# Patient Record
Sex: MALE | Race: WHITE | NOT HISPANIC OR LATINO | Employment: OTHER | URBAN - METROPOLITAN AREA
[De-identification: names, ages, dates, MRNs, and addresses within clinical notes are randomized per-mention and may not be internally consistent; named-entity substitution may affect disease eponyms.]

---

## 2017-01-06 ENCOUNTER — OFFICE VISIT (OUTPATIENT)
Dept: CARDIOLOGY | Facility: MEDICAL CENTER | Age: 67
End: 2017-01-06
Payer: MEDICARE

## 2017-01-06 ENCOUNTER — NON-PROVIDER VISIT (OUTPATIENT)
Dept: CARDIOLOGY | Facility: MEDICAL CENTER | Age: 67
End: 2017-01-06
Payer: MEDICARE

## 2017-01-06 VITALS
BODY MASS INDEX: 32.27 KG/M2 | OXYGEN SATURATION: 93 % | WEIGHT: 238 LBS | SYSTOLIC BLOOD PRESSURE: 128 MMHG | DIASTOLIC BLOOD PRESSURE: 72 MMHG | HEART RATE: 72 BPM

## 2017-01-06 DIAGNOSIS — I48.0 PAROXYSMAL ATRIAL FIBRILLATION (HCC): ICD-10-CM

## 2017-01-06 DIAGNOSIS — I49.5 SINOATRIAL NODE DYSFUNCTION (HCC): ICD-10-CM

## 2017-01-06 DIAGNOSIS — Z95.810 AICD (AUTOMATIC CARDIOVERTER/DEFIBRILLATOR) PRESENT: ICD-10-CM

## 2017-01-06 DIAGNOSIS — Z98.890 S/P CARDIAC CATH: ICD-10-CM

## 2017-01-06 DIAGNOSIS — I47.29 PAROXYSMAL VENTRICULAR TACHYCARDIA (HCC): ICD-10-CM

## 2017-01-06 DIAGNOSIS — Z79.899 HIGH RISK MEDICATION USE: ICD-10-CM

## 2017-01-06 DIAGNOSIS — Z79.01 ANTICOAGULANT LONG-TERM USE: ICD-10-CM

## 2017-01-06 DIAGNOSIS — I47.20 VENTRICULAR TACHYCARDIA (HCC): ICD-10-CM

## 2017-01-06 PROCEDURE — G8427 DOCREV CUR MEDS BY ELIG CLIN: HCPCS | Performed by: INTERNAL MEDICINE

## 2017-01-06 PROCEDURE — 99214 OFFICE O/P EST MOD 30 MIN: CPT | Mod: 25 | Performed by: INTERNAL MEDICINE

## 2017-01-06 PROCEDURE — G8419 CALC BMI OUT NRM PARAM NOF/U: HCPCS | Performed by: INTERNAL MEDICINE

## 2017-01-06 PROCEDURE — 3017F COLORECTAL CA SCREEN DOC REV: CPT | Mod: 8P | Performed by: INTERNAL MEDICINE

## 2017-01-06 PROCEDURE — 1036F TOBACCO NON-USER: CPT | Performed by: INTERNAL MEDICINE

## 2017-01-06 PROCEDURE — 93283 PRGRMG EVAL IMPLANTABLE DFB: CPT | Performed by: INTERNAL MEDICINE

## 2017-01-06 NOTE — Clinical Note
[facility]   [address], [address]  [place] [address]  Phone: [phone]  Fax: [phone]              Mkie Ferrell  1950    Encounter Date: 1/6/2017    Daniel Ferrera M.D.          PROGRESS NOTE:  Subjective:   Mike Ferrell is a 66 y.o. male who presents today with PAF and VT on sotalol. One asymptomatic NSVT. No a fib. Feels well. No chest pain or SOB. No syncope or presyncope. Device near TERESA.    Past Medical History   Diagnosis Date   • Paroxysmal ventricular tachycardia (HCC) 4/4/2012   • Sinoatrial node dysfunction (HCC) 4/4/2012   • Atrial fibrillation (HCC) 4/4/2012   • Hypopotassemia 4/4/2012   • Unspecified essential hypertension 4/4/2012   • Other and unspecified hyperlipidemia 4/4/2012   • Syncope and collapse 4/4/2012   • Thrombocytopenia, unspecified (HCC) 4/4/2012   • Methamphetamine use none for many years   • Automatic implantable cardiac defibrillator in situ 11/8/2012     History reviewed. No pertinent past surgical history.  History reviewed. No pertinent family history.  History   Smoking status   • Never Smoker    Smokeless tobacco   • Never Used     Allergies   Allergen Reactions   • Sulfa Drugs      Outpatient Encounter Prescriptions as of 1/6/2017   Medication Sig Dispense Refill   • warfarin (COUMADIN) 5 MG Tab TAKE ONE TO ONE & ONE-HALF TABLETS BY MOUTH ONCE DAILY AS DIRECTED BY  COUMADIN  CLINIC 120 Tab 1   • hydrochlorothiazide (MICROZIDE) 12.5 MG capsule Take 1 Cap by mouth every day. 90 Cap 3   • sotalol (BETAPACE) 120 MG tablet Take 1 Tab by mouth 2 times a day. TAKE 1 TAB BY MOUTH 2 TIMES A DAY. 180 Tab 3   • atorvastatin (LIPITOR) 40 MG TABS Take 1 Tab by mouth every day. 30 Tab 0   • amlodipine (NORVASC) 5 MG TABS Take 5 mg by mouth every day.     • benazepril (LOTENSIN) 40 MG tablet Take 40 mg by mouth every day.     • clonidine (CATAPRES) 0.1 MG TABS Take 0.1 mg by mouth as needed. Hardly ever uses     •  Cholecalciferol (VITAMIN D PO) Take 4,000 Units by mouth every day at 6 PM.       No facility-administered encounter medications on file as of 1/6/2017.     ROS     Objective:   /72 mmHg  Pulse 72  Wt 107.956 kg (238 lb)  SpO2 93%    Physical Exam   Constitutional: He is oriented to person, place, and time. He appears well-developed and well-nourished.   HENT:   Mouth/Throat: Oropharynx is clear and moist.   Eyes: Conjunctivae and EOM are normal.   Neck: No JVD present. No thyroid mass present.   Cardiovascular: Normal rate, regular rhythm, S1 normal, S2 normal and normal pulses.  PMI is not displaced.  Exam reveals no gallop.    No murmur heard.  Pulses:       Carotid pulses are 2+ on the right side, and 2+ on the left side.       Radial pulses are 2+ on the right side, and 2+ on the left side.        Femoral pulses are 2+ on the right side, and 2+ on the left side.       Dorsalis pedis pulses are 2+ on the right side, and 2+ on the left side.   No peripheral edema.   Pulmonary/Chest: Effort normal and breath sounds normal.   Abdominal: Soft. Normal appearance. He exhibits no abdominal bruit. There is no hepatosplenomegaly. There is no tenderness.   Musculoskeletal: Normal range of motion. He exhibits no edema.        Thoracic back: He exhibits no tenderness and no spasm.   Neurological: He is alert and oriented to person, place, and time.   Skin: No rash noted. No cyanosis. Nails show no clubbing.   Psychiatric: He has a normal mood and affect.       Assessment:     1. S/P cardiac cath     2. Anticoagulant long-term use     3. Paroxysmal atrial fibrillation (HCC)     4. High risk medication use     5. Paroxysmal ventricular tachycardia (HCC)     6. Sinoatrial node dysfunction (HCC)         Medical Decision Making:  Today's Assessment / Status / Plan:     1. VT one NSVT. Continue sotalol.  2. A fib none.  3. Anticoagulation continue.  4. HLD good numbers on statins.  5. Device nearing TERESA. Monthly  checks.      Hipolito Hall M.D.  [address] [facility] [place] [address]  VIA Facsimile: [phone]

## 2017-01-06 NOTE — PROGRESS NOTES
Subjective:   Mike Ferrell is a 66 y.o. male who presents today with PAF and VT on sotalol. One asymptomatic NSVT. No a fib. Feels well. No chest pain or SOB. No syncope or presyncope. Device near TERESA.    Past Medical History   Diagnosis Date   • Paroxysmal ventricular tachycardia (HCC) 4/4/2012   • Sinoatrial node dysfunction (HCC) 4/4/2012   • Atrial fibrillation (HCC) 4/4/2012   • Hypopotassemia 4/4/2012   • Unspecified essential hypertension 4/4/2012   • Other and unspecified hyperlipidemia 4/4/2012   • Syncope and collapse 4/4/2012   • Thrombocytopenia, unspecified (HCC) 4/4/2012   • Methamphetamine use none for many years   • Automatic implantable cardiac defibrillator in situ 11/8/2012     History reviewed. No pertinent past surgical history.  History reviewed. No pertinent family history.  History   Smoking status   • Never Smoker    Smokeless tobacco   • Never Used     Allergies   Allergen Reactions   • Sulfa Drugs      Outpatient Encounter Prescriptions as of 1/6/2017   Medication Sig Dispense Refill   • warfarin (COUMADIN) 5 MG Tab TAKE ONE TO ONE & ONE-HALF TABLETS BY MOUTH ONCE DAILY AS DIRECTED BY  COUMADIN  CLINIC 120 Tab 1   • hydrochlorothiazide (MICROZIDE) 12.5 MG capsule Take 1 Cap by mouth every day. 90 Cap 3   • sotalol (BETAPACE) 120 MG tablet Take 1 Tab by mouth 2 times a day. TAKE 1 TAB BY MOUTH 2 TIMES A DAY. 180 Tab 3   • atorvastatin (LIPITOR) 40 MG TABS Take 1 Tab by mouth every day. 30 Tab 0   • amlodipine (NORVASC) 5 MG TABS Take 5 mg by mouth every day.     • benazepril (LOTENSIN) 40 MG tablet Take 40 mg by mouth every day.     • clonidine (CATAPRES) 0.1 MG TABS Take 0.1 mg by mouth as needed. Hardly ever uses     • Cholecalciferol (VITAMIN D PO) Take 4,000 Units by mouth every day at 6 PM.       No facility-administered encounter medications on file as of 1/6/2017.     ROS     Objective:   /72 mmHg  Pulse 72  Wt 107.956 kg (238 lb)  SpO2 93%    Physical Exam    Constitutional: He is oriented to person, place, and time. He appears well-developed and well-nourished.   HENT:   Mouth/Throat: Oropharynx is clear and moist.   Eyes: Conjunctivae and EOM are normal.   Neck: No JVD present. No thyroid mass present.   Cardiovascular: Normal rate, regular rhythm, S1 normal, S2 normal and normal pulses.  PMI is not displaced.  Exam reveals no gallop.    No murmur heard.  Pulses:       Carotid pulses are 2+ on the right side, and 2+ on the left side.       Radial pulses are 2+ on the right side, and 2+ on the left side.        Femoral pulses are 2+ on the right side, and 2+ on the left side.       Dorsalis pedis pulses are 2+ on the right side, and 2+ on the left side.   No peripheral edema.   Pulmonary/Chest: Effort normal and breath sounds normal.   Abdominal: Soft. Normal appearance. He exhibits no abdominal bruit. There is no hepatosplenomegaly. There is no tenderness.   Musculoskeletal: Normal range of motion. He exhibits no edema.        Thoracic back: He exhibits no tenderness and no spasm.   Neurological: He is alert and oriented to person, place, and time.   Skin: No rash noted. No cyanosis. Nails show no clubbing.   Psychiatric: He has a normal mood and affect.       Assessment:     1. S/P cardiac cath     2. Anticoagulant long-term use     3. Paroxysmal atrial fibrillation (HCC)     4. High risk medication use     5. Paroxysmal ventricular tachycardia (HCC)     6. Sinoatrial node dysfunction (HCC)         Medical Decision Making:  Today's Assessment / Status / Plan:     1. VT one NSVT. Continue sotalol.  2. A fib none.  3. Anticoagulation continue.  4. HLD good numbers on statins.  5. Device nearing TERESA. Monthly checks.

## 2017-01-06 NOTE — MR AVS SNAPSHOT
Mike Ferrell   2017 9:40 AM   Office Visit   MRN: [id number]    Department:  [facility]   Dept Phone:  [phone]    Description:  Male : 1950   Provider:  Daniel Ferrera M.D.           Reason for Visit     Follow-Up           Allergies as of 2017     Allergen Noted Reactions    Sulfa Drugs 2012         You were diagnosed with     S/P cardiac cath   [051465]       Anticoagulant long-term use   [531837]       Paroxysmal atrial fibrillation (HCC)   [060441]       High risk medication use   [082460]       Paroxysmal ventricular tachycardia (HCC)   [427.1.ICD-9-CM]       Sinoatrial node dysfunction (HCC)   [427.81.ICD-9-CM]         Vital Signs     Blood Pressure Pulse Weight Oxygen Saturation Smoking Status       128/72 mmHg 72 107.956 kg (238 lb) 93% Never Smoker        Basic Information     Date Of Birth Sex Race Ethnicity Preferred Language    1950 Male White Non- English      Your appointments     2017  9:15 AM   Established Patient with [facility] EXAM 5   [facility]  (--)    [address] [facility] [address]   [phone]            2017  8:45 AM   PACER CHECK ONLY with PACER CHECK-[facility]   [facility] (--)    [address] [facility] [facility] [address]  [facility] [id number] [phone]            2017  9:00 AM   FOLLOW UP with Daniel Ferrera M.D.   [facility] (--)    [address] [place] [address] [address]  [facility] [id number] [phone]              Problem List              ICD-10-CM Priority Class Noted - Resolved    Paroxysmal ventricular tachycardia (HCC) I47.2   2012 - Present    Sinoatrial node dysfunction (HCC) I49.5   2012 - Present    Atrial fibrillation (HCC) I48.91   2012 - Present    Hypopotassemia E87.6   2012 - Present    Essential hypertension I10   2012 - Present    Other and unspecified hyperlipidemia  E78.5   4/4/2012 - Present    Syncope and collapse R55   4/4/2012 - Present    Thrombocytopenia (HCC) D69.6   4/4/2012 - Present    High risk medication use Z79.899   5/14/2012 - Present    Anticoagulant long-term use Z79.01   5/14/2012 - Present    Automatic implantable cardioverter-defibrillator in situ Z95.810   11/8/2012 - Present    Alcohol abuse F10.10   5/8/2013 - Present    Essential hypertension, benign I10   6/2/2014 - Present    HLD (hyperlipidemia) E78.5   8/26/2015 - Present    Paroxysmal atrial fibrillation (HCC) I48.0   9/9/2015 - Present    S/P cardiac cath Z98.890   1/6/2017 - Present      Health Maintenance        Date Due Completion Dates    IMM DTaP/Tdap/Td Vaccine (1 - Tdap) 7/8/1967 ---    COLONOSCOPY 7/8/1998 ---    IMM ZOSTER VACCINE 7/8/2008 ---    IMM PNEUMOCOCCAL 65+ (ADULT) LOW/MEDIUM RISK SERIES (1 of 2 - PCV13) 7/8/2013 ---    IMM INFLUENZA (1) 9/1/2016 ---            Current Immunizations     No immunizations on file.      Below and/or attached are the medications your provider expects you to take. Review all of your home medications and newly ordered medications with your provider and/or pharmacist. Follow medication instructions as directed by your provider and/or pharmacist. Please keep your medication list with you and share with your provider. Update the information when medications are discontinued, doses are changed, or new medications (including over-the-counter products) are added; and carry medication information at all times in the event of emergency situations     Allergies:  SULFA DRUGS - (reactions not documented)               Medications  Valid as of: January 06, 2017 - 10:48 AM    Generic Name Brand Name Tablet Size Instructions for use    AmLODIPine Besylate (Tab) NORVASC 5 MG Take 5 mg by mouth every day.        Atorvastatin Calcium (Tab) LIPITOR 40 MG Take 1 Tab by mouth every day.        Benazepril HCl (Tab) LOTENSIN 40 MG Take 40 mg by mouth every day.         Cholecalciferol   Take 4,000 Units by mouth every day at 6 PM.        CloNIDine HCl (Tab) CATAPRES 0.1 MG Take 0.1 mg by mouth as needed. Hardly ever uses        HydroCHLOROthiazide (Cap) MICROZIDE 12.5 MG Take 1 Cap by mouth every day.        Sotalol HCl (Tab) BETAPACE 120 MG Take 1 Tab by mouth 2 times a day. TAKE 1 TAB BY MOUTH 2 TIMES A DAY.        Warfarin Sodium (Tab) COUMADIN 5 MG TAKE ONE TO ONE & ONE-HALF TABLETS BY MOUTH ONCE DAILY AS DIRECTED BY  COUMADIN  CLINIC        .                 Medicines prescribed today were sent to:     [id number] - [place], [place] - [address]    [address] [facility] [address]    Phone: [phone] Fax: [phone]    Open 24 Hours?: No    [facility] [address] [place], [place] - [address] [facility]    [address] [facility] [address]    Phone: [phone] Fax: [phone]    Open 24 Hours?: No      Medication refill instructions:       If your prescription bottle indicates you have medication refills left, it is not necessary to call your provider’s office. Please contact your pharmacy and they will refill your medication.    If your prescription bottle indicates you do not have any refills left, you may request refills at any time through one of the following ways: The online [email or web address] system (except Urgent Care), by calling your provider’s office, or by asking your pharmacy to contact your provider’s office with a refill request. Medication refills are processed only during regular business hours and may not be available until the next business day. Your provider may request additional information or to have a follow-up visit with you prior to refilling your medication.   *Please Note: Medication refills are assigned a new Rx number when refilled electronically. Your pharmacy may indicate that no refills were authorized even though a new prescription for the same medication is available at the pharmacy. Please request the medicine by name  with the pharmacy before contacting your provider for a refill.           [employer] Pharmalab Access Code: [email or web address]  Expires: 2/5/2017  9:13 AM    [employer] Pharmalab  A secure, online tool to manage your health information     [employer]’s [employer] Pharmalab® is a secure, online tool that connects you to your personalized health information from the privacy of your home -- day or night - making it very easy for you to manage your healthcare. Once the activation process is completed, you can even access your medical information using the [employer] Pharmalab martha, which is available for free in the Apple Martha store or Google Play store.     [employer] Pharmalab provides the following levels of access (as shown below):   My Chart Features   [facility] Primary [facility] Doctor [facility]  Specialists [facility]  Urgent  Care [facility]  Primary Care  Doctor   Email your healthcare team securely and privately 24/7 X X X    Manage appointments: schedule your next appointment; view details of past/upcoming appointments X      Request prescription refills. X      View recent personal medical records, including lab and immunizations X X X X   View health record, including health history, allergies, medications X X X X   Read reports about your outpatient visits, procedures, consult and ER notes X X X X   See your discharge summary, which is a recap of your hospital and/or ER visit that includes your diagnosis, lab results, and care plan. X X       How to register for [employer] Pharm[employer]ab:  1. Go to  [email or web address].  2. Click on the Sign Up Now box, which takes you to the New Member Sign Up page. You will need to provide the following information:  a. Enter your [employer] Pharmalab Access Code exactly as it appears at the top of this page. (You will not need to use this code after you’ve completed the sign-up process. If you do not sign up before the expiration date, you must request a new code.)   b. Enter your date of birth.   c. Enter your home email address.   d. Click Submit, and follow the next  screen’s instructions.  3. Create a "Hey, Neighbor!"t ID. This will be your "Hey, Neighbor!"t login ID and cannot be changed, so think of one that is secure and easy to remember.  4. Create a "Hey, Neighbor!"t password. You can change your password at any time.  5. Enter your Password Reset Question and Answer. This can be used at a later time if you forget your password.   6. Enter your e-mail address. This allows you to receive e-mail notifications when new information is available in [employer].  7. Click Sign Up. You can now view your health information.    For assistance activating your [employer] account, call [phone]

## 2017-01-25 ENCOUNTER — ANTICOAGULATION VISIT (OUTPATIENT)
Dept: VASCULAR LAB | Facility: MEDICAL CENTER | Age: 67
End: 2017-01-25
Attending: NURSE PRACTITIONER
Payer: MEDICARE

## 2017-01-25 VITALS — SYSTOLIC BLOOD PRESSURE: 119 MMHG | DIASTOLIC BLOOD PRESSURE: 80 MMHG | HEART RATE: 77 BPM

## 2017-01-25 DIAGNOSIS — I48.91 ATRIAL FIBRILLATION, UNSPECIFIED TYPE (HCC): ICD-10-CM

## 2017-01-25 DIAGNOSIS — I48.0 PAROXYSMAL ATRIAL FIBRILLATION (HCC): ICD-10-CM

## 2017-01-25 LAB — INR PPP: 2.5 (ref 2–3.5)

## 2017-01-25 PROCEDURE — 99211 OFF/OP EST MAY X REQ PHY/QHP: CPT | Performed by: NURSE PRACTITIONER

## 2017-01-25 PROCEDURE — 85610 PROTHROMBIN TIME: CPT

## 2017-01-25 NOTE — PROGRESS NOTES
Anticoagulation Summary as of 1/25/2017     INR goal 2.0-3.0   Selected INR 2.5 (1/25/2017)   Maintenance plan 7.5 mg (5 mg x 1.5) on Sun, Tue, Thu; 5 mg (5 mg x 1) all other days   Weekly total 42.5 mg   No change documented DIANE PikePOG   Plan last modified ILEANA Pike (9/30/2016)   Next INR check 3/22/2017   Target end date     Indications   Atrial fibrillation (CMS-[facility]) [I48.91]  Paroxysmal atrial fibrillation (CMS-[facility]) [I48.0]         Anticoagulation Episode Summary     INR check location [facility]    Preferred lab     Send INR reminders to     Comments       Anticoagulation Care Providers     Provider Role Specialty Phone number    Daniel Ferrera M.D. Referring Cardiac Electrophysiology [phone]    [facility] Responsible  [phone]        Patient is therapeutic today. Denies any medication or diet changes. No current symptoms of bleeding or thrombosis reported. Continue current regimen. Follow up in 8 weeks.    Medication: Warfarin (Coumadin)     Sunday Monday Tuesday Wednesday Thursday Friday Saturday      7.5 mg    5 mg    7.5 mg    5 mg    7.5 mg    5 mg    5 mg      1.5 tab(s)    1 tab(s)    1.5 tab(s)    1 tab(s)    1.5 tab(s)    1 tab(s)  1 tab(s)     Next Appointment: Wednesday, March 22, 2017 at 9:15 am.    Rebecca SNIDER

## 2017-01-25 NOTE — MR AVS SNAPSHOT
Mike Soy Ferrell   2017 9:15 AM   Anticoagulation Visit   MRN: [id number]    Department:  Vascular Medicine   Dept Phone:  [phone]    Description:  Male : 1950   Provider:  [facility] EXAM 5           Allergies as of 2017     Allergen Noted Reactions    Sulfa Drugs 2012         You were diagnosed with     Paroxysmal atrial fibrillation (CMS-HCC)   [361881]       Atrial fibrillation, unspecified type (CMS-HCC)   [4712189]         Vital Signs     Smoking Status                   Never Smoker            Basic Information     Date Of Birth Sex Race Ethnicity Preferred Language    1950 Male White Non- English      Your appointments     2017  9:15 AM   Established Patient with IHVH EXAM 5   [facility]  (--)    [address]  [place] [address]   [phone]            Mar 22, 2017  9:15 AM   Established Patient with IHVH EXAM 4   [facility]  (--)    [address]  [place] [address]   [phone]            2017  8:45 AM   PACER CHECK ONLY with PACER CHECK-CAM B   [facility]-CAM B (--)    [address]  [place] [address]   [phone]            2017  9:00 AM   FOLLOW UP with Daniel Ferrera M.D.   [facility]-CAM B (--)    [address], [address]  [place] [address]   [phone]              Problem List              ICD-10-CM Priority Class Noted - Resolved    Paroxysmal ventricular tachycardia (CMS-HCC) I47.2   2012 - Present    Sinoatrial node dysfunction (CMS-HCC) I49.5   2012 - Present    Atrial fibrillation (CMS-HCC) I48.91   2012 - Present    Hypopotassemia E87.6   2012 - Present    Essential hypertension I10   2012 - Present    Other and unspecified hyperlipidemia E78.5   2012 - Present    Syncope and collapse R55   2012 -  Present    Thrombocytopenia (CMS-HCC) D69.6   4/4/2012 - Present    High risk medication use Z79.899   5/14/2012 - Present    Anticoagulant long-term use Z79.01   5/14/2012 - Present    Automatic implantable cardioverter-defibrillator in situ Z95.810   11/8/2012 - Present    Alcohol abuse F10.10   5/8/2013 - Present    Essential hypertension, benign I10   6/2/2014 - Present    HLD (hyperlipidemia) E78.5   8/26/2015 - Present    Paroxysmal atrial fibrillation (CMS-HCC) I48.0   9/9/2015 - Present    S/P cardiac cath Z98.890   1/6/2017 - Present      Health Maintenance        Date Due Completion Dates    IMM DTaP/Tdap/Td Vaccine (1 - Tdap) 7/8/1967 ---    COLONOSCOPY 7/8/1998 ---    IMM ZOSTER VACCINE 7/8/2008 ---    IMM PNEUMOCOCCAL 65+ (ADULT) LOW/MEDIUM RISK SERIES (1 of 2 - PCV13) 7/8/2013 ---    IMM INFLUENZA (1) 9/1/2016 ---            Results     POCT Protime      Component    INR    2.5                        Current Immunizations     No immunizations on file.      Below and/or attached are the medications your provider expects you to take. Review all of your home medications and newly ordered medications with your provider and/or pharmacist. Follow medication instructions as directed by your provider and/or pharmacist. Please keep your medication list with you and share with your provider. Update the information when medications are discontinued, doses are changed, or new medications (including over-the-counter products) are added; and carry medication information at all times in the event of emergency situations     Allergies:  SULFA DRUGS - (reactions not documented)               Medications  Valid as of: January 25, 2017 -  9:10 AM    Generic Name Brand Name Tablet Size Instructions for use    AmLODIPine Besylate (Tab) NORVASC 5 MG Take 5 mg by mouth every day.        Atorvastatin Calcium (Tab) LIPITOR 40 MG Take 1 Tab by mouth every day.        Benazepril HCl (Tab) LOTENSIN 40 MG Take 40 mg by mouth every  day.        Cholecalciferol   Take 4,000 Units by mouth every day at 6 PM.        CloNIDine HCl (Tab) CATAPRES 0.1 MG Take 0.1 mg by mouth as needed. Hardly ever uses        HydroCHLOROthiazide (Cap) MICROZIDE 12.5 MG Take 1 Cap by mouth every day.        Sotalol HCl (Tab) BETAPACE 120 MG Take 1 Tab by mouth 2 times a day. TAKE 1 TAB BY MOUTH 2 TIMES A DAY.        Warfarin Sodium (Tab) COUMADIN 5 MG TAKE ONE TO ONE & ONE-HALF TABLETS BY MOUTH ONCE DAILY AS DIRECTED BY  COUMADIN  CLINIC        .                 Medicines prescribed today were sent to:     [facility][id number] - [place], [place] - [address]    [address] [address]    Phone: [phone] Fax: [phone]    Open 24 Hours?: No    [facility] [address] [place] [address] [address]    [address] [address]    Phone: [phone] Fax: [phone]    Open 24 Hours?: No      Medication refill instructions:       If your prescription bottle indicates you have medication refills left, it is not necessary to call your provider’s office. Please contact your pharmacy and they will refill your medication.    If your prescription bottle indicates you do not have any refills left, you may request refills at any time through one of the following ways: The online My [employer] Interest system (except Urgent Care), by calling your provider’s office, or by asking your pharmacy to contact your provider’s office with a refill request. Medication refills are processed only during regular business hours and may not be available until the next business day. Your provider may request additional information or to have a follow-up visit with you prior to refilling your medication.   *Please Note: Medication refills are assigned a new Rx number when refilled electronically. Your pharmacy may indicate that no refills were authorized even though a new prescription for the same medication is available at the pharmacy. Please request the  medicine by name with the pharmacy before contacting your provider for a refill.        Warfarin Dosing Calendar   January 2017 Details    Sun Mon Tue Wed Thu Fri Sat     1               2               3               4               5               6               7                 8               9               10               11               12               13               14                 15               16               17               18               19               20               21                 22               23               24               25   2.5   5 mg   See details      26      7.5 mg         27      5 mg         28      5 mg           29      7.5 mg         30      5 mg         31      7.5 mg              Date Details   01/25 This INR check   INR: 2.5               How to take your warfarin dose     To take:  5 mg Take 1 of the 5 mg tablets.    To take:  7.5 mg Take 1.5 of the 5 mg tablets.           Warfarin Dosing Calendar   February 2017 Details    Sun Mon Tue Wed Thu Fri Sat        1      5 mg         2      7.5 mg         3      5 mg         4      5 mg           5      7.5 mg         6      5 mg         7      7.5 mg         8      5 mg         9      7.5 mg         10      5 mg         11      5 mg           12      7.5 mg         13      5 mg         14      7.5 mg         15      5 mg         16      7.5 mg         17      5 mg         18      5 mg           19      7.5 mg         20      5 mg         21      7.5 mg         22      5 mg         23      7.5 mg         24      5 mg         25      5 mg           26      7.5 mg         27      5 mg         28      7.5 mg              Date Details   No additional details            How to take your warfarin dose     To take:  5 mg Take 1 of the 5 mg tablets.    To take:  7.5 mg Take 1.5 of the 5 mg tablets.           Warfarin Dosing Calendar   March 2017 Details    Sun Mon Tue Wed Thu Fri Sat        1      5 mg         2         7.5 mg         3      5 mg         4      5 mg           5      7.5 mg         6      5 mg         7      7.5 mg         8      5 mg         9      7.5 mg         10      5 mg         11      5 mg           12      7.5 mg         13      5 mg         14      7.5 mg         15      5 mg         16      7.5 mg         17      5 mg         18      5 mg           19      7.5 mg         20      5 mg         21      7.5 mg         22      5 mg         23               24               25                 26               27               28               29               30               31                 Date Details   No additional details    Date of next INR:  3/22/2017         How to take your warfarin dose     To take:  5 mg Take 1 of the 5 mg tablets.    To take:  7.5 mg Take 1.5 of the 5 mg tablets.              Intellution Access Code: [email or web address]  Expires: 2/5/2017  9:13 AM    Intellution  A secure, online tool to manage your health information     [employer]’s Intellution® is a secure, online tool that connects you to your personalized health information from the privacy of your home -- day or night - making it very easy for you to manage your healthcare. Once the activation process is completed, you can even access your medical information using the Intellution martha, which is available for free in the Apple Martha store or Google Play store.     Intellution provides the following levels of access (as shown below):   My Chart Features   [facility] Primary [facility] Doctor [facility] [place]  Specialists [facility]  Urgent  Care [facility]  Primary Care  Doctor   Email your healthcare team securely and privately 24/7 X X X    Manage appointments: schedule your next appointment; view details of past/upcoming appointments X      Request prescription refills. X      View recent personal medical records, including lab and immunizations X X X X   View health record, including health history, allergies, medications X X X X   Read reports about your  outpatient visits, procedures, consult and ER notes X X X X   See your discharge summary, which is a recap of your hospital and/or ER visit that includes your diagnosis, lab results, and care plan. X X       How to register for SoftoCoupon:  1. Go to  [email or web address].  2. Click on the Sign Up Now box, which takes you to the New Member Sign Up page. You will need to provide the following information:  a. Enter your SoftoCoupon Access Code exactly as it appears at the top of this page. (You will not need to use this code after you’ve completed the sign-up process. If you do not sign up before the expiration date, you must request a new code.)   b. Enter your date of birth.   c. Enter your home email address.   d. Click Submit, and follow the next screen’s instructions.  3. Create a SoftoCoupon ID. This will be your SoftoCoupon login ID and cannot be changed, so think of one that is secure and easy to remember.  4. Create a SoftoCoupon password. You can change your password at any time.  5. Enter your Password Reset Question and Answer. This can be used at a later time if you forget your password.   6. Enter your e-mail address. This allows you to receive e-mail notifications when new information is available in SoftoCoupon.  7. Click Sign Up. You can now view your health information.    For assistance activating your SoftoCoupon account, call [phone]

## 2017-01-26 LAB — INR BLD: 2.5 (ref 0.9–1.2)

## 2017-03-22 ENCOUNTER — ANTICOAGULATION VISIT (OUTPATIENT)
Dept: VASCULAR LAB | Facility: MEDICAL CENTER | Age: 67
End: 2017-03-22
Attending: INTERNAL MEDICINE
Payer: MEDICARE

## 2017-03-22 VITALS — HEART RATE: 86 BPM | DIASTOLIC BLOOD PRESSURE: 87 MMHG | SYSTOLIC BLOOD PRESSURE: 120 MMHG

## 2017-03-22 DIAGNOSIS — I48.0 PAROXYSMAL ATRIAL FIBRILLATION (HCC): ICD-10-CM

## 2017-03-22 DIAGNOSIS — I48.91 ATRIAL FIBRILLATION, UNSPECIFIED TYPE (HCC): ICD-10-CM

## 2017-03-22 LAB
INR BLD: 2.7 (ref 0.9–1.2)
INR PPP: 2.7 (ref 2–3.5)

## 2017-03-22 PROCEDURE — 99211 OFF/OP EST MAY X REQ PHY/QHP: CPT | Performed by: NURSE PRACTITIONER

## 2017-03-22 PROCEDURE — 85610 PROTHROMBIN TIME: CPT

## 2017-03-22 NOTE — MR AVS SNAPSHOT
Mike Olivier Ferrell   3/22/2017 9:15 AM   Anticoagulation Visit   MRN: [id number]    Department:  Vascular Medicine   Dept Phone:  [phone]    Description:  Male : 1950   Provider:  [facility] EXAM 4           Allergies as of 3/22/2017     Allergen Noted Reactions    Sulfa Drugs 2012         You were diagnosed with     Paroxysmal atrial fibrillation (CMS-HCC)   [322081]       Atrial fibrillation, unspecified type (CMS-HCC)   [0334121]         Vital Signs     Blood Pressure Pulse Smoking Status             120/87 mmHg 86 Never Smoker          Basic Information     Date Of Birth Sex Race Ethnicity Preferred Language    1950 Male White Non- English      Your appointments     Mar 22, 2017  9:15 AM   Established Patient with IHV EXAM 4   [facility]  (--)    [address] [place] [address]   [phone]            May 24, 2017  9:15 AM   Established Patient with IHV EXAM 4   [facility]  (--)    [address] [place] [address]   [phone]            2017  8:45 AM   PACER CHECK ONLY with PACER CHECK-CAM B   [facility]-CAM B (--)    [address]  [place] [address]   [phone]            2017  9:00 AM   FOLLOW UP with Daniel Ferrera M.D.   [facility]-CAM B (--)    [address]  [place] [address]   [phone]              Problem List              ICD-10-CM Priority Class Noted - Resolved    Paroxysmal ventricular tachycardia (CMS-HCC) I47.2   2012 - Present    Sinoatrial node dysfunction (CMS-HCC) I49.5   2012 - Present    Atrial fibrillation (CMS-HCC) I48.91   2012 - Present    Hypopotassemia E87.6   2012 - Present    Essential hypertension I10   2012 - Present    Other and unspecified hyperlipidemia E78.5   2012 - Present    Syncope and  collapse R55   4/4/2012 - Present    Thrombocytopenia (CMS-HCC) D69.6   4/4/2012 - Present    High risk medication use Z79.899   5/14/2012 - Present    Anticoagulant long-term use Z79.01   5/14/2012 - Present    Automatic implantable cardioverter-defibrillator in situ Z95.810   11/8/2012 - Present    Alcohol abuse F10.10   5/8/2013 - Present    Essential hypertension, benign I10   6/2/2014 - Present    HLD (hyperlipidemia) E78.5   8/26/2015 - Present    Paroxysmal atrial fibrillation (CMS-HCC) I48.0   9/9/2015 - Present    S/P cardiac cath Z98.890   1/6/2017 - Present      Health Maintenance        Date Due Completion Dates    IMM DTaP/Tdap/Td Vaccine (1 - Tdap) 7/8/1967 ---    COLONOSCOPY 7/8/1998 ---    IMM ZOSTER VACCINE 7/8/2008 ---    IMM PNEUMOCOCCAL 65+ (ADULT) LOW/MEDIUM RISK SERIES (1 of 2 - PCV13) 7/8/2013 ---    IMM INFLUENZA (1) 9/1/2016 ---            Results     POCT Protime      Component    INR    2.7                        Current Immunizations     No immunizations on file.      Below and/or attached are the medications your provider expects you to take. Review all of your home medications and newly ordered medications with your provider and/or pharmacist. Follow medication instructions as directed by your provider and/or pharmacist. Please keep your medication list with you and share with your provider. Update the information when medications are discontinued, doses are changed, or new medications (including over-the-counter products) are added; and carry medication information at all times in the event of emergency situations     Allergies:  SULFA DRUGS - (reactions not documented)               Medications  Valid as of: March 22, 2017 -  9:05 AM    Generic Name Brand Name Tablet Size Instructions for use    AmLODIPine Besylate (Tab) NORVASC 5 MG Take 5 mg by mouth every day.        Atorvastatin Calcium (Tab) LIPITOR 40 MG Take 1 Tab by mouth every day.        Benazepril HCl (Tab) LOTENSIN 40 MG  Take 40 mg by mouth every day.        Cholecalciferol   Take 4,000 Units by mouth every day at 6 PM.        CloNIDine HCl (Tab) CATAPRES 0.1 MG Take 0.1 mg by mouth as needed. Hardly ever uses        HydroCHLOROthiazide (Cap) MICROZIDE 12.5 MG Take 1 Cap by mouth every day.        Sotalol HCl (Tab) BETAPACE 120 MG Take 1 Tab by mouth 2 times a day. TAKE 1 TAB BY MOUTH 2 TIMES A DAY.        Warfarin Sodium (Tab) COUMADIN 5 MG TAKE ONE TO ONE & ONE-HALF TABLETS BY MOUTH ONCE DAILY AS DIRECTED BY  COUMADIN  CLINIC        .                 Medicines prescribed today were sent to:     [facility]/PHARMACY #[id number] - [place], [place] - [address]    [address] [address]    Phone: [phone] Fax: [phone]    Open 24 Hours?: No    [facility] [address] [place] [address] - [address]    [address] [address]    Phone: [phone] Fax: [phone]    Open 24 Hours?: No      Medication refill instructions:       If your prescription bottle indicates you have medication refills left, it is not necessary to call your provider’s office. Please contact your pharmacy and they will refill your medication.    If your prescription bottle indicates you do not have any refills left, you may request refills at any time through one of the following ways: The online [employer] system (except Urgent Care), by calling your provider’s office, or by asking your pharmacy to contact your provider’s office with a refill request. Medication refills are processed only during regular business hours and may not be available until the next business day. Your provider may request additional information or to have a follow-up visit with you prior to refilling your medication.   *Please Note: Medication refills are assigned a new Rx number when refilled electronically. Your pharmacy may indicate that no refills were authorized even though a new prescription for the same medication is available at the  pharmacy. Please request the medicine by name with the pharmacy before contacting your provider for a refill.        Warfarin Dosing Calendar March 2017 Details    Sun Mon Tue Wed Thu Fri Sat        1               2               3               4                 5               6               7               8               9               10               11                 12               13               14               15               16               17               18                 19               20               21               22   2.7   5 mg   See details      23      7.5 mg         24      5 mg         25      5 mg           26      7.5 mg         27      5 mg         28      7.5 mg         29      5 mg         30      7.5 mg         31      5 mg           Date Details   03/22 This INR check   INR: 2.7               How to take your warfarin dose     To take:  5 mg Take 1 of the 5 mg tablets.    To take:  7.5 mg Take 1.5 of the 5 mg tablets.           Warfarin Dosing Calendar April 2017 Details    Sun Mon Tue Wed Thu Fri Sat           1      5 mg           2      7.5 mg         3      5 mg         4      7.5 mg         5      5 mg         6      7.5 mg         7      5 mg         8      5 mg           9      7.5 mg         10      5 mg         11      7.5 mg         12      5 mg         13      7.5 mg         14      5 mg         15      5 mg           16      7.5 mg         17      5 mg         18      7.5 mg         19      5 mg         20      7.5 mg         21      5 mg         22      5 mg           23      7.5 mg         24      5 mg         25      7.5 mg         26      5 mg         27      7.5 mg         28      5 mg         29      5 mg           30      7.5 mg                Date Details   No additional details            How to take your warfarin dose     To take:  5 mg Take 1 of the 5 mg tablets.    To take:  7.5 mg Take 1.5 of the 5 mg tablets.           Warfarin Dosing  Calendar   May 2017 Details    Sun Mon Tue Wed Thu Fri Sat      1      5 mg         2      7.5 mg         3      5 mg         4      7.5 mg         5      5 mg         6      5 mg           7      7.5 mg         8      5 mg         9      7.5 mg         10      5 mg         11      7.5 mg         12      5 mg         13      5 mg           14      7.5 mg         15      5 mg         16      7.5 mg         17      5 mg         18      7.5 mg         19      5 mg         20      5 mg           21      7.5 mg         22      5 mg         23      7.5 mg         24      5 mg         25               26               27                 28               29               30               31                   Date Details   No additional details    Date of next INR:  5/24/2017         How to take your warfarin dose     To take:  5 mg Take 1 of the 5 mg tablets.    To take:  7.5 mg Take 1.5 of the 5 mg tablets.              [employer] Access Code: [email or web address]  Expires: 4/21/2017  9:05 AM    [employer]  A secure, online tool to manage your health information     [email or web address]® is a secure, online tool that connects you to your personalized health information from the privacy of your home -- day or night - making it very easy for you to manage your healthcare. Once the activation process is completed, you can even access your medical information using the [employer] martha, which is available for free in the Apple Martha store or Google Play store.     [employer] provides the following levels of access (as shown below):   My Chart Features   [facility] Primary Care Doctor [facility]  Specialists [facility]  Urgent  Care Non-[facility]  Primary Care  Doctor   Email your healthcare team securely and privately 24/7 X X X    Manage appointments: schedule your next appointment; view details of past/upcoming appointments X      Request prescription refills. X      View recent personal medical records, including lab and immunizations X X X X   View  health record, including health history, allergies, medications X X X X   Read reports about your outpatient visits, procedures, consult and ER notes X X X X   See your discharge summary, which is a recap of your hospital and/or ER visit that includes your diagnosis, lab results, and care plan. X X       How to register for Ipercast:  1. Go to  [email or web address].  2. Click on the Sign Up Now box, which takes you to the New Member Sign Up page. You will need to provide the following information:  a. Enter your Ipercast Access Code exactly as it appears at the top of this page. (You will not need to use this code after you’ve completed the sign-up process. If you do not sign up before the expiration date, you must request a new code.)   b. Enter your date of birth.   c. Enter your home email address.   d. Click Submit, and follow the next screen’s instructions.  3. Create a Ipercast ID. This will be your Ipercast login ID and cannot be changed, so think of one that is secure and easy to remember.  4. Create a [email or web address] password. You can change your password at any time.  5. Enter your Password Reset Question and Answer. This can be used at a later time if you forget your password.   6. Enter your e-mail address. This allows you to receive e-mail notifications when new information is available in Ipercast.  7. Click Sign Up. You can now view your health information.    For assistance activating your Ipercast account, call [phone]

## 2017-03-22 NOTE — PROGRESS NOTES
Anticoagulation Summary as of 3/22/2017     INR goal 2.0-3.0   Selected INR 2.7 (3/22/2017)   Maintenance plan 7.5 mg (5 mg x 1.5) on Sun, Tue, Thu; 5 mg (5 mg x 1) all other days   Weekly total 42.5 mg   Plan last modified ILEANA Pike (9/30/2016)   Next INR check 5/24/2017   Target end date     Indications   Atrial fibrillation (CMS-HCC) [I48.91]  Paroxysmal atrial fibrillation (CMS-HCC) [I48.0]         Anticoagulation Episode Summary     INR check location [facility]    Preferred lab     Send INR reminders to     Comments       Anticoagulation Care Providers     Provider Role Specialty Phone number    Daniel Ferrera M.D. Referring Cardiac Electrophysiology [phone]    [facility] Anticoagulation Services Responsible  [phone]        Patient is therapeutic today. Denies any medication or diet changes. No current symptoms of bleeding or thrombosis reported. Continue current regimen. Follow up in 9 weeks.    Medication: Warfarin (Coumadin)     Sunday Monday Tuesday Wednesday Thursday Friday Saturday      7.5 mg    5 mg    7.5 mg    5 mg    7.5 mg    5 mg    5 mg      1.5 tab(s)    1 tab(s)    1.5 tab(s)    1 tab(s)    1.5 tab(s)    1 tab(s)  1 tab(s)     Next Appointment: Wednesday, May 24, 2017 at 9:15 am.    Rebecca SNIDER

## 2017-04-11 ENCOUNTER — TELEPHONE (OUTPATIENT)
Dept: CARDIOLOGY | Facility: MEDICAL CENTER | Age: 67
End: 2017-04-11

## 2017-04-11 NOTE — TELEPHONE ENCOUNTER
----- Message from Gayathri Banegas, Med Ass't sent at 4/7/2017 12:06 PM PDT -----  Patient's device at [facility]-- please call to schedule for gen change--can be done in the next 2-4 weeks.  Patient has dual chamber Medtronic AICD.    Thanks,  Gayathri

## 2017-04-11 NOTE — TELEPHONE ENCOUNTER
Dr. Ferrera,    I'm going to schedule this gen change with you. This patient is taking Coumadin. Would you like him to hold the Coumadin for this procedure or continue taking it?    Thank You,  Diana

## 2017-04-13 NOTE — TELEPHONE ENCOUNTER
Patient scheduled for ICD Gen change on 5-3-17 at [facility] with Dr. Ferrera.

## 2017-05-01 DIAGNOSIS — Z01.810 PRE-OPERATIVE CARDIOVASCULAR EXAMINATION: ICD-10-CM

## 2017-05-01 DIAGNOSIS — Z01.812 PRE-OPERATIVE LABORATORY EXAMINATION: ICD-10-CM

## 2017-05-01 LAB
ALBUMIN SERPL BCP-MCNC: 4.6 G/DL (ref 3.2–4.9)
ALBUMIN/GLOB SERPL: 1.8 G/DL
ALP SERPL-CCNC: 92 U/L (ref 30–99)
ALT SERPL-CCNC: 28 U/L (ref 2–50)
ANION GAP SERPL CALC-SCNC: 10 MMOL/L (ref 0–11.9)
AST SERPL-CCNC: 29 U/L (ref 12–45)
BILIRUB SERPL-MCNC: 1.6 MG/DL (ref 0.1–1.5)
BUN SERPL-MCNC: 23 MG/DL (ref 8–22)
CALCIUM SERPL-MCNC: 9.8 MG/DL (ref 8.5–10.5)
CHLORIDE SERPL-SCNC: 104 MMOL/L (ref 96–112)
CO2 SERPL-SCNC: 26 MMOL/L (ref 20–33)
CREAT SERPL-MCNC: 1.04 MG/DL (ref 0.5–1.4)
EKG IMPRESSION: NORMAL
ERYTHROCYTE [DISTWIDTH] IN BLOOD BY AUTOMATED COUNT: 46.5 FL (ref 35.9–50)
GFR SERPL CREATININE-BSD FRML MDRD: >60 ML/MIN/1.73 M 2
GLOBULIN SER CALC-MCNC: 2.5 G/DL (ref 1.9–3.5)
GLUCOSE SERPL-MCNC: 105 MG/DL (ref 65–99)
HCT VFR BLD AUTO: 41.9 % (ref 42–52)
HGB BLD-MCNC: 14.3 G/DL (ref 14–18)
INR PPP: 2.48 (ref 0.87–1.13)
MCH RBC QN AUTO: 32.7 PG (ref 27–33)
MCHC RBC AUTO-ENTMCNC: 34.1 G/DL (ref 33.7–35.3)
MCV RBC AUTO: 95.9 FL (ref 81.4–97.8)
PLATELET # BLD AUTO: 133 K/UL (ref 164–446)
PMV BLD AUTO: 11.2 FL (ref 9–12.9)
POTASSIUM SERPL-SCNC: 3.8 MMOL/L (ref 3.6–5.5)
PROT SERPL-MCNC: 7.1 G/DL (ref 6–8.2)
PROTHROMBIN TIME: 27.6 SEC (ref 12–14.6)
RBC # BLD AUTO: 4.37 M/UL (ref 4.7–6.1)
SODIUM SERPL-SCNC: 140 MMOL/L (ref 135–145)
WBC # BLD AUTO: 4.3 K/UL (ref 4.8–10.8)

## 2017-05-01 PROCEDURE — 85027 COMPLETE CBC AUTOMATED: CPT

## 2017-05-01 PROCEDURE — 93005 ELECTROCARDIOGRAM TRACING: CPT

## 2017-05-01 PROCEDURE — 93010 ELECTROCARDIOGRAM REPORT: CPT | Performed by: INTERNAL MEDICINE

## 2017-05-01 PROCEDURE — 80053 COMPREHEN METABOLIC PANEL: CPT

## 2017-05-01 PROCEDURE — 85610 PROTHROMBIN TIME: CPT

## 2017-05-01 PROCEDURE — 36415 COLL VENOUS BLD VENIPUNCTURE: CPT

## 2017-05-01 RX ORDER — ACETAMINOPHEN 500 MG
500-1000 TABLET ORAL EVERY 6 HOURS PRN
COMMUNITY
End: 2019-06-21

## 2017-05-03 ENCOUNTER — HOSPITAL ENCOUNTER (OUTPATIENT)
Facility: MEDICAL CENTER | Age: 67
End: 2017-05-03
Attending: INTERNAL MEDICINE | Admitting: INTERNAL MEDICINE
Payer: MEDICARE

## 2017-05-03 VITALS
WEIGHT: 237.22 LBS | HEIGHT: 72 IN | BODY MASS INDEX: 32.13 KG/M2 | HEART RATE: 65 BPM | RESPIRATION RATE: 22 BRPM | TEMPERATURE: 97.7 F | SYSTOLIC BLOOD PRESSURE: 138 MMHG | OXYGEN SATURATION: 94 % | DIASTOLIC BLOOD PRESSURE: 86 MMHG

## 2017-05-03 PROBLEM — T82.111A COMPLICATIONS, MECHANICAL, PACEMAKER, CARDIAC: Status: ACTIVE | Noted: 2017-05-03

## 2017-05-03 LAB
INR PPP: 1.47 (ref 0.87–1.13)
PROTHROMBIN TIME: 18.3 SEC (ref 12–14.6)

## 2017-05-03 PROCEDURE — 99153 MOD SED SAME PHYS/QHP EA: CPT

## 2017-05-03 PROCEDURE — 85610 PROTHROMBIN TIME: CPT

## 2017-05-03 PROCEDURE — 99152 MOD SED SAME PHYS/QHP 5/>YRS: CPT

## 2017-05-03 PROCEDURE — 700101 HCHG RX REV CODE 250

## 2017-05-03 PROCEDURE — C1721 AICD, DUAL CHAMBER: HCPCS

## 2017-05-03 PROCEDURE — 33263 RMVL & RPLCMT DFB GEN 2 LEAD: CPT

## 2017-05-03 PROCEDURE — 305387 HCHG SUTURES

## 2017-05-03 PROCEDURE — 304952 HCHG R 2 PADS

## 2017-05-03 PROCEDURE — 304853 HCHG PPM TEST CABLE

## 2017-05-03 PROCEDURE — 700111 HCHG RX REV CODE 636 W/ 250 OVERRIDE (IP)

## 2017-05-03 RX ORDER — OXYCODONE HYDROCHLORIDE AND ACETAMINOPHEN 5; 325 MG/1; MG/1
1-2 TABLET ORAL EVERY 4 HOURS PRN
Status: DISCONTINUED | OUTPATIENT
Start: 2017-05-03 | End: 2017-05-03 | Stop reason: HOSPADM

## 2017-05-03 RX ORDER — CEFAZOLIN SODIUM 1 G/3ML
INJECTION, POWDER, FOR SOLUTION INTRAMUSCULAR; INTRAVENOUS
Status: COMPLETED
Start: 2017-05-03 | End: 2017-05-03

## 2017-05-03 RX ORDER — MIDAZOLAM HYDROCHLORIDE 1 MG/ML
INJECTION INTRAMUSCULAR; INTRAVENOUS
Status: COMPLETED
Start: 2017-05-03 | End: 2017-05-03

## 2017-05-03 RX ORDER — LIDOCAINE HYDROCHLORIDE 20 MG/ML
INJECTION, SOLUTION INFILTRATION; PERINEURAL
Status: COMPLETED
Start: 2017-05-03 | End: 2017-05-03

## 2017-05-03 RX ADMIN — FENTANYL CITRATE 100 MCG: 50 INJECTION, SOLUTION INTRAMUSCULAR; INTRAVENOUS at 07:59

## 2017-05-03 RX ADMIN — CEFAZOLIN 1000 MG: 1 INJECTION, POWDER, FOR SOLUTION INTRAVENOUS at 07:49

## 2017-05-03 RX ADMIN — LIDOCAINE HYDROCHLORIDE: 20 INJECTION, SOLUTION INFILTRATION; PERINEURAL at 07:49

## 2017-05-03 RX ADMIN — MIDAZOLAM 2 MG: 1 INJECTION INTRAMUSCULAR; INTRAVENOUS at 07:59

## 2017-05-03 RX ADMIN — MIDAZOLAM 1 MG: 1 INJECTION INTRAMUSCULAR; INTRAVENOUS at 08:03

## 2017-05-03 ASSESSMENT — PAIN SCALES - GENERAL
PAINLEVEL_OUTOF10: 0

## 2017-05-03 NOTE — IP AVS SNAPSHOT
5/3/2017    Mike Ferrell  [address]  [place] [facility] [address]    Dear Mike:    [facility] wants to ensure your discharge home is safe and you or your loved ones have had all of your questions answered regarding your care after you leave the hospital.    Below is a list of resources and contact information should you have any questions regarding your hospital stay, follow-up instructions, or active medical symptoms.    Questions or Concerns Regarding… Contact   Medical Questions Related to Your Discharge  (7 days a week, 8am-5pm) Contact a Nurse Care Coordinator   [phone]   Medical Questions Not Related to Your Discharge  (24 hours a day / 7 days a week)  Contact the Nurse Health Line   [phone]    Medications or Discharge Instructions Refer to your discharge packet   or contact your [facility] Primary Care Provider   [phone]   Follow-up Appointment(s) Schedule your appointment via [employer]   or contact Scheduling [phone]   Billing Review your statement via [employer]  or contact Billing [phone]   Medical Records Review your records via [employer]   or contact Medical Records [phone]     You may receive a telephone call within two days of discharge. This call is to make certain you understand your discharge instructions and have the opportunity to have any questions answered. You can also easily access your medical information, test results and upcoming appointments via the [employer] free online health management tool. You can learn more and sign up at [employer]. For assistance setting up your [employer] account, please call [phone].    Once again, we want to ensure your discharge home is safe and that you have a clear understanding of any next steps in your care. If you have any questions or concerns, please do not hesitate to contact us, we are here for you. Thank you for choosing [facility] for your healthcare needs.    Sincerely,    Your [facility] Healthcare Team

## 2017-05-03 NOTE — H&P
Physician H&P    Patient ID:  Mike Ferrell  [id number]  66 y.o. male  1950    History:  Primary Diagnosis: Secondary prevention AICD at [facility]    HPI:  65 yo w male admitted for AICD generator change. Initial PPM then upgrade to AICD with VT. No chest pain or SOB. No F,C,S. No bleeding. Coumadin held two days.    Past Medical History:  has a past medical history of Paroxysmal ventricular tachycardia (CMS-HCC) (4/4/2012); Sinoatrial node dysfunction (CMS-HCC) (4/4/2012); Atrial fibrillation (CMS-[facility]) (4/4/2012); Hypopotassemia (4/4/2012); Unspecified essential hypertension (4/4/2012); Other and unspecified hyperlipidemia (4/4/2012); Syncope and collapse (4/4/2012); Thrombocytopenia, unspecified (CMS-[facility]) (4/4/2012); Methamphetamine use (none for many years); Automatic implantable cardiac defibrillator in situ (11/8/2012); Arthritis; High cholesterol; Pneumonia (2015); Pain; and Pacemaker.  Past Surgical History:  has past surgical history that includes cholecystectomy (1999); pacemaker insertion (2009); aicd implant (2010); and tonsillectomy (1953).  Past Social History:  reports that he has never smoked. He has never used smokeless tobacco. He reports that he drinks alcohol. He reports that he does not use illicit drugs.  Past Family History: History reviewed. No pertinent family history.  Allergies: Sulfa drugs    Current Medications:  Prior to Admission medications    Medication Sig Start Date End Date Taking? Authorizing Provider   acetaminophen (TYLENOL) 500 MG Tab Take 500-1,000 mg by mouth every 6 hours as needed.   Yes Not In System Provider   POTASSIUM CHLORIDE PO Take 1 Tab by mouth every day.   Yes Not In System Provider   warfarin (COUMADIN) 5 MG Tab TAKE ONE TO ONE & ONE-HALF TABLETS BY MOUTH ONCE DAILY AS DIRECTED BY  COUMADIN  CLINIC 11/2/16   Rebecca Mathews A.P.N.   hydrochlorothiazide (MICROZIDE) 12.5 MG capsule Take 1 Cap by mouth every day. 5/31/16   WILLIAM WangRLinneaNLinnea   sotalol (BETAPACE)  120 MG tablet Take 1 Tab by mouth 2 times a day. TAKE 1 TAB BY MOUTH 2 TIMES A DAY. 5/13/16   Daniel Ferrera M.D.   atorvastatin (LIPITOR) 40 MG TABS Take 1 Tab by mouth every day. 7/31/15   Daniel Ferrera M.D.   amlodipine (NORVASC) 5 MG TABS Take 5 mg by mouth every day.    Not In System Provider   benazepril (LOTENSIN) 40 MG tablet Take 40 mg by mouth every day.    Not In System Provider   Cholecalciferol (VITAMIN D PO) Take 4,000 Units by mouth every day at 6 PM.    Not In System Provider       Review of Systems:  ROS  Blood pressure 138/86, pulse 71, temperature 36.9 °C (98.4 °F), resp. rate 16, height 1.829 m (6'), weight 107.6 kg (237 lb 3.4 oz), SpO2 92 %.    Physical Examination:  Physical Exam   Constitutional: He is oriented to person, place, and time. He appears well-developed and well-nourished.   HENT:   Mouth/Throat: Oropharynx is clear and moist.   Eyes: Conjunctivae and EOM are normal.   Neck: No JVD present. No thyroid mass present.   Cardiovascular: Normal rate, regular rhythm, S1 normal, S2 normal and normal pulses.  PMI is not displaced.  Exam reveals no gallop.    No murmur heard.  Pulses:       Carotid pulses are 2+ on the right side, and 2+ on the left side.       Radial pulses are 2+ on the right side, and 2+ on the left side.        Femoral pulses are 2+ on the right side, and 2+ on the left side.       Dorsalis pedis pulses are 2+ on the right side, and 2+ on the left side.   No peripheral edema.   Pulmonary/Chest: Effort normal and breath sounds normal.   Abdominal: Soft. Normal appearance. He exhibits no abdominal bruit. There is no hepatosplenomegaly. There is no tenderness.   Musculoskeletal: Normal range of motion. He exhibits no edema.        Thoracic back: He exhibits no tenderness and no spasm.   Neurological: He is alert and oriented to person, place, and time.   Skin: No rash noted. No cyanosis. Nails show no clubbing.   Psychiatric: He has a normal mood and affect.        Impression:  1. AICD at [facility].  The risk, benefits, and alternatives to ICD replacement were discussed in great detail, specific risks mentioned including bleeding, infection, cardiac perforation with possible tamponade requiring pericardiocentesis or open heart surgery.  In addition the possibility of lead dislodgment (2-3%), inappropriate shocks, pneumothorax (3%), hemothorax were discussed. Also mentioned were the possibility of death, stroke, and myocardial infarction. The patient verbalized understanding of these potential complications and wishes to proceed with this procedure.           Pre Procedure Assessment:  Pre-Procedure Assessment - Applicable for patients receiving sedation by non-anesthesia practitioner.  Previous drug history, other anesthetic experiences, potential anesthetic problems and choice of anesthesia assessed, discussed with patient.     No prior complications.  Results of relevant diagnostic studies reviewed.    Plan of Sedation:  IV conscious sedationPatient assessed immediately prior to sedationPatient deemed appropriate candidate for conscious sedation options and plans discussed with patient / family    ASA 3 - Patient with severe systemic disease

## 2017-05-03 NOTE — OR NURSING
0834    PATIENT RECEIVED FROM CATH LAB,  S/P GENERATOR CHANGED.  PATIENT IS A/A/OX4.  DENIES ANY PAIN.  LEFT SC DRESSING INTACT.  NO FAMILY AT BEDSIDE AT THIS TIME.    0900  PATIENT TAKING PO FLUID WELL.    0930   D/C INSTRUCTIONS GIVEN TO PATIENT AND FAMILY.  VERBALIZED UNDERSTANDING OF ALL.  HL D/C.  PATIENT D/C TO HOME,  VIA WHEELCHAIR, ESCORTED OUT BY VOLUNTEER.

## 2017-05-03 NOTE — DISCHARGE INSTRUCTIONS
ACTIVITY: Rest and take it easy for the first 24 hours.  A responsible adult is recommended to remain with you during that time.  It is normal to feel sleepy.  We encourage you to not do anything that requires balance, judgment or coordination.    MILD FLU-LIKE SYMPTOMS ARE NORMAL. YOU MAY EXPERIENCE GENERALIZED MUSCLE ACHES, THROAT IRRITATION, HEADACHE AND/OR SOME NAUSEA.    FOR 24 HOURS DO NOT:  Drive, operate machinery or run household appliances.  Drink beer or alcoholic beverages.   Make important decisions or sign legal documents.    SPECIAL INSTRUCTIONS: *KEEP INCISION DRY FOR 7 DAYS**    DIET: To avoid nausea, slowly advance diet as tolerated, avoiding spicy or greasy foods for the first day.  Add more substantial food to your diet according to your physician's instructions.  Babies can be fed formula or breast milk as soon as they are hungry.  INCREASE FLUIDS AND FIBER TO AVOID CONSTIPATION.    SURGICAL DRESSING/BATHING: *NO RUNNING WATER OVER INCISION FOR 7 DAYS**    FOLLOW-UP APPOINTMENT:  A follow-up appointment should be arranged with your doctor in *[place]   [phone].  CALL TO MAKE APPT WITH PACEMAKER CLINIC IN 1 WEEK**; call to schedule.    You should CALL YOUR PHYSICIAN if you develop:  Fever greater than 101 degrees F.  Pain not relieved by medication, or persistent nausea or vomiting.  Excessive bleeding (blood soaking through dressing) or unexpected drainage from the wound.  Extreme redness or swelling around the incision site, drainage of pus or foul smelling drainage.  Inability to urinate or empty your bladder within 8 hours.  Problems with breathing or chest pain.    You should call 911 if you develop problems with breathing or chest pain.  If you are unable to contact your doctor or surgical center, you should go to the nearest emergency room or urgent care center.  Physician's telephone #: *[phone]**    If any questions arise, call your doctor.  If your doctor is not available, please  feel free to call the Surgical Center at [phone].  The Center is open Monday through Friday from 7AM to 7PM.  You can also call the HEALTH HOTLINE open 24 hours/day, 7 days/week and speak to a nurse at [phone], or toll free at [phone].    A registered nurse may call you a few days after your surgery to see how you are doing after your procedure.    MEDICATIONS: Resume taking daily medication.  Take prescribed pain medication with food.  If no medication is prescribed, you may take non-aspirin pain medication if needed.  PAIN MEDICATION CAN BE VERY CONSTIPATING.  Take a stool softener or laxative such as senokot, pericolace, or milk of magnesia if needed.        If your physician has prescribed pain medication that includes Acetaminophen (Tylenol), do not take additional Acetaminophen (Tylenol) while taking the prescribed medication.    Depression / Suicide Risk    As you are discharged from this [facility] facility, it is important to learn how to keep safe from harming yourself.    Recognize the warning signs:  · Abrupt changes in personality, positive or negative- including increase in energy   · Giving away possessions  · Change in eating patterns- significant weight changes-  positive or negative  · Change in sleeping patterns- unable to sleep or sleeping all the time   · Unwillingness or inability to communicate  · Depression  · Unusual sadness, discouragement and loneliness  · Talk of wanting to die  · Neglect of personal appearance   · Rebelliousness- reckless behavior  · Withdrawal from people/activities they love  · Confusion- inability to concentrate     If you or a loved one observes any of these behaviors or has concerns about self-harm, here's what you can do:  · Talk about it- your feelings and reasons for harming yourself  · Remove any means that you might use to hurt yourself (examples: pills, rope, extension cords, firearm)  · Get professional help from the community (Mental  Health, Substance Abuse, psychological counseling)  · Do not be alone:Call your Safe Contact- someone whom you trust who will be there for you.  · Call your local CRISIS HOTLINE 294-4900 or 769-155-0943  · Call your local Children's Mobile Crisis Response Team [place] (265) 885-3649 or www.[employer]  · Call the toll free National Suicide Prevention Hotlines   · National Suicide Prevention Lifeline 300-272-GYNX (9619)  · National Hope Line Network 800-SUICIDE (700-2419)

## 2017-05-03 NOTE — IP AVS SNAPSHOT
Home Care Instructions                                                                                                                Name:Mike Ferrell  Medical Record Number:[id number]  CSN: [id number]    YOB: 1950   Age: 66 y.o.  Sex: male  HT:1.829 m (6') WT: 107.6 kg (237 lb 3.4 oz)          Admit Date: 5/3/2017     Discharge Date:   Today's Date: 5/3/2017  Attending Doctor:  Daniel Ferrera M.D.                  Allergies:  Sulfa drugs                Discharge Instructions         ACTIVITY: Rest and take it easy for the first 24 hours.  A responsible adult is recommended to remain with you during that time.  It is normal to feel sleepy.  We encourage you to not do anything that requires balance, judgment or coordination.    MILD FLU-LIKE SYMPTOMS ARE NORMAL. YOU MAY EXPERIENCE GENERALIZED MUSCLE ACHES, THROAT IRRITATION, HEADACHE AND/OR SOME NAUSEA.    FOR 24 HOURS DO NOT:  Drive, operate machinery or run household appliances.  Drink beer or alcoholic beverages.   Make important decisions or sign legal documents.    SPECIAL INSTRUCTIONS: *KEEP INCISION DRY FOR 7 DAYS**    DIET: To avoid nausea, slowly advance diet as tolerated, avoiding spicy or greasy foods for the first day.  Add more substantial food to your diet according to your physician's instructions.  Babies can be fed formula or breast milk as soon as they are hungry.  INCREASE FLUIDS AND FIBER TO AVOID CONSTIPATION.    SURGICAL DRESSING/BATHING: *NO RUNNING WATER OVER INCISION FOR 7 DAYS**    FOLLOW-UP APPOINTMENT:  A follow-up appointment should be arranged with your doctor in *DR FERRER[place]   [phone].  CALL TO MAKE APPT WITH [facility]ER CLINIC IN 1 WEEK**; call to schedule.    You should CALL YOUR PHYSICIAN if you develop:  Fever greater than 101 degrees F.  Pain not relieved by medication, or persistent nausea or vomiting.  Excessive bleeding (blood soaking through dressing) or unexpected drainage from the wound.  Extreme redness or swelling  around the incision site, drainage of pus or foul smelling drainage.  Inability to urinate or empty your bladder within 8 hours.  Problems with breathing or chest pain.    You should call 911 if you develop problems with breathing or chest pain.  If you are unable to contact your doctor or surgical center, you should go to the nearest emergency room or urgent care center.  Physician's telephone #: *[phone]**    If any questions arise, call your doctor.  If your doctor is not available, please feel free to call the Surgical Center at [phone].  The Center is open Monday through Friday from 7AM to 7PM.  You can also call the HEALTH HOTLINE open 24 hours/day, 7 days/week and speak to a nurse at [phone], or toll free at [phone].    A registered nurse may call you a few days after your surgery to see how you are doing after your procedure.    MEDICATIONS: Resume taking daily medication.  Take prescribed pain medication with food.  If no medication is prescribed, you may take non-aspirin pain medication if needed.  PAIN MEDICATION CAN BE VERY CONSTIPATING.  Take a stool softener or laxative such as senokot, pericolace, or milk of magnesia if needed.        If your physician has prescribed pain medication that includes Acetaminophen (Tylenol), do not take additional Acetaminophen (Tylenol) while taking the prescribed medication.    Depression / Suicide Risk    As you are discharged from this [facility] facility, it is important to learn how to keep safe from harming yourself.    Recognize the warning signs:  · Abrupt changes in personality, positive or negative- including increase in energy   · Giving away possessions  · Change in eating patterns- significant weight changes-  positive or negative  · Change in sleeping patterns- unable to sleep or sleeping all the time   · Unwillingness or inability to communicate  · Depression  · Unusual sadness, discouragement and loneliness  · Talk of wanting to  die  · Neglect of personal appearance   · Rebelliousness- reckless behavior  · Withdrawal from people/activities they love  · Confusion- inability to concentrate     If you or a loved one observes any of these behaviors or has concerns about self-harm, here's what you can do:  · Talk about it- your feelings and reasons for harming yourself  · Remove any means that you might use to hurt yourself (examples: pills, rope, extension cords, firearm)  · Get professional help from the community (Mental Health, Substance Abuse, psychological counseling)  · Do not be alone:Call your Safe Contact- someone whom you trust who will be there for you.  · Call your local CRISIS HOTLINE 748-7480 or 435-165-4137  · Call your local Children's Mobile Crisis Response Team [place] (338) 299-4061 or www.[employer]  · Call the toll free National Suicide Prevention Hotlines   · National Suicide Prevention Lifeline 471-232-PARU (9142)  · [employer] Line Network 800-SUICIDE (186-6714)       Medication List      ASK your doctor about these medications        Instructions    Morning Afternoon Evening Bedtime    acetaminophen 500 MG Tabs   Commonly known as:  TYLENOL        Take 500-1,000 mg by mouth every 6 hours as needed.   Dose:  500-1000 mg                        amlodipine 5 MG Tabs   Commonly known as:  NORVASC        Take 5 mg by mouth every day.   Dose:  5 mg                        atorvastatin 40 MG Tabs   Commonly known as:  LIPITOR        Doctor's comments:  Patient increased to 40mg QD by PCP, updated med list on 7/31/15   Take 1 Tab by mouth every day.   Dose:  40 mg                        benazepril 40 MG tablet   Commonly known as:  LOTENSIN        Take 40 mg by mouth every day.   Dose:  40 mg                        hydrochlorothiazide 12.5 MG capsule   Commonly known as:  MICROZIDE        Take 1 Cap by mouth every day.   Dose:  12.5 mg                        POTASSIUM CHLORIDE PO        Take 1 Tab by mouth every  day.   Dose:  1 Tab                        sotalol 120 MG tablet   Commonly known as:  BETAPACE        Take 1 Tab by mouth 2 times a day. TAKE 1 TAB BY MOUTH 2 TIMES A DAY.   Dose:  120 mg                        VITAMIN D PO        Take 4,000 Units by mouth every day at 6 PM.   Dose:  4000 Units                        warfarin 5 MG Tabs   Commonly known as:  COUMADIN        TAKE ONE TO ONE & ONE-HALF TABLETS BY MOUTH ONCE DAILY AS DIRECTED BY  COUMADIN  CLINIC                                Medication Information     Above and/or attached are the medications your physician expects you to take upon discharge. Review all of your home medications and newly ordered medications with your doctor and/or pharmacist. Follow medication instructions as directed by your doctor and/or pharmacist. Please keep your medication list with you and share with your physician. Update the information when medications are discontinued, doses are changed, or new medications (including over-the-counter products) are added; and carry medication information at all times in the event of emergency situations.        Resources     Quit Smoking / Tobacco Use:    I understand the use of any tobacco products increases my chance of suffering from future heart disease or stroke and could cause other illnesses which may shorten my life. Quitting the use of tobacco products is the single most important thing I can do to improve my health. For further information on smoking / tobacco cessation call a Toll Free Quit Line at 1-410.577.7564 (*National Cancer [place]) or 1-881.833.2417 (American Lung Association) or you can access the web based program at www.lungusa.org.    [place] Tobacco Users Help Line:  (435) 281-3596       Toll Free: 1-159.397.9629  Quit Tobacco Program [facility] (821)994-9380    Crisis Hotline:    [place] Crisis Hotline:  0-964-VVKKRWV or 1-765.831.4751    [place] Crisis Hotline:    1-610.255.1277 or  372.651.9287    Discharge Survey:   Thank you for choosing [facility]. We hope we did everything we could to make your hospital stay a pleasant one. You may be receiving a survey and we would appreciate your time and participation in answering the questions. Your input is very valuable to us in our efforts to improve our service to our patients and their families.            Signatures     My signature on this form indicates that:    1. I acknowledge receipt and understanding of these Home Care Instruction.  2. My questions regarding this information have been answered to my satisfaction.  3. I have formulated a plan with my discharge nurse to obtain my prescribed medications for home.    __________________________________      __________________________________                   Patient Signature                                 Guardian/Responsible Adult Signature      __________________________________                 __________       ________                       Nurse Signature                                               Date                 Time

## 2017-05-03 NOTE — OP REPORT
DATE OF SERVICE:  05/03/2017    DATE OF PROCEDURE:  05/03/2017    OPERATION:  Replacement of AICD generator.    PREOPERATIVE DIAGNOSIS:  Automatic implantable cardioverter defibrillator at   elective replacement indicator.    POSTOPERATIVE DIAGNOSIS:  Successful replacement of defibrillator pulse   generator.    SURGEON:  Daniel Ferrera MD    ASSISTANT:  None.    ANESTHESIA:  Local anesthesia with conscious sedation.    ANESTHESIOLOGIST:  No anesthesiologist used.    INDICATION:  AICD at TERESA secondary prevention device.    COMPLICATIONS:  None.    NARRATIVE:  After risks and benefits were explained to the patient, the   patient gave informed consent.  The patient was brought to the cardiac   catheterization lab in fasting state.  Patient prepped and draped in usual   sterile manner.  Lidocaine 2% was infiltrated subcutaneously over the old   defibrillator pulse generator.  A 5-cm incision made with #15 blade.  Blunt   dissection performed down to the defibrillator pulse generator and the   generator was then removed.  Next, leads were freed up from adhesions.  Next,   leads were tested, they tested out fine.  Next, the pocket was irrigated with   antibiotic solution.  All bleeders were bovied off.  Next, all leads were   attached to new defibrillator pulse generator and placed in the pocket.    Incision was closed with 3-0 Vicryl in deep layer, 4-0 Vicryl subcutaneous   layer, 4-0 Vicryl in skin, a dressing was applied.    Total blood loss was 10 mL.    Preprocedure EKG shows sinus rhythm, right bundle-branch block.    Postprocedure EKG shows sinus rhythm with right bundle-branch block.    RESULTS:  Device is a [employer], model #FLEV1U1, serial #[id number].  Right   atrial lead is a [employer] model #1688TC, serial #[id number], implantation date   08/18/2009.  The RV lead is [employer] model #6947-65, lead serial #   [id number], implantation date 12/17/2010.    THRESHOLD INFORMATION:  Right atrium threshold 0.8 volts,  impedance was 484   ohms, and the P wave was 3.4 mV.  Right ventricle, the threshold 1.3 volts,   impedance was 361 ohms, R wave was 5 mV.    SEDATION GIVEN:  IV Versed and fentanyl.  Patient received IV antibiotics   prior to procedure.    CONCLUSION:  1.  Successful replacement of defibrillator pulse generator.  2.  Plan is to monitor the patient.  3.  Resume medications.       ____________________________________     MD LUCIAN BEAHC / NTS    DD:  05/03/2017 08:29:11  DT:  05/03/2017 08:47:12    D#:  [id number]  Job#:  [id number]

## 2017-05-03 NOTE — OR SURGEON
Immediate Post-Operative Note    PreOp Diagnosis: AICD at TERESA    PostOp Diagnosis: Generator change    Surgeon(s):  Daniel Ferrera M.D.    Type of Anesthesia: Moderate Sedation    Specimen: None    Estimated Blood Loss: 10 cc's    Findings: RV 1.25 V, RA 1 V    Complications: none      Daniel Ferrera M.D.  5/3/2017 8:25 AM

## 2017-05-12 ENCOUNTER — NON-PROVIDER VISIT (OUTPATIENT)
Dept: CARDIOLOGY | Facility: MEDICAL CENTER | Age: 67
End: 2017-05-12
Payer: MEDICARE

## 2017-05-12 DIAGNOSIS — I48.0 PAROXYSMAL ATRIAL FIBRILLATION (HCC): ICD-10-CM

## 2017-05-12 DIAGNOSIS — I47.20 VENTRICULAR TACHYCARDIA (HCC): ICD-10-CM

## 2017-05-12 DIAGNOSIS — Z95.810 AICD (AUTOMATIC CARDIOVERTER/DEFIBRILLATOR) PRESENT: ICD-10-CM

## 2017-05-12 PROCEDURE — 93283 PRGRMG EVAL IMPLANTABLE DFB: CPT | Performed by: INTERNAL MEDICINE

## 2017-05-12 NOTE — MR AVS SNAPSHOT
Mike Olivier Ferrell   2017 11:00 AM   Appointment   MRN: [id number]    Department:  [facility]   Dept Phone:  [phone]    Description:  Male : 1950   Provider:  [facility] 2           Allergies as of 2017     Allergen Noted Reactions    Sulfa Drugs 2012   Rash, Itching    Rash, itch      Vital Signs     Smoking Status                   Never Smoker            Basic Information     Date Of Birth Sex Race Ethnicity Preferred Language    1950 Male White Non- English      Your appointments     May 12, 2017 11:00 AM   PACER CHECK ONLY with PACER CHECK-[facility] 2   [facility] (--)    [address]  [place] [id number] [phone]            May 24, 2017  9:15 AM   Established Patient with [facility] EXAM 4   [facility]  (--)    [address]  [place] [address]   [phone]            2017  8:45 AM   PACER CHECK ONLY with PACER CHECK-CAM B   [facility] (--)    [address]  [place] [address] [phone]            2017  9:00 AM   FOLLOW UP with Daniel Ferrera M.D.   [facility] (--)    [address]  [place] [id number] [phone]              Problem List              ICD-10-CM Priority Class Noted - Resolved    Paroxysmal ventricular tachycardia (CMS-HCC) I47.2   2012 - Present    Sinoatrial node dysfunction (CMS-[facility]) I49.5   2012 - Present    Atrial fibrillation (CMS-[facility]) I48.91   2012 - Present    Hypopotassemia E87.6   2012 - Present    Essential hypertension I10   2012 - Present    Other and unspecified hyperlipidemia E78.5   2012 - Present    Syncope and collapse R55   2012 - Present    Thrombocytopenia (CMS-[facility]) D69.6   2012 - Present    High risk medication use Z79.899   2012 - Present    Anticoagulant long-term use Z79.01   5/14/2012 - Present    Automatic implantable cardioverter-defibrillator in situ Z95.810   11/8/2012 - Present    Alcohol abuse F10.10   5/8/2013 - Present    Essential hypertension, benign I10   6/2/2014 - Present    HLD (hyperlipidemia) E78.5   8/26/2015 - Present    Paroxysmal atrial fibrillation (CMS-HCC) I48.0   9/9/2015 - Present    S/P cardiac cath Z98.890   1/6/2017 - Present    Complications, mechanical, pacemaker, 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      Below and/or attached are the medications your provider expects you to take. Review all of your home medications and newly ordered medications with your provider and/or pharmacist. Follow medication instructions as directed by your provider and/or pharmacist. Please keep your medication list with you and share with your provider. Update the information when medications are discontinued, doses are changed, or new medications (including over-the-counter products) are added; and carry medication information at all times in the event of emergency situations     Allergies:  SULFA DRUGS - Rash,Itching               Medications  Valid as of: May 12, 2017 - 10:59 AM    Generic Name Brand Name Tablet Size Instructions for use    Acetaminophen (Tab) TYLENOL 500 MG Take 500-1,000 mg by mouth every 6 hours as needed.        AmLODIPine Besylate (Tab) NORVASC 5 MG Take 5 mg by mouth every day.        Atorvastatin Calcium (Tab) LIPITOR 40 MG Take 1 Tab by mouth every day.        Benazepril HCl (Tab) LOTENSIN 40 MG Take 40 mg by mouth every day.        Cholecalciferol   Take 4,000 Units by mouth every day at 6 PM.         HydroCHLOROthiazide (Cap) MICROZIDE 12.5 MG Take 1 Cap by mouth every day.        Potassium Chloride   Take 1 Tab by mouth every day.        Sotalol HCl (Tab) BETAPACE 120 MG TAKE 1 TAB BY MOUTH 2 TIMES A DAY. TAKE 1 TAB BY MOUTH 2 TIMES A DAY.        Warfarin Sodium (Tab) COUMADIN 5 MG TAKE ONE TO ONE & ONE-HALF TABLETS BY MOUTH ONCE DAILY AS DIRECTED BY  COUMADIN  CLINIC        .                 Medicines prescribed today were sent to:     [facility]/PHARMACY #[id number] - [place], [place] - [address]    [address] [address]    Phone: [phone] Fax: [phone]    Open 24 Hours?: No    [facility] [address] [place], [place] - [address]    [address] [address]    Phone: [phone] Fax: [phone]    Open 24 Hours?: No      Medication refill instructions:       If your prescription bottle indicates you have medication refills left, it is not necessary to call your provider’s office. Please contact your pharmacy and they will refill your medication.    If your prescription bottle indicates you do not have any refills left, you may request refills at any time through one of the following ways: The online [employer] system (except Urgent Care), by calling your provider’s office, or by asking your pharmacy to contact your provider’s office with a refill request. Medication refills are processed only during regular business hours and may not be available until the next business day. Your provider may request additional information or to have a follow-up visit with you prior to refilling your medication.   *Please Note: Medication refills are assigned a new Rx number when refilled electronically. Your pharmacy may indicate that no refills were authorized even though a new prescription for the same medication is available at the pharmacy. Please request the medicine by name with the pharmacy before contacting your provider for a refill.           [employer] Access Code:  [email or web address]  Expires: 6/11/2017 10:24 AM    [employer]  A secure, online tool to manage your health information     [employer]’s [employer]® is a secure, online tool that connects you to your personalized health information from the privacy of your home -- day or night - making it very easy for you to manage your healthcare. Once the activation process is completed, you can even access your medical information using the [employer] martha, which is available for free in the Apple Martha store or Google Play store.     [employer] provides the following levels of access (as shown below):   My Chart Features   [facility] Primary Care Doctor [facility]  Specialists [facility]  Urgent  Care Non-[facility]  Primary Care  Doctor   Email your healthcare team securely and privately 24/7 X X X    Manage appointments: schedule your next appointment; view details of past/upcoming appointments X      Request prescription refills. X      View recent personal medical records, including lab and immunizations X X X X   View health record, including health history, allergies, medications X X X X   Read reports about your outpatient visits, procedures, consult and ER notes X X X X   See your discharge summary, which is a recap of your hospital and/or ER visit that includes your diagnosis, lab results, and care plan. X X       How to register for [employer]  1. Go to  [email or web address].  2. Click on the Sign Up Now box, which takes you to the New Member Sign Up page. You will need to provide the following information:  a. Enter your [employer] Access Code exactly as it appears at the top of this page. (You will not need to use this code after you’ve completed the sign-up process. If you do not sign up before the expiration date, you must request a new code.)   b. Enter your date of birth.   c. Enter your home email address.   d. Click Submit, and follow the next screen’s instructions.  3. Create a [employer] ID. This will be your [employer] login ID and cannot be  changed, so think of one that is secure and easy to remember.  4. Create a [email or web address] password. You can change your password at any time.  5. Enter your Password Reset Question and Answer. This can be used at a later time if you forget your password.   6. Enter your e-mail address. This allows you to receive e-mail notifications when new information is available in [email or web address].  7. Click Sign Up. You can now view your health information.    For assistance activating your [email or web address] account, call [phone]

## 2017-05-12 NOTE — PROGRESS NOTES
Wound site is healing well.  Patient advised to watch for increased redness, swelling, oozing or fever--verbalized understanding.  He will return in July with Dr. Ferrera-- previously scheduled.

## 2017-05-24 ENCOUNTER — ANTICOAGULATION VISIT (OUTPATIENT)
Dept: VASCULAR LAB | Facility: MEDICAL CENTER | Age: 67
End: 2017-05-24
Attending: INTERNAL MEDICINE
Payer: MEDICARE

## 2017-05-24 VITALS — HEART RATE: 82 BPM | DIASTOLIC BLOOD PRESSURE: 79 MMHG | SYSTOLIC BLOOD PRESSURE: 122 MMHG

## 2017-05-24 DIAGNOSIS — I48.0 PAROXYSMAL ATRIAL FIBRILLATION (HCC): ICD-10-CM

## 2017-05-24 DIAGNOSIS — I48.91 ATRIAL FIBRILLATION, UNSPECIFIED TYPE (HCC): ICD-10-CM

## 2017-05-24 LAB
INR BLD: 2.7 (ref 0.9–1.2)
INR PPP: 2.7 (ref 2–3.5)

## 2017-05-24 PROCEDURE — 85610 PROTHROMBIN TIME: CPT

## 2017-05-24 PROCEDURE — 99211 OFF/OP EST MAY X REQ PHY/QHP: CPT | Performed by: NURSE PRACTITIONER

## 2017-05-24 NOTE — MR AVS SNAPSHOT
Mike Ferrell   2017 9:15 AM   Anticoagulation Visit   MRN: [id number]    Department:  Vascular Medicine   Dept Phone:  [phone]    Description:  Male : 1950   Provider:  [facility] EXAM 4           Allergies as of 2017     Allergen Noted Reactions    Sulfa Drugs 2012   Rash, Itching    Rash, itch      You were diagnosed with     Paroxysmal atrial fibrillation (CMS-[facility])   [943794]       Atrial fibrillation, unspecified type (CMS-[facility])   [9902842]         Vital Signs     Blood Pressure Pulse Smoking Status             122/79 mmHg 82 Never Smoker          Basic Information     Date Of Birth Sex Race Ethnicity Preferred Language    1950 Male White Non- English      Your appointments     2017  8:45 AM   PACER CHECK ONLY with PACER CHECK-CAM B   [facility] (--)    [address] [place] [address] [address]  [facility] [address]   [phone]            2017  9:00 AM   FOLLOW UP with Daniel Ferrera M.D.   [facility] (--)    [address] [place] [address] [address]  [facility] [address]   [phone]            2017  9:15 AM   Established Patient with [facility] EXAM 4   [facility]  (--)    [address] [address]   [phone]              Problem List              ICD-10-CM Priority Class Noted - Resolved    Paroxysmal ventricular tachycardia (CMS-[facility]) I47.2   2012 - Present    Sinoatrial node dysfunction (CMS-[facility]) I49.5   2012 - Present    Atrial fibrillation (CMS-[facility]) I48.91   2012 - Present    Hypopotassemia E87.6   2012 - Present    Essential hypertension I10   2012 - Present    Other and unspecified hyperlipidemia E78.5   2012 - Present    Syncope and collapse R55   2012 - Present    Thrombocytopenia (CMS-[facility]) D69.6   2012 - Present    High risk medication use Z79.899   2012 - Present    Anticoagulant long-term  use Z79.01   5/14/2012 - Present    Automatic implantable cardioverter-defibrillator in situ Z95.810   11/8/2012 - Present    Alcohol abuse F10.10   5/8/2013 - Present    Essential hypertension, benign I10   6/2/2014 - Present    HLD (hyperlipidemia) E78.5   8/26/2015 - Present    Paroxysmal atrial fibrillation (CMS-HCC) I48.0   9/9/2015 - Present    S/P cardiac cath Z98.890   1/6/2017 - Present    Complications, mechanical, pacemaker, 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      Below and/or attached are the medications your provider expects you to take. Review all of your home medications and newly ordered medications with your provider and/or pharmacist. Follow medication instructions as directed by your provider and/or pharmacist. Please keep your medication list with you and share with your provider. Update the information when medications are discontinued, doses are changed, or new medications (including over-the-counter products) are added; and carry medication information at all times in the event of emergency situations     Allergies:  SULFA DRUGS - Rash,Itching               Medications  Valid as of: May 24, 2017 -  9:23 AM    Generic Name Brand Name Tablet Size Instructions for use    Acetaminophen (Tab) TYLENOL 500 MG Take 500-1,000 mg by mouth every 6 hours as needed.        AmLODIPine Besylate (Tab) NORVASC 5 MG Take 5 mg by mouth every day.        Atorvastatin Calcium (Tab) LIPITOR 40 MG Take 1 Tab by mouth every day.        Benazepril HCl (Tab) LOTENSIN 40 MG Take 40 mg by mouth every day.        Cholecalciferol    Take 4,000 Units by mouth every day at 6 PM.        HydroCHLOROthiazide (Cap) MICROZIDE 12.5 MG Take 1 Cap by mouth every day.        Potassium Chloride   Take 1 Tab by mouth every day.        Sotalol HCl (Tab) BETAPACE 120 MG TAKE 1 TAB BY MOUTH 2 TIMES A DAY. TAKE 1 TAB BY MOUTH 2 TIMES A DAY.        Warfarin Sodium (Tab) COUMADIN 5 MG TAKE ONE TO ONE AND ONE-HALF TABLETS BY MOUTH ONCE DAILY AS DIRECTED BY COUMADIN CLINIC        .                 Medicines prescribed today were sent to:     [facility]/PHARMACY #[id number] - [place], [place] - [address]    [address] [address]    Phone: [phone] Fax: [phone]    Open 24 Hours?: No    [facility] [address] [place], [place] - [address]    [address] [facility] [address]    Phone: [phone] Fax: [phone]    Open 24 Hours?: No      Medication refill instructions:       If your prescription bottle indicates you have medication refills left, it is not necessary to call your provider’s office. Please contact your pharmacy and they will refill your medication.    If your prescription bottle indicates you do not have any refills left, you may request refills at any time through one of the following ways: The online [employer] system (except Urgent Care), by calling your provider’s office, or by asking your pharmacy to contact your provider’s office with a refill request. Medication refills are processed only during regular business hours and may not be available until the next business day. Your provider may request additional information or to have a follow-up visit with you prior to refilling your medication.   *Please Note: Medication refills are assigned a new Rx number when refilled electronically. Your pharmacy may indicate that no refills were authorized even though a new prescription for the same medication is available at the pharmacy. Please request the medicine by name with the pharmacy before contacting your provider  for a refill.        Warfarin Dosing Calendar   May 2017 Details    Sun Mon Tue Wed Thu Fri Sat      1               2               3               4               5               6                 7               8               9               10               11               12               13                 14               15               16               17               18               19               20                 21               22               23               24   2.7   5 mg   See details      25      7.5 mg         26      5 mg         27      5 mg           28      7.5 mg         29      5 mg         30      7.5 mg         31      5 mg             Date Details   05/24 This INR check   INR: 2.7               How to take your warfarin dose     To take:  5 mg Take 1 of the 5 mg tablets.    To take:  7.5 mg Take 1.5 of the 5 mg tablets.           Warfarin Dosing Calendar   June 2017 Details    Sun Mon Tue Wed Thu Fri Sat         1      7.5 mg         2      5 mg         3      5 mg           4      7.5 mg         5      5 mg         6      7.5 mg         7      5 mg         8      7.5 mg         9      5 mg         10      5 mg           11      7.5 mg         12      5 mg         13      7.5 mg         14      5 mg         15      7.5 mg         16      5 mg         17      5 mg           18      7.5 mg         19      5 mg         20      7.5 mg         21      5 mg         22      7.5 mg         23      5 mg         24      5 mg           25      7.5 mg         26      5 mg         27      7.5 mg         28      5 mg         29      7.5 mg         30      5 mg           Date Details   No additional details            How to take your warfarin dose     To take:  5 mg Take 1 of the 5 mg tablets.    To take:  7.5 mg Take 1.5 of the 5 mg tablets.           Warfarin Dosing Calendar   July 2017 Details    Sun Mon Tue Wed Thu Fri Sat           1      5 mg           2      7.5 mg         3      5  mg         4      7.5 mg         5      5 mg         6      7.5 mg         7      5 mg         8      5 mg           9      7.5 mg         10      5 mg         11      7.5 mg         12      5 mg         13      7.5 mg         14      5 mg         15      5 mg           16      7.5 mg         17      5 mg         18      7.5 mg         19      5 mg         20      7.5 mg         21      5 mg         22      5 mg           23      7.5 mg         24      5 mg         25      7.5 mg         26      5 mg         27               28               29                 30               31                     Date Details   No additional details    Date of next INR:  7/26/2017         How to take your warfarin dose     To take:  5 mg Take 1 of the 5 mg tablets.    To take:  7.5 mg Take 1.5 of the 5 mg tablets.              [employer] Access Code: [email or web address]  Expires: 6/11/2017 10:24 AM    [employer]  A secure, online tool to manage your health information     [employer]® is a secure, online tool that connects you to your personalized health information from the privacy of your home -- day or night - making it very easy for you to manage your healthcare. Once the activation process is completed, you can even access your medical information using the [employer] martha, which is available for free in the Apple Martha store or Google Play store.     [employer] provides the following levels of access (as shown below):   My Chart Features   [facility] Primary Care Doctor [facility]  Specialists [facility]  Urgent  Care Non-[facility]  Primary Care  Doctor   Email your healthcare team securely and privately 24/7 X X X    Manage appointments: schedule your next appointment; view details of past/upcoming appointments X      Request prescription refills. X      View recent personal medical records, including lab and immunizations X X X X   View health record, including health history, allergies, medications X X X X   Read reports about your  outpatient visits, procedures, consult and ER notes X X X X   See your discharge summary, which is a recap of your hospital and/or ER visit that includes your diagnosis, lab results, and care plan. X X       How to register for [employer]:  1. Go to  [email or web address].  2. Click on the Sign Up Now box, which takes you to the New Member Sign Up page. You will need to provide the following information:  a. Enter your [employer] Access Code exactly as it appears at the top of this page. (You will not need to use this code after you’ve completed the sign-up process. If you do not sign up before the expiration date, you must request a new code.)   b. Enter your date of birth.   c. Enter your home email address.   d. Click Submit, and follow the next screen’s instructions.  3. Create a [employer] ID. This will be your [employer] login ID and cannot be changed, so think of one that is secure and easy to remember.  4. Create a [employer] password. You can change your password at any time.  5. Enter your Password Reset Question and Answer. This can be used at a later time if you forget your password.   6. Enter your e-mail address. This allows you to receive e-mail notifications when new information is available in [employer].  7. Click Sign Up. You can now view your health information.    For assistance activating your [employer] account, call [phone]

## 2017-05-24 NOTE — PROGRESS NOTES
Anticoagulation Summary as of 5/24/2017     INR goal 2.0-3.0   Selected INR 2.7 (5/24/2017)   Maintenance plan 7.5 mg (5 mg x 1.5) on Sun, Tue, Thu; 5 mg (5 mg x 1) all other days   Weekly total 42.5 mg   Plan last modified ILEANA Pike (9/30/2016)   Next INR check 7/26/2017   Target end date     Indications   Atrial fibrillation (CMS-HCC) [I48.91]  Paroxysmal atrial fibrillation (CMS-HCC) [I48.0]         Anticoagulation Episode Summary     INR check location [facility]    Preferred lab     Send INR reminders to     Comments       Anticoagulation Care Providers     Provider Role Specialty Phone number    Daniel Ferrera M.D. Referring Cardiac Electrophysiology [phone]    [facility] Responsible  [phone]        Patient is therapeutic today. He had a new pacemaker/defibrilltor placed earlier this month. Denies any medication or diet changes. No current symptoms of bleeding or thrombosis reported. Continue current regimen. Follow up in 9 weeks.    Medication: Warfarin (Coumadin)     Sunday Monday Tuesday Wednesday Thursday Friday Saturday      7.5 mg    5 mg    7.5 mg    5 mg    7.5 mg    5 mg    5 mg      1.5 tab(s)    1 tab(s)    1.5 tab(s)    1 tab(s)    1.5 tab(s)    1 tab(s)  1 tab(s)     Next Appointment: Wednesday, July 26, 2017 at 9:15 am.    Rebecca SNIDER

## 2017-05-30 DIAGNOSIS — I10 ESSENTIAL HYPERTENSION: ICD-10-CM

## 2017-05-30 RX ORDER — HYDROCHLOROTHIAZIDE 12.5 MG/1
12.5 CAPSULE, GELATIN COATED ORAL
Qty: 90 CAP | Refills: 3 | Status: SHIPPED | OUTPATIENT
Start: 2017-05-30 | End: 2018-05-20 | Stop reason: SDUPTHER

## 2017-07-07 ENCOUNTER — OFFICE VISIT (OUTPATIENT)
Dept: CARDIOLOGY | Facility: MEDICAL CENTER | Age: 67
End: 2017-07-07
Payer: MEDICARE

## 2017-07-07 ENCOUNTER — NON-PROVIDER VISIT (OUTPATIENT)
Dept: CARDIOLOGY | Facility: MEDICAL CENTER | Age: 67
End: 2017-07-07
Payer: MEDICARE

## 2017-07-07 VITALS
DIASTOLIC BLOOD PRESSURE: 74 MMHG | OXYGEN SATURATION: 95 % | BODY MASS INDEX: 30.64 KG/M2 | SYSTOLIC BLOOD PRESSURE: 124 MMHG | HEART RATE: 89 BPM | WEIGHT: 226 LBS

## 2017-07-07 DIAGNOSIS — Z79.899 HIGH RISK MEDICATION USE: ICD-10-CM

## 2017-07-07 DIAGNOSIS — I48.0 PAROXYSMAL ATRIAL FIBRILLATION (HCC): ICD-10-CM

## 2017-07-07 DIAGNOSIS — Z95.810 AUTOMATIC IMPLANTABLE CARDIOVERTER-DEFIBRILLATOR IN SITU: ICD-10-CM

## 2017-07-07 DIAGNOSIS — R55 SYNCOPE AND COLLAPSE: ICD-10-CM

## 2017-07-07 DIAGNOSIS — Z79.01 ANTICOAGULANT LONG-TERM USE: ICD-10-CM

## 2017-07-07 DIAGNOSIS — I49.5 SINOATRIAL NODE DYSFUNCTION (HCC): ICD-10-CM

## 2017-07-07 DIAGNOSIS — I45.10 RBBB: ICD-10-CM

## 2017-07-07 DIAGNOSIS — E78.2 MIXED HYPERLIPIDEMIA: ICD-10-CM

## 2017-07-07 DIAGNOSIS — I10 ESSENTIAL HYPERTENSION: ICD-10-CM

## 2017-07-07 PROBLEM — T82.111A COMPLICATIONS, MECHANICAL, PACEMAKER, CARDIAC: Status: RESOLVED | Noted: 2017-05-03 | Resolved: 2017-07-07

## 2017-07-07 PROCEDURE — 93283 PRGRMG EVAL IMPLANTABLE DFB: CPT | Performed by: INTERNAL MEDICINE

## 2017-07-07 PROCEDURE — 99214 OFFICE O/P EST MOD 30 MIN: CPT | Mod: 24,25 | Performed by: INTERNAL MEDICINE

## 2017-07-07 NOTE — NON-PROVIDER
Final threshold testing. Appropriate device function demonstrated. Wound site appears clear. See back in 3 months.

## 2017-07-07 NOTE — PROGRESS NOTES
Subjective:   Mike Ferrell is a 66 y.o. male who presents today with AICD with recent generator change. No complaints. No chest pain or SOB. No palps. Mild T wave oversensing. Adjusted. Myalgias with lipitor that resolved with stopping.    Past Medical History   Diagnosis Date   • Paroxysmal ventricular tachycardia (CMS-HCC) 4/4/2012   • Sinoatrial node dysfunction (CMS-HCC) 4/4/2012   • Atrial fibrillation (CMS-HCC) 4/4/2012   • Hypopotassemia 4/4/2012   • Unspecified essential hypertension 4/4/2012   • Other and unspecified hyperlipidemia 4/4/2012   • Syncope and collapse 4/4/2012   • Thrombocytopenia, unspecified (CMS-HCC) 4/4/2012   • Methamphetamine use none for many years   • Automatic implantable cardiac defibrillator in situ 11/8/2012   • Arthritis    • High cholesterol    • Pneumonia 2015   • Pain      shoulders   • Pacemaker      Past Surgical History   Procedure Laterality Date   • Cholecystectomy  1999   • Pacemaker insertion  2009   • Aicd implant  2010   • Tonsillectomy  1953     History reviewed. No pertinent family history.  History   Smoking status   • Never Smoker    Smokeless tobacco   • Never Used     Allergies   Allergen Reactions   • Lipitor [Atorvastatin]    • Sulfa Drugs Rash and Itching     Rash, itch     Outpatient Encounter Prescriptions as of 7/7/2017   Medication Sig Dispense Refill   • hydrochlorothiazide (MICROZIDE) 12.5 MG capsule Take 1 Cap by mouth every day. 90 Cap 3   • warfarin (COUMADIN) 5 MG Tab TAKE ONE TO ONE AND ONE-HALF TABLETS BY MOUTH ONCE DAILY AS DIRECTED BY COUMADIN CLINIC 135 Tab 3   • sotalol (BETAPACE) 120 MG tablet TAKE 1 TAB BY MOUTH 2 TIMES A DAY. TAKE 1 TAB BY MOUTH 2 TIMES A DAY. 180 Tab 1   • acetaminophen (TYLENOL) 500 MG Tab Take 500-1,000 mg by mouth every 6 hours as needed.     • POTASSIUM CHLORIDE PO Take 1 Tab by mouth every day.     • amlodipine (NORVASC) 5 MG TABS Take 5 mg by mouth every day.     • benazepril (LOTENSIN) 40 MG tablet Take 40 mg by  mouth every day.     • Cholecalciferol (VITAMIN D PO) Take 4,000 Units by mouth every day at 6 PM.     • [DISCONTINUED] atorvastatin (LIPITOR) 40 MG TABS Take 1 Tab by mouth every day. 30 Tab 0     No facility-administered encounter medications on file as of 7/7/2017.     ROS     Objective:   /74 mmHg  Pulse 89  Wt 102.513 kg (226 lb)  SpO2 95%    Physical Exam   Constitutional: He is oriented to person, place, and time. He appears well-developed and well-nourished.   HENT:   Mouth/Throat: Oropharynx is clear and moist.   Eyes: Conjunctivae and EOM are normal.   Neck: No JVD present. No thyroid mass present.   Cardiovascular: Normal rate, regular rhythm, S1 normal, S2 normal and normal pulses.  PMI is not displaced.  Exam reveals no gallop.    No murmur heard.  Pulses:       Carotid pulses are 2+ on the right side, and 2+ on the left side.       Radial pulses are 2+ on the right side, and 2+ on the left side.        Femoral pulses are 2+ on the right side, and 2+ on the left side.       Dorsalis pedis pulses are 2+ on the right side, and 2+ on the left side.   No peripheral edema.   Pulmonary/Chest: Effort normal and breath sounds normal.   Abdominal: Soft. Normal appearance. He exhibits no abdominal bruit. There is no hepatosplenomegaly. There is no tenderness.   Musculoskeletal: Normal range of motion. He exhibits no edema.        Thoracic back: He exhibits no tenderness and no spasm.   Neurological: He is alert and oriented to person, place, and time.   Skin: No rash noted. No cyanosis. Nails show no clubbing.   Psychiatric: He has a normal mood and affect.       Assessment:     1. High risk medication use     2. Essential hypertension     3. Automatic implantable cardioverter-defibrillator in situ     4. Paroxysmal atrial fibrillation (CMS-HCC)     5. Anticoagulant long-term use     6. Sinoatrial node dysfunction (CMS-HCC)     7. Syncope and collapse     8. Mixed hyperlipidemia         Medical Decision  Making:  Today's Assessment / Status / Plan:     1. VT none.  2. PAF on sotalol.  3. Anticoagulation continue.  4. AICD adjusted v sensing.  5. HLD lipitor stopped and repeat labs in next month.

## 2017-07-07 NOTE — Clinical Note
[facility]   [address]  [place] [address]  Phone: [phone]  Fax: [phone]              Mike Ferrell  1950    Encounter Date: 7/7/2017    Daniel Ferrera M.D.          PROGRESS NOTE:  Subjective:   Mike Ferrell is a 66 y.o. male who presents today with AICD with recent generator change. No complaints. No chest pain or SOB. No palps. Mild T wave oversensing. Adjusted. Myalgias with lipitor that resolved with stopping.    Past Medical History   Diagnosis Date   • Paroxysmal ventricular tachycardia (CMS-HCC) 4/4/2012   • Sinoatrial node dysfunction (CMS-HCC) 4/4/2012   • Atrial fibrillation (CMS-[facility]) 4/4/2012   • Hypopotassemia 4/4/2012   • Unspecified essential hypertension 4/4/2012   • Other and unspecified hyperlipidemia 4/4/2012   • Syncope and collapse 4/4/2012   • Thrombocytopenia, unspecified (CMS-HCC) 4/4/2012   • Methamphetamine use none for many years   • Automatic implantable cardiac defibrillator in situ 11/8/2012   • Arthritis    • High cholesterol    • Pneumonia 2015   • Pain      shoulders   • Pacemaker      Past Surgical History   Procedure Laterality Date   • Cholecystectomy  1999   • Pacemaker insertion  2009   • Aicd implant  2010   • Tonsillectomy  1953     History reviewed. No pertinent family history.  History   Smoking status   • Never Smoker    Smokeless tobacco   • Never Used     Allergies   Allergen Reactions   • Lipitor [Atorvastatin]    • Sulfa Drugs Rash and Itching     Rash, itch     Outpatient Encounter Prescriptions as of 7/7/2017   Medication Sig Dispense Refill   • hydrochlorothiazide (MICROZIDE) 12.5 MG capsule Take 1 Cap by mouth every day. 90 Cap 3   • warfarin (COUMADIN) 5 MG Tab TAKE ONE TO ONE AND ONE-HALF TABLETS BY MOUTH ONCE DAILY AS DIRECTED BY COUMADIN CLINIC 135 Tab 3   • sotalol (BETAPACE) 120 MG tablet TAKE 1 TAB BY MOUTH 2 TIMES A DAY. TAKE 1 TAB BY MOUTH 2 TIMES A DAY. 180 Tab 1   •  acetaminophen (TYLENOL) 500 MG Tab Take 500-1,000 mg by mouth every 6 hours as needed.     • POTASSIUM CHLORIDE PO Take 1 Tab by mouth every day.     • amlodipine (NORVASC) 5 MG TABS Take 5 mg by mouth every day.     • benazepril (LOTENSIN) 40 MG tablet Take 40 mg by mouth every day.     • Cholecalciferol (VITAMIN D PO) Take 4,000 Units by mouth every day at 6 PM.     • [DISCONTINUED] atorvastatin (LIPITOR) 40 MG TABS Take 1 Tab by mouth every day. 30 Tab 0     No facility-administered encounter medications on file as of 7/7/2017.     ROS     Objective:   /74 mmHg  Pulse 89  Wt 102.513 kg (226 lb)  SpO2 95%    Physical Exam   Constitutional: He is oriented to person, place, and time. He appears well-developed and well-nourished.   HENT:   Mouth/Throat: Oropharynx is clear and moist.   Eyes: Conjunctivae and EOM are normal.   Neck: No JVD present. No thyroid mass present.   Cardiovascular: Normal rate, regular rhythm, S1 normal, S2 normal and normal pulses.  PMI is not displaced.  Exam reveals no gallop.    No murmur heard.  Pulses:       Carotid pulses are 2+ on the right side, and 2+ on the left side.       Radial pulses are 2+ on the right side, and 2+ on the left side.        Femoral pulses are 2+ on the right side, and 2+ on the left side.       Dorsalis pedis pulses are 2+ on the right side, and 2+ on the left side.   No peripheral edema.   Pulmonary/Chest: Effort normal and breath sounds normal.   Abdominal: Soft. Normal appearance. He exhibits no abdominal bruit. There is no hepatosplenomegaly. There is no tenderness.   Musculoskeletal: Normal range of motion. He exhibits no edema.        Thoracic back: He exhibits no tenderness and no spasm.   Neurological: He is alert and oriented to person, place, and time.   Skin: No rash noted. No cyanosis. Nails show no clubbing.   Psychiatric: He has a normal mood and affect.       Assessment:     1. High risk medication use     2. Essential hypertension        3. Automatic implantable cardioverter-defibrillator in situ     4. Paroxysmal atrial fibrillation (CMS-HCC)     5. Anticoagulant long-term use     6. Sinoatrial node dysfunction (CMS-HCC)     7. Syncope and collapse     8. Mixed hyperlipidemia         Medical Decision Making:  Today's Assessment / Status / Plan:     1. VT none.  2. PAF on sotalol.  3. Anticoagulation continue.  4. AICD adjusted v sensing.  5. HLD lipitor stopped and repeat labs in next month.      No Recipients

## 2017-07-07 NOTE — MR AVS SNAPSHOT
Mike Ferrell   2017 8:45 AM   Non-Provider Visit   MRN: [id number]    Department:  [facility]   Dept Phone:  [phone]    Description:  Male : 1950   Provider:  [facility]           Allergies as of 2017     Allergen Noted Reactions    Lipitor [Atorvastatin] 2017       Sulfa Drugs 2012   Rash, Itching    Rash, itch      Vital Signs     Smoking Status                   Never Smoker            Basic Information     Date Of Birth Sex Race Ethnicity Preferred Language    1950 Male White Non- English      Your appointments     2017  9:15 AM   Established Patient with [facility] EXAM 4   [facility]  (--)    [address]  [place] [address]   [phone]            Grover 10, 2018  9:15 AM   PACER CHECK ONLY with [facility] 2   [facility] (--)    [address], [address]  [place] [id number] [phone]            Grover 10, 2018  9:40 AM   FOLLOW UP with Daniel Ferrera M.D.   [facility] (--)    [address], [address]  [place] [id number] [phone]              Problem List              ICD-10-CM Priority Class Noted - Resolved    Paroxysmal ventricular tachycardia (CMS-HCC) I47.2   2012 - Present    Sinoatrial node dysfunction (CMS-HCC) I49.5   2012 - Present    Atrial fibrillation (CMS-[facility]) I48.91   2012 - Present    Hypopotassemia E87.6   2012 - Present    Essential hypertension I10   2012 - Present    Other and unspecified hyperlipidemia E78.5   2012 - Present    Syncope and collapse R55   2012 - Present    Thrombocytopenia (CMS-[facility]) D69.6   2012 - Present    High risk medication use Z79.899   2012 - Present    Anticoagulant long-term use Z79.01   2012 - Present    Automatic implantable cardioverter-defibrillator in situ Z95.810   2012 - Present    Alcohol abuse  F10.10   5/8/2013 - Present    HLD (hyperlipidemia) E78.5   8/26/2015 - Present    Paroxysmal atrial fibrillation (CMS-HCC) I48.0   9/9/2015 - Present    S/P cardiac cath Z98.890   1/6/2017 - Present      Health Maintenance        Date Due Completion Dates    IMM DTaP/Tdap/Td Vaccine (1 - Tdap) 7/8/1967 ---    COLONOSCOPY 7/8/1998 ---    IMM ZOSTER VACCINE 7/8/2008 ---    IMM PNEUMOCOCCAL 65+ (ADULT) LOW/MEDIUM RISK SERIES (1 of 2 - PCV13) 7/8/2013 ---    IMM INFLUENZA (1) 9/1/2017 ---            Current Immunizations     No immunizations on file.      Below and/or attached are the medications your provider expects you to take. Review all of your home medications and newly ordered medications with your provider and/or pharmacist. Follow medication instructions as directed by your provider and/or pharmacist. Please keep your medication list with you and share with your provider. Update the information when medications are discontinued, doses are changed, or new medications (including over-the-counter products) are added; and carry medication information at all times in the event of emergency situations     Allergies:  LIPITOR - (reactions not documented)     SULFA DRUGS - Rash,Itching               Medications  Valid as of: July 07, 2017 -  9:11 AM    Generic Name Brand Name Tablet Size Instructions for use    Acetaminophen (Tab) TYLENOL 500 MG Take 500-1,000 mg by mouth every 6 hours as needed.        AmLODIPine Besylate (Tab) NORVASC 5 MG Take 5 mg by mouth every day.        Benazepril HCl (Tab) LOTENSIN 40 MG Take 40 mg by mouth every day.        Cholecalciferol   Take 4,000 Units by mouth every day at 6 PM.        HydroCHLOROthiazide (Cap) MICROZIDE 12.5 MG Take 1 Cap by mouth every day.        Potassium Chloride   Take 1 Tab by mouth every day.        Sotalol HCl (Tab) BETAPACE 120 MG TAKE 1 TAB BY MOUTH 2 TIMES A DAY. TAKE 1 TAB BY MOUTH 2 TIMES A DAY.        Warfarin Sodium (Tab) COUMADIN 5 MG TAKE ONE TO ONE  AND ONE-HALF TABLETS BY MOUTH ONCE DAILY AS DIRECTED BY COUMADIN CLINIC        .                 Medicines prescribed today were sent to:     [id number] - [place], [place] - [address]    [address] [address]    Phone: [phone] Fax: [phone]    Open 24 Hours?: No    [facility] - [place], [place] - [address]    [address] [place] [address]    Phone: [phone] Fax: [phone]    Open 24 Hours?: No      Medication refill instructions:       If your prescription bottle indicates you have medication refills left, it is not necessary to call your provider’s office. Please contact your pharmacy and they will refill your medication.    If your prescription bottle indicates you do not have any refills left, you may request refills at any time through one of the following ways: The online [employer] system (except Urgent Care), by calling your provider’s office, or by asking your pharmacy to contact your provider’s office with a refill request. Medication refills are processed only during regular business hours and may not be available until the next business day. Your provider may request additional information or to have a follow-up visit with you prior to refilling your medication.   *Please Note: Medication refills are assigned a new Rx number when refilled electronically. Your pharmacy may indicate that no refills were authorized even though a new prescription for the same medication is available at the pharmacy. Please request the medicine by name with the pharmacy before contacting your provider for a refill.           [employer] Access Code: [email or web address]  Expires: 8/6/2017  9:10 AM    [employer]  A secure, online tool to manage your health information     [employer]s [employer]® is a secure, online tool that connects you to your personalized health information from the privacy of your home -- day or night - making it very easy for you to manage  your healthcare. Once the activation process is completed, you can even access your medical information using the [employer] martha, which is available for free in the Apple Martha store or Google Play store.     [employer] provides the following levels of access (as shown below):   My Chart Features   [facility] Primary [facility] Doctor [facility]  Specialists [facility]  Urgent  Care Non-[facility]  Primary Care  Doctor   Email your healthcare team securely and privately 24/7 X X X    Manage appointments: schedule your next appointment; view details of past/upcoming appointments X      Request prescription refills. X      View recent personal medical records, including lab and immunizations X X X X   View health record, including health history, allergies, medications X X X X   Read reports about your outpatient visits, procedures, consult and ER notes X X X X   See your discharge summary, which is a recap of your hospital and/or ER visit that includes your diagnosis, lab results, and care plan. X X       How to register for [employer]:  1. Go to  [email or web address].  2. Click on the Sign Up Now box, which takes you to the New Member Sign Up page. You will need to provide the following information:  a. Enter your [employer] Access Code exactly as it appears at the top of this page. (You will not need to use this code after you’ve completed the sign-up process. If you do not sign up before the expiration date, you must request a new code.)   b. Enter your date of birth.   c. Enter your home email address.   d. Click Submit, and follow the next screen’s instructions.  3. Create a [employer]ize ID. This will be your [employer] login ID and cannot be changed, so think of one that is secure and easy to remember.  4. Create a [employer] password. You can change your password at any time.  5. Enter your Password Reset Question and Answer. This can be used at a later time if you forget your password.   6. Enter your e-mail address. This allows you to receive  e-mail notifications when new information is available in [employer].  7. Click Sign Up. You can now view your health information.    For assistance activating your [employer] account, call [phone]

## 2017-07-07 NOTE — MR AVS SNAPSHOT
Mike Ferrell   2017 9:00 AM   Office Visit   MRN: [id number]    Department:  [facility]   Dept Phone:  [phone]    Description:  Male : 1950   Provider:  Daniel Ferrera M.D.           Reason for Visit     Follow-Up           Allergies as of 2017     Allergen Noted Reactions    Lipitor [Atorvastatin] 2017       Sulfa Drugs 2012   Rash, Itching    Rash, itch      You were diagnosed with     High risk medication use   [476599]       Essential hypertension   [1274054]       Automatic implantable cardioverter-defibrillator in situ   [4612743]       Paroxysmal atrial fibrillation (CMS-HCC)   [296969]       Anticoagulant long-term use   [057327]       Sinoatrial node dysfunction (CMS-HCC)   [427.81.ICD-9-CM]       Syncope and collapse   [780.2.ICD-9-CM]       Mixed hyperlipidemia   [272.2.ICD-9-CM]         Vital Signs     Blood Pressure Pulse Weight Oxygen Saturation Smoking Status       124/74 mmHg 89 102.513 kg (226 lb) 95% Never Smoker        Basic Information     Date Of Birth Sex Race Ethnicity Preferred Language    1950 Male White Non- English      Your appointments     2017  9:15 AM   Established Patient with [facility] EXAM 4   [facility]  (--)    [address] [facility] [address]   [phone]            Grover 10, 2018  9:15 AM   PACER CHECK ONLY with PACER CHECK-CAM B 2   [facility] (--)    [address]  [facility] [id number] [phone]            Grover 10, 2018  9:40 AM   FOLLOW UP with Daniel Ferrera M.D.   [facility] (--)    [address], [address]  [place] [id number] [phone]              Problem List              ICD-10-CM Priority Class Noted - Resolved    Paroxysmal ventricular tachycardia (CMS-HCC) I47.2   2012 - Present    Sinoatrial node dysfunction (CMS-HCC) I49.5   2012 - Present    Atrial fibrillation (CMS-HCC) I48.91   4/4/2012 - Present    Hypopotassemia E87.6   4/4/2012 - Present    Essential hypertension I10   4/4/2012 - Present    Other and unspecified hyperlipidemia E78.5   4/4/2012 - Present    Syncope and collapse R55   4/4/2012 - Present    Thrombocytopenia (CMS-HCC) D69.6   4/4/2012 - Present    High risk medication use Z79.899   5/14/2012 - Present    Anticoagulant long-term use Z79.01   5/14/2012 - Present    Automatic implantable cardioverter-defibrillator in situ Z95.810   11/8/2012 - Present    Alcohol abuse F10.10   5/8/2013 - Present    HLD (hyperlipidemia) E78.5   8/26/2015 - Present    Paroxysmal atrial fibrillation (CMS-HCC) I48.0   9/9/2015 - Present    S/P cardiac cath Z98.890   1/6/2017 - Present      Health Maintenance        Date Due Completion Dates    IMM DTaP/Tdap/Td Vaccine (1 - Tdap) 7/8/1967 ---    COLONOSCOPY 7/8/1998 ---    IMM ZOSTER VACCINE 7/8/2008 ---    IMM PNEUMOCOCCAL 65+ (ADULT) LOW/MEDIUM RISK SERIES (1 of 2 - PCV13) 7/8/2013 ---    IMM INFLUENZA (1) 9/1/2017 ---            Current Immunizations     No immunizations on file.      Below and/or attached are the medications your provider expects you to take. Review all of your home medications and newly ordered medications with your provider and/or pharmacist. Follow medication instructions as directed by your provider and/or pharmacist. Please keep your medication list with you and share with your provider. Update the information when medications are discontinued, doses are changed, or new medications (including over-the-counter products) are added; and carry medication information at all times in the event of emergency situations     Allergies:  LIPITOR - (reactions not documented)     SULFA DRUGS - Rash,Itching               Medications  Valid as of: July 07, 2017 -  9:10 AM    Generic Name Brand Name Tablet Size Instructions for use    Acetaminophen (Tab) TYLENOL 500 MG Take 500-1,000 mg by mouth  every 6 hours as needed.        AmLODIPine Besylate (Tab) NORVASC 5 MG Take 5 mg by mouth every day.        Benazepril HCl (Tab) LOTENSIN 40 MG Take 40 mg by mouth every day.        Cholecalciferol   Take 4,000 Units by mouth every day at 6 PM.        HydroCHLOROthiazide (Cap) MICROZIDE 12.5 MG Take 1 Cap by mouth every day.        Potassium Chloride   Take 1 Tab by mouth every day.        Sotalol HCl (Tab) BETAPACE 120 MG TAKE 1 TAB BY MOUTH 2 TIMES A DAY. TAKE 1 TAB BY MOUTH 2 TIMES A DAY.        Warfarin Sodium (Tab) COUMADIN 5 MG TAKE ONE TO ONE AND ONE-HALF TABLETS BY MOUTH ONCE DAILY AS DIRECTED BY COUMADIN CLINIC        .                 Medicines prescribed today were sent to:     [facility]/PHARMACY #[id number] - [place], [place] - [address]    [address] [facility] [address]    Phone: [phone] Fax: [phone]    Open 24 Hours?: No    [facility] [address] [place] [address] [address] [address]    Phone: [phone] Fax: [phone]    Open 24 Hours?: No      Medication refill instructions:       If your prescription bottle indicates you have medication refills left, it is not necessary to call your provider’s office. Please contact your pharmacy and they will refill your medication.    If your prescription bottle indicates you do not have any refills left, you may request refills at any time through one of the following ways: The online e(ye)BRAIN system (except Urgent Care), by calling your provider’s office, or by asking your pharmacy to contact your provider’s office with a refill request. Medication refills are processed only during regular business hours and may not be available until the next business day. Your provider may request additional information or to have a follow-up visit with you prior to refilling your medication.   *Please Note: Medication refills are assigned a new Rx number when refilled electronically. Your pharmacy may indicate  that no refills were authorized even though a new prescription for the same medication is available at the pharmacy. Please request the medicine by name with the pharmacy before contacting your provider for a refill.           MyMiniLife Access Code: [email or web address]  Expires: 8/6/2017  9:10 AM    MyMiniLife  A secure, online tool to manage your health information     [employer]’s MyMiniLife® is a secure, online tool that connects you to your personalized health information from the privacy of your home -- day or night - making it very easy for you to manage your healthcare. Once the activation process is completed, you can even access your medical information using the MyMiniLife martha, which is available for free in the Apple Martha store or Google Play store.     MyMiniLife provides the following levels of access (as shown below):   My Chart Features   [facility] Primary Care Doctor [facility]  Specialists [facility]  Urgent  Care Non-[facility]  Primary Care  Doctor   Email your healthcare team securely and privately 24/7 X X X    Manage appointments: schedule your next appointment; view details of past/upcoming appointments X      Request prescription refills. X      View recent personal medical records, including lab and immunizations X X X X   View health record, including health history, allergies, medications X X X X   Read reports about your outpatient visits, procedures, consult and ER notes X X X X   See your discharge summary, which is a recap of your hospital and/or ER visit that includes your diagnosis, lab results, and care plan. X X       How to register for MyMiniLife:  1. Go to  [email or web address].  2. Click on the Sign Up Now box, which takes you to the New Member Sign Up page. You will need to provide the following information:  a. Enter your MyMiniLife Access Code exactly as it appears at the top of this page. (You will not need to use this code after you’ve completed the sign-up process. If you do not sign up before the  expiration date, you must request a new code.)   b. Enter your date of birth.   c. Enter your home email address.   d. Click Submit, and follow the next screen’s instructions.  3. Create a [employer] ID. This will be your [employer] login ID and cannot be changed, so think of one that is secure and easy to remember.  4. Create a [employer] password. You can change your password at any time.  5. Enter your Password Reset Question and Answer. This can be used at a later time if you forget your password.   6. Enter your e-mail address. This allows you to receive e-mail notifications when new information is available in [employer].  7. Click Sign Up. You can now view your health information.    For assistance activating your [employer] account, call [phone]

## 2017-07-26 ENCOUNTER — ANTICOAGULATION VISIT (OUTPATIENT)
Dept: VASCULAR LAB | Facility: MEDICAL CENTER | Age: 67
End: 2017-07-26
Attending: INTERNAL MEDICINE
Payer: MEDICARE

## 2017-07-26 VITALS — HEART RATE: 74 BPM | SYSTOLIC BLOOD PRESSURE: 124 MMHG | DIASTOLIC BLOOD PRESSURE: 90 MMHG

## 2017-07-26 DIAGNOSIS — I48.0 PAROXYSMAL ATRIAL FIBRILLATION (HCC): ICD-10-CM

## 2017-07-26 LAB
INR BLD: 3.5 (ref 0.9–1.2)
INR PPP: 3.5 (ref 2–3.5)

## 2017-07-26 PROCEDURE — 85610 PROTHROMBIN TIME: CPT

## 2017-07-26 PROCEDURE — 99212 OFFICE O/P EST SF 10 MIN: CPT | Performed by: NURSE PRACTITIONER

## 2017-07-26 NOTE — PROGRESS NOTES
Anticoagulation Summary as of 7/26/2017     INR goal 2.0-3.0   Selected INR 3.5! (7/26/2017)   Maintenance plan 7.5 mg (5 mg x 1.5) on Sun, Tue, Thu; 5 mg (5 mg x 1) all other days   Weekly total 42.5 mg   Plan last modified ILEANA Pike (9/30/2016)   Next INR check 8/23/2017   Target end date     Indications   Atrial fibrillation (CMS-HCC) [I48.91]  Paroxysmal atrial fibrillation (CMS-HCC) [I48.0]         Anticoagulation Episode Summary     [facility] check location [facility]    Preferred lab     Send INR reminders to     Comments       Anticoagulation Care Providers     Provider Role Specialty Phone number    Daniel Ferrera M.D. Referring Cardiac Electrophysiology [phone]    [facility] Anticoagulation Services Responsible  [phone]        Anticoagulation Patient Findings      Current Outpatient Prescriptions on File Prior to Visit   Medication Sig Dispense Refill   • hydrochlorothiazide (MICROZIDE) 12.5 MG capsule Take 1 Cap by mouth every day. 90 Cap 3   • warfarin (COUMADIN) 5 MG Tab TAKE ONE TO ONE AND ONE-HALF TABLETS BY MOUTH ONCE DAILY AS DIRECTED BY COUMADIN CLINIC 135 Tab 3   • sotalol (BETAPACE) 120 MG tablet TAKE 1 TAB BY MOUTH 2 TIMES A DAY. TAKE 1 TAB BY MOUTH 2 TIMES A DAY. 180 Tab 1   • acetaminophen (TYLENOL) 500 MG Tab Take 500-1,000 mg by mouth every 6 hours as needed.     • POTASSIUM CHLORIDE PO Take 1 Tab by mouth every day.     • amlodipine (NORVASC) 5 MG TABS Take 5 mg by mouth every day.     • benazepril (LOTENSIN) 40 MG tablet Take 40 mg by mouth every day.     • Cholecalciferol (VITAMIN D PO) Take 4,000 Units by mouth every day at 6 PM.       No current facility-administered medications on file prior to visit.       Lab Results   Component Value Date/Time    SODIUM 140 05/01/2017 09:50 AM    POTASSIUM 3.8 05/01/2017 09:50 AM    CHLORIDE 104 05/01/2017 09:50 AM    CO2 26 05/01/2017 09:50 AM    GLUCOSE 105* 05/01/2017 09:50 AM    BUN 23* 05/01/2017 09:50 AM    CREATININE 1.04  05/01/2017 09:50 AM    BUN-CREATININE RATIO 25* 05/30/2014 09:04 AM          Mike Ferrell seen in clinic today  INR  supra-therapeutic.    Denies signs/symptoms of bleeding and/or thrombosis.    Denies changes to diet or medications.   Follow up appointment in 4 week(s).      Take 2.5 mg tonight then continue current medication regimen.        ROSI Hall.

## 2017-07-26 NOTE — MR AVS SNAPSHOT
Mike Ferrell   2017 9:15 AM   Anticoagulation Visit   MRN: [id number]    Department:  Vascular Medicine   Dept Phone:  [phone]    Description:  Male : 1950   Provider:  V EXAM 4           Allergies as of 2017     Allergen Noted Reactions    Lipitor [Atorvastatin] 2017       Sulfa Drugs 2012   Rash, Itching    Rash, itch      You were diagnosed with     Paroxysmal atrial fibrillation (CMS-HCC)   [419579]         Vital Signs     Smoking Status                   Never Smoker            Basic Information     Date Of Birth Sex Race Ethnicity Preferred Language    1950 Male White Non- English      Your appointments     2017  9:15 AM   Established Patient with [facility] EXAM 4   [facility] and [facility]  (--)    [address] [facility]  [place] [address]   [phone]            Aug 23, 2017  9:15 AM   Established Patient with [facility] EXAM 5   [facility] V[facility] Health  (--)    [address] [facility]  [place] [address]   [phone]            Grover 10, 2018  9:15 AM   PACER CHECK ONLY with PACER CHECK-CAM B 2   [facility] Health-[facility] (--)    [address], [address]  [place] [id number] [phone]            Gro[place] 10, 2018  9:40 AM   FOLLOW UP with Daniel Ferrera M.D.   [facility] (--)    [address]  [place] [address]   [phone]              Problem List              ICD-10-CM Priority Class Noted - Resolved    Paroxysmal ventricular tachycardia (CMS-HCC) I47.2   2012 - Present    Sinoatrial node dysfunction (CMS-HCC) I49.5   2012 - Present    Atrial fibrillation (CMS-HCC) I48.91   2012 - Present    Hypopotassemia E87.6   2012 - Present    Essential hypertension I10   2012 - Present    Other and unspecified hyperlipidemia E78.5   2012 - Present    Syncope and collapse R55   2012 -  Present    Thrombocytopenia (CMS-HCC) D69.6   4/4/2012 - Present    High risk medication use Z79.899   5/14/2012 - Present    Anticoagulant long-term use Z79.01   5/14/2012 - Present    Automatic implantable cardioverter-defibrillator in situ Z95.810   11/8/2012 - Present    Alcohol abuse F10.10   5/8/2013 - Present    HLD (hyperlipidemia) E78.5   8/26/2015 - Present    Paroxysmal atrial fibrillation (CMS-HCC) I48.0   9/9/2015 - Present    S/P cardiac cath Z98.890   1/6/2017 - Present    RBBB I45.10   7/7/2017 - Present      Health Maintenance        Date Due Completion Dates    IMM DTaP/Tdap/Td Vaccine (1 - Tdap) 7/8/1967 ---    COLONOSCOPY 7/8/1998 ---    IMM ZOSTER VACCINE 7/8/2008 ---    IMM PNEUMOCOCCAL 65+ (ADULT) LOW/MEDIUM RISK SERIES (1 of 2 - PCV13) 7/8/2013 ---    IMM INFLUENZA (1) 9/1/2017 ---            Results     POCT Protime      Component    INR    3.5                        Current Immunizations     No immunizations on file.      Below and/or attached are the medications your provider expects you to take. Review all of your home medications and newly ordered medications with your provider and/or pharmacist. Follow medication instructions as directed by your provider and/or pharmacist. Please keep your medication list with you and share with your provider. Update the information when medications are discontinued, doses are changed, or new medications (including over-the-counter products) are added; and carry medication information at all times in the event of emergency situations     Allergies:  LIPITOR - (reactions not documented)     SULFA DRUGS - Rash,Itching               Medications  Valid as of: July 26, 2017 -  9:13 AM    Generic Name Brand Name Tablet Size Instructions for use    Acetaminophen (Tab) TYLENOL 500 MG Take 500-1,000 mg by mouth every 6 hours as needed.        AmLODIPine Besylate (Tab) NORVASC 5 MG Take 5 mg by mouth every day.        Benazepril HCl (Tab) LOTENSIN 40 MG Take 40  mg by mouth every day.        Cholecalciferol   Take 4,000 Units by mouth every day at 6 PM.        HydroCHLOROthiazide (Cap) MICROZIDE 12.5 MG Take 1 Cap by mouth every day.        Potassium Chloride   Take 1 Tab by mouth every day.        Sotalol HCl (Tab) BETAPACE 120 MG TAKE 1 TAB BY MOUTH 2 TIMES A DAY. TAKE 1 TAB BY MOUTH 2 TIMES A DAY.        Warfarin Sodium (Tab) COUMADIN 5 MG TAKE ONE TO ONE AND ONE-HALF TABLETS BY MOUTH ONCE DAILY AS DIRECTED BY COUMADIN CLINIC        .                 Medicines prescribed today were sent to:     [facility]/PHARMACY #[id number] - [place], [place] - [address]    [address] [facility] [address]    Phone: [phone] Fax: [phone]    Open 24 Hours?: No    [facility] [address] [place], [place] - [address] [address] [facility] [address]    Phone: [phone] Fax: [phone]    Open 24 Hours?: No      Medication refill instructions:       If your prescription bottle indicates you have medication refills left, it is not necessary to call your provider’s office. Please contact your pharmacy and they will refill your medication.    If your prescription bottle indicates you do not have any refills left, you may request refills at any time through one of the following ways: The online ArcMail system (except Urgent Care), by calling your provider’s office, or by asking your pharmacy to contact your provider’s office with a refill request. Medication refills are processed only during regular business hours and may not be available until the next business day. Your provider may request additional information or to have a follow-up visit with you prior to refilling your medication.   *Please Note: Medication refills are assigned a new Rx number when refilled electronically. Your pharmacy may indicate that no refills were authorized even though a new prescription for the same medication is available at the pharmacy. Please request the medicine by name  with the pharmacy before contacting your provider for a refill.        Warfarin Dosing Calendar   July 2017 Details    Sun Mon Tue Wed Thu Fri Sat           1                 2               3               4               5               6               7               8                 9               10               11               12               13               14               15                 16               17               18               19               20               21               22                 23               24               25               26   3.5   2.5 mg   See details      27      7.5 mg         28      5 mg         29      5 mg           30      7.5 mg         31      5 mg               Date Details   07/26 This INR check   INR: 3.5               How to take your warfarin dose     To take:  2.5 mg Take 0.5 of a 5 mg tablet.    To take:  5 mg Take 1 of the 5 mg tablets.    To take:  7.5 mg Take 1.5 of the 5 mg tablets.           Warfarin Dosing Calendar   August 2017 Details    Sun Mon Tue Wed Thu Fri Sat       1      7.5 mg         2      5 mg         3      7.5 mg         4      5 mg         5      5 mg           6      7.5 mg         7      5 mg         8      7.5 mg         9      5 mg         10      7.5 mg         11      5 mg         12      5 mg           13      7.5 mg         14      5 mg         15      7.5 mg         16      5 mg         17      7.5 mg         18      5 mg         19      5 mg           20      7.5 mg         21      5 mg         22      7.5 mg         23      5 mg         24               25               26                 27               28               29               30               31                  Date Details   No additional details    Date of next INR:  8/23/2017         How to take your warfarin dose     To take:  5 mg Take 1 of the 5 mg tablets.    To take:  7.5 mg Take 1.5 of the 5 mg tablets.              [employer] Access Code:  [email or web address]  Expires: 8/6/2017  9:10 AM    TicTacTi  A secure, online tool to manage your health information     [employer]’s TicTacTi® is a secure, online tool that connects you to your personalized health information from the privacy of your home -- day or night - making it very easy for you to manage your healthcare. Once the activation process is completed, you can even access your medical information using the TicTacTi martha, which is available for free in the Apple Martha store or Google Play store.     TicTacTi provides the following levels of access (as shown below):   My Chart Features   [facility] Primary Care Doctor [facility]  Specialists [facility]  Urgent  Care Non-[facility]  Primary Care  Doctor   Email your healthcare team securely and privately 24/7 X X X    Manage appointments: schedule your next appointment; view details of past/upcoming appointments X      Request prescription refills. X      View recent personal medical records, including lab and immunizations X X X X   View health record, including health history, allergies, medications X X X X   Read reports about your outpatient visits, procedures, consult and ER notes X X X X   See your discharge summary, which is a recap of your hospital and/or ER visit that includes your diagnosis, lab results, and care plan. X X       How to register for TicTacTi:  1. Go to  [email or web address].  2. Click on the Sign Up Now box, which takes you to the New Member Sign Up page. You will need to provide the following information:  a. Enter your TicTacTi Access Code exactly as it appears at the top of this page. (You will not need to use this code after you’ve completed the sign-up process. If you do not sign up before the expiration date, you must request a new code.)   b. Enter your date of birth.   c. Enter your home email address.   d. Click Submit, and follow the next screen’s instructions.  3. Create a TicTacTi ID. This will be your TicTacTi login ID and cannot be  changed, so think of one that is secure and easy to remember.  4. Create a [employer] password. You can change your password at any time.  5. Enter your Password Reset Question and Answer. This can be used at a later time if you forget your password.   6. Enter your e-mail address. This allows you to receive e-mail notifications when new information is available in [employer].  7. Click Sign Up. You can now view your health information.    For assistance activating your [employer] account, call [phone]

## 2017-08-23 ENCOUNTER — ANTICOAGULATION VISIT (OUTPATIENT)
Dept: VASCULAR LAB | Facility: MEDICAL CENTER | Age: 67
End: 2017-08-23
Attending: INTERNAL MEDICINE
Payer: MEDICARE

## 2017-08-23 VITALS — DIASTOLIC BLOOD PRESSURE: 94 MMHG | HEART RATE: 81 BPM | SYSTOLIC BLOOD PRESSURE: 123 MMHG

## 2017-08-23 DIAGNOSIS — I48.0 PAROXYSMAL ATRIAL FIBRILLATION (HCC): ICD-10-CM

## 2017-08-23 LAB — INR PPP: 2.3 (ref 2–3.5)

## 2017-08-23 PROCEDURE — 85610 PROTHROMBIN TIME: CPT

## 2017-08-23 PROCEDURE — 99211 OFF/OP EST MAY X REQ PHY/QHP: CPT | Performed by: NURSE PRACTITIONER

## 2017-08-23 NOTE — PROGRESS NOTES
Anticoagulation Summary as of 8/23/2017     INR goal 2.0-3.0   Selected INR 2.3 (8/23/2017)   Maintenance plan 7.5 mg (5 mg x 1.5) on Sun, Tue, Thu; 5 mg (5 mg x 1) all other days   Weekly total 42.5 mg   Plan last modified ILEANA Pike (9/30/2016)   Next INR check 9/27/2017   Target end date     Indications   Atrial fibrillation (CMS-HCC) [I48.91]  Paroxysmal atrial fibrillation (CMS-HCC) [I48.0]         Anticoagulation Episode Summary     [facility] location [facility]    Preferred lab     Send INR reminders to     Comments       Anticoagulation Care Providers     Provider Role Specialty Phone number    Daniel Ferrera M.D. Referring Cardiac Electrophysiology [phone]    [facility] Anticoagulation Services Responsible  [phone]        Patient is therapeutic today. Denies any medication or diet changes. No current symptoms of bleeding or thrombosis reported. Continue current regimen. Follow up in 5 weeks.    Medication: Warfarin (Coumadin)     Sunday Monday Tuesday Wednesday Thursday Friday Saturday      7.5 mg    5 mg    7.5 mg    5 mg    7.5 mg    5 mg    5 mg      1.5 tab(s)    1 tab(s)    1.5 tab(s)    1 tab(s)    1.5 tab(s)    1 tab(s)  1 tab(s)     Next Appointment: Wednesday, September 27, 2017 at 9:15 am.    Rebecca SNIDER

## 2017-08-23 NOTE — MR AVS SNAPSHOT
Mike Ferrell   2017 9:15 AM   Anticoagulation Visit   MRN: [id number]    Department:  Vascular Medicine   Dept Phone:  [phone]    Description:  Male : 1950   Provider:  V EXAM 5           Allergies as of 2017     Allergen Noted Reactions    Lipitor [Atorvastatin] 2017       Sulfa Drugs 2012   Rash, Itching    Rash, itch      You were diagnosed with     Paroxysmal atrial fibrillation (CMS-HCC)   [813714]         Vital Signs     Blood Pressure Pulse Smoking Status             123/94 mmHg 81 Never Smoker          Basic Information     Date Of Birth Sex Race Ethnicity Preferred Language    1950 Male White Non- English      Your appointments     Aug 23, 2017  9:15 AM   Established Patient with [facility] EXAM 5   [facility]  (--)    [address]  [place] [address]   [phone]            Sep 27, 2017  9:15 AM   Established Patient with IHVH EXAM 4   [facility]  (--)    [address] [place] [address]   [phone]            Grover 10, 2018  9:15 AM   PACER CHECK ONLY with PACER CHECK-CAM B 2   [facility] (--)    [address]  [place] [id number] [phone]            Grover 10, 2018  9:40 AM   FOLLOW UP with Daniel Ferrera M.D.   [facility] (--)    [address]  [place] [address]   [phone]              Problem List              ICD-10-CM Priority Class Noted - Resolved    Paroxysmal ventricular tachycardia (CMS-HCC) I47.2   2012 - Present    Sinoatrial node dysfunction (CMS-HCC) I49.5   2012 - Present    Atrial fibrillation (CMS-HCC) I48.91   2012 - Present    Hypopotassemia E87.6   2012 - Present    Essential hypertension I10   2012 - Present    Other and unspecified hyperlipidemia E78.5   2012 - Present    Syncope and  collapse R55   4/4/2012 - Present    Thrombocytopenia (CMS-HCC) D69.6   4/4/2012 - Present    High risk medication use Z79.899   5/14/2012 - Present    Anticoagulant long-term use Z79.01   5/14/2012 - Present    Automatic implantable cardioverter-defibrillator in situ Z95.810   11/8/2012 - Present    Alcohol abuse F10.10   5/8/2013 - Present    HLD (hyperlipidemia) E78.5   8/26/2015 - Present    Paroxysmal atrial fibrillation (CMS-HCC) I48.0   9/9/2015 - Present    S/P cardiac cath Z98.890   1/6/2017 - Present    RBBB I45.10   7/7/2017 - Present      Health Maintenance        Date Due Completion Dates    IMM DTaP/Tdap/Td Vaccine (1 - Tdap) 7/8/1967 ---    COLONOSCOPY 7/8/1998 ---    IMM ZOSTER VACCINE 7/8/2008 ---    IMM PNEUMOCOCCAL 65+ (ADULT) LOW/MEDIUM RISK SERIES (1 of 2 - PCV13) 7/8/2013 ---    IMM INFLUENZA (1) 9/1/2017 ---            Results     POCT Protime      Component    INR    2.3                        Current Immunizations     No immunizations on file.      Below and/or attached are the medications your provider expects you to take. Review all of your home medications and newly ordered medications with your provider and/or pharmacist. Follow medication instructions as directed by your provider and/or pharmacist. Please keep your medication list with you and share with your provider. Update the information when medications are discontinued, doses are changed, or new medications (including over-the-counter products) are added; and carry medication information at all times in the event of emergency situations     Allergies:  LIPITOR - (reactions not documented)     SULFA DRUGS - Rash,Itching               Medications  Valid as of: August 23, 2017 -  9:00 AM    Generic Name Brand Name Tablet Size Instructions for use    Acetaminophen (Tab) TYLENOL 500 MG Take 500-1,000 mg by mouth every 6 hours as needed.        AmLODIPine Besylate (Tab) NORVASC 5 MG Take 5 mg by mouth every day.        Benazepril HCl  (Tab) LOTENSIN 40 MG Take 40 mg by mouth every day.        Cholecalciferol   Take 4,000 Units by mouth every day at 6 PM.        HydroCHLOROthiazide (Cap) MICROZIDE 12.5 MG Take 1 Cap by mouth every day.        Potassium Chloride   Take 1 Tab by mouth every day.        Sotalol HCl (Tab) BETAPACE 120 MG TAKE 1 TAB BY MOUTH 2 TIMES A DAY. TAKE 1 TAB BY MOUTH 2 TIMES A DAY.        Warfarin Sodium (Tab) COUMADIN 5 MG TAKE ONE TO ONE AND ONE-HALF TABLETS BY MOUTH ONCE DAILY AS DIRECTED BY COUMADIN CLINIC        .                 Medicines prescribed today were sent to:     [id number] - [place], [place] - [address]    [address] [address]    Phone: [phone] Fax: [phone]    Open 24 Hours?: No    [facility] [address] [place] [address] [address]    [address] [address]    Phone: [phone] Fax: [phone]    Open 24 Hours?: No      Medication refill instructions:       If your prescription bottle indicates you have medication refills left, it is not necessary to call your provider’s office. Please contact your pharmacy and they will refill your medication.    If your prescription bottle indicates you do not have any refills left, you may request refills at any time through one of the following ways: The online [employer] system (except Urgent Care), by calling your provider’s office, or by asking your pharmacy to contact your provider’s office with a refill request. Medication refills are processed only during regular business hours and may not be available until the next business day. Your provider may request additional information or to have a follow-up visit with you prior to refilling your medication.   *Please Note: Medication refills are assigned a new Rx number when refilled electronically. Your pharmacy may indicate that no refills were authorized even though a new prescription for the same medication is available at the pharmacy. Please  request the medicine by name with the pharmacy before contacting your provider for a refill.        Warfarin Dosing Calendar   August 2017 Details    Sun Mon Tue Wed Thu Fri Sat       1               2               3               4               5                 6               7               8               9               10               11               12                 13               14               15               16               17               18               19                 20               21               22               23   2.3   5 mg   See details      24      7.5 mg         25      5 mg         26      5 mg           27      7.5 mg         28      5 mg         29      7.5 mg         30      5 mg         31      7.5 mg            Date Details   08/23 This INR check   INR: 2.3               How to take your warfarin dose     To take:  5 mg Take 1 of the 5 mg tablets.    To take:  7.5 mg Take 1.5 of the 5 mg tablets.           Warfarin Dosing Calendar September 2017 Details    Sun Mon Tue Wed Thu Fri Sat          1      5 mg         2      5 mg           3      7.5 mg         4      5 mg         5      7.5 mg         6      5 mg         7      7.5 mg         8      5 mg         9      5 mg           10      7.5 mg         11      5 mg         12      7.5 mg         13      5 mg         14      7.5 mg         15      5 mg         16      5 mg           17      7.5 mg         18      5 mg         19      7.5 mg         20      5 mg         21      7.5 mg         22      5 mg         23      5 mg           24      7.5 mg         25      5 mg         26      7.5 mg         27      5 mg         28               29               30                Date Details   No additional details    Date of next INR:  9/27/2017         How to take your warfarin dose     To take:  5 mg Take 1 of the 5 mg tablets.    To take:  7.5 mg Take 1.5 of the 5 mg tablets.              [employer] Access Code:  [email or web address]  Expires: 9/22/2017  9:00 AM    [employer]  A secure, online tool to manage your health information     [employer]’s [employer]® is a secure, online tool that connects you to your personalized health information from the privacy of your home -- day or night - making it very easy for you to manage your healthcare. Once the activation process is completed, you can even access your medical information using the [employer] martha, which is available for free in the [employer] Martha store or Google Play store.     [employer] provides the following levels of access (as shown below):   My Chart Features   [facility] Primary Care Doctor [facility]  Specialists [facility]  Urgent  Care Non-[facility]  Primary Care  Doctor   Email your healthcare team securely and privately 24/7 X X X    Manage appointments: schedule your next appointment; view details of past/upcoming appointments X      Request prescription refills. X      View recent personal medical records, including lab and immunizations X X X X   View health record, including health history, allergies, medications X X X X   Read reports about your outpatient visits, procedures, consult and ER notes X X X X   See your discharge summary, which is a recap of your hospital and/or ER visit that includes your diagnosis, lab results, and care plan. X X       How to register for [employer]:  1. Go to  [email or web address].  2. Click on the Sign Up Now box, which takes you to the New Member Sign Up page. You will need to provide the following information:  a. Enter your [employer] Access Code exactly as it appears at the top of this page. (You will not need to use this code after you’ve completed the sign-up process. If you do not sign up before the expiration date, you must request a new code.)   b. Enter your date of birth.   c. Enter your home email address.   d. Click Submit, and follow the next screen’s instructions.  3. Create a [employer] ID. This will be your [employer] login ID and cannot be  changed, so think of one that is secure and easy to remember.  4. Create a [employer] password. You can change your password at any time.  5. Enter your Password Reset Question and Answer. This can be used at a later time if you forget your password.   6. Enter your e-mail address. This allows you to receive e-mail notifications when new information is available in [employer].  7. Click Sign Up. You can now view your health information.    For assistance activating your [employer] account, call [phone]

## 2017-08-24 LAB — INR BLD: 2.3 (ref 0.9–1.2)

## 2017-09-27 ENCOUNTER — ANTICOAGULATION VISIT (OUTPATIENT)
Dept: VASCULAR LAB | Facility: MEDICAL CENTER | Age: 67
End: 2017-09-27
Attending: INTERNAL MEDICINE
Payer: MEDICARE

## 2017-09-27 VITALS — DIASTOLIC BLOOD PRESSURE: 83 MMHG | SYSTOLIC BLOOD PRESSURE: 126 MMHG | HEART RATE: 90 BPM

## 2017-09-27 DIAGNOSIS — I48.0 PAROXYSMAL ATRIAL FIBRILLATION (HCC): ICD-10-CM

## 2017-09-27 LAB
INR BLD: 2.6 (ref 0.9–1.2)
INR PPP: 2.6 (ref 2–3.5)

## 2017-09-27 PROCEDURE — 85610 PROTHROMBIN TIME: CPT

## 2017-09-27 PROCEDURE — 99211 OFF/OP EST MAY X REQ PHY/QHP: CPT | Performed by: NURSE PRACTITIONER

## 2017-11-02 DIAGNOSIS — I48.91 ATRIAL FIBRILLATION, UNSPECIFIED TYPE (HCC): ICD-10-CM

## 2017-11-02 RX ORDER — SOTALOL HYDROCHLORIDE 120 MG/1
120 TABLET ORAL 2 TIMES DAILY
Qty: 180 TAB | Refills: 2
Start: 2017-11-02 | End: 2018-07-25 | Stop reason: SDUPTHER

## 2017-11-08 ENCOUNTER — ANTICOAGULATION VISIT (OUTPATIENT)
Dept: VASCULAR LAB | Facility: MEDICAL CENTER | Age: 67
End: 2017-11-08
Attending: INTERNAL MEDICINE
Payer: MEDICARE

## 2017-11-08 VITALS — HEART RATE: 86 BPM | DIASTOLIC BLOOD PRESSURE: 87 MMHG | SYSTOLIC BLOOD PRESSURE: 123 MMHG

## 2017-11-08 DIAGNOSIS — I48.0 PAROXYSMAL ATRIAL FIBRILLATION (HCC): ICD-10-CM

## 2017-11-08 LAB
INR BLD: 2.6 (ref 0.9–1.2)
INR PPP: 2.6 (ref 2–3.5)

## 2017-11-08 PROCEDURE — 85610 PROTHROMBIN TIME: CPT

## 2017-11-08 PROCEDURE — 99211 OFF/OP EST MAY X REQ PHY/QHP: CPT | Performed by: NURSE PRACTITIONER

## 2017-11-08 NOTE — PROGRESS NOTES
Anticoagulation Summary  As of 11/8/2017    INR goal:   2.0-3.0   TTR:   70.3 % (2.4 y)   Today's INR:   2.6   Maintenance plan:   7.5 mg (5 mg x 1.5) on Sun, Tue, Thu; 5 mg (5 mg x 1) all other days   Weekly total:   42.5 mg   Plan last modified:   DIANE PikePOG (9/30/2016)   Next INR check:   12/27/2017   Target end date:       Indications    Atrial fibrillation (CMS-HCC) [I48.91]  Paroxysmal atrial fibrillation (CMS-HCC) [I48.0]             Anticoagulation Episode Summary     [facility] check [facility]   [facility]    Preferred lab:       Send INR reminders to:       Comments:         Anticoagulation Care Providers     Provider Role Specialty Phone number    Daniel Ferrera M.D. Referring Cardiac Electrophysiology [phone]    [facility] Anticoagulation Services Responsible  [phone]        Anticoagulation Patient Findings      HPI:  Mike Ferrell seen in clinic today for follow up on anticoagulation therapy in the presence of AF. Denies any changes to current medical/health status since last appointment. Denies any medication or diet changes. No current symptoms of bleeding or thrombosis reported.    A/P:   INR therapeutic. Continue current regimen. BP recorded in vitals.    Follow up appointment in 7 week(s).    Next Appointment: Wednesday, December 27, 2017 at 9:15 am.    Rebecca SNIDER

## 2017-11-29 ENCOUNTER — HOSPITAL ENCOUNTER (OUTPATIENT)
Dept: RADIOLOGY | Facility: MEDICAL CENTER | Age: 67
End: 2017-11-29
Attending: FAMILY MEDICINE
Payer: MEDICARE

## 2017-11-29 DIAGNOSIS — M25.511 RIGHT SHOULDER PAIN, UNSPECIFIED CHRONICITY: ICD-10-CM

## 2017-11-29 PROCEDURE — 73030 X-RAY EXAM OF SHOULDER: CPT | Mod: RT

## 2017-12-27 ENCOUNTER — ANTICOAGULATION VISIT (OUTPATIENT)
Dept: VASCULAR LAB | Facility: MEDICAL CENTER | Age: 67
End: 2017-12-27
Attending: INTERNAL MEDICINE
Payer: MEDICARE

## 2017-12-27 VITALS — HEART RATE: 87 BPM | SYSTOLIC BLOOD PRESSURE: 144 MMHG | DIASTOLIC BLOOD PRESSURE: 94 MMHG

## 2017-12-27 DIAGNOSIS — I48.0 PAROXYSMAL ATRIAL FIBRILLATION (HCC): ICD-10-CM

## 2017-12-27 LAB
INR BLD: 2.4 (ref 0.9–1.2)
INR PPP: 2.4 (ref 2–3.5)

## 2017-12-27 PROCEDURE — 85610 PROTHROMBIN TIME: CPT

## 2017-12-27 PROCEDURE — 99211 OFF/OP EST MAY X REQ PHY/QHP: CPT | Performed by: NURSE PRACTITIONER

## 2017-12-27 NOTE — PROGRESS NOTES
Anticoagulation Summary  As of 12/27/2017    INR goal:   2.0-3.0   TTR:   71.9 % (2.5 y)   Today's INR:   2.4   Maintenance plan:   7.5 mg (5 mg x 1.5) on Sun, Tue, Thu; 5 mg (5 mg x 1) all other days   Weekly total:   42.5 mg   Plan last modified:   DIANE PikePOG (9/30/2016)   Next INR check:   2/21/2018   Target end date:       Indications    Atrial fibrillation (CMS-HCC) [I48.91]  Paroxysmal atrial fibrillation (CMS-HCC) [I48.0]             Anticoagulation Episode Summary     [facility] check [facility]   [facility]    Preferred lab:       Send INR reminders to:       Comments:         Anticoagulation Care Providers     Provider Role Specialty Phone number    Daniel Ferrera M.D. Referring Cardiac Electrophysiology [phone]    [facility] Anticoagulation Services Responsible  [phone]        Anticoagulation Patient Findings      HPI:  Mike Ferrell seen in clinic today for follow up on anticoagulation therapy in the presence of AF. Denies any changes to current medical/health status since last appointment. Denies any medication or diet changes. No current symptoms of bleeding or thrombosis reported.    A/P:   INR therapeutic. Continue current regimen. BP recorded in vitals.    Follow up appointment in 8 week(s).    Next Appointment: Wednesday, February 21, 2018 at 9:15 am.     Rebecca SNIDER

## 2018-01-10 ENCOUNTER — OFFICE VISIT (OUTPATIENT)
Dept: CARDIOLOGY | Facility: MEDICAL CENTER | Age: 68
End: 2018-01-10
Payer: MEDICARE

## 2018-01-10 ENCOUNTER — NON-PROVIDER VISIT (OUTPATIENT)
Dept: CARDIOLOGY | Facility: MEDICAL CENTER | Age: 68
End: 2018-01-10
Payer: MEDICARE

## 2018-01-10 VITALS
DIASTOLIC BLOOD PRESSURE: 90 MMHG | OXYGEN SATURATION: 90 % | WEIGHT: 239 LBS | HEART RATE: 90 BPM | HEIGHT: 72 IN | SYSTOLIC BLOOD PRESSURE: 120 MMHG | BODY MASS INDEX: 32.37 KG/M2

## 2018-01-10 DIAGNOSIS — Z95.810 AUTOMATIC IMPLANTABLE CARDIOVERTER-DEFIBRILLATOR IN SITU: ICD-10-CM

## 2018-01-10 DIAGNOSIS — I47.29 PAROXYSMAL VENTRICULAR TACHYCARDIA (HCC): ICD-10-CM

## 2018-01-10 DIAGNOSIS — I48.0 PAROXYSMAL ATRIAL FIBRILLATION (HCC): ICD-10-CM

## 2018-01-10 DIAGNOSIS — Z79.01 ANTICOAGULANT LONG-TERM USE: ICD-10-CM

## 2018-01-10 DIAGNOSIS — Z79.899 HIGH RISK MEDICATION USE: ICD-10-CM

## 2018-01-10 DIAGNOSIS — F10.10 ALCOHOL ABUSE: ICD-10-CM

## 2018-01-10 DIAGNOSIS — R55 SYNCOPE AND COLLAPSE: ICD-10-CM

## 2018-01-10 DIAGNOSIS — Z98.890 S/P CARDIAC CATH: ICD-10-CM

## 2018-01-10 PROCEDURE — 99214 OFFICE O/P EST MOD 30 MIN: CPT | Performed by: INTERNAL MEDICINE

## 2018-01-10 PROCEDURE — 93283 PRGRMG EVAL IMPLANTABLE DFB: CPT | Performed by: INTERNAL MEDICINE

## 2018-01-10 NOTE — LETTER
[facility]   [address], [address]  [place] [address]  Phone: [phone]  Fax: [phone]              Mike Ferrell  1950    Encounter Date: 1/10/2018    Daniel Ferrera M.D.          PROGRESS NOTE:  Subjective:   Mike Ferrell is a 67 y.o. male who presents today with SSS and VT. Nl cors. Nl EF. On sotalol. Occasional a fib and NSVT asymptomatic. No chest pain r SOB. Has DJD seeing Dr Conroy.     Chief Complaint: AICD a fib and VT    Past Medical History:   Diagnosis Date   • Arthritis    • Atrial fibrillation (CMS-HCC) 4/4/2012   • Automatic implantable cardiac defibrillator in situ 11/8/2012   • High cholesterol    • Hypopotassemia 4/4/2012   • Methamphetamine use none for many years   • Other and unspecified hyperlipidemia 4/4/2012   • Pacemaker    • Pain     shoulders   • Paroxysmal ventricular tachycardia (CMS-[facility]) 4/4/2012   • Pneumonia 2015   • Sinoatrial node dysfunction (CMS-[facility]) 4/4/2012   • Syncope and collapse 4/4/2012   • Thrombocytopenia, unspecified 4/4/2012   • Unspecified essential hypertension 4/4/2012     Past Surgical History:   Procedure Laterality Date   • AICD IMPLANT  2010   • PACEMAKER INSERTION  2009   • CHOLECYSTECTOMY  1999   • TONSILLECTOMY  1953     History reviewed. No pertinent family history.  History   Smoking Status   • Never Smoker   Smokeless Tobacco   • Never Used     Allergies   Allergen Reactions   • Lipitor [Atorvastatin]    • Sulfa Drugs Rash and Itching     Rash, itch     Outpatient Encounter Prescriptions as of 1/10/2018   Medication Sig Dispense Refill   • sotalol (BETAPACE) 120 MG tablet Take 1 Tab by mouth 2 Times a Day. 180 Tab 2   • hydrochlorothiazide (MICROZIDE) 12.5 MG capsule Take 1 Cap by mouth every day. 90 Cap 3   • warfarin (COUMADIN) 5 MG Tab TAKE ONE TO ONE AND ONE-HALF TABLETS BY MOUTH ONCE DAILY AS DIRECTED BY COUMADIN CLINIC 135 Tab 3   • acetaminophen (TYLENOL) 500 MG Tab Take 500-1,000 mg  by mouth every 6 hours as needed.     • POTASSIUM CHLORIDE PO Take 1 Tab by mouth every day.     • amlodipine (NORVASC) 5 MG TABS Take 5 mg by mouth every day.     • benazepril (LOTENSIN) 40 MG tablet Take 40 mg by mouth every day.     • Cholecalciferol (VITAMIN D PO) Take 4,000 Units by mouth every day at 6 PM.       No facility-administered encounter medications on file as of 1/10/2018.      ROS     Objective:   /90   Pulse 90   Ht 1.829 m (6')   Wt 108.4 kg (239 lb)   SpO2 90%   BMI 32.41 kg/m²      Physical Exam   Constitutional: He is oriented to person, place, and time. He appears well-developed. No distress.   HENT:   Mouth/Throat: Mucous membranes are normal.   Eyes: Conjunctivae and EOM are normal.   Neck: No JVD present. No tracheal deviation present. No thyroid mass and no thyromegaly present.   Cardiovascular: Normal rate, regular rhythm and intact distal pulses.    No murmur heard.  Pulmonary/Chest: Effort normal and breath sounds normal. No respiratory distress. He exhibits no tenderness.   Abdominal: Soft. There is no tenderness.   Musculoskeletal: Normal range of motion. He exhibits no edema.   Neurological: He is alert and oriented to person, place, and time. He has normal strength. He displays no tremor.   Skin: Skin is warm and dry. He is not diaphoretic.   Psychiatric: He has a normal mood and affect. His behavior is normal.   Vitals reviewed.      Assessment:     1. Alcohol abuse     2. Paroxysmal atrial fibrillation (CMS-HCC)     3. High risk medication use     4. S/P cardiac cath     5. Paroxysmal ventricular tachycardia (CMS-HCC)     6. Anticoagulant long-term use         Medical Decision Making:  Today's Assessment / Status / Plan:   1. VT nl AICD function.  2. PAF continue sotalol. Minimal.  3. Alcohol work on cutting back.  4. Anticoagulation continue.  5. Device check in 6 months and me in 1 year.        Hipolito Hall M.D.  [address]  [place]  [address]  VIA Facsimile: [phone]

## 2018-01-10 NOTE — PROGRESS NOTES
Subjective:   Mike Ferrell is a 67 y.o. male who presents today with SSS and VT. Nl cors. Nl EF. On sotalol. Occasional a fib and NSVT asymptomatic. No chest pain r SOB. Has DJD seeing Dr Conroy.     Chief Complaint: AICD a fib and VT    Past Medical History:   Diagnosis Date   • Arthritis    • Atrial fibrillation (CMS-HCC) 4/4/2012   • Automatic implantable cardiac defibrillator in situ 11/8/2012   • High cholesterol    • Hypopotassemia 4/4/2012   • Methamphetamine use none for many years   • Other and unspecified hyperlipidemia 4/4/2012   • Pacemaker    • Pain     shoulders   • Paroxysmal ventricular tachycardia (CMS-HCC) 4/4/2012   • Pneumonia 2015   • Sinoatrial node dysfunction (CMS-HCC) 4/4/2012   • Syncope and collapse 4/4/2012   • Thrombocytopenia, unspecified 4/4/2012   • Unspecified essential hypertension 4/4/2012     Past Surgical History:   Procedure Laterality Date   • AICD IMPLANT  2010   • PACEMAKER INSERTION  2009   • CHOLECYSTECTOMY  1999   • TONSILLECTOMY  1953     History reviewed. No pertinent family history.  History   Smoking Status   • Never Smoker   Smokeless Tobacco   • Never Used     Allergies   Allergen Reactions   • Lipitor [Atorvastatin]    • Sulfa Drugs Rash and Itching     Rash, itch     Outpatient Encounter Prescriptions as of 1/10/2018   Medication Sig Dispense Refill   • sotalol (BETAPACE) 120 MG tablet Take 1 Tab by mouth 2 Times a Day. 180 Tab 2   • hydrochlorothiazide (MICROZIDE) 12.5 MG capsule Take 1 Cap by mouth every day. 90 Cap 3   • warfarin (COUMADIN) 5 MG Tab TAKE ONE TO ONE AND ONE-HALF TABLETS BY MOUTH ONCE DAILY AS DIRECTED BY COUMADIN CLINIC 135 Tab 3   • acetaminophen (TYLENOL) 500 MG Tab Take 500-1,000 mg by mouth every 6 hours as needed.     • POTASSIUM CHLORIDE PO Take 1 Tab by mouth every day.     • amlodipine (NORVASC) 5 MG TABS Take 5 mg by mouth every day.     • benazepril (LOTENSIN) 40 MG tablet Take 40 mg by mouth every day.     • Cholecalciferol (VITAMIN  D PO) Take 4,000 Units by mouth every day at 6 PM.       No facility-administered encounter medications on file as of 1/10/2018.      ROS     Objective:   /90   Pulse 90   Ht 1.829 m (6')   Wt 108.4 kg (239 lb)   SpO2 90%   BMI 32.41 kg/m²     Physical Exam   Constitutional: He is oriented to person, place, and time. He appears well-developed. No distress.   HENT:   Mouth/Throat: Mucous membranes are normal.   Eyes: Conjunctivae and EOM are normal.   Neck: No JVD present. No tracheal deviation present. No thyroid mass and no thyromegaly present.   Cardiovascular: Normal rate, regular rhythm and intact distal pulses.    No murmur heard.  Pulmonary/Chest: Effort normal and breath sounds normal. No respiratory distress. He exhibits no tenderness.   Abdominal: Soft. There is no tenderness.   Musculoskeletal: Normal range of motion. He exhibits no edema.   Neurological: He is alert and oriented to person, place, and time. He has normal strength. He displays no tremor.   Skin: Skin is warm and dry. He is not diaphoretic.   Psychiatric: He has a normal mood and affect. His behavior is normal.   Vitals reviewed.      Assessment:     1. Alcohol abuse     2. Paroxysmal atrial fibrillation (CMS-HCC)     3. High risk medication use     4. S/P cardiac cath     5. Paroxysmal ventricular tachycardia (CMS-HCC)     6. Anticoagulant long-term use         Medical Decision Making:  Today's Assessment / Status / Plan:   1. VT nl AICD function.  2. PAF continue sotalol. Minimal.  3. Alcohol work on cutting back.  4. Anticoagulation continue.  5. Device check in 6 months and me in 1 year.

## 2018-02-21 ENCOUNTER — ANTICOAGULATION VISIT (OUTPATIENT)
Dept: VASCULAR LAB | Facility: MEDICAL CENTER | Age: 68
End: 2018-02-21
Attending: INTERNAL MEDICINE
Payer: MEDICARE

## 2018-02-21 VITALS — HEART RATE: 85 BPM | DIASTOLIC BLOOD PRESSURE: 89 MMHG | SYSTOLIC BLOOD PRESSURE: 143 MMHG

## 2018-02-21 DIAGNOSIS — I48.91 ATRIAL FIBRILLATION, UNSPECIFIED TYPE (HCC): ICD-10-CM

## 2018-02-21 LAB — INR PPP: 2 (ref 2–3.5)

## 2018-02-21 PROCEDURE — 99211 OFF/OP EST MAY X REQ PHY/QHP: CPT | Performed by: NURSE PRACTITIONER

## 2018-02-21 PROCEDURE — 85610 PROTHROMBIN TIME: CPT

## 2018-02-21 NOTE — PROGRESS NOTES
Anticoagulation Summary  As of 2/21/2018    INR goal:   2.0-3.0   TTR:   73.5 % (2.7 y)   Today's INR:   2.0   Maintenance plan:   7.5 mg (5 mg x 1.5) on Sun, Tue, Thu; 5 mg (5 mg x 1) all other days   Weekly total:   42.5 mg   Plan last modified:   ILEANA Pike (9/30/2016)   Next INR check:   4/25/2018   Target end date:       Indications    Atrial fibrillation (CMS-HCC) [I48.91]  Paroxysmal atrial fibrillation (CMS-HCC) (Resolved) [I48.0]             Anticoagulation Episode Summary     [facility] check location[facility]   [facility]    Preferred lab:       Send INR reminders to:       Comments:         Anticoagulation Care Providers     Provider Role Specialty Phone number    Daniel Frerera M.D. Referring Cardiac Electrophysiology [phone]    [facility] Responsible  [phone]        Anticoagulation Patient Findings      HPI:  Mike Ferrell seen in clinic today for follow up on anticoagulation therapy in the presence of AF. Denies any changes to current medical/health status since last appointment. Denies any medication or diet changes. No current symptoms of bleeding or thrombosis reported.    A/P:   INR therapeutic. Continue current regimen. BP recorded in vitals.    Follow up appointment in 9 week(s).    Next Appointment: Wednesday, April 25, 2018 at 9:15 am.    Rebecca singh

## 2018-02-22 LAB — INR BLD: 2 (ref 0.9–1.2)

## 2018-04-25 ENCOUNTER — ANTICOAGULATION VISIT (OUTPATIENT)
Dept: VASCULAR LAB | Facility: MEDICAL CENTER | Age: 68
End: 2018-04-25
Attending: INTERNAL MEDICINE
Payer: MEDICARE

## 2018-04-25 VITALS — HEART RATE: 89 BPM | DIASTOLIC BLOOD PRESSURE: 88 MMHG | SYSTOLIC BLOOD PRESSURE: 123 MMHG

## 2018-04-25 DIAGNOSIS — Z79.01 CHRONIC ANTICOAGULATION: ICD-10-CM

## 2018-04-25 DIAGNOSIS — I48.91 ATRIAL FIBRILLATION, UNSPECIFIED TYPE (HCC): ICD-10-CM

## 2018-04-25 LAB
INR BLD: 1.8 (ref 0.9–1.2)
INR PPP: 1.8 (ref 2–3.5)

## 2018-04-25 PROCEDURE — 99212 OFFICE O/P EST SF 10 MIN: CPT | Performed by: NURSE PRACTITIONER

## 2018-04-25 PROCEDURE — 85610 PROTHROMBIN TIME: CPT

## 2018-04-25 RX ORDER — WARFARIN SODIUM 5 MG/1
TABLET ORAL
Qty: 135 TAB | Refills: 1 | Status: SHIPPED | OUTPATIENT
Start: 2018-04-25 | End: 2018-07-30

## 2018-04-25 NOTE — PROGRESS NOTES
Anticoagulation Summary  As of 4/25/2018    INR goal:   2.0-3.0   TTR:   69.0 % (2.9 y)   Today's INR:   1.8!   Warfarin maintenance plan:   7.5 mg (5 mg x 1.5) on Sun, Tue, Thu; 5 mg (5 mg x 1) all other days   Weekly warfarin total:   42.5 mg   Plan last modified:   ILEANA Pike (9/30/2016)   Next INR check:   6/6/2018   Target end date:       Indications    Atrial fibrillation (CMS-HCC) [I48.91]  Paroxysmal atrial fibrillation (CMS-HCC) (Resolved) [I48.0]             Anticoagulation Episode Summary     [facility]   [facility]    Preferred lab:       Send INR reminders to:       Comments:         Anticoagulation Care Providers     Provider Role Specialty Phone number    Daniel Ferrera M.D. Referring Cardiac Electrophysiology [phone]    [facility] Anticoagulation Services Responsible  [phone]        Anticoagulation Patient Findings      HPI:  Mike Ferrell seen in clinic today for follow up on anticoagulation therapy in the presence of AF. Denies any changes to current medical/health status since last appointment. Denies any medication or diet changes. No current symptoms of bleeding or thrombosis reported.    A/P:   INR subtherapeutic. Will increase tonight then continue current regimen. BP recorded in vitals.    Follow up appointment in 6 week(s) per pt's preference.    Next Appointment: Wednesday, June 6, 2018 at 9:15 am.    Rebecca SNIDER

## 2018-06-06 ENCOUNTER — ANTICOAGULATION VISIT (OUTPATIENT)
Dept: VASCULAR LAB | Facility: MEDICAL CENTER | Age: 68
End: 2018-06-06
Attending: INTERNAL MEDICINE
Payer: MEDICARE

## 2018-06-06 VITALS — SYSTOLIC BLOOD PRESSURE: 134 MMHG | DIASTOLIC BLOOD PRESSURE: 92 MMHG | HEART RATE: 74 BPM

## 2018-06-06 DIAGNOSIS — I48.91 ATRIAL FIBRILLATION, UNSPECIFIED TYPE (HCC): ICD-10-CM

## 2018-06-06 LAB
INR BLD: 3 (ref 0.9–1.2)
INR PPP: 3 (ref 2–3.5)

## 2018-06-06 PROCEDURE — 99211 OFF/OP EST MAY X REQ PHY/QHP: CPT | Performed by: NURSE PRACTITIONER

## 2018-06-06 PROCEDURE — 85610 PROTHROMBIN TIME: CPT

## 2018-06-06 NOTE — PROGRESS NOTES
Anticoagulation Summary  As of 6/6/2018    INR goal:   2.0-3.0   TTR:   69.6 % (3 y)   Today's INR:   3.0   Warfarin maintenance plan:   7.5 mg (5 mg x 1.5) on Sun, Tue, Thu; 5 mg (5 mg x 1) all other days   Weekly warfarin total:   42.5 mg   Plan last modified:   Rebecca Mathews ALinneaPOG (9/30/2016)   Next INR check:   7/18/2018   Target end date:       Indications    Atrial fibrillation (HCC) [I48.91]  Paroxysmal atrial fibrillation (HCC) (Resolved) [I48.0]             Anticoagulation Episode Summary     INR check location:   [facility]    Preferred lab:       Send INR reminders to:       Comments:         Anticoagulation Care Providers     Provider Role Specialty Phone number    Daniel Ferrera M.D. Referring Cardiac Electrophysiology [phone]    [facility] Responsible  [phone]        Anticoagulation Patient Findings      HPI:  Mike Ferrell seen in clinic today for follow up on anticoagulation therapy in the presence of AF. Denies any changes to current medical/health status since last appointment. Took NSAIDs this week for arthritis pain. Denies any diet changes. No current symptoms of bleeding or thrombosis reported.    A/P:   INR therapeutic. Continue current regimen. BP recorded in vitals.    Follow up appointment in 6 week(s).    Next Appointment: Wednesday, July 18, 2018 at 9:15 am.    Rebecca SNIDER

## 2018-07-12 ENCOUNTER — NON-PROVIDER VISIT (OUTPATIENT)
Dept: CARDIOLOGY | Facility: MEDICAL CENTER | Age: 68
End: 2018-07-12
Payer: MEDICARE

## 2018-07-12 DIAGNOSIS — Z95.810 AUTOMATIC IMPLANTABLE CARDIOVERTER-DEFIBRILLATOR IN SITU: ICD-10-CM

## 2018-07-12 PROCEDURE — 93283 PRGRMG EVAL IMPLANTABLE DFB: CPT | Performed by: INTERNAL MEDICINE

## 2018-07-18 ENCOUNTER — ANTICOAGULATION VISIT (OUTPATIENT)
Dept: VASCULAR LAB | Facility: MEDICAL CENTER | Age: 68
End: 2018-07-18
Attending: INTERNAL MEDICINE
Payer: MEDICARE

## 2018-07-18 VITALS — DIASTOLIC BLOOD PRESSURE: 92 MMHG | SYSTOLIC BLOOD PRESSURE: 132 MMHG

## 2018-07-18 DIAGNOSIS — I48.91 ATRIAL FIBRILLATION, UNSPECIFIED TYPE (HCC): ICD-10-CM

## 2018-07-18 LAB
INR BLD: 1.3 (ref 0.9–1.2)
INR PPP: 1.3 (ref 2–3.5)

## 2018-07-18 PROCEDURE — 85610 PROTHROMBIN TIME: CPT

## 2018-07-18 PROCEDURE — 99212 OFFICE O/P EST SF 10 MIN: CPT | Performed by: NURSE PRACTITIONER

## 2018-07-18 NOTE — PROGRESS NOTES
Anticoagulation Summary  As of 7/18/2018    INR goal:   2.0-3.0   TTR:   69.2 % (3.1 y)   Today's INR:   1.3!   Warfarin maintenance plan:   7.5 mg (5 mg x 1.5) every day   Weekly warfarin total:   52.5 mg   Plan last modified:   ILEANA Pike (7/18/2018)   Next INR check:   7/26/2018   Target end date:       Indications    Atrial fibrillation (HCC) [I48.91]  Paroxysmal atrial fibrillation (HCC) (Resolved) [I48.0]             Anticoagulation Episode Summary     INR check location:   [facility]    Preferred lab:       Send INR reminders to:       Comments:         Anticoagulation Care Providers     Provider Role Specialty Phone number    Daniel Ferrera M.D. Referring Cardiac Electrophysiology [phone]    [facility] Responsible  [phone]        Anticoagulation Patient Findings      HPI:  Mike Ferrell seen in clinic today for follow up on anticoagulation therapy in the presence of AF. Stopped drinking completely about 2 weeks ago and plans to abstain from ETOH. Denies any medication changes. No current symptoms of bleeding or thrombosis reported.    A/P:   INR subtherapeutic. Will increase regimen. BP recorded in vitals.    Follow up appointment in 1 week(s).    Next Appointment: Thursday, July 26, 2018 at 9:00 am.     Rebecca SNIDER

## 2018-07-25 DIAGNOSIS — I48.91 ATRIAL FIBRILLATION, UNSPECIFIED TYPE (HCC): ICD-10-CM

## 2018-07-25 RX ORDER — SOTALOL HYDROCHLORIDE 120 MG/1
TABLET ORAL
Qty: 180 TAB | Refills: 3 | Status: SHIPPED | OUTPATIENT
Start: 2018-07-25 | End: 2019-08-04 | Stop reason: SDUPTHER

## 2018-07-26 ENCOUNTER — ANTICOAGULATION VISIT (OUTPATIENT)
Dept: VASCULAR LAB | Facility: MEDICAL CENTER | Age: 68
End: 2018-07-26
Attending: INTERNAL MEDICINE
Payer: MEDICARE

## 2018-07-26 VITALS — HEART RATE: 81 BPM | SYSTOLIC BLOOD PRESSURE: 144 MMHG | DIASTOLIC BLOOD PRESSURE: 96 MMHG

## 2018-07-26 DIAGNOSIS — I48.91 ATRIAL FIBRILLATION, UNSPECIFIED TYPE (HCC): ICD-10-CM

## 2018-07-26 LAB — INR PPP: 2 (ref 2–3.5)

## 2018-07-26 PROCEDURE — 99211 OFF/OP EST MAY X REQ PHY/QHP: CPT | Performed by: NURSE PRACTITIONER

## 2018-07-26 PROCEDURE — 85610 PROTHROMBIN TIME: CPT

## 2018-07-26 NOTE — PROGRESS NOTES
Anticoagulation Summary  As of 7/26/2018    INR goal:   2.0-3.0   TTR:   68.7 % (3.1 y)   Today's INR:   2.0   Warfarin maintenance plan:   7.5 mg (5 mg x 1.5) every day   Weekly warfarin total:   52.5 mg   Plan last modified:   ILEANA Pike (7/18/2018)   Next INR check:   8/9/2018   Target end date:       Indications    Atrial fibrillation (HCC) [I48.91]  Paroxysmal atrial fibrillation (HCC) (Resolved) [I48.0]             Anticoagulation Episode Summary     INR check location:   [facility]    Preferred lab:       Send INR reminders to:       Comments:         Anticoagulation Care Providers     Provider Role Specialty Phone number    Daniel Ferrera M.D. Referring Cardiac Electrophysiology [phone]    [facility] Responsible  [phone]        Anticoagulation Patient Findings      HPI:  Mike Ferrell seen in clinic today for follow up on anticoagulation therapy in the presence of AF. Denies any changes to current medical/health status since last appointment. Denies any medication or diet changes. No current symptoms of bleeding or thrombosis reported.    A/P:   INR therapeutic. Continue current regimen. BP recorded in vitals.    Follow up appointment in 2 week(s).    Next Appointment: Thursday, August 9, 2018 at 9:15 am.     Rebecca SNIDER

## 2018-07-27 LAB — INR BLD: 2 (ref 0.9–1.2)

## 2018-07-29 DIAGNOSIS — Z79.01 CHRONIC ANTICOAGULATION: ICD-10-CM

## 2018-07-30 RX ORDER — WARFARIN SODIUM 5 MG/1
TABLET ORAL
Qty: 135 TAB | Refills: 1 | Status: SHIPPED | OUTPATIENT
Start: 2018-07-30 | End: 2019-04-07 | Stop reason: SDUPTHER

## 2018-08-09 ENCOUNTER — ANTICOAGULATION VISIT (OUTPATIENT)
Dept: VASCULAR LAB | Facility: MEDICAL CENTER | Age: 68
End: 2018-08-09
Attending: INTERNAL MEDICINE
Payer: MEDICARE

## 2018-08-09 VITALS — SYSTOLIC BLOOD PRESSURE: 136 MMHG | HEART RATE: 98 BPM | DIASTOLIC BLOOD PRESSURE: 96 MMHG

## 2018-08-09 DIAGNOSIS — I48.91 ATRIAL FIBRILLATION, UNSPECIFIED TYPE (HCC): ICD-10-CM

## 2018-08-09 LAB
INR BLD: 4.4 (ref 0.9–1.2)
INR PPP: 4.4 (ref 2–3.5)

## 2018-08-09 PROCEDURE — 85610 PROTHROMBIN TIME: CPT

## 2018-08-09 PROCEDURE — 99212 OFFICE O/P EST SF 10 MIN: CPT

## 2018-08-09 NOTE — PROGRESS NOTES
Anticoagulation Summary  As of 8/9/2018    INR goal:   2.0-3.0   TTR:   68.4 % (3.2 y)   Today's INR:   4.4!   Warfarin maintenance plan:   5 mg (5 mg x 1) on Mon, Fri; 7.5 mg (5 mg x 1.5) all other days   Weekly warfarin total:   47.5 mg   Plan last modified:   Rissa Mills PharmD (8/9/2018)   Next INR check:   8/23/2018   Target end date:       Indications    Atrial fibrillation (HCC) [I48.91]  Paroxysmal atrial fibrillation (HCC) (Resolved) [I48.0]             Anticoagulation Episode Summary     INR check location:   [facility]    Preferred lab:       Send INR reminders to:       Comments:         Anticoagulation Care Providers     Provider Role Specialty Phone number    Daniel Ferrera M.D. Referring Cardiac Electrophysiology [phone]    [facility] Responsible  [phone]        Anticoagulation Patient Findings  Patient Findings     Negatives:   Signs/symptoms of thrombosis, Signs/symptoms of bleeding, Laboratory test error suspected, Change in health, Change in alcohol use, Change in activity, Upcoming invasive procedure, Emergency department visit, Upcoming dental procedure, Missed doses, Extra doses, Change in medications, Change in diet/appetite, Hospital admission, Bruising, Other complaints        History of Present Illness: follow up appointment for chronic anticoagulation with the high risk medication, warfarin for atrial fibrillation.    Last INR was out of range, dosage adjusted: pt is now supra therapeutic today. Will HOLD dose tonight, and reduce weekly dose by 10%. Follow up in 2 weeks, to reduce risk of adverse events related to this high risk medication,  Warfarin.    Cecelia TobiasD

## 2018-08-23 ENCOUNTER — ANTICOAGULATION VISIT (OUTPATIENT)
Dept: VASCULAR LAB | Facility: MEDICAL CENTER | Age: 68
End: 2018-08-23
Attending: INTERNAL MEDICINE
Payer: MEDICARE

## 2018-08-23 DIAGNOSIS — I48.91 ATRIAL FIBRILLATION, UNSPECIFIED TYPE (HCC): ICD-10-CM

## 2018-08-23 LAB
INR BLD: 2.5 (ref 0.9–1.2)
INR PPP: 2.5 (ref 2–3.5)

## 2018-08-23 PROCEDURE — 85610 PROTHROMBIN TIME: CPT

## 2018-08-23 PROCEDURE — 99211 OFF/OP EST MAY X REQ PHY/QHP: CPT | Performed by: NURSE PRACTITIONER

## 2018-08-23 NOTE — PROGRESS NOTES
Anticoagulation Summary  As of 8/23/2018    INR goal:   2.0-3.0   TTR:   67.9 % (3.2 y)   Today's INR:   2.5   Warfarin maintenance plan:   7.5 mg (5 mg x 1.5) on Sun, Tue, Thu; 5 mg (5 mg x 1) all other days   Weekly warfarin total:   42.5 mg   Plan last modified:   Rebecca Mathews AiLnneaPLinneaNLinnea (8/23/2018)   Next INR check:   9/20/2018   Target end date:       Indications    Atrial fibrillation (HCC) [I48.91]  Paroxysmal atrial fibrillation (HCC) (Resolved) [I48.0]             Anticoagulation Episode Summary     INR check location:   [facility]    Preferred lab:       Send INR reminders to:       Comments:         Anticoagulation Care Providers     Provider Role Specialty Phone number    Daniel Ferrera M.D. Referring Cardiac Electrophysiology [phone]    [facility] Responsible  [phone]        Anticoagulation Patient Findings      HPI:  Mike Ferrell seen in clinic today for follow up on anticoagulation therapy in the presence of AF. Denies any changes to current medical/health status since last appointment. Denies any medication or diet changes. No current symptoms of bleeding or thrombosis reported. He resume drinking alcohol daily and started his previous warfarin regimen as he was stable in the past. He knows ETOH can increase his risk of bleeding.    A/P:   INR therapeutic. Continue current regimen. BP recorded in vitals.    Follow up appointment in 4 week(s).    Next Appointment: Thursday, September 20, 2018 at 9:45 am.     Rebecca SNIDER

## 2018-09-20 ENCOUNTER — ANTICOAGULATION VISIT (OUTPATIENT)
Dept: VASCULAR LAB | Facility: MEDICAL CENTER | Age: 68
End: 2018-09-20
Attending: INTERNAL MEDICINE
Payer: MEDICARE

## 2018-09-20 VITALS — DIASTOLIC BLOOD PRESSURE: 91 MMHG | HEART RATE: 73 BPM | SYSTOLIC BLOOD PRESSURE: 138 MMHG

## 2018-09-20 DIAGNOSIS — I48.91 ATRIAL FIBRILLATION, UNSPECIFIED TYPE (HCC): ICD-10-CM

## 2018-09-20 LAB
INR BLD: 2.5 (ref 0.9–1.2)
INR PPP: 2.5 (ref 2–3.5)

## 2018-09-20 PROCEDURE — 85610 PROTHROMBIN TIME: CPT

## 2018-09-20 PROCEDURE — 99211 OFF/OP EST MAY X REQ PHY/QHP: CPT | Performed by: NURSE PRACTITIONER

## 2018-09-20 NOTE — PROGRESS NOTES
Anticoagulation Summary  As of 9/20/2018    INR goal:   2.0-3.0   TTR:   68.6 % (3.3 y)   Today's INR:   2.5   Warfarin maintenance plan:   7.5 mg (5 mg x 1.5) on Sun, Tue, Thu; 5 mg (5 mg x 1) all other days   Weekly warfarin total:   42.5 mg   Plan last modified:   Rebecca Mathews, A.P.NLinnea (8/23/2018)   Next INR check:      Target end date:       Indications    Atrial fibrillation (HCC) [I48.91]  Paroxysmal atrial fibrillation (HCC) (Resolved) [I48.0]             Anticoagulation Episode Summary     INR check location:   [facility]    Preferred lab:       Send INR reminders to:       Comments:         Anticoagulation Care Providers     Provider Role Specialty Phone number    Daniel Ferrera M.D. Referring Cardiac Electrophysiology [phone]    [facility] Responsible  [phone]        Anticoagulation Patient Findings      HPI:  Mike Ferrell seen in clinic today for follow up on anticoagulation therapy in the presence of AF. Denies any changes to current medical/health status since last appointment. Denies any medication or diet changes. No current symptoms of bleeding or thrombosis reported.    A/P:   INR therapeutic. Continue current regimen. BP recorded in vitals.    Follow up appointment in 6 week(s).    Next Appointment: Thursday, November 1, 2018 at 9:45 am.     Rebecca SNIDER

## 2018-11-05 ENCOUNTER — ANTICOAGULATION VISIT (OUTPATIENT)
Dept: VASCULAR LAB | Facility: MEDICAL CENTER | Age: 68
End: 2018-11-05
Attending: INTERNAL MEDICINE
Payer: MEDICARE

## 2018-11-05 VITALS — HEART RATE: 79 BPM | SYSTOLIC BLOOD PRESSURE: 140 MMHG | DIASTOLIC BLOOD PRESSURE: 91 MMHG

## 2018-11-05 DIAGNOSIS — I48.91 ATRIAL FIBRILLATION, UNSPECIFIED TYPE (HCC): ICD-10-CM

## 2018-11-05 LAB — INR PPP: 5 (ref 2–3.5)

## 2018-11-05 PROCEDURE — 85610 PROTHROMBIN TIME: CPT

## 2018-11-05 PROCEDURE — 99212 OFFICE O/P EST SF 10 MIN: CPT | Performed by: NURSE PRACTITIONER

## 2018-11-06 LAB — INR BLD: 5 (ref 0.9–1.2)

## 2018-11-06 NOTE — PROGRESS NOTES
Anticoagulation Summary  As of 11/5/2018    INR goal:   2.0-3.0   TTR:   66.8 % (3.4 y)   Today's INR:   5.0!   Warfarin maintenance plan:   7.5 mg (5 mg x 1.5) on Sun, Tue, Thu; 5 mg (5 mg x 1) all other days   Weekly warfarin total:   42.5 mg   Plan last modified:   ILEANA Pike (8/23/2018)   Next INR check:      Target end date:       Indications    Atrial fibrillation (HCC) [I48.91]  Paroxysmal atrial fibrillation (HCC) (Resolved) [I48.0]             Anticoagulation Episode Summary     INR check location:   [facility]    Preferred lab:       Send INR reminders to:       Comments:         Anticoagulation Care Providers     Provider Role Specialty Phone number    Daniel Ferrera M.D. Referring Cardiac Electrophysiology [phone]    [facility] Anticoagulation Services Responsible  [phone]        Anticoagulation Patient Findings      HPI:  Mike Ferrell seen in clinic today for follow up on anticoagulation therapy in the presence of AF. Denies any changes to current medical/health status since last appointment. Denies any medication or diet changes. No current symptoms of bleeding or thrombosis reported.    A/P:   INR supratherapeutic. POC checked x 2 per pt's request. Declines stat venapuncture. Will HOLD two doses then continue current regimen. BP recorded in vitals.    Follow up appointment in 1 week(s).    Next Appointment: Wednesday, November 14, 2018 at 10:00 am.     Rebecca SNIDER

## 2018-11-14 ENCOUNTER — ANTICOAGULATION VISIT (OUTPATIENT)
Dept: VASCULAR LAB | Facility: MEDICAL CENTER | Age: 68
End: 2018-11-14
Attending: INTERNAL MEDICINE
Payer: MEDICARE

## 2018-11-14 DIAGNOSIS — Z79.01 ANTICOAGULANT LONG-TERM USE: ICD-10-CM

## 2018-11-14 PROCEDURE — 85610 PROTHROMBIN TIME: CPT

## 2018-11-14 PROCEDURE — 99211 OFF/OP EST MAY X REQ PHY/QHP: CPT | Performed by: NURSE PRACTITIONER

## 2018-12-12 ENCOUNTER — ANTICOAGULATION VISIT (OUTPATIENT)
Dept: VASCULAR LAB | Facility: MEDICAL CENTER | Age: 68
End: 2018-12-12
Attending: INTERNAL MEDICINE
Payer: MEDICARE

## 2018-12-12 ENCOUNTER — HOSPITAL ENCOUNTER (OUTPATIENT)
Dept: CARDIOLOGY | Facility: MEDICAL CENTER | Age: 68
End: 2018-12-12
Attending: INTERNAL MEDICINE
Payer: MEDICARE

## 2018-12-12 VITALS — HEART RATE: 86 BPM | SYSTOLIC BLOOD PRESSURE: 138 MMHG | DIASTOLIC BLOOD PRESSURE: 97 MMHG

## 2018-12-12 DIAGNOSIS — I48.91 ATRIAL FIBRILLATION, UNSPECIFIED TYPE (HCC): ICD-10-CM

## 2018-12-12 LAB — INR PPP: 2 (ref 2–3.5)

## 2018-12-12 PROCEDURE — 85610 PROTHROMBIN TIME: CPT

## 2018-12-12 PROCEDURE — 99211 OFF/OP EST MAY X REQ PHY/QHP: CPT | Performed by: NURSE PRACTITIONER

## 2018-12-12 NOTE — PROGRESS NOTES
Anticoagulation Summary  As of 12/12/2018    INR goal:   2.0-3.0   TTR:   65.9 % (3.5 y)   Today's INR:   2.0   Warfarin maintenance plan:   7.5 mg (5 mg x 1.5) on Sun, Tue, Thu; 5 mg (5 mg x 1) all other days   Weekly warfarin total:   42.5 mg   Plan last modified:   DIANE PikePOG (8/23/2018)   Next INR check:   1/23/2019   Target end date:       Indications    Atrial fibrillation (HCC) [I48.91]  Paroxysmal atrial fibrillation (HCC) (Resolved) [I48.0]             Anticoagulation Episode Summary     INR check location:   [facility]    Preferred lab:       Send INR reminders to:       Comments:         Anticoagulation Care Providers     Provider Role Specialty Phone number    Daniel Ferrera M.D. Referring Cardiac Electrophysiology [phone]    [facility] Responsible  [phone]        Anticoagulation Patient Findings      HPI:  Mike Ferrell seen in clinic today for follow up on anticoagulation therapy in the presence of AF. Denies any changes to current medical/health status since last appointment. Denies any medication or diet changes. No current symptoms of bleeding or thrombosis reported.    A/P:   INR therapeutic. Continue current regimen. BP recorded in vitals.    Follow up appointment in 5 week(s).    Next Appointment: Wednesday, January 23, 2019 at 9:45 am.     Rebecca SNIDER

## 2018-12-18 LAB — INR BLD: 2 (ref 0.9–1.2)

## 2018-12-27 LAB — INR PPP: 2.1 (ref 2–3.5)

## 2018-12-28 ENCOUNTER — NON-PROVIDER VISIT (OUTPATIENT)
Dept: CARDIOLOGY | Facility: MEDICAL CENTER | Age: 68
End: 2018-12-28
Payer: MEDICARE

## 2018-12-28 ENCOUNTER — OFFICE VISIT (OUTPATIENT)
Dept: CARDIOLOGY | Facility: MEDICAL CENTER | Age: 68
End: 2018-12-28
Payer: MEDICARE

## 2018-12-28 VITALS
SYSTOLIC BLOOD PRESSURE: 120 MMHG | BODY MASS INDEX: 29.8 KG/M2 | HEART RATE: 70 BPM | WEIGHT: 220 LBS | DIASTOLIC BLOOD PRESSURE: 80 MMHG | HEIGHT: 72 IN

## 2018-12-28 DIAGNOSIS — Z98.890 S/P CARDIAC CATH: ICD-10-CM

## 2018-12-28 DIAGNOSIS — Z95.810 AUTOMATIC IMPLANTABLE CARDIOVERTER-DEFIBRILLATOR IN SITU: ICD-10-CM

## 2018-12-28 DIAGNOSIS — I47.29 PAROXYSMAL VENTRICULAR TACHYCARDIA (HCC): ICD-10-CM

## 2018-12-28 DIAGNOSIS — F10.10 ALCOHOL ABUSE: ICD-10-CM

## 2018-12-28 DIAGNOSIS — I10 ESSENTIAL HYPERTENSION: ICD-10-CM

## 2018-12-28 DIAGNOSIS — I49.5 SINOATRIAL NODE DYSFUNCTION (HCC): ICD-10-CM

## 2018-12-28 DIAGNOSIS — R55 SYNCOPE AND COLLAPSE: ICD-10-CM

## 2018-12-28 DIAGNOSIS — I48.91 ATRIAL FIBRILLATION, UNSPECIFIED TYPE (HCC): Primary | ICD-10-CM

## 2018-12-28 LAB — EKG IMPRESSION: NORMAL

## 2018-12-28 PROCEDURE — 99214 OFFICE O/P EST MOD 30 MIN: CPT | Mod: 25 | Performed by: INTERNAL MEDICINE

## 2018-12-28 PROCEDURE — 93283 PRGRMG EVAL IMPLANTABLE DFB: CPT | Performed by: INTERNAL MEDICINE

## 2018-12-28 PROCEDURE — 93000 ELECTROCARDIOGRAM COMPLETE: CPT | Mod: 59 | Performed by: INTERNAL MEDICINE

## 2018-12-28 NOTE — PROGRESS NOTES
Chief Complaint   Patient presents with   • Atrial Fibrillation     follow up       Subjective:   Mike Ferrell is a 68 y.o. male who presents today with history of with history of ventricular tachycardia and sick sinus syndrome.  Previous pacemaker upgraded to defibrillator.  Previous drug abuse none at this time.  Previous alcohol use.  No recent echocardiogram.  Appears to be pacing more in the ventricle.  Denies any chest pain or shortness of breath.  Denies any shocks from the device.  Has had some atrial fibrillation with some mild fatigue.  A. fib percentage 4.8%.  81% V sense.    Past Medical History:   Diagnosis Date   • Arthritis    • Atrial fibrillation (CMS-[facility]) ([facility]) 4/4/2012   • Automatic implantable cardiac defibrillator in situ 11/8/2012   • High cholesterol    • Hypopotassemia 4/4/2012   • Methamphetamine use none for many years   • Other and unspecified hyperlipidemia 4/4/2012   • Pacemaker    • Pain     shoulders   • Paroxysmal ventricular tachycardia (CMS-[facility]) ([facility]) 4/4/2012   • Pneumonia 2015   • Sinoatrial node dysfunction (CMS-[facility]) ([facility]) 4/4/2012   • Syncope and collapse 4/4/2012   • Thrombocytopenia, unspecified ([facility]) 4/4/2012   • Unspecified essential hypertension 4/4/2012     Past Surgical History:   Procedure Laterality Date   • AICD IMPLANT  2010   • PACEMAKER INSERTION  2009   • CHOLECYSTECTOMY  1999   • TONSILLECTOMY  1953     No family history on file.  Social History     Social History   • Marital status: Single     Spouse name: N/A   • Number of children: N/A   • Years of education: N/A     Occupational History   • Not on file.     Social History Main Topics   • Smoking status: Never Smoker   • Smokeless tobacco: Never Used   • Alcohol use Yes      Comment: 4-5 per day   • Drug use: No   • Sexual activity: Not on file     Other Topics Concern   • Not on file     Social History Narrative   • No narrative on file     Allergies   Allergen Reactions   • Lipitor [Atorvastatin]    •  Sulfa Drugs Rash and Itching     Rash, itch     Outpatient Encounter Prescriptions as of 12/28/2018   Medication Sig Dispense Refill   • warfarin (COUMADIN) 5 MG Tab TAKE ONE TO ONE & ONE-HALF TABLETS BY MOUTH ONCE DAILY AS DIRECTED BY  [facility] 135 Tab 1   • sotalol (BETAPACE) 120 MG tablet TAKE 1 TABLET BY MOUTH TWICE A  Tab 3   • hydrochlorothiazide (MICROZIDE) 12.5 MG capsule TAKE 1 CAP BY MOUTH EVERY DAY. 90 Cap 3   • acetaminophen (TYLENOL) 500 MG Tab Take 500-1,000 mg by mouth every 6 hours as needed.     • amlodipine (NORVASC) 5 MG TABS Take 5 mg by mouth every day.     • benazepril (LOTENSIN) 40 MG tablet Take 40 mg by mouth every day.     • Cholecalciferol (VITAMIN D PO) Take 4,000 Units by mouth every day at 6 PM.     • POTASSIUM CHLORIDE PO Take 1 Tab by mouth every day.       No facility-administered encounter medications on file as of 12/28/2018.      ROS     Objective:   /80 (BP Location: Left arm, Patient Position: Sitting, BP Cuff Size: Adult)   Pulse 70   Ht 1.829 m (6')   Wt 99.8 kg (220 lb)   BMI 29.84 kg/m²     Physical Exam   Constitutional: He is oriented to person, place, and time. He appears well-developed and well-nourished.   Overweight   HENT:   Head: Normocephalic and atraumatic.   Eyes: EOM are normal.   Neck: Normal range of motion. Neck supple.   Cardiovascular: Normal rate, regular rhythm and intact distal pulses.  Exam reveals no gallop and no friction rub.    No murmur heard.  Pulmonary/Chest: Effort normal and breath sounds normal.   Abdominal: Soft.   Musculoskeletal: Normal range of motion. He exhibits no edema.   Neurological: He is alert and oriented to person, place, and time.   Skin: Skin is warm and dry.   Psychiatric: He has a normal mood and affect. His behavior is normal. Judgment and thought content normal.       Assessment:     1. Atrial fibrillation, unspecified type (HCC)  EKG    CULTURE STOOL    COMP METABOLIC PANEL    THYROID PANEL    Lipid  Profile    EC-ECHOCARDIOGRAM COMPLETE W/O CONT   2. Alcohol abuse     3. Paroxysmal ventricular tachycardia (HCC)     4. Automatic implantable cardioverter-defibrillator in situ     5. Syncope and collapse     6. Sinoatrial node dysfunction (HCC)     7. S/P cardiac cath     8. Essential hypertension         Medical Decision Making:  Today's Assessment / Status / Plan:   1.  Sick sinus syndrome status post permanent pacemaker normal function.  No recent echocardiogram recheck.  2.  Atrial fibrillation small percentage continue sotalol.  3.  QTC below 550.  4.  Anticoagulation continue.  5.  Recheck labs.  6.  Follow-up with me in 6 months.

## 2018-12-28 NOTE — PROGRESS NOTES
Anticoagulation Summary  As of 11/14/2018    INR goal:   2.0-3.0   TTR:   --   Today's INR:   2.1 (12/27/2018)   Warfarin maintenance plan:   7.5 mg (5 mg x 1.5) on Sun, Tue, Thu; 5 mg (5 mg x 1) all other days   Weekly warfarin total:   42.5 mg   Plan last modified:   Rebecca Mathews, A.P.NLinnea (8/23/2018)   Next INR check:      Target end date:       Indications    Atrial fibrillation (HCC) [I48.91]  Paroxysmal atrial fibrillation (HCC) (Resolved) [I48.0]             Anticoagulation Episode Summary     INR check location:   [facility]    Preferred lab:       Send INR reminders to:       Comments:         Anticoagulation Care Providers     Provider Role Specialty Phone number    Daniel Ferrera M.D. Referring Cardiac Electrophysiology [phone]    [facility] Responsible  [phone]        Anticoagulation Patient Findings      HPI:  Mike Ferrell seen in clinic today for follow up on anticoagulation therapy in the presence of AF. Denies any changes to current medical/health status since last appointment. Denies any medication or diet changes. No current symptoms of bleeding or thrombosis reported.    A/P:   INR therapeutic. INR down from 5.0. Resume usual regimen.     Follow up appointment in 4 week(s) per pt's preference.    Next Appointment: Wednesday, December 12, 2018 at 9:45 am.       Rebecca SNIDER

## 2018-12-28 NOTE — LETTER
[facility]   [address], [address]  [place] [address]  Phone: [phone]  Fax: [phone]              Mike Ferrell  1950    Encounter Date: 12/28/2018    Daniel Ferrera M.D.          PROGRESS NOTE:  Chief Complaint   Patient presents with   • Atrial Fibrillation     follow up       Subjective:   Mike Ferrell is a 68 y.o. male who presents today with history of with history of ventricular tachycardia and sick sinus syndrome.  Previous pacemaker upgraded to defibrillator.  Previous drug abuse none at this time.  Previous alcohol use.  No recent echocardiogram.  Appears to be pacing more in the ventricle.  Denies any chest pain or shortness of breath.  Denies any shocks from the device.  Has had some atrial fibrillation with some mild fatigue.  A. fib percentage 4.8%.  81% V sense.    Past Medical History:   Diagnosis Date   • Arthritis    • Atrial fibrillation (CMS-HCC) ([facility]) 4/4/2012   • Automatic implantable cardiac defibrillator in situ 11/8/2012   • High cholesterol    • Hypopotassemia 4/4/2012   • Methamphetamine use none for many years   • Other and unspecified hyperlipidemia 4/4/2012   • Pacemaker    • Pain     shoulders   • Paroxysmal ventricular tachycardia (CMS-HCC) ([facility]) 4/4/2012   • Pneumonia 2015   • Sinoatrial node dysfunction (CMS-HCC) ([facility]) 4/4/2012   • Syncope and collapse 4/4/2012   • Thrombocytopenia, unspecified ([facility]) 4/4/2012   • Unspecified essential hypertension 4/4/2012     Past Surgical History:   Procedure Laterality Date   • AICD IMPLANT  2010   • PACEMAKER INSERTION  2009   • CHOLECYSTECTOMY  1999   • TONSILLECTOMY  1953     No family history on file.  Social History     Social History   • Marital status: Single     Spouse name: N/A   • Number of children: N/A   • Years of education: N/A     Occupational History   • Not on file.     Social History Main Topics   • Smoking status: Never Smoker   • Smokeless tobacco: Never Used    • Alcohol use Yes      Comment: 4-5 per day   • Drug use: No   • Sexual activity: Not on file     Other Topics Concern   • Not on file     Social History Narrative   • No narrative on file     Allergies   Allergen Reactions   • Lipitor [Atorvastatin]    • Sulfa Drugs Rash and Itching     Rash, itch     Outpatient Encounter Prescriptions as of 12/28/2018   Medication Sig Dispense Refill   • warfarin (COUMADIN) 5 MG Tab TAKE ONE TO ONE & ONE-HALF TABLETS BY MOUTH ONCE DAILY AS DIRECTED BY  [facility] 135 Tab 1   • sotalol (BETAPACE) 120 MG tablet TAKE 1 TABLET BY MOUTH TWICE A  Tab 3   • hydrochlorothiazide (MICROZIDE) 12.5 MG capsule TAKE 1 CAP BY MOUTH EVERY DAY. 90 Cap 3   • acetaminophen (TYLENOL) 500 MG Tab Take 500-1,000 mg by mouth every 6 hours as needed.     • amlodipine (NORVASC) 5 MG TABS Take 5 mg by mouth every day.     • benazepril (LOTENSIN) 40 MG tablet Take 40 mg by mouth every day.     • Cholecalciferol (VITAMIN D PO) Take 4,000 Units by mouth every day at 6 PM.     • POTASSIUM CHLORIDE PO Take 1 Tab by mouth every day.       No facility-administered encounter medications on file as of 12/28/2018.      ROS     Objective:   /80 (BP Location: Left arm, Patient Position: Sitting, BP Cuff Size: Adult)   Pulse 70   Ht 1.829 m (6')   Wt 99.8 kg (220 lb)   BMI 29.84 kg/m²      Physical Exam   Constitutional: He is oriented to person, place, and time. He appears well-developed and well-nourished.   Overweight   HENT:   Head: Normocephalic and atraumatic.   Eyes: EOM are normal.   Neck: Normal range of motion. Neck supple.   Cardiovascular: Normal rate, regular rhythm and intact distal pulses.  Exam reveals no gallop and no friction rub.    No murmur heard.  Pulmonary/Chest: Effort normal and breath sounds normal.   Abdominal: Soft.   Musculoskeletal: Normal range of motion. He exhibits no edema.   Neurological: He is alert and oriented to person, place, and time.   Skin: Skin is warm  and dry.   Psychiatric: He has a normal mood and affect. His behavior is normal. Judgment and thought content normal.       Assessment:     1. Atrial fibrillation, unspecified type (HCC)  EKG    CULTURE STOOL    COMP METABOLIC PANEL    THYROID PANEL    Lipid Profile    EC-ECHOCARDIOGRAM COMPLETE W/O CONT   2. Alcohol abuse     3. Paroxysmal ventricular tachycardia (HCC)     4. Automatic implantable cardioverter-defibrillator in situ     5. Syncope and collapse     6. Sinoatrial node dysfunction (HCC)     7. S/P cardiac cath     8. Essential hypertension         Medical Decision Making:  Today's Assessment / Status / Plan:   1.  Sick sinus syndrome status post permanent pacemaker normal function.  No recent echocardiogram recheck.  2.  Atrial fibrillation small percentage continue sotalol.  3.  QTC below 550.  4.  Anticoagulation continue.  5.  Recheck labs.  6.  Follow-up with me in 6 months.      Hipolito Hall M.D.  [address] [place] [address]  VIA Facsimile: [phone]

## 2019-01-18 ENCOUNTER — HOSPITAL ENCOUNTER (OUTPATIENT)
Dept: CARDIOLOGY | Facility: MEDICAL CENTER | Age: 69
End: 2019-01-18
Attending: INTERNAL MEDICINE
Payer: MEDICARE

## 2019-01-18 DIAGNOSIS — I48.91 ATRIAL FIBRILLATION, UNSPECIFIED TYPE (HCC): ICD-10-CM

## 2019-01-18 LAB
LV EJECT FRACT MOD 2C 99903: 56.22
LV EJECT FRACT MOD 4C 99902: 57.15
LV EJECT FRACT MOD BP 99901: 57.31

## 2019-01-18 PROCEDURE — 93306 TTE W/DOPPLER COMPLETE: CPT | Mod: 26 | Performed by: INTERNAL MEDICINE

## 2019-01-18 PROCEDURE — 93306 TTE W/DOPPLER COMPLETE: CPT

## 2019-01-23 ENCOUNTER — ANTICOAGULATION VISIT (OUTPATIENT)
Dept: VASCULAR LAB | Facility: MEDICAL CENTER | Age: 69
End: 2019-01-23
Attending: INTERNAL MEDICINE
Payer: MEDICARE

## 2019-01-23 VITALS — DIASTOLIC BLOOD PRESSURE: 76 MMHG | HEART RATE: 83 BPM | SYSTOLIC BLOOD PRESSURE: 127 MMHG

## 2019-01-23 DIAGNOSIS — I48.91 ATRIAL FIBRILLATION, UNSPECIFIED TYPE (HCC): ICD-10-CM

## 2019-01-23 LAB — INR PPP: 3.9 (ref 2–3.5)

## 2019-01-23 PROCEDURE — 85610 PROTHROMBIN TIME: CPT

## 2019-01-23 PROCEDURE — 99212 OFFICE O/P EST SF 10 MIN: CPT | Performed by: NURSE PRACTITIONER

## 2019-01-23 NOTE — PROGRESS NOTES
Anticoagulation Summary  As of 1/23/2019    INR goal:   2.0-3.0   TTR:   65.9 % (3.6 y)   Today's INR:   3.9!   Warfarin maintenance plan:   7.5 mg (5 mg x 1.5) on Sun, Tue, Thu; 5 mg (5 mg x 1) all other days   Weekly warfarin total:   42.5 mg   Plan last modified:   DIAEN PikePOG (8/23/2018)   Next INR check:   2/6/2019   Target end date:       Indications    Atrial fibrillation (HCC) [I48.91]  Paroxysmal atrial fibrillation (HCC) (Resolved) [I48.0]             Anticoagulation Episode Summary     INR check location:   [facility]    Preferred lab:       Send INR reminders to:       Comments:         Anticoagulation Care Providers     Provider Role Specialty Phone number    Daniel Ferrera M.D. Referring Cardiac Electrophysiology [phone]    [facility] Responsible  [phone]        Anticoagulation Patient Findings      HPI:  Mike Ferrell seen in clinic today for follow up on anticoagulation therapy in the presence of AF. Denies any changes to current medical/health status since last appointment. Denies any medication or diet changes. No current symptoms of bleeding or thrombosis reported.    A/P:   INR supratherapeutic. HOLD tonight then continue current regimen. BP recorded in vitals.    Follow up appointment in 2 week(s).    Next Appointment: Wednesday, February 6, 2019 at 10:00 am.     Rebecca SNIDER

## 2019-01-24 LAB — INR BLD: 3.9 (ref 0.9–1.2)

## 2019-02-06 ENCOUNTER — ANTICOAGULATION VISIT (OUTPATIENT)
Dept: VASCULAR LAB | Facility: MEDICAL CENTER | Age: 69
End: 2019-02-06
Attending: INTERNAL MEDICINE
Payer: MEDICARE

## 2019-02-06 VITALS — SYSTOLIC BLOOD PRESSURE: 136 MMHG | DIASTOLIC BLOOD PRESSURE: 89 MMHG | HEART RATE: 73 BPM

## 2019-02-06 DIAGNOSIS — I48.91 ATRIAL FIBRILLATION, UNSPECIFIED TYPE (HCC): ICD-10-CM

## 2019-02-06 LAB — INR PPP: 4.7 (ref 2–3.5)

## 2019-02-06 PROCEDURE — 99212 OFFICE O/P EST SF 10 MIN: CPT | Performed by: NURSE PRACTITIONER

## 2019-02-06 PROCEDURE — 85610 PROTHROMBIN TIME: CPT

## 2019-02-06 NOTE — PROGRESS NOTES
Anticoagulation Summary  As of 2019    INR goal:   2.0-3.0   TTR:   65.2 % (3.7 y)   INR used for dosin.7! (2019)   Warfarin maintenance plan:   7.5 mg (5 mg x 1.5) every Sun, Thu; 5 mg (5 mg x 1) all other days   Weekly warfarin total:   40 mg   Plan last modified:   Rebecca Mathews A.P.NLinnea (2019)   Next INR check:   2019   Target end date:       Indications    Atrial fibrillation (HCC) [I48.91]  Paroxysmal atrial fibrillation (HCC) (Resolved) [I48.0]             Anticoagulation Episode Summary     INR check location:   [facility]    Preferred lab:       Send INR reminders to:       Comments:         Anticoagulation Care Providers     Provider Role Specialty Phone number    Daniel Ferrera M.D. Referring Cardiac Electrophysiology [phone]    [facility] Responsible  [phone]        Anticoagulation Patient Findings      HPI:  Mike Ferrell seen in clinic today for follow up on anticoagulation therapy in the presence of AF. Denies any changes to current medical/health status since last appointment. Denies any medication or diet changes. No current symptoms of bleeding or thrombosis reported.    A/P:   INR supratherapeutic. HOLD tonight and began reduced regimen. BP recorded in vitals.    Follow up appointment in 2 week(s).    Next Appointment: 2019 at 9:45 am.     Rebecca SNIDER

## 2019-02-07 LAB — INR BLD: 4.7 (ref 0.9–1.2)

## 2019-02-20 ENCOUNTER — ANTICOAGULATION VISIT (OUTPATIENT)
Dept: VASCULAR LAB | Facility: MEDICAL CENTER | Age: 69
End: 2019-02-20
Attending: INTERNAL MEDICINE
Payer: MEDICARE

## 2019-02-20 DIAGNOSIS — Z79.01 CHRONIC ANTICOAGULATION: ICD-10-CM

## 2019-02-20 LAB — INR PPP: 2.2 (ref 2–3.5)

## 2019-02-20 PROCEDURE — 85610 PROTHROMBIN TIME: CPT

## 2019-02-20 PROCEDURE — 99211 OFF/OP EST MAY X REQ PHY/QHP: CPT

## 2019-02-20 NOTE — PROGRESS NOTES
Anticoagulation Summary  As of 2019    INR goal:   2.0-3.0   TTR:   64.9 % (3.7 y)   INR used for dosin.2 (2019)   Warfarin maintenance plan:   7.5 mg (5 mg x 1.5) every Sun, Thu; 5 mg (5 mg x 1) all other days   Weekly warfarin total:   40 mg   Plan last modified:   ILEANA Pike (2019)   Next INR check:   3/13/2019   Target end date:       Indications    Atrial fibrillation (HCC) [I48.91]  Paroxysmal atrial fibrillation (HCC) (Resolved) [I48.0]             Anticoagulation Episode Summary     INR check location:   [facility]    Preferred lab:       Send INR reminders to:       Comments:         Anticoagulation Care Providers     Provider Role Specialty Phone number    Daniel Ferrera M.D. Referring Cardiac Electrophysiology [phone]    [facility] Responsible  [phone]        Anticoagulation Patient Findings      HPI:  Mike Ferrell seen in clinic today, on anticoagulation therapy with warfarin for AF.   Changes to current medical/health status since last appt: none  Denies signs/symptoms of bleeding and/or thrombosis since the last appt.    Denies any interval changes to diet  Denies any interval changes to medications since last appt.   Denies any complications or cost restrictions with current therapy.   Confirmed dosing regimen.     A/P   INR  therapeutic.   Pt is to continue with current warfarin dosing regimen.     Follow up appointment in 3 weeks per pt.     Jose Arvizu, CeceliaD

## 2019-03-13 ENCOUNTER — ANTICOAGULATION VISIT (OUTPATIENT)
Dept: VASCULAR LAB | Facility: MEDICAL CENTER | Age: 69
End: 2019-03-13
Attending: INTERNAL MEDICINE
Payer: MEDICARE

## 2019-03-13 VITALS — DIASTOLIC BLOOD PRESSURE: 90 MMHG | HEART RATE: 74 BPM | SYSTOLIC BLOOD PRESSURE: 130 MMHG

## 2019-03-13 DIAGNOSIS — I48.91 ATRIAL FIBRILLATION, UNSPECIFIED TYPE (HCC): ICD-10-CM

## 2019-03-13 LAB — INR PPP: 3.3 (ref 2–3.5)

## 2019-03-13 PROCEDURE — 99212 OFFICE O/P EST SF 10 MIN: CPT | Performed by: NURSE PRACTITIONER

## 2019-03-13 PROCEDURE — 85610 PROTHROMBIN TIME: CPT

## 2019-03-13 NOTE — PROGRESS NOTES
Anticoagulation Summary  As of 3/13/2019    INR goal:   2.0-3.0   TTR:   65.0 % (3.8 y)   INR used for dosing:   3.3! (3/13/2019)   Warfarin maintenance plan:   7.5 mg (5 mg x 1.5) every Sun, Wed; 5 mg (5 mg x 1) all other days   Weekly warfarin total:   40 mg   Plan last modified:   Rebecca Mathews ALinneaPOG (3/13/2019)   Next INR check:      Target end date:       Indications    Atrial fibrillation (HCC) [I48.91]  Paroxysmal atrial fibrillation (HCC) (Resolved) [I48.0]             Anticoagulation Episode Summary     INR check location:   [facility]    Preferred lab:       Send INR reminders to:       Comments:         Anticoagulation Care Providers     Provider Role Specialty Phone number    Daniel Ferrera M.D. Referring Cardiac Electrophysiology [phone]    [facility] Responsible  [phone]        Anticoagulation Patient Findings      HPI:  Mike Ferrell seen in clinic today for follow up on anticoagulation therapy in the presence of AF. Denies any changes to current medical/health status since last appointment. Denies any medication or diet changes. No current symptoms of bleeding or thrombosis reported.    A/P:   INR supratherapeutic. Decrease tonight then continue current regimen. BP recorded in vitals.    Follow up appointment in 3 week(s).    Next Appointment: Wednesday, April 10, 2019 at 9:45 am.     Rebecca SNIDER

## 2019-03-14 LAB — INR BLD: 3.3 (ref 0.9–1.2)

## 2019-04-10 ENCOUNTER — ANTICOAGULATION VISIT (OUTPATIENT)
Dept: VASCULAR LAB | Facility: MEDICAL CENTER | Age: 69
End: 2019-04-10
Attending: INTERNAL MEDICINE
Payer: MEDICARE

## 2019-04-10 VITALS — DIASTOLIC BLOOD PRESSURE: 93 MMHG | SYSTOLIC BLOOD PRESSURE: 125 MMHG | HEART RATE: 74 BPM

## 2019-04-10 DIAGNOSIS — Z79.01 CHRONIC ANTICOAGULATION: ICD-10-CM

## 2019-04-10 DIAGNOSIS — I48.91 ATRIAL FIBRILLATION, UNSPECIFIED TYPE (HCC): ICD-10-CM

## 2019-04-10 LAB — INR PPP: 2.2 (ref 2–3.5)

## 2019-04-10 PROCEDURE — 99211 OFF/OP EST MAY X REQ PHY/QHP: CPT | Performed by: NURSE PRACTITIONER

## 2019-04-10 PROCEDURE — 85610 PROTHROMBIN TIME: CPT

## 2019-04-10 NOTE — PROGRESS NOTES
Anticoagulation Summary  As of 4/10/2019    INR goal:   2.0-3.0   TTR:   65.2 % (3.8 y)   INR used for dosin.2 (4/10/2019)   Warfarin maintenance plan:   7.5 mg (5 mg x 1.5) every Sun, Wed; 5 mg (5 mg x 1) all other days   Weekly warfarin total:   40 mg   Plan last modified:   ILEANA Pike (3/13/2019)   Next INR check:   2019   Target end date:       Indications    Atrial fibrillation (HCC) [I48.91]  Paroxysmal atrial fibrillation (HCC) (Resolved) [I48.0]             Anticoagulation Episode Summary     INR check location:   [facility]    Preferred lab:       Send INR reminders to:       Comments:         Anticoagulation Care Providers     Provider Role Specialty Phone number    Daniel Ferrera M.D. Referring Cardiac Electrophysiology [phone]    [facility] Responsible  [phone]        Anticoagulation Patient Findings      HPI:  Mike Ferrell seen in clinic today for follow up on anticoagulation therapy in the presence of AF. Denies any changes to current medical/health status since last appointment. Denies any medication or diet changes. No current symptoms of bleeding or thrombosis reported.    A/P:   INR therapeutic. Continue current regimen. BP recorded in vitals.    Follow up appointment in 4 week(s).    Next Appointment: Wednesday, May 8, 2019 at 9:45 am.    Rebecca SNIDER

## 2019-04-11 LAB — INR BLD: 2.2 (ref 0.9–1.2)

## 2019-05-08 ENCOUNTER — ANTICOAGULATION VISIT (OUTPATIENT)
Dept: VASCULAR LAB | Facility: MEDICAL CENTER | Age: 69
End: 2019-05-08
Attending: INTERNAL MEDICINE
Payer: MEDICARE

## 2019-05-08 VITALS — DIASTOLIC BLOOD PRESSURE: 91 MMHG | SYSTOLIC BLOOD PRESSURE: 141 MMHG | HEART RATE: 83 BPM

## 2019-05-08 DIAGNOSIS — I48.91 ATRIAL FIBRILLATION, UNSPECIFIED TYPE (HCC): ICD-10-CM

## 2019-05-08 LAB
INR BLD: 3.7 (ref 0.9–1.2)
INR PPP: 3.7 (ref 2–3.5)

## 2019-05-08 PROCEDURE — 85610 PROTHROMBIN TIME: CPT

## 2019-05-08 PROCEDURE — 99212 OFFICE O/P EST SF 10 MIN: CPT | Performed by: PHARMACIST

## 2019-05-08 NOTE — PROGRESS NOTES
Anticoagulation Summary  As of 5/8/2019    INR goal:   2.0-3.0   TTR:   64.9 % (3.9 y)   INR used for dosing:   3.70! (5/8/2019)   Warfarin maintenance plan:   7.5 mg (5 mg x 1.5) every Sun, Wed; 5 mg (5 mg x 1) all other days   Weekly warfarin total:   40 mg   Plan last modified:   ILEANA Pike (3/13/2019)   Next INR check:   6/5/2019   Target end date:       Indications    Atrial fibrillation (HCC) [I48.91]  Paroxysmal atrial fibrillation (HCC) (Resolved) [I48.0]             Anticoagulation Episode Summary     INR check location:   [facility]    Preferred lab:       Send INR reminders to:       Comments:         Anticoagulation Care Providers     Provider Role Specialty Phone number    Daniel Ferrera M.D. Referring Cardiac Electrophysiology [phone]    [facility] Responsible  [phone]        Anticoagulation Patient Findings    HPI:  Mike presents for anticoagulation management. He takes warfarin for a history of atrial fib. No current signs of bleeding. No interval medication or diet changes. Drank more EtOH than usual this weekend.      A:  Supratherapeutic INR    P:  HOLD warfarin tonight then resume previously prescribed regimen.  Follow up INR in four weeks.    Reji Miguel, PharmD

## 2019-05-19 DIAGNOSIS — I10 ESSENTIAL HYPERTENSION: ICD-10-CM

## 2019-05-20 RX ORDER — HYDROCHLOROTHIAZIDE 12.5 MG/1
CAPSULE, GELATIN COATED ORAL
Qty: 90 CAP | Refills: 2 | Status: SHIPPED | OUTPATIENT
Start: 2019-05-20 | End: 2019-11-27 | Stop reason: SDUPTHER

## 2019-06-05 ENCOUNTER — ANTICOAGULATION VISIT (OUTPATIENT)
Dept: VASCULAR LAB | Facility: MEDICAL CENTER | Age: 69
End: 2019-06-05
Attending: INTERNAL MEDICINE
Payer: MEDICARE

## 2019-06-05 VITALS — DIASTOLIC BLOOD PRESSURE: 88 MMHG | SYSTOLIC BLOOD PRESSURE: 124 MMHG | HEART RATE: 81 BPM

## 2019-06-05 DIAGNOSIS — I48.91 ATRIAL FIBRILLATION, UNSPECIFIED TYPE (HCC): ICD-10-CM

## 2019-06-05 LAB — INR PPP: 2 (ref 2–3.5)

## 2019-06-05 PROCEDURE — 99211 OFF/OP EST MAY X REQ PHY/QHP: CPT | Performed by: NURSE PRACTITIONER

## 2019-06-05 PROCEDURE — 85610 PROTHROMBIN TIME: CPT

## 2019-06-05 NOTE — PROGRESS NOTES
Anticoagulation Summary  As of 2019    INR goal:   2.0-3.0   TTR:   64.8 % (4 y)   INR used for dosin.00 (2019)   Warfarin maintenance plan:   7.5 mg (5 mg x 1.5) every Sun, Wed; 5 mg (5 mg x 1) all other days   Weekly warfarin total:   40 mg   Plan last modified:   DIANE PikePOG (3/13/2019)   Next INR check:   7/3/2019   Target end date:       Indications    Atrial fibrillation (HCC) [I48.91]  Paroxysmal atrial fibrillation (HCC) (Resolved) [I48.0]             Anticoagulation Episode Summary     INR check location:   [facility]    Preferred lab:       Send INR reminders to:       Comments:         Anticoagulation Care Providers     Provider Role Specialty Phone number    Daniel Ferrera M.D. Referring Cardiac Electrophysiology [phone]    [facility] Responsible  [phone]        Anticoagulation Patient Findings      HPI:  Mike Ferrell seen in clinic today for follow up on anticoagulation therapy in the presence of AF. Denies any changes to current medical/health status since last appointment. Denies any medication or diet changes. No current symptoms of bleeding or thrombosis reported.    A/P:   INR therapeutic. Continue current regimen. BP recorded in vitals.    Follow up appointment in 6 week(s).    Next Appointment: 2019 at 9:45 am.     Rebecca SNIDER

## 2019-06-06 LAB — INR BLD: 2 (ref 0.9–1.2)

## 2019-06-17 ENCOUNTER — HOSPITAL ENCOUNTER (OUTPATIENT)
Dept: LAB | Facility: MEDICAL CENTER | Age: 69
End: 2019-06-17
Attending: INTERNAL MEDICINE
Payer: MEDICARE

## 2019-06-17 ENCOUNTER — HOSPITAL ENCOUNTER (OUTPATIENT)
Dept: LAB | Facility: MEDICAL CENTER | Age: 69
End: 2019-06-17
Attending: FAMILY MEDICINE
Payer: MEDICARE

## 2019-06-17 DIAGNOSIS — I48.91 ATRIAL FIBRILLATION, UNSPECIFIED TYPE (HCC): ICD-10-CM

## 2019-06-17 LAB
ALBUMIN SERPL BCP-MCNC: 4.1 G/DL (ref 3.2–4.9)
ALBUMIN/GLOB SERPL: 1.6 G/DL
ALP SERPL-CCNC: 57 U/L (ref 30–99)
ALT SERPL-CCNC: 22 U/L (ref 2–50)
ANION GAP SERPL CALC-SCNC: 9 MMOL/L (ref 0–11.9)
AST SERPL-CCNC: 16 U/L (ref 12–45)
BILIRUB SERPL-MCNC: 0.7 MG/DL (ref 0.1–1.5)
BUN SERPL-MCNC: 23 MG/DL (ref 8–22)
CALCIUM SERPL-MCNC: 9.7 MG/DL (ref 8.5–10.5)
CHLORIDE SERPL-SCNC: 107 MMOL/L (ref 96–112)
CHOLEST SERPL-MCNC: 219 MG/DL (ref 100–199)
CO2 SERPL-SCNC: 28 MMOL/L (ref 20–33)
CREAT SERPL-MCNC: 0.95 MG/DL (ref 0.5–1.4)
GLOBULIN SER CALC-MCNC: 2.5 G/DL (ref 1.9–3.5)
GLUCOSE SERPL-MCNC: 101 MG/DL (ref 65–99)
HDLC SERPL-MCNC: 47 MG/DL
LDLC SERPL CALC-MCNC: 145 MG/DL
POTASSIUM SERPL-SCNC: 3.4 MMOL/L (ref 3.6–5.5)
PROT SERPL-MCNC: 6.6 G/DL (ref 6–8.2)
PSA SERPL-MCNC: 0.53 NG/ML (ref 0–4)
SODIUM SERPL-SCNC: 144 MMOL/L (ref 135–145)
TRIGL SERPL-MCNC: 134 MG/DL (ref 0–149)

## 2019-06-17 PROCEDURE — 80061 LIPID PANEL: CPT

## 2019-06-17 PROCEDURE — 84436 ASSAY OF TOTAL THYROXINE: CPT

## 2019-06-17 PROCEDURE — 84479 ASSAY OF THYROID (T3 OR T4): CPT

## 2019-06-17 PROCEDURE — 36415 COLL VENOUS BLD VENIPUNCTURE: CPT

## 2019-06-17 PROCEDURE — 84153 ASSAY OF PSA TOTAL: CPT

## 2019-06-17 PROCEDURE — 80053 COMPREHEN METABOLIC PANEL: CPT

## 2019-06-18 ENCOUNTER — TELEPHONE (OUTPATIENT)
Dept: CARDIOLOGY | Facility: MEDICAL CENTER | Age: 69
End: 2019-06-18

## 2019-06-18 DIAGNOSIS — E87.6 HYPOKALEMIA: ICD-10-CM

## 2019-06-18 LAB
FT4I SERPL CALC-MCNC: 2 UNITS (ref 1.7–4.2)
T3RU NFR SERPL: 36 % (ref 28–41)
T4 SERPL-MCNC: 5.67 UG/DL (ref 5.1–14.1)

## 2019-06-18 RX ORDER — POTASSIUM CHLORIDE 750 MG/1
10 CAPSULE, EXTENDED RELEASE ORAL DAILY
Qty: 90 CAP | Refills: 1 | Status: SHIPPED | OUTPATIENT
Start: 2019-06-18 | End: 2019-11-27

## 2019-06-18 NOTE — TELEPHONE ENCOUNTER
Status:  Final result   Visible to patient:  No (Not Released) Dx:  Atrial fibrillation, unspecified type...   Notes recorded by Elvi Martinez R.N. on 6/18/2019 at 2:03 PM PDT  Pt is not currently on K+, please advise on dose. See nurses note from here.   [facility]   Pt will discuss further chol options at appt in 2 days.  ------    Notes recorded by Daniel Ferrera M.D. on 6/18/2019 at 10:49 AM PDT  should double his daily k dose. HLD can continue to try diet and exercise or try a different statin or zetia  ------    Notes recorded by Elvi Martinez R.N. on 6/18/2019 at 9:37 AM PDT     To DS

## 2019-06-18 NOTE — TELEPHONE ENCOUNTER
rx ordered. BMP lab slip mailed to pt to repeat in 1-2 weeks unless otherwise changed at appt on 6/21

## 2019-06-21 ENCOUNTER — NON-PROVIDER VISIT (OUTPATIENT)
Dept: CARDIOLOGY | Facility: MEDICAL CENTER | Age: 69
End: 2019-06-21
Payer: MEDICARE

## 2019-06-21 ENCOUNTER — OFFICE VISIT (OUTPATIENT)
Dept: CARDIOLOGY | Facility: MEDICAL CENTER | Age: 69
End: 2019-06-21
Payer: MEDICARE

## 2019-06-21 VITALS
OXYGEN SATURATION: 95 % | WEIGHT: 220 LBS | SYSTOLIC BLOOD PRESSURE: 126 MMHG | HEIGHT: 72 IN | BODY MASS INDEX: 29.8 KG/M2 | HEART RATE: 86 BPM | DIASTOLIC BLOOD PRESSURE: 80 MMHG

## 2019-06-21 DIAGNOSIS — Z95.810 AUTOMATIC IMPLANTABLE CARDIOVERTER-DEFIBRILLATOR IN SITU: ICD-10-CM

## 2019-06-21 DIAGNOSIS — I47.29 PAROXYSMAL VENTRICULAR TACHYCARDIA (HCC): ICD-10-CM

## 2019-06-21 DIAGNOSIS — Z98.890 S/P CARDIAC CATH: ICD-10-CM

## 2019-06-21 DIAGNOSIS — I48.0 PAROXYSMAL ATRIAL FIBRILLATION (HCC): ICD-10-CM

## 2019-06-21 DIAGNOSIS — I49.5 SINOATRIAL NODE DYSFUNCTION (HCC): ICD-10-CM

## 2019-06-21 DIAGNOSIS — Z79.01 ANTICOAGULANT LONG-TERM USE: ICD-10-CM

## 2019-06-21 PROCEDURE — 93283 PRGRMG EVAL IMPLANTABLE DFB: CPT | Performed by: INTERNAL MEDICINE

## 2019-06-21 PROCEDURE — 99214 OFFICE O/P EST MOD 30 MIN: CPT | Mod: 25 | Performed by: INTERNAL MEDICINE

## 2019-06-21 NOTE — PROGRESS NOTES
Chief Complaint   Patient presents with   • Atrial Fibrillation     F/V DX:PAF   With history of    Subjective:   Mike Ferrell is a 68 y.o. male who presents today with history of ventricular arrhythmias and paroxysmal atrial fibrillation.  Status post implantable defibrillator.  On sotalol.  Mild hypokalemia.  Overall doing well.  Coming by his grandson who is has custody.  Patient is doing well denies chest pain or shortness of breath    Past Medical History:   Diagnosis Date   • Arthritis    • Atrial fibrillation (HCC) 4/4/2012   • Automatic implantable cardiac defibrillator in situ 11/8/2012   • High cholesterol    • Hypopotassemia 4/4/2012   • Methamphetamine use ([facility]) none for many years   • Other and unspecified hyperlipidemia 4/4/2012   • Pacemaker    • Pain     shoulders   • Paroxysmal ventricular tachycardia ([facility]) 4/4/2012   • Pneumonia 2015   • Sinoatrial node dysfunction ([facility]) 4/4/2012   • Syncope and collapse 4/4/2012   • Thrombocytopenia, unspecified ([facility]) 4/4/2012   • Unspecified essential hypertension 4/4/2012     Past Surgical History:   Procedure Laterality Date   • AICD IMPLANT  2010   • PACEMAKER INSERTION  2009   • CHOLECYSTECTOMY  1999   • TONSILLECTOMY  1953     No family history on file.  Social History     Social History   • Marital status:      Spouse name: N/A   • Number of children: N/A   • Years of education: N/A     Occupational History   • Not on file.     Social History Main Topics   • Smoking status: Never Smoker   • Smokeless tobacco: Never Used   • Alcohol use Yes      Comment: 4-5 per day   • Drug use: No   • Sexual activity: Not on file     Other Topics Concern   • Not on file     Social History Narrative   • No narrative on file     Allergies   Allergen Reactions   • Lipitor [Atorvastatin]    • Sulfa Drugs Rash and Itching     Rash, itch     Outpatient Encounter Prescriptions as of 6/21/2019   Medication Sig Dispense Refill   • potassium chloride (MICRO-K) 10 MEQ  capsule Take 1 Cap by mouth every day. 90 Cap 1   • hydrochlorothiazide (MICROZIDE) 12.5 MG capsule TAKE 1 CAP BY MOUTH DAILY 90 Cap 2   • warfarin (COUMADIN) 5 MG Tab TAKE 1 (ONE) TO 1 & 1/2 (ONE & ONE-HALF) TABLETS BY MOUTH ONCE DAILY AS DIRECTED BY THE COUMADIN CLINIC. 135 Tab 1   • sotalol (BETAPACE) 120 MG tablet TAKE 1 TABLET BY MOUTH TWICE A  Tab 3   • amlodipine (NORVASC) 5 MG TABS Take 5 mg by mouth every day.     • benazepril (LOTENSIN) 40 MG tablet Take 40 mg by mouth every day.     • Cholecalciferol (VITAMIN D PO) Take 4,000 Units by mouth every day at 6 PM.     • [DISCONTINUED] acetaminophen (TYLENOL) 500 MG Tab Take 500-1,000 mg by mouth every 6 hours as needed.     • [DISCONTINUED] POTASSIUM CHLORIDE PO Take 1 Tab by mouth every day.       No facility-administered encounter medications on file as of 6/21/2019.      ROS     Objective:   /80 (BP Location: Left arm, Patient Position: Sitting, BP Cuff Size: Adult)   Pulse 86   Ht 1.829 m (6')   Wt 99.8 kg (220 lb)   SpO2 95%   BMI 29.84 kg/m²     Physical Exam   Constitutional: He is oriented to person, place, and time. He appears well-developed and well-nourished.   HENT:   Head: Normocephalic and atraumatic.   Eyes: EOM are normal.   Neck: Normal range of motion. Neck supple.   Cardiovascular: Normal rate, regular rhythm and intact distal pulses.  Exam reveals no gallop and no friction rub.    No murmur heard.  Pulmonary/Chest: Effort normal and breath sounds normal.   Abdominal: Soft.   Musculoskeletal: Normal range of motion. He exhibits no edema.   Neurological: He is alert and oriented to person, place, and time.   Skin: Skin is warm and dry.   Psychiatric: He has a normal mood and affect. His behavior is normal. Judgment and thought content normal.       Assessment:     1. Anticoagulant long-term use     2. Automatic implantable cardioverter-defibrillator in situ     3. Paroxysmal atrial fibrillation (HCC)     4. Paroxysmal  ventricular tachycardia (HCC)     5. S/P cardiac cath         Medical Decision Making:  Today's Assessment / Status / Plan:   1.  Paroxysmal atrial fibrillation.  Continue sotalol 2.7%.  2.  Anticoagulation continue Coumadin.  3.  Ventricular arrhythmias quiet.  4.  Follow-up in device clinic in 6 months follow-up me in 1 year.  5.  Mild hypokalemia potassium repleted.

## 2019-06-21 NOTE — LETTER
[facility]   [address]  [place] [address]  Phone: [phone]  Fax: [phone]              Mike Ferrell  1950    Encounter Date: 6/21/2019    Daniel Ferrera M.D.          PROGRESS NOTE:  Chief Complaint   Patient presents with   • Atrial Fibrillation     F/V DX:PAF   With history of    Subjective:   Mike Ferrell is a 68 y.o. male who presents today with history of ventricular arrhythmias and paroxysmal atrial fibrillation.  Status post implantable defibrillator.  On sotalol.  Mild hypokalemia.  Overall doing well.  Coming by his grandson who is has custody.  Patient is doing well denies chest pain or shortness of breath    Past Medical History:   Diagnosis Date   • Arthritis    • Atrial fibrillation (HCC) 4/4/2012   • Automatic implantable cardiac defibrillator in situ 11/8/2012   • High cholesterol    • Hypopotassemia 4/4/2012   • Methamphetamine use (HCC) none for many years   • Other and unspecified hyperlipidemia 4/4/2012   • Pacemaker    • Pain     shoulders   • Paroxysmal ventricular tachycardia (HCC) 4/4/2012   • Pneumonia 2015   • Sinoatrial node dysfunction (HCC) 4/4/2012   • Syncope and collapse 4/4/2012   • Thrombocytopenia, unspecified (HCC) 4/4/2012   • Unspecified essential hypertension 4/4/2012     Past Surgical History:   Procedure Laterality Date   • AICD IMPLANT  2010   • PACEMAKER INSERTION  2009   • CHOLECYSTECTOMY  1999   • TONSILLECTOMY  1953     No family history on file.  Social History     Social History   • Marital status:      Spouse name: N/A   • Number of children: N/A   • Years of education: N/A     Occupational History   • Not on file.     Social History Main Topics   • Smoking status: Never Smoker   • Smokeless tobacco: Never Used   • Alcohol use Yes      Comment: 4-5 per day   • Drug use: No   • Sexual activity: Not on file     Other Topics Concern   • Not on file     Social History Narrative   • No  narrative on file     Allergies   Allergen Reactions   • Lipitor [Atorvastatin]    • Sulfa Drugs Rash and Itching     Rash, itch     Outpatient Encounter Prescriptions as of 6/21/2019   Medication Sig Dispense Refill   • potassium chloride (MICRO-K) 10 MEQ capsule Take 1 Cap by mouth every day. 90 Cap 1   • hydrochlorothiazide (MICROZIDE) 12.5 MG capsule TAKE 1 CAP BY MOUTH DAILY 90 Cap 2   • warfarin (COUMADIN) 5 MG Tab TAKE 1 (ONE) TO 1 & 1/2 (ONE & ONE-HALF) TABLETS BY MOUTH ONCE DAILY AS DIRECTED BY THE COUMADIN CLINIC. 135 Tab 1   • sotalol (BETAPACE) 120 MG tablet TAKE 1 TABLET BY MOUTH TWICE A  Tab 3   • amlodipine (NORVASC) 5 MG TABS Take 5 mg by mouth every day.     • benazepril (LOTENSIN) 40 MG tablet Take 40 mg by mouth every day.     • Cholecalciferol (VITAMIN D PO) Take 4,000 Units by mouth every day at 6 PM.     • [DISCONTINUED] acetaminophen (TYLENOL) 500 MG Tab Take 500-1,000 mg by mouth every 6 hours as needed.     • [DISCONTINUED] POTASSIUM CHLORIDE PO Take 1 Tab by mouth every day.       No facility-administered encounter medications on file as of 6/21/2019.      ROS     Objective:   /80 (BP Location: Left arm, Patient Position: Sitting, BP Cuff Size: Adult)   Pulse 86   Ht 1.829 m (6')   Wt 99.8 kg (220 lb)   SpO2 95%   BMI 29.84 kg/m²      Physical Exam   Constitutional: He is oriented to person, place, and time. He appears well-developed and well-nourished.   HENT:   Head: Normocephalic and atraumatic.   Eyes: EOM are normal.   Neck: Normal range of motion. Neck supple.   Cardiovascular: Normal rate, regular rhythm and intact distal pulses.  Exam reveals no gallop and no friction rub.    No murmur heard.  Pulmonary/Chest: Effort normal and breath sounds normal.   Abdominal: Soft.   Musculoskeletal: Normal range of motion. He exhibits no edema.   Neurological: He is alert and oriented to person, place, and time.   Skin: Skin is warm and dry.   Psychiatric: He has a normal mood  and affect. His behavior is normal. Judgment and thought content normal.       Assessment:     1. Anticoagulant long-term use     2. Automatic implantable cardioverter-defibrillator in situ     3. Paroxysmal atrial fibrillation (HCC)     4. Paroxysmal ventricular tachycardia (HCC)     5. S/P cardiac cath         Medical Decision Making:  Today's Assessment / Status / Plan:   1.  Paroxysmal atrial fibrillation.  Continue sotalol 2.7%.  2.  Anticoagulation continue Coumadin.  3.  Ventricular arrhythmias quiet.  4.  Follow-up in device clinic in 6 months follow-up me in 1 year.  5.  Mild hypokalemia potassium repleted.      Hipolito Hall M.D.  [address] [place] [address]  VIA Facsimile: [phone]

## 2019-07-17 ENCOUNTER — ANTICOAGULATION VISIT (OUTPATIENT)
Dept: VASCULAR LAB | Facility: MEDICAL CENTER | Age: 69
End: 2019-07-17
Attending: INTERNAL MEDICINE
Payer: MEDICARE

## 2019-07-17 VITALS — DIASTOLIC BLOOD PRESSURE: 81 MMHG | SYSTOLIC BLOOD PRESSURE: 133 MMHG | HEART RATE: 87 BPM

## 2019-07-17 DIAGNOSIS — I48.0 PAROXYSMAL ATRIAL FIBRILLATION (HCC): ICD-10-CM

## 2019-07-17 LAB — INR PPP: 2.3 (ref 2–3.5)

## 2019-07-17 PROCEDURE — 99211 OFF/OP EST MAY X REQ PHY/QHP: CPT | Performed by: NURSE PRACTITIONER

## 2019-07-17 PROCEDURE — 85610 PROTHROMBIN TIME: CPT

## 2019-07-17 NOTE — PROGRESS NOTES
Anticoagulation Summary  As of 2019    INR goal:   2.0-3.0   TTR:   65.8 % (4.1 y)   INR used for dosin.30 (2019)   Warfarin maintenance plan:   7.5 mg (5 mg x 1.5) every Sun, Wed; 5 mg (5 mg x 1) all other days   Weekly warfarin total:   40 mg   Plan last modified:   DIANE PikePOG (3/13/2019)   Next INR check:   2019   Target end date:       Indications    Atrial fibrillation (HCC) [I48.91]  Paroxysmal atrial fibrillation (HCC) (Resolved) [I48.0]             Anticoagulation Episode Summary     INR check location:   [facility]    Preferred lab:       Send INR reminders to:       Comments:         Anticoagulation Care Providers     Provider Role Specialty Phone number    Daniel Ferrera M.D. Referring Cardiac Electrophysiology [phone]    [facility] Responsible  [phone]        Anticoagulation Patient Findings      HPI:  Mike Ferrell seen in clinic today for follow up on anticoagulation therapy in the presence of AF.   Denies any changes to current medical/health status since last appointment.   Denies any medication or diet changes.   No current symptoms of bleeding or thrombosis reported.    A/P:   INR therapeutic.   Continue current regimen.   BP recorded in vitals.    Follow up appointment in 6 week(s).    Next Appointment: 2019 at 9:45 am.    Rebecca SNIDER

## 2019-07-19 LAB — INR BLD: 2.3 (ref 0.9–1.2)

## 2019-08-04 DIAGNOSIS — I48.91 ATRIAL FIBRILLATION, UNSPECIFIED TYPE (HCC): ICD-10-CM

## 2019-08-05 RX ORDER — SOTALOL HYDROCHLORIDE 120 MG/1
TABLET ORAL
Qty: 180 TAB | Refills: 3 | Status: SHIPPED | OUTPATIENT
Start: 2019-08-05 | End: 2020-01-28

## 2019-09-04 ENCOUNTER — ANTICOAGULATION VISIT (OUTPATIENT)
Dept: VASCULAR LAB | Facility: MEDICAL CENTER | Age: 69
End: 2019-09-04
Attending: INTERNAL MEDICINE
Payer: MEDICARE

## 2019-09-04 VITALS — DIASTOLIC BLOOD PRESSURE: 90 MMHG | SYSTOLIC BLOOD PRESSURE: 133 MMHG | HEART RATE: 72 BPM

## 2019-09-04 DIAGNOSIS — I48.0 PAROXYSMAL ATRIAL FIBRILLATION (HCC): ICD-10-CM

## 2019-09-04 LAB
INR BLD: 2.5 (ref 0.9–1.2)
INR PPP: 2.5 (ref 2–3.5)

## 2019-09-04 PROCEDURE — 99211 OFF/OP EST MAY X REQ PHY/QHP: CPT | Performed by: NURSE PRACTITIONER

## 2019-09-04 PROCEDURE — 85610 PROTHROMBIN TIME: CPT

## 2019-09-04 NOTE — PROGRESS NOTES
Anticoagulation Summary  As of 2019    INR goal:   2.0-3.0   TTR:   66.9 % (4.2 y)   INR used for dosin.50 (2019)   Warfarin maintenance plan:   7.5 mg (5 mg x 1.5) every Sun, Wed; 5 mg (5 mg x 1) all other days   Weekly warfarin total:   40 mg   Plan last modified:   ILEANA Pike (3/13/2019)   Next INR check:   10/30/2019   Target end date:       Indications    Atrial fibrillation (HCC) [I48.91]  Paroxysmal atrial fibrillation (HCC) (Resolved) [I48.0]             Anticoagulation Episode Summary     INR check location[facility]   [facility]    Preferred lab:       Send INR reminders to:       Comments:         Anticoagulation Care Providers     Provider Role Specialty Phone number    Daniel Ferrera M.D. Referring Cardiac Electrophysiology [phone]    [facility] Responsible  [phone]        Anticoagulation Patient Findings      HPI:  Mike Ferrell seen in clinic today for follow up on anticoagulation therapy in the presence of AF.   Denies any changes to current medical/health status since last appointment.   Denies any medication or diet changes.   No current symptoms of bleeding or thrombosis reported.    A/P:   INR therapeutic.   Continue current regimen.   BP recorded in vitals.    Follow up appointment in 8 week(s).    Next Appointment: Wednesday, Oct 30, 2019 at 9:45 am.    Rebecca SNIDER

## 2019-09-23 ENCOUNTER — HOSPITAL ENCOUNTER (OUTPATIENT)
Dept: LAB | Facility: MEDICAL CENTER | Age: 69
End: 2019-09-23
Attending: FAMILY MEDICINE
Payer: MEDICARE

## 2019-09-23 LAB
ALBUMIN SERPL BCP-MCNC: 4.8 G/DL (ref 3.2–4.9)
ALBUMIN/GLOB SERPL: 2.2 G/DL
ALP SERPL-CCNC: 80 U/L (ref 30–99)
ALT SERPL-CCNC: 14 U/L (ref 2–50)
ANION GAP SERPL CALC-SCNC: 12 MMOL/L (ref 0–11.9)
AST SERPL-CCNC: 18 U/L (ref 12–45)
BASOPHILS # BLD AUTO: 0.8 % (ref 0–1.8)
BASOPHILS # BLD: 0.05 K/UL (ref 0–0.12)
BILIRUB SERPL-MCNC: 1 MG/DL (ref 0.1–1.5)
BUN SERPL-MCNC: 21 MG/DL (ref 8–22)
CALCIUM SERPL-MCNC: 10.1 MG/DL (ref 8.5–10.5)
CHLORIDE SERPL-SCNC: 106 MMOL/L (ref 96–112)
CO2 SERPL-SCNC: 24 MMOL/L (ref 20–33)
CREAT SERPL-MCNC: 1.03 MG/DL (ref 0.5–1.4)
EOSINOPHIL # BLD AUTO: 0.05 K/UL (ref 0–0.51)
EOSINOPHIL NFR BLD: 0.8 % (ref 0–6.9)
ERYTHROCYTE [DISTWIDTH] IN BLOOD BY AUTOMATED COUNT: 50.4 FL (ref 35.9–50)
ERYTHROCYTE [SEDIMENTATION RATE] IN BLOOD BY WESTERGREN METHOD: 14 MM/HOUR (ref 0–20)
GLOBULIN SER CALC-MCNC: 2.2 G/DL (ref 1.9–3.5)
GLUCOSE SERPL-MCNC: 85 MG/DL (ref 65–99)
HCT VFR BLD AUTO: 42.5 % (ref 42–52)
HGB BLD-MCNC: 14.8 G/DL (ref 14–18)
IMM GRANULOCYTES # BLD AUTO: 0.01 K/UL (ref 0–0.11)
IMM GRANULOCYTES NFR BLD AUTO: 0.2 % (ref 0–0.9)
LYMPHOCYTES # BLD AUTO: 1.35 K/UL (ref 1–4.8)
LYMPHOCYTES NFR BLD: 21.6 % (ref 22–41)
MCH RBC QN AUTO: 36.8 PG (ref 27–33)
MCHC RBC AUTO-ENTMCNC: 34.8 G/DL (ref 33.7–35.3)
MCV RBC AUTO: 105.7 FL (ref 81.4–97.8)
MONOCYTES # BLD AUTO: 0.62 K/UL (ref 0–0.85)
MONOCYTES NFR BLD AUTO: 9.9 % (ref 0–13.4)
NEUTROPHILS # BLD AUTO: 4.18 K/UL (ref 1.82–7.42)
NEUTROPHILS NFR BLD: 66.7 % (ref 44–72)
NRBC # BLD AUTO: 0 K/UL
NRBC BLD-RTO: 0 /100 WBC
NT-PROBNP SERPL IA-MCNC: 274 PG/ML (ref 0–125)
PLATELET # BLD AUTO: 155 K/UL (ref 164–446)
PMV BLD AUTO: 11.9 FL (ref 9–12.9)
POTASSIUM SERPL-SCNC: 3.3 MMOL/L (ref 3.6–5.5)
PROT SERPL-MCNC: 7 G/DL (ref 6–8.2)
RBC # BLD AUTO: 4.02 M/UL (ref 4.7–6.1)
SODIUM SERPL-SCNC: 142 MMOL/L (ref 135–145)
TSH SERPL DL<=0.005 MIU/L-ACNC: 1.89 UIU/ML (ref 0.38–5.33)
WBC # BLD AUTO: 6.3 K/UL (ref 4.8–10.8)

## 2019-09-23 PROCEDURE — 84443 ASSAY THYROID STIM HORMONE: CPT

## 2019-09-23 PROCEDURE — 85025 COMPLETE CBC W/AUTO DIFF WBC: CPT

## 2019-09-23 PROCEDURE — 83880 ASSAY OF NATRIURETIC PEPTIDE: CPT

## 2019-09-23 PROCEDURE — 80053 COMPREHEN METABOLIC PANEL: CPT

## 2019-09-23 PROCEDURE — 36415 COLL VENOUS BLD VENIPUNCTURE: CPT

## 2019-09-23 PROCEDURE — 85652 RBC SED RATE AUTOMATED: CPT

## 2019-10-17 ENCOUNTER — HOSPITAL ENCOUNTER (OUTPATIENT)
Dept: RADIOLOGY | Facility: MEDICAL CENTER | Age: 69
End: 2019-10-17
Attending: FAMILY MEDICINE
Payer: MEDICARE

## 2019-10-17 DIAGNOSIS — R05.9 COUGH: ICD-10-CM

## 2019-10-17 PROCEDURE — 71046 X-RAY EXAM CHEST 2 VIEWS: CPT

## 2019-10-21 ENCOUNTER — DOCUMENTATION (OUTPATIENT)
Dept: CARDIOLOGY | Facility: MEDICAL CENTER | Age: 69
End: 2019-10-21

## 2019-10-21 NOTE — PROGRESS NOTES
Bella Steinberg, Med Ass't  Roxy Villanueva Med Ass't             Yes, patient called and wanted to schedule sooner appt.    Previous Messages      ----- Message -----   From: Roxy Villanueva, Med Ass't   Sent: 10/21/2019  11:43 AM PDT   To: Bella Steinberg Med Ass't, *   Subject: R/S patient per note                             Bella   Per MILLY last note patient was to be scheduled for 6mo device check and 1yr F/V.Let me know if you have any questions.     Thank you   Roxy Acosta to R/S per Last office note by DS. Scheduling states pt request early appt.

## 2019-10-29 DIAGNOSIS — Z79.01 CHRONIC ANTICOAGULATION: ICD-10-CM

## 2019-10-30 ENCOUNTER — ANTICOAGULATION VISIT (OUTPATIENT)
Dept: VASCULAR LAB | Facility: MEDICAL CENTER | Age: 69
End: 2019-10-30
Attending: INTERNAL MEDICINE
Payer: MEDICARE

## 2019-10-30 ENCOUNTER — OFFICE VISIT (OUTPATIENT)
Dept: CARDIOLOGY | Facility: MEDICAL CENTER | Age: 69
End: 2019-10-30
Payer: MEDICARE

## 2019-10-30 VITALS
HEART RATE: 85 BPM | OXYGEN SATURATION: 94 % | BODY MASS INDEX: 33.18 KG/M2 | WEIGHT: 245 LBS | DIASTOLIC BLOOD PRESSURE: 90 MMHG | HEIGHT: 72 IN | SYSTOLIC BLOOD PRESSURE: 134 MMHG

## 2019-10-30 VITALS — SYSTOLIC BLOOD PRESSURE: 135 MMHG | HEART RATE: 77 BPM | DIASTOLIC BLOOD PRESSURE: 98 MMHG

## 2019-10-30 DIAGNOSIS — I47.29 PAROXYSMAL VENTRICULAR TACHYCARDIA (HCC): ICD-10-CM

## 2019-10-30 DIAGNOSIS — I48.0 PAROXYSMAL ATRIAL FIBRILLATION (HCC): ICD-10-CM

## 2019-10-30 DIAGNOSIS — Z95.810 AUTOMATIC IMPLANTABLE CARDIOVERTER-DEFIBRILLATOR IN SITU: ICD-10-CM

## 2019-10-30 DIAGNOSIS — Z79.899 HIGH RISK MEDICATION USE: ICD-10-CM

## 2019-10-30 DIAGNOSIS — Z79.01 ANTICOAGULANT LONG-TERM USE: ICD-10-CM

## 2019-10-30 DIAGNOSIS — I49.5 SINOATRIAL NODE DYSFUNCTION (HCC): ICD-10-CM

## 2019-10-30 DIAGNOSIS — Z98.890 S/P CARDIAC CATH: ICD-10-CM

## 2019-10-30 DIAGNOSIS — F10.10 ALCOHOL ABUSE: ICD-10-CM

## 2019-10-30 LAB — INR PPP: 3 (ref 2–3.5)

## 2019-10-30 PROCEDURE — 85610 PROTHROMBIN TIME: CPT

## 2019-10-30 PROCEDURE — 93283 PRGRMG EVAL IMPLANTABLE DFB: CPT | Performed by: INTERNAL MEDICINE

## 2019-10-30 PROCEDURE — 99214 OFFICE O/P EST MOD 30 MIN: CPT | Mod: 25 | Performed by: INTERNAL MEDICINE

## 2019-10-30 PROCEDURE — 99211 OFF/OP EST MAY X REQ PHY/QHP: CPT | Performed by: NURSE PRACTITIONER

## 2019-10-30 NOTE — PROGRESS NOTES
Chief Complaint   Patient presents with   • Atrial Fibrillation     F/V DX:PAF       Subjective:   Mike Ferrell is a 69 y.o. male who presents today with history of previous methamphetamine abuse alcohol abuse.  Drinks greater than 5 drinks per day.  2-month history of worsening shortness of breath and fatigue.  Denies any chest pain negative cath in 2010.  Does not smoke.  BNP was slightly high.  Increased burden of ventricular pacing despite being programmed with MVP.  Chest x-ray unremarkable.    Past Medical History:   Diagnosis Date   • Arthritis    • Atrial fibrillation (HCC) 4/4/2012   • Automatic implantable cardiac defibrillator in situ 11/8/2012   • High cholesterol    • Hypopotassemia 4/4/2012   • Methamphetamine use (HCC) none for many years   • Other and unspecified hyperlipidemia 4/4/2012   • Pacemaker    • Pain     shoulders   • Paroxysmal ventricular tachycardia (HCC) 4/4/2012   • Pneumonia 2015   • Sinoatrial node dysfunction (HCC) 4/4/2012   • Syncope and collapse 4/4/2012   • Thrombocytopenia, unspecified (HCC) 4/4/2012   • Unspecified essential hypertension 4/4/2012     Past Surgical History:   Procedure Laterality Date   • AICD IMPLANT  2010   • PACEMAKER INSERTION  2009   • CHOLECYSTECTOMY  1999   • TONSILLECTOMY  1953     No family history on file.  Social History     Socioeconomic History   • Marital status:      Spouse name: Not on file   • Number of children: Not on file   • Years of education: Not on file   • Highest education level: Not on file   Occupational History   • Not on file   Social Needs   • Financial resource strain: Not on file   • Food insecurity:     Worry: Not on file     Inability: Not on file   • Transportation needs:     Medical: Not on file     Non-medical: Not on file   Tobacco Use   • Smoking status: Never Smoker   • Smokeless tobacco: Never Used   Substance and Sexual Activity   • Alcohol use: Yes     Comment: 4-5 per day   • Drug use: No   • Sexual  activity: Not on file   Lifestyle   • Physical activity:     Days per week: Not on file     Minutes per session: Not on file   • Stress: Not on file   Relationships   • Social connections:     Talks on phone: Not on file     Gets together: Not on file     Attends Denominational service: Not on file     Active member of club or organization: Not on file     Attends meetings of clubs or organizations: Not on file     Relationship status: Not on file   • Intimate partner violence:     Fear of current or ex partner: Not on file     Emotionally abused: Not on file     Physically abused: Not on file     Forced sexual activity: Not on file   Other Topics Concern   • Not on file   Social History Narrative   • Not on file     Allergies   Allergen Reactions   • Lipitor [Atorvastatin]    • Sulfa Drugs Rash and Itching     Rash, itch     Outpatient Encounter Medications as of 10/30/2019   Medication Sig Dispense Refill   • sotalol (BETAPACE) 120 MG tablet TAKE 1 TABLET BY MOUTH TWICE A  Tab 3   • potassium chloride (MICRO-K) 10 MEQ capsule Take 1 Cap by mouth every day. (Patient taking differently: Take 20 mEq by mouth every day.) 90 Cap 1   • hydrochlorothiazide (MICROZIDE) 12.5 MG capsule TAKE 1 CAP BY MOUTH DAILY 90 Cap 2   • warfarin (COUMADIN) 5 MG Tab TAKE 1 (ONE) TO 1 & 1/2 (ONE & ONE-HALF) TABLETS BY MOUTH ONCE DAILY AS DIRECTED BY THE COUMADIN CLINIC. 135 Tab 1   • amlodipine (NORVASC) 5 MG TABS Take 5 mg by mouth every day.     • benazepril (LOTENSIN) 40 MG tablet Take 40 mg by mouth every day.     • Cholecalciferol (VITAMIN D PO) Take 4,000 Units by mouth every day at 6 PM.       No facility-administered encounter medications on file as of 10/30/2019.      ROS     Objective:   /90 (BP Location: Left arm, Patient Position: Sitting, BP Cuff Size: Large adult)   Pulse 85   Ht 1.829 m (6')   Wt 111.1 kg (245 lb)   SpO2 94%   BMI 33.23 kg/m²     Physical Exam   Constitutional: He is oriented to person, place,  and time. He appears well-developed and well-nourished.   HENT:   Head: Normocephalic and atraumatic.   Eyes: EOM are normal.   Neck: Normal range of motion. Neck supple.   Cardiovascular: Normal rate, regular rhythm and intact distal pulses. Exam reveals no gallop and no friction rub.   No murmur heard.  Pulmonary/Chest: Effort normal and breath sounds normal.   Abdominal: Soft.   Musculoskeletal: Normal range of motion. He exhibits no edema.   Neurological: He is alert and oriented to person, place, and time.   Skin: Skin is warm and dry.   Psychiatric: He has a normal mood and affect. His behavior is normal. Judgment and thought content normal.       Assessment:     1. Paroxysmal atrial fibrillation (HCC)  EC-ECHOCARDIOGRAM LTD W/O CONT   2. S/P cardiac cath     3. Sinoatrial node dysfunction (HCC)     4. Paroxysmal ventricular tachycardia (HCC)     5. High risk medication use     6. Automatic implantable cardioverter-defibrillator in situ     7. Anticoagulant long-term use     8. Alcohol abuse         Medical Decision Making:  Today's Assessment / Status / Plan:   1.  Worsening dyspnea possible related to alcohol abuse also possibly related to RV pacing.  I did extend the AV delay.  Check echocardiogram.  May need upgrade to CRT-D.  Underlying first-degree AV block right bundle branch block.  2.  Alcohol abuse work on cessation.  3.  Paroxysmal atrial fibrillation 15% on sotalol and anticoagulation.  4.  Follow-up with me in 2 months.

## 2019-10-30 NOTE — PROGRESS NOTES
Anticoagulation Summary  As of 10/30/2019    INR goal:   2.0-3.0   TTR:   68.0 % (4.4 y)   INR used for dosing:   3.00 (10/30/2019)   Warfarin maintenance plan:   7.5 mg (5 mg x 1.5) every Sun, Wed; 5 mg (5 mg x 1) all other days   Weekly warfarin total:   40 mg   Plan last modified:   DIANE PikePOG (3/13/2019)   Next INR check:   1/2/2020   Target end date:       Indications    Atrial fibrillation (HCC) [I48.91]  Paroxysmal atrial fibrillation (HCC) (Resolved) [I48.0]             Anticoagulation Episode Summary     INR check location:   Anticoagulation Clinic    Preferred lab:       Send INR reminders to:       Comments:         Anticoagulation Care Providers     Provider Role Specialty Phone number    Daniel Ferrera M.D. Referring Cardiac Electrophysiology [phone]    [facility] Responsible  [phone]        Anticoagulation Patient Findings      HPI:  Mike Ferrell seen in clinic today for follow up on anticoagulation therapy in the presence of AF.   Denies any changes to current medical/health status since last appointment.   Denies any medication or diet changes.   No current symptoms of bleeding or thrombosis reported.    A/P:   INR therapeutic.   Encouraged to eat a little extra greens. Continue current regimen.   BP recorded in vitals.    Follow up appointment in 7 week(s).    Next Appointment: Thursday, Jan 2, 2019 at 9:45 am.    Rebecca SNIDER

## 2019-10-31 LAB — INR BLD: 3 (ref 0.9–1.2)

## 2019-11-08 ENCOUNTER — HOSPITAL ENCOUNTER (OUTPATIENT)
Dept: CARDIOLOGY | Facility: MEDICAL CENTER | Age: 69
End: 2019-11-08
Attending: INTERNAL MEDICINE
Payer: MEDICARE

## 2019-11-08 DIAGNOSIS — I48.0 PAROXYSMAL ATRIAL FIBRILLATION (HCC): ICD-10-CM

## 2019-11-08 PROCEDURE — 93325 DOPPLER ECHO COLOR FLOW MAPG: CPT

## 2019-11-11 LAB
LV EJECT FRACT MOD 2C 99903: 55.15
LV EJECT FRACT MOD 4C 99902: 62.25
LV EJECT FRACT MOD BP 99901: 59.21

## 2019-11-11 PROCEDURE — 93308 TTE F-UP OR LMTD: CPT | Mod: 26 | Performed by: INTERNAL MEDICINE

## 2019-11-14 ENCOUNTER — TELEPHONE (OUTPATIENT)
Dept: CARDIOLOGY | Facility: MEDICAL CENTER | Age: 69
End: 2019-11-14

## 2019-11-14 NOTE — LETTER
November 14, 2019        Mike Ferrell  [address]   [facility] [address]        Dear Mike,      Dr. Ferrera reviewed your echocardiogram results. He states they are normal. He is not recommending an upgrade on your pacemaker.    If you have questions or concerns, please give us a call.      Thank you,    Roxana HERNÁNDEZ for Dr. Ferrera

## 2019-11-15 NOTE — TELEPHONE ENCOUNTER
Result Notes for EC-ECHOCARDIOGRAM LTD W/O CONT     Notes recorded by Daniel Ferrera M.D. on 11/11/2019 at 4:22 PM PST  Echo looks nl would not upgrade ppm         Letter drafted and mailed to pt.

## 2019-11-27 ENCOUNTER — TELEPHONE (OUTPATIENT)
Dept: CARDIOLOGY | Facility: MEDICAL CENTER | Age: 69
End: 2019-11-27

## 2019-11-27 DIAGNOSIS — I10 ESSENTIAL HYPERTENSION: ICD-10-CM

## 2019-11-27 RX ORDER — HYDROCHLOROTHIAZIDE 12.5 MG/1
12.5 CAPSULE, GELATIN COATED ORAL DAILY
Qty: 100 CAP | Refills: 3 | Status: ON HOLD | OUTPATIENT
Start: 2019-11-27 | End: 2021-12-21

## 2019-11-27 RX ORDER — POTASSIUM CHLORIDE 20 MEQ/1
20 TABLET, EXTENDED RELEASE ORAL DAILY
Qty: 100 TAB | Refills: 3 | Status: SHIPPED | OUTPATIENT
Start: 2019-11-27 | End: 2020-01-07

## 2019-11-27 NOTE — TELEPHONE ENCOUNTER
You  You Just now (3:17 PM)      Called pt, he has K+ 20meq that he is taking once daily. Will update rx.     Routing comment      See refill encounter

## 2020-01-02 ENCOUNTER — ANTICOAGULATION VISIT (OUTPATIENT)
Dept: VASCULAR LAB | Facility: MEDICAL CENTER | Age: 70
End: 2020-01-02
Attending: INTERNAL MEDICINE
Payer: MEDICARE

## 2020-01-02 VITALS — SYSTOLIC BLOOD PRESSURE: 138 MMHG | DIASTOLIC BLOOD PRESSURE: 97 MMHG | HEART RATE: 82 BPM

## 2020-01-02 DIAGNOSIS — I48.0 PAROXYSMAL ATRIAL FIBRILLATION (HCC): ICD-10-CM

## 2020-01-02 LAB
INR BLD: 3.1 (ref 0.9–1.2)
INR PPP: 3.1 (ref 2–3.5)

## 2020-01-02 PROCEDURE — 99212 OFFICE O/P EST SF 10 MIN: CPT | Performed by: NURSE PRACTITIONER

## 2020-01-02 PROCEDURE — 85610 PROTHROMBIN TIME: CPT

## 2020-01-02 RX ORDER — MELOXICAM 7.5 MG/1
7.5 TABLET ORAL DAILY
COMMUNITY
End: 2020-01-07

## 2020-01-02 NOTE — PROGRESS NOTES
Anticoagulation Summary  As of 1/2/2020    INR goal:   2.0-3.0   TTR:   65.4 % (4.6 y)   INR used for dosing:   3.10! (1/2/2020)   Warfarin maintenance plan:   7.5 mg (5 mg x 1.5) every Wed; 5 mg (5 mg x 1) all other days   Weekly warfarin total:   37.5 mg   Plan last modified:   Rebecca Mathews ALinneaPOG (1/2/2020)   Next INR check:   2/13/2020   Target end date:       Indications    Atrial fibrillation (HCC) [I48.91]  Paroxysmal atrial fibrillation (HCC) (Resolved) [I48.0]             Anticoagulation Episode Summary     INR check location:   Anticoagulation Clinic    Preferred lab:       Send INR reminders to:       Comments:         Anticoagulation Care Providers     Provider Role Specialty Phone number    Daniel Ferrera M.D. Referring Cardiac Electrophysiology [phone]    [facility] Responsible  [phone]        Anticoagulation Patient Findings      HPI:  Mike Ferrell seen in clinic today for follow up on anticoagulation therapy in the presence of AF.   Denies any changes to current medical/health status since last appointment.   Started meloxicam for arthritic pain. Let pt know the combination with warfarin can increase his risk of GIB and to monitor closely for blood with his stools or for dark, tarry stools or coffee ground stools - he will go to the ER if he has any of these symptoms.  Denies any diet changes.   No current symptoms of bleeding or thrombosis reported.    A/P:   INR supratherapeutic. Will decrease regimen.   BP recorded in vitals.    Follow up appointment in 6 week(s) per pt's request.    Next Appointment: Thursday, Feb 13, 2020 at 9:45 am.    Rebecca SNIDER

## 2020-01-07 ENCOUNTER — OFFICE VISIT (OUTPATIENT)
Dept: CARDIOLOGY | Facility: MEDICAL CENTER | Age: 70
End: 2020-01-07
Payer: MEDICARE

## 2020-01-07 VITALS
WEIGHT: 249 LBS | OXYGEN SATURATION: 94 % | DIASTOLIC BLOOD PRESSURE: 84 MMHG | HEART RATE: 85 BPM | HEIGHT: 72 IN | SYSTOLIC BLOOD PRESSURE: 142 MMHG | BODY MASS INDEX: 33.72 KG/M2

## 2020-01-07 DIAGNOSIS — I47.29 PAROXYSMAL VENTRICULAR TACHYCARDIA (HCC): ICD-10-CM

## 2020-01-07 DIAGNOSIS — I48.0 PAROXYSMAL ATRIAL FIBRILLATION (HCC): ICD-10-CM

## 2020-01-07 DIAGNOSIS — Z79.01 ANTICOAGULANT LONG-TERM USE: ICD-10-CM

## 2020-01-07 DIAGNOSIS — I49.5 SINOATRIAL NODE DYSFUNCTION (HCC): ICD-10-CM

## 2020-01-07 DIAGNOSIS — F10.10 ALCOHOL ABUSE: ICD-10-CM

## 2020-01-07 DIAGNOSIS — Z95.810 AUTOMATIC IMPLANTABLE CARDIOVERTER-DEFIBRILLATOR IN SITU: ICD-10-CM

## 2020-01-07 DIAGNOSIS — I45.10 RBBB: ICD-10-CM

## 2020-01-07 DIAGNOSIS — E78.2 MIXED HYPERLIPIDEMIA: ICD-10-CM

## 2020-01-07 DIAGNOSIS — I10 ESSENTIAL HYPERTENSION: ICD-10-CM

## 2020-01-07 PROCEDURE — 93283 PRGRMG EVAL IMPLANTABLE DFB: CPT | Performed by: INTERNAL MEDICINE

## 2020-01-07 PROCEDURE — 99214 OFFICE O/P EST MOD 30 MIN: CPT | Mod: 25 | Performed by: INTERNAL MEDICINE

## 2020-01-07 RX ORDER — POTASSIUM CHLORIDE 750 MG/1
20 TABLET, FILM COATED, EXTENDED RELEASE ORAL
COMMUNITY
Start: 2019-12-18 | End: 2021-02-22

## 2020-01-07 RX ORDER — MELOXICAM 15 MG/1
TABLET ORAL
COMMUNITY
Start: 2019-11-21 | End: 2020-01-07

## 2020-01-07 NOTE — PROGRESS NOTES
Chief Complaint   Patient presents with   • Atrial Fibrillation     F/V DX:PAF       Subjective:   Mike Ferrell is a 69 y.o. male who presents today with history of sick sinus syndrome status post permanent pacemaker, upgrade to dual-chamber defibrillator secondary to VT. Recently seen with some increasing shortness of breath.  Some excess alcohol.  Programmed the AV delay longer to allow less pacing in the ventricle and pseudofusion.  Patient feels improved.  Hyperlipidemia with LDL in the 140s 6 months ago.  Blood pressures been stable at home less than 120.  Denies smoking.  Echocardiogram unremarkable.    Past Medical History:   Diagnosis Date   • Arthritis    • Atrial fibrillation (HCC) 4/4/2012   • High cholesterol    • Hypopotassemia 4/4/2012   • Methamphetamine use (HCC) none for many years   • Other and unspecified hyperlipidemia 4/4/2012   • Pain     shoulders   • Paroxysmal ventricular tachycardia (HCC) 4/4/2012   • Sinoatrial node dysfunction (HCC) 4/4/2012   • Syncope and collapse 4/4/2012   • Thrombocytopenia, unspecified (HCC) 4/4/2012   • Unspecified essential hypertension 4/4/2012     Past Surgical History:   Procedure Laterality Date   • AICD IMPLANT  2010   • PACEMAKER INSERTION  2009   • CHOLECYSTECTOMY  1999   • TONSILLECTOMY  1953     History reviewed. No pertinent family history.  Social History     Socioeconomic History   • Marital status:      Spouse name: Not on file   • Number of children: Not on file   • Years of education: Not on file   • Highest education level: Not on file   Occupational History   • Not on file   Social Needs   • Financial resource strain: Not on file   • Food insecurity:     Worry: Not on file     Inability: Not on file   • Transportation needs:     Medical: Not on file     Non-medical: Not on file   Tobacco Use   • Smoking status: Never Smoker   • Smokeless tobacco: Never Used   Substance and Sexual Activity   • Alcohol use: Yes     Comment: 4-5 per day    • Drug use: No   • Sexual activity: Not on file   Lifestyle   • Physical activity:     Days per week: Not on file     Minutes per session: Not on file   • Stress: Not on file   Relationships   • Social connections:     Talks on phone: Not on file     Gets together: Not on file     Attends Holiness service: Not on file     Active member of club or organization: Not on file     Attends meetings of clubs or organizations: Not on file     Relationship status: Not on file   • Intimate partner violence:     Fear of current or ex partner: Not on file     Emotionally abused: Not on file     Physically abused: Not on file     Forced sexual activity: Not on file   Other Topics Concern   • Not on file   Social History Narrative   • Not on file     Allergies   Allergen Reactions   • Lipitor [Atorvastatin]    • Sulfa Drugs Rash and Itching     Rash, itch     Outpatient Encounter Medications as of 1/7/2020   Medication Sig Dispense Refill   • potassium chloride ER (KLOR-CON) 10 MEQ tablet      • Cholecalciferol (VITAMIN D3) 5000 units Cap      • hydrochlorothiazide (MICROZIDE) 12.5 MG capsule Take 1 Cap by mouth every day. 100 Cap 3   • sotalol (BETAPACE) 120 MG tablet TAKE 1 TABLET BY MOUTH TWICE A  Tab 3   • warfarin (COUMADIN) 5 MG Tab TAKE 1 (ONE) TO 1 & 1/2 (ONE & ONE-HALF) TABLETS BY MOUTH ONCE DAILY AS DIRECTED BY THE COUMADIN CLINIC. 135 Tab 1   • amlodipine (NORVASC) 5 MG TABS Take 5 mg by mouth every day.     • benazepril (LOTENSIN) 40 MG tablet Take 40 mg by mouth every day.     • [DISCONTINUED] meloxicam (MOBIC) 15 MG tablet      • [DISCONTINUED] meloxicam (MOBIC) 7.5 MG Tab Take 7.5 mg by mouth every day.     • [DISCONTINUED] potassium chloride SA (KDUR) 20 MEQ Tab CR Take 1 Tab by mouth every day. 100 Tab 3   • [DISCONTINUED] Cholecalciferol (VITAMIN D PO) Take 4,000 Units by mouth every day at 6 PM.       No facility-administered encounter medications on file as of 1/7/2020.      ROS     Objective:   BP  142/84 (BP Location: Left arm, Patient Position: Sitting, BP Cuff Size: Large adult)   Pulse 85   Ht 1.829 m (6')   Wt 112.9 kg (249 lb)   SpO2 94%   BMI 33.77 kg/m²     Physical Exam   Constitutional: He is oriented to person, place, and time. He appears well-developed and well-nourished.   HENT:   Head: Normocephalic and atraumatic.   Eyes: EOM are normal.   Neck: Normal range of motion. Neck supple.   Cardiovascular: Normal rate, regular rhythm and intact distal pulses. Exam reveals no gallop and no friction rub.   No murmur heard.  Pulmonary/Chest: Effort normal and breath sounds normal.   Abdominal: Soft.   Musculoskeletal: Normal range of motion.         General: No edema.   Neurological: He is alert and oriented to person, place, and time.   Skin: Skin is warm and dry.   Psychiatric: He has a normal mood and affect. His behavior is normal. Judgment and thought content normal.       Assessment:     1. RBBB     2. Sinoatrial node dysfunction (HCC)     3. Alcohol abuse     4. Anticoagulant long-term use     5. Paroxysmal atrial fibrillation (HCC)     6. Essential hypertension     7. Paroxysmal ventricular tachycardia (HCC)     8. Mixed hyperlipidemia     9. Automatic implantable cardioverter-defibrillator in situ         Medical Decision Making:  Today's Assessment / Status / Plan:   1.  Dyspnea possible related to RV pacing.  Still significant amount of RV pacing but able to program longer AV delays.  Pseudofusion with improvement.  Would not upgrade at this time.   2.  Paroxysmal atrial fibrillation continue sotalol.  3.  Anticoagulation continue.  4.  Hyperlipidemia.  Repeat labs are pending in next few months.  Previous intolerance to Lipitor may require a statin.  5.  Follow-up in 6 months.  6.  Also less atrial fibrillation significant.  This also may contribute to improvement in dyspnea.

## 2020-01-27 DIAGNOSIS — I48.91 ATRIAL FIBRILLATION, UNSPECIFIED TYPE (HCC): ICD-10-CM

## 2020-01-28 RX ORDER — SOTALOL HYDROCHLORIDE 120 MG/1
TABLET ORAL
Qty: 180 TAB | Refills: 3 | Status: SHIPPED | OUTPATIENT
Start: 2020-01-28 | End: 2021-02-05 | Stop reason: SDUPTHER

## 2020-02-13 ENCOUNTER — ANTICOAGULATION VISIT (OUTPATIENT)
Dept: VASCULAR LAB | Facility: MEDICAL CENTER | Age: 70
End: 2020-02-13
Attending: INTERNAL MEDICINE
Payer: MEDICARE

## 2020-02-13 VITALS — DIASTOLIC BLOOD PRESSURE: 93 MMHG | HEART RATE: 78 BPM | SYSTOLIC BLOOD PRESSURE: 136 MMHG

## 2020-02-13 DIAGNOSIS — I48.0 PAROXYSMAL ATRIAL FIBRILLATION (HCC): ICD-10-CM

## 2020-02-13 LAB
INR BLD: 1.7 (ref 0.9–1.2)
INR PPP: 1.7 (ref 2–3.5)

## 2020-02-13 PROCEDURE — 99212 OFFICE O/P EST SF 10 MIN: CPT | Performed by: NURSE PRACTITIONER

## 2020-02-13 PROCEDURE — 85610 PROTHROMBIN TIME: CPT

## 2020-02-13 NOTE — PROGRESS NOTES
Anticoagulation Summary  As of 2020    INR goal:   2.0-3.0   TTR:   65.6 % (4.7 y)   INR used for dosin.70! (2020)   Warfarin maintenance plan:   7.5 mg (5 mg x 1.5) every Sun, Wed; 5 mg (5 mg x 1) all other days   Weekly warfarin total:   40 mg   Plan last modified:   DIANE PikePOG (2020)   Next INR check:   3/12/2020   Target end date:       Indications    Atrial fibrillation (HCC) [I48.91]  Paroxysmal atrial fibrillation (HCC) (Resolved) [I48.0]             Anticoagulation Episode Summary     INR check location:   Anticoagulation Clinic    Preferred lab:       Send INR reminders to:       Comments:         Anticoagulation Care Providers     Provider Role Specialty Phone number    Daniel Ferrera M.D. Referring Cardiac Electrophysiology [phone]    [facility] Responsible  [phone]        Anticoagulation Patient Findings      HPI:  Mike Ferrlel seen in clinic today for follow up on anticoagulation therapy in the presence of AF.   Denies any changes to current medical/health status since last appointment.   He is eating more greens and plans to stay consistent. Denies any medication changes.   No current symptoms of bleeding or thrombosis reported.    A/P:   INR subtherapeutic. CHADS 2 score = 1.   Will increase regimen.   BP recorded in vitals.    Follow up appointment in 4 week(s) per pt's request.    Next Appointment:  at 9:45 am.    Rebecca SNIDER

## 2020-03-12 ENCOUNTER — ANTICOAGULATION VISIT (OUTPATIENT)
Dept: VASCULAR LAB | Facility: MEDICAL CENTER | Age: 70
End: 2020-03-12
Attending: INTERNAL MEDICINE
Payer: MEDICARE

## 2020-03-12 VITALS — HEART RATE: 78 BPM | DIASTOLIC BLOOD PRESSURE: 83 MMHG | SYSTOLIC BLOOD PRESSURE: 143 MMHG

## 2020-03-12 DIAGNOSIS — I48.0 PAROXYSMAL ATRIAL FIBRILLATION (HCC): ICD-10-CM

## 2020-03-12 LAB
INR BLD: 4.4 (ref 0.9–1.2)
INR PPP: 4.4 (ref 2–3.5)

## 2020-03-12 PROCEDURE — 99212 OFFICE O/P EST SF 10 MIN: CPT | Performed by: NURSE PRACTITIONER

## 2020-03-12 PROCEDURE — 85610 PROTHROMBIN TIME: CPT | Mod: QW

## 2020-03-12 NOTE — PROGRESS NOTES
Anticoagulation Summary  As of 3/12/2020    INR goal:   2.0-3.0   TTR:   65.1 % (4.8 y)   INR used for dosin.40! (3/12/2020)   Warfarin maintenance plan:   7.5 mg (5 mg x 1.5) every Wed; 5 mg (5 mg x 1) all other days   Weekly warfarin total:   37.5 mg   Plan last modified:   DIANE PikePOG (3/12/2020)   Next INR check:   3/26/2020   Target end date:       Indications    Atrial fibrillation (HCC) [I48.91]  Paroxysmal atrial fibrillation (HCC) (Resolved) [I48.0]             Anticoagulation Episode Summary     INR check location:   Anticoagulation Clinic    Preferred lab:       Send INR reminders to:       Comments:         Anticoagulation Care Providers     Provider Role Specialty Phone number    Daniel Ferrera M.D. Referring Cardiac Electrophysiology [phone]    [facility] Responsible  [phone]        Anticoagulation Patient Findings      HPI:  Mike Ferrell seen in clinic today for follow up on anticoagulation therapy in the presence of AF.   Denies any changes to current medical/health status since last appointment.   Denies any medication or diet changes.   No current symptoms of bleeding or thrombosis reported.    A/P:   INR supratherapeutic.   HOLD one dose then began decrease regimen.   BP recorded in vitals.    Follow up appointment in 2 week(s).    Next Appointment:  at 9:15 am.    Rebecca SNIDER

## 2020-03-13 ENCOUNTER — HOSPITAL ENCOUNTER (OUTPATIENT)
Dept: LAB | Facility: MEDICAL CENTER | Age: 70
End: 2020-03-13
Attending: FAMILY MEDICINE
Payer: MEDICARE

## 2020-03-13 LAB
ALBUMIN SERPL BCP-MCNC: 4.6 G/DL (ref 3.2–4.9)
ALBUMIN/GLOB SERPL: 1.7 G/DL
ALP SERPL-CCNC: 76 U/L (ref 30–99)
ALT SERPL-CCNC: 20 U/L (ref 2–50)
ANION GAP SERPL CALC-SCNC: 9 MMOL/L (ref 7–16)
AST SERPL-CCNC: 26 U/L (ref 12–45)
BILIRUB SERPL-MCNC: 0.9 MG/DL (ref 0.1–1.5)
BUN SERPL-MCNC: 19 MG/DL (ref 8–22)
CALCIUM SERPL-MCNC: 10.4 MG/DL (ref 8.5–10.5)
CHLORIDE SERPL-SCNC: 103 MMOL/L (ref 96–112)
CHOLEST SERPL-MCNC: 220 MG/DL (ref 100–199)
CO2 SERPL-SCNC: 27 MMOL/L (ref 20–33)
CREAT SERPL-MCNC: 0.81 MG/DL (ref 0.5–1.4)
FASTING STATUS PATIENT QL REPORTED: NORMAL
GLOBULIN SER CALC-MCNC: 2.7 G/DL (ref 1.9–3.5)
GLUCOSE SERPL-MCNC: 100 MG/DL (ref 65–99)
HDLC SERPL-MCNC: 53 MG/DL
LDLC SERPL CALC-MCNC: 143 MG/DL
NT-PROBNP SERPL IA-MCNC: 233 PG/ML (ref 0–125)
POTASSIUM SERPL-SCNC: 3.9 MMOL/L (ref 3.6–5.5)
PROT SERPL-MCNC: 7.3 G/DL (ref 6–8.2)
SODIUM SERPL-SCNC: 139 MMOL/L (ref 135–145)
TRIGL SERPL-MCNC: 120 MG/DL (ref 0–149)

## 2020-03-13 PROCEDURE — 83880 ASSAY OF NATRIURETIC PEPTIDE: CPT

## 2020-03-13 PROCEDURE — 80061 LIPID PANEL: CPT

## 2020-03-13 PROCEDURE — 36415 COLL VENOUS BLD VENIPUNCTURE: CPT

## 2020-03-13 PROCEDURE — 80053 COMPREHEN METABOLIC PANEL: CPT

## 2020-03-26 ENCOUNTER — ANTICOAGULATION VISIT (OUTPATIENT)
Dept: VASCULAR LAB | Facility: MEDICAL CENTER | Age: 70
End: 2020-03-26
Attending: INTERNAL MEDICINE
Payer: MEDICARE

## 2020-03-26 VITALS — SYSTOLIC BLOOD PRESSURE: 148 MMHG | HEART RATE: 76 BPM | DIASTOLIC BLOOD PRESSURE: 84 MMHG

## 2020-03-26 DIAGNOSIS — I48.0 PAROXYSMAL ATRIAL FIBRILLATION (HCC): ICD-10-CM

## 2020-03-26 LAB — INR PPP: 2.1 (ref 2–3.5)

## 2020-03-26 PROCEDURE — 99211 OFF/OP EST MAY X REQ PHY/QHP: CPT | Performed by: NURSE PRACTITIONER

## 2020-03-26 PROCEDURE — 85610 PROTHROMBIN TIME: CPT

## 2020-03-26 NOTE — PROGRESS NOTES
Anticoagulation Summary  As of 3/26/2020    INR goal:   2.0-3.0   TTR:   64.9 % (4.8 y)   INR used for dosin.10 (3/26/2020)   Warfarin maintenance plan:   7.5 mg (5 mg x 1.5) every Wed; 5 mg (5 mg x 1) all other days   Weekly warfarin total:   37.5 mg   Plan last modified:   Rebecca Mathews ALinneaPLinneaNLinnea (3/12/2020)   Next INR check:      Target end date:       Indications    Atrial fibrillation (HCC) [I48.91]  Paroxysmal atrial fibrillation (HCC) (Resolved) [I48.0]             Anticoagulation Episode Summary     INR check location:   Anticoagulation Clinic    Preferred lab:       Send INR reminders to:       Comments:         Anticoagulation Care Providers     Provider Role Specialty Phone number    Daniel Ferrera M.D. Referring Cardiac Electrophysiology [phone]    [facility] Responsible  [phone]        Anticoagulation Patient Findings      HPI:  Mike Ferrell seen in clinic today for follow up on anticoagulation therapy in the presence of AF.   Denies any changes to current medical/health status since last appointment.   Denies any medication or diet changes.   No current symptoms of bleeding or thrombosis reported.    A/P:   INR therapeutic.   Continue current regimen.   BP recorded in vitals.    Follow up appointment in 6 week(s).    Next Appointment: Thursday, May 7, 2020 at 9:45 am.     Rebecca SNIDER

## 2020-03-27 LAB — INR BLD: 2.1 (ref 0.9–1.2)

## 2020-04-02 ENCOUNTER — TELEPHONE (OUTPATIENT)
Dept: CARDIOLOGY | Facility: MEDICAL CENTER | Age: 70
End: 2020-04-02

## 2020-04-02 NOTE — TELEPHONE ENCOUNTER
DS    Patient was prescribed Rosuvastatin by Dr. Hall. They want to make sure that it would be okay to take with the other medications DS prescribed. Please call the PT back at [phone].    Thank you,  Letitia LAMB

## 2020-04-02 NOTE — TELEPHONE ENCOUNTER
I let patient know it was ok.  He has never been on them and therefore no problems.  No problems with pacemaker + statin (his major concern).

## 2020-05-07 ENCOUNTER — ANTICOAGULATION VISIT (OUTPATIENT)
Dept: VASCULAR LAB | Facility: MEDICAL CENTER | Age: 70
End: 2020-05-07
Attending: INTERNAL MEDICINE
Payer: MEDICARE

## 2020-05-07 DIAGNOSIS — I48.0 PAROXYSMAL ATRIAL FIBRILLATION (HCC): ICD-10-CM

## 2020-05-07 LAB — INR PPP: 2.2 (ref 2–3.5)

## 2020-05-07 PROCEDURE — 85610 PROTHROMBIN TIME: CPT

## 2020-05-07 PROCEDURE — 99211 OFF/OP EST MAY X REQ PHY/QHP: CPT

## 2020-05-07 NOTE — PROGRESS NOTES
Anticoagulation Summary  As of 2020    INR goal:   2.0-3.0   TTR:   65.7 % (4.9 y)   INR used for dosin.20 (2020)   Warfarin maintenance plan:   7.5 mg (5 mg x 1.5) every Wed; 5 mg (5 mg x 1) all other days   Weekly warfarin total:   37.5 mg   Plan last modified:   ILEANA Pike (3/12/2020)   Next INR check:   2020   Target end date:       Indications    Atrial fibrillation (HCC) [I48.91]  Paroxysmal atrial fibrillation (HCC) (Resolved) [I48.0]             Anticoagulation Episode Summary     INR check location:   Anticoagulation Clinic    Preferred lab:       Send INR reminders to:       Comments:         Anticoagulation Care Providers     Provider Role Specialty Phone number    Daniel Ferrera M.D. Referring Cardiac Electrophysiology [phone]    [facility] Responsible  [phone]        Anticoagulation Patient Findings  Patient Findings     Negatives:   Signs/symptoms of thrombosis, Signs/symptoms of bleeding, Laboratory test error suspected, Change in health, Change in alcohol use, Change in activity, Upcoming invasive procedure, Emergency department visit, Upcoming dental procedure, Missed doses, Extra doses, Change in medications, Change in diet/appetite, Hospital admission, Bruising, Other complaints              History of Present Illness: follow up appointment for chronic anticoagulation with the high risk medication, warfarin for atrial fibrillation    Pt remains therapeutic today. Continue current dosing regimen.  Follow up in 8 weeks, to reduce the risk of adverse events related to this high risk medication, warfarin.  Pt declines vitals today    Rissa Mills, Clinical Pharmacist

## 2020-06-29 ENCOUNTER — OFFICE VISIT (OUTPATIENT)
Dept: CARDIOLOGY | Facility: MEDICAL CENTER | Age: 70
End: 2020-06-29
Payer: MEDICARE

## 2020-06-29 VITALS
DIASTOLIC BLOOD PRESSURE: 80 MMHG | HEART RATE: 90 BPM | WEIGHT: 245 LBS | HEIGHT: 72 IN | SYSTOLIC BLOOD PRESSURE: 140 MMHG | OXYGEN SATURATION: 94 % | BODY MASS INDEX: 33.18 KG/M2

## 2020-06-29 DIAGNOSIS — Z79.01 ANTICOAGULANT LONG-TERM USE: ICD-10-CM

## 2020-06-29 DIAGNOSIS — Z98.890 S/P CARDIAC CATH: ICD-10-CM

## 2020-06-29 DIAGNOSIS — I48.0 PAROXYSMAL ATRIAL FIBRILLATION (HCC): ICD-10-CM

## 2020-06-29 DIAGNOSIS — Z95.810 AUTOMATIC IMPLANTABLE CARDIOVERTER-DEFIBRILLATOR IN SITU: ICD-10-CM

## 2020-06-29 DIAGNOSIS — I45.10 RIGHT BUNDLE BRANCH BLOCK: ICD-10-CM

## 2020-06-29 DIAGNOSIS — I47.29 PAROXYSMAL VENTRICULAR TACHYCARDIA (HCC): ICD-10-CM

## 2020-06-29 DIAGNOSIS — E78.2 MIXED HYPERLIPIDEMIA: ICD-10-CM

## 2020-06-29 DIAGNOSIS — F10.10 ALCOHOL ABUSE: ICD-10-CM

## 2020-06-29 DIAGNOSIS — I49.5 SINOATRIAL NODE DYSFUNCTION (HCC): ICD-10-CM

## 2020-06-29 PROCEDURE — 93283 PRGRMG EVAL IMPLANTABLE DFB: CPT | Performed by: INTERNAL MEDICINE

## 2020-06-29 PROCEDURE — 99214 OFFICE O/P EST MOD 30 MIN: CPT | Mod: 25 | Performed by: INTERNAL MEDICINE

## 2020-06-29 RX ORDER — ROSUVASTATIN CALCIUM 5 MG/1
10 TABLET, COATED ORAL
Qty: 60 TAB | Refills: 3
Start: 2020-06-29 | End: 2021-02-11

## 2020-06-29 ASSESSMENT — FIBROSIS 4 INDEX: FIB4 SCORE: 2.66

## 2020-06-29 NOTE — PROGRESS NOTES
Chief Complaint   Patient presents with   • Atrial Fibrillation     Follow up       Subjective:   Mike Ferrell is a 69 y.o. male who presents today with history of clinical VT and previous sick sinus syndrome.  Pacemaker upgraded to defibrillator.  Feeling well.  Avoiding RV pacing.  Baseline right bundle branch block and first-degree.  He has cut down his alcohol and has had significantly less atrial fibrillation.  On sotalol.  On anticoagulation.  He recently started on a statin for hyperlipidemia.  No chest pain or shortness of breath.    Past Medical History:   Diagnosis Date   • Arthritis    • Atrial fibrillation (HCC) 4/4/2012   • Methamphetamine use (HCC) none for many years   • Pain     shoulders   • Paroxysmal ventricular tachycardia (HCC) 4/4/2012   • Sinoatrial node dysfunction (HCC) 4/4/2012   • Syncope and collapse 4/4/2012     Past Surgical History:   Procedure Laterality Date   • AICD IMPLANT  2010   • PACEMAKER INSERTION  2009   • CHOLECYSTECTOMY  1999   • TONSILLECTOMY  1953     History reviewed. No pertinent family history.  Social History     Socioeconomic History   • Marital status:      Spouse name: Not on file   • Number of children: Not on file   • Years of education: Not on file   • Highest education level: Not on file   Occupational History   • Not on file   Social Needs   • Financial resource strain: Not on file   • Food insecurity     Worry: Not on file     Inability: Not on file   • Transportation needs     Medical: Not on file     Non-medical: Not on file   Tobacco Use   • Smoking status: Never Smoker   • Smokeless tobacco: Never Used   Substance and Sexual Activity   • Alcohol use: Yes     Comment: 4-5 per day   • Drug use: No   • Sexual activity: Not on file   Lifestyle   • Physical activity     Days per week: Not on file     Minutes per session: Not on file   • Stress: Not on file   Relationships   • Social connections     Talks on phone: Not on file     Gets together: Not  on file     Attends Denominational service: Not on file     Active member of club or organization: Not on file     Attends meetings of clubs or organizations: Not on file     Relationship status: Not on file   • Intimate partner violence     Fear of current or ex partner: Not on file     Emotionally abused: Not on file     Physically abused: Not on file     Forced sexual activity: Not on file   Other Topics Concern   • Not on file   Social History Narrative   • Not on file     Allergies   Allergen Reactions   • Lipitor [Atorvastatin]    • Sulfa Drugs Rash and Itching     Rash, itch     Outpatient Encounter Medications as of 6/29/2020   Medication Sig Dispense Refill   • rosuvastatin (CRESTOR) 5 MG Tab Take 2 Tabs by mouth every 48 hours. 60 Tab 3   • sotalol (BETAPACE) 120 MG tablet TAKE 1 TABLET BY MOUTH TWICE A  Tab 3   • potassium chloride ER (KLOR-CON) 10 MEQ tablet Take 20 mEq by mouth.     • Cholecalciferol (VITAMIN D3) 5000 units Cap      • hydrochlorothiazide (MICROZIDE) 12.5 MG capsule Take 1 Cap by mouth every day. 100 Cap 3   • warfarin (COUMADIN) 5 MG Tab TAKE 1 (ONE) TO 1 & 1/2 (ONE & ONE-HALF) TABLETS BY MOUTH ONCE DAILY AS DIRECTED BY THE COUMADIN CLINIC. 135 Tab 1   • amlodipine (NORVASC) 5 MG TABS Take 5 mg by mouth every day.     • benazepril (LOTENSIN) 40 MG tablet Take 40 mg by mouth every day.       No facility-administered encounter medications on file as of 6/29/2020.      ROS     Objective:   /80 (BP Location: Right arm, Patient Position: Sitting, BP Cuff Size: Adult)   Pulse 90   Ht 1.829 m (6')   Wt 111.1 kg (245 lb)   SpO2 94%   BMI 33.23 kg/m²     Physical Exam   Constitutional: He is oriented to person, place, and time. He appears well-developed and well-nourished.   HENT:   Head: Normocephalic and atraumatic.   Eyes: EOM are normal.   Neck: Normal range of motion. Neck supple.   Cardiovascular: Normal rate, regular rhythm and intact distal pulses. Exam reveals no gallop and  no friction rub.   No murmur heard.  Pulmonary/Chest: Effort normal and breath sounds normal.   Abdominal: Soft.   Musculoskeletal: Normal range of motion.         General: No edema.   Neurological: He is alert and oriented to person, place, and time.   Skin: Skin is warm and dry.   Psychiatric: He has a normal mood and affect. His behavior is normal. Judgment and thought content normal.       Assessment:     1. Automatic implantable cardioverter-defibrillator in situ     2. RBBB     3. S/P cardiac cath     4. Paroxysmal atrial fibrillation (HCC)     5. Alcohol abuse     6. Anticoagulant long-term use     7. Sinoatrial node dysfunction (HCC)     8. Paroxysmal ventricular tachycardia (HCC)     9. Mixed hyperlipidemia         Medical Decision Making:  Today's Assessment / Status / Plan:   1.  Hypertension continue current regimen.  2.  Hyperlipidemia continue statins.  3.  VT and atrial fibrillation continue sotalol.  4.  Anticoagulation.  5.  Alcohol use continue to decrease.  6.  AICD normal function.  7.  Follow-up with me in 6 months.

## 2020-07-02 ENCOUNTER — ANTICOAGULATION VISIT (OUTPATIENT)
Dept: VASCULAR LAB | Facility: MEDICAL CENTER | Age: 70
End: 2020-07-02
Attending: INTERNAL MEDICINE
Payer: MEDICARE

## 2020-07-02 DIAGNOSIS — I48.0 PAROXYSMAL ATRIAL FIBRILLATION (HCC): ICD-10-CM

## 2020-07-02 LAB — INR PPP: 2.7 (ref 2–3.5)

## 2020-07-02 PROCEDURE — 85610 PROTHROMBIN TIME: CPT

## 2020-07-02 PROCEDURE — 99211 OFF/OP EST MAY X REQ PHY/QHP: CPT

## 2020-07-02 NOTE — PROGRESS NOTES
Anticoagulation Summary  As of 2020    INR goal:   2.0-3.0   TTR:   66.8 % (5.1 y)   INR used for dosin.70 (2020)   Warfarin maintenance plan:   7.5 mg (5 mg x 1.5) every Wed; 5 mg (5 mg x 1) all other days   Weekly warfarin total:   37.5 mg   Plan last modified:   ILEANA Pike (3/12/2020)   Next INR check:   2020   Target end date:       Indications    Atrial fibrillation (HCC) [I48.91]             Anticoagulation Episode Summary     INR check location:   Anticoagulation Clinic    Preferred lab:       Send INR reminders to:       Comments:         Anticoagulation Care Providers     Provider Role Specialty Phone number    Daniel Ferrera M.D. Referring Cardiac Electrophysiology [phone]    [facility] Responsible  [phone]        Anticoagulation Patient Findings      HPI:  Mike Ferrell seen in clinic today, on anticoagulation therapy with warfarin for Afib.   Changes to current medical/health status since last appt: none  Denies signs/symptoms of bleeding and/or thrombosis since the last appt.    Denies any interval changes to diet  Denies any interval changes to medications since last appt.   Denies any complications or cost restrictions with current therapy.   Confirmed dosing regimen.     A/P   INR  therapeutic.   Pt is to continue with current warfarin dosing regimen.     Follow up appointment in 8 week(s).    Jose Arvizu, CeceliaD

## 2020-08-19 ENCOUNTER — HOSPITAL ENCOUNTER (OUTPATIENT)
Dept: LAB | Facility: MEDICAL CENTER | Age: 70
End: 2020-08-19
Attending: FAMILY MEDICINE
Payer: MEDICARE

## 2020-08-19 LAB
ALBUMIN SERPL BCP-MCNC: 4.9 G/DL (ref 3.2–4.9)
ALBUMIN/GLOB SERPL: 2 G/DL
ALP SERPL-CCNC: 66 U/L (ref 30–99)
ALT SERPL-CCNC: 21 U/L (ref 2–50)
ANION GAP SERPL CALC-SCNC: 14 MMOL/L (ref 7–16)
AST SERPL-CCNC: 24 U/L (ref 12–45)
BILIRUB SERPL-MCNC: 1 MG/DL (ref 0.1–1.5)
BUN SERPL-MCNC: 21 MG/DL (ref 8–22)
CALCIUM SERPL-MCNC: 9.7 MG/DL (ref 8.5–10.5)
CHLORIDE SERPL-SCNC: 104 MMOL/L (ref 96–112)
CHOLEST SERPL-MCNC: 218 MG/DL (ref 100–199)
CO2 SERPL-SCNC: 24 MMOL/L (ref 20–33)
CREAT SERPL-MCNC: 0.93 MG/DL (ref 0.5–1.4)
FASTING STATUS PATIENT QL REPORTED: NORMAL
GLOBULIN SER CALC-MCNC: 2.4 G/DL (ref 1.9–3.5)
GLUCOSE SERPL-MCNC: 93 MG/DL (ref 65–99)
HDLC SERPL-MCNC: 62 MG/DL
LDLC SERPL CALC-MCNC: 108 MG/DL
POTASSIUM SERPL-SCNC: 3.8 MMOL/L (ref 3.6–5.5)
PROT SERPL-MCNC: 7.3 G/DL (ref 6–8.2)
SODIUM SERPL-SCNC: 142 MMOL/L (ref 135–145)
TRIGL SERPL-MCNC: 238 MG/DL (ref 0–149)

## 2020-08-19 PROCEDURE — 36415 COLL VENOUS BLD VENIPUNCTURE: CPT

## 2020-08-19 PROCEDURE — 80061 LIPID PANEL: CPT

## 2020-08-19 PROCEDURE — 80053 COMPREHEN METABOLIC PANEL: CPT

## 2020-08-27 ENCOUNTER — ANTICOAGULATION VISIT (OUTPATIENT)
Dept: VASCULAR LAB | Facility: MEDICAL CENTER | Age: 70
End: 2020-08-27
Attending: INTERNAL MEDICINE
Payer: MEDICARE

## 2020-08-27 DIAGNOSIS — I48.91 ATRIAL FIBRILLATION, UNSPECIFIED TYPE (HCC): ICD-10-CM

## 2020-08-27 LAB
INR BLD: 6.6 (ref 0.9–1.2)
INR PPP: 6.6 (ref 2–3.5)

## 2020-08-27 PROCEDURE — 99212 OFFICE O/P EST SF 10 MIN: CPT | Performed by: NURSE PRACTITIONER

## 2020-08-27 PROCEDURE — 85610 PROTHROMBIN TIME: CPT

## 2020-08-27 NOTE — PROGRESS NOTES
Anticoagulation Summary  As of 2020    INR goal:  2.0-3.0   TTR:  65.0 % (5.2 y)   INR used for dosin.60 (2020)   Warfarin maintenance plan:  7.5 mg (5 mg x 1.5) every Wed; 5 mg (5 mg x 1) all other days   Weekly warfarin total:  37.5 mg   Plan last modified:  Rebecca Mathews ALinneaPOG (3/12/2020)   Next INR check:  2020   Target end date:  Indefinite    Indications    Atrial fibrillation (HCC) [I48.91]             Anticoagulation Episode Summary     INR check location:  Anticoagulation Clinic    Preferred lab:      Send INR reminders to:      Comments:        Anticoagulation Care Providers     Provider Role Specialty Phone number    Daniel Ferrera M.D. Referring Cardiac Electrophysiology [phone]    [facility] Responsible  [phone]        Anticoagulation Patient Findings      HPI:  Mike Ferrell seen in clinic today for follow up on anticoagulation therapy in the presence of AF.   Denies any changes to current medical/health status since last appointment.   Denies any medication or diet changes.   No current symptoms of bleeding or thrombosis reported.  Under a lot of stress this week. Had more alcohol than his usual.    A/P:   INR supratherapeutic.   HOLD two doses and will decrease Saturday's dose. Pt then to continue current regimen. He will be extra cautious about avoiding falls and monitoring for bleeding. He knows ETOH can increase his risk of bleeding and to avoid.  BP recorded in vitals.    Follow up appointment in 1 week(s).    Next Appointment:  at10:15 am.    Rebecca SNIDER

## 2020-09-02 ENCOUNTER — ANTICOAGULATION VISIT (OUTPATIENT)
Dept: VASCULAR LAB | Facility: MEDICAL CENTER | Age: 70
End: 2020-09-02
Attending: INTERNAL MEDICINE
Payer: MEDICARE

## 2020-09-02 DIAGNOSIS — I48.91 ATRIAL FIBRILLATION, UNSPECIFIED TYPE (HCC): ICD-10-CM

## 2020-09-02 LAB — INR PPP: 3.3 (ref 2–3.5)

## 2020-09-02 PROCEDURE — 99212 OFFICE O/P EST SF 10 MIN: CPT | Performed by: NURSE PRACTITIONER

## 2020-09-02 PROCEDURE — 85610 PROTHROMBIN TIME: CPT

## 2020-09-02 NOTE — PROGRESS NOTES
Anticoagulation Summary  As of 9/2/2020    INR goal:  2.0-3.0   TTR:  64.8 % (5.2 y)   INR used for dosing:  3.30 (9/2/2020)   Warfarin maintenance plan:  2.5 mg (5 mg x 0.5) every Fri; 5 mg (5 mg x 1) all other days   Weekly warfarin total:  32.5 mg   Plan last modified:  DIANE PikePOG (9/2/2020)   Next INR check:  9/16/2020   Target end date:  Indefinite    Indications    Atrial fibrillation (HCC) [I48.91]             Anticoagulation Episode Summary     INR check location:  Anticoagulation Clinic    Preferred lab:      Send INR reminders to:      Comments:        Anticoagulation Care Providers     Provider Role Specialty Phone number    Daniel Ferrera M.D. Referring Cardiac Electrophysiology [phone]    [facility] Responsible  [phone]        Anticoagulation Patient Findings      HPI:  Mike Ferrell seen in clinic today for follow up on anticoagulation therapy in the presence of AF.   Denies any changes to current medical/health status since last appointment.   Denies any medication or diet changes.   No current symptoms of bleeding or thrombosis reported.    A/P:   INR supratherapeutic despite two dose holds.   HOLD tonight and began reduced regimen.     Follow up appointment in 2 week(s).    Next Appointment: Wednesday, Sept 16, 2020 at 9:45 am.    Rebecca SNIDER

## 2020-09-03 LAB — INR BLD: 3.3 (ref 0.9–1.2)

## 2020-09-16 ENCOUNTER — ANTICOAGULATION VISIT (OUTPATIENT)
Dept: VASCULAR LAB | Facility: MEDICAL CENTER | Age: 70
End: 2020-09-16
Attending: INTERNAL MEDICINE
Payer: MEDICARE

## 2020-09-16 VITALS — HEART RATE: 85 BPM | SYSTOLIC BLOOD PRESSURE: 125 MMHG | DIASTOLIC BLOOD PRESSURE: 48 MMHG

## 2020-09-16 DIAGNOSIS — I48.91 ATRIAL FIBRILLATION, UNSPECIFIED TYPE (HCC): ICD-10-CM

## 2020-09-16 LAB
INR BLD: 3 (ref 0.9–1.2)
INR PPP: 3 (ref 2–3.5)

## 2020-09-16 PROCEDURE — 99212 OFFICE O/P EST SF 10 MIN: CPT | Performed by: NURSE PRACTITIONER

## 2020-09-16 PROCEDURE — 85610 PROTHROMBIN TIME: CPT

## 2020-09-16 NOTE — PROGRESS NOTES
Anticoagulation Summary  As of 9/16/2020    INR goal:  2.0-3.0   TTR:  64.4 % (5.3 y)   INR used for dosing:  3.00 (9/16/2020)   Warfarin maintenance plan:  2.5 mg (5 mg x 0.5) every Mon, Fri; 5 mg (5 mg x 1) all other days   Weekly warfarin total:  30 mg   Plan last modified:  Rebecca Mathews ALinneaPOG (9/16/2020)   Next INR check:  10/7/2020   Target end date:  Indefinite    Indications    Atrial fibrillation (HCC) [I48.91]             Anticoagulation Episode Summary     INR check location:  Anticoagulation Clinic    Preferred lab:      Send INR reminders to:      Comments:        Anticoagulation Care Providers     Provider Role Specialty Phone number    Daniel Ferrera M.D. Referring Cardiac Electrophysiology [phone]    [facility] Responsible  [phone]        Anticoagulation Patient Findings      HPI:  Mike Ferrell seen in clinic today for follow up on anticoagulation therapy in the presence of AF.   Denies any changes to current medical/health status since last appointment.   Denies any medication or diet changes.   No current symptoms of bleeding or thrombosis reported.    A/P:   INR therapeutic. INR trending high. Will decrease regimen a bit more.   BP recorded in vitals.    Follow up appointment in 3 week(s) per pt's request.    Next Appointment: Wednesday, Oct 7, 2020 at 9:45 am.    Rebecca SNIDER

## 2020-10-07 ENCOUNTER — ANTICOAGULATION VISIT (OUTPATIENT)
Dept: VASCULAR LAB | Facility: MEDICAL CENTER | Age: 70
End: 2020-10-07
Attending: INTERNAL MEDICINE
Payer: MEDICARE

## 2020-10-07 VITALS — DIASTOLIC BLOOD PRESSURE: 40 MMHG | HEART RATE: 66 BPM | SYSTOLIC BLOOD PRESSURE: 112 MMHG

## 2020-10-07 DIAGNOSIS — I48.91 ATRIAL FIBRILLATION, UNSPECIFIED TYPE (HCC): ICD-10-CM

## 2020-10-07 LAB
INR BLD: 1.7 (ref 0.9–1.2)
INR PPP: 1.7 (ref 2–3.5)

## 2020-10-07 PROCEDURE — 99212 OFFICE O/P EST SF 10 MIN: CPT | Performed by: NURSE PRACTITIONER

## 2020-10-07 PROCEDURE — 85610 PROTHROMBIN TIME: CPT

## 2020-10-07 NOTE — PROGRESS NOTES
Anticoagulation Summary  As of 10/7/2020    INR goal:  2.0-3.0   TTR:  64.5 % (5.3 y)   INR used for dosin.70 (10/7/2020)   Warfarin maintenance plan:  2.5 mg (5 mg x 0.5) every Mon, Fri; 5 mg (5 mg x 1) all other days   Weekly warfarin total:  30 mg   Plan last modified:  Rebecca Mathews ALinneaPLinneaNLinnea (2020)   Next INR check:     Target end date:  Indefinite    Indications    Atrial fibrillation (HCC) [I48.91]             Anticoagulation Episode Summary     INR check location:  Anticoagulation Clinic    Preferred lab:      Send INR reminders to:      Comments:        Anticoagulation Care Providers     Provider Role Specialty Phone number    Daniel Ferrera M.D. Referring Cardiac Electrophysiology [phone]    [facility] Responsible  [phone]        Anticoagulation Patient Findings      HPI:  Mike Ferrell seen in clinic today for follow up on anticoagulation therapy in the presence of AF.   Denies any changes to current medical/health status since last appointment.   Denies any medication or diet changes.   No current symptoms of bleeding or thrombosis reported.    A/P:   INR subtherapeutic. Low CHADS 2 score.   Will increase regimen.   BP recorded in vitals.    Follow up appointment in 3 week(s) per pt's request.    Next Appointment: Wednesday, Oct 28, 2020 at 9:45 am.    Rebecca SNIDER

## 2020-10-12 ENCOUNTER — OFFICE VISIT (OUTPATIENT)
Dept: CARDIOLOGY | Facility: MEDICAL CENTER | Age: 70
End: 2020-10-12
Payer: MEDICARE

## 2020-10-12 VITALS
BODY MASS INDEX: 31.42 KG/M2 | OXYGEN SATURATION: 94 % | DIASTOLIC BLOOD PRESSURE: 74 MMHG | SYSTOLIC BLOOD PRESSURE: 116 MMHG | WEIGHT: 232 LBS | HEART RATE: 88 BPM | HEIGHT: 72 IN

## 2020-10-12 DIAGNOSIS — I49.5 SINOATRIAL NODE DYSFUNCTION (HCC): ICD-10-CM

## 2020-10-12 DIAGNOSIS — F10.10 ALCOHOL ABUSE: ICD-10-CM

## 2020-10-12 DIAGNOSIS — E78.2 MIXED HYPERLIPIDEMIA: ICD-10-CM

## 2020-10-12 DIAGNOSIS — R06.02 SHORTNESS OF BREATH: ICD-10-CM

## 2020-10-12 DIAGNOSIS — Z79.01 ANTICOAGULANT LONG-TERM USE: ICD-10-CM

## 2020-10-12 DIAGNOSIS — I48.91 ATRIAL FIBRILLATION, UNSPECIFIED TYPE (HCC): ICD-10-CM

## 2020-10-12 DIAGNOSIS — R06.09 DYSPNEA ON EXERTION: ICD-10-CM

## 2020-10-12 DIAGNOSIS — I47.29 PAROXYSMAL VENTRICULAR TACHYCARDIA (HCC): ICD-10-CM

## 2020-10-12 DIAGNOSIS — I10 ESSENTIAL HYPERTENSION: ICD-10-CM

## 2020-10-12 DIAGNOSIS — Z95.810 AUTOMATIC IMPLANTABLE CARDIOVERTER-DEFIBRILLATOR IN SITU: ICD-10-CM

## 2020-10-12 PROBLEM — R06.00 DYSPNEA: Status: ACTIVE | Noted: 2020-10-12

## 2020-10-12 PROCEDURE — 99214 OFFICE O/P EST MOD 30 MIN: CPT | Performed by: INTERNAL MEDICINE

## 2020-10-12 PROCEDURE — 93283 PRGRMG EVAL IMPLANTABLE DFB: CPT | Performed by: INTERNAL MEDICINE

## 2020-10-12 ASSESSMENT — FIBROSIS 4 INDEX: FIB4 SCORE: 2.43

## 2020-10-12 NOTE — PROGRESS NOTES
Chief Complaint   Patient presents with   • Atrial Fibrillation     F/V DX:PAF       Subjective:   Mike Ferrell is a 70 y.o. male who presents today with history of sick sinus syndrome status post permanent pacemaker.  Upgrade to AICD for VT. normal coronaries.  Normal echocardiogram a year ago.  Mild dyspnea on exertion worsened with smoke.  Has not seen pulmonary.  Dyspnea improved with albuterol.  Ventricular pacing at 18 %.  No shocks from the device.  No atrial fibrillation.    Past Medical History:   Diagnosis Date   • Arthritis    • Atrial fibrillation (HCC) 4/4/2012   • Methamphetamine use (HCC) none for many years   • Pain     shoulders   • Paroxysmal ventricular tachycardia (HCC) 4/4/2012   • Sinoatrial node dysfunction (HCC) 4/4/2012   • Syncope and collapse 4/4/2012     Past Surgical History:   Procedure Laterality Date   • AICD IMPLANT  2010   • PACEMAKER INSERTION  2009   • CHOLECYSTECTOMY  1999   • TONSILLECTOMY  1953     No family history on file.  Social History     Socioeconomic History   • Marital status:      Spouse name: Not on file   • Number of children: Not on file   • Years of education: Not on file   • Highest education level: Not on file   Occupational History   • Not on file   Social Needs   • Financial resource strain: Not on file   • Food insecurity     Worry: Not on file     Inability: Not on file   • Transportation needs     Medical: Not on file     Non-medical: Not on file   Tobacco Use   • Smoking status: Never Smoker   • Smokeless tobacco: Never Used   Substance and Sexual Activity   • Alcohol use: Yes     Comment: 4-5 per day   • Drug use: No   • Sexual activity: Not on file   Lifestyle   • Physical activity     Days per week: Not on file     Minutes per session: Not on file   • Stress: Not on file   Relationships   • Social connections     Talks on phone: Not on file     Gets together: Not on file     Attends Voodoo service: Not on file     Active member of club or  organization: Not on file     Attends meetings of clubs or organizations: Not on file     Relationship status: Not on file   • Intimate partner violence     Fear of current or ex partner: Not on file     Emotionally abused: Not on file     Physically abused: Not on file     Forced sexual activity: Not on file   Other Topics Concern   • Not on file   Social History Narrative   • Not on file     Allergies   Allergen Reactions   • Lipitor [Atorvastatin]    • Sulfa Drugs Rash and Itching     Rash, itch     Outpatient Encounter Medications as of 10/12/2020   Medication Sig Dispense Refill   • Acetaminophen (TYLENOL ARTHRITIS PAIN PO) Take  by mouth.     • rosuvastatin (CRESTOR) 5 MG Tab Take 2 Tabs by mouth every 48 hours. 60 Tab 3   • sotalol (BETAPACE) 120 MG tablet TAKE 1 TABLET BY MOUTH TWICE A  Tab 3   • potassium chloride ER (KLOR-CON) 10 MEQ tablet Take 20 mEq by mouth.     • Cholecalciferol (VITAMIN D3) 5000 units Cap      • hydrochlorothiazide (MICROZIDE) 12.5 MG capsule Take 1 Cap by mouth every day. 100 Cap 3   • warfarin (COUMADIN) 5 MG Tab TAKE 1 (ONE) TO 1 & 1/2 (ONE & ONE-HALF) TABLETS BY MOUTH ONCE DAILY AS DIRECTED BY THE COUMADIN CLINIC. 135 Tab 1   • amlodipine (NORVASC) 5 MG TABS Take 5 mg by mouth every day.     • benazepril (LOTENSIN) 40 MG tablet Take 40 mg by mouth every day.       No facility-administered encounter medications on file as of 10/12/2020.      ROS     Objective:   /74 (BP Location: Left arm, Patient Position: Sitting, BP Cuff Size: Adult)   Pulse 88   Ht 1.829 m (6')   Wt 105.2 kg (232 lb)   SpO2 94%   BMI 31.46 kg/m²     Physical Exam   Constitutional: He is oriented to person, place, and time. He appears well-developed and well-nourished.   HENT:   Head: Normocephalic and atraumatic.   Eyes: EOM are normal.   Neck: Normal range of motion. Neck supple.   Cardiovascular: Normal rate, regular rhythm and intact distal pulses. Exam reveals no gallop and no friction  rub.   No murmur heard.  Pulmonary/Chest: Effort normal and breath sounds normal.   Abdominal: Soft.   Musculoskeletal: Normal range of motion.         General: No edema.   Neurological: He is alert and oriented to person, place, and time.   Skin: Skin is warm and dry.   Psychiatric: He has a normal mood and affect. His behavior is normal. Judgment and thought content normal.       Assessment:     1. Sinoatrial node dysfunction (HCC)  REFERRAL TO PULMONARY AND SLEEP MEDICINE General Pulmonary   2. Paroxysmal ventricular tachycardia (HCC)     3. Mixed hyperlipidemia     4. Essential hypertension     5. Atrial fibrillation, unspecified type (HCC)     6. Anticoagulant long-term use     7. Alcohol abuse     8. Automatic implantable cardioverter-defibrillator in situ     9. Shortness of breath     10. Dyspnea on exertion         Medical Decision Making:  Today's Assessment / Status / Plan:   1.  Dyspnea on exertion improved with albuterol worsening with smoke.  Referral to pulmonary for evaluation.  2.  Status post AICD normal function.  3.  Atrial fibrillation none on Coumadin.  4.  Hyperlipidemia on low-dose Crestor.  5.  Follow-up with me in 6 months.

## 2020-10-21 DIAGNOSIS — Z79.01 CHRONIC ANTICOAGULATION: ICD-10-CM

## 2020-10-28 ENCOUNTER — ANTICOAGULATION VISIT (OUTPATIENT)
Dept: VASCULAR LAB | Facility: MEDICAL CENTER | Age: 70
End: 2020-10-28
Attending: INTERNAL MEDICINE
Payer: MEDICARE

## 2020-10-28 VITALS — HEART RATE: 78 BPM | DIASTOLIC BLOOD PRESSURE: 98 MMHG | SYSTOLIC BLOOD PRESSURE: 144 MMHG

## 2020-10-28 DIAGNOSIS — I48.91 ATRIAL FIBRILLATION, UNSPECIFIED TYPE (HCC): ICD-10-CM

## 2020-10-28 LAB
INR BLD: 4.5 (ref 0.9–1.2)
INR PPP: 4.5 (ref 2–3.5)

## 2020-10-28 PROCEDURE — 99212 OFFICE O/P EST SF 10 MIN: CPT | Performed by: NURSE PRACTITIONER

## 2020-10-28 PROCEDURE — 85610 PROTHROMBIN TIME: CPT

## 2020-10-28 NOTE — PROGRESS NOTES
Anticoagulation Summary  As of 10/28/2020    INR goal:  2.0-3.0   TTR:  64.2 % (5.4 y)   INR used for dosin.50 (10/28/2020)   Warfarin maintenance plan:  2.5 mg (5 mg x 0.5) every Wed; 5 mg (5 mg x 1) all other days   Weekly warfarin total:  32.5 mg   Plan last modified:  DIANE PikePOG (10/28/2020)   Next INR check:  2020   Target end date:  Indefinite    Indications    Atrial fibrillation (HCC) [I48.91]             Anticoagulation Episode Summary     INR check location:  Anticoagulation Clinic    Preferred lab:      Send INR reminders to:      Comments:        Anticoagulation Care Providers     Provider Role Specialty Phone number    Daniel Ferrera M.D. Referring Cardiac Electrophysiology [phone]    [facility] Responsible  [phone]        Anticoagulation Patient Findings      HPI:  Mike Ferrell seen in clinic today for follow up on anticoagulation therapy in the presence of AF.   Denies any changes to current medical/health status since last appointment.   Denies any medication or diet changes.   No current symptoms of bleeding or thrombosis reported.  Pt admits to drinking ETOH daily. Reports that he is consistent with the amount. Knows alcohol and concurrent warfarin increases his risk of bleeding. No plans to quit.    A/P:   INR supratherapeutic. Will HOLD one dose then decrease regimen.   BP recorded in vitals.    Follow up appointment in 4 week(s) per pt. I suggested sooner follow up.    Next Appointment:  at 9:45 am.    Rebecca SNIDER

## 2020-11-16 ENCOUNTER — OFFICE VISIT (OUTPATIENT)
Dept: SLEEP MEDICINE | Facility: MEDICAL CENTER | Age: 70
End: 2020-11-16
Payer: MEDICARE

## 2020-11-16 VITALS
DIASTOLIC BLOOD PRESSURE: 80 MMHG | WEIGHT: 225 LBS | OXYGEN SATURATION: 95 % | HEART RATE: 74 BPM | BODY MASS INDEX: 30.48 KG/M2 | TEMPERATURE: 97.5 F | SYSTOLIC BLOOD PRESSURE: 126 MMHG | RESPIRATION RATE: 16 BRPM | HEIGHT: 72 IN

## 2020-11-16 DIAGNOSIS — R06.09 DYSPNEA ON EXERTION: ICD-10-CM

## 2020-11-16 DIAGNOSIS — I48.91 ATRIAL FIBRILLATION, UNSPECIFIED TYPE (HCC): ICD-10-CM

## 2020-11-16 DIAGNOSIS — J47.9 BRONCHIECTASIS, UNCOMPLICATED (HCC): ICD-10-CM

## 2020-11-16 DIAGNOSIS — J84.9 INTERSTITIAL LUNG DISEASE (HCC): ICD-10-CM

## 2020-11-16 PROCEDURE — 99204 OFFICE O/P NEW MOD 45 MIN: CPT | Performed by: INTERNAL MEDICINE

## 2020-11-16 ASSESSMENT — FIBROSIS 4 INDEX: FIB4 SCORE: 2.43

## 2020-11-16 ASSESSMENT — PAIN SCALES - GENERAL: PAINLEVEL: NO PAIN

## 2020-11-16 NOTE — PATIENT INSTRUCTIONS
"This gentleman comes in today with his wife, lives locally, has had breathing problems recently aggravated by the smoke this summer.    He indicates that his symptoms of shortness of breath began with an episode of valley fever in 1996, hospitalized for 7 days in [facility].  I reviewed his x-ray from over a year ago, and repeating that now to see if there is any impact.  He may have some interstitial lung disease from the valley fever and prolonged infection, that remains to be determined.  We will screen him with an x-ray of lung function test a 6-minute walk test and then decide if we need to look at high resolution CAT scan.    He had an episode last February of protracted illness, may have had Covid, presently is caring for his grandson who is 9 years old and understands the importance of masks handwashing and distancing.    The other contribution to his pulmonary limitations are atrial fibrillation, currently has a pacemaker, is on chronic anticoagulation and followed by cardiology.    The last issue with regard to his shortness of breath and [place] exertion is he does carry excess weight, he indicates 10 pound weight gain on the \"Covid diet\", but also reduced activity and exercise related to arthritis, he has a cane and moves slowly, cannot do exercise to maintain weight loss, portion control will have to be the best approach.  He does drink alcohol, moderate, no associated medical or social problems that will contribute to weight gain understandably.    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  Flu vaccine is declined by the patient.  "

## 2020-11-17 NOTE — PROGRESS NOTES
"Mike Ferrell is a 70 y.o. male here for shortness of breath and dyspnea on exertion. Patient was referred by primary care and cardiology.    History of Present Illness: As follows  his gentleman comes in today with his wife, lives locally, has had breathing problems recently aggravated by the smoke this summer.    He indicates that his symptoms of shortness of breath began with an episode of valley fever in 1996, hospitalized for 7 days in [facility].  I reviewed his x-ray from over a year ago, and repeating that now to see if there is any impact.  He may have some interstitial lung disease from the valley fever and prolonged infection, that remains to be determined.  We will screen him with an x-ray of lung function test a 6-minute walk test and then decide if we need to look at high resolution CAT scan.    He had an episode last February of protracted illness, may have had Covid, presently is caring for his grandson who is 9 years old and understands the importance of masks handwashing and distancing.    The other contribution to his pulmonary limitations are atrial fibrillation, currently has a pacemaker, is on chronic anticoagulation and followed by cardiology.    The last issue with regard to his shortness of breath and [place] exertion is he does carry excess weight, he indicates 10 pound weight gain on the \"Covid diet\", but also reduced activity and exercise related to arthritis, he has a cane and moves slowly, cannot do exercise to maintain weight loss, portion control will have to be the best approach.  He does drink alcohol, moderate, no associated medical or social problems that will contribute to weight gain understandably.    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  Flu vaccine is declined by the " patient.Constitutional ROS: No unexpected change in weight, No unexplained fevers  Eyes: No change in vision or blurring or double vision  Mouth/Throat ROS: No sore throat, No recent change in voice or hoarseness  Pulmonary ROS: See present history for pertinent positives  Cardiovascular ROS: No chest pain to suggest acute coronary syndrome  Gastrointestinal ROS: No abdominal pain to suggest peptic disease  Musculoskeletal/Extremities ROS: no acute artritis or unusual swelling; chronic arthritis is quite debilitating, reduces his activity level  Hematologic/Lymphatic ROS: No easy bleeding or unusual lymph node swelling  Neurologic ROS: Uses a cane for ambulation  Psychiatric ROS: No hallucinations  Allergic/Immunologic: No  urticaria or allergic rash      Current Outpatient Medications   Medication Sig Dispense Refill   • Acetaminophen (TYLENOL ARTHRITIS PAIN PO) Take  by mouth.     • rosuvastatin (CRESTOR) 5 MG Tab Take 2 Tabs by mouth every 48 hours. 60 Tab 3   • sotalol (BETAPACE) 120 MG tablet TAKE 1 TABLET BY MOUTH TWICE A  Tab 3   • Cholecalciferol (VITAMIN D3) 5000 units Cap      • hydrochlorothiazide (MICROZIDE) 12.5 MG capsule Take 1 Cap by mouth every day. 100 Cap 3   • warfarin (COUMADIN) 5 MG Tab TAKE 1 (ONE) TO 1 & 1/2 (ONE & ONE-HALF) TABLETS BY MOUTH ONCE DAILY AS DIRECTED BY THE COUMADIN CLINIC. 135 Tab 1   • amlodipine (NORVASC) 5 MG TABS Take 5 mg by mouth every day.     • benazepril (LOTENSIN) 40 MG tablet Take 40 mg by mouth every day.     • potassium chloride ER (KLOR-CON) 10 MEQ tablet Take 20 mEq by mouth.       No current facility-administered medications for this visit.        Social History     Tobacco Use   • Smoking status: Never Smoker   • Smokeless tobacco: Never Used   Substance Use Topics   • Alcohol use: Yes     Comment: 4-5 per day   • Drug use: No        Past Medical History:   Diagnosis Date   • Arthritis    • Atrial fibrillation (HCC) 4/4/2012   • Methamphetamine use (HCC)  none for many years   • Pain     shoulders   • Paroxysmal ventricular tachycardia (HCC) 4/4/2012   • Sinoatrial node dysfunction (HCC) 4/4/2012   • Syncope and collapse 4/4/2012       Past Surgical History:   Procedure Laterality Date   • AICD IMPLANT  2010   • PACEMAKER INSERTION  2009   • CHOLECYSTECTOMY  1999   • TONSILLECTOMY  1953       Allergies: Lipitor [atorvastatin] and Sulfa drugs    History reviewed. No pertinent family history.    Physical Examination    Vitals:    11/16/20 1555   Height: 1.829 m (6')   Weight: 102.1 kg (225 lb)   Weight % change since last entry.: 0 %   BP: 126/80   Pulse: 74   BMI (Calculated): 30.52   Resp: 16   Temp: 36.4 °C (97.5 °F)   TempSrc: Temporal       General Appearance: alert, no distress  Skin: Skin color, texture, turgor normal. No rashes or lesions.  Eyes: negative  Oropharynx: Lips, mucosa, and tongue normal. Teeth and gums normal. Oropharynx moist and without lesion  Lungs: positive findings: Quiet and clear but diminished without wheezes or rhonchi  Heart: negative. RRR without murmur, gallop, or rubs.  No ectopy.  Abdomen: Abdomen soft, non-tender. . No masses,  No organomegaly  Extremities:  No deformities, edema, or skin discoloration  Joints: No acute arthritis; describes chronic arthritis, particularly both knees with reduced mobility and chronic discomfort  Peripheral Pulses:perfused  Neurologic: intact grossly; uses a cane for ambulation  No clubbing or cyanosis      Imaging: Ordered and pending ordered and pending    PFTS: Ordered and pending      Assessment and Plan  1. Dyspnea on exertion  Multifactorial, conditioning, elevated body mass index, possible chronic lung scarring from prior protracted flexion work-up in progress  - PULMONARY FUNCTION TESTS -Test requested: Complete Pulmonary Function Test; Future  - DX-CHEST-2 VIEWS; Future  - Exercise Test for Bronchospasm / 6-Minute Walk; Future    2. BMI 30.0-30.9,adult  Patient's body mass index is 30.52  kg/m². Exercise and nutrition counseling were performed at this visit.    3. Atrial fibrillation, unspecified type (HCC)  Noted    4. Interstitial lung disease (HCC), presumptive  Possible with prior exposures both in the workplace and protracted pneumonia  - PULMONARY FUNCTION TESTS -Test requested: Complete Pulmonary Function Test; Future  - DX-CHEST-2 VIEWS; Future  - Exercise Test for Bronchospasm / 6-Minute Walk; Future    5. Bronchiectasis, uncomplicated (HCC)   May need high-resolution CAT scan with postinfectious process potential  - PULMONARY FUNCTION TESTS -Test requested: Complete Pulmonary Function Test; Future    Followup Return in about 2 months (around 1/16/2021) for follow up visit with Dr. Sandra Ferrera.

## 2020-11-25 ENCOUNTER — ANTICOAGULATION VISIT (OUTPATIENT)
Dept: VASCULAR LAB | Facility: MEDICAL CENTER | Age: 70
End: 2020-11-25
Attending: INTERNAL MEDICINE
Payer: MEDICARE

## 2020-11-25 VITALS — HEART RATE: 76 BPM | DIASTOLIC BLOOD PRESSURE: 70 MMHG | SYSTOLIC BLOOD PRESSURE: 137 MMHG

## 2020-11-25 DIAGNOSIS — I48.91 ATRIAL FIBRILLATION, UNSPECIFIED TYPE (HCC): ICD-10-CM

## 2020-11-25 LAB
INR BLD: 2.1 (ref 0.9–1.2)
INR PPP: 2.1 (ref 2–3.5)

## 2020-11-25 PROCEDURE — 85610 PROTHROMBIN TIME: CPT

## 2020-11-25 PROCEDURE — 99211 OFF/OP EST MAY X REQ PHY/QHP: CPT | Performed by: NURSE PRACTITIONER

## 2020-11-25 NOTE — PROGRESS NOTES
Anticoagulation Summary  As of 2020    INR goal:  2.0-3.0   TTR:  63.8 % (5.5 y)   INR used for dosin.10 (2020)   Warfarin maintenance plan:  5 mg (5 mg x 1), then 2.5 mg (5 mg x 0.5) repeating every 2 days   Average weekly warfarin total:  26.25 mg   Plan last modified:  Rebecca Mathews A.P.NLinnea (2020)   Next INR check:  2020   Target end date:  Indefinite    Indications    Atrial fibrillation (HCC) [I48.91]             Anticoagulation Episode Summary     INR check location:  Anticoagulation Clinic    Preferred lab:      Send INR reminders to:      Comments:        Anticoagulation Care Providers     Provider Role Specialty Phone number    Daniel Ferrera M.D. Referring Cardiac Electrophysiology [phone]    [facility] Responsible  [phone]        Anticoagulation Patient Findings      HPI:  Mike Ferrell seen in clinic today for follow up on anticoagulation therapy in the presence of AF.   Denies any changes to current medical/health status since last appointment.   Denies any medication or diet changes.   No current symptoms of bleeding or thrombosis reported.  He self adjusted his dose to 5 mg/2.5 mg alternating every other day.    A/P:   INR therapeutic.   Continue current regimen.   BP recorded in vitals.    Follow up appointment in 4 week(s) per pt's preference.    Next Appointment: Wednesday, Dec 23, 2020 at 9:45 am.    Rebecca SNIDER

## 2020-12-23 ENCOUNTER — ANTICOAGULATION VISIT (OUTPATIENT)
Dept: VASCULAR LAB | Facility: MEDICAL CENTER | Age: 70
End: 2020-12-23
Attending: INTERNAL MEDICINE
Payer: MEDICARE

## 2020-12-23 DIAGNOSIS — I48.91 ATRIAL FIBRILLATION, UNSPECIFIED TYPE (HCC): ICD-10-CM

## 2020-12-23 LAB
INR BLD: 1.6 (ref 0.9–1.2)
INR PPP: 1.6 (ref 2–3.5)

## 2020-12-23 PROCEDURE — 99212 OFFICE O/P EST SF 10 MIN: CPT | Performed by: NURSE PRACTITIONER

## 2020-12-23 PROCEDURE — 85610 PROTHROMBIN TIME: CPT

## 2020-12-23 NOTE — PROGRESS NOTES
Anticoagulation Summary  As of 2020    INR goal:  2.0-3.0   TTR:  63.2 % (5.5 y)   INR used for dosin.60 (2020)   Warfarin maintenance plan:  2.5 mg (5 mg x 0.5) every Mon, Fri; 5 mg (5 mg x 1) all other days   Weekly warfarin total:  30 mg   Plan last modified:  DIANE PikePOG (2020)   Next INR check:  2021   Target end date:  Indefinite    Indications    Atrial fibrillation (HCC) [I48.91]             Anticoagulation Episode Summary     INR check location:  Anticoagulation Clinic    Preferred lab:      Send INR reminders to:      Comments:        Anticoagulation Care Providers     Provider Role Specialty Phone number    Daniel Ferrera M.D. Referring Cardiac Electrophysiology [phone]    [facility] Responsible  [phone]        Anticoagulation Patient Findings      HPI:  Mike Ferrell seen in clinic today for follow up on anticoagulation therapy in the presence of AF.   Denies any changes to current medical/health status since last appointment.   Denies any medication or diet changes.   No current symptoms of bleeding or thrombosis reported.    Pt on antiplatelet therapy - n/a    A/P:   INR subtherapeutic. Low CHADS 2 score. Will increase regimen.   BP recorded in vitals.    Follow up appointment in 4 week(s) per pt's preference.    Next Appointment: Wednesday, 2021 at 9:45 am.    Rebecca SNIDER

## 2021-01-15 DIAGNOSIS — Z23 NEED FOR VACCINATION: ICD-10-CM

## 2021-01-20 ENCOUNTER — TELEPHONE (OUTPATIENT)
Dept: VASCULAR LAB | Facility: MEDICAL CENTER | Age: 71
End: 2021-01-20

## 2021-01-20 ENCOUNTER — APPOINTMENT (OUTPATIENT)
Dept: VASCULAR LAB | Facility: MEDICAL CENTER | Age: 71
End: 2021-01-20
Attending: INTERNAL MEDICINE
Payer: MEDICARE

## 2021-01-20 NOTE — TELEPHONE ENCOUNTER
Pt called to report diarrhea and body aches/weakness. He wants to reschedule his INR appt. New appt made for 2/3/21 at 9:45 am. He will continue his current dose of warfarin.    Rebecca SNIDER

## 2021-02-03 ENCOUNTER — ANTICOAGULATION VISIT (OUTPATIENT)
Dept: VASCULAR LAB | Facility: MEDICAL CENTER | Age: 71
End: 2021-02-03
Attending: INTERNAL MEDICINE
Payer: MEDICARE

## 2021-02-03 VITALS — HEART RATE: 75 BPM | SYSTOLIC BLOOD PRESSURE: 135 MMHG | DIASTOLIC BLOOD PRESSURE: 86 MMHG

## 2021-02-03 DIAGNOSIS — I48.91 ATRIAL FIBRILLATION, UNSPECIFIED TYPE (HCC): ICD-10-CM

## 2021-02-03 LAB
INR BLD: 2.6 (ref 0.9–1.2)
INR PPP: 2.6 (ref 2–3.5)

## 2021-02-03 PROCEDURE — 85610 PROTHROMBIN TIME: CPT

## 2021-02-03 PROCEDURE — 99211 OFF/OP EST MAY X REQ PHY/QHP: CPT | Performed by: NURSE PRACTITIONER

## 2021-02-03 NOTE — PROGRESS NOTES
Anticoagulation Summary  As of 2/3/2021    INR goal:  2.0-3.0   TTR:  63.1 % (5.7 y)   INR used for dosin.60 (2/3/2021)   Warfarin maintenance plan:  5 mg (5 mg x 1) every day   Weekly warfarin total:  35 mg   Plan last modified:  ILEANA Pike (2/3/2021)   Next INR check:     Target end date:  Indefinite    Indications    Atrial fibrillation (HCC) [I48.91]             Anticoagulation Episode Summary     INR check location:  Anticoagulation Clinic    Preferred lab:      Send INR reminders to:      Comments:        Anticoagulation Care Providers     Provider Role Specialty Phone number    Daniel Ferrera M.D. Referring Cardiac Electrophysiology [phone]    [facility] Responsible  [phone]        Anticoagulation Patient Findings      HPI:  Mike Ferrell seen in clinic today for follow up on anticoagulation therapy in the presence of AF.   Denies any changes to current medical/health status since last appointment.   Denies any medication or diet changes.   No current symptoms of bleeding or thrombosis reported.  He is taking 5 mg daily.    Pt on antiplatelet therapy - none.    A/P:   INR therapeutic.   Continue current regimen of 5 mg daily.   BP recorded in vitals.    Follow up appointment in 8 week(s).    Next Appointment: 2021 at 9:45 am.    Rebecca SNIDER

## 2021-02-05 DIAGNOSIS — I48.91 ATRIAL FIBRILLATION, UNSPECIFIED TYPE (HCC): ICD-10-CM

## 2021-02-05 DIAGNOSIS — Z79.899 HIGH RISK MEDICATION USE: ICD-10-CM

## 2021-02-05 DIAGNOSIS — Z51.81 ENCOUNTER FOR MONITORING SOTALOL THERAPY: ICD-10-CM

## 2021-02-05 DIAGNOSIS — Z79.01 ANTICOAGULANT LONG-TERM USE: ICD-10-CM

## 2021-02-05 DIAGNOSIS — Z79.899 ENCOUNTER FOR MONITORING SOTALOL THERAPY: ICD-10-CM

## 2021-02-05 DIAGNOSIS — I10 ESSENTIAL HYPERTENSION: ICD-10-CM

## 2021-02-05 DIAGNOSIS — E78.2 MIXED HYPERLIPIDEMIA: ICD-10-CM

## 2021-02-05 RX ORDER — SOTALOL HYDROCHLORIDE 120 MG/1
120 TABLET ORAL 2 TIMES DAILY
Qty: 180 TAB | Refills: 3 | Status: SHIPPED | OUTPATIENT
Start: 2021-02-05 | End: 2022-03-12 | Stop reason: SDUPTHER

## 2021-02-10 ENCOUNTER — NON-PROVIDER VISIT (OUTPATIENT)
Dept: SLEEP MEDICINE | Facility: MEDICAL CENTER | Age: 71
End: 2021-02-10
Attending: INTERNAL MEDICINE
Payer: MEDICARE

## 2021-02-10 ENCOUNTER — HOSPITAL ENCOUNTER (OUTPATIENT)
Dept: LAB | Facility: MEDICAL CENTER | Age: 71
End: 2021-02-10
Attending: INTERNAL MEDICINE
Payer: MEDICARE

## 2021-02-10 VITALS — BODY MASS INDEX: 32.78 KG/M2 | HEIGHT: 72 IN | WEIGHT: 242 LBS

## 2021-02-10 DIAGNOSIS — J84.9 INTERSTITIAL LUNG DISEASE (HCC): ICD-10-CM

## 2021-02-10 DIAGNOSIS — J47.9 BRONCHIECTASIS, UNCOMPLICATED (HCC): ICD-10-CM

## 2021-02-10 DIAGNOSIS — R06.09 DYSPNEA ON EXERTION: ICD-10-CM

## 2021-02-10 DIAGNOSIS — Z79.899 ENCOUNTER FOR MONITORING SOTALOL THERAPY: ICD-10-CM

## 2021-02-10 DIAGNOSIS — Z79.01 ANTICOAGULANT LONG-TERM USE: ICD-10-CM

## 2021-02-10 DIAGNOSIS — E78.2 MIXED HYPERLIPIDEMIA: ICD-10-CM

## 2021-02-10 DIAGNOSIS — I48.91 ATRIAL FIBRILLATION, UNSPECIFIED TYPE (HCC): ICD-10-CM

## 2021-02-10 DIAGNOSIS — Z51.81 ENCOUNTER FOR MONITORING SOTALOL THERAPY: ICD-10-CM

## 2021-02-10 DIAGNOSIS — Z79.899 HIGH RISK MEDICATION USE: ICD-10-CM

## 2021-02-10 DIAGNOSIS — I10 ESSENTIAL HYPERTENSION: ICD-10-CM

## 2021-02-10 LAB
ALBUMIN SERPL BCP-MCNC: 4.5 G/DL (ref 3.2–4.9)
ALBUMIN/GLOB SERPL: 1.6 G/DL
ALP SERPL-CCNC: 68 U/L (ref 30–99)
ALT SERPL-CCNC: 20 U/L (ref 2–50)
ANION GAP SERPL CALC-SCNC: 11 MMOL/L (ref 7–16)
AST SERPL-CCNC: 25 U/L (ref 12–45)
BASOPHILS # BLD AUTO: 1.2 % (ref 0–1.8)
BASOPHILS # BLD: 0.06 K/UL (ref 0–0.12)
BILIRUB SERPL-MCNC: 0.7 MG/DL (ref 0.1–1.5)
BUN SERPL-MCNC: 29 MG/DL (ref 8–22)
CALCIUM SERPL-MCNC: 9.7 MG/DL (ref 8.5–10.5)
CHLORIDE SERPL-SCNC: 102 MMOL/L (ref 96–112)
CHOLEST SERPL-MCNC: 242 MG/DL (ref 100–199)
CO2 SERPL-SCNC: 24 MMOL/L (ref 20–33)
CREAT SERPL-MCNC: 1.69 MG/DL (ref 0.5–1.4)
EOSINOPHIL # BLD AUTO: 0.13 K/UL (ref 0–0.51)
EOSINOPHIL NFR BLD: 2.6 % (ref 0–6.9)
ERYTHROCYTE [DISTWIDTH] IN BLOOD BY AUTOMATED COUNT: 48.3 FL (ref 35.9–50)
GLOBULIN SER CALC-MCNC: 2.9 G/DL (ref 1.9–3.5)
GLUCOSE SERPL-MCNC: 89 MG/DL (ref 65–99)
HCT VFR BLD AUTO: 42.2 % (ref 42–52)
HDLC SERPL-MCNC: 55 MG/DL
HGB BLD-MCNC: 14.5 G/DL (ref 14–18)
IMM GRANULOCYTES # BLD AUTO: 0.02 K/UL (ref 0–0.11)
IMM GRANULOCYTES NFR BLD AUTO: 0.4 % (ref 0–0.9)
LDLC SERPL CALC-MCNC: 152 MG/DL
LYMPHOCYTES # BLD AUTO: 1.56 K/UL (ref 1–4.8)
LYMPHOCYTES NFR BLD: 31.6 % (ref 22–41)
MAGNESIUM SERPL-MCNC: 1.6 MG/DL (ref 1.5–2.5)
MCH RBC QN AUTO: 35.9 PG (ref 27–33)
MCHC RBC AUTO-ENTMCNC: 34.4 G/DL (ref 33.7–35.3)
MCV RBC AUTO: 104.5 FL (ref 81.4–97.8)
MONOCYTES # BLD AUTO: 0.53 K/UL (ref 0–0.85)
MONOCYTES NFR BLD AUTO: 10.7 % (ref 0–13.4)
NEUTROPHILS # BLD AUTO: 2.64 K/UL (ref 1.82–7.42)
NEUTROPHILS NFR BLD: 53.5 % (ref 44–72)
NRBC # BLD AUTO: 0 K/UL
NRBC BLD-RTO: 0 /100 WBC
PLATELET # BLD AUTO: 152 K/UL (ref 164–446)
PMV BLD AUTO: 11.6 FL (ref 9–12.9)
POTASSIUM SERPL-SCNC: 3.8 MMOL/L (ref 3.6–5.5)
PROT SERPL-MCNC: 7.4 G/DL (ref 6–8.2)
RBC # BLD AUTO: 4.04 M/UL (ref 4.7–6.1)
SODIUM SERPL-SCNC: 137 MMOL/L (ref 135–145)
TRIGL SERPL-MCNC: 173 MG/DL (ref 0–149)
WBC # BLD AUTO: 4.9 K/UL (ref 4.8–10.8)

## 2021-02-10 PROCEDURE — 80053 COMPREHEN METABOLIC PANEL: CPT

## 2021-02-10 PROCEDURE — 85025 COMPLETE CBC W/AUTO DIFF WBC: CPT

## 2021-02-10 PROCEDURE — 94618 PULMONARY STRESS TESTING: CPT | Performed by: INTERNAL MEDICINE

## 2021-02-10 PROCEDURE — 94060 EVALUATION OF WHEEZING: CPT | Performed by: INTERNAL MEDICINE

## 2021-02-10 PROCEDURE — 83735 ASSAY OF MAGNESIUM: CPT

## 2021-02-10 PROCEDURE — 80061 LIPID PANEL: CPT

## 2021-02-10 PROCEDURE — 84443 ASSAY THYROID STIM HORMONE: CPT

## 2021-02-10 PROCEDURE — 36415 COLL VENOUS BLD VENIPUNCTURE: CPT

## 2021-02-10 PROCEDURE — 94729 DIFFUSING CAPACITY: CPT | Performed by: INTERNAL MEDICINE

## 2021-02-10 PROCEDURE — 94726 PLETHYSMOGRAPHY LUNG VOLUMES: CPT | Performed by: INTERNAL MEDICINE

## 2021-02-10 ASSESSMENT — 6 MINUTE WALK TEST (6MWT)
PERCEIVED BREATHLESSNESS AT 1 MIN: 1
HEART RATE AT 6 MIN: 94
BLOOD PRESSURE AT 1 MIN: 135/86
SAO2 AT 1 MIN: 93
PERCEIVED BREATHLESSNESS AT 2 MIN: 1
SAO2 AT 2 MIN: 93
PERCEIVED FATIGUE AT 1 MIN: 6
PERCEIVED BREATHLESSNESS AT 3 MIN: 1
PERCEIVED FATIGUE AT 4 MIN: 6
HEART RATE AT 4 MIN: 95
O2 SAT PERCENT ROOM AIR: 94
PERCEIVED BREATHLESSNESS AT 5 MIN: 1
SAO2 AT 6 MIN: 94
PERCEIVED BREATHLESSNESS AT 4 MIN: 1
HEART RATE AT 5 MIN: 95
SAO2 AT 4 MIN: 93
HEART RATE AT 1 MIN: 94
PERCEIVED FATIGUE AT 5 MIN: 6
PERCEIVED FATIGUE AT 2 MIN: 6
COMMENTS: TEST ON ROOM AIR.
HEART RATE AT 2 MIN: 95
SAO2 AT 2 MIN: 93
AMBULATES WITH O2: WITHOUT O2
BLOOD PRESSURE: LEFT ARM
TOTAL REST TIME: 0
HEART RATE: 94
HEART RATE AT 1 MIN: 95
PERCENT OF NORMAL WALKED: 52
SAO2 AT 1 MIN: 94
PERCEIVED FATIGUE AT 6 MIN: 6
PERCEIVED FATIGUE AT 2 MIN: 6
HEART RATE AT 2 MIN: 94
HEART RATE AT 3 MIN: 95
SAO2 AT 5 MIN: 94
PERCEIVED FATIGUE AT 3 MIN: 6
SAO2 AT 3 MIN: 93
NUMBER OF RESTS: 0
PERCEIVED BREATHLESSNESS AT 2 MIN: 1
PERCEIVED BREATHLESSNESS AT 6 MIN: 1
SITTING BLOOD PRESSURE: 135/86
PERCEIVED FATIGUE AT 1 MIN: 6
PERCEIVED BREATHLESSNESS AT 1 MIN: 1

## 2021-02-10 ASSESSMENT — PULMONARY FUNCTION TESTS
FEV1_PERCENT_PREDICTED: 68
FEV1_PERCENT_CHANGE: -1
FEV1/FVC_PREDICTED: 75
FEV1/FVC_PERCENT_LLN: 63
FEV1/FVC: 71.29
FVC_PERCENT_PREDICTED: 70
FEV1/FVC: 73
FEV1_PERCENT_CHANGE: 0
FEV1/FVC_PERCENT_PREDICTED: 94
FVC_PREDICTED: 4.47
FEV1_LLN: 2.79
FEV1/FVC_PERCENT_PREDICTED: 94
FEV1/FVC: 71
FEV1/FVC_PERCENT_CHANGE: -2
FVC: 3.17
FVC: 3.15
FVC_LLN: 3.73
FEV1/FVC: 73
FEV1: 2.26
FEV1/FVC_PERCENT_PREDICTED: 97
FVC_PERCENT_PREDICTED: 71
FEV1/FVC_PERCENT_PREDICTED: 96
FEV1_PERCENT_PREDICTED: 67
FEV1: 2.3
FEV1/FVC_PERCENT_PREDICTED: 75
FEV1_PREDICTED: 3.35

## 2021-02-10 ASSESSMENT — FIBROSIS 4 INDEX: FIB4 SCORE: 2.43

## 2021-02-10 NOTE — PROCEDURES
Test on Room Air.    There is no desaturation with ambulation.  Distance walked was 1080 feet or 52% predicted.  This testing shows no abnormal desaturation but does show decreased exercise tolerance.

## 2021-02-10 NOTE — PROCEDURES
Technician: Celine Dyer RRT   Good patient effort & cooperation. Patient had a hard time panting to speed during the PLETH. The results of this test meet the ATS/ERS standards for acceptability & reproducibility.  Test was performed on the OpenDrive Body Plethysmograph-Elite DX system.  Predicted equations for Spirometry are GLI-2012, ITS for lung volumes, and GLI-2017 for DLCO.  The DLCO was uncorrected for Hgb.  A bronchodilator of Ventolin HFA -2puffs via spacer administered.  DLCO performed during dilation period.    Interpretation;   1.  Baseline spirometry shows proportionate and mild reduction in both FVC at 3.15 L or 70% predicted and FEV1 at 2.3 L or 68% predicted.  FEV1/FVC ratio is normal at 73.  2.  No significant bronchodilator response.  3.  Total lung capacity is low normal at 6.06 L or 81% predicted.  There is borderline air trapping.  4.  Diffusion capacity is mildly reduced at 69% predicted.  Overall pulmonary function testing shows mild restrictive defect although borderline air trapping and mild concavity to the expiratory flow volume loop suggest mixed restrictive obstructive lung disease.  DLCO is mildly reduced suggesting pulmonary parenchymal process such as ILD.  Currently the PFT pattern could be consistent with the stated diagnoses of bronchiectasis with associated ILD.  Suggest clinical correlation.

## 2021-02-11 ENCOUNTER — TELEPHONE (OUTPATIENT)
Dept: CARDIOLOGY | Facility: MEDICAL CENTER | Age: 71
End: 2021-02-11

## 2021-02-11 DIAGNOSIS — Z79.899 HIGH RISK MEDICATION USE: ICD-10-CM

## 2021-02-11 DIAGNOSIS — R79.89 ELEVATED SERUM CREATININE: ICD-10-CM

## 2021-02-11 LAB — TSH SERPL DL<=0.005 MIU/L-ACNC: 1.21 UIU/ML (ref 0.38–5.33)

## 2021-02-11 RX ORDER — PRAVASTATIN SODIUM 20 MG
20 TABLET ORAL EVERY EVENING
Qty: 30 TABLET | Refills: 11 | Status: SHIPPED | OUTPATIENT
Start: 2021-02-11 | End: 2021-02-11

## 2021-02-11 RX ORDER — PRAVASTATIN SODIUM 20 MG
20 TABLET ORAL EVERY EVENING
Qty: 30 TABLET | Refills: 11 | Status: ON HOLD | OUTPATIENT
Start: 2021-02-11 | End: 2021-02-22

## 2021-02-11 NOTE — TELEPHONE ENCOUNTER
Spoke with patient and notified of results and POC. Patient willing to try pravastatin and will let us know if he develops any symptoms from it.    Prescription sent to [facility] and referral placed to nephrology.

## 2021-02-11 NOTE — TELEPHONE ENCOUNTER
----- Message from Daniel Ferrera M.D. sent at 2/11/2021 11:39 AM PST -----  May want to retry a statin like pravachol 20 mg qd. Increase hydration and refer to renal

## 2021-02-12 ENCOUNTER — OFFICE VISIT (OUTPATIENT)
Dept: SLEEP MEDICINE | Facility: MEDICAL CENTER | Age: 71
End: 2021-02-12
Payer: MEDICARE

## 2021-02-12 VITALS
HEART RATE: 82 BPM | BODY MASS INDEX: 32.51 KG/M2 | TEMPERATURE: 98.1 F | WEIGHT: 240 LBS | DIASTOLIC BLOOD PRESSURE: 76 MMHG | SYSTOLIC BLOOD PRESSURE: 122 MMHG | HEIGHT: 72 IN | OXYGEN SATURATION: 95 %

## 2021-02-12 DIAGNOSIS — R06.09 DYSPNEA ON EXERTION: ICD-10-CM

## 2021-02-12 DIAGNOSIS — I48.0 PAROXYSMAL ATRIAL FIBRILLATION (HCC): ICD-10-CM

## 2021-02-12 PROCEDURE — 99214 OFFICE O/P EST MOD 30 MIN: CPT | Performed by: INTERNAL MEDICINE

## 2021-02-12 RX ORDER — POTASSIUM CHLORIDE 20 MEQ/1
20 TABLET, EXTENDED RELEASE ORAL DAILY
COMMUNITY
End: 2021-12-08

## 2021-02-12 ASSESSMENT — FIBROSIS 4 INDEX: FIB4 SCORE: 2.65

## 2021-02-12 ASSESSMENT — PAIN SCALES - GENERAL: PAINLEVEL: NO PAIN

## 2021-02-12 NOTE — PATIENT INSTRUCTIONS
Jose comes in for follow-up of his remote history of valley fever, made a significant impact on his lung function 30 years ago, since then he has been limited more by arthritis than by his lungs.  A 6-minute walk test did not show desaturation, that is encouraging.  In addition we underwent lung function testing, he does have mild obstruction, some borderline restriction some of this is from reduced expiratory reserve volume with his body shape and there may be a suspicion of some prior reduction of oxygen transfer related to his valley fever, but at this time he is at his baseline state, the x-ray that we had considered he is decided to hold off on, and I would only do a high resolution CAT scan or x-ray if he presented with new symptoms, at his request.    Presently he has limitations with cane, arthritis, tries to exercise but is limited in that regard and does not feel that his pulmonary status is deteriorating.  He would like to be seen on a periodic basis, in 1 year but I can certainly see him sooner if new problems arise or his situation changes.  All of his findings seem to be quiesced sent, stable, and he does not require the inhaler unless there is smoke in the air, last summer was problematic and he utilized his family's inhaler, he tells me he himself has an adequate supply for as needed use.

## 2021-02-12 NOTE — PROGRESS NOTES
Mike Ferrell is a 70 y.o. male here for results of lung function testing and 6-minute walk test with history of valley fever protracted illness. Patient was referred by primary care.    History of Present Illness: As follows  Jose comes in for follow-up of his remote history of valley fever, made a significant impact on his lung function 30 years ago, since then he has been limited more by arthritis than by his lungs.  A 6-minute walk test did not show desaturation, that is encouraging.  In addition we underwent lung function testing, he does have mild obstruction, some borderline restriction some of this is from reduced expiratory reserve volume with his body shape and there may be a suspicion of some prior reduction of oxygen transfer related to his valley fever, but at this time he is at his baseline state, the x-ray that we had considered he is decided to hold off on, and I would only do a high resolution CAT scan or x-ray if he presented with new symptoms, at his request.    Presently he has limitations with cane, arthritis, tries to exercise but is limited in that regard and does not feel that his pulmonary status is deteriorating.  He would like to be seen on a periodic basis, in 1 year but I can certainly see him sooner if new problems arise or his situation changes.  All of his findings seem to be quiesced sent, stable, and he does not require the inhaler unless there is smoke in the air, last summer was problematic and he utilized his family's inhaler, he tells me he himself has an adequate supply for as needed use.  Constitutional ROS: No unexpected change in weight, No unexplained fevers  Eyes: No change in vision or blurring or double vision  Mouth/Throat ROS: No sore throat, No recent change in voice or hoarseness  Pulmonary ROS: See present history for pertinent positives  Cardiovascular ROS: No chest pain to suggest acute coronary syndrome  Gastrointestinal ROS: No abdominal pain to suggest  peptic disease  Musculoskeletal/Extremities ROS: no acute artritis or unusual swelling  Hematologic/Lymphatic ROS: No easy bleeding or unusual lymph node swelling  Neurologic ROS: No new or unusual weakness  Psychiatric ROS: No hallucinations  Allergic/Immunologic: No  urticaria or allergic rash      Current Outpatient Medications   Medication Sig Dispense Refill   • potassium chloride SA (KDUR) 20 MEQ Tab CR      • B Complex Vitamins (B COMPLEX PO) Take  by mouth.     • sotalol (BETAPACE) 120 MG tablet Take 1 Tab by mouth 2 times a day. 180 Tab 3   • Acetaminophen (TYLENOL ARTHRITIS PAIN PO) Take  by mouth.     • Cholecalciferol (VITAMIN D3) 5000 units Cap      • hydrochlorothiazide (MICROZIDE) 12.5 MG capsule Take 1 Cap by mouth every day. 100 Cap 3   • warfarin (COUMADIN) 5 MG Tab TAKE 1 (ONE) TO 1 & 1/2 (ONE & ONE-HALF) TABLETS BY MOUTH ONCE DAILY AS DIRECTED BY THE COUMADIN CLINIC. 135 Tab 1   • amlodipine (NORVASC) 5 MG TABS Take 5 mg by mouth every day.     • benazepril (LOTENSIN) 40 MG tablet Take 40 mg by mouth every day.     • pravastatin (PRAVACHOL) 20 MG Tab Take 1 tablet by mouth every evening. 30 tablet 11   • potassium chloride ER (KLOR-CON) 10 MEQ tablet Take 20 mEq by mouth.       No current facility-administered medications for this visit.       Social History     Tobacco Use   • Smoking status: Never Smoker   • Smokeless tobacco: Never Used   Substance Use Topics   • Alcohol use: Yes     Comment: 4-5 per day   • Drug use: No        Past Medical History:   Diagnosis Date   • Arthritis    • Atrial fibrillation (HCC) 4/4/2012   • Methamphetamine use (HCC) none for many years   • Pain     shoulders   • Paroxysmal ventricular tachycardia (HCC) 4/4/2012   • Sinoatrial node dysfunction (HCC) 4/4/2012   • Syncope and collapse 4/4/2012       Past Surgical History:   Procedure Laterality Date   • AICD IMPLANT  2010   • PACEMAKER INSERTION  2009   • CHOLECYSTECTOMY  1999   • TONSILLECTOMY  1953        Allergies: Lipitor [atorvastatin] and Sulfa drugs    History reviewed. No pertinent family history.    Physical Examination    Vitals:    02/12/21 1044   Height: 1.829 m (6')   Weight: 109 kg (240 lb)   Weight % change since last entry.: 0 %   BP: 122/76   Pulse: 82   BMI (Calculated): 32.55   Temp: 36.7 °C (98.1 °F)   TempSrc: Temporal       General Appearance: alert, no distress  Skin: Skin color, texture, turgor normal. No rashes or lesions.  Eyes: negative  Oropharynx: Lips, mucosa, and tongue normal. Teeth and gums normal. Oropharynx moist and without lesion  Lungs: positive findings: Quiet and clear  Heart: negative. RRR without murmur, gallop, or rubs.  No ectopy.  Abdomen: Abdomen soft, non-tender. . No masses,  No organomegaly  Extremities:  No deformities, edema, or skin discoloration; knee braces bilaterally, and uses a cane, reduced mobility  Joints: No acute arthritis  Peripheral Pulses:perfused  Neurologic: intact grossly  No cyanosis      Imaging: Declined none today    PFTS: None today      Assessment and Plan  1. Dyspnea on exertion  Stable pattern, surveillance as needed    2. BMI 30.0-30.9,adult  Reduced expiratory reserve volume    3. Paroxysmal atrial fibrillation (HCC)  Noted      Followup Return in about 1 year (around 2/12/2022) for follow up visit with Dr. Sandra Ferrera.

## 2021-02-22 ENCOUNTER — APPOINTMENT (OUTPATIENT)
Dept: RADIOLOGY | Facility: MEDICAL CENTER | Age: 71
End: 2021-02-22
Attending: EMERGENCY MEDICINE
Payer: MEDICARE

## 2021-02-22 ENCOUNTER — HOSPITAL ENCOUNTER (OUTPATIENT)
Facility: MEDICAL CENTER | Age: 71
End: 2021-02-23
Attending: EMERGENCY MEDICINE | Admitting: STUDENT IN AN ORGANIZED HEALTH CARE EDUCATION/TRAINING PROGRAM
Payer: MEDICARE

## 2021-02-22 DIAGNOSIS — M10.9 ACUTE GOUT OF RIGHT FOOT, UNSPECIFIED CAUSE: ICD-10-CM

## 2021-02-22 DIAGNOSIS — R53.1 WEAKNESS: ICD-10-CM

## 2021-02-22 DIAGNOSIS — M79.10 MYALGIA: ICD-10-CM

## 2021-02-22 PROBLEM — D72.829 LEUCOCYTOSIS: Status: ACTIVE | Noted: 2021-02-22

## 2021-02-22 LAB
ALBUMIN SERPL BCP-MCNC: 4.2 G/DL (ref 3.2–4.9)
ALBUMIN/GLOB SERPL: 1.4 G/DL
ALP SERPL-CCNC: 89 U/L (ref 30–99)
ALT SERPL-CCNC: 14 U/L (ref 2–50)
ANION GAP SERPL CALC-SCNC: 16 MMOL/L (ref 7–16)
APPEARANCE UR: CLEAR
AST SERPL-CCNC: 17 U/L (ref 12–45)
BACTERIA #/AREA URNS HPF: ABNORMAL /HPF
BASOPHILS # BLD AUTO: 0.5 % (ref 0–1.8)
BASOPHILS # BLD: 0.06 K/UL (ref 0–0.12)
BILIRUB SERPL-MCNC: 2.2 MG/DL (ref 0.1–1.5)
BILIRUB UR QL STRIP.AUTO: ABNORMAL
BLOOD CULTURE HOLD CXBCH: NORMAL
BUN SERPL-MCNC: 14 MG/DL (ref 8–22)
CALCIUM SERPL-MCNC: 9.4 MG/DL (ref 8.5–10.5)
CHLORIDE SERPL-SCNC: 93 MMOL/L (ref 96–112)
CK SERPL-CCNC: 60 U/L (ref 0–154)
CO2 SERPL-SCNC: 22 MMOL/L (ref 20–33)
COLOR UR: YELLOW
CREAT SERPL-MCNC: 0.92 MG/DL (ref 0.5–1.4)
CRP SERPL HS-MCNC: 25.18 MG/DL (ref 0–0.75)
EOSINOPHIL # BLD AUTO: 0.05 K/UL (ref 0–0.51)
EOSINOPHIL NFR BLD: 0.4 % (ref 0–6.9)
EPI CELLS #/AREA URNS HPF: ABNORMAL /HPF
ERYTHROCYTE [DISTWIDTH] IN BLOOD BY AUTOMATED COUNT: 46.8 FL (ref 35.9–50)
FLUAV RNA SPEC QL NAA+PROBE: NEGATIVE
FLUBV RNA SPEC QL NAA+PROBE: NEGATIVE
GLOBULIN SER CALC-MCNC: 3 G/DL (ref 1.9–3.5)
GLUCOSE SERPL-MCNC: 109 MG/DL (ref 65–99)
GLUCOSE UR STRIP.AUTO-MCNC: NEGATIVE MG/DL
HCT VFR BLD AUTO: 40.9 % (ref 42–52)
HGB BLD-MCNC: 14.3 G/DL (ref 14–18)
IMM GRANULOCYTES # BLD AUTO: 0.04 K/UL (ref 0–0.11)
IMM GRANULOCYTES NFR BLD AUTO: 0.3 % (ref 0–0.9)
INR PPP: 1.52 (ref 0.87–1.13)
KETONES UR STRIP.AUTO-MCNC: ABNORMAL MG/DL
LACTATE BLD-SCNC: 1.6 MMOL/L (ref 0.5–2)
LACTATE BLD-SCNC: 1.9 MMOL/L (ref 0.5–2)
LACTATE BLD-SCNC: 3.4 MMOL/L (ref 0.5–2)
LEUKOCYTE ESTERASE UR QL STRIP.AUTO: NEGATIVE
LYMPHOCYTES # BLD AUTO: 1.31 K/UL (ref 1–4.8)
LYMPHOCYTES NFR BLD: 10.6 % (ref 22–41)
MCH RBC QN AUTO: 36 PG (ref 27–33)
MCHC RBC AUTO-ENTMCNC: 35 G/DL (ref 33.7–35.3)
MCV RBC AUTO: 103 FL (ref 81.4–97.8)
MICRO URNS: ABNORMAL
MONOCYTES # BLD AUTO: 1.62 K/UL (ref 0–0.85)
MONOCYTES NFR BLD AUTO: 13.1 % (ref 0–13.4)
NEUTROPHILS # BLD AUTO: 9.32 K/UL (ref 1.82–7.42)
NEUTROPHILS NFR BLD: 75.1 % (ref 44–72)
NITRITE UR QL STRIP.AUTO: POSITIVE
NRBC # BLD AUTO: 0 K/UL
NRBC BLD-RTO: 0 /100 WBC
PH UR STRIP.AUTO: 5 [PH] (ref 5–8)
PLATELET # BLD AUTO: 184 K/UL (ref 164–446)
PMV BLD AUTO: 11.4 FL (ref 9–12.9)
POTASSIUM SERPL-SCNC: 3.8 MMOL/L (ref 3.6–5.5)
PROCALCITONIN SERPL-MCNC: 0.48 NG/ML
PROT SERPL-MCNC: 7.2 G/DL (ref 6–8.2)
PROT UR QL STRIP: 30 MG/DL
PROTHROMBIN TIME: 18.8 SEC (ref 12–14.6)
RBC # BLD AUTO: 3.97 M/UL (ref 4.7–6.1)
RBC # URNS HPF: ABNORMAL /HPF
RBC UR QL AUTO: NEGATIVE
RENAL EPI CELLS #/AREA URNS HPF: ABNORMAL /HPF
RSV RNA SPEC QL NAA+PROBE: NEGATIVE
SARS-COV-2 RNA RESP QL NAA+PROBE: NOTDETECTED
SODIUM SERPL-SCNC: 131 MMOL/L (ref 135–145)
SP GR UR STRIP.AUTO: >=1.03
SPECIMEN SOURCE: NORMAL
URATE SERPL-MCNC: 7.1 MG/DL (ref 2.5–8.3)
UROBILINOGEN UR STRIP.AUTO-MCNC: 2 MG/DL
WBC # BLD AUTO: 12.4 K/UL (ref 4.8–10.8)
WBC #/AREA URNS HPF: ABNORMAL /HPF

## 2021-02-22 PROCEDURE — 700111 HCHG RX REV CODE 636 W/ 250 OVERRIDE (IP): Performed by: EMERGENCY MEDICINE

## 2021-02-22 PROCEDURE — 81001 URINALYSIS AUTO W/SCOPE: CPT

## 2021-02-22 PROCEDURE — 700111 HCHG RX REV CODE 636 W/ 250 OVERRIDE (IP): Performed by: STUDENT IN AN ORGANIZED HEALTH CARE EDUCATION/TRAINING PROGRAM

## 2021-02-22 PROCEDURE — A9270 NON-COVERED ITEM OR SERVICE: HCPCS | Performed by: EMERGENCY MEDICINE

## 2021-02-22 PROCEDURE — 87086 URINE CULTURE/COLONY COUNT: CPT

## 2021-02-22 PROCEDURE — 83605 ASSAY OF LACTIC ACID: CPT | Mod: 91

## 2021-02-22 PROCEDURE — 82550 ASSAY OF CK (CPK): CPT

## 2021-02-22 PROCEDURE — 0241U HCHG SARS-COV-2 COVID-19 NFCT DS RESP RNA 4 TRGT MIC: CPT

## 2021-02-22 PROCEDURE — 71045 X-RAY EXAM CHEST 1 VIEW: CPT

## 2021-02-22 PROCEDURE — 85025 COMPLETE CBC W/AUTO DIFF WBC: CPT

## 2021-02-22 PROCEDURE — 80053 COMPREHEN METABOLIC PANEL: CPT

## 2021-02-22 PROCEDURE — 84145 PROCALCITONIN (PCT): CPT

## 2021-02-22 PROCEDURE — 87150 DNA/RNA AMPLIFIED PROBE: CPT | Mod: 91

## 2021-02-22 PROCEDURE — 700102 HCHG RX REV CODE 250 W/ 637 OVERRIDE(OP): Performed by: EMERGENCY MEDICINE

## 2021-02-22 PROCEDURE — 87077 CULTURE AEROBIC IDENTIFY: CPT

## 2021-02-22 PROCEDURE — G0378 HOSPITAL OBSERVATION PER HR: HCPCS

## 2021-02-22 PROCEDURE — 36415 COLL VENOUS BLD VENIPUNCTURE: CPT

## 2021-02-22 PROCEDURE — 96366 THER/PROPH/DIAG IV INF ADDON: CPT

## 2021-02-22 PROCEDURE — 700105 HCHG RX REV CODE 258: Performed by: EMERGENCY MEDICINE

## 2021-02-22 PROCEDURE — C9803 HOPD COVID-19 SPEC COLLECT: HCPCS | Performed by: EMERGENCY MEDICINE

## 2021-02-22 PROCEDURE — 99218 PR INITIAL OBSERVATION CARE,LEVL I: CPT | Performed by: STUDENT IN AN ORGANIZED HEALTH CARE EDUCATION/TRAINING PROGRAM

## 2021-02-22 PROCEDURE — 87040 BLOOD CULTURE FOR BACTERIA: CPT | Mod: 91

## 2021-02-22 PROCEDURE — A9270 NON-COVERED ITEM OR SERVICE: HCPCS | Performed by: STUDENT IN AN ORGANIZED HEALTH CARE EDUCATION/TRAINING PROGRAM

## 2021-02-22 PROCEDURE — 96365 THER/PROPH/DIAG IV INF INIT: CPT

## 2021-02-22 PROCEDURE — 84550 ASSAY OF BLOOD/URIC ACID: CPT

## 2021-02-22 PROCEDURE — 700105 HCHG RX REV CODE 258: Performed by: STUDENT IN AN ORGANIZED HEALTH CARE EDUCATION/TRAINING PROGRAM

## 2021-02-22 PROCEDURE — 85610 PROTHROMBIN TIME: CPT

## 2021-02-22 PROCEDURE — 99285 EMERGENCY DEPT VISIT HI MDM: CPT

## 2021-02-22 PROCEDURE — 86140 C-REACTIVE PROTEIN: CPT

## 2021-02-22 PROCEDURE — 700102 HCHG RX REV CODE 250 W/ 637 OVERRIDE(OP): Performed by: STUDENT IN AN ORGANIZED HEALTH CARE EDUCATION/TRAINING PROGRAM

## 2021-02-22 RX ORDER — COLESEVELAM 180 1/1
625 TABLET ORAL EVERY EVENING
Status: ON HOLD | COMMUNITY
End: 2022-07-07

## 2021-02-22 RX ORDER — BISACODYL 10 MG
10 SUPPOSITORY, RECTAL RECTAL
Status: DISCONTINUED | OUTPATIENT
Start: 2021-02-22 | End: 2021-02-23 | Stop reason: HOSPADM

## 2021-02-22 RX ORDER — COLCHICINE 0.6 MG/1
1.2 TABLET ORAL ONCE
Status: COMPLETED | OUTPATIENT
Start: 2021-02-22 | End: 2021-02-22

## 2021-02-22 RX ORDER — OXYCODONE HYDROCHLORIDE 5 MG/1
5 TABLET ORAL EVERY 4 HOURS PRN
Status: DISCONTINUED | OUTPATIENT
Start: 2021-02-22 | End: 2021-02-23 | Stop reason: HOSPADM

## 2021-02-22 RX ORDER — HYDROCODONE BITARTRATE AND ACETAMINOPHEN 5; 325 MG/1; MG/1
1 TABLET ORAL ONCE
Status: COMPLETED | OUTPATIENT
Start: 2021-02-22 | End: 2021-02-22

## 2021-02-22 RX ORDER — POLYETHYLENE GLYCOL 3350 17 G/17G
1 POWDER, FOR SOLUTION ORAL
Status: DISCONTINUED | OUTPATIENT
Start: 2021-02-22 | End: 2021-02-23 | Stop reason: HOSPADM

## 2021-02-22 RX ORDER — ACETAMINOPHEN 325 MG/1
650 TABLET ORAL EVERY 4 HOURS PRN
Status: DISCONTINUED | OUTPATIENT
Start: 2021-02-22 | End: 2021-02-23 | Stop reason: HOSPADM

## 2021-02-22 RX ORDER — ONDANSETRON 2 MG/ML
4 INJECTION INTRAMUSCULAR; INTRAVENOUS EVERY 4 HOURS PRN
Status: DISCONTINUED | OUTPATIENT
Start: 2021-02-22 | End: 2021-02-23 | Stop reason: HOSPADM

## 2021-02-22 RX ORDER — TRAMADOL HYDROCHLORIDE 50 MG/1
50 TABLET ORAL EVERY MORNING
Status: SHIPPED | COMMUNITY
End: 2022-05-05

## 2021-02-22 RX ORDER — ASCORBIC ACID 500 MG
500 TABLET ORAL DAILY
Status: SHIPPED | COMMUNITY
End: 2021-11-01

## 2021-02-22 RX ORDER — GUAIFENESIN/DEXTROMETHORPHAN 100-10MG/5
10 SYRUP ORAL EVERY 6 HOURS PRN
Status: DISCONTINUED | OUTPATIENT
Start: 2021-02-22 | End: 2021-02-23 | Stop reason: HOSPADM

## 2021-02-22 RX ORDER — ONDANSETRON 4 MG/1
4 TABLET, ORALLY DISINTEGRATING ORAL EVERY 4 HOURS PRN
Status: DISCONTINUED | OUTPATIENT
Start: 2021-02-22 | End: 2021-02-23 | Stop reason: HOSPADM

## 2021-02-22 RX ORDER — COLCHICINE 0.6 MG/1
0.6 TABLET ORAL 2 TIMES DAILY
Status: DISCONTINUED | OUTPATIENT
Start: 2021-02-23 | End: 2021-02-23 | Stop reason: HOSPADM

## 2021-02-22 RX ORDER — SOTALOL HYDROCHLORIDE 120 MG/1
120 TABLET ORAL 2 TIMES DAILY
Status: DISCONTINUED | OUTPATIENT
Start: 2021-02-22 | End: 2021-02-23 | Stop reason: HOSPADM

## 2021-02-22 RX ORDER — MAGNESIUM SULFATE HEPTAHYDRATE 40 MG/ML
2 INJECTION, SOLUTION INTRAVENOUS ONCE
Status: COMPLETED | OUTPATIENT
Start: 2021-02-22 | End: 2021-02-22

## 2021-02-22 RX ORDER — BENAZEPRIL HYDROCHLORIDE 20 MG/1
40 TABLET ORAL DAILY
Status: DISCONTINUED | OUTPATIENT
Start: 2021-02-23 | End: 2021-02-23 | Stop reason: HOSPADM

## 2021-02-22 RX ORDER — SODIUM CHLORIDE 9 MG/ML
INJECTION, SOLUTION INTRAVENOUS CONTINUOUS
Status: DISCONTINUED | OUTPATIENT
Start: 2021-02-22 | End: 2021-02-23

## 2021-02-22 RX ORDER — PRAVASTATIN SODIUM 20 MG
20 TABLET ORAL EVERY EVENING
Status: DISCONTINUED | OUTPATIENT
Start: 2021-02-22 | End: 2021-02-22

## 2021-02-22 RX ORDER — COLESEVELAM 180 1/1
625 TABLET ORAL DAILY
Status: DISCONTINUED | OUTPATIENT
Start: 2021-02-23 | End: 2021-02-23 | Stop reason: HOSPADM

## 2021-02-22 RX ORDER — COLCHICINE 0.6 MG/1
0.6 TABLET ORAL ONCE
Status: DISPENSED | OUTPATIENT
Start: 2021-02-22 | End: 2021-02-23

## 2021-02-22 RX ORDER — AMOXICILLIN 250 MG
2 CAPSULE ORAL 2 TIMES DAILY
Status: DISCONTINUED | OUTPATIENT
Start: 2021-02-22 | End: 2021-02-23 | Stop reason: HOSPADM

## 2021-02-22 RX ADMIN — COLCHICINE 1.2 MG: 0.6 TABLET, FILM COATED ORAL at 16:44

## 2021-02-22 RX ADMIN — HYDROCODONE BITARTRATE AND ACETAMINOPHEN 1 TABLET: 5; 325 TABLET ORAL at 12:44

## 2021-02-22 RX ADMIN — MAGNESIUM SULFATE 2 G: 2 INJECTION INTRAVENOUS at 18:54

## 2021-02-22 RX ADMIN — SOTALOL HYDROCHLORIDE 120 MG: 120 TABLET ORAL at 18:32

## 2021-02-22 RX ADMIN — SODIUM CHLORIDE: 9 INJECTION, SOLUTION INTRAVENOUS at 16:47

## 2021-02-22 RX ADMIN — CEFTRIAXONE SODIUM 2 G: 2 INJECTION, POWDER, FOR SOLUTION INTRAMUSCULAR; INTRAVENOUS at 11:46

## 2021-02-22 RX ADMIN — OXYCODONE 5 MG: 5 TABLET ORAL at 15:23

## 2021-02-22 ASSESSMENT — LIFESTYLE VARIABLES
ON A TYPICAL DAY WHEN YOU DRINK ALCOHOL HOW MANY DRINKS DO YOU HAVE: 5
TOTAL SCORE: 0
AUDITORY DISTURBANCES: NOT PRESENT
TOTAL SCORE: 4
TOTAL SCORE: 0
VISUAL DISTURBANCES: NOT PRESENT
ORIENTATION AND CLOUDING OF SENSORIUM: ORIENTED AND CAN DO SERIAL ADDITIONS
AUDITORY DISTURBANCES: NOT PRESENT
HOW MANY TIMES IN THE PAST YEAR HAVE YOU HAD 5 OR MORE DRINKS IN A DAY: 365
ANXIETY: NO ANXIETY (AT EASE)
TOTAL SCORE: 4
TREMOR: NO TREMOR
HEADACHE, FULLNESS IN HEAD: NOT PRESENT
HAVE YOU EVER FELT YOU SHOULD CUT DOWN ON YOUR DRINKING: YES
VISUAL DISTURBANCES: NOT PRESENT
PAROXYSMAL SWEATS: NO SWEAT VISIBLE
ORIENTATION AND CLOUDING OF SENSORIUM: ORIENTED AND CAN DO SERIAL ADDITIONS
NAUSEA AND VOMITING: NO NAUSEA AND NO VOMITING
EVER FELT BAD OR GUILTY ABOUT YOUR DRINKING: YES
CONSUMPTION TOTAL: POSITIVE
HAVE PEOPLE ANNOYED YOU BY CRITICIZING YOUR DRINKING: YES
PAROXYSMAL SWEATS: NO SWEAT VISIBLE
NAUSEA AND VOMITING: NO NAUSEA AND NO VOMITING
TOTAL SCORE: 4
AVERAGE NUMBER OF DAYS PER WEEK YOU HAVE A DRINK CONTAINING ALCOHOL: 7
HEADACHE, FULLNESS IN HEAD: NOT PRESENT
ANXIETY: NO ANXIETY (AT EASE)
DOES PATIENT WANT TO STOP DRINKING: NO
AGITATION: NORMAL ACTIVITY
AGITATION: NORMAL ACTIVITY
ALCOHOL_USE: YES
EVER HAD A DRINK FIRST THING IN THE MORNING TO STEADY YOUR NERVES TO GET RID OF A HANGOVER: YES
TREMOR: NO TREMOR

## 2021-02-22 ASSESSMENT — ENCOUNTER SYMPTOMS
BLURRED VISION: 0
HEADACHES: 0
SPUTUM PRODUCTION: 0
MYALGIAS: 0
DIZZINESS: 0
VOMITING: 0
DIARRHEA: 0
COUGH: 0
SHORTNESS OF BREATH: 1
CHILLS: 0
CONSTIPATION: 0
NERVOUS/ANXIOUS: 0
FOCAL WEAKNESS: 0
NAUSEA: 0
SINUS PAIN: 0
DOUBLE VISION: 0
PALPITATIONS: 0
SORE THROAT: 0
FEVER: 0
WHEEZING: 0
ABDOMINAL PAIN: 0

## 2021-02-22 ASSESSMENT — PATIENT HEALTH QUESTIONNAIRE - PHQ9
1. LITTLE INTEREST OR PLEASURE IN DOING THINGS: NOT AT ALL
2. FEELING DOWN, DEPRESSED, IRRITABLE, OR HOPELESS: NOT AT ALL
SUM OF ALL RESPONSES TO PHQ9 QUESTIONS 1 AND 2: 0

## 2021-02-22 ASSESSMENT — PAIN DESCRIPTION - PAIN TYPE
TYPE: CHRONIC PAIN
TYPE: ACUTE PAIN

## 2021-02-22 ASSESSMENT — FIBROSIS 4 INDEX: FIB4 SCORE: 2.65

## 2021-02-22 NOTE — ED TRIAGE NOTES
".  Chief Complaint   Patient presents with   • Gout     BIB [facility] for \"gout attack\" to entire body since yesterday. unable to move comfortably states everything is \"stiff\". major pain in neck and BLE region. denies other medical complaints. denies increased intake in red meats, nuts, seafood. \"refugio been sticking to my diet and dont why my gout flared up\" hx of this condition, not on any medications because \"it causes m,e diarrhea\"     ./65   Pulse 71   Temp 36.6 °C (97.8 °F) (Temporal)   Resp 14   Ht 1.829 m (6')   Wt 109 kg (240 lb)   SpO2 90%   BMI 32.55 kg/m²     "

## 2021-02-22 NOTE — ASSESSMENT & PLAN NOTE
- sick sinus syndrome s/p pacemaker placement & AICD for Paroxysmal Paroxysmal VT  - stable   -Patient is following up with outpatient cardiology every 6 mos  -Continue home sotalol and warfarin

## 2021-02-22 NOTE — ASSESSMENT & PLAN NOTE
- c/o Red, hot, tender ,swollen ankles bilaterally  -Known history of gout for 40 years, not on any medications because of medication intolerance with diarrhea  -Colchicine 1.5 mg once followed by colchicine 0.6 mg in 1 hour followed by colchicine 0.6 mg twice daily  -Consider starting allopurinol on discharge

## 2021-02-22 NOTE — ED NOTES
Med Rec complete per Pt and Pt's home pharmacy.  Allergies reviewed  Pt states no oral ABX in the last 14 days.    Pt states that he was taking Tramadol but was unsure of the strength. He stated that his last dose was on 2/17/21. Pt's home pharmacy had no record of Tramadol in his profile.

## 2021-02-22 NOTE — ASSESSMENT & PLAN NOTE
"-Patient and wife stated that he drinks whiskey every day, wife stated that \"he drinks too much\"  -Patient denied past medical history of alcohol withdrawal seizures or alcohol withdrawal signs/ symptoms   -Monitor for alcohol withdrawal signs & symptoms  "

## 2021-02-22 NOTE — ASSESSMENT & PLAN NOTE
-Neck dyspnea on exertion   - past medical history of valley fever  -Pt is following up with outpatient pulmonology once a year  -Oxygen per protocol

## 2021-02-22 NOTE — ASSESSMENT & PLAN NOTE
- sick sinus syndrome s/p pacemaker placement & AICD for Paroxysmal Paroxysmal VT  -Atrial fibrillation  -[id number] score 2  - stable   -Patient is following up with outpatient cardiology every 6 mos  -Continue home sotalol and warfarin

## 2021-02-22 NOTE — ASSESSMENT & PLAN NOTE
- Labs showed mild leukocytosis with WBC of 12, elevated L/A at 3.4  - UA positive for nitrates and some bacteria, WBC 0-2.  - Patient got a dose of ceftriaxone, IV fluid  -Leukocytosis may be a component of acute gouty arthritis  -Patient denied dysuria, urinary frequency and urgency  -Hold antibiotics for now since the patient got a dose of ceftriaxone for today   -Ordered procalcitonin  -will start antibiotic if worsening leukocytosis and procalcitonin came back positive

## 2021-02-22 NOTE — DISCHARGE PLANNING
Pt independent prior to admission, he does have a cane if needed.        Care Transition Team Assessment  (Karen, spouse~ [phone])    Information Source  Orientation : Oriented x 4  Information Given By: Patient  Who is responsible for making decisions for patient? : Patient     Elopement Risk  Legal Hold: No  Ambulatory or Self Mobile in Wheelchair: No-Not an Elopement Risk    Interdisciplinary Discharge Planning  Does Admitting Nurse Feel This Could be a Complex Discharge?: No  Primary Care Physician: Hipolito Hall  Lives with - Patient's Self Care Capacity: Spouse  Support Systems: Spouse / Significant Other  Do You Take your Prescribed Medications Regularly: Yes  Able to Return to Previous ADL's: Yes  Mobility Issues: No  Prior Services: None  Patient Prefers to be Discharged to:: home  Assistance Needed: Unknown at this Time  Durable Medical Equipment: Not Applicable    Discharge Preparedness  What is your plan after discharge?: Home with help  What are your discharge supports?: Spouse  Prior Functional Level: Independent with Activities of Daily Living  Difficulity with ADLs: None  Difficulity with IADLs: None    Functional Assesment  Prior Functional Level: Independent with Activities of Daily Living    Finances  Prescription Coverage: Yes    Advance Directive  Advance Directive?: None    Domestic Abuse  Have you ever been the victim of abuse or violence?: No    Discharge Risks or Barriers  Discharge risks or barriers?: No    Anticipated Discharge Information  Discharge Disposition: Still a Patient (30)  Discharge Address: [address] [place] [address]  Discharge Contact Phone Number: [phone]

## 2021-02-22 NOTE — PROGRESS NOTES
Inpatient Anticoagulation Service Note    Date: 2/23/2021      Hemoglobin Value: 14.3  Hematocrit Value: (!) 40.9  Lab Platelet Value: 184  Target INR: 2.0 to 3.0    INR from last 7 days     Date/Time INR Value    02/22/21 0830  (!) 1.52        Dose from last 7 days     Date/Time Dose (mg)    02/23/21 0036  10        Average Dose (mg): (Home regimen = 5 mg daily)  Bridge Therapy: No       Reversal Agent Administered: Not Applicable  Comments: Therapy with warfarin continues from home for history of atrial fibrillation. Baseline INR subtherapeutic. Home dosing regimen per [facility] outlined above.     Plan:  Will give larger dose of 10 mg to mobilize INR and continue home dosing thereafter. Pharmacy will continue to monitor INR trend for dose adjustments as appropriate.    Education Material Provided?: No(Chronic warfarin therapy)    Pharmacist suggested discharge dosing: TBD, potentially continue home dosing of 5 mg daily     Zach RaiD, BCPS

## 2021-02-22 NOTE — ED PROVIDER NOTES
"ED Provider Note    CHIEF COMPLAINT  Chief Complaint   Patient presents with   • Gout     BIB [facility] for \"gout attack\" to entire body since yesterday. unable to move comfortably states everything is \"stiff\". major pain in neck and BLE region. denies other medical complaints. denies increased intake in red meats, nuts, seafood. \"refugio been sticking to my diet and dont why my gout flared up\" hx of this condition, not on any medications because \"it causes m,e diarrhea\"         HPI  Mike Ferrell is a 70 y.o. male who presents to the emergency department for evaluation of body aches.  The patient comes in complaining of a possible gouty flare.  He states he has a history of gout but this is the worst gouty flare has ever had.  Typically it is just in his right ankle but now is in both ankles both on his shoulders and his neck.  He feels diffusely achy he feels occasionally chilled.  Denies any chest pain, cough, or shortness of breath.  Denies any fevers though he has had a chill.  Denies any known Covid exposure.  Denies any dysuria duration or frequency.  Denies any other acute concerns or complaints.  Did recently have his medicines changed to tramadol and new cholesterol medication last week.  He felt he had achy since that time.    REVIEW OF SYSTEMS  See HPI for further details. All other systems are negative.    PAST MEDICAL HISTORY  Past Medical History:   Diagnosis Date   • Arthritis    • Atrial fibrillation (HCC) 4/4/2012   • Methamphetamine use (HCC) none for many years   • Pain     shoulders   • Paroxysmal ventricular tachycardia (HCC) 4/4/2012   • Sinoatrial node dysfunction (HCC) 4/4/2012   • Syncope and collapse 4/4/2012       FAMILY HISTORY  History reviewed. No pertinent family history.    SOCIAL HISTORY  Social History     Socioeconomic History   • Marital status:      Spouse name: Not on file   • Number of children: Not on file   • Years of education: Not on file   • Highest education level: Not " on file   Occupational History   • Not on file   Tobacco Use   • Smoking status: Never Smoker   • Smokeless tobacco: Never Used   Substance and Sexual Activity   • Alcohol use: Yes     Comment: 4-5 per day   • Drug use: No   • Sexual activity: Not on file   Other Topics Concern   • Not on file   Social History Narrative   • Not on file     Social Determinants of Health     Financial Resource Strain:    • Difficulty of Paying Living Expenses:    Food Insecurity:    • Worried About Running Out of Food in the Last Year:    • Ran Out of Food in the Last Year:    Transportation Needs:    • Lack of Transportation (Medical):    • Lack of Transportation (Non-Medical):    Physical Activity:    • Days of Exercise per Week:    • Minutes of Exercise per Session:    Stress:    • Feeling of Stress :    Social Connections:    • Frequency of Communication with Friends and Family:    • Frequency of Social Gatherings with Friends and Family:    • Attends Mu-ism Services:    • Active Member of Clubs or Organizations:    • Attends Club or Organization Meetings:    • Marital Status:    Intimate Partner Violence:    • Fear of Current or Ex-Partner:    • Emotionally Abused:    • Physically Abused:    • Sexually Abused:        SURGICAL HISTORY  Past Surgical History:   Procedure Laterality Date   • AICD IMPLANT  2010   • PACEMAKER INSERTION  2009   • CHOLECYSTECTOMY  1999   • TONSILLECTOMY  1953       CURRENT MEDICATIONS  Home Medications     Reviewed by Ирина Miller R.N. (Registered Nurse) on 02/22/21 at 0834  Med List Status: Not Addressed   Medication Last Dose Status   Acetaminophen (TYLENOL ARTHRITIS PAIN PO)  Active   amlodipine (NORVASC) 5 MG TABS  Active   B Complex Vitamins (B COMPLEX PO)  Active   benazepril (LOTENSIN) 40 MG tablet  Active   Cholecalciferol (VITAMIN D3) 5000 units Cap  Active   hydrochlorothiazide (MICROZIDE) 12.5 MG capsule  Active   potassium chloride ER (KLOR-CON) 10 MEQ tablet  Active   potassium chloride  SA (KDUR) 20 MEQ Tab CR  Active   pravastatin (PRAVACHOL) 20 MG Tab  Active   sotalol (BETAPACE) 120 MG tablet  Active   warfarin (COUMADIN) 5 MG Tab  Active                ALLERGIES  Allergies   Allergen Reactions   • Lipitor [Atorvastatin]    • Sulfa Drugs Rash and Itching     Rash, itch       PHYSICAL EXAM  VITAL SIGNS: /65   Pulse 71   Temp 36.6 °C (97.8 °F) (Temporal)   Resp 14   Ht 1.829 m (6')   Wt 109 kg (240 lb)   SpO2 90%   BMI 32.55 kg/m²    Constitutional: Awake, ill-appearing, appears uncomfortable no acute distress  HENT: Normocephalic, Atraumatic, Bilateral external ears normal,  Eyes: PERRL, EOMI, Conjunctiva normal, No discharge.   Neck: Normal range of motion  Cardiovascular: Normal heart rate, Normal rhythm, No murmurs, No rubs, No gallops.   Thorax & Lungs: Normal breath sounds, No respiratory distress, No wheezing  Abdomen: Bowel sounds normal, Soft, No tenderness.   Skin: Warm, Dry, No erythema, No rash.   Musculoskeletal: Good range of motion in all major joints.  Right lower extremities tenderness and swelling around the ankle the foot is mildly warm.  This is consistent with gout.  Good perfusion.  Left ankle is chronically swollen but not red or hot.  Upper extremities showed no focal tenderness or swelling around the joints.  Neurologic: Alert, No focal deficits noted.   Psychiatric: Affect anxious      Results for orders placed or performed during the hospital encounter of 02/22/21   LACTIC ACID   Result Value Ref Range    Lactic Acid 3.4 (H) 0.5 - 2.0 mmol/L   LACTIC ACID   Result Value Ref Range    Lactic Acid 1.9 0.5 - 2.0 mmol/L   LACTIC ACID   Result Value Ref Range    Lactic Acid 1.6 0.5 - 2.0 mmol/L   CBC WITH DIFFERENTIAL   Result Value Ref Range    WBC 12.4 (H) 4.8 - 10.8 K/uL    RBC 3.97 (L) 4.70 - 6.10 M/uL    Hemoglobin 14.3 14.0 - 18.0 g/dL    Hematocrit 40.9 (L) 42.0 - 52.0 %    .0 (H) 81.4 - 97.8 fL    MCH 36.0 (H) 27.0 - 33.0 pg    MCHC 35.0 33.7 - 35.3  g/dL    RDW 46.8 35.9 - 50.0 fL    Platelet Count 184 164 - 446 K/uL    MPV 11.4 9.0 - 12.9 fL    Neutrophils-Polys 75.10 (H) 44.00 - 72.00 %    Lymphocytes 10.60 (L) 22.00 - 41.00 %    Monocytes 13.10 0.00 - 13.40 %    Eosinophils 0.40 0.00 - 6.90 %    Basophils 0.50 0.00 - 1.80 %    Immature Granulocytes 0.30 0.00 - 0.90 %    Nucleated RBC 0.00 /100 WBC    Neutrophils (Absolute) 9.32 (H) 1.82 - 7.42 K/uL    Lymphs (Absolute) 1.31 1.00 - 4.80 K/uL    Monos (Absolute) 1.62 (H) 0.00 - 0.85 K/uL    Eos (Absolute) 0.05 0.00 - 0.51 K/uL    Baso (Absolute) 0.06 0.00 - 0.12 K/uL    Immature Granulocytes (abs) 0.04 0.00 - 0.11 K/uL    NRBC (Absolute) 0.00 K/uL   COMP METABOLIC PANEL   Result Value Ref Range    Sodium 131 (L) 135 - 145 mmol/L    Potassium 3.8 3.6 - 5.5 mmol/L    Chloride 93 (L) 96 - 112 mmol/L    Co2 22 20 - 33 mmol/L    Anion Gap 16.0 7.0 - 16.0    Glucose 109 (H) 65 - 99 mg/dL    Bun 14 8 - 22 mg/dL    Creatinine 0.92 0.50 - 1.40 mg/dL    Calcium 9.4 8.5 - 10.5 mg/dL    AST(SGOT) 17 12 - 45 U/L    ALT(SGPT) 14 2 - 50 U/L    Alkaline Phosphatase 89 30 - 99 U/L    Total Bilirubin 2.2 (H) 0.1 - 1.5 mg/dL    Albumin 4.2 3.2 - 4.9 g/dL    Total Protein 7.2 6.0 - 8.2 g/dL    Globulin 3.0 1.9 - 3.5 g/dL    A-G Ratio 1.4 g/dL   URINALYSIS    Specimen: Urine   Result Value Ref Range    Color Yellow     Character Clear     Specific Gravity >=1.030 <1.035    Ph 5.0 5.0 - 8.0    Glucose Negative Negative mg/dL    Ketones Trace (A) Negative mg/dL    Protein 30 (A) Negative mg/dL    Bilirubin Moderate (A) Negative    Urobilinogen, Urine 2.0 Negative    Nitrite Positive (A) Negative    Leukocyte Esterase Negative Negative    Occult Blood Negative Negative    Micro Urine Req Microscopic    COV-2, FLU A/B, AND RSV BY PCR (2-4 HOURS CEPHEID): Collect NP swab in VTM    Specimen: Respirate   Result Value Ref Range    Influenza virus A RNA Negative Negative    Influenza virus B, PCR Negative Negative    RSV, PCR Negative  Negative    SARS-CoV-2 by PCR NotDetected     SARS-CoV-2 Source NP Swab    CREATINE KINASE   Result Value Ref Range    CPK Total 60 0 - 154 U/L   CRP QUANTITIVE (NON-CARDIAC)   Result Value Ref Range    Stat C-Reactive Protein 25.18 (H) 0.00 - 0.75 mg/dL   URIC ACID   Result Value Ref Range    Uric Acid 7.1 2.5 - 8.3 mg/dL   ESTIMATED GFR   Result Value Ref Range    GFR If African American >60 >60 mL/min/1.73 m 2    GFR If Non African American >60 >60 mL/min/1.73 m 2   URINE MICROSCOPIC (W/UA)   Result Value Ref Range    WBC 0-2 (A) /hpf    RBC Rare /hpf    Bacteria Few (A) None /hpf    Epithelial Cells Few /hpf    Epithelial Cells Renal Rare /hpf   Blood Culture,Hold   Result Value Ref Range    Blood Culture Hold Collected         RADIOLOGY/PROCEDURES  DX-CHEST-PORTABLE (1 VIEW)   Final Result      No acute cardiac or pulmonary abnormality is noted.            COURSE & MEDICAL DECISION MAKING  Pertinent Labs & Imaging studies reviewed. (See chart for details)    Patient presents with a gouty flare in his right foot but diffuse pain throughout his entire body including his arms, neck, and legs.  He states he feels weak and is having hard time getting up and moving around.  He states he feels like he altered his whole body.  Is never had any gouty attack quite like this were involved his whole body.    The patient certainly could have gout but a broader differential diagnosis was considered including sepsis, COVID-19, dehydration, electrolyte abnormality to name a few.    The patient has a leukocytosis and a relatively soft blood pressure.  With a lactic acidosis and elevated CRP.  He is cultured empirically given dose of antibiotics.  This could be sepsis.    The patient also could have a gouty flare.  He deftly has gout in his right foot which is red hot and swollen.  Do not believe this is cellulitis.  I spoke with his wife he states he is having hard time getting up and getting around and care himself with a  decreased intake of food and fluid at home.  And is generally not doing well.    At this point I suspect he does have gotten concerned about a possible infection.  The patient has the above described labs.  I have given empiric dose of antibiotics and be hospitalized because he is weak and having hard time getting up and walking.  This may be all just pain related to gout and this is the case patient can be better pain management or possibly colchicine for acute gout while in the hospital.    His uric acid is not particularly high.    The patient will be hospitalized for further work-up and treatment I discussed the case with hospitalist at that time.    FINAL IMPRESSION  1. Acute gout of right foot, unspecified cause     2. Myalgia     3. Weakness         3.         Electronically signed by: Rashid Franco M.D., 2/22/2021 8:45 AM

## 2021-02-23 ENCOUNTER — PATIENT OUTREACH (OUTPATIENT)
Dept: HEALTH INFORMATION MANAGEMENT | Facility: OTHER | Age: 71
End: 2021-02-23

## 2021-02-23 VITALS
SYSTOLIC BLOOD PRESSURE: 125 MMHG | WEIGHT: 240 LBS | OXYGEN SATURATION: 96 % | HEIGHT: 72 IN | RESPIRATION RATE: 18 BRPM | HEART RATE: 70 BPM | BODY MASS INDEX: 32.51 KG/M2 | DIASTOLIC BLOOD PRESSURE: 68 MMHG | TEMPERATURE: 98.6 F

## 2021-02-23 LAB
ALBUMIN SERPL BCP-MCNC: 3.4 G/DL (ref 3.2–4.9)
ALBUMIN/GLOB SERPL: 1 G/DL
ALP SERPL-CCNC: 78 U/L (ref 30–99)
ALT SERPL-CCNC: 12 U/L (ref 2–50)
ANION GAP SERPL CALC-SCNC: 11 MMOL/L (ref 7–16)
AST SERPL-CCNC: 15 U/L (ref 12–45)
BASOPHILS # BLD AUTO: 0.5 % (ref 0–1.8)
BASOPHILS # BLD: 0.04 K/UL (ref 0–0.12)
BILIRUB SERPL-MCNC: 1.1 MG/DL (ref 0.1–1.5)
BUN SERPL-MCNC: 20 MG/DL (ref 8–22)
CALCIUM SERPL-MCNC: 8.9 MG/DL (ref 8.5–10.5)
CHLORIDE SERPL-SCNC: 95 MMOL/L (ref 96–112)
CO2 SERPL-SCNC: 28 MMOL/L (ref 20–33)
CREAT SERPL-MCNC: 0.89 MG/DL (ref 0.5–1.4)
EOSINOPHIL # BLD AUTO: 0.08 K/UL (ref 0–0.51)
EOSINOPHIL NFR BLD: 0.9 % (ref 0–6.9)
ERYTHROCYTE [DISTWIDTH] IN BLOOD BY AUTOMATED COUNT: 45.8 FL (ref 35.9–50)
GLOBULIN SER CALC-MCNC: 3.4 G/DL (ref 1.9–3.5)
GLUCOSE SERPL-MCNC: 99 MG/DL (ref 65–99)
HCT VFR BLD AUTO: 35.5 % (ref 42–52)
HGB BLD-MCNC: 12.1 G/DL (ref 14–18)
IMM GRANULOCYTES # BLD AUTO: 0.03 K/UL (ref 0–0.11)
IMM GRANULOCYTES NFR BLD AUTO: 0.3 % (ref 0–0.9)
INR PPP: 1.61 (ref 0.87–1.13)
LYMPHOCYTES # BLD AUTO: 1.1 K/UL (ref 1–4.8)
LYMPHOCYTES NFR BLD: 12.4 % (ref 22–41)
MCH RBC QN AUTO: 35.5 PG (ref 27–33)
MCHC RBC AUTO-ENTMCNC: 34.1 G/DL (ref 33.7–35.3)
MCV RBC AUTO: 104.1 FL (ref 81.4–97.8)
MONOCYTES # BLD AUTO: 1.4 K/UL (ref 0–0.85)
MONOCYTES NFR BLD AUTO: 15.8 % (ref 0–13.4)
NEUTROPHILS # BLD AUTO: 6.22 K/UL (ref 1.82–7.42)
NEUTROPHILS NFR BLD: 70.1 % (ref 44–72)
NRBC # BLD AUTO: 0 K/UL
NRBC BLD-RTO: 0 /100 WBC
PLATELET # BLD AUTO: 167 K/UL (ref 164–446)
PMV BLD AUTO: 11.2 FL (ref 9–12.9)
POTASSIUM SERPL-SCNC: 3.5 MMOL/L (ref 3.6–5.5)
PROT SERPL-MCNC: 6.8 G/DL (ref 6–8.2)
PROTHROMBIN TIME: 19.7 SEC (ref 12–14.6)
RBC # BLD AUTO: 3.41 M/UL (ref 4.7–6.1)
SODIUM SERPL-SCNC: 134 MMOL/L (ref 135–145)
WBC # BLD AUTO: 8.9 K/UL (ref 4.8–10.8)

## 2021-02-23 PROCEDURE — G0378 HOSPITAL OBSERVATION PER HR: HCPCS

## 2021-02-23 PROCEDURE — 700102 HCHG RX REV CODE 250 W/ 637 OVERRIDE(OP): Performed by: NURSE PRACTITIONER

## 2021-02-23 PROCEDURE — 85610 PROTHROMBIN TIME: CPT

## 2021-02-23 PROCEDURE — A9270 NON-COVERED ITEM OR SERVICE: HCPCS | Performed by: NURSE PRACTITIONER

## 2021-02-23 PROCEDURE — 85025 COMPLETE CBC W/AUTO DIFF WBC: CPT

## 2021-02-23 PROCEDURE — 80053 COMPREHEN METABOLIC PANEL: CPT

## 2021-02-23 PROCEDURE — 99217 PR OBSERVATION CARE DISCHARGE: CPT | Performed by: INTERNAL MEDICINE

## 2021-02-23 PROCEDURE — A9270 NON-COVERED ITEM OR SERVICE: HCPCS | Performed by: STUDENT IN AN ORGANIZED HEALTH CARE EDUCATION/TRAINING PROGRAM

## 2021-02-23 PROCEDURE — 700105 HCHG RX REV CODE 258: Performed by: STUDENT IN AN ORGANIZED HEALTH CARE EDUCATION/TRAINING PROGRAM

## 2021-02-23 PROCEDURE — 700111 HCHG RX REV CODE 636 W/ 250 OVERRIDE (IP): Performed by: NURSE PRACTITIONER

## 2021-02-23 PROCEDURE — 700102 HCHG RX REV CODE 250 W/ 637 OVERRIDE(OP): Performed by: STUDENT IN AN ORGANIZED HEALTH CARE EDUCATION/TRAINING PROGRAM

## 2021-02-23 RX ORDER — WARFARIN SODIUM 2.5 MG/1
5 TABLET ORAL DAILY
Status: DISCONTINUED | OUTPATIENT
Start: 2021-02-23 | End: 2021-02-23

## 2021-02-23 RX ORDER — POTASSIUM CHLORIDE 20 MEQ/1
20 TABLET, EXTENDED RELEASE ORAL ONCE
Status: COMPLETED | OUTPATIENT
Start: 2021-02-23 | End: 2021-02-23

## 2021-02-23 RX ORDER — COLCHICINE 0.6 MG/1
0.6 TABLET ORAL 2 TIMES DAILY
Qty: 30 TABLET | Refills: 0 | Status: ON HOLD | OUTPATIENT
Start: 2021-02-23 | End: 2021-12-20

## 2021-02-23 RX ORDER — WARFARIN SODIUM 7.5 MG/1
7.5 TABLET ORAL DAILY
Status: DISCONTINUED | OUTPATIENT
Start: 2021-02-23 | End: 2021-02-23 | Stop reason: HOSPADM

## 2021-02-23 RX ORDER — WARFARIN SODIUM 10 MG/1
10 TABLET ORAL ONCE
Status: COMPLETED | OUTPATIENT
Start: 2021-02-23 | End: 2021-02-23

## 2021-02-23 RX ORDER — PREDNISONE 20 MG/1
20 TABLET ORAL DAILY
Status: DISCONTINUED | OUTPATIENT
Start: 2021-02-23 | End: 2021-02-23

## 2021-02-23 RX ADMIN — SOTALOL HYDROCHLORIDE 120 MG: 120 TABLET ORAL at 05:24

## 2021-02-23 RX ADMIN — PREDNISONE 20 MG: 20 TABLET ORAL at 08:58

## 2021-02-23 RX ADMIN — DOCUSATE SODIUM 50 MG AND SENNOSIDES 8.6 MG 2 TABLET: 8.6; 5 TABLET, FILM COATED ORAL at 05:23

## 2021-02-23 RX ADMIN — WARFARIN SODIUM 10 MG: 10 TABLET ORAL at 02:13

## 2021-02-23 RX ADMIN — POTASSIUM CHLORIDE 20 MEQ: 1500 TABLET, EXTENDED RELEASE ORAL at 08:58

## 2021-02-23 RX ADMIN — COLESEVELAM HYDROCHLORIDE 625 MG: 625 TABLET, FILM COATED ORAL at 05:24

## 2021-02-23 RX ADMIN — BENAZEPRIL HYDROCHLORIDE 40 MG: 20 TABLET ORAL at 05:23

## 2021-02-23 RX ADMIN — COLCHICINE 0.6 MG: 0.6 TABLET, FILM COATED ORAL at 05:25

## 2021-02-23 RX ADMIN — OXYCODONE 5 MG: 5 TABLET ORAL at 00:25

## 2021-02-23 RX ADMIN — SODIUM CHLORIDE: 9 INJECTION, SOLUTION INTRAVENOUS at 05:28

## 2021-02-23 ASSESSMENT — LIFESTYLE VARIABLES
HEADACHE, FULLNESS IN HEAD: NOT PRESENT
TOTAL SCORE: 4
NAUSEA AND VOMITING: NO NAUSEA AND NO VOMITING
TREMOR: *
PAROXYSMAL SWEATS: NO SWEAT VISIBLE
ORIENTATION AND CLOUDING OF SENSORIUM: ORIENTED AND CAN DO SERIAL ADDITIONS
AUDITORY DISTURBANCES: NOT PRESENT
AGITATION: NORMAL ACTIVITY
VISUAL DISTURBANCES: MILD SENSITIVITY
ANXIETY: NO ANXIETY (AT EASE)

## 2021-02-23 ASSESSMENT — CHA2DS2 SCORE
HYPERTENSION: YES
AGE 65 TO 74: YES
CHA2DS2 VASC SCORE: 2
VASCULAR DISEASE: NO
PRIOR STROKE OR TIA OR THROMBOEMBOLISM: NO
SEX: MALE
AGE 75 OR GREATER: NO
DIABETES: NO
CHF OR LEFT VENTRICULAR DYSFUNCTION: NO

## 2021-02-23 ASSESSMENT — PAIN DESCRIPTION - PAIN TYPE: TYPE: CHRONIC PAIN

## 2021-02-23 NOTE — PROGRESS NOTES
Pt arrived to unit via gurney at 1616. Pt oriented to room, unit, and plan of care. Tele-monitor placed. All questions answered at this time. Call light within reach, fall precautions in place, will continue to monitor.

## 2021-02-23 NOTE — PROGRESS NOTES
Assessment completed. Pt A&Ox4.  Pt reports 7/10 generalized pain, medicated per MAR.  Sinus rhythm on telemetry monitor.  POC discussed, communication board updated.    Bed in locked, lowest position. Call light and belongings within reach.  All needs met at this time.

## 2021-02-23 NOTE — DISCHARGE INSTRUCTIONS
"Discharge instructions:    DIET: Refer to \"Low Purine\" handout for diet choices     ACTIVITY: Resume previous level of activities.    DIAGNOSIS: Acute gouty arthritis     MEDICATIONS: Continue Colchicine twice daily until gout flare resolves, then STOP.      Return to ER if fever greater than 38 degree Celsius or 100.4 degree Fahrenheit, worsening pain, chest pain at rest or associated with exertion, worsening shortness of breath, bloody coughs, bloody bowel movement, severe unrelenting abdominal pain, focal weakness.    BRODY Orozco         Gout    Gout is a condition that causes painful swelling of the joints. Gout is a type of inflammation of the joints (arthritis). This condition is caused by having too much uric acid in the body. Uric acid is a chemical that forms when the body breaks down substances called purines. Purines are important for building body proteins.  When the body has too much uric acid, sharp crystals can form and build up inside the joints. This causes pain and swelling. Gout attacks can happen quickly and may be very painful (acute gout). Over time, the attacks can affect more joints and become more frequent (chronic gout). Gout can also cause uric acid to build up under the skin and inside the kidneys.  What are the causes?  This condition is caused by too much uric acid in your blood. This can happen because:  · Your kidneys do not remove enough uric acid from your blood. This is the most common cause.  · Your body makes too much uric acid. This can happen with some cancers and cancer treatments. It can also occur if your body is breaking down too many red blood cells (hemolytic anemia).  · You eat too many foods that are high in purines. These foods include organ meats and some seafood. Alcohol, especially beer, is also high in purines.  A gout attack may be triggered by trauma or stress.  What increases the risk?  You are more likely to develop this condition if " you:  · Have a family history of gout.  · Are male and middle-aged.  · Are female and have gone through menopause.  · Are obese.  · Frequently drink alcohol, especially beer.  · Are dehydrated.  · Lose weight too quickly.  · Have an organ transplant.  · Have lead poisoning.  · Take certain medicines, including aspirin, cyclosporine, diuretics, levodopa, and niacin.  · Have kidney disease.  · Have a skin condition called psoriasis.  What are the signs or symptoms?  An attack of acute gout happens quickly. It usually occurs in just one joint. The most common place is the big toe. Attacks often start at night. Other joints that may be affected include joints of the feet, ankle, knee, fingers, wrist, or elbow. Symptoms of this condition may include:  · Severe pain.  · Warmth.  · Swelling.  · Stiffness.  · Tenderness. The affected joint may be very painful to touch.  · Shiny, red, or purple skin.  · Chills and fever.  Chronic gout may cause symptoms more frequently. More joints may be involved. You may also have white or yellow lumps (tophi) on your hands or feet or in other areas near your joints.  How is this diagnosed?  This condition is diagnosed based on your symptoms, medical history, and physical exam. You may have tests, such as:  · Blood tests to measure uric acid levels.  · Removal of joint fluid with a thin needle (aspiration) to look for uric acid crystals.  · X-rays to look for joint damage.  How is this treated?  Treatment for this condition has two phases: treating an acute attack and preventing future attacks. Acute gout treatment may include medicines to reduce pain and swelling, including:  · NSAIDs.  · Steroids. These are strong anti-inflammatory medicines that can be taken by mouth (orally) or injected into a joint.  · Colchicine. This medicine relieves pain and swelling when it is taken soon after an attack. It can be given by mouth or through an IV.  Preventive treatment may include:  · Daily use of  smaller doses of NSAIDs or colchicine.  · Use of a medicine that reduces uric acid levels in your blood.  · Changes to your diet. You may need to see a dietitian about what to eat and drink to prevent gout.  Follow these instructions at home:  During a gout attack    · If directed, put ice on the affected area:  ? Put ice in a plastic bag.  ? Place a towel between your skin and the bag.  ? Leave the ice on for 20 minutes, 2-3 times a day.  · Raise (elevate) the affected joint above the level of your heart as often as possible.  · Rest the joint as much as possible. If the affected joint is in your leg, you may be given crutches to use.  · Follow instructions from your health care provider about eating or drinking restrictions.  Avoiding future gout attacks  · Follow a low-purine diet as told by your dietitian or health care provider. Avoid foods and drinks that are high in purines, including liver, kidney, anchovies, asparagus, herring, mushrooms, mussels, and beer.  · Maintain a healthy weight or lose weight if you are overweight. If you want to lose weight, talk with your health care provider. It is important that you do not lose weight too quickly.  · Start or maintain an exercise program as told by your health care provider.  Eating and drinking  · Drink enough fluids to keep your urine pale yellow.  · If you drink alcohol:  ? Limit how much you use to:  § 0-1 drink a day for women.  § 0-2 drinks a day for men.  ? Be aware of how much alcohol is in your drink. In the [place] one drink equals one 12 oz bottle of beer (355 mL) one 5 oz glass of wine (148 mL), or one 1½ oz glass of hard liquor (44 mL).  General instructions  · Take over-the-counter and prescription medicines only as told by your health care provider.  · Do not drive or use heavy machinery while taking prescription pain medicine.  · Return to your normal activities as told by your health care provider. Ask your health care provider what activities are  safe for you.  · Keep all follow-up visits as told by your health care provider. This is important.  Contact a health care provider if you have:  · Another gout attack.  · Continuing symptoms of a gout attack after 10 days of treatment.  · Side effects from your medicines.  · Chills or a fever.  · Burning pain when you urinate.  · Pain in your lower back or belly.  Get help right away if you:  · Have severe or uncontrolled pain.  · Cannot urinate.  Summary  · Gout is painful swelling of the joints caused by inflammation.  · The most common site of pain is the big toe, but it can affect other joints in the body.  · Medicines and dietary changes can help to prevent and treat gout attacks.  This information is not intended to replace advice given to you by your health care provider. Make sure you discuss any questions you have with your health care provider.  Document Released: 12/15/2001 Document Revised: 07/10/2019 Document Reviewed: 07/10/2019  [employer] Patient Education © 2020 [employer]        Low-Purine Eating Plan  A low-purine eating plan involves making food choices to limit your intake of purine. Purine is a kind of uric acid. Too much uric acid in your blood can cause certain conditions, such as gout and kidney stones. Eating a low-purine diet can help control these conditions.  What are tips for following this plan?  Reading food labels    Avoid foods with saturated or Trans fat.  Check the ingredient list of grains-based foods, such as bread and cereal, to make sure that they contain whole grains.  Check the ingredient list of sauces or soups to make sure they do not contain meat or fish.  When choosing soft drinks, check the ingredient list to make sure they do not contain high-fructose corn syrup.  Shopping  Buy plenty of fresh fruits and vegetables.  Avoid buying canned or fresh fish.  Buy dairy products labeled as low-fat or nonfat.  Avoid buying premade or processed foods. These foods are often  high in fat, salt (sodium), and added sugar.  Cooking  Use olive oil instead of butter when cooking. Oils like olive oil, canola oil, and sunflower oil contain healthy fats.  Meal planning  Learn which foods do or do not affect you. If you find out that a food tends to cause your gout symptoms to flare up, avoid eating that food. You can enjoy foods that do not cause problems. If you have any questions about a food item, talk with your dietitian or health care provider.  Limit foods high in fat, especially saturated fat. Fat makes it harder for your body to get rid of uric acid.  Choose foods that are lower in fat and are lean sources of protein.  General guidelines  Limit alcohol intake to no more than 1 drink a day for nonpregnant women and 2 drinks a day for men. One drink equals 12 oz of beer, 5 oz of wine, or 1½ oz of hard liquor. Alcohol can affect the way your body gets rid of uric acid.  Drink plenty of water to keep your urine clear or pale yellow. Fluids can help remove uric acid from your body.  If directed by your health care provider, take a vitamin C supplement.  Work with your health care provider and dietitian to develop a plan to achieve or maintain a healthy weight. Losing weight can help reduce uric acid in your blood.  What foods are recommended?  The items listed may not be a complete list. Talk with your dietitian about what dietary choices are best for you.  Foods low in purines  Foods low in purines do not need to be limited. These include:  All fruits.  All low-purine vegetables, pickles, and olives.  Breads, pasta, rice, cornbread, and popcorn. Cake and other baked goods.  All dairy foods.  Eggs, nuts, and nut butters.  Spices and condiments, such as salt, herbs, and vinegar.  Plant oils, butter, and margarine.  Water, sugar-free soft drinks, tea, coffee, and cocoa.  Vegetable-based soups, broths, sauces, and gravies.  Foods moderate in purines  Foods moderate in purines should be limited  to the amounts listed.  ½ cup of asparagus, cauliflower, spinach, mushrooms, or green peas, each day.  2/3 cup uncooked oatmeal, each day.  ¼ cup dry wheat bran or wheat germ, each day.  2-3 ounces of meat or poultry, each day.  4-6 ounces of shellfish, such as crab, lobster, oysters, or shrimp, each day.  1 cup cooked beans, peas, or lentils, each day.  Soup, broths, or bouillon made from meat or fish. Limit these foods as much as possible.  What foods are not recommended?  The items listed may not be a complete list. Talk with your dietitian about what dietary choices are best for you.  Limit your intake of foods high in purines, including:  Beer and other alcohol.  Meat-based gravy or sauce.  Canned or fresh fish, such as:  Anchovies, sardines, herring, and tuna.  Mussels and scallops.  Codfish, trout, and dara.  Jhaveri.  Organ meats, such as:  Liver or kidney.  [place]pe.  Sweetbreads (thymus gland or pancreas).  Wild game or goose.  Yeast or yeast extract supplements.  Drinks sweetened with high-fructose corn syrup.  Summary  Eating a low-purine diet can help control conditions caused by too much uric acid in the body, such as gout or kidney stones.  Choose low-purine foods, limit alcohol, and limit foods high in fat.  You will learn over time which foods do or do not affect you. If you find out that a food tends to cause your gout symptoms to flare up, avoid eating that food.  This information is not intended to replace advice given to you by your health care provider. Make sure you discuss any questions you have with your health care provider.  Document Released: 04/13/2012 Document Revised: 11/30/2018 Document Reviewed: 01/31/2018  [employer] Patient Education © 2020 [employer]    Discharge Instructions    Discharged to home by car with relative. Discharged via wheelchair, hospital escort: Yes.  Special equipment needed: Not Applicable    Be sure to schedule a follow-up appointment with your primary care  doctor or any specialists as instructed.     Discharge Plan:   Diet Plan: Discussed  Activity Level: Discussed  Confirmed Follow up Appointment: Appointment Scheduled  Confirmed Symptoms Management: Discussed  Medication Reconciliation Updated: Yes  Influenza Vaccine Indication: Patient Refuses    I understand that a diet low in cholesterol, fat, and sodium is recommended for good health. Unless I have been given specific instructions below for another diet, I accept this instruction as my diet prescription.   Other diet: Low Purine Diet    Special Instructions: None    · Is patient discharged on Warfarin / Coumadin?   Yes    You are receiving the drug warfarin. Please understand the importance of monitoring warfarin with scheduled PT/INR blood draws.  Follow-up with a call to your personal Doctor's office in 3 days to schedule a PT/INR. .    IMPORTANT: HOW TO USE THIS INFORMATION:  This is a summary and does NOT have all possible information about this product. This information does not assure that this product is safe, effective, or appropriate for you. This information is not individual medical advice and does not substitute for the advice of your health care professional. Always ask your health care professional for complete information about this product and your specific health needs.      WARFARIN - ORAL (WARF-uh-rin)      COMMON BRAND NAME(S): Coumadin      WARNING:  Warfarin can cause very serious (possibly fatal) bleeding. This is more likely to occur when you first start taking this medication or if you take too much warfarin. To decrease your risk for bleeding, your doctor or other health care provider will monitor you closely and check your lab results (INR test) to make sure you are not taking too much warfarin. Keep all medical and laboratory appointments. Tell your doctor right away if you notice any signs of serious bleeding. See also Side Effects section.      USES:  This medication is used to  "treat blood clots (such as in deep vein thrombosis-DVT or pulmonary embolus-PE) and/or to prevent new clots from forming in your body. Preventing harmful blood clots helps to reduce the risk of a stroke or heart attack. Conditions that increase your risk of developing blood clots include a certain type of irregular heart rhythm (atrial fibrillation), heart valve replacement, recent heart attack, and certain surgeries (such as hip/knee replacement). Warfarin is commonly called a \"blood thinner,\" but the more correct term is \"anticoagulant.\" It helps to keep blood flowing smoothly in your body by decreasing the amount of certain substances (clotting proteins) in your blood.      HOW TO USE:  Read the Medication Guide provided by your pharmacist before you start taking warfarin and each time you get a refill. If you have any questions, ask your doctor or pharmacist. Take this medication by mouth with or without food as directed by your doctor or other health care professional, usually once a day. It is very important to take it exactly as directed. Do not increase the dose, take it more frequently, or stop using it unless directed by your doctor. Dosage is based on your medical condition, laboratory tests (such as INR), and response to treatment. Your doctor or other health care provider will monitor you closely while you are taking this medication to determine the right dose for you. Use this medication regularly to get the most benefit from it. To help you remember, take it at the same time each day. It is important to eat a balanced, consistent diet while taking warfarin. Some foods can affect how warfarin works in your body and may affect your treatment and dose. Avoid sudden large increases or decreases in your intake of foods high in vitamin K (such as broccoli, cauliflower, cabbage, brussels sprouts, kale, spinach, and other green leafy vegetables, liver, green tea, certain vitamin supplements). If you are " trying to lose weight, check with your doctor before you try to go on a diet. Cranberry products may also affect how your warfarin works. Limit the amount of cranberry juice (16 ounces/480 milliliters a day) or other cranberry products you may drink or eat.      SIDE EFFECTS:  Nausea, loss of appetite, or stomach/abdominal pain may occur. If any of these effects persist or worsen, tell your doctor or pharmacist promptly. Remember that your doctor has prescribed this medication because he or she has judged that the benefit to you is greater than the risk of side effects. Many people using this medication do not have serious side effects. This medication can cause serious bleeding if it affects your blood clotting proteins too much (shown by unusually high INR lab results). Even if your doctor stops your medication, this risk of bleeding can continue for up to a week. Tell your doctor right away if you have any signs of serious bleeding, including: unusual pain/swelling/discomfort, unusual/easy bruising, prolonged bleeding from cuts or gums, persistent/frequent nosebleeds, unusually heavy/prolonged menstrual flow, pink/dark urine, coughing up blood, vomit that is bloody or looks like coffee grounds, severe headache, dizziness/fainting, unusual or persistent tiredness/weakness, bloody/black/tarry stools, chest pain, shortness of breath, difficulty swallowing. Tell your doctor right away if any of these unlikely but serious side effects occur: persistent nausea/vomiting, severe stomach/abdominal pain, yellowing eyes/skin. This drug rarely has caused very serious (possibly fatal) problems if its effects lead to small blood clots (usually at the beginning of treatment). This can lead to severe skin/tissue damage that may require surgery or amputation if left untreated. Patients with certain blood conditions (protein C or S deficiency) may be at greater risk. Get medical help right away if any of these rare but serious  side effects occur: painful/red/purplish patches on the skin (such as on the toe, breast, abdomen), change in the amount of urine, vision changes, confusion, slurred speech, weakness on one side of the body. A very serious allergic reaction to this drug is rare. However, get medical help right away if you notice any symptoms of a serious allergic reaction, including: rash, itching/swelling (especially of the face/tongue/throat), severe dizziness, trouble breathing. This is not a complete list of possible side effects. If you notice other effects not listed above, contact your doctor or pharmacist. In the [place] - Call your doctor for medical advice about side effects. You may report side effects to FDA at 4-368-HEB-8141. In [place] - Call your doctor for medical advice about side effects. You may report side effects to [facility] [place] at [phone].      PRECAUTIONS:  Before taking warfarin, tell your doctor or pharmacist if you are allergic to it; or if you have any other allergies. This product may contain inactive ingredients, which can cause allergic reactions or other problems. Talk to your pharmacist for more details. Before using this medication, tell your doctor or pharmacist your medical history, especially of: blood disorders (such as anemia, hemophilia), bleeding problems (such as bleeding of the stomach/intestines, bleeding in the brain), blood vessel disorders (such as aneurysms), recent major injury/surgery, liver disease, alcohol use, mental/mood disorders (including memory problems), frequent falls/injuries. It is important that all your doctors and dentists know that you take warfarin. Before having surgery or any medical/dental procedures, tell your doctor or dentist that you are taking this medication and about all the products you use (including prescription drugs, nonprescription drugs, and herbal products). Avoid getting injections into the muscles. If you must have an injection into a muscle (for  example, a flu shot), it should be given in the arm. This way, it will be easier to check for bleeding and/or apply pressure bandages. This medication may cause stomach bleeding. Daily use of alcohol while using this medicine will increase your risk for stomach bleeding and may also affect how this medication works. Limit or avoid alcoholic beverages. If you have not been eating well, if you have an illness or infection that causes fever, vomiting, or diarrhea for more than 2 days, or if you start using any antibiotic medications, contact your doctor or pharmacist immediately because these conditions can affect how warfarin works. This medication can cause heavy bleeding. To lower the chance of getting cut, bruised, or injured, use great caution with sharp objects like safety razors and nail cutters. Use an electric razor when shaving and a soft toothbrush when brushing your teeth. Avoid activities such as contact sports. If you fall or injure yourself, especially if you hit your head, call your doctor immediately. Your doctor may need to check you. The Food & Drug Administration has stated that generic warfarin products are interchangeable. However, consult your doctor or pharmacist before switching warfarin products. Be careful not to take more than one medication that contains warfarin unless specifically directed by the doctor or health care provider who is monitoring your warfarin treatment. Older adults may be at greater risk for bleeding while using this drug. This medication is not recommended for use during pregnancy because of serious (possibly fatal) harm to an unborn baby. Discuss the use of reliable forms of birth control with your doctor. If you become pregnant or think you may be pregnant, tell your doctor immediately. If you are planning pregnancy, discuss a plan for managing your condition with your doctor before you become pregnant. Your doctor may switch the type of medication you use during  "pregnancy. Very small amounts of this medication may pass into breast milk but is unlikely to harm a nursing infant. Consult your doctor before breast-feeding.      DRUG INTERACTIONS:  Drug interactions may change how your medications work or increase your risk for serious side effects. This document does not contain all possible drug interactions. Keep a list of all the products you use (including prescription/nonprescription drugs and herbal products) and share it with your doctor and pharmacist. Do not start, stop, or change the dosage of any medicines without your doctor's approval. Warfarin interacts with many prescription, nonprescription, vitamin, and herbal products. This includes medications that are applied to the skin or inside the vagina or rectum. The interactions with warfarin usually result in an increase or decrease in the \"blood-thinning\" (anticoagulant) effect. Your doctor or other health care professional should closely monitor you to prevent serious bleeding or clotting problems. While taking warfarin, it is very important to tell your doctor or pharmacist of any changes in medications, vitamins, or herbal products that you are taking. Some products that may interact with this drug include: capecitabine, imatinib, mifepristone. Aspirin, aspirin-like drugs (salicylates), and nonsteroidal anti-inflammatory drugs (NSAIDs such as ibuprofen, naproxen, celecoxib) may have effects similar to warfarin. These drugs may increase the risk of bleeding problems if taken during treatment with warfarin. Carefully check all prescription/nonprescription product labels (including drugs applied to the skin such as pain-relieving creams) since the products may contain NSAIDs or salicylates. Talk to your doctor about using a different medication (such as acetaminophen) to treat pain/fever. Low-dose aspirin and related drugs (such as clopidogrel, ticlopidine) should be continued if prescribed by your doctor for " specific medical reasons such as heart attack or stroke prevention. Consult your doctor or pharmacist for more details. Many herbal products interact with warfarin. Tell your doctor before taking any herbal products, especially bromelains, coenzyme Q10, cranberry, danshen, dong quai, fenugreek, garlic, ginkgo biloba, ginseng, and Yasir's wort, among others. This medication may interfere with a certain laboratory test to measure theophylline levels, possibly causing false test results. Make sure laboratory personnel and all your doctors know you use this drug.      OVERDOSE:  If overdose is suspected, contact a poison control center or emergency room immediately. [place] residents can call the [place] National Poison Hotline at 1-491.258.2608. [place] residents can call a provincial poison control center. Symptoms of overdose may include: bloody/black/tarry stools, pink/dark urine, unusual/prolonged bleeding.      NOTES:  Do not share this medication with others. Laboratory and/or medical tests (such as INR, complete blood count) must be performed periodically to monitor your progress or check for side effects. Consult your doctor for more details.      MISSED DOSE:  For the best possible benefit, do not miss any doses. If you do miss a dose and remember on the same day, take it as soon as you remember. If you remember on the next day, skip the missed dose and resume your usual dosing schedule. Do not double the dose to catch up because this could increase your risk for bleeding. Keep a record of missed doses to give to your doctor or pharmacist. Contact your doctor or pharmacist if you miss 2 or more doses in a row.      STORAGE:  Store at room temperature away from light and moisture. Do not store in the bathroom. Keep all medications away from children and pets. Do not flush medications down the toilet or pour them into a drain unless instructed to do so. Properly discard this product when it is  or no longer  needed. Consult your pharmacist or local waste disposal company for more details about how to safely discard your product.      MEDICAL ALERT:  Your condition and medication can cause complications in a medical emergency. For information about enrolling in MedicAlert, call 1-682.412.7092 ([place]) or 1-775.405.1298 ([place]).      Information last revised October 2010 Copyright(c) 2010 [employer]             Depression / Suicide Risk    As you are discharged from this [facility] facility, it is important to learn how to keep safe from harming yourself.    Recognize the warning signs:  · Abrupt changes in personality, positive or negative- including increase in energy   · Giving away possessions  · Change in eating patterns- significant weight changes-  positive or negative  · Change in sleeping patterns- unable to sleep or sleeping all the time   · Unwillingness or inability to communicate  · Depression  · Unusual sadness, discouragement and loneliness  · Talk of wanting to die  · Neglect of personal appearance   · Rebelliousness- reckless behavior  · Withdrawal from people/activities they love  · Confusion- inability to concentrate     If you or a loved one observes any of these behaviors or has concerns about self-harm, here's what you can do:  · Talk about it- your feelings and reasons for harming yourself  · Remove any means that you might use to hurt yourself (examples: pills, rope, extension cords, firearm)  · Get professional help from the community (Mental Health, Substance Abuse, psychological counseling)  · Do not be alone:Call your Safe Contact- someone whom you trust who will be there for you.  · Call your local CRISIS HOTLINE 156-8363 or 861-654-6658  · Call your local Children's Mobile Crisis Response Team [place] (526) 716-1117 or www.[facility]  · Call the toll free National Suicide Prevention Hotlines   · National Suicide Prevention Lifeline 736-783-THOQ (7865)  · National Hope  Line Network 800-SUICIDE (378-0571)       1 day

## 2021-02-23 NOTE — CARE PLAN
Problem: Communication  Goal: The ability to communicate needs accurately and effectively will improve  Outcome: PROGRESSING AS EXPECTED     Problem: Safety  Goal: Will remain free from injury  Outcome: PROGRESSING AS EXPECTED  Goal: Will remain free from falls  Outcome: PROGRESSING AS EXPECTED  Note: Pt educated on safety precautions, utilization of the call light, and bed alarm.  Pt verbalized understanding.      Problem: Pain Management  Goal: Pain level will decrease to patient's comfort goal  Outcome: PROGRESSING AS EXPECTED     Problem: Infection  Goal: Will remain free from infection  Outcome: PROGRESSING AS EXPECTED  Note: Implement standard precautions and perform handwashing before and after patient contact. RN will follow protocols and necessary steps to minimize the spread of infection.  RN educated pt and any visitors on proper hand hygiene.      Problem: Mobility  Goal: Risk for activity intolerance will decrease  Outcome: PROGRESSING AS EXPECTED     Problem: Knowledge Deficit  Goal: Knowledge of disease process/condition, treatment plan, diagnostic tests, and medications will improve  Outcome: PROGRESSING AS EXPECTED  Goal: Knowledge of the prescribed therapeutic regimen will improve  Outcome: PROGRESSING AS EXPECTED

## 2021-02-23 NOTE — DISCHARGE SUMMARY
"Discharge Summary    CHIEF COMPLAINT ON ADMISSION  Chief Complaint   Patient presents with   • Gout     BIB [facility] for \"gout attack\" to entire body since yesterday. unable to move comfortably states everything is \"stiff\". major pain in neck and BLE region. denies other medical complaints. denies increased intake in red meats, nuts, seafood. \"refugio been sticking to my diet and dont why my gout flared up\" hx of this condition, not on any medications because \"it causes m,e diarrhea\"         Reason for Admission  EMS     Admission Date  2/22/2021    CODE STATUS  Full Code    HPI & HOSPITAL COURSE  70 y.o. male with current medical history of paroxysmal A. fib on sotalol & chronic coagulation with warfarin, sick sinus syndrome s/p pacemaker placement & AICD for Paroxysmal Paroxysmal VT, chronic dyspnea on exertion-remote history of valley fever: follow-up with pulmonology, hypertension, hyperlipidemia, alcohol use ( drinks whiskey every day, patient and wife stated that he drinks too much ) who presented 2/22/2021 with pain and swelling in his ankles bilaterally. Uric acid was normal. CRP was significantly elevated.    He was admitted for acute gout flare and pain managment. Patient reported he was previously prescribed prednisone, which he stopped taking due to hallucinations. Therefore, he was started on Colchicine and reported pain relief. He was noted to have mild leukocytosis, which was likely reactive and resolved on labs the following morning. CIWA protocol was initiated due to reports of heavy and recent alcohol use. He was educated on alcohol cessation to prevent further gout attacks and responded, \"I'll think about cutting back\". In addition, patient said he thought his AICD may have fired the night prior to admission. Device was interrogated and was did not show any events. For these reasons, he was discharged with instructions to continue BID colchine until gout flare resolves and follow-up with his PCP for " repeat uric acid and further medication management.     Therefore, he is discharged in guarded and stable condition to home with close outpatient follow-up.    The patient recovered much more quickly than anticipated on admission.    Discharge Date  2/23/2021    FOLLOW UP ITEMS POST DISCHARGE  PCP     DISCHARGE DIAGNOSES  Active Problems:    Gouty arthritis of both ankles POA: Unknown    Paroxysmal ventricular tachycardia (HCC) POA: Yes    Sinoatrial node dysfunction (HCC) POA: Yes    Atrial fibrillation (HCC) POA: Yes    Anticoagulant long-term use POA: Yes    Alcohol abuse POA: Yes    Dyspnea POA: Yes    Leucocytosis POA: Unknown  Resolved Problems:    * No resolved hospital problems. *      FOLLOW UP  Future Appointments   Date Time Provider Department Center   3/30/2021  9:00 AM [facility] None   3/31/2021  9:45 AM [facility] EXAM 5 [facility] None   4/6/2021 10:40 AM Daniel Ferrera M.D. [facility] None     No follow-up provider specified.    MEDICATIONS ON DISCHARGE     Medication List      START taking these medications      Instructions   colchicine 0.6 MG Tabs  Commonly known as: COLCRYS   Take 1 tablet by mouth 2 Times a Day. Continue until gout flare resolves.  Dose: 0.6 mg        CHANGE how you take these medications      Instructions   warfarin 5 MG Tabs  What changed: See the new instructions.  Commonly known as: COUMADIN   TAKE 1 (ONE) TO 1 & 1/2 (ONE & ONE-HALF) TABLETS BY MOUTH ONCE DAILY AS DIRECTED BY THE COUMADIN CLINIC.        CONTINUE taking these medications      Instructions   amLODIPine 5 MG Tabs  Commonly known as: NORVASC   Take 5 mg by mouth every day.  Dose: 5 mg     ascorbic acid 500 MG tablet  Commonly known as: Vitamin C   Take 500 mg by mouth every day.  Dose: 500 mg     B COMPLEX PO   Take  by mouth.     benazepril 40 MG tablet  Commonly known as: LOTENSIN   Take 40 mg by mouth every day.  Dose: 40 mg     colesevelam 625 MG Tabs  Commonly known as: WELCHOL   Take 625 mg by mouth every  day.  Dose: 625 mg     hydrochlorothiazide 12.5 MG capsule  Commonly known as: MICROZIDE   Take 1 Cap by mouth every day.  Dose: 12.5 mg     potassium chloride SA 20 MEQ Tbcr  Commonly known as: Kdur   Take 20 mEq by mouth every day.  Dose: 20 mEq     sotalol 120 MG tablet  Commonly known as: BETAPACE   Take 1 Tab by mouth 2 times a day.  Dose: 120 mg     traMADol 50 MG Tabs  Commonly known as: ULTRAM   Take 50 mg by mouth every 6 hours as needed for Moderate Pain.  Dose: 50 mg     vitamin D3 125 MCG (5000 UT) Caps   Take 1 capsule by mouth every day.  Dose: 1 capsule            Allergies  Allergies   Allergen Reactions   • Lipitor [Atorvastatin]    • Sulfa Drugs Rash and Itching     Rash, itch   • Statins [Hmg-Coa-R Inhibitors] Itching       DIET  Orders Placed This Encounter   Procedures   • Diet Order Diet: Regular; Nutrient modifications: (optional): Low Protein     Standing Status:   Standing     Number of Occurrences:   1     Order Specific Question:   Diet:     Answer:   Regular [1]     Order Specific Question:   Nutrient modifications: (optional)     Answer:   Low Protein [3]       ACTIVITY  As tolerated.  Weight bearing as tolerated    CONSULTATIONS  N/A    PROCEDURES  N/A    LABORATORY  Lab Results   Component Value Date    SODIUM 134 (L) 02/23/2021    POTASSIUM 3.5 (L) 02/23/2021    CHLORIDE 95 (L) 02/23/2021    CO2 28 02/23/2021    GLUCOSE 99 02/23/2021    BUN 20 02/23/2021    CREATININE 0.89 02/23/2021        Lab Results   Component Value Date    WBC 8.9 02/23/2021    HEMOGLOBIN 12.1 (L) 02/23/2021    HEMATOCRIT 35.5 (L) 02/23/2021    PLATELETCT 167 02/23/2021        Total time of the discharge process exceeds 35 minutes.

## 2021-02-23 NOTE — PROGRESS NOTES
Report received from BETTY Boyd.  Patient resting in bed. POC gone over with patient and all questions answered.  Patient A & O  X 4.  No apparent signs of distress.  Safety precautions in place.  Patient educated to call for assistance.  Will continue to monitor.

## 2021-02-23 NOTE — CARE PLAN
Problem: Communication  Goal: The ability to communicate needs accurately and effectively will improve  Outcome: PROGRESSING AS EXPECTED     Problem: Safety  Goal: Will remain free from injury  Outcome: PROGRESSING AS EXPECTED     Problem: Safety  Goal: Will remain free from falls  Outcome: PROGRESSING AS EXPECTED     Problem: Pain Management  Goal: Pain level will decrease to patient's comfort goal  Outcome: PROGRESSING AS EXPECTED     Problem: Infection  Goal: Will remain free from infection  Outcome: PROGRESSING AS EXPECTED     Problem: Venous Thromboembolism (VTW)/Deep Vein Thrombosis (DVT) Prevention:  Goal: Patient will participate in Venous Thrombosis (VTE)/Deep Vein Thrombosis (DVT)Prevention Measures  Outcome: PROGRESSING AS EXPECTED

## 2021-02-23 NOTE — PROGRESS NOTES
Inpatient Anticoagulation Service Note    Date: 2/23/2021  Reason for Anticoagulation: Atrial Fibrillation   [id number] VASc Score: 2  HAS-BLED Score: 3    Hemoglobin Value: (!) 12.1  Hematocrit Value: (!) 35.5  Lab Platelet Value: 167  Target INR: 2.0 to 3.0    INR from last 7 days     Date/Time INR Value    02/23/21 0215  (!) 1.61    02/22/21 0830  (!) 1.52        Dose from last 7 days     Date/Time Dose (mg)    02/23/21 0036  10        Average Dose (mg): (Home regimen = 5 mg daily)  Significant Interactions: Other (Comments): (Welchol (home med), prednisone)  Bridge Therapy: No    Reversal Agent Administered: Not Applicable  Comments: INR subtherapeutic, increased by ~0.1 unit after a 10 mg dose yesterday. H/H trending down but no sx of bleeding. No new significant DDI. Per patient, he has been consistent with his home regimen, no new change in diet or any new antibiotics started recently PTA. Of note, patient reported recent changes with some home regimen, but no major DDI with warfarin. Given that patient's INR is subtherapeutic, will increase dose to 7.5 mg Qday.     Plan:  Warfarin 7.5 mg Qday  Education Material Provided?: No  Pharmacist suggested discharge dosing: TBD, will mostly like need to be discharged on a higher dose than 5 mg Qday.    Tiffanie Camacho, PharmD

## 2021-02-24 LAB
BACTERIA UR CULT: NORMAL
SIGNIFICANT IND 70042: NORMAL
SITE SITE: NORMAL
SOURCE SOURCE: NORMAL

## 2021-02-24 NOTE — PROGRESS NOTES
Pt's discharge paperwork printed out and gone over with the patient.  All questions and concerns addressed.  Pt's IV and armband removed.  Pt gathered all of his items and everything is accounted for to discharge.  Pt is being escorted down via wheelchair to the [facility] exit to be picked up by his wife.

## 2021-02-25 LAB
BACTERIA BLD CULT: ABNORMAL
BACTERIA BLD CULT: ABNORMAL
SIGNIFICANT IND 70042: ABNORMAL
SITE SITE: ABNORMAL
SOURCE SOURCE: ABNORMAL

## 2021-02-26 ENCOUNTER — PATIENT OUTREACH (OUTPATIENT)
Dept: SCHEDULING | Facility: IMAGING CENTER | Age: 71
End: 2021-02-26

## 2021-02-27 LAB
BACTERIA BLD CULT: NORMAL
SIGNIFICANT IND 70042: NORMAL
SITE SITE: NORMAL
SOURCE SOURCE: NORMAL

## 2021-03-01 SDOH — ECONOMIC STABILITY: TRANSPORTATION INSECURITY
IN THE PAST 12 MONTHS, HAS LACK OF TRANSPORTATION KEPT YOU FROM MEETINGS, WORK, OR FROM GETTING THINGS NEEDED FOR DAILY LIVING?: NO

## 2021-03-01 SDOH — ECONOMIC STABILITY: FOOD INSECURITY: WITHIN THE PAST 12 MONTHS, THE FOOD YOU BOUGHT JUST DIDN'T LAST AND YOU DIDN'T HAVE MONEY TO GET MORE.: NEVER TRUE

## 2021-03-01 SDOH — ECONOMIC STABILITY: FOOD INSECURITY: WITHIN THE PAST 12 MONTHS, YOU WORRIED THAT YOUR FOOD WOULD RUN OUT BEFORE YOU GOT MONEY TO BUY MORE.: NEVER TRUE

## 2021-03-01 SDOH — ECONOMIC STABILITY: TRANSPORTATION INSECURITY
IN THE PAST 12 MONTHS, HAS THE LACK OF TRANSPORTATION KEPT YOU FROM MEDICAL APPOINTMENTS OR FROM GETTING MEDICATIONS?: NO

## 2021-03-01 ASSESSMENT — SOCIAL DETERMINANTS OF HEALTH (SDOH): HOW HARD IS IT FOR YOU TO PAY FOR THE VERY BASICS LIKE FOOD, HOUSING, MEDICAL CARE, AND HEATING?: NOT HARD AT ALL

## 2021-03-01 NOTE — PROGRESS NOTES
Community Health Worker Intake  • Social determinates of health intake completed.   • Identified barriers to- none.  • Contact information provided to Mike Ferrell.  • Outpatient assessment completed.  • Did the patient receive medications post discharge: Yes    CHW Elizabeth spoke to pt via telephone and completed out pt assessment. Pt reports that he does not have any food, housing, or food needs. Pt reports that he has all of his follow up apts scheduled and does not need any assistance. CHW provided pt with [facility] contact info.    Plan: Dc pt from [facility] as pt has no needs at this time.

## 2021-03-04 ENCOUNTER — ANTICOAGULATION VISIT (OUTPATIENT)
Dept: VASCULAR LAB | Facility: MEDICAL CENTER | Age: 71
End: 2021-03-04
Attending: INTERNAL MEDICINE
Payer: MEDICARE

## 2021-03-04 VITALS — HEART RATE: 85 BPM | SYSTOLIC BLOOD PRESSURE: 124 MMHG | DIASTOLIC BLOOD PRESSURE: 76 MMHG

## 2021-03-04 DIAGNOSIS — I48.0 PAROXYSMAL ATRIAL FIBRILLATION (HCC): ICD-10-CM

## 2021-03-04 LAB — INR PPP: 1.8 (ref 2–3.5)

## 2021-03-04 PROCEDURE — 85610 PROTHROMBIN TIME: CPT

## 2021-03-04 PROCEDURE — 99212 OFFICE O/P EST SF 10 MIN: CPT | Performed by: NURSE PRACTITIONER

## 2021-03-04 NOTE — PROGRESS NOTES
Anticoagulation Summary  As of 3/4/2021    INR goal:  2.0-3.0   TTR:  62.8 % (5.7 y)   INR used for dosin.80 (3/4/2021)   Warfarin maintenance plan:  7.5 mg (5 mg x 1.5) every Tue, Thu; 5 mg (5 mg x 1) all other days   Weekly warfarin total:  40 mg   Plan last modified:  Rebecca Mathews, A.P.NLinnea (3/4/2021)   Next INR check:     Target end date:  Indefinite    Indications    Atrial fibrillation (HCC) [I48.91]             Anticoagulation Episode Summary     INR check location:  Anticoagulation Clinic    Preferred lab:      Send INR reminders to:      Comments:        Anticoagulation Care Providers     Provider Role Specialty Phone number    Daniel Ferrera M.D. Referring Cardiac Electrophysiology [phone]    [facility] Responsible  [phone]        Anticoagulation Patient Findings      HPI:  Mike Frerell seen in clinic today for follow up on anticoagulation therapy in the presence of AF.   Hospitalized recently for gout attack.  Denies any medication or diet changes.   No current symptoms of bleeding or thrombosis reported.  He is not drinking any alcohol.    Pt on antiplatelet therapy - none.    A/P:   INR subtherapeutic.   Will increase regimen.   BP recorded in vitals.    Follow up appointment in 4 week(s) per pt's request.    Next Appointment: 2021 at 9:45 am.    Rebecca SNIDER

## 2021-03-05 LAB — INR BLD: 1.8 (ref 0.9–1.2)

## 2021-03-30 ENCOUNTER — HOSPITAL ENCOUNTER (OUTPATIENT)
Dept: LAB | Facility: MEDICAL CENTER | Age: 71
End: 2021-03-30
Attending: NURSE PRACTITIONER
Payer: MEDICARE

## 2021-03-30 ENCOUNTER — HOSPITAL ENCOUNTER (OUTPATIENT)
Dept: LAB | Facility: MEDICAL CENTER | Age: 71
End: 2021-03-30
Attending: FAMILY MEDICINE
Payer: MEDICARE

## 2021-03-30 LAB
ALBUMIN SERPL BCP-MCNC: 4 G/DL (ref 3.2–4.9)
ALBUMIN SERPL BCP-MCNC: 4.1 G/DL (ref 3.2–4.9)
ALBUMIN/GLOB SERPL: 1.3 G/DL
ALBUMIN/GLOB SERPL: 1.4 G/DL
ALP SERPL-CCNC: 113 U/L (ref 30–99)
ALP SERPL-CCNC: 113 U/L (ref 30–99)
ALT SERPL-CCNC: 30 U/L (ref 2–50)
ALT SERPL-CCNC: 30 U/L (ref 2–50)
ANION GAP SERPL CALC-SCNC: 11 MMOL/L (ref 7–16)
ANION GAP SERPL CALC-SCNC: 12 MMOL/L (ref 7–16)
AST SERPL-CCNC: 30 U/L (ref 12–45)
AST SERPL-CCNC: 31 U/L (ref 12–45)
BASOPHILS # BLD AUTO: 1.9 % (ref 0–1.8)
BASOPHILS # BLD: 0.07 K/UL (ref 0–0.12)
BILIRUB SERPL-MCNC: 0.4 MG/DL (ref 0.1–1.5)
BILIRUB SERPL-MCNC: 0.4 MG/DL (ref 0.1–1.5)
BUN SERPL-MCNC: 15 MG/DL (ref 8–22)
BUN SERPL-MCNC: 16 MG/DL (ref 8–22)
CALCIUM SERPL-MCNC: 9.7 MG/DL (ref 8.5–10.5)
CALCIUM SERPL-MCNC: 9.7 MG/DL (ref 8.5–10.5)
CHLORIDE SERPL-SCNC: 103 MMOL/L (ref 96–112)
CHLORIDE SERPL-SCNC: 103 MMOL/L (ref 96–112)
CHOLEST SERPL-MCNC: 207 MG/DL (ref 100–199)
CO2 SERPL-SCNC: 24 MMOL/L (ref 20–33)
CO2 SERPL-SCNC: 25 MMOL/L (ref 20–33)
CREAT SERPL-MCNC: 1.02 MG/DL (ref 0.5–1.4)
CREAT SERPL-MCNC: 1.07 MG/DL (ref 0.5–1.4)
EOSINOPHIL # BLD AUTO: 0.18 K/UL (ref 0–0.51)
EOSINOPHIL NFR BLD: 4.9 % (ref 0–6.9)
ERYTHROCYTE [DISTWIDTH] IN BLOOD BY AUTOMATED COUNT: 46.7 FL (ref 35.9–50)
FASTING STATUS PATIENT QL REPORTED: NORMAL
FASTING STATUS PATIENT QL REPORTED: NORMAL
GLOBULIN SER CALC-MCNC: 2.9 G/DL (ref 1.9–3.5)
GLOBULIN SER CALC-MCNC: 3 G/DL (ref 1.9–3.5)
GLUCOSE SERPL-MCNC: 105 MG/DL (ref 65–99)
GLUCOSE SERPL-MCNC: 105 MG/DL (ref 65–99)
HCT VFR BLD AUTO: 40.1 % (ref 42–52)
HDLC SERPL-MCNC: 43 MG/DL
HGB BLD-MCNC: 13.5 G/DL (ref 14–18)
IMM GRANULOCYTES # BLD AUTO: 0.01 K/UL (ref 0–0.11)
IMM GRANULOCYTES NFR BLD AUTO: 0.3 % (ref 0–0.9)
LDLC SERPL CALC-MCNC: 130 MG/DL
LYMPHOCYTES # BLD AUTO: 1.15 K/UL (ref 1–4.8)
LYMPHOCYTES NFR BLD: 31.6 % (ref 22–41)
MCH RBC QN AUTO: 33.7 PG (ref 27–33)
MCHC RBC AUTO-ENTMCNC: 33.7 G/DL (ref 33.7–35.3)
MCV RBC AUTO: 100 FL (ref 81.4–97.8)
MONOCYTES # BLD AUTO: 0.38 K/UL (ref 0–0.85)
MONOCYTES NFR BLD AUTO: 10.4 % (ref 0–13.4)
NEUTROPHILS # BLD AUTO: 1.85 K/UL (ref 1.82–7.42)
NEUTROPHILS NFR BLD: 50.9 % (ref 44–72)
NRBC # BLD AUTO: 0 K/UL
NRBC BLD-RTO: 0 /100 WBC
PLATELET # BLD AUTO: 201 K/UL (ref 164–446)
PMV BLD AUTO: 11.2 FL (ref 9–12.9)
POTASSIUM SERPL-SCNC: 3.8 MMOL/L (ref 3.6–5.5)
POTASSIUM SERPL-SCNC: 3.9 MMOL/L (ref 3.6–5.5)
PROT SERPL-MCNC: 7 G/DL (ref 6–8.2)
PROT SERPL-MCNC: 7 G/DL (ref 6–8.2)
RBC # BLD AUTO: 4.01 M/UL (ref 4.7–6.1)
SODIUM SERPL-SCNC: 139 MMOL/L (ref 135–145)
SODIUM SERPL-SCNC: 139 MMOL/L (ref 135–145)
TRIGL SERPL-MCNC: 168 MG/DL (ref 0–149)
URATE SERPL-MCNC: 9.4 MG/DL (ref 2.5–8.3)
WBC # BLD AUTO: 3.6 K/UL (ref 4.8–10.8)

## 2021-03-30 PROCEDURE — 84550 ASSAY OF BLOOD/URIC ACID: CPT

## 2021-03-30 PROCEDURE — 36415 COLL VENOUS BLD VENIPUNCTURE: CPT

## 2021-03-30 PROCEDURE — 80053 COMPREHEN METABOLIC PANEL: CPT

## 2021-03-30 PROCEDURE — 80061 LIPID PANEL: CPT

## 2021-03-30 PROCEDURE — 80053 COMPREHEN METABOLIC PANEL: CPT | Mod: 91

## 2021-03-30 PROCEDURE — 85025 COMPLETE CBC W/AUTO DIFF WBC: CPT

## 2021-03-31 ENCOUNTER — ANTICOAGULATION VISIT (OUTPATIENT)
Dept: VASCULAR LAB | Facility: MEDICAL CENTER | Age: 71
End: 2021-03-31
Attending: INTERNAL MEDICINE
Payer: MEDICARE

## 2021-03-31 VITALS — SYSTOLIC BLOOD PRESSURE: 125 MMHG | DIASTOLIC BLOOD PRESSURE: 81 MMHG | HEART RATE: 79 BPM

## 2021-03-31 DIAGNOSIS — I48.0 PAROXYSMAL ATRIAL FIBRILLATION (HCC): ICD-10-CM

## 2021-03-31 LAB — INR PPP: 3.1 (ref 2–3.5)

## 2021-03-31 PROCEDURE — 85610 PROTHROMBIN TIME: CPT

## 2021-03-31 PROCEDURE — 99211 OFF/OP EST MAY X REQ PHY/QHP: CPT | Performed by: NURSE PRACTITIONER

## 2021-03-31 NOTE — PROGRESS NOTES
Anticoagulation Summary  As of 3/31/2021    INR goal:  2.0-3.0   TTR:  63.0 % (5.8 y)   INR used for dosing:  3.10 (3/31/2021)   Warfarin maintenance plan:  7.5 mg (5 mg x 1.5) every Tue, Thu; 5 mg (5 mg x 1) all other days   Weekly warfarin total:  40 mg   Plan last modified:  Rebecca Mathews A.P.NLinnea (3/4/2021)   Next INR check:  4/28/2021   Target end date:  Indefinite    Indications    Atrial fibrillation (HCC) [I48.91]             Anticoagulation Episode Summary     INR check location:  Anticoagulation Clinic    Preferred lab:      Send INR reminders to:      Comments:        Anticoagulation Care Providers     Provider Role Specialty Phone number    Daniel Ferrera M.D. Referring Cardiac Electrophysiology [phone]    [facility] Responsible  [phone]        Anticoagulation Patient Findings      HPI:  Mike Ferrell seen in clinic today for follow up on anticoagulation therapy in the presence of AF.   Denies any changes to current medical/health status since last appointment.   Denies any medication or diet changes.   No current symptoms of bleeding or thrombosis reported.    Pt on antiplatelet therapy - none.    A/P:   INR slightly supratherapeutic. With shared decision making, will continue current regimen and he will add a little more greens to his diet.   BP recorded in vitals.    Pt educated to contact our clinic with any changes in medications or s/s of bleeding or thrombosis. Pt is aware to seek immediate medical attention for falls, head injury or deep cuts    Follow up appointment in 4 week(s) per pt's request.    Next Appointment: Wednesday, April 28, 2021 at 9:45 am.    Rebecca SNIDER

## 2021-04-06 ENCOUNTER — OFFICE VISIT (OUTPATIENT)
Dept: CARDIOLOGY | Facility: MEDICAL CENTER | Age: 71
End: 2021-04-06
Payer: MEDICARE

## 2021-04-06 VITALS
WEIGHT: 228 LBS | HEIGHT: 72 IN | OXYGEN SATURATION: 93 % | BODY MASS INDEX: 30.88 KG/M2 | SYSTOLIC BLOOD PRESSURE: 122 MMHG | HEART RATE: 92 BPM | DIASTOLIC BLOOD PRESSURE: 84 MMHG

## 2021-04-06 DIAGNOSIS — Z95.810 AUTOMATIC IMPLANTABLE CARDIOVERTER-DEFIBRILLATOR IN SITU: ICD-10-CM

## 2021-04-06 DIAGNOSIS — I10 ESSENTIAL HYPERTENSION: ICD-10-CM

## 2021-04-06 DIAGNOSIS — E78.2 MIXED HYPERLIPIDEMIA: ICD-10-CM

## 2021-04-06 DIAGNOSIS — I49.5 SINOATRIAL NODE DYSFUNCTION (HCC): ICD-10-CM

## 2021-04-06 DIAGNOSIS — I47.29 PAROXYSMAL VENTRICULAR TACHYCARDIA (HCC): ICD-10-CM

## 2021-04-06 DIAGNOSIS — M10.9 GOUTY ARTHRITIS OF BOTH ANKLES: ICD-10-CM

## 2021-04-06 DIAGNOSIS — Z98.890 S/P CARDIAC CATH: ICD-10-CM

## 2021-04-06 DIAGNOSIS — Z79.01 ANTICOAGULANT LONG-TERM USE: ICD-10-CM

## 2021-04-06 DIAGNOSIS — F10.10 ALCOHOL ABUSE: ICD-10-CM

## 2021-04-06 DIAGNOSIS — I45.10 RIGHT BUNDLE BRANCH BLOCK: ICD-10-CM

## 2021-04-06 PROCEDURE — 99214 OFFICE O/P EST MOD 30 MIN: CPT | Mod: 25 | Performed by: INTERNAL MEDICINE

## 2021-04-06 PROCEDURE — 93283 PRGRMG EVAL IMPLANTABLE DFB: CPT | Performed by: INTERNAL MEDICINE

## 2021-04-06 ASSESSMENT — FIBROSIS 4 INDEX: FIB4 SCORE: 2.03

## 2021-04-06 NOTE — PROGRESS NOTES
Chief Complaint   Patient presents with   • Atrial Fibrillation     F/V DX:PAF       Subjective:   Mike Ferrell is a 70 y.o. male who presents today with recent hospitalization for gout attack and UTI.  Did have some hallucinations at that time.  Thought he got shocked by the defibrillator.  No shocks seen.  Increased amount of ventricular pacing.  Asymptomatic.  5% ventricular sensing.  Gout.  On medications.  Renal status improved. Some excess alcohol use.  Past Medical History:   Diagnosis Date   • Arthritis    • Atrial fibrillation (HCC) 4/4/2012   • Methamphetamine use (HCC) none for many years   • Pain     shoulders   • Paroxysmal ventricular tachycardia (HCC) 4/4/2012   • Sinoatrial node dysfunction (HCC) 4/4/2012   • Syncope and collapse 4/4/2012     Past Surgical History:   Procedure Laterality Date   • AICD IMPLANT  2010   • PACEMAKER INSERTION  2009   • CHOLECYSTECTOMY  1999   • TONSILLECTOMY  1953     No family history on file.  Social History     Socioeconomic History   • Marital status:      Spouse name: Not on file   • Number of children: Not on file   • Years of education: Not on file   • Highest education level: Not on file   Occupational History   • Not on file   Tobacco Use   • Smoking status: Never Smoker   • Smokeless tobacco: Never Used   Substance and Sexual Activity   • Alcohol use: Yes     Comment: 4-5 per day   • Drug use: No   • Sexual activity: Not on file   Other Topics Concern   • Not on file   Social History Narrative   • Not on file     Social Determinants of Health     Financial Resource Strain: Low Risk    • Difficulty of Paying Living Expenses: Not hard at all   Food Insecurity: No Food Insecurity   • Worried About Running Out of Food in the Last Year: Never true   • Ran Out of Food in the Last Year: Never true   Transportation Needs: No Transportation Needs   • Lack of Transportation (Medical): No   • Lack of Transportation (Non-Medical): No   Physical Activity:    •  Days of Exercise per Week:    • Minutes of Exercise per Session:    Stress:    • Feeling of Stress :    Social Connections:    • Frequency of Communication with Friends and Family:    • Frequency of Social Gatherings with Friends and Family:    • Attends [employer] Services:    • Active Member of Clubs or Organizations:    • Attends Club or Organization Meetings:    • Marital Status:    Intimate Partner Violence:    • Fear of Current or Ex-Partner:    • Emotionally Abused:    • Physically Abused:    • Sexually Abused:      Allergies   Allergen Reactions   • Lipitor [Atorvastatin]    • Sulfa Drugs Rash and Itching     Rash, itch   • Statins [Hmg-Coa-R Inhibitors] Itching     Outpatient Encounter Medications as of 4/6/2021   Medication Sig Dispense Refill   • colchicine (COLCRYS) 0.6 MG Tab Take 1 tablet by mouth 2 Times a Day. Continue until gout flare resolves. 30 tablet 0   • ascorbic acid (VITAMIN C) 500 MG tablet Take 500 mg by mouth every day.     • traMADol (ULTRAM) 50 MG Tab Take 50 mg by mouth every 6 hours as needed for Moderate Pain.     • potassium chloride SA (KDUR) 20 MEQ Tab CR Take 20 mEq by mouth every day.     • B Complex Vitamins (B COMPLEX PO) Take  by mouth.     • sotalol (BETAPACE) 120 MG tablet Take 1 Tab by mouth 2 times a day. 180 Tab 3   • Cholecalciferol (VITAMIN D3) 5000 units Cap Take 1 capsule by mouth every day.     • hydrochlorothiazide (MICROZIDE) 12.5 MG capsule Take 1 Cap by mouth every day. 100 Cap 3   • warfarin (COUMADIN) 5 MG Tab TAKE 1 (ONE) TO 1 & 1/2 (ONE & ONE-HALF) TABLETS BY MOUTH ONCE DAILY AS DIRECTED BY THE COUMADIN CLINIC. (Patient taking differently: Take 5 mg by mouth every day.) 135 Tab 1   • amlodipine (NORVASC) 5 MG TABS Take 5 mg by mouth every day.     • benazepril (LOTENSIN) 40 MG tablet Take 40 mg by mouth every day.     • colesevelam (WELCHOL) 625 MG Tab Take 625 mg by mouth every day.       No facility-administered encounter medications on file as of 4/6/2021.      ROS     Objective:   /84 (BP Location: Left arm, Patient Position: Sitting, BP Cuff Size: Large adult)   Pulse 92   Ht 1.829 m (6')   Wt 103 kg (228 lb)   SpO2 93%   BMI 30.92 kg/m²     Physical Exam   Constitutional: He is oriented to person, place, and time. He appears well-developed and well-nourished.   HENT:   Head: Normocephalic and atraumatic.   Eyes: EOM are normal.   Cardiovascular: Normal rate, regular rhythm and intact distal pulses. Exam reveals no gallop and no friction rub.   No murmur heard.  Pulmonary/Chest: Effort normal and breath sounds normal.   Abdominal: Soft.   Musculoskeletal:         General: No edema.      Cervical back: Normal range of motion and neck supple.   Neurological: He is alert and oriented to person, place, and time.   Skin: Skin is warm and dry.   Psychiatric: He has a normal mood and affect. His behavior is normal. Judgment and thought content normal.       Assessment:     1. Sinoatrial node dysfunction (HCC)     2. S/P cardiac cath     3. Paroxysmal ventricular tachycardia (HCC)     4. Automatic implantable cardioverter-defibrillator in situ     5. Anticoagulant long-term use     6. Alcohol abuse     7. RBBB     8. Mixed hyperlipidemia     9. Essential hypertension     10. Gouty arthritis of both ankles         Medical Decision Making:  Today's Assessment / Status / Plan:   1.  Paroxysmal atrial fibrillation continue sotalol.Continue anticoagulation.  2.  Ventricular arrhythmias continue sotalol.  3.  Renal insufficiency back to baseline.  4.  AICD normal function no shocks.  5.  Alcohol abuse working on decreasing.  6.  Gout followed by primary.  7.  Follow-up in 6 months

## 2021-04-09 LAB — INR BLD: 3.1 (ref 0.9–1.2)

## 2021-04-28 ENCOUNTER — ANTICOAGULATION VISIT (OUTPATIENT)
Dept: VASCULAR LAB | Facility: MEDICAL CENTER | Age: 71
End: 2021-04-28
Attending: INTERNAL MEDICINE
Payer: MEDICARE

## 2021-04-28 VITALS — DIASTOLIC BLOOD PRESSURE: 80 MMHG | HEART RATE: 74 BPM | SYSTOLIC BLOOD PRESSURE: 133 MMHG

## 2021-04-28 DIAGNOSIS — I48.0 PAROXYSMAL ATRIAL FIBRILLATION (HCC): ICD-10-CM

## 2021-04-28 LAB
INR BLD: 1.8 (ref 0.9–1.2)
INR PPP: 1.8 (ref 2–3.5)

## 2021-04-28 PROCEDURE — 99212 OFFICE O/P EST SF 10 MIN: CPT | Performed by: NURSE PRACTITIONER

## 2021-04-28 PROCEDURE — 85610 PROTHROMBIN TIME: CPT

## 2021-04-28 NOTE — PROGRESS NOTES
Anticoagulation Summary  As of 2021    INR goal:  2.0-3.0   TTR:  63.1 % (5.9 y)   INR used for dosin.80 (2021)   Warfarin maintenance plan:  7.5 mg (5 mg x 1.5) every Tue, Th; 5 mg (5 mg x 1) all other days   Weekly warfarin total:  40 mg   Plan last modified:  Rebecca Mathews, A.P.NLinnea (3/4/2021)   Next INR check:     Target end date:  Indefinite    Indications    Atrial fibrillation (HCC) [I48.91]             Anticoagulation Episode Summary     INR check location:  Anticoagulation Clinic    Preferred lab:      Send INR reminders to:      Comments:        Anticoagulation Care Providers     Provider Role Specialty Phone number    Daniel Ferrera M.D. Referring Cardiac Electrophysiology [phone]    [facility] Responsible  [phone]        Anticoagulation Patient Findings      HPI:  Mike Ferrell seen in clinic today for follow up on anticoagulation therapy in the presence of AF.   Denies any changes to current medical/health status since last appointment.   Denies any medication or diet changes.   No current symptoms of bleeding or thrombosis reported.    Pt is not on antiplatelet therapy.    A/P:   INR subtherapeutic.   Will increase tonight then continue current regimen.   BP recorded in vitals.    Pt educated to contact our clinic with any changes in medications or s/s of bleeding or thrombosis. Pt is aware to seek immediate medical attention for falls, head injury or deep cuts    Follow up appointment in 5 week(s) per pt's request.    Next Appointment: 2021 at 9:45 am.    Rebecca SNIDER

## 2021-05-25 ENCOUNTER — PATIENT OUTREACH (OUTPATIENT)
Dept: HEALTH INFORMATION MANAGEMENT | Facility: OTHER | Age: 71
End: 2021-05-25

## 2021-05-25 NOTE — PROGRESS NOTES
Outcome: Left Message to schedule Comprehensive Health Assessment.      Please transfer to Patient Outreach Team at [phone] when patient returns call.        Attempt # 1

## 2021-06-02 ENCOUNTER — APPOINTMENT (OUTPATIENT)
Dept: VASCULAR LAB | Facility: MEDICAL CENTER | Age: 71
End: 2021-06-02
Attending: INTERNAL MEDICINE
Payer: MEDICARE

## 2021-06-03 ENCOUNTER — DOCUMENTATION (OUTPATIENT)
Dept: VASCULAR LAB | Facility: MEDICAL CENTER | Age: 71
End: 2021-06-03

## 2021-06-03 NOTE — PROGRESS NOTES
Called pt and left VM for them to call back so we could get them rescheduled for their missed routine anti-coag appt with [facility].

## 2021-06-04 ENCOUNTER — ANTICOAGULATION VISIT (OUTPATIENT)
Dept: VASCULAR LAB | Facility: MEDICAL CENTER | Age: 71
End: 2021-06-04
Attending: INTERNAL MEDICINE
Payer: MEDICARE

## 2021-06-04 DIAGNOSIS — Z79.01 CHRONIC ANTICOAGULATION: ICD-10-CM

## 2021-06-04 LAB — INR PPP: 1.7 (ref 2–3.5)

## 2021-06-04 PROCEDURE — 99212 OFFICE O/P EST SF 10 MIN: CPT

## 2021-06-04 PROCEDURE — 85610 PROTHROMBIN TIME: CPT

## 2021-06-04 NOTE — PROGRESS NOTES
Anticoagulation Summary  As of 2021    INR goal:  2.0-3.0   TTR:  62.1 % (6 y)   INR used for dosin.70 (2021)   Warfarin maintenance plan:  7.5 mg (5 mg x 1.5) every Mon, Wed, Fri; 5 mg (5 mg x 1) all other days   Weekly warfarin total:  42.5 mg   Plan last modified:  Rissa Mills (2021)   Next INR check:  2021   Target end date:  Indefinite    Indications    Atrial fibrillation (HCC) [I48.91]             Anticoagulation Episode Summary     INR check location:  Anticoagulation Clinic    Preferred lab:      Send INR reminders to:      Comments:        Anticoagulation Care Providers     Provider Role Specialty Phone number    Daniel Ferrera M.D. Referring Cardiac Electrophysiology [phone]    [facility] Responsible  [phone]        Anticoagulation Patient Findings  Patient Findings     Negatives:  Signs/symptoms of thrombosis, Signs/symptoms of bleeding, Laboratory test error suspected, Change in health, Change in alcohol use, Change in activity, Upcoming invasive procedure, Emergency department visit, Upcoming dental procedure, Missed doses, Extra doses, Change in medications, Change in diet/appetite, Hospital admission, Bruising, Other complaints              History of Present Illness: follow up appointment for chronic anticoagulation with the high risk medication, warfarin for AF    Last INR was out of range, dosage adjusted: pt remains sub therapeutic today. Will increase weekly dose by 6%. Follow up in 2 weeks, to reduce the risk of adverse events related to this high risk medication, warfarin.    Rissa Mills, Clinical Pharmacist

## 2021-06-09 ENCOUNTER — HOSPITAL ENCOUNTER (EMERGENCY)
Facility: MEDICAL CENTER | Age: 71
End: 2021-06-09
Attending: EMERGENCY MEDICINE
Payer: MEDICARE

## 2021-06-09 ENCOUNTER — APPOINTMENT (OUTPATIENT)
Dept: RADIOLOGY | Facility: MEDICAL CENTER | Age: 71
End: 2021-06-09
Attending: EMERGENCY MEDICINE
Payer: MEDICARE

## 2021-06-09 ENCOUNTER — NURSE TRIAGE (OUTPATIENT)
Dept: HEALTH INFORMATION MANAGEMENT | Facility: OTHER | Age: 71
End: 2021-06-09

## 2021-06-09 VITALS
OXYGEN SATURATION: 93 % | WEIGHT: 244.27 LBS | SYSTOLIC BLOOD PRESSURE: 154 MMHG | BODY MASS INDEX: 31.35 KG/M2 | DIASTOLIC BLOOD PRESSURE: 87 MMHG | HEIGHT: 74 IN | RESPIRATION RATE: 16 BRPM | TEMPERATURE: 97.6 F | HEART RATE: 70 BPM

## 2021-06-09 DIAGNOSIS — Z79.01 CHRONIC ANTICOAGULATION: ICD-10-CM

## 2021-06-09 DIAGNOSIS — S90.02XA TRAUMATIC ECCHYMOSIS OF LEFT ANKLE, INITIAL ENCOUNTER: ICD-10-CM

## 2021-06-09 LAB
ANION GAP SERPL CALC-SCNC: 11 MMOL/L (ref 7–16)
BUN SERPL-MCNC: 30 MG/DL (ref 8–22)
CALCIUM SERPL-MCNC: 9 MG/DL (ref 8.5–10.5)
CHLORIDE SERPL-SCNC: 105 MMOL/L (ref 96–112)
CO2 SERPL-SCNC: 20 MMOL/L (ref 20–33)
CREAT SERPL-MCNC: 0.94 MG/DL (ref 0.5–1.4)
ERYTHROCYTE [DISTWIDTH] IN BLOOD BY AUTOMATED COUNT: 45.9 FL (ref 35.9–50)
GLUCOSE SERPL-MCNC: 108 MG/DL (ref 65–99)
HCT VFR BLD AUTO: 38.1 % (ref 42–52)
HGB BLD-MCNC: 13.3 G/DL (ref 14–18)
INR PPP: 1.8 (ref 0.87–1.13)
MCH RBC QN AUTO: 33.4 PG (ref 27–33)
MCHC RBC AUTO-ENTMCNC: 34.9 G/DL (ref 33.7–35.3)
MCV RBC AUTO: 95.7 FL (ref 81.4–97.8)
PLATELET # BLD AUTO: 134 K/UL (ref 164–446)
PMV BLD AUTO: 10.8 FL (ref 9–12.9)
POTASSIUM SERPL-SCNC: 4.1 MMOL/L (ref 3.6–5.5)
PROTHROMBIN TIME: 21.4 SEC (ref 12–14.6)
RBC # BLD AUTO: 3.98 M/UL (ref 4.7–6.1)
SODIUM SERPL-SCNC: 136 MMOL/L (ref 135–145)
WBC # BLD AUTO: 5.9 K/UL (ref 4.8–10.8)

## 2021-06-09 PROCEDURE — 73610 X-RAY EXAM OF ANKLE: CPT | Mod: LT

## 2021-06-09 PROCEDURE — 99283 EMERGENCY DEPT VISIT LOW MDM: CPT

## 2021-06-09 PROCEDURE — 85610 PROTHROMBIN TIME: CPT

## 2021-06-09 PROCEDURE — 80048 BASIC METABOLIC PNL TOTAL CA: CPT

## 2021-06-09 PROCEDURE — 85027 COMPLETE CBC AUTOMATED: CPT

## 2021-06-09 ASSESSMENT — LIFESTYLE VARIABLES
CONSUMPTION TOTAL: INCOMPLETE
HAVE PEOPLE ANNOYED YOU BY CRITICIZING YOUR DRINKING: NO
DOES PATIENT WANT TO STOP DRINKING: NO
TOTAL SCORE: 0
EVER HAD A DRINK FIRST THING IN THE MORNING TO STEADY YOUR NERVES TO GET RID OF A HANGOVER: NO
DO YOU DRINK ALCOHOL: NO
TOTAL SCORE: 0
EVER FELT BAD OR GUILTY ABOUT YOUR DRINKING: NO
TOTAL SCORE: 0
HAVE YOU EVER FELT YOU SHOULD CUT DOWN ON YOUR DRINKING: NO

## 2021-06-09 ASSESSMENT — FIBROSIS 4 INDEX: FIB4 SCORE: 2.03

## 2021-06-09 NOTE — ED TRIAGE NOTES
Mike Ferrell  70 y.o. male  Chief Complaint   Patient presents with   • Ankle Pain   • Foot Swelling   • Bleeding/Bruising     Patient reports he woke up with a large bruising to his left ankle that wraps across the back of his leg and developed new swelling. Patient describes pain as dull but states only painful when he bears weight and it subsides with rest. Patient denies trauma. Patient states he had a recent increase in his coumadin dose last Friday due to INR level of 1.7.     Vitals:    06/09/21 0900   BP: 154/90   Pulse: 83   Resp: 16   Temp: 36.4 °C (97.6 °F)   SpO2: 94%

## 2021-06-09 NOTE — TELEPHONE ENCOUNTER
"Ankle really swollen & purple bruise, bruise is 2 inches from going all the way around his ankle.  INR of 1.7. on 6/4,  Daniel Ferrera is cardiologist.  [facility] follows his INR.  Before taking was taking 1 coumadin tablet 7 days a week. Still taking 7 days a week but increased to 1.5 pills on Monday, Wednesday & Friday.  Took 1 1/2  pills on 6/7 & 6/9. Swelling first noticed on 6/8.  History of fall before 6/4.       Reason for Disposition  • Patient sounds very sick or weak to the triager    Additional Information  • Negative: Difficulty breathing  • Negative: Entire foot is cool or blue in comparison to other side  • Negative: Chest pain  • Negative: Followed an ankle injury  • Negative: Ankle pain is main symptom  • Negative: Swelling of both ankles (i.e., pedal edema)  • Negative: Swelling of calf or leg    Answer Assessment - Initial Assessment Questions  1. LOCATION: \"Which joint is swollen?\"      Left ankle swelling & bruising  2. ONSET: \"When did the swelling start?\"      Noticed @ 7 p.m. on 6/8  3. SIZE: \"How large is the swelling?\"      Pretty big, abut 2 times size of other ankle  4. PAIN: \"Is there any pain?\" If so, ask: \"How bad is it?\" (Scale 1-10; or mild, moderate, severe)      Painful, 8  5. CAUSE: \"What do you think caused the swollen joint?\"      Does not know, fell about week ago, does not remember hitting ankle  6. OTHER SYMPTOMS: \"Do you have any other symptoms?\" (e.g., fever, chest pain, difficulty breathing, calf pain)      Bruising around ankle  7. PREGNANCY: \"Is there any chance you are pregnant?\" \"When was your last menstrual period?\"      NA    Protocols used: ANKLE SWELLING-A-AH      "

## 2021-06-09 NOTE — ED PROVIDER NOTES
ED Provider Note    Scribed for Santino Juarez M.D. by Della Acosta. 6/9/2021  9:37 AM    Primary care provider: Hipolito Hall M.D.  Means of arrival: Walk in  History obtained from: Patient  History limited by: None    CHIEF COMPLAINT  Chief Complaint   Patient presents with    Ankle Pain    Foot Swelling    Bleeding/Bruising     [facility]  Mike Ferrell is a 70 y.o. male who presents to the Emergency Department for evaluation of left ankle bruising onset this morning. The patient is on warfarin and notes his dosage was recently increased. He admits to associated symptoms of moderate left ankle swelling, but denies significant left ankle pain. He denies any trauma to his ankle. He notes he walked more than usual over the past couple days. No alleviating factors were reported.      REVIEW OF SYSTEMS  Pertinent positives include left ankle bruising, left ankle swelling.   Pertinent negatives include no significant left ankle pain.    All other systems reviewed and negative. See [facility] for further details.     PAST MEDICAL HISTORY   has a past medical history of Arthritis, Atrial fibrillation (HCC) (4/4/2012), Methamphetamine use (HCC) (none for many years), Pain, Paroxysmal ventricular tachycardia (HCC) (4/4/2012), Sinoatrial node dysfunction (HCC) (4/4/2012), and Syncope and collapse (4/4/2012).    SURGICAL HISTORY   has a past surgical history that includes cholecystectomy (1999); pacemaker insertion (2009); aicd implant (2010); and tonsillectomy (1953).    SOCIAL HISTORY  Social History     Tobacco Use    Smoking status: Never Smoker    Smokeless tobacco: Never Used   Vaping Use    Vaping Use: Never used   Substance Use Topics    Alcohol use: Yes     Comment: a couple of whiskey every weekened     Drug use: No      Social History     Substance and Sexual Activity   Drug Use No     FAMILY HISTORY  History reviewed. No pertinent family history.    CURRENT MEDICATIONS  Current Outpatient Medications  "  Medication Instructions    amLODIPine (NORVASC) 5 mg, DAILY    ascorbic acid (VITAMIN C) 500 mg, Oral, DAILY    B Complex Vitamins (B COMPLEX PO) Oral    benazepril (LOTENSIN) 40 mg, DAILY    Cholecalciferol (VITAMIN D3) 5000 units Cap 1 capsule, Oral, DAILY    colchicine (COLCRYS) 0.6 mg, Oral, 2 TIMES DAILY, Continue until gout flare resolves.    colesevelam (WELCHOL) 625 mg, Oral, DAILY    hydrochlorothiazide (MICROZIDE) 12.5 mg, Oral, DAILY    potassium chloride SA (KDUR) 20 MEQ Tab CR 20 mEq, Oral, DAILY    sotalol (BETAPACE) 120 mg, Oral, 2 TIMES DAILY    traMADol (ULTRAM) 50 mg, Oral, EVERY 6 HOURS PRN    warfarin (COUMADIN) 5 MG Tab TAKE 1 (ONE) TO 1 & 1/2 (ONE & ONE-HALF) TABLETS BY MOUTH ONCE DAILY AS DIRECTED BY THE COUMADIN CLINIC.     ALLERGIES  Allergies   Allergen Reactions    Lipitor [Atorvastatin]     Sulfa Drugs Rash and Itching     Rash, itch    Statins [Hmg-Coa-R Inhibitors] Itching     PHYSICAL EXAM  VITAL SIGNS: /90   Pulse 83   Temp 36.4 °C (97.6 °F) (Temporal)   Resp 16   Ht 1.88 m (6' 2\")   Wt 111 kg (244 lb 4.3 oz)   SpO2 94%   BMI 31.36 kg/m²   Constitutional: Well-developed and well-nourished. No distress.   HENT: Head is normocephalic and atraumatic.   Cardiovascular: Capillary refill is less than 2 seconds   Pulmonary/Chest: No respiratory distress.    Abdominal: Atraumatic.  Musculoskeletal: Ecchymosis about the medial and lateral aspect of the left ankle without any bony tenderness to palpation. No calf tenderness. Extremities exhibit normal range of motion   Neurological: Awake, alert and oriented to person, place, and time. left lower extremity is neurologically intact.   Skin: Skin is warm and dry. No rash.   Psychiatric: Normal mood and affect. Appropriate for clinical situation     DIAGNOSTIC STUDIES / PROCEDURES    LABS  Results for orders placed or performed during the hospital encounter of 06/09/21   CBC WITHOUT DIFFERENTIAL   Result Value Ref Range    WBC 5.9 " 4.8 - 10.8 K/uL    RBC 3.98 (L) 4.70 - 6.10 M/uL    Hemoglobin 13.3 (L) 14.0 - 18.0 g/dL    Hematocrit 38.1 (L) 42.0 - 52.0 %    MCV 95.7 81.4 - 97.8 fL    MCH 33.4 (H) 27.0 - 33.0 pg    MCHC 34.9 33.7 - 35.3 g/dL    RDW 45.9 35.9 - 50.0 fL    Platelet Count 134 (L) 164 - 446 K/uL    MPV 10.8 9.0 - 12.9 fL   BASIC METABOLIC PANEL   Result Value Ref Range    Sodium 136 135 - 145 mmol/L    Potassium 4.1 3.6 - 5.5 mmol/L    Chloride 105 96 - 112 mmol/L    Co2 20 20 - 33 mmol/L    Glucose 108 (H) 65 - 99 mg/dL    Bun 30 (H) 8 - 22 mg/dL    Creatinine 0.94 0.50 - 1.40 mg/dL    Calcium 9.0 8.5 - 10.5 mg/dL    Anion Gap 11.0 7.0 - 16.0   PROTHROMBIN TIME (INR)   Result Value Ref Range    PT 21.4 (H) 12.0 - 14.6 sec    INR 1.80 (H) 0.87 - 1.13   ESTIMATED GFR   Result Value Ref Range    GFR If African American >60 >60 mL/min/1.73 m 2    GFR If Non African American >60 >60 mL/min/1.73 m 2      All labs reviewed by me.    RADIOLOGY  DX-ANKLE 3+ VIEWS LEFT   Final Result      1.  No left ankle fracture or dislocation.      2.  Mild diffuse soft tissue swelling.      3.  Moderate osteoarthritic appearing degenerative change of the left ankle.        The radiologist's interpretation of all radiological studies have been reviewed by me.    COURSE & MEDICAL DECISION MAKING  Nursing notes, VS, PMSFHx reviewed in chart.     Review of past medical records shows the patient was in the anticoagulation clinic on 6/4/21, at which time he was found to have INR of 1.7. His warfarin was increased by 6%.     9:37 AM - Patient seen and examined at bedside. I informed the patient of my plan to run diagnostic studies to evaluate their symptoms including imaging and labs. Patient verbalizes understanding and support with my plan of care. Ordered DX-Chest, CBC w/o diff, BMP, INR to evaluate his symptoms. The differential diagnoses include but are not limited to: Supratherapeutic INR, Trauma, Fracture.      10:45 AM - I reevaluated the patient at  bedside. I discussed the patient's diagnostic study results as shown above. I discussed plan for discharge and follow up as outlined below. The patient verbalizes they feel comfortable going home. The patient is stable for discharge at this time and will return for any new or worsening symptoms. Patient verbalizes understanding and support with my plan for discharge.      The patient will return for new or worsening symptoms and is stable at the time of discharge.    The patient is referred to a primary physician for blood pressure management, diabetic screening, and for all other preventative health concerns.    DISPOSITION:  Patient will be discharged home in stable condition.    FOLLOW UP:  Hipolito Hall M.D.  [address]   [address]  [place] [id number] [phone]    Schedule an appointment as soon as possible for a visit       [facility], Emergency Dept  [address] [id number] [phone]    If symptoms worsen    FINAL IMPRESSION  1. Traumatic ecchymosis of left ankle, initial encounter    2. Chronic anticoagulation        Della PALOMARES (El), am scribing for, and in the presence of, Santino Juarez M.D..    Electronically signed by: Della Acosta (El), 6/9/2021    Snatino PALOMARES M.D. personally performed the services described in this documentation, as scribed by Della Acosta in my presence, and it is both accurate and complete.    The note accurately reflects work and decisions made by me.  Santino Juarez M.D.  6/9/2021  2:26 PM

## 2021-06-14 ENCOUNTER — ANTICOAGULATION VISIT (OUTPATIENT)
Dept: VASCULAR LAB | Facility: MEDICAL CENTER | Age: 71
End: 2021-06-14
Attending: INTERNAL MEDICINE
Payer: MEDICARE

## 2021-06-14 DIAGNOSIS — I48.11 LONGSTANDING PERSISTENT ATRIAL FIBRILLATION (HCC): ICD-10-CM

## 2021-06-14 LAB
INR BLD: 1.8 (ref 0.9–1.2)
INR PPP: 1.8 (ref 2–3.5)

## 2021-06-14 PROCEDURE — 85610 PROTHROMBIN TIME: CPT

## 2021-06-14 PROCEDURE — 99212 OFFICE O/P EST SF 10 MIN: CPT | Performed by: NURSE PRACTITIONER

## 2021-06-14 NOTE — PROGRESS NOTES
Anticoagulation Summary  As of 2021    INR goal:  2.0-3.0   TTR:  61.8 % (6 y)   INR used for dosin.80 (2021)   Warfarin maintenance plan:  5 mg (5 mg x 1) every Sun, Tue, Thu; 7.5 mg (5 mg x 1.5) all other days   Weekly warfarin total:  45 mg   Plan last modified:  Rebecca Mathews A.P.NLinnea (2021)   Next INR check:     Target end date:  Indefinite    Indications    Atrial fibrillation (HCC) [I48.91]             Anticoagulation Episode Summary     INR check location:  Anticoagulation Clinic    Preferred lab:      Send INR reminders to:      Comments:        Anticoagulation Care Providers     Provider Role Specialty Phone number    Daniel Ferrera M.D. Referring Cardiac Electrophysiology [phone]    [facility] Responsible  [phone]        Anticoagulation Patient Findings      HPI:  Mike Ferrell seen in clinic today for follow up on anticoagulation therapy in the presence of AF.   He was in the ER last week for left foot/ankle bruising. Work up was neg. Seems to be improving.  INR trending low.  He is eating more greens and plans to stay consistent.  Denies any medication changes.   No current symptoms of bleeding or thrombosis reported.  Verified dose with patient. No missed doses.  BP recorded in vitals.    Pt is not on antiplatelet therapy.    A/P:   INR subtherapeutic. CHADS 2 score = 1 (htn). Will increase regimen.     Pt educated to contact our clinic with any changes in medications or s/s of bleeding or thrombosis. Pt is aware to seek immediate medical attention for falls, head injury or deep cuts    Follow up appointment in 2 week(s).    Next Appointment: Wednesday, 2021 at 9:30 am.    Rebecca SNIDER

## 2021-06-30 ENCOUNTER — ANTICOAGULATION VISIT (OUTPATIENT)
Dept: VASCULAR LAB | Facility: MEDICAL CENTER | Age: 71
End: 2021-06-30
Attending: INTERNAL MEDICINE
Payer: MEDICARE

## 2021-06-30 VITALS — HEART RATE: 78 BPM | DIASTOLIC BLOOD PRESSURE: 52 MMHG | SYSTOLIC BLOOD PRESSURE: 124 MMHG

## 2021-06-30 DIAGNOSIS — I48.11 LONGSTANDING PERSISTENT ATRIAL FIBRILLATION (HCC): ICD-10-CM

## 2021-06-30 LAB
INR BLD: 2.1 (ref 0.9–1.2)
INR PPP: 2.1 (ref 2–3.5)

## 2021-06-30 PROCEDURE — 85610 PROTHROMBIN TIME: CPT

## 2021-06-30 PROCEDURE — 99211 OFF/OP EST MAY X REQ PHY/QHP: CPT | Performed by: NURSE PRACTITIONER

## 2021-06-30 NOTE — PROGRESS NOTES
Anticoagulation Summary  As of 2021    INR goal:  2.0-3.0   TTR:  61.6 % (6.1 y)   INR used for dosin.10 (2021)   Warfarin maintenance plan:  5 mg (5 mg x 1) every Sun, Tue, Thu; 7.5 mg (5 mg x 1.5) all other days   Weekly warfarin total:  45 mg   Plan last modified:  Rebecca Mathews, A.P.N. (2021)   Next INR check:  2021   Target end date:  Indefinite    Indications    Atrial fibrillation (HCC) [I48.91]             Anticoagulation Episode Summary     INR check location:  Anticoagulation Clinic    Preferred lab:      Send INR reminders to:      Comments:        Anticoagulation Care Providers     Provider Role Specialty Phone number    Daniel Ferrera M.D. Referring Cardiac Electrophysiology [phone]    [facility] Responsible  [phone]        Anticoagulation Patient Findings      HPI:  Mike Ferrell seen in clinic today for follow up on anticoagulation therapy in the presence of AF.   Denies any changes to current medical/health status since last appointment.   Denies any medication or diet changes.   No current symptoms of bleeding or thrombosis reported.  Verified dose with patient.  BP recorded in vitals.    Pt is not on antiplatelet therapy.    A/P:   INR therapeutic.   Continue current regimen.     Pt educated to contact our clinic with any changes in medications or s/s of bleeding or thrombosis. Pt is aware to seek immediate medical attention for falls, head injury or deep cuts    Follow up appointment in 4 week(s) per pt's request.    Next Appointment: 2021 at 9:30 am    Rebecca SNIDER

## 2021-07-28 ENCOUNTER — ANTICOAGULATION VISIT (OUTPATIENT)
Dept: VASCULAR LAB | Facility: MEDICAL CENTER | Age: 71
End: 2021-07-28
Attending: INTERNAL MEDICINE
Payer: MEDICARE

## 2021-07-28 VITALS — DIASTOLIC BLOOD PRESSURE: 70 MMHG | SYSTOLIC BLOOD PRESSURE: 126 MMHG | HEART RATE: 70 BPM

## 2021-07-28 DIAGNOSIS — I48.11 LONGSTANDING PERSISTENT ATRIAL FIBRILLATION (HCC): ICD-10-CM

## 2021-07-28 LAB — INR PPP: 1.9 (ref 2–3.5)

## 2021-07-28 PROCEDURE — 85610 PROTHROMBIN TIME: CPT

## 2021-07-28 PROCEDURE — 99212 OFFICE O/P EST SF 10 MIN: CPT | Performed by: NURSE PRACTITIONER

## 2021-07-28 NOTE — PROGRESS NOTES
Anticoagulation Summary  As of 2021    INR goal:  2.0-3.0   TTR:  61.4 % (6.1 y)   INR used for dosin.90 (2021)   Warfarin maintenance plan:  5 mg (5 mg x 1) every Sun, Tue, Thu; 7.5 mg (5 mg x 1.5) all other days   Weekly warfarin total:  45 mg   Plan last modified:  Rebecca Mathews, A.P.NLinnea (2021)   Next INR check:  2021   Target end date:  Indefinite    Indications    Atrial fibrillation (HCC) [I48.91]             Anticoagulation Episode Summary     INR check location:  Anticoagulation Clinic    Preferred lab:      Send INR reminders to:      Comments:        Anticoagulation Care Providers     Provider Role Specialty Phone number    Daniel Ferrera M.D. Referring Cardiac Electrophysiology [phone]    [facility] Responsible  [phone]        Anticoagulation Patient Findings      HPI:  Mike Ferrell seen in clinic today for follow up on anticoagulation therapy in the presence of AF.   Denies any changes to current medical/health status since last appointment.   Denies any medication or diet changes.   No current symptoms of bleeding or thrombosis reported.  Verified dose with patient. He is taking 5 mg on , not 7.5 mg as previously noted.  BP recorded in vitals.    Pt on antiplatelet therapy.    A/P:   INR slightly subtherapeutic.   Will have patient began previously intended regimen.     Pt educated to contact our clinic with any changes in medications or s/s of bleeding or thrombosis. Pt is aware to seek immediate medical attention for falls, head injury or deep cuts    Follow up appointment in 4 week(s) per pt's request.    Next Appointment: Wednesday, 2021 at 9:30 am.    Rebecca SNIDER

## 2021-07-29 LAB — INR BLD: 1.9 (ref 0.9–1.2)

## 2021-08-25 ENCOUNTER — ANTICOAGULATION VISIT (OUTPATIENT)
Dept: VASCULAR LAB | Facility: MEDICAL CENTER | Age: 71
End: 2021-08-25
Attending: INTERNAL MEDICINE
Payer: MEDICARE

## 2021-08-25 VITALS — DIASTOLIC BLOOD PRESSURE: 89 MMHG | HEART RATE: 84 BPM | SYSTOLIC BLOOD PRESSURE: 139 MMHG

## 2021-08-25 DIAGNOSIS — I48.11 LONGSTANDING PERSISTENT ATRIAL FIBRILLATION (HCC): ICD-10-CM

## 2021-08-25 LAB
INR BLD: 2.9 (ref 0.9–1.2)
INR PPP: 2.9 (ref 2–3.5)

## 2021-08-25 PROCEDURE — 85610 PROTHROMBIN TIME: CPT

## 2021-08-25 PROCEDURE — 99211 OFF/OP EST MAY X REQ PHY/QHP: CPT | Performed by: NURSE PRACTITIONER

## 2021-08-25 NOTE — PROGRESS NOTES
Anticoagulation Summary  As of 2021    INR goal:  2.0-3.0   TTR:  61.8 % (6.2 y)   INR used for dosin.90 (2021)   Warfarin maintenance plan:  5 mg (5 mg x 1) every Mon, Wed, Fri; 7.5 mg (5 mg x 1.5) all other days   Weekly warfarin total:  45 mg   Plan last modified:  ILEANA Pike (2021)   Next INR check:  2021   Target end date:  Indefinite    Indications    Atrial fibrillation (HCC) [I48.91]             Anticoagulation Episode Summary     INR check location:  Anticoagulation Clinic    Preferred lab:      Send INR reminders to:      Comments:        Anticoagulation Care Providers     Provider Role Specialty Phone number    Daniel Ferrera M.D. Referring Cardiac Electrophysiology [phone]    [facility]ticoagulation [facility] Responsible  [phone]                    Refer to Patient Findings for HPI:        Vitals:    21 0914   BP: 139/89   Pulse: 84     Verified current warfarin dosing schedule.      Medications reconciled   Pt is not on antiplatelet therapy      A/P   INR  -therapeutic.     Warfarin dosing recommendation: Continue current regimen.    Pt educated to contact our clinic with any changes in medications or s/s of bleeding or thrombosis. Pt is aware to seek immediate medical attention for falls, head injury or deep cuts.    Follow up appointment in 5 week(s).    ILEANA Pike

## 2021-09-22 ENCOUNTER — HOSPITAL ENCOUNTER (OUTPATIENT)
Dept: LAB | Facility: MEDICAL CENTER | Age: 71
End: 2021-09-22
Attending: FAMILY MEDICINE
Payer: MEDICARE

## 2021-09-22 LAB
ALBUMIN SERPL BCP-MCNC: 4.1 G/DL (ref 3.2–4.9)
ALBUMIN/GLOB SERPL: 1.5 G/DL
ALP SERPL-CCNC: 129 U/L (ref 30–99)
ALT SERPL-CCNC: 30 U/L (ref 2–50)
ANION GAP SERPL CALC-SCNC: 13 MMOL/L (ref 7–16)
AST SERPL-CCNC: 44 U/L (ref 12–45)
BILIRUB SERPL-MCNC: 1 MG/DL (ref 0.1–1.5)
BUN SERPL-MCNC: 18 MG/DL (ref 8–22)
CALCIUM SERPL-MCNC: 9.6 MG/DL (ref 8.5–10.5)
CHLORIDE SERPL-SCNC: 99 MMOL/L (ref 96–112)
CHOLEST SERPL-MCNC: 235 MG/DL (ref 100–199)
CO2 SERPL-SCNC: 27 MMOL/L (ref 20–33)
CREAT SERPL-MCNC: 1.26 MG/DL (ref 0.5–1.4)
GLOBULIN SER CALC-MCNC: 2.8 G/DL (ref 1.9–3.5)
GLUCOSE SERPL-MCNC: 97 MG/DL (ref 65–99)
HDLC SERPL-MCNC: 57 MG/DL
LDLC SERPL CALC-MCNC: 154 MG/DL
POTASSIUM SERPL-SCNC: 3.8 MMOL/L (ref 3.6–5.5)
PROT SERPL-MCNC: 6.9 G/DL (ref 6–8.2)
SODIUM SERPL-SCNC: 139 MMOL/L (ref 135–145)
TRIGL SERPL-MCNC: 121 MG/DL (ref 0–149)
URATE SERPL-MCNC: 8.3 MG/DL (ref 2.5–8.3)

## 2021-09-22 PROCEDURE — 84550 ASSAY OF BLOOD/URIC ACID: CPT

## 2021-09-22 PROCEDURE — 36415 COLL VENOUS BLD VENIPUNCTURE: CPT

## 2021-09-22 PROCEDURE — 80053 COMPREHEN METABOLIC PANEL: CPT

## 2021-09-22 PROCEDURE — 80061 LIPID PANEL: CPT

## 2021-09-23 ENCOUNTER — PATIENT MESSAGE (OUTPATIENT)
Dept: HEALTH INFORMATION MANAGEMENT | Facility: OTHER | Age: 71
End: 2021-09-23

## 2021-09-29 ENCOUNTER — ANTICOAGULATION VISIT (OUTPATIENT)
Dept: VASCULAR LAB | Facility: MEDICAL CENTER | Age: 71
End: 2021-09-29
Attending: INTERNAL MEDICINE
Payer: MEDICARE

## 2021-09-29 VITALS — SYSTOLIC BLOOD PRESSURE: 149 MMHG | HEART RATE: 86 BPM | DIASTOLIC BLOOD PRESSURE: 97 MMHG

## 2021-09-29 DIAGNOSIS — I48.11 LONGSTANDING PERSISTENT ATRIAL FIBRILLATION (HCC): ICD-10-CM

## 2021-09-29 LAB — INR PPP: 5.6 (ref 2–3.5)

## 2021-09-29 PROCEDURE — 85610 PROTHROMBIN TIME: CPT

## 2021-09-29 PROCEDURE — 99212 OFFICE O/P EST SF 10 MIN: CPT | Performed by: NURSE PRACTITIONER

## 2021-09-29 NOTE — PROGRESS NOTES
Anticoagulation Summary  As of 2021    INR goal:  2.0-3.0   TTR:  60.9 % (6.3 y)   INR used for dosin.60 (2021)   Warfarin maintenance plan:  5 mg (5 mg x 1) every Mon, Wed, Fri; 7.5 mg (5 mg x 1.5) all other days   Weekly warfarin total:  45 mg   Plan last modified:  ILEANA Pike (2021)   Next INR check:  10/13/2021   Target end date:  Indefinite    Indications    Atrial fibrillation (HCC) [I48.91]             Anticoagulation Episode Summary     INR check location:  Anticoagulation Clinic    Preferred lab:      Send INR reminders to:      Comments:        Anticoagulation Care Providers     Provider Role Specialty Phone number    Daniel Ferrera M.D. Referring Internal Medicine Clinical Cardiac Electrophysiology [phone]    [facility] Responsible  [phone]                Refer to Patient Findings for HPI:  Patient Findings     Negatives:  Signs/symptoms of thrombosis, Signs/symptoms of bleeding, Laboratory test error suspected, Change in health, Change in alcohol use, Change in activity, Upcoming invasive procedure, Emergency department visit, Upcoming dental procedure, Missed doses, Extra doses, Change in medications, Change in diet/appetite, Hospital admission, Bruising, Other complaints          Vitals:    21 0907   BP: 149/97   Pulse: 86       Verified current warfarin dosing schedule.    Medications reconciled   Pt is not on antiplatelet therapy      A/P   INR  supra-therapeutic.     Warfarin dosing recommendation: HOLD two doses then resume your prior regimen.    Pt educated to contact our clinic with any changes in medications or s/s of bleeding or thrombosis. Pt is aware to seek immediate medical attention for falls, head injury or deep cuts.    Follow up appointment in 2 week(s) per pt's request.    ILEANA Pike

## 2021-10-01 LAB — INR BLD: 5.6 (ref 0.9–1.2)

## 2021-10-04 ENCOUNTER — TELEPHONE (OUTPATIENT)
Dept: CARDIOLOGY | Facility: MEDICAL CENTER | Age: 71
End: 2021-10-04

## 2021-10-04 NOTE — TELEPHONE ENCOUNTER
Remote transmission 10/4/2021 shows normal device function with no afib. 1-NSVT episode 7 bts 10/2/2021@4:30pm lasting 1 sec.   Full report scanned into media.

## 2021-10-04 NOTE — TELEPHONE ENCOUNTER
"Wife called hetal feels like he has been back in afib for about a week.   Sob, chest tightness has been ongoing for about 1 week.   Confirm sotalol and warfarin as prescribed. Does not monitor BP or HR. But last time s/o checked with pulse ox he was about 90bpm.    Used to have a home monitor but not hooked up. Will plug in monitor and try to send a transmission. Per device team he is followed by us remotely. Will call back once monitor plugged back in and transmission sent.       Advised if pt had worsening or intolerable symptoms may go to ER for evaluation. Wife states she will not be taking him to the ER because \"they don't know what to do with defibrillators and pacemakers over there\"   "

## 2021-10-04 NOTE — TELEPHONE ENCOUNTER
Returned call, transmission clear of arrhthymias in the last week.     States SOB with exertion only, no longer having chest discomfort started last Sunday and resolved this Saturday. Does complain of cough recently slowly resolving, and has been sleeping a lot, and has to cut up food really small or starts chocking.     Pt states he is starting to feel a little better today. Encouraged to call PCP regarding swallowing issue. With symptoms slowly resolving and no arrhthymias on device. Will see if pt may have just had a cold that is now resolving. Pt s/o to monitor over the next few days. Will forward to DS for any further recommendations and call back with any changes to POC    [phone] Karen elkins.

## 2021-10-13 ENCOUNTER — ANTICOAGULATION VISIT (OUTPATIENT)
Dept: VASCULAR LAB | Facility: MEDICAL CENTER | Age: 71
End: 2021-10-13
Attending: INTERNAL MEDICINE
Payer: MEDICARE

## 2021-10-13 DIAGNOSIS — Z79.01 CHRONIC ANTICOAGULATION: ICD-10-CM

## 2021-10-13 LAB
INR BLD: 2.4 (ref 0.9–1.2)
INR PPP: 2.4 (ref 2–3.5)

## 2021-10-13 PROCEDURE — 99211 OFF/OP EST MAY X REQ PHY/QHP: CPT

## 2021-10-13 PROCEDURE — 85610 PROTHROMBIN TIME: CPT

## 2021-10-13 NOTE — PROGRESS NOTES
Anticoagulation Summary  As of 10/13/2021    INR goal:  2.0-3.0   TTR:  60.7 % (6.3 y)   INR used for dosin.40 (10/13/2021)   Warfarin maintenance plan:  5 mg (5 mg x 1) every Mon, Wed, Fri; 7.5 mg (5 mg x 1.5) all other days   Weekly warfarin total:  45 mg   Plan last modified:  ILEANA Pike (2021)   Next INR check:  11/10/2021   Target end date:  Indefinite    Indications    Atrial fibrillation (HCC) [I48.91]             Anticoagulation Episode Summary     INR check location:  Anticoagulation Clinic    Preferred lab:      Send INR reminders to:      Comments:        Anticoagulation Care Providers     Provider Role Specialty Phone number    Daniel Ferrera M.D. Referring Internal Medicine Clinical Cardiac Electrophysiology [phone]    [facility] Responsible  [phone]                Refer to Patient Findings for HPI:  Patient Findings     Positives:  Change in alcohol use (Less EtOH - plans to continue.)    Negatives:  Signs/symptoms of thrombosis, Signs/symptoms of bleeding, Laboratory test error suspected, Change in health, Change in activity, Upcoming invasive procedure, Emergency department visit, Upcoming dental procedure, Missed doses, Extra doses, Change in medications, Change in diet/appetite, Hospital admission, Bruising, Other complaints          There were no vitals filed for this visit.  Pt declined vitals    Verified current warfarin dosing schedule.    Medications reconciled   Pt is not on antiplatelet therapy      A/P   INR  therapeutic.     Warfarin dosing recommendation: Instructed pt to continue on with current regimen.    Pt educated to contact our clinic with any changes in medications or s/s of bleeding or thrombosis. Pt is aware to seek immediate medical attention for falls, head injury or deep cuts.    Follow up appointment in 4 week(s).    David Giles, [place]

## 2021-10-14 DIAGNOSIS — Z79.01 CHRONIC ANTICOAGULATION: ICD-10-CM

## 2021-11-01 ENCOUNTER — OFFICE VISIT (OUTPATIENT)
Dept: CARDIOLOGY | Facility: MEDICAL CENTER | Age: 71
End: 2021-11-01
Payer: MEDICARE

## 2021-11-01 VITALS
DIASTOLIC BLOOD PRESSURE: 80 MMHG | HEART RATE: 74 BPM | BODY MASS INDEX: 30.48 KG/M2 | SYSTOLIC BLOOD PRESSURE: 118 MMHG | HEIGHT: 72 IN | RESPIRATION RATE: 16 BRPM | WEIGHT: 225 LBS | OXYGEN SATURATION: 94 %

## 2021-11-01 DIAGNOSIS — I49.5 SINOATRIAL NODE DYSFUNCTION (HCC): ICD-10-CM

## 2021-11-01 DIAGNOSIS — Z95.810 AUTOMATIC IMPLANTABLE CARDIOVERTER-DEFIBRILLATOR IN SITU: ICD-10-CM

## 2021-11-01 DIAGNOSIS — I48.91 ATRIAL FIBRILLATION, UNSPECIFIED TYPE (HCC): ICD-10-CM

## 2021-11-01 DIAGNOSIS — I10 ESSENTIAL HYPERTENSION: ICD-10-CM

## 2021-11-01 DIAGNOSIS — Z79.01 ANTICOAGULANT LONG-TERM USE: ICD-10-CM

## 2021-11-01 DIAGNOSIS — I45.10 RIGHT BUNDLE BRANCH BLOCK: ICD-10-CM

## 2021-11-01 DIAGNOSIS — I47.29 PAROXYSMAL VENTRICULAR TACHYCARDIA (HCC): ICD-10-CM

## 2021-11-01 DIAGNOSIS — E78.2 MIXED HYPERLIPIDEMIA: ICD-10-CM

## 2021-11-01 DIAGNOSIS — F10.10 ALCOHOL ABUSE: ICD-10-CM

## 2021-11-01 PROCEDURE — 99214 OFFICE O/P EST MOD 30 MIN: CPT | Mod: 25 | Performed by: INTERNAL MEDICINE

## 2021-11-01 PROCEDURE — 93283 PRGRMG EVAL IMPLANTABLE DFB: CPT | Performed by: INTERNAL MEDICINE

## 2021-11-01 RX ORDER — POTASSIUM CHLORIDE 20 MEQ/1
20 TABLET, EXTENDED RELEASE ORAL DAILY
Qty: 30 TABLET | Refills: 3 | Status: SHIPPED | OUTPATIENT
Start: 2021-11-01 | End: 2021-12-20

## 2021-11-01 RX ORDER — FUROSEMIDE 40 MG/1
40 TABLET ORAL DAILY
Qty: 30 TABLET | Refills: 3 | Status: ON HOLD | OUTPATIENT
Start: 2021-11-01 | End: 2021-12-21

## 2021-11-01 ASSESSMENT — FIBROSIS 4 INDEX: FIB4 SCORE: 4.38

## 2021-11-10 ENCOUNTER — ANTICOAGULATION VISIT (OUTPATIENT)
Dept: VASCULAR LAB | Facility: MEDICAL CENTER | Age: 71
End: 2021-11-10
Attending: INTERNAL MEDICINE
Payer: MEDICARE

## 2021-11-10 VITALS — DIASTOLIC BLOOD PRESSURE: 74 MMHG | SYSTOLIC BLOOD PRESSURE: 112 MMHG | HEART RATE: 75 BPM

## 2021-11-10 DIAGNOSIS — I48.11 LONGSTANDING PERSISTENT ATRIAL FIBRILLATION (HCC): ICD-10-CM

## 2021-11-10 LAB
INR BLD: 4 (ref 0.9–1.2)
INR PPP: 4 (ref 2–3.5)

## 2021-11-10 PROCEDURE — 99212 OFFICE O/P EST SF 10 MIN: CPT | Performed by: NURSE PRACTITIONER

## 2021-11-10 PROCEDURE — 85610 PROTHROMBIN TIME: CPT

## 2021-11-10 NOTE — PROGRESS NOTES
Anticoagulation Summary  As of 11/10/2021    INR goal:  2.0-3.0   TTR:  60.4 % (6.4 y)   INR used for dosin.00 (11/10/2021)   Warfarin maintenance plan:  7.5 mg (5 mg x 1.5) every Tue, Sat; 5 mg (5 mg x 1) all other days   Weekly warfarin total:  40 mg   Plan last modified:  ILEANA Pike (11/10/2021)   Next INR check:  2021   Target end date:  Indefinite    Indications    Atrial fibrillation (HCC) [I48.91]             Anticoagulation Episode Summary     INR check location:  Anticoagulation Clinic    Preferred lab:      Send INR reminders to:      Comments:        Anticoagulation Care Providers     Provider Role Specialty Phone number    Daniel Ferrera M.D. Referring Internal Medicine Clinical Cardiac Electrophysiology [phone]    [facility] Responsible  [phone]                Refer to Patient Findings for HPI:  Patient Findings     Positives:  Change in health, Change in activity, Change in medications    Negatives:  Signs/symptoms of thrombosis, Signs/symptoms of bleeding, Laboratory test error suspected, Change in alcohol use, Upcoming invasive procedure, Emergency department visit, Upcoming dental procedure, Missed doses, Extra doses, Change in diet/appetite, Hospital admission, Bruising, Other complaints    Comments:  Having increased right hip and right knee pain. Taking Motrin 800 mg as needed. Also on acetaminophen and percocet. May need surgery in the near future. Also taking furosemide. He knows NSAID can increase his risk of bleeding and will be extra cautious.          Vitals:    11/10/21 0926   BP: 112/74   Pulse: 75       Verified current warfarin dosing schedule.    Medications reconciled   Pt is not on antiplatelet therapy      A/P   INR  supra-therapeutic.     Warfarin dosing recommendation: HOLD tonight and began reduced regimen.    Pt educated to contact our clinic with any changes in medications or s/s of bleeding or thrombosis. Pt is aware to seek  immediate medical attention for falls, head injury or deep cuts.    Follow up appointment in 4 week(s) per pt's request.    ROSI Pike.

## 2021-11-12 NOTE — PROGRESS NOTES
Outcome: Scheduled in home PRAFUL between 9-1pm     Please transfer to Patient Outreach Team at [phone] when patient returns call.      Attempt # 2

## 2021-11-15 PROBLEM — D68.69 SECONDARY HYPERCOAGULABLE STATE (HCC): Status: ACTIVE | Noted: 2021-11-15

## 2021-11-15 PROBLEM — N18.31 STAGE 3A CHRONIC KIDNEY DISEASE: Status: ACTIVE | Noted: 2021-11-15

## 2021-12-08 ENCOUNTER — ANTICOAGULATION VISIT (OUTPATIENT)
Dept: VASCULAR LAB | Facility: MEDICAL CENTER | Age: 71
End: 2021-12-08
Attending: INTERNAL MEDICINE
Payer: MEDICARE

## 2021-12-08 DIAGNOSIS — I48.11 LONGSTANDING PERSISTENT ATRIAL FIBRILLATION (HCC): ICD-10-CM

## 2021-12-08 DIAGNOSIS — D68.69 SECONDARY HYPERCOAGULABLE STATE (HCC): ICD-10-CM

## 2021-12-08 LAB — INR PPP: 7 (ref 2–3.5)

## 2021-12-08 PROCEDURE — 99212 OFFICE O/P EST SF 10 MIN: CPT | Performed by: NURSE PRACTITIONER

## 2021-12-08 PROCEDURE — 85610 PROTHROMBIN TIME: CPT

## 2021-12-08 RX ORDER — OMEPRAZOLE 20 MG/1
20 CAPSULE, DELAYED RELEASE ORAL EVERY EVENING
COMMUNITY
End: 2022-02-22

## 2021-12-08 NOTE — PROGRESS NOTES
Anticoagulation Summary  As of 2021    INR goal:  2.0-3.0   TTR:  59.7 % (6.5 y)   INR used for dosin.00 (2021)   Warfarin maintenance plan:  5 mg (5 mg x 1) every day   Weekly warfarin total:  35 mg   Plan last modified:  ILEANA Pike (2021)   Next INR check:  12/15/2021   Target end date:  Indefinite    Indications    Atrial fibrillation (HCC) [I48.91]             Anticoagulation Episode Summary     INR check location:  Anticoagulation Clinic    Preferred lab:      Send INR reminders to:      Comments:        Anticoagulation Care Providers     Provider Role Specialty Phone number    Daniel Ferrera M.D. Referring Internal Medicine Clinical Cardiac Electrophysiology [phone]    [facility] Responsible  [phone]                Refer to Patient Findings for HPI:  Patient Findings     Positives:  Change in activity    Negatives:  Signs/symptoms of thrombosis, Signs/symptoms of bleeding, Laboratory test error suspected, Change in health, Change in alcohol use, Upcoming invasive procedure, Emergency department visit, Upcoming dental procedure, Missed doses, Extra doses, Change in medications, Change in diet/appetite, Hospital admission, Bruising, Other complaints    Comments:  Patient with increased arthritic pain. Has a rx for ibuprofen 800 mg but not taking. Using acetaminophen arthritis most days of the week. Just completed a 5 day course of medrol dose eugene. Taking Tramadol q PM. Also on omeprazole. No changes with ETOH (drinks daily, is consistent with the amount and aware of the increased risk for bleeding).        Verified current warfarin dosing schedule.    Medications reconciled - yes  Pt is not on antiplatelet therapy    Pt or his wife will call in the future for any medication changes.    A/P   INR  supra-therapeutic.     Warfarin dosing recommendation: HOLD three doses then began 5 mg daily.    Pt educated to contact our clinic with any changes in  medications or s/s of bleeding or thrombosis. Pt is aware to seek immediate medical attention for falls, head injury or deep cuts.    Follow up appointment in 1 week(s).    ROSI Pike.

## 2021-12-09 LAB — INR BLD: 7 (ref 0.9–1.2)

## 2021-12-15 ENCOUNTER — ANTICOAGULATION VISIT (OUTPATIENT)
Dept: VASCULAR LAB | Facility: MEDICAL CENTER | Age: 71
End: 2021-12-15
Attending: INTERNAL MEDICINE
Payer: MEDICARE

## 2021-12-15 ENCOUNTER — PRE-ADMISSION TESTING (OUTPATIENT)
Dept: ADMISSIONS | Facility: MEDICAL CENTER | Age: 71
End: 2021-12-15
Attending: STUDENT IN AN ORGANIZED HEALTH CARE EDUCATION/TRAINING PROGRAM
Payer: MEDICARE

## 2021-12-15 DIAGNOSIS — D68.69 SECONDARY HYPERCOAGULABLE STATE (HCC): ICD-10-CM

## 2021-12-15 DIAGNOSIS — Z01.812 PRE-OPERATIVE LABORATORY EXAMINATION: ICD-10-CM

## 2021-12-15 LAB
ERYTHROCYTE [DISTWIDTH] IN BLOOD BY AUTOMATED COUNT: 50.2 FL (ref 35.9–50)
HCT VFR BLD AUTO: 33.5 % (ref 42–52)
HGB BLD-MCNC: 11.1 G/DL (ref 14–18)
INR BLD: 2.7 (ref 0.9–1.2)
INR PPP: 2.7 (ref 2–3.5)
MCH RBC QN AUTO: 34.7 PG (ref 27–33)
MCHC RBC AUTO-ENTMCNC: 33.1 G/DL (ref 33.7–35.3)
MCV RBC AUTO: 104.7 FL (ref 81.4–97.8)
PLATELET # BLD AUTO: 152 K/UL (ref 164–446)
PMV BLD AUTO: 11.6 FL (ref 9–12.9)
RBC # BLD AUTO: 3.2 M/UL (ref 4.7–6.1)
WBC # BLD AUTO: 8.6 K/UL (ref 4.8–10.8)

## 2021-12-15 PROCEDURE — 36415 COLL VENOUS BLD VENIPUNCTURE: CPT

## 2021-12-15 PROCEDURE — 85027 COMPLETE CBC AUTOMATED: CPT

## 2021-12-15 PROCEDURE — 85610 PROTHROMBIN TIME: CPT

## 2021-12-15 PROCEDURE — 99211 OFF/OP EST MAY X REQ PHY/QHP: CPT

## 2021-12-15 RX ORDER — IBUPROFEN 800 MG/1
800 TABLET ORAL EVERY 8 HOURS PRN
COMMUNITY
End: 2021-12-20

## 2021-12-15 NOTE — PROGRESS NOTES
Anticoagulation Summary  As of 12/15/2021    INR goal:  2.0-3.0   TTR:  59.5 % (6.5 y)   INR used for dosin.70 (12/15/2021)   Warfarin maintenance plan:  5 mg (5 mg x 1) every day   Weekly warfarin total:  35 mg   Plan last modified:  ILEANA Pike (2021)   Next INR check:  2021   Target end date:  Indefinite    Indications    Atrial fibrillation (HCC) [I48.91]  Secondary hypercoagulable state (HCC) [D68.69]             Anticoagulation Episode Summary     INR check location:  Anticoagulation Clinic    Preferred lab:      Send INR reminders to:      Comments:        Anticoagulation Care Providers     Provider Role Specialty Phone number    Daniel Ferrera M.D. Referring Internal Medicine Clinical Cardiac Electrophysiology [phone]    [facility] Responsible  [phone]                Refer to Patient Findings for HPI:  Patient Findings     Positives:  Upcoming invasive procedure (CT guided lumbar myelogram on )    Negatives:  Signs/symptoms of thrombosis, Signs/symptoms of bleeding, Laboratory test error suspected, Change in health, Change in alcohol use, Change in activity, Emergency department visit, Upcoming dental procedure, Missed doses, Extra doses, Change in medications, Change in diet/appetite, Hospital admission, Bruising, Other complaints          There were no vitals filed for this visit.   pt declined vitals    Verified current warfarin dosing schedule.    Medications reconciled   Pt is not on antiplatelet therapy      A/P   INR  therapeutic.     Pt to have procedure on  and needs to hold warfarin for 5 days prior.  Pt on warfarin for atrial fibrillation; [id number] = 2; NO hx of stroke.    Will forgo bridging with Lovenox  Pt to continue with warfarin regimen post op pending surgeon's discretion    Warfarin dosing recommendation: Hold warfarin x 5 days then continue current regimen post op    Pt educated to contact our clinic with any changes in  medications or s/s of bleeding or thrombosis. Pt is aware to seek immediate medical attention for falls, head injury or deep cuts.    Follow up appointment in 1.5 week(s).    Bridgette Fermin, PharmD

## 2021-12-20 ENCOUNTER — HOSPITAL ENCOUNTER (OUTPATIENT)
Facility: MEDICAL CENTER | Age: 71
End: 2021-12-21
Attending: EMERGENCY MEDICINE | Admitting: HOSPITALIST
Payer: MEDICARE

## 2021-12-20 ENCOUNTER — APPOINTMENT (OUTPATIENT)
Dept: RADIOLOGY | Facility: MEDICAL CENTER | Age: 71
End: 2021-12-20
Attending: STUDENT IN AN ORGANIZED HEALTH CARE EDUCATION/TRAINING PROGRAM
Payer: MEDICARE

## 2021-12-20 ENCOUNTER — HOSPITAL ENCOUNTER (OUTPATIENT)
Facility: MEDICAL CENTER | Age: 71
End: 2021-12-20
Attending: STUDENT IN AN ORGANIZED HEALTH CARE EDUCATION/TRAINING PROGRAM | Admitting: STUDENT IN AN ORGANIZED HEALTH CARE EDUCATION/TRAINING PROGRAM
Payer: MEDICARE

## 2021-12-20 VITALS
HEIGHT: 72 IN | BODY MASS INDEX: 29.86 KG/M2 | TEMPERATURE: 97.5 F | HEART RATE: 70 BPM | WEIGHT: 220.46 LBS | OXYGEN SATURATION: 96 %

## 2021-12-20 DIAGNOSIS — N17.9 ACUTE KIDNEY INJURY (HCC): ICD-10-CM

## 2021-12-20 DIAGNOSIS — R79.89 ELEVATED TROPONIN I LEVEL: ICD-10-CM

## 2021-12-20 DIAGNOSIS — N17.9 AKI (ACUTE KIDNEY INJURY) (HCC): ICD-10-CM

## 2021-12-20 DIAGNOSIS — M54.16 LUMBAR RADICULOPATHY: ICD-10-CM

## 2021-12-20 DIAGNOSIS — I95.9 HYPOTENSION, UNSPECIFIED HYPOTENSION TYPE: ICD-10-CM

## 2021-12-20 DIAGNOSIS — E86.0 DEHYDRATION: ICD-10-CM

## 2021-12-20 PROBLEM — R11.10 DRY HEAVES: Status: ACTIVE | Noted: 2021-12-20

## 2021-12-20 LAB
ALBUMIN SERPL BCP-MCNC: 4 G/DL (ref 3.2–4.9)
ALBUMIN/GLOB SERPL: 1.7 G/DL
ALP SERPL-CCNC: 102 U/L (ref 30–99)
ALT SERPL-CCNC: 14 U/L (ref 2–50)
ANION GAP SERPL CALC-SCNC: 15 MMOL/L (ref 7–16)
AST SERPL-CCNC: 12 U/L (ref 12–45)
BASOPHILS # BLD AUTO: 0.6 % (ref 0–1.8)
BASOPHILS # BLD: 0.04 K/UL (ref 0–0.12)
BILIRUB SERPL-MCNC: 1.8 MG/DL (ref 0.1–1.5)
BLOOD CULTURE HOLD CXBCH: NORMAL
BUN SERPL-MCNC: 59 MG/DL (ref 8–22)
CALCIUM SERPL-MCNC: 9.2 MG/DL (ref 8.5–10.5)
CHLORIDE SERPL-SCNC: 93 MMOL/L (ref 96–112)
CO2 SERPL-SCNC: 26 MMOL/L (ref 20–33)
CORTIS SERPL-MCNC: 11.4 UG/DL (ref 0–23)
CREAT SERPL-MCNC: 2.32 MG/DL (ref 0.5–1.4)
EOSINOPHIL # BLD AUTO: 0.14 K/UL (ref 0–0.51)
EOSINOPHIL NFR BLD: 1.9 % (ref 0–6.9)
ERYTHROCYTE [DISTWIDTH] IN BLOOD BY AUTOMATED COUNT: 49.8 FL (ref 35.9–50)
GLOBULIN SER CALC-MCNC: 2.4 G/DL (ref 1.9–3.5)
GLUCOSE SERPL-MCNC: 101 MG/DL (ref 65–99)
HCT VFR BLD AUTO: 32.1 % (ref 42–52)
HGB BLD-MCNC: 11.3 G/DL (ref 14–18)
IMM GRANULOCYTES # BLD AUTO: 0.05 K/UL (ref 0–0.11)
IMM GRANULOCYTES NFR BLD AUTO: 0.7 % (ref 0–0.9)
INR PPP: 1.32 (ref 0.87–1.13)
LIPASE SERPL-CCNC: 50 U/L (ref 11–82)
LYMPHOCYTES # BLD AUTO: 0.84 K/UL (ref 1–4.8)
LYMPHOCYTES NFR BLD: 11.6 % (ref 22–41)
MCH RBC QN AUTO: 36 PG (ref 27–33)
MCHC RBC AUTO-ENTMCNC: 35.2 G/DL (ref 33.7–35.3)
MCV RBC AUTO: 102.2 FL (ref 81.4–97.8)
MONOCYTES # BLD AUTO: 0.93 K/UL (ref 0–0.85)
MONOCYTES NFR BLD AUTO: 12.8 % (ref 0–13.4)
NEUTROPHILS # BLD AUTO: 5.24 K/UL (ref 1.82–7.42)
NEUTROPHILS NFR BLD: 72.4 % (ref 44–72)
NRBC # BLD AUTO: 0 K/UL
NRBC BLD-RTO: 0 /100 WBC
PLATELET # BLD AUTO: 168 K/UL (ref 164–446)
PMV BLD AUTO: 11.2 FL (ref 9–12.9)
POTASSIUM SERPL-SCNC: 3.8 MMOL/L (ref 3.6–5.5)
PROT SERPL-MCNC: 6.4 G/DL (ref 6–8.2)
PROTHROMBIN TIME: 16 SEC (ref 12–14.6)
RBC # BLD AUTO: 3.14 M/UL (ref 4.7–6.1)
SODIUM SERPL-SCNC: 134 MMOL/L (ref 135–145)
TROPONIN T SERPL-MCNC: 58 NG/L (ref 6–19)
WBC # BLD AUTO: 7.2 K/UL (ref 4.8–10.8)

## 2021-12-20 PROCEDURE — 83690 ASSAY OF LIPASE: CPT

## 2021-12-20 PROCEDURE — 700105 HCHG RX REV CODE 258: Performed by: EMERGENCY MEDICINE

## 2021-12-20 PROCEDURE — 82533 TOTAL CORTISOL: CPT

## 2021-12-20 PROCEDURE — 85610 PROTHROMBIN TIME: CPT

## 2021-12-20 PROCEDURE — 700102 HCHG RX REV CODE 250 W/ 637 OVERRIDE(OP): Performed by: HOSPITALIST

## 2021-12-20 PROCEDURE — 36415 COLL VENOUS BLD VENIPUNCTURE: CPT

## 2021-12-20 PROCEDURE — 99285 EMERGENCY DEPT VISIT HI MDM: CPT

## 2021-12-20 PROCEDURE — 700101 HCHG RX REV CODE 250: Performed by: HOSPITALIST

## 2021-12-20 PROCEDURE — 85025 COMPLETE CBC W/AUTO DIFF WBC: CPT

## 2021-12-20 PROCEDURE — 99220 PR INITIAL OBSERVATION CARE,LEVL III: CPT | Performed by: HOSPITALIST

## 2021-12-20 PROCEDURE — 84484 ASSAY OF TROPONIN QUANT: CPT

## 2021-12-20 PROCEDURE — G0378 HOSPITAL OBSERVATION PER HR: HCPCS

## 2021-12-20 PROCEDURE — 80053 COMPREHEN METABOLIC PANEL: CPT

## 2021-12-20 PROCEDURE — A9270 NON-COVERED ITEM OR SERVICE: HCPCS | Performed by: HOSPITALIST

## 2021-12-20 RX ORDER — HEPARIN SODIUM 5000 [USP'U]/ML
5000 INJECTION, SOLUTION INTRAVENOUS; SUBCUTANEOUS EVERY 8 HOURS
Status: DISCONTINUED | OUTPATIENT
Start: 2021-12-20 | End: 2021-12-21 | Stop reason: HOSPADM

## 2021-12-20 RX ORDER — HYDROCODONE BITARTRATE AND ACETAMINOPHEN 10; 325 MG/1; MG/1
1 TABLET ORAL EVERY 4 HOURS PRN
Status: ON HOLD | COMMUNITY
End: 2022-07-07

## 2021-12-20 RX ORDER — BISACODYL 10 MG
10 SUPPOSITORY, RECTAL RECTAL
Status: DISCONTINUED | OUTPATIENT
Start: 2021-12-20 | End: 2021-12-21 | Stop reason: HOSPADM

## 2021-12-20 RX ORDER — B-COMPLEX WITH VITAMIN C
1 TABLET ORAL DAILY
Status: SHIPPED | COMMUNITY
End: 2022-05-16

## 2021-12-20 RX ORDER — SOTALOL HYDROCHLORIDE 120 MG/1
120 TABLET ORAL EVERY 24 HOURS
Status: DISCONTINUED | OUTPATIENT
Start: 2021-12-21 | End: 2021-12-21 | Stop reason: HOSPADM

## 2021-12-20 RX ORDER — ONDANSETRON 4 MG/1
4 TABLET, ORALLY DISINTEGRATING ORAL EVERY 4 HOURS PRN
Status: DISCONTINUED | OUTPATIENT
Start: 2021-12-20 | End: 2021-12-20

## 2021-12-20 RX ORDER — SODIUM CHLORIDE AND POTASSIUM CHLORIDE 150; 900 MG/100ML; MG/100ML
INJECTION, SOLUTION INTRAVENOUS CONTINUOUS
Status: DISCONTINUED | OUTPATIENT
Start: 2021-12-20 | End: 2021-12-21 | Stop reason: HOSPADM

## 2021-12-20 RX ORDER — SENNOSIDES 8.6 MG
1300 CAPSULE ORAL EVERY 6 HOURS PRN
COMMUNITY
End: 2021-12-20

## 2021-12-20 RX ORDER — SODIUM CHLORIDE 9 MG/ML
1000 INJECTION, SOLUTION INTRAVENOUS ONCE
Status: COMPLETED | OUTPATIENT
Start: 2021-12-20 | End: 2021-12-20

## 2021-12-20 RX ORDER — ONDANSETRON 2 MG/ML
4 INJECTION INTRAMUSCULAR; INTRAVENOUS EVERY 4 HOURS PRN
Status: DISCONTINUED | OUTPATIENT
Start: 2021-12-20 | End: 2021-12-20

## 2021-12-20 RX ORDER — HYDROCODONE BITARTRATE AND ACETAMINOPHEN 5; 325 MG/1; MG/1
1 TABLET ORAL EVERY 6 HOURS PRN
Status: DISCONTINUED | OUTPATIENT
Start: 2021-12-20 | End: 2021-12-21 | Stop reason: HOSPADM

## 2021-12-20 RX ORDER — ROSUVASTATIN CALCIUM 5 MG/1
TABLET, COATED ORAL
Status: ON HOLD | COMMUNITY
Start: 2021-12-04 | End: 2021-12-20

## 2021-12-20 RX ORDER — POLYETHYLENE GLYCOL 3350 17 G/17G
1 POWDER, FOR SOLUTION ORAL
Status: DISCONTINUED | OUTPATIENT
Start: 2021-12-20 | End: 2021-12-21 | Stop reason: HOSPADM

## 2021-12-20 RX ORDER — AMOXICILLIN 250 MG
2 CAPSULE ORAL 2 TIMES DAILY
Status: DISCONTINUED | OUTPATIENT
Start: 2021-12-20 | End: 2021-12-21 | Stop reason: HOSPADM

## 2021-12-20 RX ORDER — SODIUM CHLORIDE, SODIUM LACTATE, POTASSIUM CHLORIDE, CALCIUM CHLORIDE 600; 310; 30; 20 MG/100ML; MG/100ML; MG/100ML; MG/100ML
INJECTION, SOLUTION INTRAVENOUS CONTINUOUS
Status: DISCONTINUED | OUTPATIENT
Start: 2021-12-20 | End: 2021-12-20 | Stop reason: CLARIF

## 2021-12-20 RX ADMIN — SODIUM CHLORIDE 1000 ML: 9 INJECTION, SOLUTION INTRAVENOUS at 15:48

## 2021-12-20 RX ADMIN — HYDROCODONE BITARTRATE AND ACETAMINOPHEN 1 TABLET: 5; 325 TABLET ORAL at 20:20

## 2021-12-20 RX ADMIN — SODIUM CHLORIDE 1000 ML: 9 INJECTION, SOLUTION INTRAVENOUS at 13:53

## 2021-12-20 RX ADMIN — POTASSIUM CHLORIDE AND SODIUM CHLORIDE: 900; 150 INJECTION, SOLUTION INTRAVENOUS at 18:52

## 2021-12-20 ASSESSMENT — LIFESTYLE VARIABLES
EVER HAD A DRINK FIRST THING IN THE MORNING TO STEADY YOUR NERVES TO GET RID OF A HANGOVER: NO
EVER FELT BAD OR GUILTY ABOUT YOUR DRINKING: NO
AVERAGE NUMBER OF DAYS PER WEEK YOU HAVE A DRINK CONTAINING ALCOHOL: 0
TOTAL SCORE: 0
TOTAL SCORE: 0
HAVE PEOPLE ANNOYED YOU BY CRITICIZING YOUR DRINKING: NO
HOW MANY TIMES IN THE PAST YEAR HAVE YOU HAD 5 OR MORE DRINKS IN A DAY: 0
ON A TYPICAL DAY WHEN YOU DRINK ALCOHOL HOW MANY DRINKS DO YOU HAVE: 2
TOTAL SCORE: 0
AVERAGE NUMBER OF DAYS PER WEEK YOU HAVE A DRINK CONTAINING ALCOHOL: 7
DO YOU DRINK ALCOHOL: YES
CONSUMPTION TOTAL: NEGATIVE
TOTAL SCORE: 0
ON A TYPICAL DAY WHEN YOU DRINK ALCOHOL HOW MANY DRINKS DO YOU HAVE: 0
CONSUMPTION TOTAL: NEGATIVE
HAVE PEOPLE ANNOYED YOU BY CRITICIZING YOUR DRINKING: NO
HOW MANY TIMES IN THE PAST YEAR HAVE YOU HAD 5 OR MORE DRINKS IN A DAY: 0
HAVE YOU EVER FELT YOU SHOULD CUT DOWN ON YOUR DRINKING: NO
DOES PATIENT WANT TO STOP DRINKING: NO
TOTAL SCORE: 0
ALCOHOL_USE: NO
HAVE YOU EVER FELT YOU SHOULD CUT DOWN ON YOUR DRINKING: NO
TOTAL SCORE: 0
EVER FELT BAD OR GUILTY ABOUT YOUR DRINKING: NO
EVER HAD A DRINK FIRST THING IN THE MORNING TO STEADY YOUR NERVES TO GET RID OF A HANGOVER: NO

## 2021-12-20 ASSESSMENT — ENCOUNTER SYMPTOMS
CHILLS: 0
SHORTNESS OF BREATH: 0
DIZZINESS: 1
FEVER: 0
VOMITING: 1
NAUSEA: 1

## 2021-12-20 ASSESSMENT — PAIN DESCRIPTION - PAIN TYPE
TYPE: ACUTE PAIN
TYPE: ACUTE PAIN;CHRONIC PAIN

## 2021-12-20 ASSESSMENT — FIBROSIS 4 INDEX
FIB4 SCORE: 3.86
FIB4 SCORE: 3.86

## 2021-12-20 NOTE — OR NURSING
[facility]  Nauseated and pale.  Bp on low side in 90's.  Took bp meds and diuretics this morning and didn't check bp.  Ir notified.  Patient resting in bed.  Wife at bedside. Nauseated gone after lying down for 20 minutes.

## 2021-12-20 NOTE — H&P
Hospital Medicine History & Physical Note    Date of Service  12/20/2021    Primary Care Physician  Hipolito Hall M.D.    Consultants  none    Code Status  Full Code    Chief Complaint  Chief Complaint   Patient presents with   • Sent by MD       History of Presenting Illness  Mike Ferrell is a 71 y.o. male who presented 12/20/2021 with dizziness and hypotension.  Mr. Ferrell has a past medical history of atrial fibrillation and AICD placement by Dr. Daniel Ferrera that has been on Coumadin therapy as an outpatient.  He was scheduled for a myelogram today and his Coumadin had been held for this.  Prior to the procedure he was found to have hypotension with a systolic blood pressure in the 90s and was orthostatic for he was referred to the emergency room where is found to have an acute kidney injury with a creatinine over 2.  Upon questioning, he has had poor appetite for the past few months after he likely COVID-19 infection in September.  For the past week he has had dry heaves on a daily basis with even less oral intake.  A few weeks ago his primary care provider Dr. Hall started him on Lasix.    Mr. Ferrell will be placed under observation status will be given IV fluids overnight we will follow his urine output.  His blood pressure medicines will be held for except for the sotalol which he is on for atrial fibrillation will certainly hold his hydrochlorothiazide and Lasix upon discharge.    His wife, Karen is at bedside.  We had a discussion about alcohol cessation is is apparently has been drinking more than usual and he is open to this.  He has been drinking about a half a quart of bourbon per day.    He is scheduled for cataract surgery in a couple days therefore we will continue to hold his Coumadin and hopefully he can have this procedure done.    Review of Systems  Review of Systems   Constitutional: Negative for chills and fever.   Respiratory: Negative for shortness of breath.     Cardiovascular: Negative for chest pain and leg swelling.   Gastrointestinal: Positive for nausea and vomiting.   Neurological: Positive for dizziness.   All other systems reviewed and are negative.      Past Medical History   has a past medical history of AICD (automatic cardioverter/defibrillator) present, Arthritis, Atrial fibrillation (HCC) (2012), Bowel habit changes, Breath shortness, Cataract (12/15/2021), Chronic back pain, High cholesterol, Hypertension, Methamphetamine use (HCC) (none for many years), Pacemaker, Pain (12/15/2021), Paroxysmal ventricular tachycardia (HCC) (2012), Pneumonia, Sinoatrial node dysfunction (HCC) (2012), and Syncope and collapse (2012).    Surgical History   has a past surgical history that includes cholecystectomy (); pacemaker insertion (); aicd implant (); and tonsillectomy ().     Family History  Father  of heart attack    Social History   reports that he has never smoked. He has never used smokeless tobacco. He reports current alcohol use. He reports previous drug use.    Allergies  Allergies   Allergen Reactions   • Statins [Hmg-Coa-R Inhibitors] Itching   • Sulfa Drugs Rash and Itching     Rash, itch       Medications  Prior to Admission Medications   Prescriptions Last Dose Informant Patient Reported? Taking?   B Complex Vitamins (B COMPLEX PO)  Patient Yes No   Sig: Take 1 Tablet by mouth every day.   Cholecalciferol (VITAMIN D3) 5000 units Cap  Patient Yes No   Sig: Take 5,000 Units by mouth every day.   HYDROcodone/acetaminophen (NORCO)  MG Tab  Patient Yes No   Sig: Take 1 Tablet by mouth 2 times a day as needed. Indications: Pain   acetaminophen (TYLENOL 8 HOUR) 650 MG CR tablet  Patient Yes No   Sig: Take 1,300 mg by mouth every 6 hours as needed. Indications: Pain   amlodipine (NORVASC) 5 MG TABS  Patient Yes No   Sig: Take 5 mg by mouth every morning. Indications: High Blood Pressure Disorder   benazepril (LOTENSIN) 20  MG Tab  Patient Yes No   Sig: Take 40 mg by mouth every morning. Indications: High Blood Pressure Disorder   colesevelam (WELCHOL) 625 MG Tab  Patient Yes No   Sig: Take 625 mg by mouth every evening.   furosemide (LASIX) 40 MG Tab  Patient No No   Sig: Take 1 Tablet by mouth every day.   hydrochlorothiazide (MICROZIDE) 12.5 MG capsule  Patient No No   Sig: Take 1 Cap by mouth every day.   ibuprofen (MOTRIN) 800 MG Tab  Patient Yes No   Sig: Take 800 mg by mouth every 8 hours as needed. Indications: Pain   omeprazole (PRILOSEC) 20 MG delayed-release capsule  Patient Yes No   Sig: Take 20 mg by mouth every evening. Indications: Gastroesophageal Reflux Disease   oxyCODONE-acetaminophen (PERCOCET) 7.5-325 MG per tablet  Patient Yes No   Sig: Take 1 Tablet by mouth every 6 hours as needed for Severe Pain.   potassium chloride SA (KDUR) 20 MEQ Tab CR  Patient No No   Sig: Take 1 Tablet by mouth every day.   sotalol (BETAPACE) 120 MG tablet  Patient No No   Sig: Take 1 Tab by mouth 2 times a day.   traMADol (ULTRAM) 50 MG Tab  Patient Yes No   Sig: Take 50 mg by mouth 1 time a day as needed for Moderate Pain. Indications: Pain   warfarin (COUMADIN) 5 MG Tab  Patient No No   Sig: TAKE 1 (ONE) TO 1 & 1/2 (ONE & ONE-HALF) TABLETS BY MOUTH ONCE DAILY AS DIRECTED BY THE COUMADIN CLINIC.   Patient taking differently: Take 5 mg by mouth every day.      Facility-Administered Medications: None       Physical Exam  Temp:  [36.4 °C (97.5 °F)-36.4 °C (97.6 °F)] 36.4 °C (97.6 °F)  Pulse:  [68-78] 69  Resp:  [12-20] 13  BP: ()/(60-76) 130/76  SpO2:  [91 %-96 %] 96 %  Blood Pressure : 130/76   Temperature: 36.4 °C (97.6 °F)   Pulse: 69   Respiration: 13   Pulse Oximetry: 96 %       Physical Exam  Vitals and nursing note reviewed.   Constitutional:       General: He is not in acute distress.     Appearance: He is ill-appearing.   HENT:      Head: Normocephalic.      Mouth/Throat:      Mouth: Mucous membranes are dry.      Pharynx:  Oropharynx is clear.   Eyes:      General: No scleral icterus.     Conjunctiva/sclera: Conjunctivae normal.   Cardiovascular:      Rate and Rhythm: Normal rate and regular rhythm.      Heart sounds: No murmur heard.       Comments: AICD in place  Pulmonary:      Effort: Pulmonary effort is normal. No respiratory distress.      Breath sounds: Normal breath sounds.   Abdominal:      General: There is distension.      Palpations: Abdomen is soft.      Tenderness: There is no abdominal tenderness.   Musculoskeletal:      Cervical back: Normal range of motion and neck supple.      Right lower leg: No edema.      Left lower leg: No edema.   Skin:     General: Skin is warm and dry.   Neurological:      General: No focal deficit present.      Mental Status: He is alert and oriented to person, place, and time.   Psychiatric:         Mood and Affect: Mood normal.         Behavior: Behavior normal.         Thought Content: Thought content normal.         Judgment: Judgment normal.         Laboratory:  Recent Labs     12/20/21  1337   WBC 7.2   RBC 3.14*   HEMOGLOBIN 11.3*   HEMATOCRIT 32.1*   .2*   MCH 36.0*   MCHC 35.2   RDW 49.8   PLATELETCT 168   MPV 11.2     Recent Labs     12/20/21  1337   SODIUM 134*   POTASSIUM 3.8   CHLORIDE 93*   CO2 26   GLUCOSE 101*   BUN 59*   CREATININE 2.32*   CALCIUM 9.2     Recent Labs     12/20/21  1337   ALTSGPT 14   ASTSGOT 12   ALKPHOSPHAT 102*   TBILIRUBIN 1.8*   LIPASE 50   GLUCOSE 101*     Recent Labs     12/20/21  1149   INR 1.32*     No results for input(s): NTPROBNP in the last 72 hours.      Recent Labs     12/20/21  1337   TROPONINT 58*       Imaging:  No orders to display           Assessment/Plan:  I anticipate this patient is appropriate for observation status at this time.    * Hypotension- (present on admission)  Assessment & Plan  Orthostatic hypotension with acute kidney injury distant with dehydration  IV fluids will be given and will hold his home blood pressure  medicines  Cortisol level has been ordered    KEVYN (acute kidney injury) (HCC)- (present on admission)  Assessment & Plan    His baseline creatinine per review of the records is 1 currently up to 2.32 today  This is likely combination of initiation of diuretics and poor oral intake  Lasix will be held he will be given IV fluids, follow urine output, check a basic metabolic panel in the morning    Dry heaves- (present on admission)  Assessment & Plan  One weak of dry heaves  Consider alcohol induced gastritis  He is on a PPI    Alcohol abuse- (present on admission)  Assessment & Plan  Is been drinking more bourbon than usual he states he is using it as a pain medication  This may be eating too his daily dry heaves as he may have component of alcohol induced gastritis  We discussed cessation she is open to    Automatic implantable cardioverter-defibrillator in situ- (present on admission)  Assessment & Plan  Followed by by Dr. Daniel Ferrera, electrophysiology    Essential hypertension- (present on admission)  Assessment & Plan  He is on Norvasc 5 mg daily, benazepril 20 mg daily, Lasix 40 mg daily, hydrochlorothiazide 12.5 mg daily, sotalol 120 mg twice daily  Sotalol will be continued hold his other blood pressure medications as blood pressure comes up and possibly restart some though likely not the diuretics on 12/21/2021    Atrial fibrillation (HCC)- (present on admission)  Assessment & Plan  Story of followed by Dr. Ferrera, Coumadin has been held due to the CT myelogram      VTE prophylaxis: heparin ppx

## 2021-12-20 NOTE — ED NOTES
Med rec updated and complete.  Allergies reviewed. Pt confirmed name and date of birth . Pt denies antibiotic use in last 30 days.      [facility] [place]  [phone]

## 2021-12-20 NOTE — ASSESSMENT & PLAN NOTE
His baseline creatinine per review of the records is 1 currently up to 2.32 today  This is likely combination of initiation of diuretics and poor oral intake  Lasix will be held he will be given IV fluids, follow urine output, check a basic metabolic panel in the morning

## 2021-12-20 NOTE — ED TRIAGE NOTES
"Mike Ferrell  71 y.o.  Chief Complaint   Patient presents with   • Sent by MD     Patient to [facility] via gurney from [facility] Pre-op. A & O x 4, GCS 15. Mask in place.    Per RN report patient presented today for an outpatient lumbar puncture to assist with diagnosis for acute-on-chronic back pain. While pre-procedure vitals were being taken patient noted with SBP 90's. RN states that anesthesia said the patient \"doesn't look good\" and wants the patient evaluated in the ED.    Labs drawn in pre-op in process per RN report.    Upon arrival to ED patient A & O x 4, GCS 15. Complaining of 4/10 chronic new pain, no other pain. Denies headache, dizziness, lightheadedness, chest pain, abdominal pain. Complaining of mild nausea with dry heaving (no vomiting) x 3 days. Endorses intermittent episodes x 3 over the past 3 days.    Attached to vitals monitoring and [place] with in reach. Family at bedside. Chart up for ERP.    Vitals:    12/20/21 1319   BP: 105/68   Pulse: 69   Resp: 13   Temp: 36.4 °C (97.6 °F)   SpO2: 94%       "

## 2021-12-20 NOTE — ED PROVIDER NOTES
"ED Provider Note    CHIEF COMPLAINT  Chief Complaint   Patient presents with   • Sent by MD DUNCAN  Mike Soy Ferrell is a 71 y.o. male who presents for evaluation of hypotension.  Was at interventional radiology due to have a CT myelogram performed.  He was found to be hypotensive and orthostatic, therefore the procedure was not performed and he was sent here for further evaluation.  The patient and the family member the bedside state he does not eat or drink much recently, he states he is has no appetite.  They suspect he is dehydrated.  Over the past couple days when he stands up he feels nauseated and lightheaded as though he could possibly pass out.  The patient has been taking his hypertensive medications still.  No chest pain.  No shortness of breath.  No abdominal pain, vomiting, diarrhea or any other specific complaints.    REVIEW OF SYSTEMS  Negative for fever, rash, chest pain, dyspnea, abdominal pain, vomiting, diarrhea, headache, focal weakness, focal numbness, focal tingling. All other systems are negative.     PAST MEDICAL HISTORY  Past Medical History:   Diagnosis Date   • AICD (automatic cardioverter/defibrillator) present    • Arthritis     generalized everywhere   • Atrial fibrillation (HCC) 4/4/2012   • Bowel habit changes     constipation   • Breath shortness     \"since 1996 from [place]\"   • Cataract 12/15/2021    bilat, no surgey yet   • Chronic back pain    • High cholesterol    • Hypertension    • Methamphetamine use (HCC) none for many years   • Pacemaker     defibrilator   • Pain 12/15/2021    back, right hip and knee, 8/10   • Paroxysmal ventricular tachycardia (HCC) 4/4/2012   • Pneumonia     in the past   • Sinoatrial node dysfunction (HCC) 4/4/2012    Dr. Daniel Ferrera   • Syncope and collapse 4/4/2012       FAMILY HISTORY  History reviewed. No pertinent family history.    SOCIAL HISTORY  Social History     Tobacco Use   • Smoking status: Never Smoker   • Smokeless tobacco: Never " Used   Vaping Use   • Vaping Use: Never used   Substance Use Topics   • Alcohol use: Yes     Comment: 2-3 drinks daily   • Drug use: Not Currently     Comment: 25 years ago       SURGICAL HISTORY  Past Surgical History:   Procedure Laterality Date   • AICD IMPLANT  2010   • PACEMAKER INSERTION  2009   • CHOLECYSTECTOMY  1999   • TONSILLECTOMY  1953       CURRENT MEDICATIONS  I personally reviewed the medication list in the charting documentation.     ALLERGIES  Allergies   Allergen Reactions   • Statins [Hmg-Coa-R Inhibitors] Itching   • Sulfa Drugs Rash and Itching     Rash, itch       MEDICAL RECORD  I have reviewed patient's medical record and pertinent results are listed above.      PHYSICAL EXAM  VITAL SIGNS: /62   Pulse 69   Temp 36.4 °C (97.6 °F) (Temporal)   Resp 12   Ht 1.829 m (6')   Wt 99.8 kg (220 lb)   SpO2 93%   BMI 29.84 kg/m²    Constitutional: Chronically ill-appearing, awake, alert, no acute distress  HENT: Normocephalic, no obvious evidence of acute trauma.  Eyes: No scleral icterus. Normal conjunctiva   Neck: Comfortable movement without any obvious restriction in the range of motion.  Cardiovascular: Upon ascultation I appreciate a regular heart rhythm and a normal rate.   Thorax & Lungs: Normal nonlabored respirations.  Upon application of the stethoscope for auscultation I find there to be no associated chest wall tenderness.  I appreciate no wheezing, rhonchi or rales. There is normal air movement.    Abdomen: The abdomen is not visibly distended. Upon palpation, I find it to be without tenderness.  No mass appreciated.  Skin: The exposed portions of skin reveal no obvious rash or other abnormalities.  Extremities/Musculoskeletal: No obvious sign of acute trauma. No asymmetric calf tenderness or edema.   Neurologic: Alert & oriented. No focal deficits observed.   Psychiatric: Normal affect appropriate for the clinical situation.    DIAGNOSTIC STUDIES /  PROCEDURES    LABS/EKGs  Results for orders placed or performed during the hospital encounter of 12/20/21   CBC WITH DIFFERENTIAL   Result Value Ref Range    WBC 7.2 4.8 - 10.8 K/uL    RBC 3.14 (L) 4.70 - 6.10 M/uL    Hemoglobin 11.3 (L) 14.0 - 18.0 g/dL    Hematocrit 32.1 (L) 42.0 - 52.0 %    .2 (H) 81.4 - 97.8 fL    MCH 36.0 (H) 27.0 - 33.0 pg    MCHC 35.2 33.7 - 35.3 g/dL    RDW 49.8 35.9 - 50.0 fL    Platelet Count 168 164 - 446 K/uL    MPV 11.2 9.0 - 12.9 fL    Neutrophils-Polys 72.40 (H) 44.00 - 72.00 %    Lymphocytes 11.60 (L) 22.00 - 41.00 %    Monocytes 12.80 0.00 - 13.40 %    Eosinophils 1.90 0.00 - 6.90 %    Basophils 0.60 0.00 - 1.80 %    Immature Granulocytes 0.70 0.00 - 0.90 %    Nucleated RBC 0.00 /100 WBC    Neutrophils (Absolute) 5.24 1.82 - 7.42 K/uL    Lymphs (Absolute) 0.84 (L) 1.00 - 4.80 K/uL    Monos (Absolute) 0.93 (H) 0.00 - 0.85 K/uL    Eos (Absolute) 0.14 0.00 - 0.51 K/uL    Baso (Absolute) 0.04 0.00 - 0.12 K/uL    Immature Granulocytes (abs) 0.05 0.00 - 0.11 K/uL    NRBC (Absolute) 0.00 K/uL   COMP METABOLIC PANEL   Result Value Ref Range    Sodium 134 (L) 135 - 145 mmol/L    Potassium 3.8 3.6 - 5.5 mmol/L    Chloride 93 (L) 96 - 112 mmol/L    Co2 26 20 - 33 mmol/L    Anion Gap 15.0 7.0 - 16.0    Glucose 101 (H) 65 - 99 mg/dL    Bun 59 (H) 8 - 22 mg/dL    Creatinine 2.32 (H) 0.50 - 1.40 mg/dL    Calcium 9.2 8.5 - 10.5 mg/dL    AST(SGOT) 12 12 - 45 U/L    ALT(SGPT) 14 2 - 50 U/L    Alkaline Phosphatase 102 (H) 30 - 99 U/L    Total Bilirubin 1.8 (H) 0.1 - 1.5 mg/dL    Albumin 4.0 3.2 - 4.9 g/dL    Total Protein 6.4 6.0 - 8.2 g/dL    Globulin 2.4 1.9 - 3.5 g/dL    A-G Ratio 1.7 g/dL   LIPASE   Result Value Ref Range    Lipase 50 11 - 82 U/L   TROPONIN   Result Value Ref Range    Troponin T 58 (H) 6 - 19 ng/L   ESTIMATED GFR   Result Value Ref Range    GFR If  34 (A) >60 mL/min/1.73 m 2    GFR If Non  28 (A) >60 mL/min/1.73 m 2   CORTISOL   Result Value  Ref Range    Cortisol 11.4 0.0 - 23.0 ug/dL   Blood Culture,Hold   Result Value Ref Range    Blood Culture Hold Collected           COURSE & MEDICAL DECISION MAKING  I have reviewed any medical record information, laboratory studies and radiographic results as noted above.  Differential diagnoses includes: Dehydration, electrolyte abnormalities, anemia, renal failure orthostasis    Encounter Summary: This is a very pleasant 71 y.o. male who unfortunately required evaluation in the emergency department today with a soft blood pressure at interventional radiology, sent after an aborted CT myelogram because of this.  He is symptomatic, when he stands up he feels nauseated and lightheaded.  His blood pressure was soft upon arrival here in emergency department, it was 90 systolic at interventional radiology.  He gives a history of dehydration.  He continues take his blood pressure medication.  No other focal findings on exam.  Will  IV fluids, will check blood work, he will be reevaluated ------- blood work reveals acute kidney injury.  Does also have a indeterminately elevated troponin.  At this point, especially considering the acute kidney injury he will require hospitalization for further evaluation and care.  Additional IV fluid ministration will be initiated.      DISPOSITION: Admit in guarded condition      FINAL IMPRESSION  1. Hypotension, unspecified hypotension type    2. Dehydration    3. Acute kidney injury (HCC)    4. Elevated troponin I level           This dictation was created using voice recognition software. The accuracy of the dictation is limited to the abilities of the software. I expect there may be some errors of grammar and possibly content. The nursing notes were reviewed and certain aspects of this information were incorporated into this note.    Electronically signed by: Francois Cabrales M.D., 12/20/2021 2:32 PM

## 2021-12-20 NOTE — ASSESSMENT & PLAN NOTE
Is been drinking more bourbon than usual he states he is using it as a pain medication  This may be eating too his daily dry heaves as he may have component of alcohol induced gastritis  We discussed cessation she is open to

## 2021-12-20 NOTE — PROGRESS NOTES
Med Rec completed per patient, wife, and med list (returned)  Allergies reviewed  No ORAL antibiotics in last 30 days    Patient takes Warfarin 5 mg daily

## 2021-12-20 NOTE — ASSESSMENT & PLAN NOTE
He is on Norvasc 5 mg daily, benazepril 20 mg daily, Lasix 40 mg daily, hydrochlorothiazide 12.5 mg daily, sotalol 120 mg twice daily  Sotalol will be continued hold his other blood pressure medications as blood pressure comes up and possibly restart some though likely not the diuretics on 12/21/2021

## 2021-12-20 NOTE — ASSESSMENT & PLAN NOTE
Orthostatic hypotension with acute kidney injury distant with dehydration  IV fluids will be given and will hold his home blood pressure medicines  Cortisol level has been ordered

## 2021-12-20 NOTE — PROGRESS NOTES
Patient presented for lumbar puncture with contrast injection for CT myelogram. Upon arrival to the PACU, patient was noted to have hypotension and inability to stand without extreme nausea. Patient states that he has been taking his normal medications with the exception of warfarin, which was held for this procedure.    Upon examination, patient appears hypotensive with otherwise normal vital signs. Patient appears of very low energy. This may be due to dehydration, as patient states he has had little water today. Patient does not appear to be a good candidate for lumbar puncture at this time and will be sent to the emergency department for further evaluation. Recommend that the patient contact radiology after his acute episode to reschedule.

## 2021-12-21 VITALS
HEIGHT: 72 IN | SYSTOLIC BLOOD PRESSURE: 125 MMHG | DIASTOLIC BLOOD PRESSURE: 76 MMHG | RESPIRATION RATE: 18 BRPM | TEMPERATURE: 96.7 F | WEIGHT: 220 LBS | BODY MASS INDEX: 29.8 KG/M2 | HEART RATE: 70 BPM | OXYGEN SATURATION: 94 %

## 2021-12-21 PROBLEM — R11.10 DRY HEAVES: Status: RESOLVED | Noted: 2021-12-20 | Resolved: 2021-12-21

## 2021-12-21 PROBLEM — I95.9 HYPOTENSION: Status: RESOLVED | Noted: 2021-12-20 | Resolved: 2021-12-21

## 2021-12-21 PROBLEM — N17.9 AKI (ACUTE KIDNEY INJURY) (HCC): Status: RESOLVED | Noted: 2021-12-20 | Resolved: 2021-12-21

## 2021-12-21 LAB
ANION GAP SERPL CALC-SCNC: 12 MMOL/L (ref 7–16)
BUN SERPL-MCNC: 51 MG/DL (ref 8–22)
CALCIUM SERPL-MCNC: 8.5 MG/DL (ref 8.5–10.5)
CHLORIDE SERPL-SCNC: 98 MMOL/L (ref 96–112)
CO2 SERPL-SCNC: 25 MMOL/L (ref 20–33)
CREAT SERPL-MCNC: 1.66 MG/DL (ref 0.5–1.4)
GLUCOSE SERPL-MCNC: 91 MG/DL (ref 65–99)
POTASSIUM SERPL-SCNC: 3.5 MMOL/L (ref 3.6–5.5)
SODIUM SERPL-SCNC: 135 MMOL/L (ref 135–145)

## 2021-12-21 PROCEDURE — G0378 HOSPITAL OBSERVATION PER HR: HCPCS

## 2021-12-21 PROCEDURE — 80048 BASIC METABOLIC PNL TOTAL CA: CPT

## 2021-12-21 PROCEDURE — 700102 HCHG RX REV CODE 250 W/ 637 OVERRIDE(OP): Performed by: INTERNAL MEDICINE

## 2021-12-21 PROCEDURE — A9270 NON-COVERED ITEM OR SERVICE: HCPCS | Performed by: HOSPITALIST

## 2021-12-21 PROCEDURE — A9270 NON-COVERED ITEM OR SERVICE: HCPCS | Performed by: INTERNAL MEDICINE

## 2021-12-21 PROCEDURE — 700101 HCHG RX REV CODE 250: Performed by: HOSPITALIST

## 2021-12-21 PROCEDURE — 99217 PR OBSERVATION CARE DISCHARGE: CPT | Performed by: INTERNAL MEDICINE

## 2021-12-21 PROCEDURE — 700102 HCHG RX REV CODE 250 W/ 637 OVERRIDE(OP): Performed by: HOSPITALIST

## 2021-12-21 RX ORDER — POTASSIUM CHLORIDE 20 MEQ/1
40 TABLET, EXTENDED RELEASE ORAL ONCE
Status: COMPLETED | OUTPATIENT
Start: 2021-12-21 | End: 2021-12-21

## 2021-12-21 RX ADMIN — HYDROCODONE BITARTRATE AND ACETAMINOPHEN 1 TABLET: 5; 325 TABLET ORAL at 03:31

## 2021-12-21 RX ADMIN — POTASSIUM CHLORIDE 40 MEQ: 1500 TABLET, EXTENDED RELEASE ORAL at 08:33

## 2021-12-21 RX ADMIN — SOTALOL HYDROCHLORIDE 120 MG: 120 TABLET ORAL at 05:54

## 2021-12-21 RX ADMIN — POTASSIUM CHLORIDE AND SODIUM CHLORIDE: 900; 150 INJECTION, SOLUTION INTRAVENOUS at 02:39

## 2021-12-21 ASSESSMENT — PAIN DESCRIPTION - PAIN TYPE
TYPE: ACUTE PAIN
TYPE: ACUTE PAIN

## 2021-12-21 NOTE — DISCHARGE INSTRUCTIONS
Discharge Instructions    Discharged to home by car with relative. Discharged via wheelchair, hospital escort: Yes.  Special equipment needed: Not Applicable    Be sure to schedule a follow-up appointment with your primary care doctor or any specialists as instructed.     Discharge Plan:   Diet Plan: Discussed  Activity Level: Discussed  Confirmed Follow up Appointment: Appointment Scheduled  Confirmed Symptoms Management: Discussed  Medication Reconciliation Updated: Yes  Influenza Vaccine Indication: Not indicated: Previously immunized this influenza season and > 8 years of age    I understand that a diet low in cholesterol, fat, and sodium is recommended for good health. Unless I have been given specific instructions below for another diet, I accept this instruction as my diet prescription.   Other diet: Regular Diet    Special Instructions: None    · Is patient discharged on Warfarin / Coumadin?   No     Depression / Suicide Risk    As you are discharged from this [facility] facility, it is important to learn how to keep safe from harming yourself.    Recognize the warning signs:  · Abrupt changes in personality, positive or negative- including increase in energy   · Giving away possessions  · Change in eating patterns- significant weight changes-  positive or negative  · Change in sleeping patterns- unable to sleep or sleeping all the time   · Unwillingness or inability to communicate  · Depression  · Unusual sadness, discouragement and loneliness  · Talk of wanting to die  · Neglect of personal appearance   · Rebelliousness- reckless behavior  · Withdrawal from people/activities they love  · Confusion- inability to concentrate     If you or a loved one observes any of these behaviors or has concerns about self-harm, here's what you can do:  · Talk about it- your feelings and reasons for harming yourself  · Remove any means that you might use to hurt yourself (examples: pills, rope, extension cords,  firearm)  · Get professional help from the community (Mental Health, Substance Abuse, psychological counseling)  · Do not be alone:Call your Safe Contact- someone whom you trust who will be there for you.  · Call your local CRISIS HOTLINE 830-5095 or 263-792-5717  · Call your local Children's Mobile Crisis Response Team [place] (935) 214-6196 or www.[employer]Whale  · Call the toll free National Suicide Prevention Hotlines   · National Suicide Prevention Lifeline 145-959-HLGW (0469)  · National Hope Line Network 800-SUICIDE (419-8514)

## 2021-12-21 NOTE — CARE PLAN
The patient is Stable - Low risk of patient condition declining or worsening    Shift Goals  Clinical Goals: Monitor BP    Progress made toward(s) clinical / shift goals:    Plan of care reviewed with pt, all questions and concerns addressed. Pain managed with PRN medications per MAR. Pain evaluated using the 0-10 pain scale.   Problem: Pain - Standard  Goal: Alleviation of pain or a reduction in pain to the patient’s comfort goal  Outcome: Progressing     Problem: Knowledge Deficit - Standard  Goal: Patient and family/care givers will demonstrate understanding of plan of care, disease process/condition, diagnostic tests and medications  Outcome: Progressing     Problem: Fall Risk  Goal: Patient will remain free from falls  Outcome: Progressing

## 2021-12-21 NOTE — CARE PLAN
Problem: Pain - Standard  Goal: Alleviation of pain or a reduction in pain to the patient’s comfort goal  Outcome: Progressing     Problem: Knowledge Deficit - Standard  Goal: Patient and family/care givers will demonstrate understanding of plan of care, disease process/condition, diagnostic tests and medications  Outcome: Progressing     Problem: Fall Risk  Goal: Patient will remain free from falls  Outcome: Progressing   The patient is Stable - Low risk of patient condition declining or worsening         Progress made toward(s) clinical / shift goals:  patient updated on POC    Patient is not progressing towards the following goals:

## 2021-12-21 NOTE — PROGRESS NOTES
Assessment completed. Pt A&Ox 4. Respirations are even and unlabored on room air. Pt reports pain at this time. Monitors applied 100% paced, VS stable, call light and belongings within reach. POC updated (D/C). Pt educated on room and call light, pt verbalized understanding. Communication board updated. Needs met.

## 2021-12-21 NOTE — DISCHARGE SUMMARY
Discharge Summary    CHIEF COMPLAINT ON ADMISSION  Chief Complaint   Patient presents with   • Sent by MD       Reason for Admission  From Surgery     Admission Date  12/20/2021    CODE STATUS  Full Code    HPI & HOSPITAL COURSE  Mike Ferrell is a 71 y.o. male with a past medical history of A. fib on Coumadin, hypertension, chronic back pain who presented 12/20/2021 with dizziness and hypotension.  He was scheduled for a CT myelogram on 12/20/2021 for evaluation of his chronic back pain however prior to the procedure he was found to have hypotension with a systolic blood pressure in the 90s and was orthostatic for he was referred to the emergency room where he was found to have an acute kidney injury with a creatinine over 2.  Reported a poor appetite and poor oral intake for the past few weeks along with dry heaves.  He also reported drinking half a quart of bourbon per day for pain control.  Patient was treated with IV fluid hydration with improvement of his creatinine.  His troponin was found to be mildly elevated but he denies any active chest pain.  His benazepril, Lasix and HCTZ were held due to hypotension and KEVYN.  He was instructed to follow-up with his PCP and was instructed to get a BMP in 1 week's time to evaluate his kidney function..  He was instructed to increase his oral intake.         Therefore, he is discharged in good and stable condition to home with close outpatient follow-up.      Discharge Date  12/21/2021    FOLLOW UP ITEMS POST DISCHARGE  Follow-up with PCP    DISCHARGE DIAGNOSES  Principal Problem (Resolved):    Hypotension POA: Yes  Active Problems:    Atrial fibrillation (HCC) POA: Yes    Essential hypertension POA: Yes      Overview: ICD-10 transition    Automatic implantable cardioverter-defibrillator in situ POA: Yes      Overview: ICD-10 transition    Alcohol abuse POA: Yes  Resolved Problems:    KEVYN (acute kidney injury) (HCC) POA: Yes    Dry heaves POA: Yes      FOLLOW  UP  Future Appointments   Date Time Provider Department Center   12/27/2021  8:45 AM [facility] EXAM 4 [facility] None   2/14/2022  9:00 AM Daniel Ferrera M.D. [facility] None     Hipolito Hall M.D.  [address]  [place] [address]  [phone]    In 1 week  As needed, If symptoms worsen      MEDICATIONS ON DISCHARGE     Medication List      CHANGE how you take these medications      Instructions   warfarin 5 MG Tabs  What changed: See the new instructions.  Commonly known as: COUMADIN   TAKE 1 (ONE) TO 1 & 1/2 (ONE & ONE-HALF) TABLETS BY MOUTH ONCE DAILY AS DIRECTED BY THE COUMADIN CLINIC.        CONTINUE taking these medications      Instructions   amLODIPine 5 MG Tabs  Commonly known as: NORVASC   Take 5 mg by mouth every morning. Indications: High Blood Pressure Disorder  Dose: 5 mg     colesevelam 625 MG Tabs  Commonly known as: WELCHOL   Take 625 mg by mouth every evening.  Dose: 625 mg     HYDROcodone/acetaminophen  MG Tabs  Commonly known as: NORCO   Take 1 Tablet by mouth 2 times a day as needed for Moderate Pain. Indications: Pain  Dose: 1 Tablet     omeprazole 20 MG delayed-release capsule  Commonly known as: PRILOSEC   Take 20 mg by mouth every evening. Indications: Gastroesophageal Reflux Disease  Dose: 20 mg     sotalol 120 MG tablet  Commonly known as: BETAPACE   Take 1 Tab by mouth 2 times a day.  Dose: 120 mg     traMADol 50 MG Tabs  Commonly known as: ULTRAM   Take 50 mg by mouth every morning. Indications: Pain  Dose: 50 mg     Vitamin B Complex Tabs   Take 1 Tablet by mouth every day.  Dose: 1 Tablet     vitamin D3 125 MCG (5000 UT) Caps   Take 5,000 Units by mouth every day.  Dose: 5,000 Units        STOP taking these medications    benazepril 20 MG Tabs  Commonly known as: LOTENSIN     furosemide 40 MG Tabs  Commonly known as: LASIX     hydrochlorothiazide 12.5 MG capsule  Commonly known as: MICROZIDE            Allergies  Allergies   Allergen Reactions   • Statins [Hmg-Coa-R  Inhibitors] Itching   • Sulfa Drugs Rash and Itching     Rash, itch       DIET  Orders Placed This Encounter   Procedures   • Diet Order Diet: Regular     Standing Status:   Standing     Number of Occurrences:   1     Order Specific Question:   Diet:     Answer:   Regular [1]       ACTIVITY  As tolerated.  Weight bearing as tolerated    CONSULTATIONS  None    PROCEDURES  None    LABORATORY  Lab Results   Component Value Date    SODIUM 135 12/21/2021    POTASSIUM 3.5 (L) 12/21/2021    CHLORIDE 98 12/21/2021    CO2 25 12/21/2021    GLUCOSE 91 12/21/2021    BUN 51 (H) 12/21/2021    CREATININE 1.66 (H) 12/21/2021        Lab Results   Component Value Date    WBC 7.2 12/20/2021    HEMOGLOBIN 11.3 (L) 12/20/2021    HEMATOCRIT 32.1 (L) 12/20/2021    PLATELETCT 168 12/20/2021        Total time of the discharge process exceeds 33 minutes.

## 2021-12-21 NOTE — CARE PLAN
Problem: Pain - Standard  Goal: Alleviation of pain or a reduction in pain to the patient’s comfort goal  Outcome: Progressing     Problem: Knowledge Deficit - Standard  Goal: Patient and family/care givers will demonstrate understanding of plan of care, disease process/condition, diagnostic tests and medications  Outcome: Progressing     Problem: Fall Risk  Goal: Patient will remain free from falls  Outcome: Progressing   The patient is Stable - Low risk of patient condition declining or worsening    Shift Goals  Clinical Goals: Monitor BP  Patient Goals: pain control    Progress made toward(s) clinical / shift goals: Patient updated on POC    Patient is not progressing towards the following goals:

## 2021-12-27 ENCOUNTER — PATIENT MESSAGE (OUTPATIENT)
Dept: HEALTH INFORMATION MANAGEMENT | Facility: OTHER | Age: 71
End: 2021-12-27
Payer: MEDICARE

## 2021-12-27 ENCOUNTER — APPOINTMENT (OUTPATIENT)
Dept: VASCULAR LAB | Facility: MEDICAL CENTER | Age: 71
End: 2021-12-27
Attending: INTERNAL MEDICINE
Payer: MEDICARE

## 2021-12-27 ENCOUNTER — TELEPHONE (OUTPATIENT)
Dept: HEALTH INFORMATION MANAGEMENT | Facility: OTHER | Age: 71
End: 2021-12-27

## 2022-01-03 ENCOUNTER — ANTICOAGULATION VISIT (OUTPATIENT)
Dept: VASCULAR LAB | Facility: MEDICAL CENTER | Age: 72
End: 2022-01-03
Attending: INTERNAL MEDICINE
Payer: MEDICARE

## 2022-01-03 VITALS — HEART RATE: 73 BPM | DIASTOLIC BLOOD PRESSURE: 88 MMHG | SYSTOLIC BLOOD PRESSURE: 127 MMHG

## 2022-01-03 DIAGNOSIS — I48.91 ATRIAL FIBRILLATION, UNSPECIFIED TYPE (HCC): ICD-10-CM

## 2022-01-03 DIAGNOSIS — D68.69 SECONDARY HYPERCOAGULABLE STATE (HCC): ICD-10-CM

## 2022-01-03 DIAGNOSIS — Z79.01 ANTICOAGULANT LONG-TERM USE: ICD-10-CM

## 2022-01-03 LAB
INR BLD: 2.1 (ref 0.9–1.2)
INR PPP: 2.1 (ref 2–3.5)

## 2022-01-03 PROCEDURE — 85610 PROTHROMBIN TIME: CPT

## 2022-01-03 PROCEDURE — 99211 OFF/OP EST MAY X REQ PHY/QHP: CPT | Performed by: NURSE PRACTITIONER

## 2022-01-03 RX ORDER — BENAZEPRIL HYDROCHLORIDE 20 MG/1
20 TABLET ORAL DAILY
COMMUNITY
End: 2022-05-04

## 2022-01-03 NOTE — PROGRESS NOTES
Anticoagulation Summary  As of 1/3/2022    INR goal:  2.0-3.0   TTR:  59.2 % (6.6 y)   INR used for dosin.10 (1/3/2022)   Warfarin maintenance plan:  5 mg (5 mg x 1) every day   Weekly warfarin total:  35 mg   Plan last modified:  ILEANA Pike (2021)   Next INR check:  2022   Target end date:  Indefinite    Indications    Atrial fibrillation (HCC) [I48.91]  Secondary hypercoagulable state (HCC) [D68.69]             Anticoagulation Episode Summary     INR check location:  Anticoagulation Clinic    Preferred lab:      Send INR reminders to:      Comments:        Anticoagulation Care Providers     Provider Role Specialty Phone number    Daniel Ferrera M.D. Referring Interventional Cardiology [phone]    [facility] Responsible  [phone]                Refer to Patient Findings for HPI:  Patient Findings     Positives:  Upcoming invasive procedure, Change in medications, Hospital admission    Negatives:  Signs/symptoms of thrombosis, Signs/symptoms of bleeding, Laboratory test error suspected, Change in health, Change in alcohol use, Change in activity, Emergency department visit, Upcoming dental procedure, Missed doses, Extra doses, Change in diet/appetite, Bruising, Other complaints    Comments:  Was hospitalized overnight last month. No longer taking furosemide and hctz. Benazapril dose decreased. Has his 2ns cataract surgery this week. Did not have to stop warfarin.          Vitals:    22 0858   BP: 127/88   Pulse: 73       Verified current warfarin dosing schedule.    Medications reconciled   Pt is not on antiplatelet therapy      A/P   INR  -therapeutic.     Warfarin dosing recommendation: Continue current regimen.    Pt educated to contact our clinic with any changes in medications or s/s of bleeding or thrombosis. Pt is aware to seek immediate medical attention for falls, head injury or deep cuts.    Follow up appointment in 4 week(s) per pt's  request.    ILEANA Pike

## 2022-01-26 ENCOUNTER — HOSPITAL ENCOUNTER (OUTPATIENT)
Dept: LAB | Facility: MEDICAL CENTER | Age: 72
End: 2022-01-26
Attending: INTERNAL MEDICINE
Payer: MEDICARE

## 2022-01-26 ENCOUNTER — HOSPITAL ENCOUNTER (OUTPATIENT)
Dept: LAB | Facility: MEDICAL CENTER | Age: 72
End: 2022-01-26
Attending: FAMILY MEDICINE
Payer: MEDICARE

## 2022-01-26 DIAGNOSIS — E78.2 MIXED HYPERLIPIDEMIA: ICD-10-CM

## 2022-01-26 LAB
ALBUMIN SERPL BCP-MCNC: 4.2 G/DL (ref 3.2–4.9)
ALBUMIN/GLOB SERPL: 2.1 G/DL
ALP SERPL-CCNC: 86 U/L (ref 30–99)
ALT SERPL-CCNC: 31 U/L (ref 2–50)
ANION GAP SERPL CALC-SCNC: 11 MMOL/L (ref 7–16)
AST SERPL-CCNC: 31 U/L (ref 12–45)
BILIRUB SERPL-MCNC: 0.9 MG/DL (ref 0.1–1.5)
BUN SERPL-MCNC: 33 MG/DL (ref 8–22)
CALCIUM SERPL-MCNC: 8.9 MG/DL (ref 8.5–10.5)
CHLORIDE SERPL-SCNC: 104 MMOL/L (ref 96–112)
CHOLEST SERPL-MCNC: 239 MG/DL (ref 100–199)
CO2 SERPL-SCNC: 23 MMOL/L (ref 20–33)
CREAT SERPL-MCNC: 0.9 MG/DL (ref 0.5–1.4)
FASTING STATUS PATIENT QL REPORTED: NORMAL
GLOBULIN SER CALC-MCNC: 2 G/DL (ref 1.9–3.5)
GLUCOSE SERPL-MCNC: 108 MG/DL (ref 65–99)
HDLC SERPL-MCNC: 77 MG/DL
LDLC SERPL CALC-MCNC: 139 MG/DL
POTASSIUM SERPL-SCNC: 4.6 MMOL/L (ref 3.6–5.5)
PROT SERPL-MCNC: 6.2 G/DL (ref 6–8.2)
SODIUM SERPL-SCNC: 138 MMOL/L (ref 135–145)
TRIGL SERPL-MCNC: 117 MG/DL (ref 0–149)

## 2022-01-26 PROCEDURE — 80053 COMPREHEN METABOLIC PANEL: CPT

## 2022-01-26 PROCEDURE — 80061 LIPID PANEL: CPT

## 2022-01-26 PROCEDURE — 36415 COLL VENOUS BLD VENIPUNCTURE: CPT

## 2022-01-28 ENCOUNTER — TELEPHONE (OUTPATIENT)
Dept: CARDIOLOGY | Facility: MEDICAL CENTER | Age: 72
End: 2022-01-28

## 2022-01-28 DIAGNOSIS — E78.2 MIXED HYPERLIPIDEMIA: ICD-10-CM

## 2022-01-29 NOTE — TELEPHONE ENCOUNTER
----- Message from Daniel Ferrera M.D. sent at 1/27/2022  8:18 AM PST -----  Referral to vascular medicine

## 2022-02-02 ENCOUNTER — ANTICOAGULATION VISIT (OUTPATIENT)
Dept: VASCULAR LAB | Facility: MEDICAL CENTER | Age: 72
End: 2022-02-02
Attending: INTERNAL MEDICINE
Payer: MEDICARE

## 2022-02-02 VITALS — SYSTOLIC BLOOD PRESSURE: 119 MMHG | HEART RATE: 73 BPM | DIASTOLIC BLOOD PRESSURE: 80 MMHG

## 2022-02-02 DIAGNOSIS — D68.69 SECONDARY HYPERCOAGULABLE STATE (HCC): ICD-10-CM

## 2022-02-02 DIAGNOSIS — I48.11 LONGSTANDING PERSISTENT ATRIAL FIBRILLATION (HCC): ICD-10-CM

## 2022-02-02 LAB — INR PPP: 1.8 (ref 2–3.5)

## 2022-02-02 PROCEDURE — 85610 PROTHROMBIN TIME: CPT

## 2022-02-02 PROCEDURE — 99212 OFFICE O/P EST SF 10 MIN: CPT | Performed by: NURSE PRACTITIONER

## 2022-02-02 RX ORDER — POTASSIUM CHLORIDE 20 MEQ/1
20 TABLET, EXTENDED RELEASE ORAL EVERY MORNING
COMMUNITY
End: 2022-05-25 | Stop reason: SDUPTHER

## 2022-02-02 NOTE — PROGRESS NOTES
Anticoagulation Summary  As of 2022    INR goal:  2.0-3.0   TTR:  58.9 % (6.7 y)   INR used for dosin.80 (2022)   Warfarin maintenance plan:  7.5 mg (5 mg x 1.5) every Wed; 5 mg (5 mg x 1) all other days   Weekly warfarin total:  37.5 mg   Plan last modified:  ILEANA Pike (2022)   Next INR check:  3/2/2022   Target end date:  Indefinite    Indications    Atrial fibrillation (HCC) [I48.91]  Secondary hypercoagulable state (HCC) [D68.69]             Anticoagulation Episode Summary     INR check location:  Anticoagulation Clinic    Preferred lab:      Send INR reminders to:      Comments:        Anticoagulation Care Providers     Provider Role Specialty Phone number    Daniel Ferrera M.D. Referring Internal Medicine Clinical Cardiac Electrophysiology [phone]    [facility] Responsible  [phone]                Refer to Patient Findings for HPI:      Vitals:    22 0912   BP: 119/80   Pulse: 73       Verified current warfarin dosing schedule.    Medications reconciled   Pt is not on antiplatelet therapy      A/P   INR  sub-therapeutic. No hx of TIA/CVA.    Warfarin dosing recommendation: began 7.5 mg on Wednesday, 5 mg all other days.    Pt educated to contact our clinic with any changes in medications or s/s of bleeding or thrombosis. Pt is aware to seek immediate medical attention for falls, head injury or deep cuts.    Follow up appointment in 4 week(s) per pt's request.    ILEANA Pike

## 2022-02-03 LAB — INR BLD: 1.8 (ref 0.9–1.2)

## 2022-02-14 ENCOUNTER — OFFICE VISIT (OUTPATIENT)
Dept: CARDIOLOGY | Facility: MEDICAL CENTER | Age: 72
End: 2022-02-14
Payer: MEDICARE

## 2022-02-14 VITALS
DIASTOLIC BLOOD PRESSURE: 84 MMHG | OXYGEN SATURATION: 96 % | BODY MASS INDEX: 30.48 KG/M2 | SYSTOLIC BLOOD PRESSURE: 122 MMHG | RESPIRATION RATE: 16 BRPM | HEART RATE: 80 BPM | HEIGHT: 72 IN | WEIGHT: 225 LBS

## 2022-02-14 DIAGNOSIS — Z79.01 ANTICOAGULANT LONG-TERM USE: ICD-10-CM

## 2022-02-14 DIAGNOSIS — I49.5 SINOATRIAL NODE DYSFUNCTION (HCC): ICD-10-CM

## 2022-02-14 DIAGNOSIS — E78.2 MIXED HYPERLIPIDEMIA: ICD-10-CM

## 2022-02-14 DIAGNOSIS — I10 ESSENTIAL HYPERTENSION: ICD-10-CM

## 2022-02-14 DIAGNOSIS — I44.2 COMPLETE HEART BLOCK (HCC): ICD-10-CM

## 2022-02-14 DIAGNOSIS — I48.0 PAROXYSMAL ATRIAL FIBRILLATION (HCC): ICD-10-CM

## 2022-02-14 DIAGNOSIS — Z95.810 AUTOMATIC IMPLANTABLE CARDIOVERTER-DEFIBRILLATOR IN SITU: ICD-10-CM

## 2022-02-14 DIAGNOSIS — I47.29 PAROXYSMAL VENTRICULAR TACHYCARDIA (HCC): ICD-10-CM

## 2022-02-14 PROCEDURE — 93283 PRGRMG EVAL IMPLANTABLE DFB: CPT | Performed by: INTERNAL MEDICINE

## 2022-02-14 PROCEDURE — 99214 OFFICE O/P EST MOD 30 MIN: CPT | Performed by: INTERNAL MEDICINE

## 2022-02-14 RX ORDER — COLCHICINE 0.6 MG/1
TABLET ORAL
COMMUNITY
End: 2022-05-02

## 2022-02-14 RX ORDER — FUROSEMIDE 40 MG/1
40 TABLET ORAL EVERY MORNING
Status: ON HOLD | COMMUNITY
Start: 2022-02-04 | End: 2022-05-18

## 2022-02-14 RX ORDER — SILDENAFIL 100 MG/1
TABLET, FILM COATED ORAL
COMMUNITY
End: 2022-04-01

## 2022-02-14 ASSESSMENT — FIBROSIS 4 INDEX: FIB4 SCORE: 2.42

## 2022-02-14 NOTE — PROGRESS NOTES
"Chief Complaint   Patient presents with   • Ventricular Tachycardia     F/V Dx: Paroxysmal ventricular tachycardia (HCC)       Subjective     Mike Ferrell is a 71 y.o. male who presents today with history of worsening lower extremity edema and shortness of breath.  Pacing AAIR to DDDR.  Now pacer dependent with CHB.  Long VA interval.    Past Medical History:   Diagnosis Date   • AICD (automatic cardioverter/defibrillator) present    • Arthritis     generalized everywhere   • Atrial fibrillation (HCC) 4/4/2012   • Bowel habit changes     constipation   • Breath shortness     \"since 1996 from [place]\"   • Cataract 12/15/2021    bilat, no surgey yet   • Chronic back pain    • High cholesterol    • Hypertension    • Methamphetamine use (HCC) none for many years   • Pacemaker     defibrilator   • Pain 12/15/2021    back, right hip and knee, 8/10   • Paroxysmal ventricular tachycardia (HCC) 4/4/2012   • Pneumonia     in the past   • Sinoatrial node dysfunction (HCC) 4/4/2012    Dr. Daniel Ferrera   • Syncope and collapse 4/4/2012     Past Surgical History:   Procedure Laterality Date   • AICD IMPLANT  2010   • PACEMAKER INSERTION  2009   • CHOLECYSTECTOMY  1999   • TONSILLECTOMY  1953     History reviewed. No pertinent family history.  Social History     Socioeconomic History   • Marital status:      Spouse name: Not on file   • Number of children: Not on file   • Years of education: Not on file   • Highest education level: Not on file   Occupational History   • Not on file   Tobacco Use   • Smoking status: Never Smoker   • Smokeless tobacco: Never Used   Vaping Use   • Vaping Use: Never used   Substance and Sexual Activity   • Alcohol use: Yes     Comment: 2-3 drinks daily   • Drug use: Not Currently     Comment: 25 years ago   • Sexual activity: Not on file   Other Topics Concern   • Not on file   Social History Narrative   • Not on file     Social Determinants of Health     Financial Resource Strain: Low " Risk    • Difficulty of Paying Living Expenses: Not hard at all   Food Insecurity: No Food Insecurity   • Worried About Running Out of Food in the Last Year: Never true   • Ran Out of Food in the Last Year: Never true   Transportation Needs: No Transportation Needs   • Lack of Transportation (Medical): No   • Lack of Transportation (Non-Medical): No   Physical Activity:    • Days of Exercise per Week: Not on file   • Minutes of Exercise per Session: Not on file   Stress:    • Feeling of Stress : Not on file   Social Connections:    • Frequency of Communication with Friends and Family: Not on file   • Frequency of Social Gatherings with Friends and Family: Not on file   • Attends Latter day Services: Not on file   • Active Member of Clubs or Organizations: Not on file   • Attends Club or Organization Meetings: Not on file   • Marital Status: Not on file   Intimate Partner Violence:    • Fear of Current or Ex-Partner: Not on file   • Emotionally Abused: Not on file   • Physically Abused: Not on file   • Sexually Abused: Not on file   Housing Stability:    • Unable to Pay for Housing in the Last Year: Not on file   • Number of Places Lived in the Last Year: Not on file   • Unstable Housing in the Last Year: Not on file     Allergies   Allergen Reactions   • Statins [Hmg-Coa-R Inhibitors] Itching   • Sulfa Drugs Rash and Itching     Rash, itch     Outpatient Encounter Medications as of 2/14/2022   Medication Sig Dispense Refill   • sildenafil citrate (VIAGRA) 100 MG tablet sildenafil 100 mg tablet     • furosemide (LASIX) 40 MG Tab TAKE 1 TABLET EVERY DAY BY ORAL ROUTE AS NEEDED.     • colchicine (COLCRYS) 0.6 MG Tab colchicine 0.6 mg tablet     • potassium chloride SA (KDUR) 20 MEQ Tab CR Take 20 mEq by mouth every day.     • benazepril (LOTENSIN) 20 MG Tab Take 20 mg by mouth every day.     • HYDROcodone/acetaminophen (NORCO)  MG Tab Take 1 Tablet by mouth 2 times a day as needed for Moderate Pain. Indications:  Pain     • B Complex Vitamins (VITAMIN B COMPLEX) Tab Take 1 Tablet by mouth every day.     • omeprazole (PRILOSEC) 20 MG delayed-release capsule Take 20 mg by mouth every evening. Indications: Gastroesophageal Reflux Disease     • colesevelam (WELCHOL) 625 MG Tab Take 625 mg by mouth every evening.     • traMADol (ULTRAM) 50 MG Tab Take 50 mg by mouth every morning. Indications: Pain     • sotalol (BETAPACE) 120 MG tablet Take 1 Tab by mouth 2 times a day. 180 Tab 3   • Cholecalciferol (VITAMIN D3) 5000 units Cap Take 5,000 Units by mouth every day.     • warfarin (COUMADIN) 5 MG Tab TAKE 1 (ONE) TO 1 & 1/2 (ONE & ONE-HALF) TABLETS BY MOUTH ONCE DAILY AS DIRECTED BY THE COUMADIN CLINIC. (Patient taking differently: Take 5 mg by mouth every evening.) 135 Tab 1   • amlodipine (NORVASC) 5 MG TABS Take 5 mg by mouth every morning. Indications: High Blood Pressure Disorder       No facility-administered encounter medications on file as of 2/14/2022.     ROS           Objective     /84 (BP Location: Left arm, Patient Position: Sitting, BP Cuff Size: Adult)   Pulse 80   Resp 16   Ht 1.829 m (6')   Wt 102 kg (225 lb)   SpO2 96%   BMI 30.52 kg/m²     Physical Exam  Constitutional:       Appearance: He is well-developed.   HENT:      Head: Normocephalic and atraumatic.   Cardiovascular:      Rate and Rhythm: Normal rate and regular rhythm.      Heart sounds: No murmur heard.  No friction rub. No gallop.    Pulmonary:      Effort: Pulmonary effort is normal.      Breath sounds: Normal breath sounds.   Abdominal:      Palpations: Abdomen is soft.   Musculoskeletal:         General: Normal range of motion.      Cervical back: Normal range of motion and neck supple.      Right lower leg: Edema present.      Left lower leg: Edema present.   Skin:     General: Skin is warm and dry.   Neurological:      Mental Status: He is alert and oriented to person, place, and time.   Psychiatric:         Behavior: Behavior normal.          Thought Content: Thought content normal.         Judgment: Judgment normal.                Assessment & Plan     1. Automatic implantable cardioverter-defibrillator in situ  EC-ECHOCARDIOGRAM COMPLETE W/O CONT   2. Anticoagulant long-term use     3. Mixed hyperlipidemia     4. Essential hypertension     5. Paroxysmal atrial fibrillation (HCC)     6. Sinoatrial node dysfunction (HCC)     7. Paroxysmal ventricular tachycardia (HCC)     8. Complete heart block (HCC)         Medical Decision Making: Today's Assessment/Status/Plan:   1.  Complete heart block.  Reprogrammed DDDR with therapeutic AV delays.  2.  Lower extremity edema increase Lasix x1 week.  Compression stockings.  3.  Atrial fibrillation none seen.  Continue sotalol.  4.  Anticoagulation continue.  5.  Recheck echocardiogram.  6.  Follow-up in 6 weeks.  7.  Hyperlipidemia intolerant to statin.  Has referral to vascular medicine.

## 2022-02-17 ENCOUNTER — HOSPITAL ENCOUNTER (OUTPATIENT)
Dept: CARDIOLOGY | Facility: MEDICAL CENTER | Age: 72
End: 2022-02-17
Attending: INTERNAL MEDICINE
Payer: MEDICARE

## 2022-02-17 DIAGNOSIS — Z95.810 AUTOMATIC IMPLANTABLE CARDIOVERTER-DEFIBRILLATOR IN SITU: ICD-10-CM

## 2022-02-17 PROCEDURE — 93306 TTE W/DOPPLER COMPLETE: CPT

## 2022-02-21 LAB
LV EJECT FRACT  99904: 60
LV EJECT FRACT MOD 2C 99903: 50.28
LV EJECT FRACT MOD 4C 99902: 48.8
LV EJECT FRACT MOD BP 99901: 49.89

## 2022-02-21 PROCEDURE — 93306 TTE W/DOPPLER COMPLETE: CPT | Mod: 26 | Performed by: INTERNAL MEDICINE

## 2022-02-22 ENCOUNTER — OFFICE VISIT (OUTPATIENT)
Dept: CARDIOLOGY | Facility: MEDICAL CENTER | Age: 72
End: 2022-02-22
Payer: MEDICARE

## 2022-02-22 ENCOUNTER — TELEPHONE (OUTPATIENT)
Dept: CARDIOLOGY | Facility: MEDICAL CENTER | Age: 72
End: 2022-02-22
Payer: MEDICARE

## 2022-02-22 ENCOUNTER — HOSPITAL ENCOUNTER (OUTPATIENT)
Dept: LAB | Facility: MEDICAL CENTER | Age: 72
End: 2022-02-22
Attending: INTERNAL MEDICINE
Payer: MEDICARE

## 2022-02-22 VITALS
HEART RATE: 86 BPM | SYSTOLIC BLOOD PRESSURE: 108 MMHG | DIASTOLIC BLOOD PRESSURE: 60 MMHG | BODY MASS INDEX: 34.81 KG/M2 | OXYGEN SATURATION: 95 % | WEIGHT: 257 LBS | HEIGHT: 72 IN

## 2022-02-22 DIAGNOSIS — I49.5 SINOATRIAL NODE DYSFUNCTION (HCC): ICD-10-CM

## 2022-02-22 DIAGNOSIS — R06.02 SHORTNESS OF BREATH: ICD-10-CM

## 2022-02-22 DIAGNOSIS — I10 ESSENTIAL HYPERTENSION: ICD-10-CM

## 2022-02-22 DIAGNOSIS — Z79.01 ANTICOAGULANT LONG-TERM USE: ICD-10-CM

## 2022-02-22 DIAGNOSIS — I44.2 COMPLETE HEART BLOCK (HCC): ICD-10-CM

## 2022-02-22 DIAGNOSIS — M79.89 SWELLING OF BOTH LOWER EXTREMITIES: ICD-10-CM

## 2022-02-22 DIAGNOSIS — Z95.810 AUTOMATIC IMPLANTABLE CARDIOVERTER-DEFIBRILLATOR IN SITU: ICD-10-CM

## 2022-02-22 DIAGNOSIS — I50.30 HEART FAILURE WITH PRESERVED EJECTION FRACTION, UNSPECIFIED HF CHRONICITY (HCC): ICD-10-CM

## 2022-02-22 DIAGNOSIS — I48.91 ATRIAL FIBRILLATION, UNSPECIFIED TYPE (HCC): ICD-10-CM

## 2022-02-22 DIAGNOSIS — R06.09 DYSPNEA ON EXERTION: ICD-10-CM

## 2022-02-22 DIAGNOSIS — E78.2 MIXED HYPERLIPIDEMIA: ICD-10-CM

## 2022-02-22 DIAGNOSIS — I47.29 PAROXYSMAL VENTRICULAR TACHYCARDIA (HCC): ICD-10-CM

## 2022-02-22 LAB
ALBUMIN SERPL BCP-MCNC: 4.2 G/DL (ref 3.2–4.9)
ALBUMIN/GLOB SERPL: 2.1 G/DL
ALP SERPL-CCNC: 75 U/L (ref 30–99)
ALT SERPL-CCNC: 30 U/L (ref 2–50)
ANION GAP SERPL CALC-SCNC: 13 MMOL/L (ref 7–16)
AST SERPL-CCNC: 20 U/L (ref 12–45)
BILIRUB SERPL-MCNC: 0.5 MG/DL (ref 0.1–1.5)
BUN SERPL-MCNC: 30 MG/DL (ref 8–22)
CALCIUM SERPL-MCNC: 9 MG/DL (ref 8.5–10.5)
CHLORIDE SERPL-SCNC: 102 MMOL/L (ref 96–112)
CO2 SERPL-SCNC: 22 MMOL/L (ref 20–33)
CREAT SERPL-MCNC: 1.21 MG/DL (ref 0.5–1.4)
GLOBULIN SER CALC-MCNC: 2 G/DL (ref 1.9–3.5)
GLUCOSE SERPL-MCNC: 107 MG/DL (ref 65–99)
NT-PROBNP SERPL IA-MCNC: 537 PG/ML (ref 0–125)
POTASSIUM SERPL-SCNC: 3.5 MMOL/L (ref 3.6–5.5)
PROT SERPL-MCNC: 6.2 G/DL (ref 6–8.2)
SODIUM SERPL-SCNC: 137 MMOL/L (ref 135–145)

## 2022-02-22 PROCEDURE — 36415 COLL VENOUS BLD VENIPUNCTURE: CPT

## 2022-02-22 PROCEDURE — 83880 ASSAY OF NATRIURETIC PEPTIDE: CPT

## 2022-02-22 PROCEDURE — 99214 OFFICE O/P EST MOD 30 MIN: CPT | Mod: 25 | Performed by: INTERNAL MEDICINE

## 2022-02-22 PROCEDURE — 93283 PRGRMG EVAL IMPLANTABLE DFB: CPT | Performed by: INTERNAL MEDICINE

## 2022-02-22 PROCEDURE — 80053 COMPREHEN METABOLIC PANEL: CPT

## 2022-02-22 RX ORDER — SPIRONOLACTONE 25 MG/1
25 TABLET ORAL DAILY
Qty: 90 TABLET | Refills: 3 | Status: SHIPPED | OUTPATIENT
Start: 2022-02-22 | End: 2022-05-16

## 2022-02-22 ASSESSMENT — FIBROSIS 4 INDEX: FIB4 SCORE: 1.59

## 2022-02-22 NOTE — PROGRESS NOTES
"No chief complaint on file.      Subjective     Mike Ferrell is a 71 y.o. male who presents today with history of dual-chamber defibrillator.  RV pacing.  LV function normal on echo.  Elevated BNP.  On Norvasc.  Continues to have swelling in his legs.  BNP elevated, potassium slightly low.    Past Medical History:   Diagnosis Date   • AICD (automatic cardioverter/defibrillator) present    • Arthritis     generalized everywhere   • Atrial fibrillation (HCC) 4/4/2012   • Bowel habit changes     constipation   • Breath shortness     \"since 1996 from [place]\"   • Cataract 12/15/2021    bilat, no surgey yet   • Chronic back pain    • High cholesterol    • Hypertension    • Methamphetamine use (HCC) none for many years   • Pacemaker     defibrilator   • Pain 12/15/2021    back, right hip and knee, 8/10   • Paroxysmal ventricular tachycardia (HCC) 4/4/2012   • Pneumonia     in the past   • Sinoatrial node dysfunction (HCC) 4/4/2012    Dr. Daniel Ferrera   • Syncope and collapse 4/4/2012     Past Surgical History:   Procedure Laterality Date   • AICD IMPLANT  2010   • PACEMAKER INSERTION  2009   • CHOLECYSTECTOMY  1999   • TONSILLECTOMY  1953     History reviewed. No pertinent family history.  Social History     Socioeconomic History   • Marital status:      Spouse name: Not on file   • Number of children: Not on file   • Years of education: Not on file   • Highest education level: Not on file   Occupational History   • Not on file   Tobacco Use   • Smoking status: Never Smoker   • Smokeless tobacco: Never Used   Vaping Use   • Vaping Use: Never used   Substance and Sexual Activity   • Alcohol use: Yes     Comment: 2-3 drinks daily   • Drug use: Not Currently     Comment: 25 years ago   • Sexual activity: Not on file   Other Topics Concern   • Not on file   Social History Narrative   • Not on file     Social Determinants of Health     Financial Resource Strain: Low Risk    • Difficulty of Paying Living " Expenses: Not hard at all   Food Insecurity: No Food Insecurity   • Worried About Running Out of Food in the Last Year: Never true   • Ran Out of Food in the Last Year: Never true   Transportation Needs: No Transportation Needs   • Lack of Transportation (Medical): No   • Lack of Transportation (Non-Medical): No   Physical Activity: Not on file   Stress: Not on file   Social Connections: Not on file   Intimate Partner Violence: Not on file   Housing Stability: Not on file     Allergies   Allergen Reactions   • Statins [Hmg-Coa-R Inhibitors] Itching   • Sulfa Drugs Rash and Itching     Rash, itch     Outpatient Encounter Medications as of 2/22/2022   Medication Sig Dispense Refill   • spironolactone (ALDACTONE) 25 MG Tab Take 1 Tablet by mouth every day. 90 Tablet 3   • sildenafil citrate (VIAGRA) 100 MG tablet sildenafil 100 mg tablet     • furosemide (LASIX) 40 MG Tab Take 40 mg by mouth as needed.     • colchicine (COLCRYS) 0.6 MG Tab colchicine 0.6 mg tablet     • potassium chloride SA (KDUR) 20 MEQ Tab CR Take 20 mEq by mouth every day.     • benazepril (LOTENSIN) 20 MG Tab Take 20 mg by mouth every day.     • HYDROcodone/acetaminophen (NORCO)  MG Tab Take 1 Tablet by mouth 2 times a day as needed for Moderate Pain. Indications: Pain     • B Complex Vitamins (VITAMIN B COMPLEX) Tab Take 1 Tablet by mouth every day.     • colesevelam (WELCHOL) 625 MG Tab Take 625 mg by mouth every evening.     • traMADol (ULTRAM) 50 MG Tab Take 50 mg by mouth every morning. Indications: Pain     • sotalol (BETAPACE) 120 MG tablet Take 1 Tab by mouth 2 times a day. 180 Tab 3   • Cholecalciferol (VITAMIN D3) 5000 units Cap Take 5,000 Units by mouth every day.     • warfarin (COUMADIN) 5 MG Tab TAKE 1 (ONE) TO 1 & 1/2 (ONE & ONE-HALF) TABLETS BY MOUTH ONCE DAILY AS DIRECTED BY THE COUMADIN CLINIC. (Patient taking differently: Take 5 mg by mouth every evening.) 135 Tab 1   • [DISCONTINUED] omeprazole (PRILOSEC) 20 MG  delayed-release capsule Take 20 mg by mouth every evening. Indications: Gastroesophageal Reflux Disease (Patient not taking: Reported on 2/22/2022)     • [DISCONTINUED] amlodipine (NORVASC) 5 MG TABS Take 5 mg by mouth every morning. Indications: High Blood Pressure Disorder       No facility-administered encounter medications on file as of 2/22/2022.     ROS           Objective     /60 (BP Location: Right arm, Patient Position: Sitting, BP Cuff Size: Adult)   Pulse 86   Ht 1.829 m (6')   Wt 117 kg (257 lb)   SpO2 95%   BMI 34.86 kg/m²     Physical Exam  Constitutional:       Appearance: He is well-developed.   HENT:      Head: Normocephalic and atraumatic.   Cardiovascular:      Rate and Rhythm: Normal rate and regular rhythm.      Heart sounds: No murmur heard.    No friction rub. No gallop.   Pulmonary:      Effort: Pulmonary effort is normal.      Breath sounds: Normal breath sounds.   Abdominal:      Palpations: Abdomen is soft.   Musculoskeletal:         General: Normal range of motion.      Cervical back: Normal range of motion and neck supple.   Skin:     General: Skin is warm and dry.   Neurological:      Mental Status: He is alert and oriented to person, place, and time.   Psychiatric:         Behavior: Behavior normal.         Thought Content: Thought content normal.         Judgment: Judgment normal.                Assessment & Plan     1. Atrial fibrillation, unspecified type (HCC)  Basic Metabolic Panel    REFERRAL TO CARDIOLOGY   2. Paroxysmal ventricular tachycardia (HCC)     3. Dyspnea on exertion     4. Automatic implantable cardioverter-defibrillator in situ     5. Complete heart block (HCC)     6. Anticoagulant long-term use     7. Mixed hyperlipidemia     8. Essential hypertension     9. Sinoatrial node dysfunction (HCC)     10. Heart failure with preserved ejection fraction, unspecified HF chronicity (HCC)         Medical Decision Making: Today's Assessment/Status/Plan:   1.  Heart  failure preserved LV function.  DC Norvasc.  Add Aldactone.  Check BMP in BNP in 1 week.  2.  Pacing normal function.  3.  Avoid too much salt and diuretics.  4.  Referral to heart failure clinic.  5.  Patient already has follow-up with me.

## 2022-02-22 NOTE — TELEPHONE ENCOUNTER
CHON Jones (spouse) called, she mentioned that since Mike came in , Chon had made some changes to his Pace Maker, he is still having issues, and would like to get a all back.     Karen - [phone]    Thank you,    Shyann JAIN

## 2022-02-22 NOTE — TELEPHONE ENCOUNTER
Inc in lasix x1 week pt wife said did not help at all and now his swelling is worse. Pt still having SOB. Pt wife made appt today to f/u with DS. Discussed echo results and no questions at this time.        Daniel Ferrera M.D.  Nicole Gonsalez R.N.  Echo looks fine       D/w DS in office. Labs ordered and referral to HF placed, offered appt with HF team tomorrow, wife emotional d/t having to go out of town and would like pt to be seen today. Pt to go get lab work done in hopes for results to discuss in office

## 2022-02-28 NOTE — PROGRESS NOTES
Cardiology Follow-up Consultation Note    Date of note: 03/01/22  Primary Care Provider: Hipolito Hall M.D.    Patient Name: Mike Ferrell   YOB: 1950  MRN:              [id number]    Chief Complaint: Follow-up HFpEF    History of Present Illness: Mr. Mike Ferrell is a 71 y.o. male whose current medical problems include HFpEF, hypertension, atrial fibrillation, complete heart block status post pacemaker placement, ventricular tachycardia status post ICD, and dyslipidemia with statin intolerance who is here for follow-up HFpEF.    The patient was previously a patient of Dr. Ferrera, last seen 2/22/2022.  During the visit, Aldactone was added for leg swelling.  Prior to that visit, the patient was evaluated in Dr. Ferrera's clinic on 2/14/2022, and Lasix was increased for 1 week.  The patient is referred to heart failure clinic for further management of volume status.    The patient is new to me.  The patient presents with his wife.  The patient reports that ever since starting Aldactone, neck swelling has somewhat improved but continues to have leg swelling.  His normal weight is around 220 pounds, and he is currently close to 260 pounds.  The patient reports early satiety as well as persistent leg swelling.  He denies any chest pain or shortness of breath.  No orthopnea or PND.  No palpitations.  No syncope or presyncopal episodes.      Cardiovascular Risk Factors:  1. Smoking status: Never smoker  2. Type II Diabetes Mellitus: None/not recently checked  3. Hypertension: None  4. Dyslipidemia: Intolerant to statin  Cholesterol,Tot   Date Value Ref Range Status   01/26/2022 239 (H) 100 - 199 mg/dL Final     LDL   Date Value Ref Range Status   01/26/2022 139 (H) <100 mg/dL Final     HDL   Date Value Ref Range Status   01/26/2022 77 >=40 mg/dL Final     Triglycerides   Date Value Ref Range Status   01/26/2022 117 0 - 149 mg/dL Final     5. Family history of early Coronary Artery Disease in a  first degree relative (Male less than 55 years of age; Female less than 65 years of age): None  6.  Obesity and/or Metabolic Syndrome: BMI 34.86  7. Sedentary lifestyle: Not active    Review of Systems   Constitutional: Negative for fever, malaise/fatigue and night sweats.   Cardiovascular: Negative for chest pain, dyspnea on exertion, irregular heartbeat, leg swelling, near-syncope, orthopnea, palpitations, paroxysmal nocturnal dyspnea and syncope.   Respiratory: Negative for cough, shortness of breath and wheezing.    Gastrointestinal: Negative for abdominal pain, diarrhea, nausea and vomiting.   Neurological: Negative for dizziness, focal weakness and weakness.       All other systems reviewed and are negative.       Current Outpatient Medications   Medication Sig Dispense Refill   • spironolactone (ALDACTONE) 25 MG Tab Take 1 Tablet by mouth every day. 90 Tablet 3   • sildenafil citrate (VIAGRA) 100 MG tablet sildenafil 100 mg tablet     • furosemide (LASIX) 40 MG Tab Take 40 mg by mouth 2 times a day. Twice daily for a week, followed by once daily.     • colchicine (COLCRYS) 0.6 MG Tab colchicine 0.6 mg tablet     • potassium chloride SA (KDUR) 20 MEQ Tab CR Take 20 mEq by mouth 2 times a day. BID for one week, followed by once daily     • benazepril (LOTENSIN) 20 MG Tab Take 20 mg by mouth every day.     • HYDROcodone/acetaminophen (NORCO)  MG Tab Take 1 Tablet by mouth 2 times a day as needed for Moderate Pain. Indications: Pain     • B Complex Vitamins (VITAMIN B COMPLEX) Tab Take 1 Tablet by mouth every day.     • colesevelam (WELCHOL) 625 MG Tab Take 625 mg by mouth every evening.     • traMADol (ULTRAM) 50 MG Tab Take 50 mg by mouth every morning. Indications: Pain     • sotalol (BETAPACE) 120 MG tablet Take 1 Tab by mouth 2 times a day. 180 Tab 3   • Cholecalciferol (VITAMIN D3) 5000 units Cap Take 5,000 Units by mouth every day.     • warfarin (COUMADIN) 5 MG Tab TAKE 1 (ONE) TO 1 & 1/2 (ONE &  ONE-HALF) TABLETS BY MOUTH ONCE DAILY AS DIRECTED BY THE COUMADIN CLINIC. (Patient taking differently: Take 5 mg by mouth every evening.) 135 Tab 1     No current facility-administered medications for this visit.         Allergies   Allergen Reactions   • Atorvastatin Myalgia   • Statins [Hmg-Coa-R Inhibitors] Itching   • Sulfa Drugs Rash and Itching     Rash, itch       Physical Exam:  Ambulatory Vitals  /80 (BP Location: Left arm, Patient Position: Sitting, BP Cuff Size: Adult)   Pulse 81   Resp 17   Ht 1.829 m (6')   Wt 118 kg (259 lb 6.4 oz)   SpO2 96%    Oxygen Therapy:  Pulse Oximetry: 96 %  BP Readings from Last 4 Encounters:   03/01/22 130/80   02/22/22 108/60   02/14/22 122/84   02/02/22 119/80       Weight/BMI: Body mass index is 35.18 kg/m².  Wt Readings from Last 4 Encounters:   03/01/22 118 kg (259 lb 6.4 oz)   02/22/22 117 kg (257 lb)   02/14/22 102 kg (225 lb)   12/20/21 99.8 kg (220 lb)     The patient walked 170 m in 6 minutes.    General: Well appearing and in no apparent distress  Eyes: nl conjunctiva, no icteric sclera  ENT: wearing a mask, normal external appearance of ears  Neck: JVP around 9  Lungs: normal respiratory effort, CTAB  Heart: RRR, no murmurs, no rubs or gallops, 1+ edema bilateral lower extremities. No LV/RV heave on cardiac palpatation. + bilateral radial pulses.  + bilateral dp pulses.   Abdomen: soft, non tender, mildly distended, no masses, normal bowel sounds.  No HSM.  Extremities/MSK: no clubbing, no cyanosis  Neurological: No focal sensory deficits  Psychiatric: Appropriate affect, A/O x 3, intact judgement and insight  Skin: Warm extremities      Lab Data Review:  Lab Results   Component Value Date/Time    CHOLSTRLTOT 239 (H) 01/26/2022 09:59 AM     (H) 01/26/2022 09:59 AM    HDL 77 01/26/2022 09:59 AM    TRIGLYCERIDE 117 01/26/2022 09:59 AM       Lab Results   Component Value Date/Time    SODIUM 137 02/22/2022 10:44 AM    POTASSIUM 3.5 (L) 02/22/2022  10:44 AM    CHLORIDE 102 02/22/2022 10:44 AM    CO2 22 02/22/2022 10:44 AM    GLUCOSE 107 (H) 02/22/2022 10:44 AM    BUN 30 (H) 02/22/2022 10:44 AM    CREATININE 1.21 02/22/2022 10:44 AM    BUNCREATRAT 25 (H) 05/30/2014 09:04 AM     Lab Results   Component Value Date/Time    ALKPHOSPHAT 75 02/22/2022 10:44 AM    ASTSGOT 20 02/22/2022 10:44 AM    ALTSGPT 30 02/22/2022 10:44 AM    TBILIRUBIN 0.5 02/22/2022 10:44 AM      Lab Results   Component Value Date/Time    WBC 7.2 12/20/2021 01:37 PM     No results found for: HBA1C      Cardiac Imaging and Procedures Review:    EKG dated 12/28/2018: My personal interpretation is AV dual paced    Echo dated 2/21/2022:   CONCLUSIONS  Compared to the prior echo on 11/08/19, no significant changes  The left ventricular ejection fraction is visually estimated to be 60%.  Mild mitral regurgitation.  Estimated right ventricular systolic pressure is 30 mmHg.  Grade 1 diastolic dysfunction (my read)    Assessment & Plan     1. Atrial fibrillation, unspecified type (HCC)     2. Paroxysmal ventricular tachycardia (HCC)     3. Automatic implantable cardioverter-defibrillator in situ     4. Complete heart block (HCC)     5. Chronic heart failure with preserved ejection fraction (HCC)  Basic Metabolic Panel    MAGNESIUM   6. Diastolic dysfunction  Basic Metabolic Panel    MAGNESIUM   7. Essential hypertension     8. Dyslipidemia     9. Statin intolerance     10. CHF (NYHA class III, ACC/AHA stage C) (HCC)  Basic Metabolic Panel    MAGNESIUM         Shared Medical Decision Making:    Grade 1 diastolic dysfunction  HFpEF  NYHA class III stage C heart failure  Volume overloaded on exam.  Patient reports his normal weight is closer to 220 pounds  -Continue spironolactone 25 mg daily  -Increase Lasix to 40 mg twice daily with potassium twice daily for 1 week, lab check in 1 week  -If the patient remains volume overloaded, will add metolazone.    Atrial fibrillation  Complete heart block status  post pacemaker  [id number] (hypertension, age,?  CHF)  -Continue warfarin  -Continue sotalol  -Continue follow-up with device clinic    Paroxysmal ventricular tachycardia  Status post ICD  -Continue sotalol  -Continue follow-up with device clinic    Dyslipidemia  Statin intolerance  -Referral has been placed to lipid clinic    Hypertension  BP well controlled this visit  -Continue benazepril 20 mg daily  -Continue Aldactone and Lasix as above    All of the patient's excellent questions were answered to the best of my knowledge and to his satisfaction.  It was a pleasure seeing Mr. Mike Ferrell in my clinic today. Return in about 3 weeks (around 3/22/2022). Patient is aware to call the cardiology clinic with any questions or concerns.      Tiffanie Mariee MD  [facility], [facility]  [address] [place] [id number]  Phone: [phone]  Fax: [phone]

## 2022-03-01 ENCOUNTER — OFFICE VISIT (OUTPATIENT)
Dept: CARDIOLOGY | Facility: MEDICAL CENTER | Age: 72
End: 2022-03-01
Attending: INTERNAL MEDICINE
Payer: MEDICARE

## 2022-03-01 VITALS
OXYGEN SATURATION: 96 % | RESPIRATION RATE: 17 BRPM | BODY MASS INDEX: 35.13 KG/M2 | DIASTOLIC BLOOD PRESSURE: 80 MMHG | SYSTOLIC BLOOD PRESSURE: 130 MMHG | WEIGHT: 259.4 LBS | HEART RATE: 81 BPM | HEIGHT: 72 IN

## 2022-03-01 DIAGNOSIS — I10 ESSENTIAL HYPERTENSION: ICD-10-CM

## 2022-03-01 DIAGNOSIS — I47.29 PAROXYSMAL VENTRICULAR TACHYCARDIA (HCC): ICD-10-CM

## 2022-03-01 DIAGNOSIS — Z95.810 AUTOMATIC IMPLANTABLE CARDIOVERTER-DEFIBRILLATOR IN SITU: ICD-10-CM

## 2022-03-01 DIAGNOSIS — I51.89 DIASTOLIC DYSFUNCTION: ICD-10-CM

## 2022-03-01 DIAGNOSIS — I50.9 CHF (NYHA CLASS III, ACC/AHA STAGE C) (HCC): ICD-10-CM

## 2022-03-01 DIAGNOSIS — I44.2 COMPLETE HEART BLOCK (HCC): ICD-10-CM

## 2022-03-01 DIAGNOSIS — I48.91 ATRIAL FIBRILLATION, UNSPECIFIED TYPE (HCC): ICD-10-CM

## 2022-03-01 DIAGNOSIS — E78.5 DYSLIPIDEMIA: ICD-10-CM

## 2022-03-01 DIAGNOSIS — Z78.9 STATIN INTOLERANCE: ICD-10-CM

## 2022-03-01 DIAGNOSIS — I50.32 CHRONIC HEART FAILURE WITH PRESERVED EJECTION FRACTION (HCC): ICD-10-CM

## 2022-03-01 PROCEDURE — 99214 OFFICE O/P EST MOD 30 MIN: CPT | Performed by: STUDENT IN AN ORGANIZED HEALTH CARE EDUCATION/TRAINING PROGRAM

## 2022-03-01 ASSESSMENT — 6 MINUTE WALK TEST (6MWT): TOTAL DISTANCE WALKED (METERS): 170

## 2022-03-01 ASSESSMENT — MINNESOTA LIVING WITH HEART FAILURE QUESTIONNAIRE (MLHF)
GIVING YOU SIDE EFFECTS FROM TREATMENTS: 5
COSTING YOU MONEY FOR MEDICAL CARE: 0
HAVING TO SIT OR LIE DOWN DURING THE DAY: 5
MAKING YOU WORRY: 5
FEELING LIKE A BURDEN TO FAMILY AND FRIENDS: 5
TOTAL_SCORE: 90
DIFFICULTY SOCIALIZING WITH FAMILY OR FRIENDS: 5
TIRED, FATIGUED OR LOW ON ENERGY: 0
DIFFICULTY TO CONCENTRATE OR REMEMBERING THINGS: 5
DIFFICULTY WITH RECREATIONAL PASTIMES, SPORTS, HOBBIES: 5
DIFFICULTY GOING AWAY FROM HOME: 5
MAKING YOU FEEL DEPRESSED: 5
WORKING AROUND THE HOUSE OR YARD DIFFICULT: 5
EATING LESS FOODS YOU LIKE: 5
DIFFICULTY SLEEPING WELL AT NIGHT: 0
SWELLING IN ANKLES OR LEGS: 5
DIFFICULTY WITH SEXUAL ACTIVITIES: 5
MAKING YOU SHORT OF BREATH: 5
DIFFICULTY WORKING TO EARN A LIVING: 5
WALKING ABOUT OR CLIMBING STAIRS DIFFICULT: 5
LOSS OF SELF CONTROL IN YOUR LIFE: 5
MAKING YOU STAY IN A HOSPITAL: 5

## 2022-03-01 ASSESSMENT — ENCOUNTER SYMPTOMS
IRREGULAR HEARTBEAT: 0
WHEEZING: 0
VOMITING: 0
PND: 0
SYNCOPE: 0
NEAR-SYNCOPE: 0
DYSPNEA ON EXERTION: 0
FOCAL WEAKNESS: 0
NIGHT SWEATS: 0
DIARRHEA: 0
ORTHOPNEA: 0
DIZZINESS: 0
SHORTNESS OF BREATH: 0
WEAKNESS: 0
FEVER: 0
ABDOMINAL PAIN: 0
NAUSEA: 0
COUGH: 0
PALPITATIONS: 0

## 2022-03-01 ASSESSMENT — FIBROSIS 4 INDEX: FIB4 SCORE: 1.59

## 2022-03-01 NOTE — PATIENT INSTRUCTIONS
-Increase lasix to 40mg twice daily for a week, and obtain labs on Tuesday  -Increase potassium to twice daily when you are increasing lasix.

## 2022-03-02 ENCOUNTER — ANTICOAGULATION VISIT (OUTPATIENT)
Dept: VASCULAR LAB | Facility: MEDICAL CENTER | Age: 72
End: 2022-03-02
Attending: INTERNAL MEDICINE
Payer: MEDICARE

## 2022-03-02 VITALS — HEART RATE: 74 BPM | SYSTOLIC BLOOD PRESSURE: 133 MMHG | DIASTOLIC BLOOD PRESSURE: 95 MMHG

## 2022-03-02 DIAGNOSIS — D68.69 SECONDARY HYPERCOAGULABLE STATE (HCC): ICD-10-CM

## 2022-03-02 DIAGNOSIS — I48.11 LONGSTANDING PERSISTENT ATRIAL FIBRILLATION (HCC): ICD-10-CM

## 2022-03-02 LAB — INR PPP: 2.7 (ref 2–3.5)

## 2022-03-02 PROCEDURE — 99211 OFF/OP EST MAY X REQ PHY/QHP: CPT | Performed by: NURSE PRACTITIONER

## 2022-03-02 PROCEDURE — 85610 PROTHROMBIN TIME: CPT

## 2022-03-02 NOTE — PROGRESS NOTES
Anticoagulation Summary  As of 3/2/2022    INR goal:  2.0-3.0   TTR:  59.1 % (6.7 y)   INR used for dosin.70 (3/2/2022)   Warfarin maintenance plan:  7.5 mg (5 mg x 1.5) every Wed; 5 mg (5 mg x 1) all other days   Weekly warfarin total:  37.5 mg   No change documented:  ILEANA Pike   Plan last modified:  ILEANA Pike (2022)   Next INR check:  2022   Target end date:  Indefinite    Indications    Atrial fibrillation (HCC) [I48.91]             Anticoagulation Episode Summary     INR check location:  Anticoagulation Clinic    Preferred lab:      Send INR reminders to:      Comments:        Anticoagulation Care Providers     Provider Role Specialty Phone number    Daniel Ferrera M.D. Referring Internal Medicine Clinical Cardiac Electrophysiology [phone]    [facility] Responsible  [phone]                Refer to Patient Findings for HPI:  Patient Findings     Positives:  Change in medications    Negatives:  Signs/symptoms of thrombosis, Signs/symptoms of bleeding, Laboratory test error suspected, Change in health, Change in alcohol use, Change in activity, Upcoming invasive procedure, Emergency department visit, Upcoming dental procedure, Missed doses, Extra doses, Change in diet/appetite, Hospital admission, Bruising, Other complaints    Comments:  His furosemide was increased along with potassium supplement x 1 week.          Vitals:    22 0955   BP: 133/95   Pulse: 74       Verified current warfarin dosing schedule.    Medications reconciled   Pt is not on antiplatelet therapy  Atrial fibrillation controlled on sotolol.  Secondary hypercoagulable state due to Atrial Fibrillation/Flutter on warfarin.      A/P   INR  -therapeutic.     Warfarin dosing recommendation: Continue current regimen.    Pt educated to contact our clinic with any changes in medications or s/s of bleeding or thrombosis. Pt is aware to seek immediate medical attention for falls,  head injury or deep cuts.    Follow up appointment in 5 week(s).    ROSI Pike.

## 2022-03-03 LAB — INR BLD: 2.7 (ref 0.9–1.2)

## 2022-03-08 ENCOUNTER — HOSPITAL ENCOUNTER (OUTPATIENT)
Dept: LAB | Facility: MEDICAL CENTER | Age: 72
End: 2022-03-08
Attending: INTERNAL MEDICINE
Payer: MEDICARE

## 2022-03-08 ENCOUNTER — HOSPITAL ENCOUNTER (OUTPATIENT)
Facility: MEDICAL CENTER | Age: 72
End: 2022-03-08
Attending: STUDENT IN AN ORGANIZED HEALTH CARE EDUCATION/TRAINING PROGRAM
Payer: MEDICARE

## 2022-03-08 DIAGNOSIS — I48.91 ATRIAL FIBRILLATION, UNSPECIFIED TYPE (HCC): ICD-10-CM

## 2022-03-08 LAB
ANION GAP SERPL CALC-SCNC: 14 MMOL/L (ref 7–16)
BUN SERPL-MCNC: 34 MG/DL (ref 8–22)
CALCIUM SERPL-MCNC: 10.1 MG/DL (ref 8.5–10.5)
CHLORIDE SERPL-SCNC: 97 MMOL/L (ref 96–112)
CO2 SERPL-SCNC: 27 MMOL/L (ref 20–33)
CREAT SERPL-MCNC: 2 MG/DL (ref 0.5–1.4)
FASTING STATUS PATIENT QL REPORTED: NORMAL
GLUCOSE SERPL-MCNC: 102 MG/DL (ref 65–99)
POTASSIUM SERPL-SCNC: 4.3 MMOL/L (ref 3.6–5.5)
SODIUM SERPL-SCNC: 138 MMOL/L (ref 135–145)

## 2022-03-08 PROCEDURE — 36415 COLL VENOUS BLD VENIPUNCTURE: CPT

## 2022-03-08 PROCEDURE — 80048 BASIC METABOLIC PNL TOTAL CA: CPT

## 2022-03-08 PROCEDURE — 83735 ASSAY OF MAGNESIUM: CPT

## 2022-03-09 ENCOUNTER — PATIENT MESSAGE (OUTPATIENT)
Dept: CARDIOLOGY | Facility: MEDICAL CENTER | Age: 72
End: 2022-03-09
Payer: MEDICARE

## 2022-03-09 DIAGNOSIS — I50.9 CHF (NYHA CLASS III, ACC/AHA STAGE C) (HCC): ICD-10-CM

## 2022-03-09 DIAGNOSIS — I50.32 CHRONIC HEART FAILURE WITH PRESERVED EJECTION FRACTION (HCC): ICD-10-CM

## 2022-03-09 DIAGNOSIS — Z79.899 HIGH RISK MEDICATION USE: ICD-10-CM

## 2022-03-09 DIAGNOSIS — I51.89 DIASTOLIC DYSFUNCTION: ICD-10-CM

## 2022-03-09 LAB — MAGNESIUM SERPL-MCNC: 1.9 MG/DL (ref 1.5–2.5)

## 2022-03-09 NOTE — PATIENT COMMUNICATION
Contacted lab, DS orders completed with duplicate BMP however magnesium was not. They are able to add it to blood drawn yesterday.

## 2022-03-09 NOTE — PATIENT COMMUNICATION
Tiffanie Mariee M.D.  You 26 minutes ago (10:45 AM)         Tesfaye Garces,   Could you call him to see if he is still having leg swelling and shortness of breath?  If the symptoms are resolved/better, I would hold lasix for 2 days, and resume at once daily dose.  He has KEVYN on labs, and I want to make sure I didn't overdiurese.  Please repeat lab again in one week. Thank you!   -HK    Message text      S/W wife Karen. No lasix for tomorrow or next day. (already took dose today) 64oz of water encouraged. Patient does not like water. Recommended salt free sugar free crystal lite for flavoring.     BMP ordered for one week.     Karen was able to repeat all instructions.

## 2022-03-12 DIAGNOSIS — I48.91 ATRIAL FIBRILLATION, UNSPECIFIED TYPE (HCC): ICD-10-CM

## 2022-03-14 RX ORDER — SOTALOL HYDROCHLORIDE 120 MG/1
120 TABLET ORAL 2 TIMES DAILY
Qty: 180 TABLET | Refills: 3 | Status: SHIPPED | OUTPATIENT
Start: 2022-03-14 | End: 2022-05-16

## 2022-03-18 ENCOUNTER — HOSPITAL ENCOUNTER (OUTPATIENT)
Dept: LAB | Facility: MEDICAL CENTER | Age: 72
End: 2022-03-18
Attending: STUDENT IN AN ORGANIZED HEALTH CARE EDUCATION/TRAINING PROGRAM
Payer: MEDICARE

## 2022-03-18 DIAGNOSIS — Z79.899 HIGH RISK MEDICATION USE: ICD-10-CM

## 2022-03-18 LAB
ANION GAP SERPL CALC-SCNC: 14 MMOL/L (ref 7–16)
BUN SERPL-MCNC: 25 MG/DL (ref 8–22)
CALCIUM SERPL-MCNC: 10.1 MG/DL (ref 8.5–10.5)
CHLORIDE SERPL-SCNC: 97 MMOL/L (ref 96–112)
CO2 SERPL-SCNC: 24 MMOL/L (ref 20–33)
CREAT SERPL-MCNC: 1.51 MG/DL (ref 0.5–1.4)
GFR SERPLBLD CREATININE-BSD FMLA CKD-EPI: 48 ML/MIN/1.73 M 2
GLUCOSE SERPL-MCNC: 104 MG/DL (ref 65–99)
POTASSIUM SERPL-SCNC: 4.6 MMOL/L (ref 3.6–5.5)
SODIUM SERPL-SCNC: 135 MMOL/L (ref 135–145)

## 2022-03-18 PROCEDURE — 80048 BASIC METABOLIC PNL TOTAL CA: CPT

## 2022-03-18 PROCEDURE — 36415 COLL VENOUS BLD VENIPUNCTURE: CPT

## 2022-03-23 ENCOUNTER — TELEPHONE (OUTPATIENT)
Dept: CARDIOLOGY | Facility: MEDICAL CENTER | Age: 72
End: 2022-03-23
Payer: MEDICARE

## 2022-03-23 DIAGNOSIS — Z79.899 HIGH RISK MEDICATION USE: ICD-10-CM

## 2022-03-23 NOTE — TELEPHONE ENCOUNTER
We should bring him in for visit with DANIA or me this week or next week - need to assess volume status. And repeat labs to be done one day prior to that visit. Thanks!     D/W HK, current follow up for 4/12/22 is sufficient

## 2022-04-01 ENCOUNTER — OFFICE VISIT (OUTPATIENT)
Dept: CARDIOLOGY | Facility: MEDICAL CENTER | Age: 72
End: 2022-04-01
Payer: MEDICARE

## 2022-04-01 ENCOUNTER — OFFICE VISIT (OUTPATIENT)
Dept: VASCULAR LAB | Facility: MEDICAL CENTER | Age: 72
End: 2022-04-01
Attending: FAMILY MEDICINE
Payer: MEDICARE

## 2022-04-01 ENCOUNTER — ANTICOAGULATION MONITORING (OUTPATIENT)
Dept: VASCULAR LAB | Facility: MEDICAL CENTER | Age: 72
End: 2022-04-01
Payer: MEDICARE

## 2022-04-01 VITALS
WEIGHT: 246 LBS | DIASTOLIC BLOOD PRESSURE: 86 MMHG | BODY MASS INDEX: 33.32 KG/M2 | HEART RATE: 83 BPM | RESPIRATION RATE: 16 BRPM | HEIGHT: 72 IN | OXYGEN SATURATION: 93 % | SYSTOLIC BLOOD PRESSURE: 92 MMHG

## 2022-04-01 VITALS
HEART RATE: 73 BPM | HEIGHT: 72 IN | DIASTOLIC BLOOD PRESSURE: 62 MMHG | SYSTOLIC BLOOD PRESSURE: 93 MMHG | WEIGHT: 247 LBS | BODY MASS INDEX: 33.46 KG/M2

## 2022-04-01 DIAGNOSIS — D68.69 SECONDARY HYPERCOAGULABLE STATE (HCC): ICD-10-CM

## 2022-04-01 DIAGNOSIS — I48.11 LONGSTANDING PERSISTENT ATRIAL FIBRILLATION (HCC): ICD-10-CM

## 2022-04-01 DIAGNOSIS — I49.5 SINOATRIAL NODE DYSFUNCTION (HCC): ICD-10-CM

## 2022-04-01 DIAGNOSIS — R73.03 PREDIABETES: ICD-10-CM

## 2022-04-01 DIAGNOSIS — I50.32 CHRONIC HEART FAILURE WITH PRESERVED EJECTION FRACTION (HCC): ICD-10-CM

## 2022-04-01 DIAGNOSIS — I50.30 HEART FAILURE WITH PRESERVED EJECTION FRACTION, UNSPECIFIED HF CHRONICITY (HCC): ICD-10-CM

## 2022-04-01 DIAGNOSIS — E78.5 DYSLIPIDEMIA: ICD-10-CM

## 2022-04-01 DIAGNOSIS — Z95.810 AUTOMATIC IMPLANTABLE CARDIOVERTER-DEFIBRILLATOR IN SITU: ICD-10-CM

## 2022-04-01 DIAGNOSIS — Z79.01 ANTICOAGULANT LONG-TERM USE: ICD-10-CM

## 2022-04-01 DIAGNOSIS — R06.09 DYSPNEA ON EXERTION: ICD-10-CM

## 2022-04-01 DIAGNOSIS — I10 ESSENTIAL HYPERTENSION: ICD-10-CM

## 2022-04-01 DIAGNOSIS — N18.32 CKD STAGE G3B/A2, GFR 30-44 AND ALBUMIN CREATININE RATIO 30-299 MG/G: ICD-10-CM

## 2022-04-01 DIAGNOSIS — E78.2 MIXED HYPERLIPIDEMIA: ICD-10-CM

## 2022-04-01 DIAGNOSIS — D53.9 MACROCYTIC ANEMIA: ICD-10-CM

## 2022-04-01 LAB
INR BLD: 2.8 (ref 0.9–1.2)
INR PPP: 2.8 (ref 2–3.5)

## 2022-04-01 PROCEDURE — 99204 OFFICE O/P NEW MOD 45 MIN: CPT | Performed by: FAMILY MEDICINE

## 2022-04-01 PROCEDURE — 99212 OFFICE O/P EST SF 10 MIN: CPT

## 2022-04-01 PROCEDURE — 85610 PROTHROMBIN TIME: CPT

## 2022-04-01 PROCEDURE — 99214 OFFICE O/P EST MOD 30 MIN: CPT | Performed by: INTERNAL MEDICINE

## 2022-04-01 PROCEDURE — 93283 PRGRMG EVAL IMPLANTABLE DFB: CPT | Performed by: INTERNAL MEDICINE

## 2022-04-01 ASSESSMENT — ENCOUNTER SYMPTOMS
MYALGIAS: 0
DIZZINESS: 0
VOMITING: 0
WHEEZING: 0
CLAUDICATION: 0
WEAKNESS: 0
SHORTNESS OF BREATH: 0
NAUSEA: 0
DIARRHEA: 0
BRUISES/BLEEDS EASILY: 0
TREMORS: 0
SEIZURES: 0
ABDOMINAL PAIN: 0
HEADACHES: 0
BLOOD IN STOOL: 0
PALPITATIONS: 0
COUGH: 0
HEMOPTYSIS: 0
FOCAL WEAKNESS: 0
CHILLS: 0
FEVER: 0

## 2022-04-01 ASSESSMENT — FIBROSIS 4 INDEX
FIB4 SCORE: 1.59
FIB4 SCORE: 1.59

## 2022-04-01 NOTE — PROGRESS NOTES
[facility] - Initial Visit  Date of Service: 04/01/22     Mike Ferrell has been referred for evaluation and management of dyslipidemia    Referral Source: Daniel Ferrera M.D.     Subjective   Current/interval symptoms or concerns:  No major concerns, working on dietary changes    Change in weight: Stable  BMI Readings from Last 5 Encounters:   04/01/22 33.36 kg/m²   04/01/22 33.50 kg/m²   03/01/22 35.18 kg/m²   02/22/22 34.86 kg/m²   02/14/22 30.52 kg/m²     Exercise habits: no regular exercise program  Dietary patterns: reduced red meat   Etoh: Yes, Details: heavy use, has reduced  Reported barriers to care: limited mobility, using cane   History of ASCVD: None  Secondary causes of dyslipidemia:  Endocrine/Hypothyroidism:  none reported   Liver disease: none reported   Renal disease/nephrotic syndrome:  none reported  Medications: None  Other Established (non-atherosclerotic) Vascular Disease, if Present: None  Age at Initial Diagnosis of Dyslipidemia: several years ago     Current Prescription Lipid Lowering Medications - including dose:   Statin: none   Non-Statin: welchol 625mg once daily   Current Lipid Lowering and Related Supplements: None  Any Current Side Effects Potentially Related to Lipid Lowering therapy? No  Current Adherence to Lipid Lowering Therapies: Complete  Previously Attempted Interventions for Lipids - including outcome  Statin: atorva   Outcome: severe itching   Non-Statin: None  Outcome: N/A  Any Previous History of Statin Intolerance? Yes, Details: other statins with myalgias  Baseline Lipids Prior to Treatment:      Ref. Range 9/22/2021 11:27   Cholesterol,Tot Latest Ref Range: 100 - 199 mg/dL 235 (H)   Triglycerides Latest Ref Range: 0 - 149 mg/dL 121   HDL Latest Ref Range: >=40 mg/dL 57   LDL Latest Ref Range: <100 mg/dL 154 (H)       Anticoagulation/antiplats: Yes, Details: VKA for Afib, INR per AC clinic , no bleeding noted     HTN:  No concerns  Home BP log: not  checking   Adherence to current HTN meds: compliant all of the time      T2D: No     HFpEF : stable, no current symptoms     PAST MEDICAL HISTORY:  has a past medical history of AICD (automatic cardioverter/defibrillator) present, Arthritis, Atrial fibrillation (HCC) (4/4/2012), Bowel habit changes, Breath shortness, Cataract (12/15/2021), Chronic back pain, High cholesterol, Hypertension, Methamphetamine use (HCC) (none for many years), Pacemaker, Pain (12/15/2021), Paroxysmal ventricular tachycardia (HCC) (4/4/2012), Pneumonia, Sinoatrial node dysfunction (HCC) (4/4/2012), and Syncope and collapse (4/4/2012).    PAST SURGICAL HISTORY:  has a past surgical history that includes cholecystectomy (1999); pacemaker insertion (2009); aicd implant (2010); and tonsillectomy (1953).    CURRENT MEDICATIONS:   Current Outpatient Medications:   •  Plant Sterols and Stanols, Take  by mouth.  •  Berberine Complex, Take  by mouth.  •  sotalol, 120 mg, Oral, BID, Taking  •  spironolactone, 25 mg, Oral, DAILY, Taking  •  furosemide, 40 mg, Oral, BID, Taking  •  colchicine, colchicine 0.6 mg tablet, PRN  •  potassium chloride SA, 20 mEq, Oral, BID, Taking  •  benazepril, 20 mg, Oral, DAILY, Taking  •  HYDROcodone/acetaminophen, 1 Tablet, Oral, BID PRN, Taking  •  Vitamin B Complex, 1 Tablet, Oral, DAILY, Taking  •  colesevelam, 625 mg, Oral, Q EVENING, Taking  •  traMADol, 50 mg, Oral, QAM, Taking  •  vitamin D3, 5,000 Units, Oral, DAILY, Taking  •  warfarin, TAKE 1 (ONE) TO 1 & 1/2 (ONE & ONE-HALF) TABLETS BY MOUTH ONCE DAILY AS DIRECTED BY THE COUMADIN CLINIC. (Patient taking differently: 5 mg, Oral, EVERY EVENING), Taking    ALLERGIES: Atorvastatin, Statins [hmg-coa-r inhibitors], and Sulfa drugs    FAMILY HISTORY: History reviewed. No pertinent family history.    SOCIAL HISTORY   Social History     Tobacco Use   • Smoking status: Never Smoker   • Smokeless tobacco: Never Used   Vaping Use   • Vaping Use: Never used   Substance  Use Topics   • Alcohol use: Yes     Comment: 2-3 drinks daily   • Drug use: Not Currently     Comment: 25 years ago       Review of Systems   Constitutional: Negative for chills, fever and malaise/fatigue.   HENT: Negative for nosebleeds.    Respiratory: Negative for cough, hemoptysis, shortness of breath and wheezing.    Cardiovascular: Positive for leg swelling. Negative for chest pain, palpitations and claudication.   Gastrointestinal: Negative for abdominal pain, blood in stool, diarrhea, nausea and vomiting.   Musculoskeletal: Negative for joint pain and myalgias.   Skin: Negative for itching and rash.   Neurological: Negative for dizziness, tremors, focal weakness, seizures, weakness and headaches.   Endo/Heme/Allergies: Does not bruise/bleed easily.         Objective    Vitals:    04/01/22 1453   BP: (!) 93/62   BP Location: Left arm   Patient Position: Sitting   BP Cuff Size: Adult   Pulse: 73   Weight: 112 kg (247 lb)   Height: 1.829 m (6')      BP Readings from Last 5 Encounters:   04/01/22 (!) 92/86   04/01/22 (!) 93/62   03/02/22 133/95   03/01/22 130/80   02/22/22 108/60     Physical Exam  Constitutional:       Appearance: He is well-developed.   HENT:      Head: Normocephalic and atraumatic.   Eyes:      Conjunctiva/sclera: Conjunctivae normal.      Pupils: Pupils are equal, round, and reactive to light.   Neck:      Thyroid: No thyromegaly.      Vascular: No JVD.   Cardiovascular:      Rate and Rhythm: Normal rate and regular rhythm.      Pulses:           Radial pulses are 2+ on the right side and 2+ on the left side.        Dorsalis pedis pulses are 2+ on the right side and 2+ on the left side.        Posterior tibial pulses are 2+ on the right side and 2+ on the left side.      Heart sounds: Normal heart sounds. No murmur heard.    No friction rub. No gallop.   Pulmonary:      Effort: Pulmonary effort is normal. No respiratory distress.      Breath sounds: Normal breath sounds.   Musculoskeletal:       Cervical back: Normal range of motion and neck supple.   Lymphadenopathy:      Cervical: No cervical adenopathy.   Skin:     General: Skin is warm and dry.   Neurological:      Mental Status: He is alert and oriented to person, place, and time.      Cranial Nerves: No cranial nerve deficit.         DATA REVIEW:  Most Recent Lipid Panel:   Lab Results   Component Value Date    CHOLSTRLTOT 239 (H) 2022    TRIGLYCERIDE 117 2022    HDL 77 2022     (H) 2022     No results found for: LIPOPROTA   No results found for: APOB      Other Pertinent Blood Work:   Lab Results   Component Value Date    SODIUM 135 2022    POTASSIUM 4.6 2022    CHLORIDE 97 2022    CO2 24 2022    ANION 14.0 2022    GLUCOSE 104 (H) 2022    BUN 25 (H) 2022    CREATININE 1.51 (H) 2022    CALCIUM 10.1 2022    ASTSGOT 20 2022    ALTSGPT 30 2022    ALKPHOSPHAT 75 2022    TBILIRUBIN 0.5 2022    ALBUMIN 4.2 2022    AGRATIO 2.1 2022    CREACTPROT 25.18 (H) 2021    TSHULTRASEN 1.210 02/10/2021    CPKTOTAL 60 2021     VASCULAR IMAGING:     Last EKG:   Results for orders placed or performed in visit on 18   EKG   Result Value Ref Range    Report       [facility]    Test Date:  2018  Pt Name:    BAILEY RICHARD                  Department: [facility]  MRN:        [id number]                      Room:  Gender:     Male                         Technician: J  :        [id number]                   Requested By:DANIEL FERRERA  Order #:    [id number]                    Reading MD: Daniel Ferrera MD    Measurements  Intervals                                Axis  Rate:       74                           P:  MA:         210                          QRS:        119  QRSD:       164                          T:          -76  QT:         476  QTc:        529    Interpretive Statements  A-V DUAL-PACED COMPLEXES W/ SOME  INHIBITION  NO FURTHER ANALYSIS ATTEMPTED DUE TO PACED RHYTHM  Compared to ECG 05/01/2017 09:45:18  Dual chamber pacing seen.    Electronically Signed On 12- 10:17:12 PST by Daniel Ferrera MD       Echo 2/2022   Compared to the prior echo on 11/08/19, no significant changes  The left ventricular ejection fraction is visually estimated to be 60%.  Mild mitral regurgitation.  Estimated right ventricular systolic pressure is 30 mmHg.            ASSESSMENT AND PLAN  1. Dyslipidemia  Comp Metabolic Panel    Lipid Profile    Lipoprotein (a)   2. Essential hypertension     3. Longstanding persistent atrial fibrillation (HCC)     4. Secondary hypercoagulable state (HCC)     5. Anticoagulant long-term use     6. Automatic implantable cardioverter-defibrillator in situ     7. Chronic heart failure with preserved ejection fraction (HCC)     8. Macrocytic anemia  VITAMIN B12    FOLATE   9. CKD stage G3b/A2, GFR 30-44 and albumin creatinine ratio  mg/g (HCC)  MICROALBUMIN CREAT RATIO URINE   10. Prediabetes       Patient Type, check all that apply: Primary Prevention    Established Atherosclerotic Cardiovascular Disease (ASCVD): no    1) HFpEF - stable echo, no fluid overload on exam   - continue meds and f/u with cardiology     2) hx of complete heart block, s/p AICD  - ongoing care with cardiology     3) Afib, chronic - stable, rate-controlled   - no prior CVA/TIA  - continue VKA, ongoing care with cardiology     Other Established (non-atherosclerotic) Vascular Disease, if Present: None    Evidence of Heterozygous Familial Hypercholesterolemia (FH): No     FH genotyping:  not recommended    ACC/AHA Indication for Statin Therapy, saji all that apply:  Baseline Calculated ASCVD risk >7.5%: Indication for Moderate intensity statin  Primary Prevention - 40-76yo, LDLc >70, <190 w/o DM    Calculated Risk for ASCVD, if applicable    The 10-year ASCVD risk score (Shimon YVONNE Jr., et al., 2013) is: 13.5%, 7.5 - <20%  "\"intermediate risk\"     Other Significant Risk Markers, if any, saji all that apply   None    Risk-enhancers: N/A    Goal LDL-C and nonHDL-C based on Clinic Protocol  reduce LDL-C 30-49% and LDL-C <100 (consider non-HDL-C <130, apoB <90)  At goal? No, 1/2022     LIFESTYLE INTERVENTIONS:    SMOKING:   reports that he has never smoked. He has never used smokeless tobacco.   - continued complete avoidance of all tobacco products     PHYSICAL ACTIVITY: continue healthy activity to improve CV fitness.  In general, targeting >150min/week of moderate-level activity.  Additional details reviewed with patient and/or outlined in care instructions     WEIGHT MANAGEMENT AND NUTRITION: Dietary plan was discussed with patient at this visit including Mediterrean and plant-based -gave NLA handouts     LIPID LOWERING MEDICATION MANAGEMENT:     Statin Therapy Recommendations from Today’s Visit:   - declines rechallenges due to prior severe myalgias and itching     Non-Statin Medications Recommendations from Today’s Visit:   Zetia: add as initial agent   Nexletol (avoid simva >20, prava >40): not currently indicated   BAS: continue welchol 1 tab daily (low dose)   Rx-grade Omega-3 FA: not currently indicated   Fibrates:  not indicated     Indication for PCSK9 Inhibitor, if applicable:  Not currently indicated    Supplements Recommended at this visit: - start plant sterols and berberine      RECOMMENDATIONS FOR OTHER CV RISK FACTORS:    1) BLOOD PRESSURE MANAGEMENT:  ACC/AHA (2017) goal <130/80  Home BP at goal:  yes  Office BP at goal:  yes  24h ABPM: not ordered to date  Indications of end organ damage: None  Device candidate? no  Plan:   Monitoring:   - start/continue home BP monitoring, reviewed correct technique, provide BP log and instructions  - order 24h ABPM:  NO  - monitor lytes/gfr routinely   - contact office if BP consistently >140/>90 for discussion of tx adjustments   Medications:  ACEi/ARB: continue benazepril 20mg " daily   DHP-CCB: none   Thiazide: none   Michael-receptor Antagonist: continue spirono 25mg   Other:   Peripheral Alpha Blocker: not indicated   Loop: not indicated   BB: not indicated   non-DHP-CCB: not indicated   Centrally Acting Alpha Agonist: not indicated   Potassium-sparing diuretic: not indicated   DRI: not indicated    Direct Vasodilator: not indicated     2) Glycemic Status: Prediabetic      Plan:  - continue healthy diet, weight reduction, daily physical activity   - consider metformin up to 850mg BID to slow or offset progression to T2D as per DPP trial   - monitor labs Q6-12mo     ANTITHROMBOTIC THERAPY continue VKA with INR mgmt per [facility] clinic    monitor for bleeding     OTHER ISSUES:    # macrocytic anemia, mild, no symptoms   TSH normal  - update folate/b12  - defer ongoing w/u or tx to PCP     Studies Ordered at Todays Visit: None   Blood Work Ordered At Today’s visit: as noted above   Follow-Up: ongoing INR mgmt with [facility] clinic, 3-4mo with [facility] med     Jose Fisher M.D.  [facility], Board-certified clinical lipidologist   [facility] Medicine Clinic   [facility]   [phone]     CC:  DAR Vasquez, Daniel GARRIDO M.D.

## 2022-04-01 NOTE — PROGRESS NOTES
Anticoagulation Summary  As of 2022    INR goal:  2.0-3.0   TTR:  59.6 % (6.8 y)   INR used for dosin.80 (2022)   Warfarin maintenance plan:  7.5 mg (5 mg x 1.5) every Wed; 5 mg (5 mg x 1) all other days   Weekly warfarin total:  37.5 mg   Plan last modified:  ILEANA Pike (2022)   Next INR check:  2022   Target end date:  Indefinite    Indications    Atrial fibrillation (HCC) [I48.91]  Secondary hypercoagulable state (HCC) [D68.69]             Anticoagulation Episode Summary     INR check location:  Anticoagulation Clinic    Preferred lab:      Send INR reminders to:      Comments:        Anticoagulation Care Providers     Provider Role Specialty Phone number    Daniel Ferrera M.D. Referring Internal Medicine Clinical Cardiac Electrophysiology [phone]    [facility] Responsible  [phone]                Refer to Patient Findings for HPI:  Patient Findings     Negatives:  Signs/symptoms of thrombosis, Signs/symptoms of bleeding, Laboratory test error suspected, Change in health, Change in alcohol use, Change in activity, Upcoming invasive procedure, Emergency department visit, Upcoming dental procedure, Missed doses, Extra doses, Change in medications, Change in diet/appetite, Hospital admission, Bruising, Other complaints          Verified current warfarin dosing schedule.    Medications reconciled   Pt is not on antiplatelet therapy      A/P   INR  therapeutic.     Warfarin dosing recommendation: Instructed pt to continue on with current regimen.    Pt educated to contact our clinic with any changes in medications or s/s of bleeding or thrombosis. Pt is aware to seek immediate medical attention for falls, head injury or deep cuts.    Follow up appointment in 6 week(s).    David Giles, PharmD, BCACP

## 2022-04-01 NOTE — PROGRESS NOTES
"Chief Complaint   Patient presents with   • Atrial Fibrillation     Fv Dx: Atrial fibrillation, unspecified type        Subjective     Mike Ferrell is a 71 y.o. male who presents today has been seen in heart failure with Dr. Mariee.  Congestion diuretics with significant improvement patient feels back to baseline.  No chest pain or shortness of breath.  Lower extremity is improved.    Past Medical History:   Diagnosis Date   • AICD (automatic cardioverter/defibrillator) present    • Arthritis     generalized everywhere   • Atrial fibrillation (HCC) 4/4/2012   • Bowel habit changes     constipation   • Breath shortness     \"since 1996 from [place]\"   • Cataract 12/15/2021    bilat, no surgey yet   • Chronic back pain    • High cholesterol    • Hypertension    • Methamphetamine use ([facility]) none for many years   • Pacemaker     defibrilator   • Pain 12/15/2021    back, right hip and knee, 8/10   • Paroxysmal ventricular tachycardia (HCC) 4/4/2012   • Pneumonia     in the past   • Sinoatrial node dysfunction (HCC) 4/4/2012    Dr. Daniel Ferrera   • Syncope and collapse 4/4/2012     Past Surgical History:   Procedure Laterality Date   • AICD IMPLANT  2010   • PACEMAKER INSERTION  2009   • CHOLECYSTECTOMY  1999   • TONSILLECTOMY  1953     No family history on file.  Social History     Socioeconomic History   • Marital status:      Spouse name: Not on file   • Number of children: Not on file   • Years of education: Not on file   • Highest education level: Not on file   Occupational History   • Not on file   Tobacco Use   • Smoking status: Never Smoker   • Smokeless tobacco: Never Used   Vaping Use   • Vaping Use: Never used   Substance and Sexual Activity   • Alcohol use: Yes     Comment: 2-3 drinks daily   • Drug use: Not Currently     Comment: 25 years ago   • Sexual activity: Not on file   Other Topics Concern   • Not on file   Social History Narrative   • Not on file     Social Determinants of Health "     Financial Resource Strain: Not on file   Food Insecurity: Not on file   Transportation Needs: Not on file   Physical Activity: Not on file   Stress: Not on file   Social Connections: Not on file   Intimate Partner Violence: Not on file   Housing Stability: Not on file     Allergies   Allergen Reactions   • Atorvastatin Myalgia   • Statins [Hmg-Coa-R Inhibitors] Itching   • Sulfa Drugs Rash and Itching     Rash, itch     Outpatient Encounter Medications as of 4/1/2022   Medication Sig Dispense Refill   • Plant Sterols and Stanols 450 MG Cap Take  by mouth.     • Barberry-Oreg Grape-Goldenseal (BERBERINE COMPLEX) 200-200-50 MG Cap Take  by mouth.     • sotalol (BETAPACE) 120 MG tablet Take 1 Tablet by mouth 2 times a day. 180 Tablet 3   • spironolactone (ALDACTONE) 25 MG Tab Take 1 Tablet by mouth every day. 90 Tablet 3   • furosemide (LASIX) 40 MG Tab Take 40 mg by mouth 2 times a day. Twice daily for a week, followed by once daily.     • colchicine (COLCRYS) 0.6 MG Tab colchicine 0.6 mg tablet     • potassium chloride SA (KDUR) 20 MEQ Tab CR Take 20 mEq by mouth 2 times a day. BID for one week, followed by once daily     • benazepril (LOTENSIN) 20 MG Tab Take 20 mg by mouth every day.     • HYDROcodone/acetaminophen (NORCO)  MG Tab Take 1 Tablet by mouth 2 times a day as needed for Moderate Pain. Indications: Pain     • B Complex Vitamins (VITAMIN B COMPLEX) Tab Take 1 Tablet by mouth every day.     • colesevelam (WELCHOL) 625 MG Tab Take 625 mg by mouth every evening.     • traMADol (ULTRAM) 50 MG Tab Take 50 mg by mouth every morning. Indications: Pain     • Cholecalciferol (VITAMIN D3) 5000 units Cap Take 5,000 Units by mouth every day.     • warfarin (COUMADIN) 5 MG Tab TAKE 1 (ONE) TO 1 & 1/2 (ONE & ONE-HALF) TABLETS BY MOUTH ONCE DAILY AS DIRECTED BY THE COUMADIN CLINIC. (Patient taking differently: Take 5 mg by mouth every evening.) 135 Tab 1     No facility-administered encounter medications on  file as of 4/1/2022.     ROS           Objective     BP (!) 92/86 (BP Location: Left arm, Patient Position: Sitting, BP Cuff Size: Adult)   Pulse 83   Resp 16   Ht 1.829 m (6')   Wt 112 kg (246 lb)   SpO2 93%   BMI 33.36 kg/m²     Physical Exam  Constitutional:       Appearance: He is well-developed.   HENT:      Head: Normocephalic and atraumatic.   Cardiovascular:      Rate and Rhythm: Normal rate and regular rhythm.      Heart sounds: No murmur heard.    No friction rub. No gallop.   Pulmonary:      Effort: Pulmonary effort is normal.      Breath sounds: Normal breath sounds.   Abdominal:      Palpations: Abdomen is soft.   Musculoskeletal:         General: Normal range of motion.      Cervical back: Normal range of motion and neck supple.   Skin:     General: Skin is warm and dry.   Neurological:      Mental Status: He is alert and oriented to person, place, and time.   Psychiatric:         Behavior: Behavior normal.         Thought Content: Thought content normal.         Judgment: Judgment normal.                Assessment & Plan     1. Heart failure with preserved ejection fraction, unspecified HF chronicity (HCC)     2. Automatic implantable cardioverter-defibrillator in situ     3. Dyspnea on exertion     4. Essential hypertension     5. Sinoatrial node dysfunction (HCC)     6. Mixed hyperlipidemia     7. Anticoagulant long-term use         Medical Decision Making: Today's Assessment/Status/Plan:   1.  Heart failure preserved LV function continue current regimen.  2.  Hyperlipidemia trying natural products.  Has been seen in vascular medicine.  3.  Atrial arrhythmias minimal on sotalol and Coumadin.  4.  AICD normal function.  5.  Follow-up me in 3 months.

## 2022-04-07 ENCOUNTER — APPOINTMENT (OUTPATIENT)
Dept: VASCULAR LAB | Facility: MEDICAL CENTER | Age: 72
End: 2022-04-07
Payer: MEDICARE

## 2022-04-21 ENCOUNTER — HOSPITAL ENCOUNTER (OUTPATIENT)
Dept: LAB | Facility: MEDICAL CENTER | Age: 72
End: 2022-04-21
Attending: FAMILY MEDICINE
Payer: MEDICARE

## 2022-04-21 LAB
ALBUMIN SERPL BCP-MCNC: 4.6 G/DL (ref 3.2–4.9)
ALBUMIN/GLOB SERPL: 2.1 G/DL
ALP SERPL-CCNC: 70 U/L (ref 30–99)
ALT SERPL-CCNC: 22 U/L (ref 2–50)
ANION GAP SERPL CALC-SCNC: 12 MMOL/L (ref 7–16)
AST SERPL-CCNC: 29 U/L (ref 12–45)
BILIRUB SERPL-MCNC: 0.6 MG/DL (ref 0.1–1.5)
BUN SERPL-MCNC: 77 MG/DL (ref 8–22)
CALCIUM SERPL-MCNC: 10.1 MG/DL (ref 8.5–10.5)
CHLORIDE SERPL-SCNC: 105 MMOL/L (ref 96–112)
CHOLEST SERPL-MCNC: 245 MG/DL (ref 100–199)
CO2 SERPL-SCNC: 21 MMOL/L (ref 20–33)
CREAT SERPL-MCNC: 2 MG/DL (ref 0.5–1.4)
EST. AVERAGE GLUCOSE BLD GHB EST-MCNC: 111 MG/DL
FASTING STATUS PATIENT QL REPORTED: NORMAL
GFR SERPLBLD CREATININE-BSD FMLA CKD-EPI: 34 ML/MIN/1.73 M 2
GLOBULIN SER CALC-MCNC: 2.2 G/DL (ref 1.9–3.5)
GLUCOSE SERPL-MCNC: 104 MG/DL (ref 65–99)
HBA1C MFR BLD: 5.5 % (ref 4–5.6)
HDLC SERPL-MCNC: 73 MG/DL
LDLC SERPL CALC-MCNC: 148 MG/DL
POTASSIUM SERPL-SCNC: 5.9 MMOL/L (ref 3.6–5.5)
PROT SERPL-MCNC: 6.8 G/DL (ref 6–8.2)
SODIUM SERPL-SCNC: 138 MMOL/L (ref 135–145)
TRIGL SERPL-MCNC: 118 MG/DL (ref 0–149)

## 2022-04-21 PROCEDURE — 80053 COMPREHEN METABOLIC PANEL: CPT

## 2022-04-21 PROCEDURE — 83036 HEMOGLOBIN GLYCOSYLATED A1C: CPT

## 2022-04-21 PROCEDURE — 80061 LIPID PANEL: CPT

## 2022-04-21 PROCEDURE — 36415 COLL VENOUS BLD VENIPUNCTURE: CPT

## 2022-04-29 ENCOUNTER — ANTICOAGULATION VISIT (OUTPATIENT)
Dept: VASCULAR LAB | Facility: MEDICAL CENTER | Age: 72
End: 2022-04-29
Attending: INTERNAL MEDICINE
Payer: MEDICARE

## 2022-04-29 DIAGNOSIS — I48.11 LONGSTANDING PERSISTENT ATRIAL FIBRILLATION (HCC): ICD-10-CM

## 2022-04-29 LAB
INR BLD: >8 (ref 0.9–1.2)
INR PPP: 8 (ref 2–3.5)

## 2022-04-29 PROCEDURE — 85610 PROTHROMBIN TIME: CPT

## 2022-04-29 PROCEDURE — 99212 OFFICE O/P EST SF 10 MIN: CPT

## 2022-04-29 NOTE — PROGRESS NOTES
Anticoagulation Summary  As of 2022    INR goal:  2.0-3.0   TTR:  59.0 % (6.9 y)   INR used for dosin.00 (2022)   Warfarin maintenance plan:  7.5 mg (5 mg x 1.5) every Wed; 5 mg (5 mg x 1) all other days   Weekly warfarin total:  37.5 mg   Plan last modified:  ILEANA Pike (2022)   Next INR check:  2022   Target end date:  Indefinite    Indications    Atrial fibrillation (HCC) [I48.91]             Anticoagulation Episode Summary     INR check location:  Anticoagulation Clinic    Preferred lab:      Send INR reminders to:      Comments:        Anticoagulation Care Providers     Provider Role Specialty Phone number    Daniel Ferrera M.D. Referring Internal Medicine Clinical Cardiac Electrophysiology [phone]    [facility] Anticoagulation Services Responsible  [phone]                Refer to Patient Findings for HPI:  Patient Findings     Positives:  Signs/symptoms of bleeding    Negatives:  Signs/symptoms of thrombosis, Laboratory test error suspected, Change in health, Change in alcohol use, Change in activity, Upcoming invasive procedure, Emergency department visit, Upcoming dental procedure, Missed doses, Extra doses, Change in medications, Change in diet/appetite, Hospital admission, Bruising, Other complaints    Comments:  Severe  Bruising on his arms.  He did have a scrape on his right arm, but bleeding is controlled.             Verified current warfarin dosing schedule.    Medications reconciled   Pt is not on antiplatelet therapy      A/P   INR  SUPRA-therapeutic.   Unknown cause of elevated    Warfarin dosing recommendation: Hold warfarin until next INR in 72 hours.     Seek emergency medical help for any serious s/s of bleeding.   Informed pt of protocol to have venipuncture, he declines.     Pt educated to contact our clinic with any changes in medications or s/s of bleeding or thrombosis. Pt is aware to seek immediate medical attention for falls, head injury or deep  cuts.    Follow up appointment in 3 days.     Jose Arvizu, PharmD

## 2022-05-02 ENCOUNTER — ANTICOAGULATION VISIT (OUTPATIENT)
Dept: VASCULAR LAB | Facility: MEDICAL CENTER | Age: 72
End: 2022-05-02
Attending: INTERNAL MEDICINE
Payer: MEDICARE

## 2022-05-02 VITALS — HEART RATE: 69 BPM | DIASTOLIC BLOOD PRESSURE: 92 MMHG | SYSTOLIC BLOOD PRESSURE: 141 MMHG

## 2022-05-02 DIAGNOSIS — I48.11 LONGSTANDING PERSISTENT ATRIAL FIBRILLATION (HCC): ICD-10-CM

## 2022-05-02 DIAGNOSIS — D68.69 SECONDARY HYPERCOAGULABLE STATE (HCC): ICD-10-CM

## 2022-05-02 LAB
INR BLD: 3.3 (ref 0.9–1.2)
INR PPP: 3.3 (ref 2–3.5)

## 2022-05-02 PROCEDURE — 85610 PROTHROMBIN TIME: CPT

## 2022-05-02 PROCEDURE — 99212 OFFICE O/P EST SF 10 MIN: CPT | Performed by: NURSE PRACTITIONER

## 2022-05-04 ENCOUNTER — OFFICE VISIT (OUTPATIENT)
Dept: CARDIOLOGY | Facility: MEDICAL CENTER | Age: 72
End: 2022-05-04
Payer: MEDICARE

## 2022-05-04 VITALS
OXYGEN SATURATION: 96 % | WEIGHT: 248.8 LBS | HEIGHT: 72 IN | BODY MASS INDEX: 33.7 KG/M2 | RESPIRATION RATE: 14 BRPM | DIASTOLIC BLOOD PRESSURE: 84 MMHG | HEART RATE: 70 BPM | SYSTOLIC BLOOD PRESSURE: 124 MMHG

## 2022-05-04 DIAGNOSIS — Z79.899 HIGH RISK MEDICATION USE: ICD-10-CM

## 2022-05-04 DIAGNOSIS — I10 ESSENTIAL HYPERTENSION: ICD-10-CM

## 2022-05-04 DIAGNOSIS — I47.29 PAROXYSMAL VENTRICULAR TACHYCARDIA (HCC): ICD-10-CM

## 2022-05-04 DIAGNOSIS — R60.9 EDEMA, UNSPECIFIED TYPE: ICD-10-CM

## 2022-05-04 DIAGNOSIS — Z95.810 AUTOMATIC IMPLANTABLE CARDIOVERTER-DEFIBRILLATOR IN SITU: ICD-10-CM

## 2022-05-04 DIAGNOSIS — I48.0 PAROXYSMAL ATRIAL FIBRILLATION (HCC): ICD-10-CM

## 2022-05-04 DIAGNOSIS — Z79.01 ANTICOAGULANT LONG-TERM USE: ICD-10-CM

## 2022-05-04 DIAGNOSIS — I50.30 HEART FAILURE WITH PRESERVED EJECTION FRACTION, UNSPECIFIED HF CHRONICITY (HCC): ICD-10-CM

## 2022-05-04 LAB — EKG IMPRESSION: NORMAL

## 2022-05-04 PROCEDURE — 93000 ELECTROCARDIOGRAM COMPLETE: CPT | Performed by: INTERNAL MEDICINE

## 2022-05-04 PROCEDURE — 99214 OFFICE O/P EST MOD 30 MIN: CPT | Performed by: NURSE PRACTITIONER

## 2022-05-04 RX ORDER — BENAZEPRIL HYDROCHLORIDE 20 MG/1
10 TABLET ORAL NIGHTLY
Status: ON HOLD | COMMUNITY
Start: 2022-05-04 | End: 2022-05-18

## 2022-05-04 ASSESSMENT — FIBROSIS 4 INDEX: FIB4 SCORE: 2.69

## 2022-05-04 NOTE — PROGRESS NOTES
"Cardiology/Electrophysiology Follow-up Note      Subjective:   Chief Complaint:   Chief Complaint   Patient presents with   • Atrial Fibrillation   • Ventricular Tachycardia     F/V Dx: Paroxysmal ventricular tachycardia (HCC)           Mike Ferrell is a 71 y.o. male who presents today for follow up and review of Heart failure with preserved ejection fraction, atrial fibrillation, CHB and VT S/P I CD.    He is followed by Dr. Ferrera for EP and Dr Mariee for cardiology/heart failure.  Past medical history also significant for high risk mediation use in the form of Sotalol, hypertension, dyslipidemia with status intolerance.    Last seen by Dr. Mariee in HF clinic for volume overload.  His diuretics were adjusted for increased diuresis.      Today in follow up he is accompanied by his wife.  He sates that his blood pressures have been low on his home machines, 90s/50s, sometimes a bit lower.  Reports not feeling well with these numbers.  States that he stopped all of his medications recently and now is feeling improved.  Recently resumed Sotalol daily and warfarin.  INR was supra therapeutic at 8 was was having issues bleeding from abrasions.  Coumadin was adjusted and repeat INR down to 3.3.  He reports that in terms of his volume overload he is close to his dry weight, states weight about 238 lbs, states edema and symptoms greatly improved.   He currently denies chest pain, dizziness, palpitations, pre syncope or syncope, dyspnea, PND, orthopnea, or lower extremity edema.        Patient endorses medication compliance        Past Medical History:   Diagnosis Date   • AICD (automatic cardioverter/defibrillator) present    • Arthritis     generalized everywhere   • Atrial fibrillation (HCC) 4/4/2012   • Bowel habit changes     constipation   • Breath shortness     \"since 1996 from [place]\"   • Cataract 12/15/2021    bilat, no surgey yet   • Chronic back pain    • High cholesterol    • Hypertension    • " Methamphetamine use (HCC) none for many years   • Pacemaker     defibrilator   • Pain 12/15/2021    back, right hip and knee, 8/10   • Paroxysmal ventricular tachycardia (HCC) 4/4/2012   • Pneumonia     in the past   • Sinoatrial node dysfunction (HCC) 4/4/2012    Dr. Daniel Ferrera   • Syncope and collapse 4/4/2012     Past Surgical History:   Procedure Laterality Date   • AICD IMPLANT  2010   • PACEMAKER INSERTION  2009   • CHOLECYSTECTOMY  1999   • TONSILLECTOMY  1953     No family history on file.  Social History     Socioeconomic History   • Marital status:      Spouse name: Not on file   • Number of children: Not on file   • Years of education: Not on file   • Highest education level: Not on file   Occupational History   • Not on file   Tobacco Use   • Smoking status: Never Smoker   • Smokeless tobacco: Never Used   Vaping Use   • Vaping Use: Never used   Substance and Sexual Activity   • Alcohol use: Yes     Comment: 2-3 drinks daily   • Drug use: Not Currently     Comment: 25 years ago   • Sexual activity: Not on file   Other Topics Concern   • Not on file   Social History Narrative   • Not on file     Social Determinants of Health     Financial Resource Strain: Not on file   Food Insecurity: Not on file   Transportation Needs: Not on file   Physical Activity: Not on file   Stress: Not on file   Social Connections: Not on file   Intimate Partner Violence: Not on file   Housing Stability: Not on file     Allergies   Allergen Reactions   • Atorvastatin Myalgia   • Statins [Hmg-Coa-R Inhibitors] Itching   • Sulfa Drugs Rash and Itching     Rash, itch       Current Outpatient Medications   Medication Sig Dispense Refill   • sotalol (BETAPACE) 120 MG tablet Take 1 Tablet by mouth 2 times a day. 180 Tablet 3   • spironolactone (ALDACTONE) 25 MG Tab Take 1 Tablet by mouth every day. 90 Tablet 3   • furosemide (LASIX) 40 MG Tab Take 40 mg by mouth 2 times a day. Twice daily for a week, followed by once daily.      • potassium chloride SA (KDUR) 20 MEQ Tab CR Take 20 mEq by mouth 2 times a day. BID for one week, followed by once daily     • benazepril (LOTENSIN) 20 MG Tab Take 20 mg by mouth every day.     • HYDROcodone/acetaminophen (NORCO)  MG Tab Take 1 Tablet by mouth 2 times a day as needed for Moderate Pain. Indications: Pain     • B Complex Vitamins (VITAMIN B COMPLEX) Tab Take 1 Tablet by mouth every day.     • colesevelam (WELCHOL) 625 MG Tab Take 625 mg by mouth every evening.     • traMADol (ULTRAM) 50 MG Tab Take 50 mg by mouth every morning. Indications: Pain     • Cholecalciferol (VITAMIN D3) 5000 units Cap Take 5,000 Units by mouth every day.     • warfarin (COUMADIN) 5 MG Tab TAKE 1 (ONE) TO 1 & 1/2 (ONE & ONE-HALF) TABLETS BY MOUTH ONCE DAILY AS DIRECTED BY THE COUMADIN CLINIC. (Patient taking differently: Take 5 mg by mouth every evening.) 135 Tab 1     No current facility-administered medications for this visit.       ROS  All others systems reviewed and negative.     Objective:     /84 (BP Location: Left arm, Patient Position: Sitting, BP Cuff Size: Adult)   Pulse 70   Resp 14   Ht 1.829 m (6')   Wt 113 kg (248 lb 12.8 oz)   SpO2 96%  Body mass index is 33.74 kg/m².    Weight/BMI: Body mass index is 33.74 kg/m².  Wt Readings from Last 4 Encounters:   04/01/22 112 kg (246 lb)   04/01/22 112 kg (247 lb)   03/01/22 118 kg (259 lb 6.4 oz)   02/22/22 117 kg (257 lb)         Physical Exam      Cardiac Imaging and Procedures Review:    EKG (5/4/22) reviewed by myself:   AV paced     Echo (2/17/22):   CONCLUSIONS  Compared to the prior echo on 11/08/19, no significant changes  The left ventricular ejection fraction is visually estimated to be 60%.  Mild mitral regurgitation.  Estimated right ventricular systolic pressure is 30 mmHg.    Labs (personally reviewed and notable for):   [facility]  Lab Results   Component Value Date/Time    SODIUM 138 04/21/2022 11:27 AM    POTASSIUM 5.9 (H) 04/21/2022 11:27  AM    CHLORIDE 105 04/21/2022 11:27 AM    CO2 21 04/21/2022 11:27 AM    GLUCOSE 104 (H) 04/21/2022 11:27 AM    BUN 77 (HH) 04/21/2022 11:27 AM    CREATININE 2.00 (H) 04/21/2022 11:27 AM      Lipids:   Lab Results   Component Value Date/Time    CHOLSTRLTOT 245 (H) 04/21/2022 11:27 AM    TRIGLYCERIDE 118 04/21/2022 11:27 AM    HDL 73 04/21/2022 11:27 AM     (H) 04/21/2022 11:27 AM     TSH:   Lab Results   Component Value Date/Time    TSHULTRASEN 1.210 02/10/2021 1321         Assessment:     1. Heart failure with preserved ejection fraction, unspecified HF chronicity (HCC)     2. Edema, unspecified type  Comp Metabolic Panel    proBrain Natriuretic Peptide, NT   3. Paroxysmal ventricular tachycardia (HCC)  EKG    Comp Metabolic Panel    proBrain Natriuretic Peptide, NT   4. Paroxysmal atrial fibrillation (HCC)  EKG   5. High risk medication use  Comp Metabolic Panel    proBrain Natriuretic Peptide, NT   6. Automatic implantable cardioverter-defibrillator in situ     7. Essential hypertension     8. Anticoagulant long-term use         Medical Decision Making:  Today's Assessment / Status / Plan:   1. Diastolic HF  2, Edema:  - Preserved on repeat echo Feb 2022.    - Reports feeling much improved with volume loss.  About 10 lbs from dry weight per pt report.  He has not been weighting daily.   - He has stopped Lasix and aldactone at this time secondary to symptoamtic hypotension.  Recent labs reviewed, increasing renal fucntion with SCr of 2.0, BUN 77.  Discussed to hold Aldactone for now.  Recheck CMP, BNP.   - Until labs evaluated attempt to transition lasix to as needed PRN edema, wt increase of 2# overnight/5#total in a week.  He is instructed to weight  daily and log, monitor closely.   - continue low sodium diet.     3. VT/AF:  4. High Risk Medication Use   - No arrhthymias logged in device.   - Continue daily Sotalol 120 mg.  QTc today appropriate at 539 for V pacing.   - Recheck CMP.     5. ICD:  - Device  interrogated today per self and with appropriate function/stable lead function.   - See device flowsheet for details.     6. HTN:  - Now with borderline hypotension, symptomatic. Normotensive today, he has been off medications with exception of Sotalol.   - Hold Aldactone, cut Lotensin to 10 mg. Attempt to transition lasix to every other day.    7. Anticoagulation:  - Warfarin: has been supratherapeutic.  Last week INR 8, now down to 3.3.  Has Close INR follow up check for this week.        Plan reviewed in detail with the patient and he verbalizes understanding and is in agreement.   RTC in 3 weeks, sooner if clinical condition changes      Please note that this dictation was created using voice recognition software. I have made every reasonable attempt to correct obvious errors, but I expect that there are errors of grammar and possibly content that I did not discover before finalizing the note.    MINI Wang.

## 2022-05-05 ENCOUNTER — ANTICOAGULATION VISIT (OUTPATIENT)
Dept: VASCULAR LAB | Facility: MEDICAL CENTER | Age: 72
End: 2022-05-05
Attending: INTERNAL MEDICINE
Payer: MEDICARE

## 2022-05-05 VITALS — SYSTOLIC BLOOD PRESSURE: 117 MMHG | DIASTOLIC BLOOD PRESSURE: 83 MMHG | HEART RATE: 72 BPM

## 2022-05-05 DIAGNOSIS — D68.69 SECONDARY HYPERCOAGULABLE STATE (HCC): ICD-10-CM

## 2022-05-05 LAB
INR BLD: 1.8 (ref 0.9–1.2)
INR PPP: 1.8 (ref 2–3.5)

## 2022-05-05 PROCEDURE — 99212 OFFICE O/P EST SF 10 MIN: CPT

## 2022-05-05 PROCEDURE — 85610 PROTHROMBIN TIME: CPT

## 2022-05-05 NOTE — PROGRESS NOTES
OP Anticoagulation Service Note    Date: 2022  Vitals:    22 0952   BP: 117/83   Pulse: 72      pt declined vitals    Anticoagulation Summary  As of 2022    INR goal:  2.0-3.0   TTR:  58.9 % (6.9 y)   INR used for dosin.80 (2022)   Warfarin maintenance plan:  7.5 mg (5 mg x 1.5) every Wed; 5 mg (5 mg x 1) all other days   Weekly warfarin total:  37.5 mg   Plan last modified:  ILEANA Pike (2022)   Next INR check:  2022   Target end date:  Indefinite    Indications    Atrial fibrillation (HCC) [I48.91]  Secondary hypercoagulable state (HCC) [D68.69]             Anticoagulation Episode Summary     INR check location:  Anticoagulation Clinic    Preferred lab:      Send INR reminders to:      Comments:        Anticoagulation Care Providers     Provider Role Specialty Phone number    Daniel Ferrera M.D. Referring Internal Medicine Clinical Cardiac Electrophysiology [phone]    [facility] Anticoagulation Services Responsible  [phone]            HPI:   Mike Ferrell seen in clinic today, on anticoagulation therapy with warfarin (a high risk medication) for atrial fibrillation      Pt is here today to evaluate anticoagulation therapy    Previous INR was  3.3 on 22    Pt was instructed to hold then lower regimen    Anticoagulation Patient Findings  Patient Findings     Negatives:  Signs/symptoms of thrombosis, Signs/symptoms of bleeding, Laboratory test error suspected, Change in health, Change in alcohol use, Change in activity, Upcoming invasive procedure, Emergency department visit, Upcoming dental procedure, Missed doses, Extra doses, Change in medications, Change in diet/appetite, Hospital admission, Bruising, Other complaints          Confirmed warfarin dosing regimen    Pt is not on antiplatelet/NSAID therapy          A/P   INR  SUB-therapeutic today, will require dose adjust ment today to prevent stroke and closer follow up.   INR is low today, will resume usual  warfarin regimen, no bolus d/t high INR on 5/2/22. After discussion with pt INR of >8.0 was likely from CHF exacerbation, he is back to his dry weight.          Pt educated to contact our clinic with any changes in medications or s/s of bleeding or thrombosis. Pt is aware to seek immediate medical attention for falls, head injury or deep cuts    Follow up appointment in 1 week(s) to reduce risk of adverse events from warfarin  Becca Frank, PharmD

## 2022-05-10 ENCOUNTER — HOSPITAL ENCOUNTER (OUTPATIENT)
Dept: LAB | Facility: MEDICAL CENTER | Age: 72
End: 2022-05-10
Attending: FAMILY MEDICINE
Payer: MEDICARE

## 2022-05-10 ENCOUNTER — HOSPITAL ENCOUNTER (OUTPATIENT)
Dept: LAB | Facility: MEDICAL CENTER | Age: 72
End: 2022-05-10
Attending: STUDENT IN AN ORGANIZED HEALTH CARE EDUCATION/TRAINING PROGRAM
Payer: MEDICARE

## 2022-05-10 ENCOUNTER — HOSPITAL ENCOUNTER (OUTPATIENT)
Dept: LAB | Facility: MEDICAL CENTER | Age: 72
End: 2022-05-10
Attending: NURSE PRACTITIONER
Payer: MEDICARE

## 2022-05-10 DIAGNOSIS — Z79.899 HIGH RISK MEDICATION USE: ICD-10-CM

## 2022-05-10 DIAGNOSIS — N18.32 CKD STAGE G3B/A2, GFR 30-44 AND ALBUMIN CREATININE RATIO 30-299 MG/G: ICD-10-CM

## 2022-05-10 DIAGNOSIS — R60.9 EDEMA, UNSPECIFIED TYPE: ICD-10-CM

## 2022-05-10 DIAGNOSIS — I47.29 PAROXYSMAL VENTRICULAR TACHYCARDIA (HCC): ICD-10-CM

## 2022-05-10 DIAGNOSIS — E78.5 DYSLIPIDEMIA: ICD-10-CM

## 2022-05-10 DIAGNOSIS — D53.9 MACROCYTIC ANEMIA: ICD-10-CM

## 2022-05-10 LAB
ALBUMIN SERPL BCP-MCNC: 3.8 G/DL (ref 3.2–4.9)
ALBUMIN SERPL BCP-MCNC: 4 G/DL (ref 3.2–4.9)
ALBUMIN/GLOB SERPL: 1.6 G/DL
ALBUMIN/GLOB SERPL: 1.9 G/DL
ALP SERPL-CCNC: 62 U/L (ref 30–99)
ALP SERPL-CCNC: 63 U/L (ref 30–99)
ALT SERPL-CCNC: 26 U/L (ref 2–50)
ALT SERPL-CCNC: 27 U/L (ref 2–50)
ANION GAP SERPL CALC-SCNC: 12 MMOL/L (ref 7–16)
ANION GAP SERPL CALC-SCNC: 12 MMOL/L (ref 7–16)
ANION GAP SERPL CALC-SCNC: 15 MMOL/L (ref 7–16)
AST SERPL-CCNC: 21 U/L (ref 12–45)
AST SERPL-CCNC: 23 U/L (ref 12–45)
BILIRUB SERPL-MCNC: 0.9 MG/DL (ref 0.1–1.5)
BILIRUB SERPL-MCNC: 0.9 MG/DL (ref 0.1–1.5)
BUN SERPL-MCNC: 66 MG/DL (ref 8–22)
BUN SERPL-MCNC: 68 MG/DL (ref 8–22)
BUN SERPL-MCNC: 69 MG/DL (ref 8–22)
CALCIUM SERPL-MCNC: 9.3 MG/DL (ref 8.5–10.5)
CALCIUM SERPL-MCNC: 9.3 MG/DL (ref 8.5–10.5)
CALCIUM SERPL-MCNC: 9.4 MG/DL (ref 8.5–10.5)
CHLORIDE SERPL-SCNC: 102 MMOL/L (ref 96–112)
CHLORIDE SERPL-SCNC: 102 MMOL/L (ref 96–112)
CHLORIDE SERPL-SCNC: 103 MMOL/L (ref 96–112)
CHOLEST SERPL-MCNC: 320 MG/DL (ref 100–199)
CHOLEST SERPL-MCNC: 322 MG/DL (ref 100–199)
CO2 SERPL-SCNC: 20 MMOL/L (ref 20–33)
CO2 SERPL-SCNC: 22 MMOL/L (ref 20–33)
CO2 SERPL-SCNC: 22 MMOL/L (ref 20–33)
CREAT SERPL-MCNC: 1.83 MG/DL (ref 0.5–1.4)
CREAT SERPL-MCNC: 1.87 MG/DL (ref 0.5–1.4)
CREAT SERPL-MCNC: 1.88 MG/DL (ref 0.5–1.4)
CREAT UR-MCNC: 96.82 MG/DL
FASTING STATUS PATIENT QL REPORTED: NORMAL
FASTING STATUS PATIENT QL REPORTED: NORMAL
GFR SERPLBLD CREATININE-BSD FMLA CKD-EPI: 37 ML/MIN/1.73 M 2
GFR SERPLBLD CREATININE-BSD FMLA CKD-EPI: 37 ML/MIN/1.73 M 2
GFR SERPLBLD CREATININE-BSD FMLA CKD-EPI: 38 ML/MIN/1.73 M 2
GLOBULIN SER CALC-MCNC: 2.1 G/DL (ref 1.9–3.5)
GLOBULIN SER CALC-MCNC: 2.4 G/DL (ref 1.9–3.5)
GLUCOSE SERPL-MCNC: 118 MG/DL (ref 65–99)
HDLC SERPL-MCNC: 76 MG/DL
HDLC SERPL-MCNC: 77 MG/DL
LDLC SERPL CALC-MCNC: 211 MG/DL
LDLC SERPL CALC-MCNC: 211 MG/DL
MICROALBUMIN UR-MCNC: <1.2 MG/DL
MICROALBUMIN/CREAT UR: NORMAL MG/G (ref 0–30)
POTASSIUM SERPL-SCNC: 5.2 MMOL/L (ref 3.6–5.5)
PROT SERPL-MCNC: 6.1 G/DL (ref 6–8.2)
PROT SERPL-MCNC: 6.2 G/DL (ref 6–8.2)
SODIUM SERPL-SCNC: 136 MMOL/L (ref 135–145)
SODIUM SERPL-SCNC: 137 MMOL/L (ref 135–145)
SODIUM SERPL-SCNC: 137 MMOL/L (ref 135–145)
TRIGL SERPL-MCNC: 167 MG/DL (ref 0–149)
TRIGL SERPL-MCNC: 169 MG/DL (ref 0–149)

## 2022-05-10 PROCEDURE — 80048 BASIC METABOLIC PNL TOTAL CA: CPT

## 2022-05-10 PROCEDURE — 80053 COMPREHEN METABOLIC PANEL: CPT | Mod: 91

## 2022-05-10 PROCEDURE — 84153 ASSAY OF PSA TOTAL: CPT

## 2022-05-10 PROCEDURE — 83695 ASSAY OF LIPOPROTEIN(A): CPT

## 2022-05-10 PROCEDURE — 83880 ASSAY OF NATRIURETIC PEPTIDE: CPT

## 2022-05-10 PROCEDURE — 82043 UR ALBUMIN QUANTITATIVE: CPT

## 2022-05-10 PROCEDURE — 80061 LIPID PANEL: CPT | Mod: 91

## 2022-05-10 PROCEDURE — 82746 ASSAY OF FOLIC ACID SERUM: CPT

## 2022-05-10 PROCEDURE — 82607 VITAMIN B-12: CPT

## 2022-05-10 PROCEDURE — 80053 COMPREHEN METABOLIC PANEL: CPT

## 2022-05-10 PROCEDURE — 82570 ASSAY OF URINE CREATININE: CPT

## 2022-05-10 PROCEDURE — 83036 HEMOGLOBIN GLYCOSYLATED A1C: CPT

## 2022-05-10 PROCEDURE — 80061 LIPID PANEL: CPT

## 2022-05-10 PROCEDURE — 36415 COLL VENOUS BLD VENIPUNCTURE: CPT

## 2022-05-11 ENCOUNTER — TELEPHONE (OUTPATIENT)
Dept: CARDIOLOGY | Facility: MEDICAL CENTER | Age: 72
End: 2022-05-11
Payer: MEDICARE

## 2022-05-11 DIAGNOSIS — I50.30 HEART FAILURE WITH PRESERVED EJECTION FRACTION, UNSPECIFIED HF CHRONICITY (HCC): ICD-10-CM

## 2022-05-11 DIAGNOSIS — R60.9 EDEMA, UNSPECIFIED TYPE: ICD-10-CM

## 2022-05-11 LAB
ALBUMIN SERPL BCP-MCNC: 4 G/DL (ref 3.2–4.9)
ALBUMIN/GLOB SERPL: 1.8 G/DL
ALP SERPL-CCNC: 63 U/L (ref 30–99)
ALT SERPL-CCNC: 28 U/L (ref 2–50)
ANION GAP SERPL CALC-SCNC: 13 MMOL/L (ref 7–16)
AST SERPL-CCNC: 21 U/L (ref 12–45)
BILIRUB SERPL-MCNC: 0.9 MG/DL (ref 0.1–1.5)
BUN SERPL-MCNC: 64 MG/DL (ref 8–22)
CALCIUM SERPL-MCNC: 9.3 MG/DL (ref 8.5–10.5)
CHLORIDE SERPL-SCNC: 103 MMOL/L (ref 96–112)
CO2 SERPL-SCNC: 21 MMOL/L (ref 20–33)
CREAT SERPL-MCNC: 1.95 MG/DL (ref 0.5–1.4)
EST. AVERAGE GLUCOSE BLD GHB EST-MCNC: 123 MG/DL
FASTING STATUS PATIENT QL REPORTED: NORMAL
FOLATE SERPL-MCNC: 8.6 NG/ML
GFR SERPLBLD CREATININE-BSD FMLA CKD-EPI: 35 ML/MIN/1.73 M 2
GLOBULIN SER CALC-MCNC: 2.2 G/DL (ref 1.9–3.5)
GLUCOSE SERPL-MCNC: 117 MG/DL (ref 65–99)
HBA1C MFR BLD: 5.9 % (ref 4–5.6)
NT-PROBNP SERPL IA-MCNC: 683 PG/ML (ref 0–125)
POTASSIUM SERPL-SCNC: 5.3 MMOL/L (ref 3.6–5.5)
PROT SERPL-MCNC: 6.2 G/DL (ref 6–8.2)
PSA SERPL-MCNC: 0.3 NG/ML (ref 0–4)
SODIUM SERPL-SCNC: 137 MMOL/L (ref 135–145)
VIT B12 SERPL-MCNC: 210 PG/ML (ref 211–911)

## 2022-05-11 NOTE — TELEPHONE ENCOUNTER
I called to discuss.  He was napping.  Conversation with wife.    Reports he has been weighing daily.  Wt/edema was increasing so took lasix x 3 days, none today.  Reports edema improved today.  Slight SOB noted.  Discussed to take lasix daily x 5 days.  If wt and symptoms stable after that can try to go to every other day.  If recurrent trouble with volume management he may need to be on daily dosing.      Discussed repeat BNP and BMP prior to next office visit.    Delphine, if you can order and mail him the lab slips?        If trouble with symptoms or volume needs to be seen sooner of ER precautions.     Thank you  Anneliese

## 2022-05-11 NOTE — RESULT ENCOUNTER NOTE
Does this patient have any HF symptoms? What are his weights?     If he is stable, please have him reduce furosemide to 40 mg daily if taking BID. If taking daily tell him to take PRN.

## 2022-05-12 ENCOUNTER — ANTICOAGULATION VISIT (OUTPATIENT)
Dept: VASCULAR LAB | Facility: MEDICAL CENTER | Age: 72
End: 2022-05-12
Attending: INTERNAL MEDICINE
Payer: MEDICARE

## 2022-05-12 VITALS — SYSTOLIC BLOOD PRESSURE: 115 MMHG | DIASTOLIC BLOOD PRESSURE: 77 MMHG | HEART RATE: 72 BPM

## 2022-05-12 DIAGNOSIS — D68.69 SECONDARY HYPERCOAGULABLE STATE (HCC): ICD-10-CM

## 2022-05-12 LAB
INR BLD: 3.9 (ref 0.9–1.2)
INR PPP: 3.9 (ref 2–3.5)
LPA SERPL-MCNC: <6 MG/DL

## 2022-05-12 PROCEDURE — 85610 PROTHROMBIN TIME: CPT

## 2022-05-12 PROCEDURE — 99212 OFFICE O/P EST SF 10 MIN: CPT

## 2022-05-12 NOTE — PROGRESS NOTES
Anticoagulation Summary  As of 5/12/2022    INR goal:  2.0-3.0   TTR:  58.9 % (6.9 y)   INR used for dosing:  3.90 (5/12/2022)   Warfarin maintenance plan:  2.5 mg (5 mg x 0.5) every Wed; 5 mg (5 mg x 1) all other days   Weekly warfarin total:  32.5 mg   Plan last modified:  Cecelia LouD (5/12/2022)   Next INR check:  5/19/2022   Target end date:  Indefinite    Indications    Atrial fibrillation (HCC) [I48.91]  Secondary hypercoagulable state (HCC) [D68.69]             Anticoagulation Episode Summary     INR check location:  Anticoagulation Clinic    Preferred lab:      Send INR reminders to:      Comments:        Anticoagulation Care Providers     Provider Role Specialty Phone number    Daniel Ferrera M.D. Referring Internal Medicine Clinical Cardiac Electrophysiology [phone]    [facility] Anticoagulation Services Responsible  [phone]                Refer to Patient Findings for HPI:  Patient Findings     Negatives:  Signs/symptoms of thrombosis, Signs/symptoms of bleeding, Laboratory test error suspected, Change in health, Change in alcohol use, Change in activity, Upcoming invasive procedure, Emergency department visit, Upcoming dental procedure, Missed doses, Extra doses, Change in medications, Change in diet/appetite, Hospital admission, Bruising, Other complaints          Vitals:    05/12/22 0934   BP: 115/77   BP Location: Right arm   Patient Position: Sitting   BP Cuff Size: Adult   Pulse: 72        Verified current warfarin dosing schedule.    Medications reconciled   Pt is not on antiplatelet therapy      A/P   INR  SUPRA-therapeutic.     Warfarin dosing recommendation: Instructed pt to HOLD x 1 dose and to then begin newly decreased regimen of 2.5 mg Wed and 5 mg ROW.    Pt educated to contact our clinic with any changes in medications or s/s of bleeding or thrombosis. Pt is aware to seek immediate medical attention for falls, head injury or deep cuts.    Follow up appointment in 1  week(s).    David Giles, PharmD, BCACP;

## 2022-05-13 ENCOUNTER — TELEPHONE (OUTPATIENT)
Dept: CARDIOLOGY | Facility: MEDICAL CENTER | Age: 72
End: 2022-05-13
Payer: MEDICARE

## 2022-05-13 NOTE — TELEPHONE ENCOUNTER
MINI David.  Dez Garcia R.N.  Does this patient have any HF symptoms? What are his weights?     If he is stable, please have him reduce furosemide to 40 mg daily if taking BID. If taking daily tell him to take PRN.       MINI David.  Dez Garcia R.N.  Repeat BMP prior to FU appt and ER precautions.     MINI Wang.  5/11/22 3:15 PM  Note   I called to discuss.  He was napping.  Conversation with wife.    Reports he has been weighing daily.  Wt/edema was increasing so took lasix x 3 days, none today.  Reports edema improved today.  Slight SOB noted.  Discussed to take lasix daily x 5 days.  If wt and symptoms stable after that can try to go to every other day.  If recurrent trouble with volume management he may need to be on daily dosing.       Discussed repeat BNP and BMP prior to next office visit.    Delphine, if you can order and mail him the lab slips?         If trouble with symptoms or volume needs to be seen sooner of ER precautions.      Thank you  Anneliese         S/W pt, symptoms are improving- reminded to get labs.

## 2022-05-16 ENCOUNTER — APPOINTMENT (OUTPATIENT)
Dept: RADIOLOGY | Facility: MEDICAL CENTER | Age: 72
DRG: 291 | End: 2022-05-16
Attending: EMERGENCY MEDICINE
Payer: MEDICARE

## 2022-05-16 ENCOUNTER — APPOINTMENT (OUTPATIENT)
Dept: CARDIOLOGY | Facility: MEDICAL CENTER | Age: 72
DRG: 291 | End: 2022-05-16
Attending: STUDENT IN AN ORGANIZED HEALTH CARE EDUCATION/TRAINING PROGRAM
Payer: MEDICARE

## 2022-05-16 ENCOUNTER — HOSPITAL ENCOUNTER (INPATIENT)
Facility: MEDICAL CENTER | Age: 72
LOS: 1 days | DRG: 291 | End: 2022-05-18
Attending: EMERGENCY MEDICINE | Admitting: INTERNAL MEDICINE
Payer: MEDICARE

## 2022-05-16 DIAGNOSIS — R53.81 DEBILITY: ICD-10-CM

## 2022-05-16 DIAGNOSIS — I48.0 PAROXYSMAL ATRIAL FIBRILLATION (HCC): ICD-10-CM

## 2022-05-16 DIAGNOSIS — I50.32 CHRONIC HEART FAILURE WITH PRESERVED EJECTION FRACTION (HCC): ICD-10-CM

## 2022-05-16 DIAGNOSIS — R06.09 DYSPNEA ON EXERTION: ICD-10-CM

## 2022-05-16 DIAGNOSIS — R60.9 PERIPHERAL EDEMA: ICD-10-CM

## 2022-05-16 DIAGNOSIS — R79.89 ELEVATED TROPONIN: ICD-10-CM

## 2022-05-16 DIAGNOSIS — R07.9 CHEST PAIN, UNSPECIFIED TYPE: ICD-10-CM

## 2022-05-16 DIAGNOSIS — R73.03 PREDIABETES: ICD-10-CM

## 2022-05-16 DIAGNOSIS — N18.32 CKD STAGE G3B/A2, GFR 30-44 AND ALBUMIN CREATININE RATIO 30-299 MG/G: ICD-10-CM

## 2022-05-16 LAB
ALBUMIN SERPL BCP-MCNC: 4.1 G/DL (ref 3.2–4.9)
ALBUMIN/GLOB SERPL: 2.3 G/DL
ALP SERPL-CCNC: 70 U/L (ref 30–99)
ALT SERPL-CCNC: 28 U/L (ref 2–50)
ANION GAP SERPL CALC-SCNC: 12 MMOL/L (ref 7–16)
APTT PPP: 26.5 SEC (ref 24.7–36)
AST SERPL-CCNC: 21 U/L (ref 12–45)
BASOPHILS # BLD AUTO: 0.2 % (ref 0–1.8)
BASOPHILS # BLD: 0.01 K/UL (ref 0–0.12)
BILIRUB SERPL-MCNC: 1 MG/DL (ref 0.1–1.5)
BUN SERPL-MCNC: 50 MG/DL (ref 8–22)
CALCIUM SERPL-MCNC: 9.2 MG/DL (ref 8.5–10.5)
CHLORIDE SERPL-SCNC: 103 MMOL/L (ref 96–112)
CO2 SERPL-SCNC: 22 MMOL/L (ref 20–33)
CREAT SERPL-MCNC: 1.46 MG/DL (ref 0.5–1.4)
EKG IMPRESSION: NORMAL
EOSINOPHIL # BLD AUTO: 0.03 K/UL (ref 0–0.51)
EOSINOPHIL NFR BLD: 0.5 % (ref 0–6.9)
ERYTHROCYTE [DISTWIDTH] IN BLOOD BY AUTOMATED COUNT: 48.9 FL (ref 35.9–50)
GFR SERPLBLD CREATININE-BSD FMLA CKD-EPI: 50 ML/MIN/1.73 M 2
GLOBULIN SER CALC-MCNC: 1.8 G/DL (ref 1.9–3.5)
GLUCOSE SERPL-MCNC: 109 MG/DL (ref 65–99)
HCT VFR BLD AUTO: 32.7 % (ref 42–52)
HGB BLD-MCNC: 11.3 G/DL (ref 14–18)
IMM GRANULOCYTES # BLD AUTO: 0.08 K/UL (ref 0–0.11)
IMM GRANULOCYTES NFR BLD AUTO: 1.4 % (ref 0–0.9)
INR PPP: 1.81 (ref 0.87–1.13)
LV EJECT FRACT  99904: 75
LV EJECT FRACT MOD 2C 99903: 66.28
LV EJECT FRACT MOD 4C 99902: 65.95
LV EJECT FRACT MOD BP 99901: 66.24
LYMPHOCYTES # BLD AUTO: 0.84 K/UL (ref 1–4.8)
LYMPHOCYTES NFR BLD: 14.5 % (ref 22–41)
MAGNESIUM SERPL-MCNC: 2.1 MG/DL (ref 1.5–2.5)
MCH RBC QN AUTO: 35.1 PG (ref 27–33)
MCHC RBC AUTO-ENTMCNC: 34.6 G/DL (ref 33.7–35.3)
MCV RBC AUTO: 101.6 FL (ref 81.4–97.8)
MONOCYTES # BLD AUTO: 0.68 K/UL (ref 0–0.85)
MONOCYTES NFR BLD AUTO: 11.7 % (ref 0–13.4)
NEUTROPHILS # BLD AUTO: 4.17 K/UL (ref 1.82–7.42)
NEUTROPHILS NFR BLD: 71.7 % (ref 44–72)
NRBC # BLD AUTO: 0.02 K/UL
NRBC BLD-RTO: 0.3 /100 WBC
NT-PROBNP SERPL IA-MCNC: 950 PG/ML (ref 0–125)
PLATELET # BLD AUTO: 110 K/UL (ref 164–446)
PMV BLD AUTO: 10.1 FL (ref 9–12.9)
POTASSIUM SERPL-SCNC: 4.7 MMOL/L (ref 3.6–5.5)
PROT SERPL-MCNC: 5.9 G/DL (ref 6–8.2)
PROTHROMBIN TIME: 20.4 SEC (ref 12–14.6)
RBC # BLD AUTO: 3.22 M/UL (ref 4.7–6.1)
SODIUM SERPL-SCNC: 137 MMOL/L (ref 135–145)
TROPONIN T SERPL-MCNC: 54 NG/L (ref 6–19)
TROPONIN T SERPL-MCNC: 66 NG/L (ref 6–19)
WBC # BLD AUTO: 5.8 K/UL (ref 4.8–10.8)

## 2022-05-16 PROCEDURE — 99285 EMERGENCY DEPT VISIT HI MDM: CPT

## 2022-05-16 PROCEDURE — 93306 TTE W/DOPPLER COMPLETE: CPT

## 2022-05-16 PROCEDURE — 93005 ELECTROCARDIOGRAM TRACING: CPT

## 2022-05-16 PROCEDURE — 700102 HCHG RX REV CODE 250 W/ 637 OVERRIDE(OP): Performed by: INTERNAL MEDICINE

## 2022-05-16 PROCEDURE — 36415 COLL VENOUS BLD VENIPUNCTURE: CPT

## 2022-05-16 PROCEDURE — G0378 HOSPITAL OBSERVATION PER HR: HCPCS

## 2022-05-16 PROCEDURE — 70450 CT HEAD/BRAIN W/O DYE: CPT | Mod: ME

## 2022-05-16 PROCEDURE — 700102 HCHG RX REV CODE 250 W/ 637 OVERRIDE(OP): Performed by: STUDENT IN AN ORGANIZED HEALTH CARE EDUCATION/TRAINING PROGRAM

## 2022-05-16 PROCEDURE — 83735 ASSAY OF MAGNESIUM: CPT

## 2022-05-16 PROCEDURE — 83880 ASSAY OF NATRIURETIC PEPTIDE: CPT

## 2022-05-16 PROCEDURE — 93306 TTE W/DOPPLER COMPLETE: CPT | Mod: 26 | Performed by: INTERNAL MEDICINE

## 2022-05-16 PROCEDURE — A9270 NON-COVERED ITEM OR SERVICE: HCPCS | Performed by: INTERNAL MEDICINE

## 2022-05-16 PROCEDURE — 99220 PR INITIAL OBSERVATION CARE,LEVL III: CPT | Performed by: STUDENT IN AN ORGANIZED HEALTH CARE EDUCATION/TRAINING PROGRAM

## 2022-05-16 PROCEDURE — 85730 THROMBOPLASTIN TIME PARTIAL: CPT

## 2022-05-16 PROCEDURE — 71045 X-RAY EXAM CHEST 1 VIEW: CPT

## 2022-05-16 PROCEDURE — 93005 ELECTROCARDIOGRAM TRACING: CPT | Performed by: EMERGENCY MEDICINE

## 2022-05-16 PROCEDURE — A9270 NON-COVERED ITEM OR SERVICE: HCPCS | Performed by: STUDENT IN AN ORGANIZED HEALTH CARE EDUCATION/TRAINING PROGRAM

## 2022-05-16 PROCEDURE — 84484 ASSAY OF TROPONIN QUANT: CPT

## 2022-05-16 PROCEDURE — 99215 OFFICE O/P EST HI 40 MIN: CPT | Performed by: INTERNAL MEDICINE

## 2022-05-16 PROCEDURE — 85610 PROTHROMBIN TIME: CPT

## 2022-05-16 PROCEDURE — 85025 COMPLETE CBC W/AUTO DIFF WBC: CPT

## 2022-05-16 PROCEDURE — 80053 COMPREHEN METABOLIC PANEL: CPT

## 2022-05-16 RX ORDER — WARFARIN SODIUM 5 MG/1
5 TABLET ORAL
Status: ON HOLD | COMMUNITY
End: 2022-05-18

## 2022-05-16 RX ORDER — LORAZEPAM 2 MG/ML
2 INJECTION INTRAMUSCULAR
Status: DISCONTINUED | OUTPATIENT
Start: 2022-05-16 | End: 2022-05-17

## 2022-05-16 RX ORDER — POLYETHYLENE GLYCOL 3350 17 G/17G
1 POWDER, FOR SOLUTION ORAL
Status: DISCONTINUED | OUTPATIENT
Start: 2022-05-16 | End: 2022-05-18 | Stop reason: HOSPADM

## 2022-05-16 RX ORDER — HYDROCODONE BITARTRATE AND ACETAMINOPHEN 10; 325 MG/1; MG/1
1 TABLET ORAL 2 TIMES DAILY PRN
Status: DISCONTINUED | OUTPATIENT
Start: 2022-05-16 | End: 2022-05-18 | Stop reason: HOSPADM

## 2022-05-16 RX ORDER — LORAZEPAM 2 MG/ML
4 INJECTION INTRAMUSCULAR
Status: DISCONTINUED | OUTPATIENT
Start: 2022-05-16 | End: 2022-05-17

## 2022-05-16 RX ORDER — BENAZEPRIL HYDROCHLORIDE 10 MG/1
10 TABLET ORAL NIGHTLY
Status: DISCONTINUED | OUTPATIENT
Start: 2022-05-16 | End: 2022-05-16

## 2022-05-16 RX ORDER — AMOXICILLIN 250 MG
2 CAPSULE ORAL 2 TIMES DAILY
Status: DISCONTINUED | OUTPATIENT
Start: 2022-05-16 | End: 2022-05-18 | Stop reason: HOSPADM

## 2022-05-16 RX ORDER — ESOMEPRAZOLE MAGNESIUM 20 MG/1
20 TABLET, DELAYED RELEASE ORAL
Status: DISCONTINUED | OUTPATIENT
Start: 2022-05-16 | End: 2022-05-16

## 2022-05-16 RX ORDER — SOTALOL HYDROCHLORIDE 120 MG/1
120 TABLET ORAL EVERY MORNING
Status: DISCONTINUED | OUTPATIENT
Start: 2022-05-16 | End: 2022-05-16

## 2022-05-16 RX ORDER — ESOMEPRAZOLE MAGNESIUM 20 MG/1
20 TABLET, DELAYED RELEASE ORAL
Status: ON HOLD | COMMUNITY
End: 2022-07-03

## 2022-05-16 RX ORDER — WARFARIN SODIUM 5 MG/1
5 TABLET ORAL DAILY
Status: DISCONTINUED | OUTPATIENT
Start: 2022-05-16 | End: 2022-05-17

## 2022-05-16 RX ORDER — FUROSEMIDE 10 MG/ML
40 INJECTION INTRAMUSCULAR; INTRAVENOUS
Status: DISCONTINUED | OUTPATIENT
Start: 2022-05-17 | End: 2022-05-17

## 2022-05-16 RX ORDER — LORAZEPAM 2 MG/ML
1 INJECTION INTRAMUSCULAR
Status: DISCONTINUED | OUTPATIENT
Start: 2022-05-16 | End: 2022-05-17

## 2022-05-16 RX ORDER — SOTALOL HYDROCHLORIDE 80 MG/1
120 TABLET ORAL EVERY MORNING
Status: DISCONTINUED | OUTPATIENT
Start: 2022-05-17 | End: 2022-05-18 | Stop reason: HOSPADM

## 2022-05-16 RX ORDER — BISACODYL 10 MG
10 SUPPOSITORY, RECTAL RECTAL
Status: DISCONTINUED | OUTPATIENT
Start: 2022-05-16 | End: 2022-05-18 | Stop reason: HOSPADM

## 2022-05-16 RX ORDER — VALSARTAN 80 MG/1
80 TABLET ORAL TWICE DAILY
Status: DISCONTINUED | OUTPATIENT
Start: 2022-05-16 | End: 2022-05-17

## 2022-05-16 RX ORDER — SOTALOL HYDROCHLORIDE 120 MG/1
120 TABLET ORAL EVERY MORNING
Status: ON HOLD | COMMUNITY
End: 2022-07-07

## 2022-05-16 RX ORDER — LORAZEPAM 2 MG/ML
3 INJECTION INTRAMUSCULAR
Status: DISCONTINUED | OUTPATIENT
Start: 2022-05-16 | End: 2022-05-17

## 2022-05-16 RX ORDER — POTASSIUM CHLORIDE 20 MEQ/1
20 TABLET, EXTENDED RELEASE ORAL EVERY MORNING
Status: DISCONTINUED | OUTPATIENT
Start: 2022-05-17 | End: 2022-05-18 | Stop reason: HOSPADM

## 2022-05-16 RX ORDER — OMEPRAZOLE 20 MG/1
20 CAPSULE, DELAYED RELEASE ORAL DAILY
Status: DISCONTINUED | OUTPATIENT
Start: 2022-05-17 | End: 2022-05-18 | Stop reason: HOSPADM

## 2022-05-16 RX ORDER — ACETAMINOPHEN 325 MG/1
650 TABLET ORAL EVERY 6 HOURS PRN
Status: DISCONTINUED | OUTPATIENT
Start: 2022-05-16 | End: 2022-05-18 | Stop reason: HOSPADM

## 2022-05-16 RX ADMIN — VALSARTAN 80 MG: 80 TABLET, FILM COATED ORAL at 17:05

## 2022-05-16 RX ADMIN — WARFARIN SODIUM 5 MG: 5 TABLET ORAL at 17:05

## 2022-05-16 RX ADMIN — SENNOSIDES AND DOCUSATE SODIUM 2 TABLET: 50; 8.6 TABLET ORAL at 17:05

## 2022-05-16 ASSESSMENT — LIFESTYLE VARIABLES
DO YOU DRINK ALCOHOL: NO
SUBSTANCE_ABUSE: 0

## 2022-05-16 ASSESSMENT — CHA2DS2 SCORE
PRIOR STROKE OR TIA OR THROMBOEMBOLISM: NO
VASCULAR DISEASE: NO
SEX: MALE
CHF OR LEFT VENTRICULAR DYSFUNCTION: YES
AGE 75 OR GREATER: NO
AGE 65 TO 74: YES
DIABETES: NO
HYPERTENSION: YES
CHA2DS2 VASC SCORE: 3

## 2022-05-16 ASSESSMENT — ENCOUNTER SYMPTOMS
PALPITATIONS: 0
WEAKNESS: 1
COUGH: 0
SENSORY CHANGE: 0
VOMITING: 0
DIAPHORESIS: 0
HEADACHES: 0
BACK PAIN: 0
NAUSEA: 0
DIZZINESS: 0
ABDOMINAL PAIN: 0
BLURRED VISION: 0
CONSTIPATION: 0
SPUTUM PRODUCTION: 0
INSOMNIA: 0
FOCAL WEAKNESS: 0
LOSS OF CONSCIOUSNESS: 0
CHILLS: 0
SHORTNESS OF BREATH: 0
FALLS: 0
FEVER: 0
EYE PAIN: 0
DIARRHEA: 0

## 2022-05-16 ASSESSMENT — HEART SCORE
ECG: NON-SPECIFIC REPOLARIZATION DISTURBANCE
AGE: 65+
HISTORY: MODERATELY SUSPICIOUS
HEART SCORE: 8
TROPONIN: GREATER THAN OR EQUAL TO 3 TIMES NORMAL LIMIT
RISK FACTORS: >2 RISK FACTORS OR HX OF ATHEROSCLEROTIC DISEASE

## 2022-05-16 ASSESSMENT — FIBROSIS 4 INDEX
FIB4 SCORE: 2.63
FIB4 SCORE: 2.63
FIB4 SCORE: 1.79

## 2022-05-16 ASSESSMENT — PAIN DESCRIPTION - PAIN TYPE: TYPE: ACUTE PAIN

## 2022-05-16 NOTE — ED PROVIDER NOTES
"ED Provider Note    Scribed for Debby Cruz M.D. by Delbert Saab. 5/16/2022  8:08 AM    Primary care provider: Hipolito Hall M.D.  Means of arrival: walk-in  History obtained from: patient  History limited by: none    CHIEF COMPLAINT  Chief Complaint   Patient presents with    Shortness of Breath     \"Comes and goes\" worse in the last few days, stopped taking spironolactone 7 days ago and his benazepril was cut in half because hi BP was too low    Foot Swelling     Increased pedal edema       HPI  Mike Ferrell is a 71 y.o. male who presents to the Emergency Department for evaluation of intermittent shortness of breath onset several weeks ago, but acutely worsening over the last few days. He states this is the worst he has ever felt and becomes short of breath even upon just standing up. He admits to associated symptoms of bilateral foot swelling, chest discomfort, increased easy bruising to arms, increased fatigue, and headache (intermittent behind his left eye radiating upward for several weeks), but denies fevers, diaphoresis, chills, or abdominal pain. He states he is able to sleep on his back and does not require bolstering with pillows. No alleviating factors were reported. He reports he has had multiple different pacemakers since 2009 due to syncopal episodes. He reports his INR has been as high as 8 recently. He reports he is compliant taking Warfarin and Lasix, but did not take his warfarin last night. He reports he stopped taking spironolactone 7 days ago and his benazepril dosage was halved because his blood pressure was too low. He reports allergies to Sulfa drugs and statins. He denies any history of heart attack or strokes.     REVIEW OF SYSTEMS  HEENT:  No ear pain, congestion, or sore throat   EYES: no discharge, redness, or vision changes  CARDIAC: positive chest discomfort, no palpitations    PULMONARY: positive dyspnea on exertion, no cough, or congestion   GI: no vomiting, " diarrhea, or abdominal pain   : no dysuria, back pain, or hematuria   Neuro: no weakness, numbness, or aphasia, positive headache  Musculoskeletal: bilateral foot swelling, no deformity, pain, or joint swelling  Endocrine: no fevers, sweating, or weight loss   SKIN: no rash, erythema, positive contusions to bilateral upper extremities    See history of present illness. All other systems are negative. C.    PAST MEDICAL HISTORY   has a past medical history of AICD (automatic cardioverter/defibrillator) present, Arthritis, Atrial fibrillation (HCC) (4/4/2012), Bowel habit changes, Breath shortness, Cataract (12/15/2021), Chronic back pain, High cholesterol, Hypertension, Methamphetamine use (HCC) (none for many years), Pacemaker, Pain (12/15/2021), Paroxysmal ventricular tachycardia (HCC) (4/4/2012), Pneumonia, Sinoatrial node dysfunction (HCC) (4/4/2012), and Syncope and collapse (4/4/2012).    SURGICAL HISTORY   has a past surgical history that includes cholecystectomy (1999); pacemaker insertion (2009); aicd implant (2010); and tonsillectomy (1953).    SOCIAL HISTORY  Social History     Tobacco Use    Smoking status: Never Smoker    Smokeless tobacco: Never Used   Vaping Use    Vaping Use: Never used   Substance Use Topics    Alcohol use: Yes     Comment: 2-3 drinks daily    Drug use: Not Currently     Comment: 25 years ago      Social History     Substance and Sexual Activity   Drug Use Not Currently    Comment: 25 years ago     FAMILY HISTORY  None reported.     CURRENT MEDICATIONS  Home Medications       Reviewed by Lorna Ozuna R.N. (Registered Nurse) on 05/16/22 at 0736  Med List Status: Partial     Medication Last Dose Status   B Complex Vitamins (VITAMIN B COMPLEX) Tab  Active   benazepril (LOTENSIN) 20 MG Tab 5/15/2022 Active   Cholecalciferol (VITAMIN D3) 5000 units Cap 5/16/2022 Active   colesevelam (WELCHOL) 625 MG Tab 5/15/2022 Active   furosemide (LASIX) 40 MG Tab 5/16/2022 Active    HYDROcodone/acetaminophen (NORCO)  MG Tab 5/16/2022 Active   potassium chloride SA (KDUR) 20 MEQ Tab CR 5/2/2022 Active   sotalol (BETAPACE) 120 MG tablet 5/16/2022 Active   spironolactone (ALDACTONE) 25 MG Tab stopped Active   warfarin (COUMADIN) 5 MG Tab 5/13/2022 Active                  ALLERGIES  Allergies   Allergen Reactions    Atorvastatin Myalgia    Statins [Hmg-Coa-R Inhibitors] Itching    Sulfa Drugs Rash and Itching     Rash, itch       PHYSICAL EXAM  VITAL SIGNS: /82   Pulse 77   Temp 36.2 °C (97.2 °F) (Temporal)   Resp 16   Wt 114 kg (250 lb 10.6 oz)   SpO2 94%   BMI 34.00 kg/m²     Constitutional: Well developed, Well nourished, No acute distress, Non-toxic appearance.   HEENT: Normocephalic, Atraumatic,  external ears normal, pharynx pink,  Mucous  Membranes moist, No rhinorrhea or mucosal edema  Eyes: PERRL, EOMI, Conjunctiva normal, No discharge.   Neck: Normal range of motion, No tenderness, Supple, No stridor.   Lymphatic: No lymphadenopathy    Cardiovascular: Regular Rate and Rhythm, Distant heart sounds   Thorax & Lungs: Lungs clear to auscultation bilaterally, No respiratory distress, No wheezes, rhales or rhonchi, No chest wall tenderness.   Abdomen: Bowel sounds normal, Soft, non tender, elevated BMI, non distended,  No pulsatile masses., no rebound guarding or peritoneal signs.   Skin: Warm, Dry, No erythema, No rash,    Back:  No CVA tenderness,  No spinal tenderness, bony crepitance, step offs, or instability.   Neurologic: Alert & oriented clear speech no focal deficits  Extremities: Equal, intact distal pulses, No cyanosis, clubbing. 2+ pitting edema to bilateral lower extremities,  No tenderness.   Musculoskeletal: Good range of motion in all major joints. No tenderness to palpation or major deformities noted.     DIAGNOSTIC STUDIES / PROCEDURES    LABS  Results for orders placed or performed during the hospital encounter of 05/16/22   CBC with Differential   Result  Value Ref Range    WBC 5.8 4.8 - 10.8 K/uL    RBC 3.22 (L) 4.70 - 6.10 M/uL    Hemoglobin 11.3 (L) 14.0 - 18.0 g/dL    Hematocrit 32.7 (L) 42.0 - 52.0 %    .6 (H) 81.4 - 97.8 fL    MCH 35.1 (H) 27.0 - 33.0 pg    MCHC 34.6 33.7 - 35.3 g/dL    RDW 48.9 35.9 - 50.0 fL    Platelet Count 110 (L) 164 - 446 K/uL    MPV 10.1 9.0 - 12.9 fL    Neutrophils-Polys 71.70 44.00 - 72.00 %    Lymphocytes 14.50 (L) 22.00 - 41.00 %    Monocytes 11.70 0.00 - 13.40 %    Eosinophils 0.50 0.00 - 6.90 %    Basophils 0.20 0.00 - 1.80 %    Immature Granulocytes 1.40 (H) 0.00 - 0.90 %    Nucleated RBC 0.30 /100 WBC    Neutrophils (Absolute) 4.17 1.82 - 7.42 K/uL    Lymphs (Absolute) 0.84 (L) 1.00 - 4.80 K/uL    Monos (Absolute) 0.68 0.00 - 0.85 K/uL    Eos (Absolute) 0.03 0.00 - 0.51 K/uL    Baso (Absolute) 0.01 0.00 - 0.12 K/uL    Immature Granulocytes (abs) 0.08 0.00 - 0.11 K/uL    NRBC (Absolute) 0.02 K/uL   Complete Metabolic Panel (CMP)   Result Value Ref Range    Sodium 137 135 - 145 mmol/L    Potassium 4.7 3.6 - 5.5 mmol/L    Chloride 103 96 - 112 mmol/L    Co2 22 20 - 33 mmol/L    Anion Gap 12.0 7.0 - 16.0    Glucose 109 (H) 65 - 99 mg/dL    Bun 50 (H) 8 - 22 mg/dL    Creatinine 1.46 (H) 0.50 - 1.40 mg/dL    Calcium 9.2 8.5 - 10.5 mg/dL    AST(SGOT) 21 12 - 45 U/L    ALT(SGPT) 28 2 - 50 U/L    Alkaline Phosphatase 70 30 - 99 U/L    Total Bilirubin 1.0 0.1 - 1.5 mg/dL    Albumin 4.1 3.2 - 4.9 g/dL    Total Protein 5.9 (L) 6.0 - 8.2 g/dL    Globulin 1.8 (L) 1.9 - 3.5 g/dL    A-G Ratio 2.3 g/dL   Troponin   Result Value Ref Range    Troponin T 66 (H) 6 - 19 ng/L   Prothrombin Time   Result Value Ref Range    PT 20.4 (H) 12.0 - 14.6 sec    INR 1.81 (H) 0.87 - 1.13   APTT   Result Value Ref Range    APTT 26.5 24.7 - 36.0 sec   ESTIMATED GFR   Result Value Ref Range    GFR (CKD-EPI) 50 (A) >60 mL/min/1.73 m 2   MAGNESIUM   Result Value Ref Range    Magnesium 2.1 1.5 - 2.5 mg/dL   proBrain Natriuretic Peptide, NT   Result Value Ref  Range    NT-proBNP 950 (H) 0 - 125 pg/mL   EKG   Result Value Ref Range    Report       [facility]    Test Date:  2022  Pt Name:    BAILEY RICHARD                  Department: ER  MRN:        [id number]                      Room:  Gender:     Male                         Technician: [id number]  :        [id number]                   Requested By:ER TRIAGE PROTOCOL  Order #:    [id number]                    Reading MD: KATIE MOTA MD    Measurements  Intervals                                Axis  Rate:       71                           P:  PA:         219                          QRS:        -80  QRSD:       185                          T:          94  QT:         482  QTc:        525    Interpretive Statements  A-V dual-paced complexes w/ some inhibition  No further analysis attempted due to paced rhythm  Compared to ECG 2022 10:03:53  No significant changes  Electronically Signed On 2022 9:01:04 PDT by KATIE MOTA MD         All labs reviewed by me.    EKG  12 lead EKG; Interpreted by emergency department physician    RADIOLOGY  CT-HEAD W/O   Final Result      1. Cerebral atrophy.   2. White matter lucencies most consistent with small vessel ischemic change versus demyelination or gliosis.   3. Otherwise, Head CT without contrast with no acute findings. No evidence of acute cerebral infarction, hemorrhage or mass lesion.         DX-CHEST-PORTABLE (1 VIEW)   Final Result      No acute cardiac or pulmonary abnormalities are identified.      Other stable chronic degenerative and postsurgical changes.      EC-ECHOCARDIOGRAM COMPLETE W/O CONT    (Results Pending)     The radiologist's interpretation of all radiological studies have been reviewed by me.    COURSE & MEDICAL DECISION MAKING  Nursing notes, VS, PMSFHx reviewed in chart.    8:08 AM Patient seen and examined at bedside by PA student.  Ordered EKG, DX-Chest, CBC w/ diff, CMP, troponin, APTT and prothrombin time  to evaluate his symptoms. The differential diagnoses include but are not limited to: CHF, cardiac ischemia, volume overload.    8:31 AM Patient evaluated by physician. Ordered CT-Head w/o.     9:00 AM [facility].     9:02 AM - I reevaluated the patient at bedside. I discussed the patient's diagnostic study results which show elevated troponin, pending CT-Head. I informed the patient of my plan to admit today given the patient's current presentation and diagnostic study results. Patient verbalizes understanding and support with my plan for admission. Paged Hospitalist.    9:22 AM I discussed the patient's case and the above findings with Dr. Akers (Hospitalist) who will assess the patient for hospitalization to [facility].     I independently evaluated the patient and repeated the important components of the history and physical.  I discussed the management with the PA student.  I have reviewed and agree with the pertinent clinical information as above including history, exam, study findings and recommendations.     Heart Score is: 8    DISPOSITION:  Patient will be hospitalized by Dr. Akers in guarded condition.      FINAL IMPRESSION  1. Chest pain, unspecified type    2. Dyspnea on exertion    3. Elevated troponin    4. Peripheral edema          Delbert PALOMARES (El), am scribing for, and in the presence of, Debby Cruz M.D..    Electronically signed by: Delbert Saab (Stalinibkylah), 5/16/2022    Debby PALOMARES M.D. personally performed the services described in this documentation, as scribed by Delbert Saab in my presence, and it is both accurate and complete.    The note accurately reflects work and decisions made by me.  Debby Cruz M.D.  5/16/2022  1:16 PM

## 2022-05-16 NOTE — ASSESSMENT & PLAN NOTE
History of,  Continue home medications  Warfarin dosing per pharmacy  Will consider switching to eliquis   Will ask CM to see how much it will cost out of pocket for patient

## 2022-05-16 NOTE — ASSESSMENT & PLAN NOTE
Not in acute exacerbation  Slightly decompensated on exam with mild LE edema  Increase lasix dose to IV lasix 40 mg daily for now  Resume home lasix 40 mg daily when euvolemic  Echo pending

## 2022-05-16 NOTE — ASSESSMENT & PLAN NOTE
History of v tach and complete heart block  History of AICD and PPM; interrogated recently at outpatient cardiology office, working well   Post-Care Instructions: I reviewed with the patient in detail post-care instructions.TWICE A DAY –  Every morning and evening to allow for optimal healing:\\n1.  Wash hands with soap and water.\\n2. Cleanse the wound gently with soap and water.  This can be done in the shower. \\n3. Dry the area gently with a clean towel or gauze pad.\\n4. Apply a thin layer of petroleum jelly (Vaseline or Aquaphor) or \\nantibiotic ointment (Polysporin/Bacitracin) and cover with a bandage.\\n5. If sutures were placed, please have suture removed in _______days.\\n6. Repeat wound care twice daily until completely healed.\\Paola the skin heals, the biopsy site will have a dark pink-dark red appearance, often a light yellowish/cobblestone like film over the biopsy site and the biopsy site edges will be red.  If you site becomes increasingly red, inflamed, swollen, unusually tender, has drainage, or you have any questions/concerns, please call the office.

## 2022-05-16 NOTE — CONSULTS
"Reason for Consult:  Asked by Dr Jose Armando Akers M.D. to see this patient with HF    CC:   Chief Complaint   Patient presents with   • Shortness of Breath     \"Comes and goes\" worse in the last few days, stopped taking spironolactone 7 days ago and his benazepril was cut in half because hi BP was too low   • Foot Swelling     Increased pedal edema       HPI:      71 year old man with PMH HFpEF, AF, CHB s/p PPM, VT and ICD for secondary prevention (patient describes VT related to LHC) presents with 10 days acute on chronic heart failure. States increased dyspnea, chest pressure, orthopnea, edema. Recounts has had recent medication changes especially to diuretics and antihypertensives. Patient admits to alcohol abuse for many years. Previously abused methamphetamines and cocaine but states has been sober for over a decade. Adds he had a LHC 2010 at [facility] [place]. \"I  on the table.\" after which he says got ICD upgrade from PPM. Patient and wife remember being told no coronary disease and no stents.     Medications / Drug list prior to admission:  No current facility-administered medications on file prior to encounter.     Current Outpatient Medications on File Prior to Encounter   Medication Sig Dispense Refill   • warfarin (COUMADIN) 5 MG Tab Take 5 mg by mouth at bedtime.     • Esomeprazole Magnesium 20 MG Tablet Delayed Response Take 20 mg by mouth every morning before breakfast.     • sotalol (BETAPACE) 120 MG tablet Take 120 mg by mouth every morning.     • benazepril (LOTENSIN) 20 MG Tab Take 0.5 Tablets by mouth every evening.     • furosemide (LASIX) 40 MG Tab Take 40 mg by mouth every morning.     • potassium chloride SA (KDUR) 20 MEQ Tab CR Take 20 mEq by mouth every morning.     • HYDROcodone/acetaminophen (NORCO)  MG Tab Take 1 Tablet by mouth 2 times a day as needed for Moderate Pain. Indications: Pain     • colesevelam (WELCHOL) 625 MG Tab Take 625 mg by mouth every evening.     • Cholecalciferol " "(VITAMIN D3) 5000 units Cap Take 5,000 Units by mouth every morning before breakfast.         Current list of administered Medications:    Current Facility-Administered Medications:   •  senna-docusate (PERICOLACE or SENOKOT S) 8.6-50 MG per tablet 2 Tablet, 2 Tablet, Oral, BID **AND** polyethylene glycol/lytes (MIRALAX) PACKET 1 Packet, 1 Packet, Oral, QDAY PRN **AND** magnesium hydroxide (MILK OF MAGNESIA) suspension 30 mL, 30 mL, Oral, QDAY PRN **AND** bisacodyl (DULCOLAX) suppository 10 mg, 10 mg, Rectal, QDAY PRN, Jose Armando Akers M.D.  •  acetaminophen (Tylenol) tablet 650 mg, 650 mg, Oral, Q6HRS PRN, Jose Armando Akers M.D.  •  benazepril (LOTENSIN) tablet 10 mg, 10 mg, Oral, Nightly, Jose Armando Akers M.D.  •  [START ON 5/17/2022] furosemide (LASIX) injection 40 mg, 40 mg, Intravenous, Q DAY, Jose Armando Akers M.D.  •  MD Alert...Warfarin per Pharmacy, , Other, PHARMACY TO DOSE, Jose Armando Akers M.D.  •  [START ON 5/17/2022] potassium chloride SA (Kdur) tablet 20 mEq, 20 mEq, Oral, QAM, Jose Armando Akers M.D.  •  HYDROcodone/acetaminophen (NORCO)  MG per tablet 1 Tablet, 1 Tablet, Oral, BID PRN, Jose Armando Akers M.D.  •  [START ON 5/17/2022] omeprazole (PRILOSEC) capsule 20 mg, 20 mg, Oral, DAILY, Jose Armando Akers M.D.  •  warfarin (COUMADIN) tablet 5 mg, 5 mg, Oral, DAILY AT 1800, Jose Armando Akers M.D.    Past Medical History:   Diagnosis Date   • AICD (automatic cardioverter/defibrillator) present    • Arthritis     generalized everywhere   • Atrial fibrillation (HCC) 4/4/2012   • Bowel habit changes     constipation   • Breath shortness     \"since 1996 from [place]\"   • Cataract 12/15/2021    bilat, no surgey yet   • Chronic back pain    • High cholesterol    • Hypertension    • Methamphetamine use (HCC) none for many years   • Pacemaker     defibrilator   • Pain 12/15/2021    back, right hip and knee, 8/10   • Paroxysmal ventricular tachycardia (HCC) 4/4/2012   • Pneumonia     in the past   • Sinoatrial node dysfunction " ([facility]) 4/4/2012    Dr. Daniel Ferrera   • Syncope and collapse 4/4/2012       Past Surgical History:   Procedure Laterality Date   • AICD IMPLANT  2010   • PACEMAKER INSERTION  2009   • CHOLECYSTECTOMY  1999   • TONSILLECTOMY  1953       No family history on file.  Patient family history was personally reviewed, no pertinent family history to current presentation    Social History     Socioeconomic History   • Marital status:      Spouse name: Not on file   • Number of children: Not on file   • Years of education: Not on file   • Highest education level: Not on file   Occupational History   • Not on file   Tobacco Use   • Smoking status: Never Smoker   • Smokeless tobacco: Never Used   Vaping Use   • Vaping Use: Never used   Substance and Sexual Activity   • Alcohol use: Yes     Comment: 2-3 drinks daily   • Drug use: Not Currently     Comment: 25 years ago   • Sexual activity: Not on file   Other Topics Concern   • Not on file   Social History Narrative   • Not on file     Social Determinants of Health     Financial Resource Strain: Not on file   Food Insecurity: Not on file   Transportation Needs: Not on file   Physical Activity: Not on file   Stress: Not on file   Social Connections: Not on file   Intimate Partner Violence: Not on file   Housing Stability: Not on file       ALLERGIES:  Allergies   Allergen Reactions   • Atorvastatin Myalgia   • Statins [Hmg-Coa-R Inhibitors] Itching   • Sulfa Drugs Rash and Itching     Rash, itch       Review of systems:  A complete review of symptoms was reviewed with patient. This is reviewed in H&P and PMH. ALL OTHERS reviewed and negative    Physical exam:  Patient Vitals for the past 24 hrs:   BP Temp Temp src Pulse Resp SpO2 Weight   05/16/22 1143 (!) 148/91 36.5 °C (97.7 °F) Temporal 73 18 94 % 114 kg (251 lb 1.7 oz)   05/16/22 1100 (!) 161/90 -- -- 69 16 94 % --   05/16/22 1000 (!) 140/83 -- -- 67 18 92 % --   05/16/22 0902 -- -- -- 69 15 93 % --   05/16/22 0901 129/72  -- -- 69 19 90 % --   22 0804 -- -- -- 77 -- 94 % --   22 0803 126/82 -- -- 82 -- 95 % --   22 0725 -- -- -- -- -- -- 114 kg (250 lb 10.6 oz)   22 0707 (!) 138/90 36.2 °C (97.2 °F) Temporal 89 16 94 % --     General: No acute distress.   EYES: no jaundice  HEENT: OP clear   Neck: No bruits No JVD.   CVS:  RRR. S1 + S2. No M/R/G. No edema.  Resp: CTAB. No wheezing or crackles/rhonchi.  Abdomen: Soft, NT, ND,  Skin: Grossly nothing acute no obvious rashes  Neurological: Alert, Moves all extremities, no cranial nerve defects on limited exam  Extremities: Pulse 2+ in b/l LE. No cyanosis.     Data:  Laboratory studies personally reviewed by me:  Recent Results (from the past 24 hour(s))   EKG    Collection Time: 22  7:47 AM   Result Value Ref Range    Report       [facility] Emergency Dept.    Test Date:  2022  Pt Name:    BAILEY RICHARD                  Department: ER  MRN:        [id number]                      Room:  Gender:     Male                         Technician: [id number]  :        [id number]                   Requested By:ER TRIAGE PROTOCOL  Order #:    [id number]                    Reading MD: KATIE MOTA MD    Measurements  Intervals                                Axis  Rate:       71                           P:  KS:         219                          QRS:        -80  QRSD:       185                          T:          94  QT:         482  QTc:        525    Interpretive Statements  A-V dual-paced complexes w/ some inhibition  No further analysis attempted due to paced rhythm  Compared to ECG 2022 10:03:53  No significant changes  Electronically Signed On 2022 9:01:04 PDT by KATIE MOTA MD     CBC with Differential    Collection Time: 22  8:11 AM   Result Value Ref Range    WBC 5.8 4.8 - 10.8 K/uL    RBC 3.22 (L) 4.70 - 6.10 M/uL    Hemoglobin 11.3 (L) 14.0 - 18.0 g/dL    Hematocrit 32.7 (L) 42.0 - 52.0 %    .6 (H) 81.4 - 97.8 fL     MCH 35.1 (H) 27.0 - 33.0 pg    MCHC 34.6 33.7 - 35.3 g/dL    RDW 48.9 35.9 - 50.0 fL    Platelet Count 110 (L) 164 - 446 K/uL    MPV 10.1 9.0 - 12.9 fL    Neutrophils-Polys 71.70 44.00 - 72.00 %    Lymphocytes 14.50 (L) 22.00 - 41.00 %    Monocytes 11.70 0.00 - 13.40 %    Eosinophils 0.50 0.00 - 6.90 %    Basophils 0.20 0.00 - 1.80 %    Immature Granulocytes 1.40 (H) 0.00 - 0.90 %    Nucleated RBC 0.30 /100 WBC    Neutrophils (Absolute) 4.17 1.82 - 7.42 K/uL    Lymphs (Absolute) 0.84 (L) 1.00 - 4.80 K/uL    Monos (Absolute) 0.68 0.00 - 0.85 K/uL    Eos (Absolute) 0.03 0.00 - 0.51 K/uL    Baso (Absolute) 0.01 0.00 - 0.12 K/uL    Immature Granulocytes (abs) 0.08 0.00 - 0.11 K/uL    NRBC (Absolute) 0.02 K/uL   Complete Metabolic Panel (CMP)    Collection Time: 05/16/22  8:11 AM   Result Value Ref Range    Sodium 137 135 - 145 mmol/L    Potassium 4.7 3.6 - 5.5 mmol/L    Chloride 103 96 - 112 mmol/L    Co2 22 20 - 33 mmol/L    Anion Gap 12.0 7.0 - 16.0    Glucose 109 (H) 65 - 99 mg/dL    Bun 50 (H) 8 - 22 mg/dL    Creatinine 1.46 (H) 0.50 - 1.40 mg/dL    Calcium 9.2 8.5 - 10.5 mg/dL    AST(SGOT) 21 12 - 45 U/L    ALT(SGPT) 28 2 - 50 U/L    Alkaline Phosphatase 70 30 - 99 U/L    Total Bilirubin 1.0 0.1 - 1.5 mg/dL    Albumin 4.1 3.2 - 4.9 g/dL    Total Protein 5.9 (L) 6.0 - 8.2 g/dL    Globulin 1.8 (L) 1.9 - 3.5 g/dL    A-G Ratio 2.3 g/dL   Troponin    Collection Time: 05/16/22  8:11 AM   Result Value Ref Range    Troponin T 66 (H) 6 - 19 ng/L   Prothrombin Time    Collection Time: 05/16/22  8:11 AM   Result Value Ref Range    PT 20.4 (H) 12.0 - 14.6 sec    INR 1.81 (H) 0.87 - 1.13   APTT    Collection Time: 05/16/22  8:11 AM   Result Value Ref Range    APTT 26.5 24.7 - 36.0 sec   ESTIMATED GFR    Collection Time: 05/16/22  8:11 AM   Result Value Ref Range    GFR (CKD-EPI) 50 (A) >60 mL/min/1.73 m 2   MAGNESIUM    Collection Time: 05/16/22  8:11 AM   Result Value Ref Range    Magnesium 2.1 1.5 - 2.5 mg/dL   proBrain  Natriuretic Peptide, NT    Collection Time: 05/16/22  8:11 AM   Result Value Ref Range    NT-proBNP 950 (H) 0 - 125 pg/mL   TROPONIN    Collection Time: 05/16/22  1:44 PM   Result Value Ref Range    Troponin T 54 (H) 6 - 19 ng/L       EKG 5/16/22 0747 interpreted by me APMIRACLE    All pertinent features of laboratory and imaging reviewed including primary images where applicable    TTE 2/2022  CONCLUSIONS  Compared to the prior echo on 11/08/19, no significant changes  The left ventricular ejection fraction is visually estimated to be 60%.  Mild mitral regurgitation.  Estimated right ventricular systolic pressure is 30 mmHg.    Principal Problem:    Chest pain POA: Yes  Active Problems:    Atrial fibrillation (HCC) POA: Yes    Automatic implantable cardioverter-defibrillator in situ POA: Yes      Overview: ICD-10 transition    (HFpEF) heart failure with preserved ejection fraction (HCC) POA: Yes    CKD stage G3b/A2, GFR 30-44 and albumin creatinine ratio  mg/g (HCC) POA: Yes  Resolved Problems:    * No resolved hospital problems. *      Assessment / Plan:  71 year old man with PMH HFpEF, AF, CHB s/p PPM, VT and ICD for secondary prevention (patient describes VT related to [facility]) presents with 10 days acute on chronic heart failure.    -continue IV lasix diuresis  -likely MRA limited by hyperkalemia  -consider sglt2i such as jardiance at discharge  -change benazepril to valsartan bid  -TTE  -MPI spect pharm  -unfortunately not good paraglide-hf subject due to alcohol abuse  -alcohol cessation  -obtain records from [facility]  -continue sotalol for AF and VT  -change anticoagulation to doac such as eliquis renally dosed    I personally discussed his case with Dr Akers    It is my pleasure to participate in the care of Mr. Ferrell.  Please do not hesitate to contact me with questions or concerns.    Patric Sood MD  Cardiologist [facility]

## 2022-05-16 NOTE — H&P
"Hospital Medicine History & Physical Note    Date of Service  5/16/2022    Primary Care Physician  Hipolito Hall M.D.      Code Status  Full Code    Chief Complaint  Chief Complaint   Patient presents with   • Shortness of Breath     \"Comes and goes\" worse in the last few days, stopped taking spironolactone 7 days ago and his benazepril was cut in half because hi BP was too low   • Foot Swelling     Increased pedal edema       History of Presenting Illness  Mike Ferrell is a 71 y.o. male who presented 5/16/2022 with dyspnea, chest pressure, feet swelling. Patient with history of v tach s/p PPM/AICD, atrial fibrillation on warfarin, CHF, who presents for above complaints. Patient reports his dyspnea has been getting worse for the past month. His feet began to swell about a week ago. He saw his cardiologist about 1 week ago and at that time due to reported hypotension patient's home spironolactone was stopped, and benazepril dose cut in half to 10 mg. Patient reports compliance with other home medications including lasix. Patient reports the past few days he has felt very fatigued, with chest pressure. Chest pressure is central, non radiating, not worse or better with anything. Patient states at baseline health he uses a walker for ambulation but can only walk short distances before becoming fatigued. He cannot do a whole grocery store trip, only walk to the bakery and back for example. The past few days he is not able to do much of anything. Patient also reports headache above left eye, intermittent for past month; no visual changes.  In the ED, /90. Hgb 11.3 (near baseline), Cr 1.46 (also baseline). Troponin T 66. CXR clear. EKG with paced AV rhythm, no acute ischemic changes. CT head ordered, results pending. Patient will be admitted for chest pressure evaluation.    I discussed the plan of care with patient and family.    Review of Systems  Review of Systems   Constitutional: Positive for " malaise/fatigue. Negative for chills, diaphoresis and fever.   Eyes: Negative for blurred vision and pain.   Respiratory: Negative for cough, sputum production and shortness of breath.    Cardiovascular: Positive for chest pain and leg swelling. Negative for palpitations.   Gastrointestinal: Negative for abdominal pain, constipation, diarrhea, nausea and vomiting.   Genitourinary: Negative for dysuria and urgency.   Musculoskeletal: Negative for back pain and falls.   Skin: Negative for itching and rash.   Neurological: Positive for weakness. Negative for dizziness, sensory change, focal weakness, loss of consciousness and headaches.   Psychiatric/Behavioral: Negative for substance abuse. The patient does not have insomnia.        Past Medical History   has a past medical history of AICD (automatic cardioverter/defibrillator) present, Arthritis, Atrial fibrillation (HCC) (4/4/2012), Bowel habit changes, Breath shortness, Cataract (12/15/2021), Chronic back pain, High cholesterol, Hypertension, Methamphetamine use (HCC) (none for many years), Pacemaker, Pain (12/15/2021), Paroxysmal ventricular tachycardia (HCC) (4/4/2012), Pneumonia, Sinoatrial node dysfunction (HCC) (4/4/2012), and Syncope and collapse (4/4/2012).    Surgical History   has a past surgical history that includes cholecystectomy (1999); pacemaker insertion (2009); aicd implant (2010); and tonsillectomy (1953).     Family History  family history is not on file.   Family history reviewed with patient. There is no family history that is pertinent to the chief complaint.     Social History   reports that he has never smoked. He has never used smokeless tobacco. He reports current alcohol use. He reports previous drug use.    Allergies  Allergies   Allergen Reactions   • Atorvastatin Myalgia   • Statins [Hmg-Coa-R Inhibitors] Itching   • Sulfa Drugs Rash and Itching     Rash, itch       Medications  Prior to Admission Medications   Prescriptions Last Dose  Informant Patient Reported? Taking?   Cholecalciferol (VITAMIN D3) 5000 units Cap 5/16/2022 at 0600 Patient Yes No   Sig: Take 5,000 Units by mouth every morning before breakfast.   Esomeprazole Magnesium 20 MG Tablet Delayed Response 5/16/2022 at 0600  Yes Yes   Sig: Take 20 mg by mouth every morning before breakfast.   HYDROcodone/acetaminophen (NORCO)  MG Tab 5/16/2022 at 0600 Patient Yes No   Sig: Take 1 Tablet by mouth 2 times a day as needed for Moderate Pain. Indications: Pain   benazepril (LOTENSIN) 20 MG Tab 5/15/2022 at Unknown time  Yes No   Sig: Take 0.5 Tablets by mouth every evening.   colesevelam (WELCHOL) 625 MG Tab 5/15/2022 at Unknown time Patient Yes No   Sig: Take 625 mg by mouth every evening.   furosemide (LASIX) 40 MG Tab 5/16/2022 at 0600  Yes No   Sig: Take 40 mg by mouth every morning.   potassium chloride SA (KDUR) 20 MEQ Tab CR 10d ago at unknown  Yes No   Sig: Take 20 mEq by mouth every morning.   sotalol (BETAPACE) 120 MG tablet 5/16/2022 at 0600  Yes Yes   Sig: Take 120 mg by mouth every morning.   warfarin (COUMADIN) 5 MG Tab 5/15/2022 at Unknown time  Yes Yes   Sig: Take 5 mg by mouth at bedtime.      Facility-Administered Medications: None       Physical Exam  Temp:  [36.2 °C (97.2 °F)] 36.2 °C (97.2 °F)  Pulse:  [67-89] 67  Resp:  [15-19] 18  BP: (126-140)/(72-90) 140/83  SpO2:  [90 %-95 %] 92 %  Blood Pressure : (!) 140/83   Temperature: 36.2 °C (97.2 °F)   Pulse: 67   Respiration: 18   Pulse Oximetry: 92 %       Physical Exam  Constitutional:       General: He is not in acute distress.     Appearance: He is not ill-appearing.   HENT:      Head: Normocephalic and atraumatic.      Right Ear: External ear normal.      Left Ear: External ear normal.      Mouth/Throat:      Pharynx: No oropharyngeal exudate or posterior oropharyngeal erythema.   Eyes:      Extraocular Movements: Extraocular movements intact.      Pupils: Pupils are equal, round, and reactive to light.    Cardiovascular:      Rate and Rhythm: Normal rate and regular rhythm.      Pulses: Normal pulses.      Heart sounds: Normal heart sounds.   Pulmonary:      Effort: Pulmonary effort is normal. No respiratory distress.      Breath sounds: Normal breath sounds. No wheezing, rhonchi or rales.   Abdominal:      General: Bowel sounds are normal. There is no distension.      Palpations: Abdomen is soft.      Tenderness: There is no abdominal tenderness. There is no guarding or rebound.   Musculoskeletal:         General: Swelling present. No tenderness. Normal range of motion.      Cervical back: Normal range of motion and neck supple.      Right lower leg: Edema present.      Left lower leg: Edema present.      Comments: Mild pretibial pitting edema   Skin:     General: Skin is warm and dry.   Neurological:      General: No focal deficit present.      Mental Status: He is oriented to person, place, and time.      Sensory: No sensory deficit.      Motor: No weakness.   Psychiatric:         Mood and Affect: Mood normal.         Behavior: Behavior normal.         Laboratory:  Recent Labs     05/16/22  0811   WBC 5.8   RBC 3.22*   HEMOGLOBIN 11.3*   HEMATOCRIT 32.7*   .6*   MCH 35.1*   MCHC 34.6   RDW 48.9   PLATELETCT 110*   MPV 10.1     Recent Labs     05/16/22  0811   SODIUM 137   POTASSIUM 4.7   CHLORIDE 103   CO2 22   GLUCOSE 109*   BUN 50*   CREATININE 1.46*   CALCIUM 9.2     Recent Labs     05/16/22  0811   ALTSGPT 28   ASTSGOT 21   ALKPHOSPHAT 70   TBILIRUBIN 1.0   GLUCOSE 109*     Recent Labs     05/16/22  0811   APTT 26.5   INR 1.81*     No results for input(s): NTPROBNP in the last 72 hours.      Recent Labs     05/16/22  0811   TROPONINT 66*       Imaging:  CT-HEAD W/O   Final Result      1. Cerebral atrophy.   2. White matter lucencies most consistent with small vessel ischemic change versus demyelination or gliosis.   3. Otherwise, Head CT without contrast with no acute findings. No evidence of acute  cerebral infarction, hemorrhage or mass lesion.         DX-CHEST-PORTABLE (1 VIEW)   Final Result      No acute cardiac or pulmonary abnormalities are identified.      Other stable chronic degenerative and postsurgical changes.      EC-ECHOCARDIOGRAM COMPLETE W/O CONT    (Results Pending)       EKG:  I have personally reviewed the images and compared with prior images.    Assessment/Plan:  Justification for Admission Status  I anticipate this patient is appropriate for observation status at this time because chest pressure evaluation will likely take <2 midnights.    * Chest pain- (present on admission)  Assessment & Plan  Presents for chest pressure last few days; appears slightly decompensated from CHF perspective but not overtly so  Troponin T mildly elevated at 66, trend  Admit to telemetry  EKG prn for chest pain  BNP ordered  Echocardiogram ordered    CKD stage G3b/A2, GFR 30-44 and albumin creatinine ratio  mg/g ([facility])- (present on admission)  Assessment & Plan  History of,  Stable  monitor    (HFpEF) heart failure with preserved ejection fraction (HCC)- (present on admission)  Assessment & Plan  Not in acute exacerbation  Slightly decompensated on exam with mild LE edema  Increase lasix dose to IV lasix 40 mg daily for now  Resume home lasix 40 mg daily when euvolemic  Echo pending    Automatic implantable cardioverter-defibrillator in situ- (present on admission)  Assessment & Plan  History of v tach and complete heart block  History of AICD and PPM; interrogated recently at outpatient cardiology office, working well    Atrial fibrillation (HCC)- (present on admission)  Assessment & Plan  History of,  Continue home medications  Warfarin dosing per pharmacy      VTE prophylaxis: therapeutic anticoagulation with warfarin

## 2022-05-16 NOTE — ED TRIAGE NOTES
"Wheeled to triage by wife  Chief Complaint   Patient presents with   • Shortness of Breath     \"Comes and goes\" worse in the last few days, stopped taking spironolactone 7 days ago and his benazepril was cut in half because hi BP was too low   • Foot Swelling     Increased pedal edema     Pt is still taking lasix, but has lucero off his spironolactone for 1 week and his SOB and LE swelling has lucero getting worse. Protocol ordered.  "

## 2022-05-16 NOTE — ED NOTES
Pt brought back to room, changed into gown and connected to monitor. VSS.  Aware of plan of care.    PIV initiated, blood drawn and sent to lab.

## 2022-05-16 NOTE — ED NOTES
Med rec complete per pt and wife at bedside. Permission given by pt to review medications with wife.

## 2022-05-16 NOTE — ASSESSMENT & PLAN NOTE
Presents for chest pressure last few days; appears slightly decompensated from CHF perspective but not overtly so  Troponin T mildly elevated at 66, trend  Echo and stress test pending   Cardiology has also been consulted appreciate rec.

## 2022-05-16 NOTE — PROGRESS NOTES
Patient arrived to unit-Tele715-2 via bed. Received report from Tricia HERNÁNDEZ, Pt awake, laying in bed. A/Ox4, VSS.  Pt denies pain. Pt oriented to unit and educated to call before getting out of bed. POC reviewed and white board updated. Tele box on. Call light in reach. Bed locked in lowest position with 2 upper bed rails up. Bed alarm on. Pt is low fall risk with assistance of one person necessary.

## 2022-05-16 NOTE — PROGRESS NOTES
Inpatient Anticoagulation Service Note    Date: 5/16/2022    Reason for Anticoagulation: Atrial Fibrillation   Target INR: 2.0 to 3.0    [id number] VASc Score: 3  HAS-BLED Score: 1     Hemoglobin Value: (!) 11.3  Hematocrit Value: (!) 32.7  Lab Platelet Value: (!) 110    INR from last 7 days     Date/Time INR Value    05/16/22 0811 1.81        Dose from last 7 days     Date/Time Dose (mg)    05/16/22 1800 5          HPI: 72 yo male admitted on 5/16/22 with SOB and foot swelling. Cardiology consulted. PMH HFpEF, AF, CHB s/p PPM, VT and ICD. Continuing warfarin from home. Warfarin is managed outpatient by the [facility] and per records, patient is prescribed Warfarin 2.5 mg PO every Wednesday and Warfarin 5 mg po on all other days. This was a recent dose reduction on 5/12/22 - patient previously prescribed warfarin 7.5 mg po on wednesdays and warfarin 5 mg po on all other days. Time in therapeutic range reported outpatient is 59%.     Assessment: INR SUBtherapeutic, just under goal range. Reported compliance with warfarin therapy per med rec. Hgb anemic, platelets below normal limits without any overt s/xs of bleeding reported.   • Potential drug-drug interactions identified with acute inpatient medications: No major DDIs identified.    • Potential drug-drug interactions identified with chronic home medications: Omeprazole which should be established interactions with warfarin therapy.   • Inpatient Diet: Cardiac, 2 gm sodium     Plan: Start warfarin 5 mg po daily. INR tomorrow.     Education Material Provided?: No (Chronic warfarin patient)    Pharmacist suggested discharge dosing: Warfarin 5 mg PO daily.      Lora Driver, PharmD, BCPS

## 2022-05-17 ENCOUNTER — HOME HEALTH ADMISSION (OUTPATIENT)
Dept: HOME HEALTH SERVICES | Facility: HOME HEALTHCARE | Age: 72
End: 2022-05-17
Payer: MEDICARE

## 2022-05-17 ENCOUNTER — APPOINTMENT (OUTPATIENT)
Dept: RADIOLOGY | Facility: MEDICAL CENTER | Age: 72
DRG: 291 | End: 2022-05-17
Attending: INTERNAL MEDICINE
Payer: MEDICARE

## 2022-05-17 LAB
ANION GAP SERPL CALC-SCNC: 10 MMOL/L (ref 7–16)
BUN SERPL-MCNC: 42 MG/DL (ref 8–22)
CALCIUM SERPL-MCNC: 8.9 MG/DL (ref 8.5–10.5)
CHLORIDE SERPL-SCNC: 104 MMOL/L (ref 96–112)
CO2 SERPL-SCNC: 24 MMOL/L (ref 20–33)
CREAT SERPL-MCNC: 1.35 MG/DL (ref 0.5–1.4)
ERYTHROCYTE [DISTWIDTH] IN BLOOD BY AUTOMATED COUNT: 50.4 FL (ref 35.9–50)
GFR SERPLBLD CREATININE-BSD FMLA CKD-EPI: 55 ML/MIN/1.73 M 2
GLUCOSE SERPL-MCNC: 113 MG/DL (ref 65–99)
HCT VFR BLD AUTO: 29.2 % (ref 42–52)
HGB BLD-MCNC: 9.9 G/DL (ref 14–18)
INR PPP: 1.43 (ref 0.87–1.13)
MCH RBC QN AUTO: 35.5 PG (ref 27–33)
MCHC RBC AUTO-ENTMCNC: 33.9 G/DL (ref 33.7–35.3)
MCV RBC AUTO: 104.7 FL (ref 81.4–97.8)
PLATELET # BLD AUTO: 92 K/UL (ref 164–446)
PMV BLD AUTO: 10.7 FL (ref 9–12.9)
POTASSIUM SERPL-SCNC: 4.7 MMOL/L (ref 3.6–5.5)
PROTHROMBIN TIME: 17 SEC (ref 12–14.6)
RBC # BLD AUTO: 2.79 M/UL (ref 4.7–6.1)
SODIUM SERPL-SCNC: 138 MMOL/L (ref 135–145)
WBC # BLD AUTO: 3.9 K/UL (ref 4.8–10.8)

## 2022-05-17 PROCEDURE — 700102 HCHG RX REV CODE 250 W/ 637 OVERRIDE(OP): Performed by: INTERNAL MEDICINE

## 2022-05-17 PROCEDURE — 85027 COMPLETE CBC AUTOMATED: CPT

## 2022-05-17 PROCEDURE — A9270 NON-COVERED ITEM OR SERVICE: HCPCS | Performed by: INTERNAL MEDICINE

## 2022-05-17 PROCEDURE — 96374 THER/PROPH/DIAG INJ IV PUSH: CPT

## 2022-05-17 PROCEDURE — 700111 HCHG RX REV CODE 636 W/ 250 OVERRIDE (IP)

## 2022-05-17 PROCEDURE — 700102 HCHG RX REV CODE 250 W/ 637 OVERRIDE(OP): Performed by: STUDENT IN AN ORGANIZED HEALTH CARE EDUCATION/TRAINING PROGRAM

## 2022-05-17 PROCEDURE — A9270 NON-COVERED ITEM OR SERVICE: HCPCS | Performed by: STUDENT IN AN ORGANIZED HEALTH CARE EDUCATION/TRAINING PROGRAM

## 2022-05-17 PROCEDURE — 700111 HCHG RX REV CODE 636 W/ 250 OVERRIDE (IP): Performed by: STUDENT IN AN ORGANIZED HEALTH CARE EDUCATION/TRAINING PROGRAM

## 2022-05-17 PROCEDURE — 99233 SBSQ HOSP IP/OBS HIGH 50: CPT | Mod: FS | Performed by: INTERNAL MEDICINE

## 2022-05-17 PROCEDURE — 85610 PROTHROMBIN TIME: CPT

## 2022-05-17 PROCEDURE — 770020 HCHG ROOM/CARE - TELE (206)

## 2022-05-17 PROCEDURE — 700102 HCHG RX REV CODE 250 W/ 637 OVERRIDE(OP): Performed by: NURSE PRACTITIONER

## 2022-05-17 PROCEDURE — A9270 NON-COVERED ITEM OR SERVICE: HCPCS | Performed by: NURSE PRACTITIONER

## 2022-05-17 PROCEDURE — 99225 PR SUBSEQUENT OBSERVATION CARE,LEVEL II: CPT | Performed by: INTERNAL MEDICINE

## 2022-05-17 PROCEDURE — A9502 TC99M TETROFOSMIN: HCPCS

## 2022-05-17 PROCEDURE — 80048 BASIC METABOLIC PNL TOTAL CA: CPT

## 2022-05-17 RX ORDER — LORAZEPAM 2 MG/1
4 TABLET ORAL
Status: DISCONTINUED | OUTPATIENT
Start: 2022-05-17 | End: 2022-05-18 | Stop reason: HOSPADM

## 2022-05-17 RX ORDER — VALSARTAN 80 MG/1
160 TABLET ORAL TWICE DAILY
Status: DISCONTINUED | OUTPATIENT
Start: 2022-05-17 | End: 2022-05-18 | Stop reason: HOSPADM

## 2022-05-17 RX ORDER — LORAZEPAM 2 MG/1
2 TABLET ORAL
Status: DISCONTINUED | OUTPATIENT
Start: 2022-05-17 | End: 2022-05-18 | Stop reason: HOSPADM

## 2022-05-17 RX ORDER — LORAZEPAM 2 MG/ML
2 INJECTION INTRAMUSCULAR
Status: DISCONTINUED | OUTPATIENT
Start: 2022-05-17 | End: 2022-05-18 | Stop reason: HOSPADM

## 2022-05-17 RX ORDER — LORAZEPAM 1 MG/1
1 TABLET ORAL EVERY 4 HOURS PRN
Status: DISCONTINUED | OUTPATIENT
Start: 2022-05-17 | End: 2022-05-18 | Stop reason: HOSPADM

## 2022-05-17 RX ORDER — TORSEMIDE 20 MG/1
20 TABLET ORAL
Status: DISCONTINUED | OUTPATIENT
Start: 2022-05-18 | End: 2022-05-18 | Stop reason: HOSPADM

## 2022-05-17 RX ORDER — AMINOPHYLLINE 25 MG/ML
100 INJECTION, SOLUTION INTRAVENOUS
Status: DISCONTINUED | OUTPATIENT
Start: 2022-05-17 | End: 2022-05-18 | Stop reason: HOSPADM

## 2022-05-17 RX ORDER — REGADENOSON 0.08 MG/ML
0.4 INJECTION, SOLUTION INTRAVENOUS ONCE
Status: COMPLETED | OUTPATIENT
Start: 2022-05-17 | End: 2022-05-17

## 2022-05-17 RX ORDER — REGADENOSON 0.08 MG/ML
INJECTION, SOLUTION INTRAVENOUS
Status: COMPLETED
Start: 2022-05-17 | End: 2022-05-17

## 2022-05-17 RX ORDER — LORAZEPAM 0.5 MG/1
0.5 TABLET ORAL EVERY 4 HOURS PRN
Status: DISCONTINUED | OUTPATIENT
Start: 2022-05-17 | End: 2022-05-18 | Stop reason: HOSPADM

## 2022-05-17 RX ORDER — LORAZEPAM 2 MG/ML
0.5 INJECTION INTRAMUSCULAR EVERY 4 HOURS PRN
Status: DISCONTINUED | OUTPATIENT
Start: 2022-05-17 | End: 2022-05-18 | Stop reason: HOSPADM

## 2022-05-17 RX ORDER — LORAZEPAM 2 MG/ML
1.5 INJECTION INTRAMUSCULAR
Status: DISCONTINUED | OUTPATIENT
Start: 2022-05-17 | End: 2022-05-18 | Stop reason: HOSPADM

## 2022-05-17 RX ORDER — LORAZEPAM 2 MG/ML
1 INJECTION INTRAMUSCULAR
Status: DISCONTINUED | OUTPATIENT
Start: 2022-05-17 | End: 2022-05-18 | Stop reason: HOSPADM

## 2022-05-17 RX ADMIN — FUROSEMIDE 40 MG: 10 INJECTION, SOLUTION INTRAMUSCULAR; INTRAVENOUS at 05:44

## 2022-05-17 RX ADMIN — REGADENOSON 0.4 MG: 0.08 INJECTION, SOLUTION INTRAVENOUS at 08:30

## 2022-05-17 RX ADMIN — OMEPRAZOLE 20 MG: 20 CAPSULE, DELAYED RELEASE ORAL at 05:47

## 2022-05-17 RX ADMIN — SOTALOL HYDROCHLORIDE 120 MG: 80 TABLET ORAL at 05:45

## 2022-05-17 RX ADMIN — POTASSIUM CHLORIDE 20 MEQ: 1500 TABLET, EXTENDED RELEASE ORAL at 05:44

## 2022-05-17 RX ADMIN — ACETAMINOPHEN 650 MG: 325 TABLET, FILM COATED ORAL at 11:51

## 2022-05-17 RX ADMIN — VALSARTAN 160 MG: 80 TABLET, FILM COATED ORAL at 17:39

## 2022-05-17 RX ADMIN — POLYETHYLENE GLYCOL 3350 1 PACKET: 17 POWDER, FOR SOLUTION ORAL at 09:24

## 2022-05-17 RX ADMIN — APIXABAN 5 MG: 5 TABLET, FILM COATED ORAL at 17:39

## 2022-05-17 RX ADMIN — VALSARTAN 80 MG: 80 TABLET, FILM COATED ORAL at 05:47

## 2022-05-17 RX ADMIN — HYDROCODONE BITARTRATE AND ACETAMINOPHEN 1 TABLET: 10; 325 TABLET ORAL at 21:36

## 2022-05-17 ASSESSMENT — COGNITIVE AND FUNCTIONAL STATUS - GENERAL
DRESSING REGULAR UPPER BODY CLOTHING: A LITTLE
DAILY ACTIVITIY SCORE: 15
HELP NEEDED FOR BATHING: TOTAL
MOVING TO AND FROM BED TO CHAIR: A LOT
SUGGESTED CMS G CODE MODIFIER MOBILITY: CL
MOBILITY SCORE: 14
SUGGESTED CMS G CODE MODIFIER DAILY ACTIVITY: CK
MOVING FROM LYING ON BACK TO SITTING ON SIDE OF FLAT BED: A LOT
TOILETING: A LOT
TURNING FROM BACK TO SIDE WHILE IN FLAT BAD: A LOT
WALKING IN HOSPITAL ROOM: A LITTLE
DRESSING REGULAR LOWER BODY CLOTHING: TOTAL
STANDING UP FROM CHAIR USING ARMS: A LITTLE
CLIMB 3 TO 5 STEPS WITH RAILING: A LOT

## 2022-05-17 ASSESSMENT — LIFESTYLE VARIABLES
AUDITORY DISTURBANCES: NOT PRESENT
HOW MANY TIMES IN THE PAST YEAR HAVE YOU HAD 5 OR MORE DRINKS IN A DAY: 200
HEADACHE, FULLNESS IN HEAD: NOT PRESENT
ORIENTATION AND CLOUDING OF SENSORIUM: ORIENTED AND CAN DO SERIAL ADDITIONS
EVER HAD A DRINK FIRST THING IN THE MORNING TO STEADY YOUR NERVES TO GET RID OF A HANGOVER: YES
ORIENTATION AND CLOUDING OF SENSORIUM: ORIENTED AND CAN DO SERIAL ADDITIONS
SUBSTANCE_ABUSE: 0
VISUAL DISTURBANCES: NOT PRESENT
PAROXYSMAL SWEATS: NO SWEAT VISIBLE
HEADACHE, FULLNESS IN HEAD: NOT PRESENT
ORIENTATION AND CLOUDING OF SENSORIUM: ORIENTED AND CAN DO SERIAL ADDITIONS
AVERAGE NUMBER OF DAYS PER WEEK YOU HAVE A DRINK CONTAINING ALCOHOL: 7
AGITATION: NORMAL ACTIVITY
TOTAL SCORE: 2
HAVE YOU EVER FELT YOU SHOULD CUT DOWN ON YOUR DRINKING: YES
HAVE PEOPLE ANNOYED YOU BY CRITICIZING YOUR DRINKING: YES
ANXIETY: NO ANXIETY (AT EASE)
PAROXYSMAL SWEATS: NO SWEAT VISIBLE
VISUAL DISTURBANCES: NOT PRESENT
PAROXYSMAL SWEATS: NO SWEAT VISIBLE
CONSUMPTION TOTAL: POSITIVE
TREMOR: NO TREMOR
TOTAL SCORE: 3
TREMOR: NO TREMOR
EVER FELT BAD OR GUILTY ABOUT YOUR DRINKING: NO
ANXIETY: NO ANXIETY (AT EASE)
ANXIETY: NO ANXIETY (AT EASE)
VISUAL DISTURBANCES: NOT PRESENT
TOTAL SCORE: 1
AUDITORY DISTURBANCES: NOT PRESENT
NAUSEA AND VOMITING: NO NAUSEA AND NO VOMITING
TREMOR: *
ANXIETY: NO ANXIETY (AT EASE)
TOTAL SCORE: 3
HEADACHE, FULLNESS IN HEAD: MODERATE
PAROXYSMAL SWEATS: NO SWEAT VISIBLE
TOTAL SCORE: 3
ANXIETY: NO ANXIETY (AT EASE)
TOTAL SCORE: 2
AGITATION: NORMAL ACTIVITY
ALCOHOL_USE: YES
VISUAL DISTURBANCES: NOT PRESENT
TREMOR: *
AUDITORY DISTURBANCES: NOT PRESENT
TOTAL SCORE: 3
AUDITORY DISTURBANCES: NOT PRESENT
AUDITORY DISTURBANCES: NOT PRESENT
ORIENTATION AND CLOUDING OF SENSORIUM: ORIENTED AND CAN DO SERIAL ADDITIONS
PAROXYSMAL SWEATS: BARELY PERCEPTIBLE SWEATING. PALMS MOIST
NAUSEA AND VOMITING: NO NAUSEA AND NO VOMITING
DOES PATIENT WANT TO TALK TO SOMEONE ABOUT QUITTING: NO
AGITATION: NORMAL ACTIVITY
HEADACHE, FULLNESS IN HEAD: NOT PRESENT
AGITATION: NORMAL ACTIVITY
NAUSEA AND VOMITING: NO NAUSEA AND NO VOMITING
TOTAL SCORE: 1
AGITATION: NORMAL ACTIVITY
DOES PATIENT WANT TO STOP DRINKING: YES
ORIENTATION AND CLOUDING OF SENSORIUM: ORIENTED AND CAN DO SERIAL ADDITIONS
ON A TYPICAL DAY WHEN YOU DRINK ALCOHOL HOW MANY DRINKS DO YOU HAVE: 20
HEADACHE, FULLNESS IN HEAD: NOT PRESENT
VISUAL DISTURBANCES: NOT PRESENT
TREMOR: TREMOR NOT VISIBLE BUT CAN BE FELT, FINGERTIP TO FINGERTIP

## 2022-05-17 ASSESSMENT — ENCOUNTER SYMPTOMS
LOSS OF CONSCIOUSNESS: 0
VOMITING: 0
BACK PAIN: 0
SHORTNESS OF BREATH: 0
CHILLS: 0
NAUSEA: 0
FOCAL WEAKNESS: 0
SPUTUM PRODUCTION: 0
DIAPHORESIS: 0
ABDOMINAL PAIN: 0
DIARRHEA: 0
WHEEZING: 0
DIZZINESS: 0
CONSTIPATION: 0
COUGH: 0
SENSORY CHANGE: 0
HEADACHES: 0
EYE PAIN: 0
PALPITATIONS: 0
CHEST TIGHTNESS: 0
FEVER: 0
INSOMNIA: 0
BLURRED VISION: 0
FALLS: 0
WEAKNESS: 1

## 2022-05-17 ASSESSMENT — PATIENT HEALTH QUESTIONNAIRE - PHQ9
1. LITTLE INTEREST OR PLEASURE IN DOING THINGS: NOT AT ALL
1. LITTLE INTEREST OR PLEASURE IN DOING THINGS: NOT AT ALL
SUM OF ALL RESPONSES TO PHQ9 QUESTIONS 1 AND 2: 0
2. FEELING DOWN, DEPRESSED, IRRITABLE, OR HOPELESS: NOT AT ALL
2. FEELING DOWN, DEPRESSED, IRRITABLE, OR HOPELESS: NOT AT ALL
SUM OF ALL RESPONSES TO PHQ9 QUESTIONS 1 AND 2: 0

## 2022-05-17 ASSESSMENT — PAIN DESCRIPTION - PAIN TYPE
TYPE: ACUTE PAIN
TYPE: ACUTE PAIN

## 2022-05-17 ASSESSMENT — FIBROSIS 4 INDEX: FIB4 SCORE: 3.149019223495355105

## 2022-05-17 NOTE — DISCHARGE PLANNING
[facility]  acceptance is pending at this time due to the following reason:    No last office visit with PCP within 90 days prior to referral is on file. If patient was accepted by [facility], confirmation that [facility] was able to contact patient and appointment was scheduled is needed before we can accept.   Thank you!

## 2022-05-17 NOTE — ASSESSMENT & PLAN NOTE
Patients has history of alcohol abuse last drink was on admission day. Patient stating he want sot stop drinking  Has been started on CIWA protocol in case patient goes through the withdraws

## 2022-05-17 NOTE — PROGRESS NOTES
On call provider paged regarding CIWA protocal. TYLOR CASILLAS Placed CIWA protocol for pt based upon patients ETOH history.

## 2022-05-17 NOTE — FACE TO FACE
Face to Face Supporting Documentation - Home Health    The encounter with this patient was in whole or in part the primary reason for home health admission.    Date of encounter:   Patient:                    MRN:                       YOB: 2024  Mike Ferrell  [id number]  1950     Home health to see patient for:  Skilled Nursing care for assessment, interventions & education, Physical Therapy evaluation and treatment and Occupational therapy evaluation and treatment    Skilled need for:  Comment: medication managment     Skilled nursing interventions to include:  Comment: Medication managment     Homebound status evidenced by:  Need the aid of supportive devices such as crutches, canes, wheelchairs or walkers. Leaving home requires a considerable and taxing effort. There is a normal inability to leave the home.    Community Physician to provide follow up care: Hipolito Hall M.D.     Optional Interventions? No      I certify the face to face encounter for this home health care referral meets the CMS requirements and the encounter/clinical assessment with the patient was, in whole, or in part, for the medical condition(s) listed above, which is the primary reason for home health care. Based on my clinical findings: the service(s) are medically necessary, support the need for home health care, and the homebound criteria are met.  I certify that this patient has had a face to face encounter by myself.  Bill Fountain M.D. - NPI: [id number]

## 2022-05-17 NOTE — DISCHARGE PLANNING
[facility]TH/[facility] TCN chart review completed. Collaborated with FAIZA Patricia prior to meeting with the pt. The most current review of medical record, knowledge of pt's PLOF and social support, LACE+ score of 75 were considered. 6 clicks scores not present.     Pt seen at bedside, spouse Karen present. Introduced TCN program. Provided education regarding differences in post acute levels of care including IRF, SNF and HH. Discussed HTH/SCP plan benefits including Meds to Beds, medical uber and [facility] transitional care services. Pt verbalizes understanding.    Pt/spouse live in a mobile home with 5 steps to enter. Baseline function, pt is a limited community ambulator with 4WW due to poor endurance.  Spouse notes that the pt had substantial difficulty managing the stairs into their home just prior to this admission. This change in function triggered the decision to come to the hospital.  Choice forms proactively obtained for HH, faxed to [facility] and given to FAIZA. Agreeable to [facility]- referral placed.    TCN will continue to follow and collaborate with discharge planning team as additional post acute needs arise. Thank you.

## 2022-05-17 NOTE — HOSPITAL COURSE
This is a 72 y/o M with PMHX of v tach s/p PPM/AICD, atrial fibrillation on warfarin, CHF, who was admitted on 5/16/22 after presenting to ER complaining SOB, chest pressure and lower extremity swelling. He saw his cardiologist about 1 week ago and at that time due to reported hypotension patient's home spironolactone was stopped, and benazepril dose cut in half to 10 mg. Patient reports compliance with other home medications including lasix. Patient reports the past few days he has felt very fatigued, with chest pressure. Chest pressure is central, non radiating, not worse or better with anything  Patient was admitted for further treatment. Cardiology was also consulted an echo and stress test have been ordered

## 2022-05-17 NOTE — PROGRESS NOTES
HOSPITALIST CROSS COVER    Notified by RN that patient drinks a fifth of whiskey daily, last drink yesterday. Patient currently has tremors. VSS    - CIWA ordered  - Encourage alcohol cessation

## 2022-05-17 NOTE — PROGRESS NOTES
Cardiology Follow Up Progress Note    Date of Service  5/17/2022    Attending Physician  Bill Fountain M.D.    Chief Complaint   Cough    HPI  Mike Ferrell is a 71 y.o. male admitted 5/16/2022 with HFpEF, AF, CHB s/p PPM, VT and ICD for secondary prevention (patient describes VT related to LHC) presents with 10 days acute on chronic heart failure. States increased dyspnea, chest pressure, orthopnea, edema, and cough    Interim Events  5/17/2022: AV paced  VSS  Labs reviewed  MPI today    Review of Systems  Review of Systems   Constitutional: Negative for chills and fever.   Respiratory: Negative for cough, chest tightness, shortness of breath and wheezing.    Cardiovascular: Negative for chest pain, palpitations and leg swelling.   Gastrointestinal: Negative for nausea and vomiting.   Neurological: Negative for dizziness and headaches.   All other systems reviewed and are negative.      Vital signs in last 24 hours  Temp:  [36.3 °C (97.4 °F)-37.2 °C (98.9 °F)] 36.9 °C (98.4 °F)  Pulse:  [72-84] 75  Resp:  [16-20] 20  BP: (114-145)/(73-96) 132/79  SpO2:  [93 %-97 %] 93 %    Physical Exam  Physical Exam  Vitals and nursing note reviewed.   Constitutional:       Appearance: Normal appearance.   HENT:      Head: Normocephalic and atraumatic.      Mouth/Throat:      Mouth: Mucous membranes are moist.   Eyes:      Extraocular Movements: Extraocular movements intact.   Neck:      Comments: No JVD  Cardiovascular:      Rate and Rhythm: Normal rate and regular rhythm.      Pulses: Normal pulses.      Heart sounds: Normal heart sounds. No murmur heard.  Pulmonary:      Effort: Pulmonary effort is normal.      Breath sounds: Normal breath sounds.   Abdominal:      Palpations: Abdomen is soft.   Musculoskeletal:      Cervical back: Normal range of motion and neck supple.   Skin:     General: Skin is warm and dry.   Neurological:      General: No focal deficit present.      Mental Status: He is alert and oriented to person,  place, and time.   Psychiatric:         Mood and Affect: Mood normal.         Behavior: Behavior normal.         Lab Review  Lab Results   Component Value Date/Time    WBC 3.9 (L) 05/17/2022 02:33 AM    RBC 2.79 (L) 05/17/2022 02:33 AM    HEMOGLOBIN 9.9 (L) 05/17/2022 02:33 AM    HEMATOCRIT 29.2 (L) 05/17/2022 02:33 AM    .7 (H) 05/17/2022 02:33 AM    MCH 35.5 (H) 05/17/2022 02:33 AM    MCHC 33.9 05/17/2022 02:33 AM    MPV 10.7 05/17/2022 02:33 AM      Lab Results   Component Value Date/Time    SODIUM 138 05/17/2022 02:33 AM    POTASSIUM 4.7 05/17/2022 02:33 AM    CHLORIDE 104 05/17/2022 02:33 AM    CO2 24 05/17/2022 02:33 AM    GLUCOSE 113 (H) 05/17/2022 02:33 AM    BUN 42 (H) 05/17/2022 02:33 AM    CREATININE 1.35 05/17/2022 02:33 AM    BUNCREATRAT 25 (H) 05/30/2014 09:04 AM      Lab Results   Component Value Date/Time    ASTSGOT 21 05/16/2022 08:11 AM    ALTSGPT 28 05/16/2022 08:11 AM     Lab Results   Component Value Date/Time    CHOLSTRLTOT 320 (H) 05/10/2022 07:36 AM     (H) 05/10/2022 07:36 AM    HDL 76 05/10/2022 07:36 AM    TRIGLYCERIDE 167 (H) 05/10/2022 07:36 AM    TROPONINT 54 (H) 05/16/2022 01:44 PM       Recent Labs     05/16/22  0811   NTPROBNP 950*       Cardiac Imaging and Procedures Review  EKG:  My personal interpretation of the EKG dated 5/16/2022 is AV paced    Echocardiogram: 5/16/2022    CONCLUSIONS  Prior echocardiogram 2/17/2022, no major changes.  The left ventricular ejection fraction is visually estimated to be   greater than 75%.  Normal regional wall motion.      Imaging  Chest X-Ray: 5/16/2022  No acute cardiopulmonary process    Assessment/Plan  1. HFpEF  - Furosemide 40 mg IV daily  -Start carvedilol 3.125 mg twice daily  -Start valsartan 160 mg twice daily  -Hold spironolactone due to elevated K  - Jardiance at discharge  - Daily weights, I/Os    2.  PAF  - Continue sotalol 120 mg twice daily high risk medication  - Continue warfarin as directed    3. CHB and  VT  -AICD    4.  HTN  - Start carvedilol 3.125 mg twice daily    5.  Chest pain  - MPI today    Thank you for allowing me to participate in the care of this patient.      Please contact me with any questions.        TYLOR Banks  [facility]  [phone]    Future Appointments   Date Time Provider Department Center   5/19/2022  9:30 AM [facility] EXAM 4 [facility] None   5/24/2022 11:30 AM [facility] None   5/25/2022  9:45 AM [facility] Castellanos [facility] None   7/7/2022 10:00 AM Daniel Ferrera M.D. [facility] None   7/12/2022  9:20 AM VASCULAR NURSE PRACTITIONER V[facility] None       Please note that this dictation was created using voice recognition software. I have worked with consultants from the vendor as well as technical experts from [facility] to optimize the interface. I have made every reasonable attempt to correct obvious errors, but I expect that there are errors of grammar and possibly content I did not discover before finalizing the note.    My total time spent caring for the patient on the day of the encounter was 15 minutes. This does not include time spent on separately billable procedures/tests.

## 2022-05-17 NOTE — PROGRESS NOTES
Report received from day shift RN, assumed care of pt. Pt A&Ox4. Plan of care discussed with pt, labs and chart reviewed. All needs met at this time. Tele box on. On Ra. Call light within reach, bed locked and in lowest position. All fall precautions and hourly rounding in place. Will continue to monitor.

## 2022-05-17 NOTE — CARE PLAN
Problem: Optimal Care for Alcohol Withdrawal  Goal: Optimal Care for the alcohol withdrawal patient  Outcome: Progressing     Problem: Psychosocial  Goal: Patient's level of anxiety will decrease  Outcome: Progressing     Problem: Psychosocial  Goal: Patient's level of anxiety will decrease  Outcome: Progressing   The patient is Stable - Low risk of patient condition declining or worsening    Shift Goals  Clinical Goals: stress test, monitor chest pain, mobility,  Patient Goals: rest, stress test, go home  Family Goals: NA    Progress made toward(s) clinical / shift goals:  patient calm and cooperative with cares, stress test completed, encourage mobility, complete CIWA for detoxing     Patient is not progressing towards the following goals:

## 2022-05-17 NOTE — PROGRESS NOTES
Hospital Medicine Daily Progress Note    Date of Service  5/17/2022    Chief Complaint  Mike Ferrell is a 71 y.o. male admitted 5/16/2022 with shortness of breath and chest pain     Hospital Course  This is a 70 y/o M with PMHX of v tach s/p PPM/AICD, atrial fibrillation on warfarin, CHF, who was admitted on 5/16/22 after presenting to ER complaining SOB, chest pressure and lower extremity swelling. He saw his cardiologist about 1 week ago and at that time due to reported hypotension patient's home spironolactone was stopped, and benazepril dose cut in half to 10 mg. Patient reports compliance with other home medications including lasix. Patient reports the past few days he has felt very fatigued, with chest pressure. Chest pressure is central, non radiating, not worse or better with anything  Patient was admitted for further treatment. Cardiology was also consulted an echo and stress test have been ordered       Interval Problem Update  Patient seen and examined, afebrile, no overnight events, stress test this morning pending results.   Cardiology also following appreciate rec.   Patient denies any chest pain this morning no nausea or vomiting.     I have personally seen and examined the patient at bedside. I discussed the plan of care with patient, family, bedside RN, charge RN,  and pharmacy.    Consultants/Specialty  cardiology    Code Status  Full Code    Disposition  Patient is not medically cleared for discharge.   Anticipate discharge to to home with close outpatient follow-up.  I have placed the appropriate orders for post-discharge needs.    Review of Systems  Review of Systems   Constitutional: Positive for malaise/fatigue. Negative for chills, diaphoresis and fever.   Eyes: Negative for blurred vision and pain.   Respiratory: Negative for cough, sputum production and shortness of breath.    Cardiovascular: Positive for chest pain and leg swelling. Negative for palpitations.    Gastrointestinal: Negative for abdominal pain, constipation, diarrhea, nausea and vomiting.   Genitourinary: Negative for dysuria and urgency.   Musculoskeletal: Negative for back pain and falls.   Skin: Negative for itching and rash.   Neurological: Positive for weakness. Negative for dizziness, sensory change, focal weakness, loss of consciousness and headaches.   Psychiatric/Behavioral: Negative for substance abuse. The patient does not have insomnia.         Physical Exam  Temp:  [36.3 °C (97.4 °F)-37.2 °C (98.9 °F)] 36.3 °C (97.4 °F)  Pulse:  [72-84] 84  Resp:  [16-18] 18  BP: (114-145)/(73-96) 145/96  SpO2:  [94 %-97 %] 97 %    Physical Exam    Fluids    Intake/Output Summary (Last 24 hours) at 5/17/2022 1232  Last data filed at 5/17/2022 0717  Gross per 24 hour   Intake --   Output 800 ml   Net -800 ml       Laboratory  Recent Labs     05/16/22  0811 05/17/22  0233   WBC 5.8 3.9*   RBC 3.22* 2.79*   HEMOGLOBIN 11.3* 9.9*   HEMATOCRIT 32.7* 29.2*   .6* 104.7*   MCH 35.1* 35.5*   MCHC 34.6 33.9   RDW 48.9 50.4*   PLATELETCT 110* 92*   MPV 10.1 10.7     Recent Labs     05/16/22  0811 05/17/22  0233   SODIUM 137 138   POTASSIUM 4.7 4.7   CHLORIDE 103 104   CO2 22 24   GLUCOSE 109* 113*   BUN 50* 42*   CREATININE 1.46* 1.35   CALCIUM 9.2 8.9     Recent Labs     05/16/22  0811 05/17/22  0233   APTT 26.5  --    INR 1.81* 1.43*               Imaging  NM-CARDIAC STRESS TEST         EC-ECHOCARDIOGRAM COMPLETE W/O CONT   Final Result      CT-HEAD W/O   Final Result      1. Cerebral atrophy.   2. White matter lucencies most consistent with small vessel ischemic change versus demyelination or gliosis.   3. Otherwise, Head CT without contrast with no acute findings. No evidence of acute cerebral infarction, hemorrhage or mass lesion.         DX-CHEST-PORTABLE (1 VIEW)   Final Result      No acute cardiac or pulmonary abnormalities are identified.      Other stable chronic degenerative and postsurgical changes.            Assessment/Plan  * Chest pain- (present on admission)  Assessment & Plan  Presents for chest pressure last few days; appears slightly decompensated from CHF perspective but not overtly so  Troponin T mildly elevated at 66, trend  Echo and stress test pending   Cardiology has also been consulted appreciate rec.     CKD stage G3b/A2, GFR 30-44 and albumin creatinine ratio  mg/g (HCC)- (present on admission)  Assessment & Plan  History of,  Stable  monitor    (HFpEF) heart failure with preserved ejection fraction (HCC)- (present on admission)  Assessment & Plan  Not in acute exacerbation  Slightly decompensated on exam with mild LE edema  Increase lasix dose to IV lasix 40 mg daily for now  Resume home lasix 40 mg daily when euvolemic  Echo pending    Alcohol abuse- (present on admission)  Assessment & Plan  Patients has history of alcohol abuse last drink was on admission day. Patient stating he want sot stop drinking  Has been started on CIWA protocol in case patient goes through the withdraws     Automatic implantable cardioverter-defibrillator in situ- (present on admission)  Assessment & Plan  History of v tach and complete heart block  History of AICD and PPM; interrogated recently at outpatient cardiology office, working well    Atrial fibrillation (HCC)- (present on admission)  Assessment & Plan  History of,  Continue home medications  Warfarin dosing per pharmacy  Will consider switching to eliquis   Will ask CM to see how much it will cost out of pocket for patient        VTE prophylaxis: therapeutic anticoagulation with warfarin     I have performed a physical exam and reviewed and updated ROS and Plan today (5/17/2022). In review of yesterday's note (5/16/2022), there are no changes except as documented above.

## 2022-05-17 NOTE — DISCHARGE PLANNING
Received Choice form at 1016  Agency/Facility Name: [facility]   Referral sent per Choice form @ [phone]

## 2022-05-17 NOTE — PROGRESS NOTES
4 Eyes Skin Assessment Completed by BETTY Cortes and BETTY Cho.    Head WDL  Ears WDL  Nose WDL  Mouth WDL  Neck WDL  Breast/Chest WDL  Shoulder Blades WDL  Spine WDL  (R) Arm/Elbow/Hand Bruising and Scab  (L) Arm/Elbow/Hand Bruising and Scab  Abdomen WDL  Groin WDL  Scrotum/Coccyx/Buttocks WDL  (R) Leg Swelling  (L) Leg Swelling  (R) Heel/Foot/Toe WDL  (L) Heel/Foot/Toe WDL          Devices In Places Tele Box and Pulse Ox      Interventions In Place N/A    Possible Skin Injury No    Pictures Uploaded Into Epic No, needs to be completed  Wound Consult Placed N/A  RN Wound Prevention Protocol Ordered No    RN updated me on hgb now 7.4 - notified GI and next hgb at 10pm.  Will hold lovenox for now. Not a good candidate for rescope given events.      Earl GRAHAM  3:02 PM

## 2022-05-17 NOTE — PROGRESS NOTES
Monitor summary:        Rhythm: paced  Rate: 73-76  Ectopy: (r)PVC  Measurements: P        12hr chart check

## 2022-05-17 NOTE — CARE PLAN
The patient is Stable - Low risk of patient condition declining or worsening    Shift Goals  Clinical Goals: Monitor VS  Patient Goals: Sleep  Family Goals: CYNTHIA. No family present at bedside.    Progress made toward(s) clinical / shift goals:    Problem: Knowledge Deficit - Standard  Goal: Patient and family/care givers will demonstrate understanding of plan of care, disease process/condition, diagnostic tests and medications  Outcome: Progressing     Problem: Optimal Care for Alcohol Withdrawal  Goal: Optimal Care for the alcohol withdrawal patient  5/17/2022 0415 by Sukumar Richards RLinneaN.  Outcome: Progressing  5/17/2022 0414 by Sukumar Richards ROG  Outcome: Progressing     Problem: Hemodynamics  Goal: Patient's hemodynamics, fluid balance and neurologic status will be stable or improve  Outcome: Progressing     Problem: Respiratory  Goal: Patient will achieve/maintain optimum respiratory ventilation and gas exchange  Outcome: Progressing       Patient is not progressing towards the following goals:

## 2022-05-17 NOTE — DISCHARGE PLANNING
Care Transition Team Assessment    LSW completed chart review to obtain the information used in this assessment. Pt lives with his wife in a mobile home that has 5 steps to enter. Pt is normally independent with all ADLs and IADLs. Pt has a 4WW that he uses at baseline. Pt has a history of etoh (current) and SA, however has not used in over a decade.    Information Source  Orientation Level: Oriented X4  Information Given By: Other (Comments) (chart review)  Who is responsible for making decisions for patient? : Patient    Readmission Evaluation  Is this a readmission?: No    Elopement Risk  Legal Hold: No  Ambulatory or Self Mobile in Wheelchair: Yes  Disoriented: No  Psychiatric Symptoms: None  History of Wandering: No  Elopement this Admit: No  Vocalizing Wanting to Leave: No  Displays Behaviors, Body Language Wanting to Leave: No-Not at Risk for Elopement  Elopement Risk: Not at Risk for Elopement    Interdisciplinary Discharge Planning  Patient or legal guardian wants to designate a caregiver: No    Discharge Preparedness  What is your plan after discharge?: Home health care  What are your discharge supports?: Spouse, Child  Prior Functional Level: Ambulatory, Drives Self, Independent with Activities of Daily Living, Independent with Medication Management  Difficulity with ADLs: None  Difficulity with IADLs: None    Functional Assesment  Prior Functional Level: Ambulatory, Drives Self, Independent with Activities of Daily Living, Independent with Medication Management    Finances  Financial Barriers to Discharge: No  Prescription Coverage: Yes    Advance Directive  Advance Directive?: None    Domestic Abuse  Have you ever been the victim of abuse or violence?: No  Physical Abuse or Sexual Abuse: No  Verbal Abuse or Emotional Abuse: No  Possible Abuse/Neglect Reported to:: Not Applicable    Psychological Assessment  History of Substance Abuse: Alcohol  History of Psychiatric Problems: No  Non-compliant with  Treatment: No  Newly Diagnosed Illness: No    Discharge Risks or Barriers  Discharge risks or barriers?: No    Anticipated Discharge Information  Discharge Disposition: D/T to home under [facility] care in anticipation of covered skilled care (06)

## 2022-05-17 NOTE — DISCHARGE PLANNING
Case Management Discharge Planning    Admission Date: 2022  GMLOS:    ALOS: 0    6-Clicks ADL Score: 20  6-Clicks Mobility Score: 19      Anticipated Discharge Dispo:      DME Needed: No    Action(s) Taken: LSW notified that Eliquis was sent to [facility] pharmacy. LSW made phone call, however they are unable to verify pt's insurance. LSW notified that this normally happens when pt's insurance information is . LSW sent Voalte message to DURAN Felipe to verify insurance.    Addendum @6069  DURAN Felipe verified that pt's coverage is active. LSW made phone call to the pharmacy who was able to verify insurance. Co-pay for eliquis is $9.85. MD Fountain notified.    Escalations Completed: Insurance     Medically Clear: No    Next Steps: No DC needs identified at this time.    Barriers to Discharge: Medical clearance    Is the patient up for discharge tomorrow: Yes    Is transport arranged for discharge disposition: No

## 2022-05-17 NOTE — DISCHARGE PLANNING
[facility]  acceptance is pending at this time due to the following reason:    No referral found in Epic. Please provide a valid Home Health referral in order for us to accept.     Thank you!

## 2022-05-18 ENCOUNTER — PHARMACY VISIT (OUTPATIENT)
Dept: PHARMACY | Facility: MEDICAL CENTER | Age: 72
End: 2022-05-18
Payer: MEDICARE

## 2022-05-18 VITALS
DIASTOLIC BLOOD PRESSURE: 83 MMHG | RESPIRATION RATE: 17 BRPM | SYSTOLIC BLOOD PRESSURE: 130 MMHG | OXYGEN SATURATION: 95 % | BODY MASS INDEX: 33.79 KG/M2 | TEMPERATURE: 97.6 F | HEART RATE: 74 BPM | WEIGHT: 249.12 LBS

## 2022-05-18 LAB
ANION GAP SERPL CALC-SCNC: 11 MMOL/L (ref 7–16)
BUN SERPL-MCNC: 39 MG/DL (ref 8–22)
CALCIUM SERPL-MCNC: 9 MG/DL (ref 8.5–10.5)
CHLORIDE SERPL-SCNC: 103 MMOL/L (ref 96–112)
CO2 SERPL-SCNC: 23 MMOL/L (ref 20–33)
CREAT SERPL-MCNC: 1.3 MG/DL (ref 0.5–1.4)
ERYTHROCYTE [DISTWIDTH] IN BLOOD BY AUTOMATED COUNT: 50.9 FL (ref 35.9–50)
GFR SERPLBLD CREATININE-BSD FMLA CKD-EPI: 58 ML/MIN/1.73 M 2
GLUCOSE SERPL-MCNC: 112 MG/DL (ref 65–99)
HCT VFR BLD AUTO: 29.9 % (ref 42–52)
HGB BLD-MCNC: 9.9 G/DL (ref 14–18)
MCH RBC QN AUTO: 34.6 PG (ref 27–33)
MCHC RBC AUTO-ENTMCNC: 33.1 G/DL (ref 33.7–35.3)
MCV RBC AUTO: 104.5 FL (ref 81.4–97.8)
PLATELET # BLD AUTO: 92 K/UL (ref 164–446)
PMV BLD AUTO: 10.4 FL (ref 9–12.9)
POTASSIUM SERPL-SCNC: 4.1 MMOL/L (ref 3.6–5.5)
RBC # BLD AUTO: 2.86 M/UL (ref 4.7–6.1)
SODIUM SERPL-SCNC: 137 MMOL/L (ref 135–145)
WBC # BLD AUTO: 3.4 K/UL (ref 4.8–10.8)

## 2022-05-18 PROCEDURE — A9270 NON-COVERED ITEM OR SERVICE: HCPCS | Performed by: INTERNAL MEDICINE

## 2022-05-18 PROCEDURE — 700102 HCHG RX REV CODE 250 W/ 637 OVERRIDE(OP): Performed by: STUDENT IN AN ORGANIZED HEALTH CARE EDUCATION/TRAINING PROGRAM

## 2022-05-18 PROCEDURE — 80048 BASIC METABOLIC PNL TOTAL CA: CPT

## 2022-05-18 PROCEDURE — 700102 HCHG RX REV CODE 250 W/ 637 OVERRIDE(OP): Performed by: INTERNAL MEDICINE

## 2022-05-18 PROCEDURE — 99239 HOSP IP/OBS DSCHRG MGMT >30: CPT | Performed by: INTERNAL MEDICINE

## 2022-05-18 PROCEDURE — 700102 HCHG RX REV CODE 250 W/ 637 OVERRIDE(OP): Performed by: NURSE PRACTITIONER

## 2022-05-18 PROCEDURE — RXMED WILLOW AMBULATORY MEDICATION CHARGE: Performed by: INTERNAL MEDICINE

## 2022-05-18 PROCEDURE — A9270 NON-COVERED ITEM OR SERVICE: HCPCS | Performed by: STUDENT IN AN ORGANIZED HEALTH CARE EDUCATION/TRAINING PROGRAM

## 2022-05-18 PROCEDURE — 85027 COMPLETE CBC AUTOMATED: CPT

## 2022-05-18 PROCEDURE — A9270 NON-COVERED ITEM OR SERVICE: HCPCS | Performed by: NURSE PRACTITIONER

## 2022-05-18 RX ORDER — VALSARTAN 160 MG/1
160 TABLET ORAL 2 TIMES DAILY
Qty: 30 TABLET | Refills: 3 | Status: SHIPPED | OUTPATIENT
Start: 2022-05-18 | End: 2022-05-25 | Stop reason: SDUPTHER

## 2022-05-18 RX ORDER — TORSEMIDE 20 MG/1
20 TABLET ORAL DAILY
Qty: 30 TABLET | Refills: 3 | Status: SHIPPED | OUTPATIENT
Start: 2022-05-19 | End: 2022-05-25 | Stop reason: SDUPTHER

## 2022-05-18 RX ORDER — CLONIDINE HYDROCHLORIDE 0.1 MG/1
0.1 TABLET ORAL EVERY 6 HOURS PRN
Status: DISCONTINUED | OUTPATIENT
Start: 2022-05-18 | End: 2022-05-18 | Stop reason: HOSPADM

## 2022-05-18 RX ADMIN — POTASSIUM CHLORIDE 20 MEQ: 1500 TABLET, EXTENDED RELEASE ORAL at 06:25

## 2022-05-18 RX ADMIN — VALSARTAN 160 MG: 80 TABLET, FILM COATED ORAL at 06:24

## 2022-05-18 RX ADMIN — OMEPRAZOLE 20 MG: 20 CAPSULE, DELAYED RELEASE ORAL at 06:25

## 2022-05-18 RX ADMIN — SOTALOL HYDROCHLORIDE 120 MG: 80 TABLET ORAL at 06:24

## 2022-05-18 RX ADMIN — APIXABAN 5 MG: 5 TABLET, FILM COATED ORAL at 06:25

## 2022-05-18 RX ADMIN — TORSEMIDE 20 MG: 20 TABLET ORAL at 06:25

## 2022-05-18 ASSESSMENT — LIFESTYLE VARIABLES
TOTAL SCORE: 0
ANXIETY: NO ANXIETY (AT EASE)
TOTAL SCORE: 0
AGITATION: NORMAL ACTIVITY
ORIENTATION AND CLOUDING OF SENSORIUM: ORIENTED AND CAN DO SERIAL ADDITIONS
PAROXYSMAL SWEATS: NO SWEAT VISIBLE
ORIENTATION AND CLOUDING OF SENSORIUM: ORIENTED AND CAN DO SERIAL ADDITIONS
HEADACHE, FULLNESS IN HEAD: NOT PRESENT
AGITATION: NORMAL ACTIVITY
VISUAL DISTURBANCES: NOT PRESENT
HEADACHE, FULLNESS IN HEAD: NOT PRESENT
NAUSEA AND VOMITING: NO NAUSEA AND NO VOMITING
TREMOR: NO TREMOR
ANXIETY: NO ANXIETY (AT EASE)
AUDITORY DISTURBANCES: NOT PRESENT
AUDITORY DISTURBANCES: NOT PRESENT
PAROXYSMAL SWEATS: NO SWEAT VISIBLE
NAUSEA AND VOMITING: NO NAUSEA AND NO VOMITING
VISUAL DISTURBANCES: NOT PRESENT
TREMOR: NO TREMOR

## 2022-05-18 ASSESSMENT — PAIN DESCRIPTION - PAIN TYPE: TYPE: ACUTE PAIN

## 2022-05-18 NOTE — PROGRESS NOTES
Handoff report received from day shift nurse. Patient care assumed. Patient is currently resting in bed. Plan of care discussed with the patient and the patient verbalizes no questions at this time. Patient is A&O x4, on 3L , telemetry monitoring in place and rhythm verified, and vital signs are stable. Patient states their pain is 0/10 at this time. Call light and belongings within reach, bed in lowest and locked position, and patient educated on the use of call light. Will continue plan of care.

## 2022-05-18 NOTE — PROGRESS NOTES
Pt to discharge lounge with CNA. All belongings with pt, wife called and updated on where to pick pt up.

## 2022-05-18 NOTE — DISCHARGE PLANNING
[facility] TCN chart review completed. Collaborated with FAIZA Patricia. Pt already in dc lounge at time of attempt to visit. Note [facility] was pending per chart (2' to no office visit within 90 days PCP prior to referral?). Per bedside RN, MD spoke with pt who reports has PCP. This TCN called PCP office and confirmed pt is established with PCP on Facesheet. FAIZA Patricia sent VOALTE to [facility] to inform of updated information. Awaiting reply from [facility] though pt is likely [place] [facility] [place] [facility] [place] [facility] Would note that pt does appear to have follow up appointment in HF clinic in 7 days a well.  TCN will continue to follow and collaborate with discharge planning team as additional post acute needs arise. Thank you.    Previously completed:  - Choice forms: HH  - [facility] introduced (Y), referral (sent).

## 2022-05-18 NOTE — DISCHARGE SUMMARY
"Discharge Summary    CHIEF COMPLAINT ON ADMISSION  Chief Complaint   Patient presents with   • Shortness of Breath     \"Comes and goes\" worse in the last few days, stopped taking spironolactone 7 days ago and his benazepril was cut in half because hi BP was too low   • Foot Swelling     Increased pedal edema       Reason for Admission  Feet Swollen     Admission Date  5/16/2022    CODE STATUS  Full Code    HPI & HOSPITAL COURSE  This is a 72 y/o M with PMHX of v tach s/p PPM/AICD, atrial fibrillation on warfarin, CHF, who was admitted on 5/16/22 after presenting to ER complaining SOB, chest pressure and lower extremity swelling. He saw his cardiologist about 1 week ago and at that time due to reported hypotension patient's home spironolactone was stopped, and benazepril dose cut in half to 10 mg. Patient reports compliance with other home medications including lasix. Patient reports the past few days he has felt very fatigued, with chest pressure. Chest pressure is central, non radiating, not worse or better with anything  Patient was admitted for further treatment. Cardiology was also consulted an echo and stress test have been ordered. Echo did not show any acute issues and stress test was negative for ischemia. Patient seen by cardiology and was continued on valsartan and also on sotalol Lasix switched to torsemide. Also switched his warfarin to eliquis.  Patient now doing better,  No alcohol withdraws signs. Patient states he will not drink anymore.   Patient now doing better. He will be discharged home today and will need to follow up with cardiology as outpatient       The patient met 2-midnight criteria for an inpatient stay at the time of discharge.    Discharge Date  5/18/2022    FOLLOW UP ITEMS POST DISCHARGE  PCP   Cardiology     DISCHARGE DIAGNOSES  Principal Problem:    Chest pain POA: Yes  Active Problems:    Atrial fibrillation (HCC) POA: Yes    Automatic implantable cardioverter-defibrillator in situ " POA: Yes      Overview: ICD-10 transition    Alcohol abuse POA: Yes    (HFpEF) heart failure with preserved ejection fraction (HCC) POA: Yes    CKD stage G3b/A2, GFR 30-44 and albumin creatinine ratio  mg/g (HCC) POA: Yes  Resolved Problems:    * No resolved hospital problems. *      FOLLOW UP  Future Appointments   Date Time Provider Department Center   5/19/2022  9:30 AM [facility] EXAM 4 VMED None   5/24/2022 11:30 AM [facility] LBN None   5/25/2022  9:45 AM BRODY Castellanos [facility] None   7/7/2022 10:00 AM Daniel Ferrera M.D. CB None   7/12/2022  9:20 AM VASCULAR NURSE PRACTITIONER VMED None     Hipolito Hall M.D.  [address]   [address]  [place] [address]  [phone]          [facility]  [address]  [place] [address]  [phone]          MEDICATIONS ON DISCHARGE     Medication List      START taking these medications      Instructions   Eliquis 5mg Tabs  Generic drug: apixaban   Take 1 Tablet by mouth 2 times a day.  Dose: 5 mg     torsemide 20 MG Tabs  Start taking on: May 19, 2022  Commonly known as: DEMADEX   Take 1 Tablet by mouth every day.  Dose: 20 mg     valsartan 160 MG Tabs  Commonly known as: DIOVAN   Take 1 Tablet by mouth 2 times a day.  Dose: 160 mg        CONTINUE taking these medications      Instructions   colesevelam 625 MG Tabs  Commonly known as: WELCHOL   Take 625 mg by mouth every evening.  Dose: 625 mg     Esomeprazole Magnesium 20 MG Tbec   Take 20 mg by mouth every morning before breakfast.  Dose: 20 mg     HYDROcodone/acetaminophen  MG Tabs  Commonly known as: NORCO   Take 1 Tablet by mouth 2 times a day as needed for Moderate Pain. Indications: Pain  Dose: 1 Tablet     potassium chloride SA 20 MEQ Tbcr  Commonly known as: Kdur   Take 20 mEq by mouth every morning.  Dose: 20 mEq     sotalol 120 MG tablet  Commonly known as: BETAPACE   Take 120 mg by mouth every morning.  Dose: 120 mg     vitamin D3 125 MCG (5000  UT) Caps   Take 5,000 Units by mouth every morning before breakfast.  Dose: 5,000 Units        STOP taking these medications    benazepril 20 MG Tabs  Commonly known as: LOTENSIN     furosemide 40 MG Tabs  Commonly known as: LASIX     warfarin 5 MG Tabs  Commonly known as: COUMADIN            Allergies  Allergies   Allergen Reactions   • Atorvastatin Myalgia   • Statins [Hmg-Coa-R Inhibitors] Itching   • Sulfa Drugs Rash and Itching     Rash, itch       DIET  Orders Placed This Encounter   Procedures   • Diet Order Diet: Cardiac; Second Modifier: (optional): 2 Gram Sodium     Standing Status:   Standing     Number of Occurrences:   1     Order Specific Question:   Diet:     Answer:   Cardiac [6]     Order Specific Question:   Second Modifier: (optional)     Answer:   2 Gram Sodium [7]       ACTIVITY  As tolerated.  Weight bearing as tolerated    CONSULTATIONS  Cardiology     PROCEDURES  None     LABORATORY  Lab Results   Component Value Date    SODIUM 137 05/18/2022    POTASSIUM 4.1 05/18/2022    CHLORIDE 103 05/18/2022    CO2 23 05/18/2022    GLUCOSE 112 (H) 05/18/2022    BUN 39 (H) 05/18/2022    CREATININE 1.30 05/18/2022        Lab Results   Component Value Date    WBC 3.4 (L) 05/18/2022    HEMOGLOBIN 9.9 (L) 05/18/2022    HEMATOCRIT 29.9 (L) 05/18/2022    PLATELETCT 92 (L) 05/18/2022        Total time of the discharge process exceeds 41 minutes.

## 2022-05-18 NOTE — PROGRESS NOTES
Telemetry Strip     Strip printed at 1400   Measurements/rhythm from strip were as follows:  Rhythm: paced  HR: 69-77  Measurements:   Ectopy: PVC              Normal Values  Rhythm SR  HR Range    Measurements 0.12-0.20 / 0.06-0.10  / 0.30-0.52

## 2022-05-18 NOTE — DISCHARGE INSTRUCTIONS
Discharge Instructions    Discharged to home by car with relative. Discharged via wheelchair, hospital escort: Yes.  Special equipment needed: Not Applicable    Be sure to schedule a follow-up appointment with your primary care doctor or any specialists as instructed.     Discharge Plan:   Diet Plan: Discussed  Activity Level: Discussed  Confirmed Follow up Appointment: Appointment Scheduled  Confirmed Symptoms Management: Discussed  Medication Reconciliation Updated: Yes    I understand that a diet low in cholesterol, fat, and sodium is recommended for good health. Unless I have been given specific instructions below for another diet, I accept this instruction as my diet prescription.   Other diet: Regular    Special Instructions: None    Is patient discharged on Warfarin / Coumadin?   No     Depression / Suicide Risk    As you are discharged from this [facility] facility, it is important to learn how to keep safe from harming yourself.    Recognize the warning signs:  Abrupt changes in personality, positive or negative- including increase in energy   Giving away possessions  Change in eating patterns- significant weight changes-  positive or negative  Change in sleeping patterns- unable to sleep or sleeping all the time   Unwillingness or inability to communicate  Depression  Unusual sadness, discouragement and loneliness  Talk of wanting to die  Neglect of personal appearance   Rebelliousness- reckless behavior  Withdrawal from people/activities they love  Confusion- inability to concentrate     If you or a loved one observes any of these behaviors or has concerns about self-harm, here's what you can do:  Talk about it- your feelings and reasons for harming yourself  Remove any means that you might use to hurt yourself (examples: pills, rope, extension cords, firearm)  Get professional help from the community (Mental Health, Substance Abuse, psychological counseling)  Do not be alone:Call your Safe Contact-  someone whom you trust who will be there for you.  Call your local CRISIS HOTLINE 305-0180 or [phone]  Call your local Children's Mobile Crisis Response Team [place] (819) 403-5544 or www.SocialMedia.com  Call the toll free National Suicide Prevention Hotlines   National Suicide Prevention Lifeline 994-129-DQTA (1019)  [facility] Line Network 800-SDCXXYK (908-6649)

## 2022-05-18 NOTE — PROGRESS NOTES
Attention order for walker. Pt commented that they already have a walker at home would like to not accept a new one at this time.

## 2022-05-18 NOTE — HEART FAILURE PROGRAM
Patient has an appt at the HF Clinic in 7 days.  He is currently in the discharge lounge.    Pneumococcal vax in 2016  Atrial fibrillation sent home on Eliquis  No DM diagnosis  Patient has an AICD for secondary prevention  Never smoker per h/p    Previous Patient with Nurse Practitioner MINI Castellanos.  Wednesday May 25, 2022 9:45 AM   [facility]  [address]   [place] [address]  [phone]

## 2022-05-18 NOTE — DISCHARGE PLANNING
ATTN: Case Management  RE: Referral for Home Health    As of 5/18/22, we have accepted the Home Health referral for the patient listed above.    A [facility] clinician will be out to see the patient within 48 hours. If you have any questions or concerns regarding the patient's transition to Home Health, please do not hesitate to contact us at [phone].      We look forward to collaborating with you,  [facility] Home Health Team

## 2022-05-18 NOTE — CARE PLAN
The patient is Watcher - Medium risk of patient condition declining or worsening    Shift Goals  Clinical Goals: stress test, monitor chest pain, mobility,  Patient Goals: rest, stress test, go home  Family Goals: NA    Progress made toward(s) clinical / shift goals:    Problem: Knowledge Deficit - Standard  Goal: Patient and family/care givers will demonstrate understanding of plan of care, disease process/condition, diagnostic tests and medications  Outcome: Progressing     Problem: Optimal Care for Alcohol Withdrawal  Goal: Optimal Care for the alcohol withdrawal patient  Outcome: Progressing     Problem: Seizure Precautions  Goal: Implementation of seizure precautions  Outcome: Progressing     Problem: Lifestyle Changes  Goal: Patient's ability to identify lifestyle changes and available resources to help reduce recurrence of condition will improve  Outcome: Progressing       Patient is not progressing towards the following goals:

## 2022-05-19 ENCOUNTER — HOME CARE VISIT (OUTPATIENT)
Dept: HOME HEALTH SERVICES | Facility: HOME HEALTHCARE | Age: 72
End: 2022-05-19
Payer: MEDICARE

## 2022-05-19 ENCOUNTER — DOCUMENTATION (OUTPATIENT)
Dept: MEDICAL GROUP | Facility: PHYSICIAN GROUP | Age: 72
End: 2022-05-19
Payer: MEDICARE

## 2022-05-19 ENCOUNTER — APPOINTMENT (OUTPATIENT)
Dept: VASCULAR LAB | Facility: MEDICAL CENTER | Age: 72
End: 2022-05-19
Attending: INTERNAL MEDICINE
Payer: MEDICARE

## 2022-05-19 VITALS
RESPIRATION RATE: 18 BRPM | HEIGHT: 72 IN | OXYGEN SATURATION: 96 % | HEART RATE: 71 BPM | BODY MASS INDEX: 32.64 KG/M2 | SYSTOLIC BLOOD PRESSURE: 100 MMHG | WEIGHT: 241 LBS | DIASTOLIC BLOOD PRESSURE: 54 MMHG | TEMPERATURE: 97.4 F

## 2022-05-19 PROCEDURE — 665001 SOC-HOME HEALTH

## 2022-05-19 PROCEDURE — G0493 RN CARE EA 15 MIN HH/HOSPICE: HCPCS

## 2022-05-19 SDOH — ECONOMIC STABILITY: HOUSING INSECURITY: EVIDENCE OF SMOKING MATERIAL: 0

## 2022-05-19 SDOH — ECONOMIC STABILITY: HOUSING INSECURITY: HOME SAFETY: WIFE SMOKES OUTSIDE ONLY.  PATIENT CURRENTLY TRYING TO STOP DRINKING  GAS STOVE- PATIENT DOES NOT COOK

## 2022-05-19 ASSESSMENT — PATIENT HEALTH QUESTIONNAIRE - PHQ9
CLINICAL INTERPRETATION OF PHQ2 SCORE: 0
1. LITTLE INTEREST OR PLEASURE IN DOING THINGS: 00
2. FEELING DOWN, DEPRESSED, IRRITABLE, OR HOPELESS: 00

## 2022-05-19 ASSESSMENT — ENCOUNTER SYMPTOMS
SUBJECTIVE PAIN PROGRESSION: UNCHANGED
HIGHEST PAIN SEVERITY IN PAST 24 HOURS: 5/10
ASSOCIATED SYMPTOMS: DENIES
NAUSEA: DENIES
SHORTNESS OF BREATH: 1
PERSON REPORTING PAIN: PATIENT
PAIN SEVERITY GOAL: 0/10
PAIN: 1
PAIN: DENIES PAIN CURRENTLY
LOWEST PAIN SEVERITY IN PAST 24 HOURS: 0/10
VOMITING: DENIES
DYSPNEA ACTIVITY LEVEL: AT REST

## 2022-05-19 ASSESSMENT — FIBROSIS 4 INDEX: FIB4 SCORE: 3.149019223495355105

## 2022-05-19 ASSESSMENT — ACTIVITIES OF DAILY LIVING (ADL): OASIS_M1830: 03

## 2022-05-19 NOTE — PROGRESS NOTES
Medication chart review for [facility] services        Current medication list     Current Outpatient Medications:   •  Home Care Oxygen, 2 L/min, Inhalation, PRN  •  Colchicine, 0.6 mg, Oral, QDAY PRN  •  calcium carbonate, 500 mg, Oral, TID PRN  •  acetaminophen, 625 mg, Oral, Q6HRS PRN  •  apixaban, 5 mg, Oral, BID  •  torsemide, 20 mg, Oral, DAILY  •  valsartan, 160 mg, Oral, BID  •  Esomeprazole Magnesium, 20 mg, Oral, QAM AC  •  sotalol, 120 mg, Oral, QAM  •  potassium chloride SA, 20 mEq, Oral, QAM  •  HYDROcodone/acetaminophen, 1 Tablet, Oral, BID PRN  •  colesevelam, 625 mg, Oral, Q EVENING  •  vitamin D3, 5,000 Units, Oral, QAM AC    Pt recently discharged from:   [facility], discharge summary 5/18/2022  Discharge medication summary:        Medication List           START taking these medications      Instructions   Eliquis 5mg Tabs  Generic drug: apixaban    Take 1 Tablet by mouth 2 times a day.  Dose: 5 mg      torsemide 20 MG Tabs  Start taking on: May 19, 2022  Commonly known as: DEMADEX    Take 1 Tablet by mouth every day.  Dose: 20 mg      valsartan 160 MG Tabs  Commonly known as: DIOVAN    Take 1 Tablet by mouth 2 times a day.  Dose: 160 mg                  CONTINUE taking these medications      Instructions   colesevelam 625 MG Tabs  Commonly known as: WELCHOL    Take 625 mg by mouth every evening.  Dose: 625 mg      Esomeprazole Magnesium 20 MG Tbec    Take 20 mg by mouth every morning before breakfast.  Dose: 20 mg      HYDROcodone/acetaminophen  MG Tabs  Commonly known as: NORCO    Take 1 Tablet by mouth 2 times a day as needed for Moderate Pain. Indications: Pain  Dose: 1 Tablet      potassium chloride SA 20 MEQ Tbcr  Commonly known as: Kdur    Take 20 mEq by mouth every morning.  Dose: 20 mEq      sotalol 120 MG tablet  Commonly known as: BETAPACE    Take 120 mg by mouth every morning.  Dose: 120 mg      vitamin D3 125 MCG (5000 UT) Caps    Take 5,000 Units by mouth  every morning before breakfast.  Dose: 5,000 Units          STOP taking these medications    benazepril 20 MG Tabs  Commonly known as: LOTENSIN      furosemide 40 MG Tabs  Commonly known as: LASIX      warfarin 5 MG Tabs  Commonly known as: COUMADIN            Allergies   Allergen Reactions   • Atorvastatin Myalgia   • Statins [Hmg-Coa-R Inhibitors] Itching   • Sulfa Drugs Rash and Itching     Rash, itch         Medications/supplements on DC summary but not on home med list   no    Medications/supplements on home med list but not DC summary          Labs     Lab Results   Component Value Date/Time    SODIUM 137 05/18/2022 02:49 AM    POTASSIUM 4.1 05/18/2022 02:49 AM    CHLORIDE 103 05/18/2022 02:49 AM    CO2 23 05/18/2022 02:49 AM    GLUCOSE 112 (H) 05/18/2022 02:49 AM    BUN 39 (H) 05/18/2022 02:49 AM    CREATININE 1.30 05/18/2022 02:49 AM    BUNCREATRAT 25 (H) 05/30/2014 09:04 AM     Lab Results   Component Value Date/Time    ALKPHOSPHAT 70 05/16/2022 08:11 AM    ASTSGOT 21 05/16/2022 08:11 AM    ALTSGPT 28 05/16/2022 08:11 AM    TBILIRUBIN 1.0 05/16/2022 08:11 AM    INR 1.43 (H) 05/17/2022 02:33 AM    ALBUMIN 4.1 05/16/2022 08:11 AM        Assessment and Plan:   • Received referral from [facility]. Medications reviewed, and compared with discharge summary if available.        CC   Hipolito Hall M.D.  [address] [place] [address]  Fax: [phone]    Sotero Chairez, PharmD, MS, BCACP, LCC  [facility]  Phone [phone] fax [phone]    This note was created using voice recognition software (Dragon). The accuracy of the dictation is limited by the abilities of the software. I have reviewed the note prior to signing, however some errors in grammar and context are still possible. If you have any questions related to this note please do not hesitate to contact our office.

## 2022-05-19 NOTE — DOCUMENTATION QUERY
"                                                                         [facility]                                                                       Query Response Note      PATIENT:               BAILEY RICHARD  ACCT #:                  [id number]  MRN:                     [id number]  :                      1950  ADMIT DATE:       2022 7:41 AM  DISCH DATE:        2022 11:30 AM  RESPONDING  PROVIDER #:        [id number]           QUERY TEXT:    Atrial fibrillation is documented in the Medical Record.  Please specify the type.  Thank you. Please call me for any questions.   CDI Specialist - June Carlson RN BSN CCDS - cell [phone]      The patient's Clinical Indicators include:  Documentation: ED Note, H&P, Cardiology Consult, Daily Progress Notes - \"atrial fibrillation on warfarin\"  22 Cardiology Progress Note:   PAF  - Continue sotalol 120 mg twice daily high risk medication  - Continue warfarin as directed  Options provided:   -- *** Note: Please review the listed options. If an applicable response is not listed. Please provide further clarification and specificity by documenting the desired response in a new note.   -- Chronic atrial fibrillation, unspecified (may refer to persistent, longstanding persistent or permanent AF.  A more specific descriptive term is preferred over the nonspecific AF   -- Longstanding persistent atrial fibrillation (AF that is persistent and continuous, lasting longer than 1 year)   -- Other persistent atrial fibrillation (AF that does not terminate within 7 days or that requires repeat pharmacological or electrical cardioversion.   -- Permanent atrial fibrillation (persistent or longstanding persistent AF where cardioversion cannot or will not be performed   -- Paroxysmal atrial fibrillation (self-terminating or intermittent spontaneously or with intervention within 7 days of onset)   -- Unable to determine      Query created by: June Carlson on " 5/18/2022 9:12 AM    RESPONSE TEXT:    Paroxysmal atrial fibrillation (self-terminating or intermittent spontaneously or with intervention within 7 days of onset)          Electronically signed by:  CARIDAD WATSON MD 5/19/2022 9:05 AM

## 2022-05-19 NOTE — DISCHARGE PLANNING
Meds-to-Beds: Discharge prescription orders listed below delivered to patient in discharge lounge. RNs Cheyenne and Lakshmi notified. Patient counseled. Patient elected to have co-payment billed to patient account.      Current Outpatient Medications   Medication Sig Dispense Refill   • apixaban (ELIQUIS) 5mg Tab Take 1 Tablet by mouth 2 times a day. 60 Tablet 0   • [START ON 5/19/2022] torsemide (DEMADEX) 20 MG Tab Take 1 Tablet by mouth every day. 30 Tablet 3   • valsartan (DIOVAN) 160 MG Tab Take 1 Tablet by mouth 2 times a day. 30 Tablet 3      Geovanna Gonzalez, PharmD

## 2022-05-20 ENCOUNTER — HOME CARE VISIT (OUTPATIENT)
Dept: HOME HEALTH SERVICES | Facility: HOME HEALTHCARE | Age: 72
End: 2022-05-20
Payer: MEDICARE

## 2022-05-20 VITALS
OXYGEN SATURATION: 100 % | SYSTOLIC BLOOD PRESSURE: 100 MMHG | HEART RATE: 74 BPM | TEMPERATURE: 97 F | WEIGHT: 241 LBS | DIASTOLIC BLOOD PRESSURE: 62 MMHG | RESPIRATION RATE: 18 BRPM | BODY MASS INDEX: 32.69 KG/M2

## 2022-05-20 PROCEDURE — G0151 HHCP-SERV OF PT,EA 15 MIN: HCPCS

## 2022-05-20 SDOH — ECONOMIC STABILITY: HOUSING INSECURITY
HOME SAFETY: PT TRACED TUBING TO CONCENTRATOR AND DID NOT NOTE ANY LEAKS OR PROBLEMS. CONCENTRATOR SET AT 2 LITERS.  NOTED NO OPEN FLAMES. PT EDUCATED IN O2 SAFETY WITH NO OPEN FLAMES/SMOKING, NO PETROLEUM PRODUCTS, VENTILATION OF BACK OF MACHINE. PATIENT ABLE TO

## 2022-05-20 SDOH — ECONOMIC STABILITY: HOUSING INSECURITY
HOME SAFETY: N-SKID FOOTWEAR, STABILIZING OF FRONT RAILINGS, RECOMMENDED RAMP TO HOME.  PATIENT AND WIFE ABLE TO SPEAKBACK VERBAL UNDERSTANDING.  STATE THEY ARE TALKING ABOUT THE RAMP.

## 2022-05-20 SDOH — ECONOMIC STABILITY: HOUSING INSECURITY
HOME SAFETY: SPEAKBACK VERBAL UNDERSTANDING.  EDUCATED IN NEED FOR ORDER FOR O2 FROM MD AS PATIENT REPORTS FAMILY MEMBER PROVIDED O2 CONCENTRATOR.  PATIENT REPORTS HE SLEEPS MUCH BETTER WITH O2.    PT EDUCATED ON REMOVAL OF TOWELS FROM FLOOR BY TOLIET, USE OF NO

## 2022-05-20 ASSESSMENT — ENCOUNTER SYMPTOMS
PAIN LOCATION - RELIEVING FACTORS: REST, MEDICATION
SEVERE DYSPNEA: 1
PERSON REPORTING PAIN: PATIENT
PAIN LOCATION - PAIN QUALITY: ACHY
PAIN LOCATION: GENERALIZED
LOWEST PAIN SEVERITY IN PAST 24 HOURS: 0/10
HIGHEST PAIN SEVERITY IN PAST 24 HOURS: 6/10
ARTHRALGIAS: 1
PAIN LOCATION - PAIN DURATION: CHRONIC
MUSCLE WEAKNESS: 1
LIMITED RANGE OF MOTION: 1
PAIN LOCATION - EXACERBATING FACTORS: WALKING, ACTIVITY
PAIN LOCATION - PAIN FREQUENCY: INTERMITTENT
PAIN LOCATION - PAIN SEVERITY: 0/10
SUBJECTIVE PAIN PROGRESSION: WAXING AND WANING
PAIN: 1
PAIN SEVERITY GOAL: 0/10

## 2022-05-20 ASSESSMENT — ACTIVITIES OF DAILY LIVING (ADL)
TRANSPORTATION COMMENTS: REQUIRES ASSIST OF ANOTHER PERSON AND AD OUT OF HOME ON UNEVEN SURFACES AND STEPS
AMBULATION ASSISTANCE ON FLAT SURFACES: 1

## 2022-05-20 ASSESSMENT — FIBROSIS 4 INDEX: FIB4 SCORE: 3.149019223495355105

## 2022-05-20 NOTE — CASE COMMUNICATION
PT evaluation completed on 5/20/22.  Frequency 1 week 1, 2 week 4 effective 5/20/22.  Please assist with authorization as needed.

## 2022-05-20 NOTE — CASE COMMUNICATION
fyi, Patient's temporal temperature at 97.0 degrees is outside of normal parameters and is to be reported to MD and CM. Patient denies any dizziness, light headedness, chest pain.  All other vitals are within HH parameters.  BP was 100/62.  Weight was same as yesterday's visit 241 lbs.      Please fax to Dr Hipolito Hall

## 2022-05-23 ENCOUNTER — HOME CARE VISIT (OUTPATIENT)
Dept: HOME HEALTH SERVICES | Facility: HOME HEALTHCARE | Age: 72
End: 2022-05-23
Payer: MEDICARE

## 2022-05-23 ENCOUNTER — HOSPITAL ENCOUNTER (OUTPATIENT)
Facility: MEDICAL CENTER | Age: 72
End: 2022-05-23
Attending: NURSE PRACTITIONER
Payer: MEDICARE

## 2022-05-23 DIAGNOSIS — R60.9 EDEMA, UNSPECIFIED TYPE: ICD-10-CM

## 2022-05-23 DIAGNOSIS — I50.30 HEART FAILURE WITH PRESERVED EJECTION FRACTION, UNSPECIFIED HF CHRONICITY (HCC): ICD-10-CM

## 2022-05-23 LAB
ANION GAP SERPL CALC-SCNC: 15 MMOL/L (ref 7–16)
BUN SERPL-MCNC: 74 MG/DL (ref 8–22)
CALCIUM SERPL-MCNC: 9.8 MG/DL (ref 8.5–10.5)
CHLORIDE SERPL-SCNC: 103 MMOL/L (ref 96–112)
CO2 SERPL-SCNC: 21 MMOL/L (ref 20–33)
CREAT SERPL-MCNC: 2.74 MG/DL (ref 0.5–1.4)
GFR SERPLBLD CREATININE-BSD FMLA CKD-EPI: 24 ML/MIN/1.73 M 2
GLUCOSE SERPL-MCNC: 87 MG/DL (ref 65–99)
NT-PROBNP SERPL IA-MCNC: 421 PG/ML (ref 0–125)
POTASSIUM SERPL-SCNC: 4.8 MMOL/L (ref 3.6–5.5)
SODIUM SERPL-SCNC: 139 MMOL/L (ref 135–145)

## 2022-05-23 PROCEDURE — 80048 BASIC METABOLIC PNL TOTAL CA: CPT

## 2022-05-23 PROCEDURE — G0153 HHCP-SVS OF S/L PATH,EA 15MN: HCPCS

## 2022-05-23 PROCEDURE — 83880 ASSAY OF NATRIURETIC PEPTIDE: CPT

## 2022-05-23 PROCEDURE — G0299 HHS/HOSPICE OF RN EA 15 MIN: HCPCS

## 2022-05-23 ASSESSMENT — FIBROSIS 4 INDEX: FIB4 SCORE: 3.149019223495355105

## 2022-05-23 ASSESSMENT — ENCOUNTER SYMPTOMS
CHANGE IN APPETITE: INCREASED
APPETITE LEVEL: GOOD

## 2022-05-24 ENCOUNTER — HOME CARE VISIT (OUTPATIENT)
Dept: HOME HEALTH SERVICES | Facility: HOME HEALTHCARE | Age: 72
End: 2022-05-24
Payer: MEDICARE

## 2022-05-24 VITALS
TEMPERATURE: 97.1 F | DIASTOLIC BLOOD PRESSURE: 60 MMHG | HEART RATE: 74 BPM | OXYGEN SATURATION: 97 % | RESPIRATION RATE: 18 BRPM | SYSTOLIC BLOOD PRESSURE: 112 MMHG

## 2022-05-24 VITALS
DIASTOLIC BLOOD PRESSURE: 60 MMHG | HEART RATE: 81 BPM | RESPIRATION RATE: 18 BRPM | OXYGEN SATURATION: 96 % | SYSTOLIC BLOOD PRESSURE: 112 MMHG | TEMPERATURE: 97.2 F

## 2022-05-24 PROCEDURE — G0157 HHC PT ASSISTANT EA 15: HCPCS | Mod: CQ

## 2022-05-24 PROCEDURE — G0152 HHCP-SERV OF OT,EA 15 MIN: HCPCS

## 2022-05-24 SDOH — ECONOMIC STABILITY: HOUSING INSECURITY
HOME SAFETY: 5 WOODEN STEPS UP TO DECK IN FRONT OF MANUFACTURED HOME ARE IN POOR REPAIR. PT/SPOUSE ARE INTERESTED IN A RAMP.

## 2022-05-24 SDOH — ECONOMIC STABILITY: HOUSING INSECURITY: EVIDENCE OF SMOKING MATERIAL: 1

## 2022-05-24 ASSESSMENT — ACTIVITIES OF DAILY LIVING (ADL)
ORAL_CARE_CURRENT_FUNCTION: INDEPENDENT
SHOPPING ASSESSED: 1
USING THE TELPHONE: INDEPENDENT
LAUNDRY ASSESSED: 1
PREPARING MEALS: NEEDS ASSISTANCE
LIGHT HOUSEKEEPING: DEPENDENT
AMBULATION ASSISTANCE: SUPERVISION
ORAL_CARE_ASSESSED: 1
TOILETING EQUIPMENT USED: MOD I WITH GRAB BAR
TOILETING: 1
DRESSING_UB_CURRENT_FUNCTION: MINIMUM ASSIST
BATHING_CURRENT_FUNCTION: MODERATE ASSIST
TELEPHONE USE ASSESSED: 1
HOUSEKEEPING ASSESSED: 1
GROOMING_CURRENT_FUNCTION: MINIMUM ASSIST
TOILETING: INDEPENDENT
FEEDING: INDEPENDENT
AMBULATION ASSISTANCE: 1
GROOMING ASSESSED: 1
TRANSPORTATION: DEPENDENT
TRANSPORTATION ASSESSED: 1
BATHING ASSESSED: 1
DRESSING_LB_CURRENT_FUNCTION: MAXIMUM ASSIST
FEEDING ASSESSED: 1
SHOPPING: DEPENDENT
LAUNDRY: DEPENDENT

## 2022-05-24 ASSESSMENT — ENCOUNTER SYMPTOMS
DENIES PAIN: 1
PERSON REPORTING PAIN: PATIENT
LOWEST PAIN SEVERITY IN PAST 24 HOURS: 0/10
HIGHEST PAIN SEVERITY IN PAST 24 HOURS: 0/10
DENIES PAIN: 1
PAIN SEVERITY GOAL: 0/10
PERSON REPORTING PAIN: PATIENT

## 2022-05-25 ENCOUNTER — HOME CARE VISIT (OUTPATIENT)
Dept: HOME HEALTH SERVICES | Facility: HOME HEALTHCARE | Age: 72
End: 2022-05-25
Payer: MEDICARE

## 2022-05-25 ENCOUNTER — OFFICE VISIT (OUTPATIENT)
Dept: CARDIOLOGY | Facility: MEDICAL CENTER | Age: 72
End: 2022-05-25
Payer: MEDICARE

## 2022-05-25 VITALS
BODY MASS INDEX: 33.59 KG/M2 | OXYGEN SATURATION: 96 % | HEART RATE: 72 BPM | WEIGHT: 248 LBS | RESPIRATION RATE: 16 BRPM | SYSTOLIC BLOOD PRESSURE: 80 MMHG | DIASTOLIC BLOOD PRESSURE: 58 MMHG | HEIGHT: 72 IN

## 2022-05-25 DIAGNOSIS — Z78.9 STATIN INTOLERANCE: ICD-10-CM

## 2022-05-25 DIAGNOSIS — I47.29 PAROXYSMAL VENTRICULAR TACHYCARDIA (HCC): ICD-10-CM

## 2022-05-25 DIAGNOSIS — I49.5 SINOATRIAL NODE DYSFUNCTION (HCC): ICD-10-CM

## 2022-05-25 DIAGNOSIS — I50.30 ACC/AHA STAGE C HEART FAILURE WITH PRESERVED EJECTION FRACTION (HCC): ICD-10-CM

## 2022-05-25 DIAGNOSIS — I50.32 CHRONIC HEART FAILURE WITH PRESERVED EJECTION FRACTION (HCC): ICD-10-CM

## 2022-05-25 DIAGNOSIS — I48.0 PAROXYSMAL ATRIAL FIBRILLATION (HCC): ICD-10-CM

## 2022-05-25 DIAGNOSIS — I50.32 CHRONIC DIASTOLIC CONGESTIVE HEART FAILURE, NYHA CLASS 3 (HCC): ICD-10-CM

## 2022-05-25 PROCEDURE — 99215 OFFICE O/P EST HI 40 MIN: CPT | Performed by: NURSE PRACTITIONER

## 2022-05-25 PROCEDURE — G0155 HHCP-SVS OF CSW,EA 15 MIN: HCPCS

## 2022-05-25 PROCEDURE — G2212 PROLONG OUTPT/OFFICE VIS: HCPCS | Performed by: NURSE PRACTITIONER

## 2022-05-25 PROCEDURE — 93000 ELECTROCARDIOGRAM COMPLETE: CPT | Performed by: INTERNAL MEDICINE

## 2022-05-25 RX ORDER — DULOXETIN HYDROCHLORIDE 30 MG/1
CAPSULE, DELAYED RELEASE ORAL
COMMUNITY
Start: 2022-04-07 | End: 2022-05-25

## 2022-05-25 RX ORDER — AMLODIPINE BESYLATE 5 MG/1
TABLET ORAL
COMMUNITY
Start: 2022-05-03 | End: 2022-05-25

## 2022-05-25 RX ORDER — POTASSIUM CHLORIDE 20 MEQ/1
20 TABLET, EXTENDED RELEASE ORAL
Qty: 30 TABLET | Refills: 5 | Status: SHIPPED | OUTPATIENT
Start: 2022-05-25 | End: 2022-07-01

## 2022-05-25 RX ORDER — VALSARTAN 80 MG/1
80 TABLET ORAL 2 TIMES DAILY
Qty: 60 TABLET | Refills: 11 | Status: ON HOLD | OUTPATIENT
Start: 2022-05-25 | End: 2022-07-07

## 2022-05-25 RX ORDER — TORSEMIDE 20 MG/1
20 TABLET ORAL
Qty: 30 TABLET | Refills: 5 | Status: SHIPPED | OUTPATIENT
Start: 2022-05-25 | End: 2022-07-01

## 2022-05-25 ASSESSMENT — FIBROSIS 4 INDEX: FIB4 SCORE: 3.149019223495355105

## 2022-05-25 NOTE — PROGRESS NOTES
Chief Complaint   Patient presents with   • Congestive Heart Failure     F/V Dx: Heart failure with preserved ejection fraction, unspecified HF chronicity (HCC)   • Atrial Fibrillation   • Hypertension     F/V Dx: Essential hypertension           Subjective     Mike Ferrell is a 71 y.o. male who presents today hospital follow-up with his wife, Karen.  Patient was recently admitted on 5/16/2022 for fluid volume overload.  Patient has past medical history of HFpEF, AF, CHB s/p PPM, VT s/p ICD for secondary prevention.  Since patient was discharged she was evaluated by his PCP and noted to have significant increase in creatinine and his diuretics were stopped.    Today, patient reports increased fatigue, nonspecific vision changes, and per persistent decreased appetite, and 1 to 2 pound weight gain.  Otherwise, patient denies chest pain, shortness of breath, palpitations, dizziness/lightheadedness, orthopnea, PND or Edema.  Patient and wife report they have been working closely with home health regarding monitoring their blood pressure daily weights.  Patient endorses medication compliance and her not sure why diuretics were stopped.  Patient and wife report poor insight to underlying disease process and need for multiple medications.  Lengthy discussion regarding heart failure review of heart failure educational booklet.    Based on physical examination findings, patient is near euvolemic. No JVD, lungs are clear to auscultation, no pitting edema in bilateral lower extremities, no ascites. Dry weight is 241 lbs.  Home weight today is 242 pounds.  Patient low systolic blood pressure in exam today.  Patient reports blood pressure ranging from 90s to 110s systolically at home.  Patient reports symptoms today, declined symptoms other days.    In reviewing patient's medication therapy we discussed Norco taken every 4 hours for arthritis along with GDMT per below.  We discussed limiting factor for HF OMT.  We also  "discussed continuing to hold diuretic therapy until follow-up lab work next week.  Parameters for weight gain provided for patient to take additional torsemide dose as needed.    Patient would benefit from further heart failure education, discussed sodium restriction.  Patient declines drinking more than 64 ounces of water daily.  Patient and wife's excellent questions were answered to the best of my knowledge.  He remained anxious regarding disease process, will have heart failure education nursing contact patient in the interim with close follow-up in 2 weeks.    Finally, briefly discussed frequent hospitalizations for optimized diuretic therapy management.  Provided CardioMEMS information, will continue to discuss at follow-up.    Past Medical History:   Diagnosis Date   • AICD (automatic cardioverter/defibrillator) present    • Arthritis     generalized everywhere   • Atrial fibrillation (HCC) 4/4/2012   • Bowel habit changes     constipation   • Breath shortness     \"since 1996 from [place]\"   • Cataract 12/15/2021    bilat, no surgey yet   • Chronic back pain    • High cholesterol    • Hypertension    • Methamphetamine use ([facility]) none for many years   • Pacemaker     defibrilator   • Pain 12/15/2021    back, right hip and knee, 8/10   • Paroxysmal ventricular tachycardia (HCC) 4/4/2012   • Pneumonia     in the past   • Sinoatrial node dysfunction (HCC) 4/4/2012    Dr. Daniel Ferrera   • Syncope and collapse 4/4/2012     Past Surgical History:   Procedure Laterality Date   • AICD IMPLANT  2010   • PACEMAKER INSERTION  2009   • CHOLECYSTECTOMY  1999   • TONSILLECTOMY  1953     History reviewed. No pertinent family history.  Social History     Socioeconomic History   • Marital status:      Spouse name: Not on file   • Number of children: Not on file   • Years of education: Not on file   • Highest education level: Not on file   Occupational History   • Not on file   Tobacco Use   • Smoking status: Never " Smoker   • Smokeless tobacco: Never Used   Vaping Use   • Vaping Use: Never used   Substance and Sexual Activity   • Alcohol use: Not Currently     Comment: No drinks since 5/15/22   • Drug use: Not Currently     Comment: 25 years ago   • Sexual activity: Not on file   Other Topics Concern   • Not on file   Social History Narrative   • Not on file     Social Determinants of Health     Financial Resource Strain: Not on file   Food Insecurity: Not on file   Transportation Needs: Not on file   Physical Activity: Not on file   Stress: Not on file   Social Connections: Not on file   Intimate Partner Violence: Not on file   Housing Stability: Not on file     Allergies   Allergen Reactions   • Atorvastatin Myalgia   • Statins [Hmg-Coa-R Inhibitors] Itching   • Sulfa Drugs Rash and Itching     Rash, itch     Outpatient Encounter Medications as of 5/25/2022   Medication Sig Dispense Refill   • torsemide (DEMADEX) 20 MG Tab Take 1 Tablet by mouth 1 time a day as needed (for weight gain and swelling). 30 Tablet 5   • valsartan (DIOVAN) 80 MG Tab Take 1 Tablet by mouth 2 times a day. 60 Tablet 11   • potassium chloride SA (KDUR) 20 MEQ Tab CR Take 1 Tablet by mouth 1 time a day as needed (with torsemide). 30 Tablet 5   • Home Care Oxygen Inhale 2 L/min as needed for Shortness of Breath (PRN SOB and at night). Oxygen dose range: 2 L/min  Respiratory route via: Nasal Cannula   Oxygen supplier: none- received concentrator from son     • Colchicine 0.6 MG Cap Take 0.6 mg by mouth 1 time a day as needed (gout flair up).     • calcium carbonate (TUMS) 500 MG Chew Tab Chew 500 mg 3 times a day as needed (heartburn).     • acetaminophen (TYLENOL) 650 MG CR tablet Take 625 mg by mouth every 6 hours as needed for Fever or Mild Pain.     • apixaban (ELIQUIS) 5mg Tab Take 1 Tablet by mouth 2 times a day. 60 Tablet 0   • Esomeprazole Magnesium 20 MG Tablet Delayed Response Take 20 mg by mouth every morning before breakfast.     • sotalol  (BETAPACE) 120 MG tablet Take 120 mg by mouth every morning.     • HYDROcodone/acetaminophen (NORCO)  MG Tab Take 1 Tablet by mouth every 6 hours as needed for Moderate Pain. Indications: Pain     • colesevelam (WELCHOL) 625 MG Tab Take 625 mg by mouth every evening.     • Cholecalciferol (VITAMIN D3) 5000 units Cap Take 5,000 Units by mouth every morning before breakfast.     • [DISCONTINUED] amLODIPine (NORVASC) 5 MG Tab  (Patient not taking: Reported on 5/25/2022)     • [DISCONTINUED] DULoxetine (CYMBALTA) 30 MG Cap DR Particles  (Patient not taking: Reported on 5/25/2022)     • [DISCONTINUED] omeprazole (PRILOSEC) 20 MG delayed-release capsule Take 20 mg by mouth every day. (Patient not taking: Reported on 5/25/2022)     • [DISCONTINUED] torsemide (DEMADEX) 20 MG Tab Take 1 Tablet by mouth every day. 30 Tablet 3   • [DISCONTINUED] valsartan (DIOVAN) 160 MG Tab Take 1 Tablet by mouth 2 times a day. 30 Tablet 3   • [DISCONTINUED] potassium chloride SA (KDUR) 20 MEQ Tab CR Take 20 mEq by mouth every morning.       No facility-administered encounter medications on file as of 5/25/2022.     Review of Systems   Constitutional: Positive for malaise/fatigue. Negative for fever and weight loss.        +weight gain   HENT:        Light sensitvity   Eyes: Negative for blurred vision.   Respiratory: Negative for cough and shortness of breath.    Cardiovascular: Negative for chest pain, palpitations, orthopnea, claudication, leg swelling and PND.   Gastrointestinal: Negative for abdominal pain, blood in stool, nausea and vomiting.        +decreased appetite   Genitourinary: Negative for dysuria, frequency and hematuria.   Musculoskeletal: Positive for back pain and joint pain. Negative for falls and myalgias.   Neurological: Negative for dizziness, tingling and loss of consciousness.   Endo/Heme/Allergies: Does not bruise/bleed easily.              Objective     BP (!) 80/58 (BP Location: Left arm, Patient Position:  Sitting, BP Cuff Size: Adult)   Pulse 72   Resp 16   Ht 1.829 m (6')   Wt 112 kg (248 lb)   SpO2 96%   BMI 33.63 kg/m²     Physical Exam  Vitals reviewed.   Constitutional:       Appearance: He is well-developed.      Comments: BMI 33   HENT:      Head: Normocephalic and atraumatic.   Eyes:      Pupils: Pupils are equal, round, and reactive to light.   Neck:      Vascular: No hepatojugular reflux or JVD.   Cardiovascular:      Rate and Rhythm: Normal rate and regular rhythm.      Heart sounds: Normal heart sounds. No murmur heard.    No friction rub. No gallop.   Pulmonary:      Effort: Pulmonary effort is normal. No respiratory distress.      Breath sounds: Normal breath sounds.   Abdominal:      General: Bowel sounds are normal. There is distension.      Palpations: Abdomen is soft.   Musculoskeletal:      Right lower leg: No edema.      Left lower leg: No edema.   Skin:     General: Skin is warm and dry.      Findings: No erythema.   Neurological:      Mental Status: He is alert and oriented to person, place, and time.   Psychiatric:         Behavior: Behavior normal.            Lab Results   Component Value Date/Time    CHOLSTRLTOT 320 (H) 05/10/2022 07:36 AM     (H) 05/10/2022 07:36 AM    HDL 76 05/10/2022 07:36 AM    TRIGLYCERIDE 167 (H) 05/10/2022 07:36 AM       Lab Results   Component Value Date/Time    SODIUM 139 05/23/2022 09:20 AM    POTASSIUM 4.8 05/23/2022 09:20 AM    CHLORIDE 103 05/23/2022 09:20 AM    CO2 21 05/23/2022 09:20 AM    GLUCOSE 87 05/23/2022 09:20 AM    BUN 74 (H) 05/23/2022 09:20 AM    CREATININE 2.74 (H) 05/23/2022 09:20 AM    BUNCREATRAT 25 (H) 05/30/2014 09:04 AM     Lab Results   Component Value Date/Time    ALKPHOSPHAT 70 05/16/2022 08:11 AM    ASTSGOT 21 05/16/2022 08:11 AM    ALTSGPT 28 05/16/2022 08:11 AM    TBILIRUBIN 1.0 05/16/2022 08:11 AM      Echocardiogram: 5/16/2022   CONCLUSIONS  Prior echocardiogram 2/17/2022, no major changes.  The left ventricular ejection  fraction is visually estimated to be   greater than 75%.  Normal regional wall motion.    Assessment & Plan     1. Paroxysmal atrial fibrillation (HCC)  EKG    Basic Metabolic Panel   2. Sinoatrial node dysfunction (HCC)  Basic Metabolic Panel   3. Paroxysmal ventricular tachycardia (HCC)  EKG    Basic Metabolic Panel   4. Statin intolerance  Basic Metabolic Panel   5. Chronic heart failure with preserved ejection fraction (HCC)  torsemide (DEMADEX) 20 MG Tab    valsartan (DIOVAN) 80 MG Tab    Basic Metabolic Panel       Medical Decision Making: Today's Assessment/Status/Plan:        HFpEF, Stage C, Class III, LVEF 75%:   -ACE-I/ARB/ARNI:Decrease Valsartan 80 mg twice daily.  Consider Entresto in the future.  -Evidence Based Beta-blocker: Sotalol per EP  -Aldosterone Antagonist: Has not tolerated in the past  -SGLT2-I: Consider once GFR improves  -Diuretic: Torsemide 20 mg daily stop by PCP due to elevated creatinine.  Waking as needed instructions provided.  Continue to hold until labs next week  -Labs: BMP in 1 week  -S/p AICD for secondary prevention  -Reinforced s/sx of worsening heart failure with patient and weight monitoring. Pt verbalizes understanding. Pt to call office if present.    -Heart Failure Education: pt to be contacted by HF nurse for further education,   -Advanced care planning: Advanced directive and POLST to be discussed at future appointments.    Based on the overall clinical history and profile, patient is a good candidate for Cardiomems implantation system to remotely monitor intracardiac pressures. He will benefit from reduced recurrent hospitalization for heart failure exacerbation and also quality of life improvement. Patient also has a good support system and has shown compliance to medical therapy.  We will continue discussion in follow-up.    PAF  -AV paced on EKG  - Continue sotalol 120 mg twice daily high risk medication  - Continue warfarin as directed     CHB and  VT  -AICD    Dyslipidemia; statin intolerance  -Patient to establish with lipid clinic    Hypertension; hypotension-overcontrolled due to polypharmacy  -Home blood pressure recordings are reported as 's.  Encouraged to continue home BP monitoring/log.  -Medication recommendations per above.    FU in clinic in 2 weeks with Dr. Mariee. Sooner if needed.    Patient verbalizes understanding and agrees with the plan of care.     I personally spent a total of 68 minutes which includes face-to-face time and non-face-to-face time spent on preparing to see the patient, reviewing prior notes and tests, obtaining history from the patient, performing a medically appropriate exam, counseling and educating the patient, ordering medications/tests/procedures/referrals as clinically indicated and documenting information in the electronic medical record.    MINI Castellanos.   [facility]  [phone]    PLEASE NOTE: This Note was created using voice recognition Software. I have made every reasonable attempt to correct obvious errors, but I expect that there are errors of grammar and possibly content that I did not discover before finalizing the note

## 2022-05-25 NOTE — Clinical Note
Very anxious in office visit yesterday.  Spent lots of time reviewing optimal care and medication management.    KEVYN on CKD possibly due to overdiuresis, close follow-up in 2 weeks with Dr. Mariee, labs in 1 week.  Potential CardioMEMS candidate Irene, can you call to check in and provide additional education.

## 2022-05-25 NOTE — CASE COMMUNICATION
Mike is reporting that he is doing his sitting HEP and today he was able to progress to standing ther ex. He perez well with sitting rest break as needed. Educated him to cont to work on amb in the home more to work on increasing his endurance Wife is present Will cont with POC to increase his functional mob and ind to prevent further decline in function. S/B Britt Guidry PT

## 2022-05-25 NOTE — PATIENT INSTRUCTIONS
Take 10 mg (half tablet of torsemide) for home weight greater than 147 lbs) with potassium as needed    Checking Blood Pressure:  -Blood pressure cuff, spend in the $40-65, with good return policy  -It should be automatic, upper arm, measure your arm to get the correct size, probably adult Large  -Put the cuff in place, rest arm on table near height of your heart, sit quietly for 5 min, legs uncrossed, with back support, then take your blood pressure, write it down, keep a log  -Check once a day. Ideally, 2 hours after medications.  -Can bring your cuff to at least one appointment where it can be calibrated to a manual cuff if you are concerned.  -Goal blood pressure is at least under 130/80, ideally under 120/80.  If you think your BP is overall too high, let us know in the office, we can adjust medications, can use MyChart or call the [facility] office: [phone].  -If you feel dizzy, please check your blood pressure.  If your blood pressure is less than 90/60 with dizziness call our office at [phone].    -Continue to monitor blood pressures, heart rates, and daily weights in log provided in office today. Please bring to next appointment to review with provider.     Salt=sodium=sea salt, guidelines say stay under 2,500 mg daily   Get salt smart, start looking at labels, count it up.  Salt is hidden in everything, salad dressing, sauces, cheese, most canned food, any processed meat.

## 2022-05-26 ENCOUNTER — HOME CARE VISIT (OUTPATIENT)
Dept: HOME HEALTH SERVICES | Facility: HOME HEALTHCARE | Age: 72
End: 2022-05-26
Payer: MEDICARE

## 2022-05-26 ENCOUNTER — TELEPHONE (OUTPATIENT)
Dept: CARDIOLOGY | Facility: MEDICAL CENTER | Age: 72
End: 2022-05-26
Payer: MEDICARE

## 2022-05-26 VITALS
OXYGEN SATURATION: 96 % | RESPIRATION RATE: 18 BRPM | HEART RATE: 68 BPM | SYSTOLIC BLOOD PRESSURE: 102 MMHG | DIASTOLIC BLOOD PRESSURE: 68 MMHG | TEMPERATURE: 96.4 F

## 2022-05-26 PROBLEM — I50.32 CHRONIC DIASTOLIC CONGESTIVE HEART FAILURE, NYHA CLASS 3 (HCC): Status: ACTIVE | Noted: 2022-05-26

## 2022-05-26 PROCEDURE — G0152 HHCP-SERV OF OT,EA 15 MIN: HCPCS

## 2022-05-26 PROCEDURE — G0299 HHS/HOSPICE OF RN EA 15 MIN: HCPCS

## 2022-05-26 ASSESSMENT — ENCOUNTER SYMPTOMS
SHORTNESS OF BREATH: 0
BRUISES/BLEEDS EASILY: 0
BLOOD IN STOOL: 0
CLAUDICATION: 0
VOMITING: 0
ABDOMINAL PAIN: 0
PALPITATIONS: 0
PND: 0
TINGLING: 0
WEIGHT LOSS: 0
ORTHOPNEA: 0
MYALGIAS: 0
COUGH: 0
BACK PAIN: 1
DIZZINESS: 0
FEVER: 0
LOSS OF CONSCIOUSNESS: 0
FALLS: 0
NAUSEA: 0
BLURRED VISION: 0

## 2022-05-26 ASSESSMENT — FIBROSIS 4 INDEX: FIB4 SCORE: 3.149019223495355105

## 2022-05-26 NOTE — TELEPHONE ENCOUNTER
S/W home health, advised to weight in AM only- 10mg torsemide with potassium as needed.     Instructions       Return in about 2 weeks (around 6/8/2022).  Take 10 mg (half tablet of torsemide) for home weight greater than 247 lbs) with potassium as needed

## 2022-05-27 ENCOUNTER — HOME CARE VISIT (OUTPATIENT)
Dept: HOME HEALTH SERVICES | Facility: HOME HEALTHCARE | Age: 72
End: 2022-05-27
Payer: MEDICARE

## 2022-05-27 VITALS
RESPIRATION RATE: 18 BRPM | HEART RATE: 70 BPM | OXYGEN SATURATION: 92 % | WEIGHT: 245 LBS | TEMPERATURE: 96.9 F | DIASTOLIC BLOOD PRESSURE: 60 MMHG | SYSTOLIC BLOOD PRESSURE: 90 MMHG | BODY MASS INDEX: 33.23 KG/M2

## 2022-05-27 VITALS
BODY MASS INDEX: 32.69 KG/M2 | OXYGEN SATURATION: 95 % | HEART RATE: 77 BPM | WEIGHT: 241 LBS | SYSTOLIC BLOOD PRESSURE: 110 MMHG | RESPIRATION RATE: 17 BRPM | TEMPERATURE: 97.4 F | DIASTOLIC BLOOD PRESSURE: 62 MMHG

## 2022-05-27 PROBLEM — Z99.81 ON SUPPLEMENTAL OXYGEN THERAPY: Status: ACTIVE | Noted: 2022-05-27

## 2022-05-27 LAB — EKG IMPRESSION: NORMAL

## 2022-05-27 PROCEDURE — G0151 HHCP-SERV OF PT,EA 15 MIN: HCPCS

## 2022-05-27 SDOH — ECONOMIC STABILITY: HOUSING INSECURITY: HOME SAFETY: OKING, MANAGEMENT OF O2 TUBING.  PATIENT ABLE TO SPEAKBACK VERBAL UNDERSTANDING.

## 2022-05-27 SDOH — ECONOMIC STABILITY: HOUSING INSECURITY
HOME SAFETY: PT TRACED TUBING TO CONCENTRATOR AND DID NOT NOTE ANY LEAKS OR PROBLEMS. CONCENTRATOR SET PER MD ORDER. NOTED NO OPEN FLAMES. PT EDUCATED IN O2 SAFETY WITH USE OF O2 VIA NASAL CANULA AS NEEDED AND AT NIGHT, KEEPING O2 PER MD ORDERS, NO OPEN FLAMES/SM

## 2022-05-27 ASSESSMENT — ENCOUNTER SYMPTOMS
LOWEST PAIN SEVERITY IN PAST 24 HOURS: 2/10
PAIN: 1
SUBJECTIVE PAIN PROGRESSION: WAXING AND WANING
PAIN SEVERITY GOAL: 0/10
PAIN LOCATION - PAIN DURATION: CHRONIC
PAIN LOCATION - PAIN SEVERITY: 5/10
PAIN LOCATION: GENERALIZED
HIGHEST PAIN SEVERITY IN PAST 24 HOURS: 7/10
MUSCLE WEAKNESS: 1
PAIN: 1
PAIN LOCATION - PAIN DURATION: CHRONIC
HIGHEST PAIN SEVERITY IN PAST 24 HOURS: 5/10
PERSON REPORTING PAIN: PATIENT
PAIN: 1
SUBJECTIVE PAIN PROGRESSION: UNCHANGED
PAIN LOCATION - PAIN SEVERITY: 5/10
PAIN LOCATION - EXACERBATING FACTORS: MOVEMENT
PAIN LOCATION - RELIEVING FACTORS: MEDS
PAIN LOCATION - PAIN FREQUENCY: FREQUENT
HIGHEST PAIN SEVERITY IN PAST 24 HOURS: 5/10
LOWEST PAIN SEVERITY IN PAST 24 HOURS: 0/10
PAIN LOCATION - PAIN QUALITY: ACHY
PAIN LOCATION - PAIN FREQUENCY: INTERMITTENT
SUBJECTIVE PAIN PROGRESSION: UNCHANGED
DEBILITATING PAIN: 1
PERSON REPORTING PAIN: PATIENT
PAIN LOCATION: GENERALIZED
LOWEST PAIN SEVERITY IN PAST 24 HOURS: 0/10
PAIN LOCATION - PAIN SEVERITY: 5/10
PAIN LOCATION - PAIN QUALITY: ACHY
PAIN SEVERITY GOAL: 0/10
PAIN LOCATION - PAIN FREQUENCY: CONSTANT
PAIN LOCATION: RIGHT SHOULDER
PERSON REPORTING PAIN: PATIENT
SEVERE DYSPNEA: 1
PAIN LOCATION - PAIN QUALITY: ACHY

## 2022-05-27 ASSESSMENT — PAIN SCALES - PAIN ASSESSMENT IN ADVANCED DEMENTIA (PAINAD)
CONSOLABILITY: 0 - NO NEED TO CONSOLE.
BODYLANGUAGE: 0 - RELAXED.
FACIALEXPRESSION: 0 - SMILING OR INEXPRESSIVE.
NEGVOCALIZATION: 0 - NONE.
TOTALSCORE: 0

## 2022-05-27 ASSESSMENT — FIBROSIS 4 INDEX: FIB4 SCORE: 3.149019223495355105

## 2022-05-27 NOTE — CASE COMMUNICATION
fyi, Patient's BP at 90/60 and temporal temperature at 96.9 degrees are outside of normal parameters and is to be reported to MD and CM. Patient denies any dizziness, light headedness, chest pain.  All other vitals are within HH parameters. Patient is holding lasix and potassium per cardiologist visit.  Patient also reports weight was 245 lbs first thing this morning which is a 4 lb weight gain since last weight on 5/26/22.  Patient the n reports he was having constipation and took Milk of Magnesia.  States that after moving bowels twice weight was 243 lbs. PT reported above to team and Letitia RN supervisor scheduling additional RN visit on 5/28/22 for follow up.      Nohelia, please fax to Dr Hipolito Hall

## 2022-05-28 ENCOUNTER — HOME CARE VISIT (OUTPATIENT)
Dept: HOME HEALTH SERVICES | Facility: HOME HEALTHCARE | Age: 72
End: 2022-05-28
Payer: MEDICARE

## 2022-05-28 VITALS
SYSTOLIC BLOOD PRESSURE: 92 MMHG | RESPIRATION RATE: 18 BRPM | HEART RATE: 74 BPM | OXYGEN SATURATION: 96 % | TEMPERATURE: 97.5 F | WEIGHT: 242 LBS | BODY MASS INDEX: 32.82 KG/M2 | DIASTOLIC BLOOD PRESSURE: 60 MMHG

## 2022-05-28 PROCEDURE — G0299 HHS/HOSPICE OF RN EA 15 MIN: HCPCS

## 2022-05-28 SDOH — ECONOMIC STABILITY: HOUSING INSECURITY: EVIDENCE OF SMOKING MATERIAL: 0

## 2022-05-28 SDOH — ECONOMIC STABILITY: HOUSING INSECURITY
HOME SAFETY: OXYGEN SAFETY RISK ASSESSMENT PERFORMED. PATIENT DOES HAVE A NO SMOKING SIGN POSTED IN THE HOME. PATIENT DOES HAVE A WORKING FIRE EXTINGUISHER PRESENT IN THE HOME. SMOKE ALARMS ARE NOT PRESENT AND FUNCTIONAL ON EACH LEVEL OF THE HOME. PATIENT DOES HA

## 2022-05-28 SDOH — ECONOMIC STABILITY: HOUSING INSECURITY
HOME SAFETY: VE A FIRE ESCAPE PLAN DEVELOPED. PATIENT DOES NOT HAVE FLAMMABLE MATERIALS PRESENT IN THE HOME PRESENTING A FIRE HAZARD. NO EVIDENCE FOUND OF SMOKING MATERIALS PRESENT IN THE HOME.

## 2022-05-28 ASSESSMENT — ENCOUNTER SYMPTOMS
HIGHEST PAIN SEVERITY IN PAST 24 HOURS: 5/10
MUSCLE WEAKNESS: 1
PAIN LOCATION - EXACERBATING FACTORS: WEIGHT BEARING
LOWEST PAIN SEVERITY IN PAST 24 HOURS: 1/10
LIMITED RANGE OF MOTION: 1
PAIN LOCATION - PAIN QUALITY: SHARP ACHE
PAIN LOCATION - PAIN SEVERITY: 5/10
PAIN LOCATION - PAIN FREQUENCY: INTERMITTENT
PAIN: 1
SUBJECTIVE PAIN PROGRESSION: UNCHANGED
ARTHRALGIAS: 1
PAIN SEVERITY GOAL: 1/10

## 2022-05-28 ASSESSMENT — FIBROSIS 4 INDEX: FIB4 SCORE: 3.149019223495355105

## 2022-05-29 VITALS
WEIGHT: 241 LBS | SYSTOLIC BLOOD PRESSURE: 110 MMHG | RESPIRATION RATE: 17 BRPM | DIASTOLIC BLOOD PRESSURE: 72 MMHG | TEMPERATURE: 97.9 F | HEART RATE: 71 BPM | OXYGEN SATURATION: 95 % | BODY MASS INDEX: 32.69 KG/M2

## 2022-05-29 ASSESSMENT — ENCOUNTER SYMPTOMS
PAIN SEVERITY GOAL: 1/10
PAIN LOCATION - PAIN SEVERITY: 5/10
PAIN LOCATION: HIPS
MUSCLE WEAKNESS: 1
LOWEST PAIN SEVERITY IN PAST 24 HOURS: 1/10
PAIN: 1
PERSON REPORTING PAIN: PATIENT
HIGHEST PAIN SEVERITY IN PAST 24 HOURS: 5/10
PAIN LOCATION - RELIEVING FACTORS: PAIN PILL, RESTING
PAIN LOCATION - PAIN QUALITY: ACHY

## 2022-05-29 ASSESSMENT — PAIN SCALES - PAIN ASSESSMENT IN ADVANCED DEMENTIA (PAINAD)
TOTALSCORE: 0
NEGVOCALIZATION: 0 - NONE.
FACIALEXPRESSION: 0 - SMILING OR INEXPRESSIVE.
BODYLANGUAGE: 0 - RELAXED.
CONSOLABILITY: 0 - NO NEED TO CONSOLE.

## 2022-05-30 ENCOUNTER — HOME CARE VISIT (OUTPATIENT)
Dept: HOME HEALTH SERVICES | Facility: HOME HEALTHCARE | Age: 72
End: 2022-05-30
Payer: MEDICARE

## 2022-05-30 VITALS
TEMPERATURE: 96.8 F | DIASTOLIC BLOOD PRESSURE: 61 MMHG | BODY MASS INDEX: 32.69 KG/M2 | SYSTOLIC BLOOD PRESSURE: 91 MMHG | HEART RATE: 71 BPM | WEIGHT: 241 LBS | OXYGEN SATURATION: 96 % | RESPIRATION RATE: 18 BRPM

## 2022-05-30 PROCEDURE — G0151 HHCP-SERV OF PT,EA 15 MIN: HCPCS

## 2022-05-30 PROCEDURE — G0299 HHS/HOSPICE OF RN EA 15 MIN: HCPCS

## 2022-05-30 SDOH — ECONOMIC STABILITY: HOUSING INSECURITY: HOME SAFETY: OKING, AVOIDING PETROLEUM PRODUCTS. PATIENT ABLE TO SPEAKBACK VERBAL UNDERSTANDING.

## 2022-05-30 ASSESSMENT — ENCOUNTER SYMPTOMS
PAIN: 1
PAIN LOCATION - PAIN DURATION: CHRONIC
PAIN LOCATION - PAIN FREQUENCY: FREQUENT
PERSON REPORTING PAIN: PATIENT
DEBILITATING PAIN: 1
PAIN LOCATION: GENERALIZED
PAIN LOCATION - PAIN QUALITY: ACHY, SHARP
PAIN LOCATION - RELIEVING FACTORS: REST, MEDICATION
PAIN LOCATION - EXACERBATING FACTORS: ACTIVITY, EXERCISE
PAIN LOCATION - PAIN SEVERITY: 5/10
HIGHEST PAIN SEVERITY IN PAST 24 HOURS: 9/10
SUBJECTIVE PAIN PROGRESSION: WAXING AND WANING
LOWEST PAIN SEVERITY IN PAST 24 HOURS: 0/10
PAIN SEVERITY GOAL: 4/10

## 2022-05-30 ASSESSMENT — FIBROSIS 4 INDEX
FIB4 SCORE: 3.149019223495355105
FIB4 SCORE: 3.149019223495355105

## 2022-05-30 ASSESSMENT — ACTIVITIES OF DAILY LIVING (ADL): TRANSPORTATION COMMENTS: REQUIRES ASSIST OF ANOTHER PERSON AND WALKER OUT OF HOME ON UNEVEN SURFACES AND STEPS

## 2022-05-30 NOTE — CASE COMMUNICATION
fyi, patient's temporal temperature was 96.8 degrees at PT visit on 5/30/22.  Other vitals within parameters but of note BP 91/61.  Patient denies dizziness or light headedness and states low temp is typical for him since a child.  Patient also reports he is not able to tolerate therapy exercises as they are aggravating arthiritis pain in particular bilateral shoulders and right hip.  Patient reports it is time to schedule f/u with BHUPINDER  for knee injections and will call tomorrow to schedule and will discuss shoulder and hip pain.  PT to f/u on next visit then possible hold if adjustments to therapy plan still not tolerated.     Nohelia, please fax to Hipolito Hall MD

## 2022-05-31 ENCOUNTER — HOME CARE VISIT (OUTPATIENT)
Dept: HOME HEALTH SERVICES | Facility: HOME HEALTHCARE | Age: 72
End: 2022-05-31
Payer: MEDICARE

## 2022-05-31 ENCOUNTER — HOSPITAL ENCOUNTER (EMERGENCY)
Facility: MEDICAL CENTER | Age: 72
End: 2022-05-31
Payer: MEDICARE

## 2022-05-31 VITALS
BODY MASS INDEX: 32.64 KG/M2 | TEMPERATURE: 96.9 F | HEART RATE: 89 BPM | RESPIRATION RATE: 18 BRPM | DIASTOLIC BLOOD PRESSURE: 72 MMHG | WEIGHT: 241 LBS | SYSTOLIC BLOOD PRESSURE: 123 MMHG | HEIGHT: 72 IN | OXYGEN SATURATION: 95 %

## 2022-05-31 VITALS
DIASTOLIC BLOOD PRESSURE: 66 MMHG | RESPIRATION RATE: 17 BRPM | HEART RATE: 69 BPM | TEMPERATURE: 97.2 F | SYSTOLIC BLOOD PRESSURE: 104 MMHG | OXYGEN SATURATION: 93 %

## 2022-05-31 VITALS
SYSTOLIC BLOOD PRESSURE: 110 MMHG | WEIGHT: 241 LBS | RESPIRATION RATE: 18 BRPM | TEMPERATURE: 96 F | HEART RATE: 71 BPM | DIASTOLIC BLOOD PRESSURE: 78 MMHG | OXYGEN SATURATION: 96 % | BODY MASS INDEX: 32.69 KG/M2

## 2022-05-31 PROCEDURE — G0152 HHCP-SERV OF OT,EA 15 MIN: HCPCS

## 2022-05-31 PROCEDURE — 302449 STATCHG TRIAGE ONLY (STATISTIC)

## 2022-05-31 ASSESSMENT — ENCOUNTER SYMPTOMS
SUBJECTIVE PAIN PROGRESSION: UNCHANGED
PERSON REPORTING PAIN: PATIENT
PAIN LOCATION - PAIN DURATION: CHRONIC
PAIN LOCATION - PAIN QUALITY: ACHY
PAIN LOCATION - RELIEVING FACTORS: MEDS, REST
PAIN: 1
PAIN LOCATION: RIGHT SHOULDER
HIGHEST PAIN SEVERITY IN PAST 24 HOURS: 5/10
PAIN LOCATION - PAIN FREQUENCY: FREQUENT
PAIN LOCATION - PAIN SEVERITY: 5/10

## 2022-05-31 ASSESSMENT — FIBROSIS 4 INDEX: FIB4 SCORE: 3.149019223495355105

## 2022-05-31 NOTE — ED NOTES
"Patients wife states \"I called the nurses line and they said he will probably just need a sleep study. His doctor will be coming by the house today to see him. We are just going to leave.\"   Apologized for wait times, AMA form signed.       "

## 2022-05-31 NOTE — ED TRIAGE NOTES
Chief Complaint   Patient presents with   • Other     Pt had witnessed sleep apnea events by wife last night while sleeping.  Pt has not been diagnosed with sleep apnea. Pt is awake and alert, no other complaints.      Pt wheeled to triage for above complaint. Pt educated on triage process and informed to alert staff of any changes or concerns.    /72   Pulse 89   Temp 36.1 °C (96.9 °F) (Temporal)   Resp 18   Ht 1.829 m (6')   Wt 109 kg (241 lb)   SpO2 95% Comment: Room air  BMI 32.69 kg/m²

## 2022-06-01 ENCOUNTER — HOME CARE VISIT (OUTPATIENT)
Dept: HOME HEALTH SERVICES | Facility: HOME HEALTHCARE | Age: 72
End: 2022-06-01
Payer: MEDICARE

## 2022-06-01 VITALS
WEIGHT: 248 LBS | SYSTOLIC BLOOD PRESSURE: 113 MMHG | DIASTOLIC BLOOD PRESSURE: 65 MMHG | TEMPERATURE: 96.8 F | OXYGEN SATURATION: 94 % | RESPIRATION RATE: 18 BRPM | HEART RATE: 76 BPM | BODY MASS INDEX: 33.63 KG/M2

## 2022-06-01 PROCEDURE — G0151 HHCP-SERV OF PT,EA 15 MIN: HCPCS

## 2022-06-01 SDOH — ECONOMIC STABILITY: HOUSING INSECURITY
HOME SAFETY: PT TRACED TUBING TO CONCENTRATOR AND DID NOT NOTE ANY LEAKS OR PROBLEMS. CONCENTRATOR SET PER MD ORDER. NOTED NO OPEN FLAMES. PT EDUCATED IN O2 SAFETY WITH USE OF O2 VIA NASAL CANULA AT NIGHT AND AS NEEDED, KEEPING O2 PER MD ORDERS, NO OPEN FLAMES/SM

## 2022-06-01 SDOH — ECONOMIC STABILITY: HOUSING INSECURITY: HOME SAFETY: OKING, MANAGEMENT OF O2 TUBING, CHANGING OF TUBE MONTHLY. PATIENT ABLE TO SPEAKBACK VERBAL UNDERSTANDING.

## 2022-06-01 ASSESSMENT — ENCOUNTER SYMPTOMS
DEBILITATING PAIN: 1
PAIN LOCATION - EXACERBATING FACTORS: RESTING
PAIN LOCATION - PAIN FREQUENCY: FREQUENT
HIGHEST PAIN SEVERITY IN PAST 24 HOURS: 7/10
PAIN: 1
PAIN LOCATION - PAIN QUALITY: ACHY
LOWEST PAIN SEVERITY IN PAST 24 HOURS: 0/10
PAIN LOCATION - PAIN DURATION: CHRONIC
SUBJECTIVE PAIN PROGRESSION: WAXING AND WANING
PERSON REPORTING PAIN: PATIENT
PAIN LOCATION: RIGHT HIP
PAIN SEVERITY GOAL: 0/10
SUBJECTIVE PAIN PROGRESSION: WAXING AND WANING
MUSCLE WEAKNESS: 1
LOWEST PAIN SEVERITY IN PAST 24 HOURS: 0/10
PERSON REPORTING PAIN: PATIENT
PAIN LOCATION - PAIN SEVERITY: 5/10
PAIN LOCATION: HIPS
PAIN LOCATION - PAIN SEVERITY: 5/10
PAIN LOCATION - RELIEVING FACTORS: PAIN PILL
PAIN: 1
PAIN LOCATION - PAIN QUALITY: ACHY
PAIN LOCATION - PAIN FREQUENCY: INTERMITTENT
PAIN SEVERITY GOAL: 0/10

## 2022-06-01 ASSESSMENT — PAIN SCALES - PAIN ASSESSMENT IN ADVANCED DEMENTIA (PAINAD)
NEGVOCALIZATION: 0 - NONE.
CONSOLABILITY: 0 - NO NEED TO CONSOLE.
BODYLANGUAGE: 0 - RELAXED.
TOTALSCORE: 0
FACIALEXPRESSION: 0 - SMILING OR INEXPRESSIVE.

## 2022-06-01 ASSESSMENT — FIBROSIS 4 INDEX: FIB4 SCORE: 3.149019223495355105

## 2022-06-01 ASSESSMENT — ACTIVITIES OF DAILY LIVING (ADL): TRANSPORTATION COMMENTS: REQUIRES ASSIST OF ANOTHER PERSON AND AD OUT OF HOME

## 2022-06-01 NOTE — CASE COMMUNICATION
Quality Review for SOC OASIS by SIMONE Ko RN on  June 1, 2022    Edits completed by SIMONE Ko RN:  1. Per narrative that patient needs moderate assistance and a walker for safety, the following changes were made: , , ,  are 2  2.  is 16 per care plan therapy sets.  3. Functional limitations checked incontinence  4. Safety measures checked ambulate only with assistance  5.Completed F2F information

## 2022-06-02 ENCOUNTER — HOME CARE VISIT (OUTPATIENT)
Dept: HOME HEALTH SERVICES | Facility: HOME HEALTHCARE | Age: 72
End: 2022-06-02
Payer: MEDICARE

## 2022-06-02 ENCOUNTER — TELEPHONE (OUTPATIENT)
Dept: CARDIOLOGY | Facility: MEDICAL CENTER | Age: 72
End: 2022-06-02
Payer: MEDICARE

## 2022-06-02 VITALS
SYSTOLIC BLOOD PRESSURE: 90 MMHG | HEART RATE: 72 BPM | TEMPERATURE: 97.6 F | WEIGHT: 247 LBS | BODY MASS INDEX: 33.5 KG/M2 | RESPIRATION RATE: 18 BRPM | DIASTOLIC BLOOD PRESSURE: 58 MMHG | OXYGEN SATURATION: 95 %

## 2022-06-02 VITALS
SYSTOLIC BLOOD PRESSURE: 90 MMHG | TEMPERATURE: 97.6 F | RESPIRATION RATE: 18 BRPM | BODY MASS INDEX: 33.5 KG/M2 | WEIGHT: 247 LBS | HEART RATE: 72 BPM | DIASTOLIC BLOOD PRESSURE: 58 MMHG | OXYGEN SATURATION: 95 %

## 2022-06-02 PROCEDURE — G0299 HHS/HOSPICE OF RN EA 15 MIN: HCPCS

## 2022-06-02 PROCEDURE — G0152 HHCP-SERV OF OT,EA 15 MIN: HCPCS

## 2022-06-02 SDOH — ECONOMIC STABILITY: HOUSING INSECURITY: EVIDENCE OF SMOKING MATERIAL: 0

## 2022-06-02 ASSESSMENT — ENCOUNTER SYMPTOMS
SHORTNESS OF BREATH: 1
SUBJECTIVE PAIN PROGRESSION: WAXING AND WANING
DEBILITATING PAIN: 1
HIGHEST PAIN SEVERITY IN PAST 24 HOURS: 7/10
PAIN LOCATION - PAIN QUALITY: ACHING
PAIN LOCATION - PAIN SEVERITY: 5/10
LOWEST PAIN SEVERITY IN PAST 24 HOURS: 5/10
MUSCLE WEAKNESS: 1
LIMITED RANGE OF MOTION: 1
PAIN LOCATION - PAIN DURATION: CHRONIC
DYSPNEA ACTIVITY LEVEL: AFTER AMBULATING 10 - 20 FT
PAIN: 1
PAIN LOCATION - RELIEVING FACTORS: REST, REPOSITION, MEDICATION
PERSON REPORTING PAIN: PATIENT
ARTHRALGIAS: 1

## 2022-06-02 ASSESSMENT — FIBROSIS 4 INDEX
FIB4 SCORE: 3.149019223495355105
FIB4 SCORE: 3.149019223495355105

## 2022-06-02 NOTE — CASE COMMUNICATION
luisito PT discharged patient on 6/1/22 per patient request as patient feels exercises are aggravating to his joint pain at this time.  Patient reports he made appts with orthopedics for shoulders and right hip.

## 2022-06-02 NOTE — TELEPHONE ENCOUNTER
LVM for pt in regards to lab work that was ordered at previous OV with KAITLYNN. Office number given for call back if pt has any questions or has had lab work done outside of [facility]. Pt has follow up appointment scheduled with HK on 6/8/22.

## 2022-06-02 NOTE — CASE COMMUNICATION
fyi, Patient's temporal temperature was 96.8 degrees at PT visit on 6/1/22 which is outside of normal parameters and is to be reported to MD and CM. Patient denies any dizziness, light headedness, chest pain.  All other vitals are within HH parameters.  Patient reports low temp is typical for him.  PT brought patient new scale from office and weight was 248 lbs.  1+ ankle/feet edema.  No change is SOB noted.

## 2022-06-03 ASSESSMENT — ENCOUNTER SYMPTOMS
HIGHEST PAIN SEVERITY IN PAST 24 HOURS: 5/10
PAIN LOCATION: RIGHT SHOULDER
PAIN LOCATION - PAIN FREQUENCY: FREQUENT
PERSON REPORTING PAIN: PATIENT
PAIN LOCATION - PAIN QUALITY: ACHY
SUBJECTIVE PAIN PROGRESSION: UNCHANGED
PAIN: 1
PAIN LOCATION - PAIN DURATION: CHRONIC
PAIN LOCATION - PAIN SEVERITY: 5/10
PAIN LOCATION - RELIEVING FACTORS: REST, MEDS.

## 2022-06-06 ENCOUNTER — HOME CARE VISIT (OUTPATIENT)
Dept: HOME HEALTH SERVICES | Facility: HOME HEALTHCARE | Age: 72
End: 2022-06-06
Payer: MEDICARE

## 2022-06-06 ENCOUNTER — HOSPITAL ENCOUNTER (OUTPATIENT)
Facility: MEDICAL CENTER | Age: 72
End: 2022-06-06
Attending: NURSE PRACTITIONER
Payer: MEDICARE

## 2022-06-06 DIAGNOSIS — Z78.9 STATIN INTOLERANCE: ICD-10-CM

## 2022-06-06 DIAGNOSIS — I47.29 PAROXYSMAL VENTRICULAR TACHYCARDIA (HCC): ICD-10-CM

## 2022-06-06 DIAGNOSIS — I48.0 PAROXYSMAL ATRIAL FIBRILLATION (HCC): ICD-10-CM

## 2022-06-06 DIAGNOSIS — I49.5 SINOATRIAL NODE DYSFUNCTION (HCC): ICD-10-CM

## 2022-06-06 DIAGNOSIS — I50.32 CHRONIC HEART FAILURE WITH PRESERVED EJECTION FRACTION (HCC): ICD-10-CM

## 2022-06-06 LAB
ANION GAP SERPL CALC-SCNC: 13 MMOL/L (ref 7–16)
BUN SERPL-MCNC: 41 MG/DL (ref 8–22)
CALCIUM SERPL-MCNC: 9.8 MG/DL (ref 8.5–10.5)
CHLORIDE SERPL-SCNC: 105 MMOL/L (ref 96–112)
CO2 SERPL-SCNC: 24 MMOL/L (ref 20–33)
CREAT SERPL-MCNC: 1.71 MG/DL (ref 0.5–1.4)
GFR SERPLBLD CREATININE-BSD FMLA CKD-EPI: 42 ML/MIN/1.73 M 2
GLUCOSE SERPL-MCNC: 127 MG/DL (ref 65–99)
POTASSIUM SERPL-SCNC: 4.2 MMOL/L (ref 3.6–5.5)
SODIUM SERPL-SCNC: 142 MMOL/L (ref 135–145)

## 2022-06-06 PROCEDURE — 80048 BASIC METABOLIC PNL TOTAL CA: CPT

## 2022-06-06 PROCEDURE — G0155 HHCP-SVS OF CSW,EA 15 MIN: HCPCS

## 2022-06-06 PROCEDURE — G0299 HHS/HOSPICE OF RN EA 15 MIN: HCPCS

## 2022-06-06 ASSESSMENT — FIBROSIS 4 INDEX: FIB4 SCORE: 3.149019223495355105

## 2022-06-06 NOTE — CASE COMMUNICATION
I agree with these changes.  ----- Message -----  From: Milana Ko R.N.  Sent: 6/1/2022   1:13 PM PDT  To: Monica Singh R.N.      Additional revision for SOC OASIS;  1.  is NA,  is 5/18/22, date of valid referral

## 2022-06-06 NOTE — CASE COMMUNICATION
I agree with these changes.  ----- Message -----  From: Milana Ko R.N.  Sent: 6/1/2022   9:32 AM PDT  To: Monica Singh R.N.      Quality Review for SOC OASIS by SIMONE Ko RN on  June 1, 2022    Edits completed by SIMONE Ko RN:  1. Per narrative that patient needs moderate assistance and a walker for safety, the following changes were made: , , ,  are 2  2.  is 16 per care plan therapy se ts.  3. Functional limitations checked incontinence  4. Safety measures checked ambulate only with assistance  5.Completed F2F information

## 2022-06-07 ASSESSMENT — ENCOUNTER SYMPTOMS
VOMITING: 0
IRREGULAR HEARTBEAT: 0
WEAKNESS: 0
FEVER: 0
ORTHOPNEA: 0
COUGH: 0
NIGHT SWEATS: 0
SYNCOPE: 0
DIZZINESS: 0
FOCAL WEAKNESS: 0
PALPITATIONS: 0
DYSPNEA ON EXERTION: 0
ABDOMINAL PAIN: 0
DIARRHEA: 0
NAUSEA: 0
PND: 0
SHORTNESS OF BREATH: 0
WHEEZING: 0
NEAR-SYNCOPE: 0

## 2022-06-07 NOTE — PROGRESS NOTES
Cardiology Follow-up Consultation Note    Date of note: 06/08/22  Primary Care Provider: Hipolito Hall M.D.    Patient Name: Mike Ferrell   YOB: 1950  MRN:              [id number]    Chief Complaint: Follow-up HFpEF    History of Present Illness: Mr. Mike Ferrell is a 71 y.o. male whose current medical problems include HFpEF, hypertension, atrial fibrillation, complete heart block status post pacemaker placement, ventricular tachycardia status post ICD, and dyslipidemia with statin intolerance who is here for follow-up HFpEF.    The patient was was last seen in my clinic on 03/01/22 (his initial visit with me).  Due to volume overload, lasix was increased for a week with lab follow up, which showed improve renal function. The patient saw Dr. Ferrera 4/1/2022 and reported improved symptoms. In May 2022, due to hypotension and worsening kidney function, he stopped lasix and aldactone.  He then saw TYLOR Balderas 5/25/20222 and was thought to be euvolemic. No changes were made to his medications, and diuretics remained held until labs, which showed improvement but still slightly elevated.     The patient returns today for follow-up.  The patient presents with his wife.  The patient's wife report that the patient started taking torsemide as needed as instructed by TYLOR Balderas as he started swelling and gained weight.  Since resuming torsemide, he has felt much better.  He still has mild leg swelling but denies any orthopnea, or PND.  No palpitations.  No syncope or presyncopal episodes.  He denies any chest pain or shortness of breath on exertion.      Cardiovascular Risk Factors:  1. Smoking status: Never smoker  2. Type II Diabetes Mellitus: None/not recently checked  3. Hypertension: None  4. Dyslipidemia: Intolerant to statin  Cholesterol,Tot   Date Value Ref Range Status   01/26/2022 239 (H) 100 - 199 mg/dL Final     LDL   Date Value Ref Range Status   01/26/2022 139 (H)  <100 mg/dL Final     HDL   Date Value Ref Range Status   01/26/2022 77 >=40 mg/dL Final     Triglycerides   Date Value Ref Range Status   01/26/2022 117 0 - 149 mg/dL Final     5. Family history of early Coronary Artery Disease in a first degree relative (Male less than 55 years of age; Female less than 65 years of age): None  6.  Obesity and/or Metabolic Syndrome: BMI 34.86  7. Sedentary lifestyle: Not active    Review of Systems   Constitutional: Negative for fever, malaise/fatigue and night sweats.   Cardiovascular: Negative for chest pain, dyspnea on exertion, irregular heartbeat, leg swelling, near-syncope, orthopnea, palpitations, paroxysmal nocturnal dyspnea and syncope.   Respiratory: Negative for cough, shortness of breath and wheezing.    Gastrointestinal: Negative for abdominal pain, diarrhea, nausea and vomiting.   Neurological: Negative for dizziness, focal weakness and weakness.       All other systems reviewed and are negative.       Current Outpatient Medications   Medication Sig Dispense Refill   • torsemide (DEMADEX) 20 MG Tab Take 1 Tablet by mouth 1 time a day as needed (for weight gain and swelling). 30 Tablet 5   • valsartan (DIOVAN) 80 MG Tab Take 1 Tablet by mouth 2 times a day. 60 Tablet 11   • potassium chloride SA (KDUR) 20 MEQ Tab CR Take 1 Tablet by mouth 1 time a day as needed (with torsemide). (Patient taking differently: Take 20 mEq by mouth 1 time a day as needed (with torsemide). Patient told to hold for one week beginning 5/25/22.) 30 Tablet 5   • Home Care Oxygen Inhale 2 L/min as needed for Shortness of Breath (PRN SOB and at night). Oxygen dose range: 2 L/min  Respiratory route via: Nasal Cannula   Oxygen supplier: none- received concentrator from son     • Colchicine 0.6 MG Cap Take 0.6 mg by mouth 1 time a day as needed (gout flair up).     • acetaminophen (TYLENOL) 650 MG CR tablet Take 625 mg by mouth every 6 hours as needed for Fever or Mild Pain.     • apixaban (ELIQUIS)  5mg Tab Take 1 Tablet by mouth 2 times a day. 60 Tablet 0   • Esomeprazole Magnesium 20 MG Tablet Delayed Response Take 20 mg by mouth every morning before breakfast.     • sotalol (BETAPACE) 120 MG tablet Take 120 mg by mouth every morning.     • HYDROcodone/acetaminophen (NORCO)  MG Tab Take 1 Tablet by mouth every 6 hours as needed for Moderate Pain. Indications: Pain     • colesevelam (WELCHOL) 625 MG Tab Take 625 mg by mouth every evening.     • Cholecalciferol (VITAMIN D3) 5000 units Cap Take 5,000 Units by mouth every morning before breakfast.       No current facility-administered medications for this visit.         Allergies   Allergen Reactions   • Atorvastatin Myalgia   • Statins [Hmg-Coa-R Inhibitors] Itching   • Sulfa Drugs Rash and Itching     Rash, itch       Physical Exam:  Ambulatory Vitals  /78 (BP Location: Left arm, Patient Position: Sitting, BP Cuff Size: Adult)   Pulse 72   Resp 16   Ht 1.829 m (6')   Wt 112 kg (246 lb)   SpO2 92%    Oxygen Therapy:  Pulse Oximetry: 92 %  BP Readings from Last 4 Encounters:   06/08/22 102/78   06/02/22 (!) 90/58   06/02/22 (!) 90/58   06/01/22 113/65       Weight/BMI: Body mass index is 33.36 kg/m².  Wt Readings from Last 4 Encounters:   06/08/22 112 kg (246 lb)   06/02/22 112 kg (247 lb)   06/02/22 112 kg (247 lb)   06/01/22 112 kg (248 lb)     The patient walked 170 m in 6 minutes.    General: Well appearing and in no apparent distress  Eyes: nl conjunctiva, no icteric sclera  ENT: wearing a mask, normal external appearance of ears  Neck: JVP around 9  Lungs: normal respiratory effort, CTAB  Heart: RRR, no murmurs, no rubs or gallops, 1+ edema bilateral lower extremities. No LV/RV heave on cardiac palpatation. + bilateral radial pulses.  + bilateral dp pulses.   Abdomen: soft, non tender, mildly distended, no masses, normal bowel sounds.  No HSM.  Extremities/MSK: no clubbing, no cyanosis  Neurological: No focal sensory deficits  Psychiatric:  Appropriate affect, A/O x 3, intact judgement and insight  Skin: Warm extremities      Lab Data Review:  Lab Results   Component Value Date/Time    CHOLSTRLTOT 320 (H) 05/10/2022 07:36 AM     (H) 05/10/2022 07:36 AM    HDL 76 05/10/2022 07:36 AM    TRIGLYCERIDE 167 (H) 05/10/2022 07:36 AM       Lab Results   Component Value Date/Time    SODIUM 142 06/06/2022 10:19 AM    POTASSIUM 4.2 06/06/2022 10:19 AM    CHLORIDE 105 06/06/2022 10:19 AM    CO2 24 06/06/2022 10:19 AM    GLUCOSE 127 (H) 06/06/2022 10:19 AM    BUN 41 (H) 06/06/2022 10:19 AM    CREATININE 1.71 (H) 06/06/2022 10:19 AM    BUNCREATRAT 25 (H) 05/30/2014 09:04 AM     Lab Results   Component Value Date/Time    ALKPHOSPHAT 70 05/16/2022 08:11 AM    ASTSGOT 21 05/16/2022 08:11 AM    ALTSGPT 28 05/16/2022 08:11 AM    TBILIRUBIN 1.0 05/16/2022 08:11 AM      Lab Results   Component Value Date/Time    WBC 3.4 (L) 05/18/2022 02:49 AM     Lab Results   Component Value Date/Time    HBA1C 5.9 (H) 05/10/2022 07:36 AM    HBA1C 5.5 04/21/2022 11:27 AM         Cardiac Imaging and Procedures Review:    EKG dated 12/28/2018: My personal interpretation is AV dual paced    Echo dated 2/21/2022:   CONCLUSIONS  Compared to the prior echo on 11/08/19, no significant changes  The left ventricular ejection fraction is visually estimated to be 60%.  Mild mitral regurgitation.  Estimated right ventricular systolic pressure is 30 mmHg.  Grade 1 diastolic dysfunction (my read)    Assessment & Plan     1. Chronic heart failure with preserved ejection fraction (HCC)  Basic Metabolic Panel   2. Paroxysmal atrial fibrillation (HCC)     3. CHF (NYHA class III, ACC/AHA stage C) (HCC)  Basic Metabolic Panel   4. Statin intolerance     5. Dyslipidemia     6. Diastolic dysfunction  Basic Metabolic Panel   7. Cardiac pacemaker in situ     8. Complete heart block (HCC)     9. Essential hypertension           Shared Medical Decision Making:    Grade 1 diastolic dysfunction  HFpEF  NYHA  class III stage C heart failure  Euvolemic on exam.  Patient reports his dry weight is closer to 240 pounds  -Continue torsemide 20mg daily for weight gain 3 lbs above dry weight.   -Repeat labs prior to next visit, if renal function stable, consider adding aldactone next visit.      Atrial fibrillation  Complete heart block status post pacemaker  [id number] 3 (hypertension, age,?  CHF)  -Continue warfarin  -Continue sotalol  -Continue follow-up with device clinic    Paroxysmal ventricular tachycardia  Status post ICD  -Continue sotalol  -Continue follow-up with device clinic    Dyslipidemia  Statin intolerance  -Follow-up in lipid clinic     Hypertension  BP well controlled this visit  -Continue benazepril 20 mg daily  -Continue Lasix as above    All of the patient's excellent questions were answered to the best of my knowledge and to his satisfaction.  It was a pleasure seeing Mr. Mike Ferrell in my clinic today. Return in about 2 months (around 8/8/2022). Patient is aware to call the cardiology clinic with any questions or concerns.      Tiffanie Mariee MD  [facility]  [facility], [facility]  [address] [address]  Phone: [phone]  Fax: [phone]

## 2022-06-08 ENCOUNTER — OFFICE VISIT (OUTPATIENT)
Dept: CARDIOLOGY | Facility: MEDICAL CENTER | Age: 72
End: 2022-06-08
Payer: MEDICARE

## 2022-06-08 VITALS
SYSTOLIC BLOOD PRESSURE: 102 MMHG | BODY MASS INDEX: 33.32 KG/M2 | RESPIRATION RATE: 16 BRPM | DIASTOLIC BLOOD PRESSURE: 78 MMHG | WEIGHT: 246 LBS | HEART RATE: 72 BPM | HEIGHT: 72 IN | OXYGEN SATURATION: 92 %

## 2022-06-08 DIAGNOSIS — I10 ESSENTIAL HYPERTENSION: ICD-10-CM

## 2022-06-08 DIAGNOSIS — I44.2 COMPLETE HEART BLOCK (HCC): ICD-10-CM

## 2022-06-08 DIAGNOSIS — I50.9 CHF (NYHA CLASS III, ACC/AHA STAGE C) (HCC): ICD-10-CM

## 2022-06-08 DIAGNOSIS — I51.89 DIASTOLIC DYSFUNCTION: ICD-10-CM

## 2022-06-08 DIAGNOSIS — I48.0 PAROXYSMAL ATRIAL FIBRILLATION (HCC): ICD-10-CM

## 2022-06-08 DIAGNOSIS — Z78.9 STATIN INTOLERANCE: ICD-10-CM

## 2022-06-08 DIAGNOSIS — Z95.0 CARDIAC PACEMAKER IN SITU: ICD-10-CM

## 2022-06-08 DIAGNOSIS — E78.5 DYSLIPIDEMIA: ICD-10-CM

## 2022-06-08 DIAGNOSIS — I50.32 CHRONIC HEART FAILURE WITH PRESERVED EJECTION FRACTION (HCC): ICD-10-CM

## 2022-06-08 PROCEDURE — 99214 OFFICE O/P EST MOD 30 MIN: CPT | Performed by: STUDENT IN AN ORGANIZED HEALTH CARE EDUCATION/TRAINING PROGRAM

## 2022-06-08 ASSESSMENT — FIBROSIS 4 INDEX: FIB4 SCORE: 3.149019223495355105

## 2022-06-08 ASSESSMENT — MINNESOTA LIVING WITH HEART FAILURE QUESTIONNAIRE (MLHF)
DIFFICULTY WORKING TO EARN A LIVING: 5
TOTAL_SCORE: 97
MAKING YOU FEEL DEPRESSED: 5
DIFFICULTY WITH RECREATIONAL PASTIMES, SPORTS, HOBBIES: 5
SWELLING IN ANKLES OR LEGS: 5
MAKING YOU SHORT OF BREATH: 5
EATING LESS FOODS YOU LIKE: 4
DIFFICULTY GOING AWAY FROM HOME: 5
WALKING ABOUT OR CLIMBING STAIRS DIFFICULT: 5
WORKING AROUND THE HOUSE OR YARD DIFFICULT: 5
FEELING LIKE A BURDEN TO FAMILY AND FRIENDS: 5
LOSS OF SELF CONTROL IN YOUR LIFE: 5
DIFFICULTY TO CONCENTRATE OR REMEMBERING THINGS: 3
DIFFICULTY WITH SEXUAL ACTIVITIES: 5
MAKING YOU WORRY: 5
MAKING YOU STAY IN A HOSPITAL: 5
TIRED, FATIGUED OR LOW ON ENERGY: 5
DIFFICULTY SLEEPING WELL AT NIGHT: 2
GIVING YOU SIDE EFFECTS FROM TREATMENTS: 3
COSTING YOU MONEY FOR MEDICAL CARE: 5
HAVING TO SIT OR LIE DOWN DURING THE DAY: 5
DIFFICULTY SOCIALIZING WITH FAMILY OR FRIENDS: 5

## 2022-06-08 NOTE — PATIENT INSTRUCTIONS
-Continue torsemide 20mg daily as needed if weight is 3 lbs above dry weight (dry weight is 240 lbs).

## 2022-06-09 ENCOUNTER — HOME CARE VISIT (OUTPATIENT)
Dept: HOME HEALTH SERVICES | Facility: HOME HEALTHCARE | Age: 72
End: 2022-06-09
Payer: MEDICARE

## 2022-06-09 VITALS
HEART RATE: 77 BPM | DIASTOLIC BLOOD PRESSURE: 64 MMHG | OXYGEN SATURATION: 94 % | BODY MASS INDEX: 33.77 KG/M2 | WEIGHT: 249 LBS | SYSTOLIC BLOOD PRESSURE: 104 MMHG | TEMPERATURE: 97.4 F | RESPIRATION RATE: 18 BRPM

## 2022-06-09 ASSESSMENT — ENCOUNTER SYMPTOMS
PAIN: 1
HIGHEST PAIN SEVERITY IN PAST 24 HOURS: 5/10
LOWEST PAIN SEVERITY IN PAST 24 HOURS: 0/10
PAIN LOCATION: HIPS
PAIN LOCATION - PAIN QUALITY: SHARP
MUSCLE WEAKNESS: 1
DEBILITATING PAIN: 1
PAIN SEVERITY GOAL: 0/10
PAIN LOCATION - RELIEVING FACTORS: PAIN MEDICATIONS
SUBJECTIVE PAIN PROGRESSION: GRADUALLY WORSENING
PAIN LOCATION - PAIN SEVERITY: 5/10

## 2022-06-09 ASSESSMENT — PAIN SCALES - PAIN ASSESSMENT IN ADVANCED DEMENTIA (PAINAD)
BODYLANGUAGE: 0 - RELAXED.
TOTALSCORE: 0
NEGVOCALIZATION: 0 - NONE.
CONSOLABILITY: 0 - NO NEED TO CONSOLE.
FACIALEXPRESSION: 0 - SMILING OR INEXPRESSIVE.

## 2022-06-10 ENCOUNTER — HOME CARE VISIT (OUTPATIENT)
Dept: HOME HEALTH SERVICES | Facility: HOME HEALTHCARE | Age: 72
End: 2022-06-10
Payer: MEDICARE

## 2022-06-10 PROCEDURE — G0152 HHCP-SERV OF OT,EA 15 MIN: HCPCS

## 2022-06-10 ASSESSMENT — FIBROSIS 4 INDEX: FIB4 SCORE: 3.149019223495355105

## 2022-06-12 VITALS
SYSTOLIC BLOOD PRESSURE: 127 MMHG | BODY MASS INDEX: 33.77 KG/M2 | DIASTOLIC BLOOD PRESSURE: 82 MMHG | RESPIRATION RATE: 16 BRPM | WEIGHT: 249 LBS | HEART RATE: 79 BPM | OXYGEN SATURATION: 93 % | TEMPERATURE: 97.7 F

## 2022-06-12 SDOH — ECONOMIC STABILITY: HOUSING INSECURITY
HOME SAFETY: FRONT STEPS IN NEED OF REPAIR. RAILING LOOSE. STAIR BOARDS ARE IN DISREPAIR. PLANS TO GET FIXED. PT BORROWED A SHOWER CHAIR BUT IS TOO BIG FOR THE SHOWER. PT HAS IMPROVED TO BEING ABLE TO SHOWER IN STANDING.

## 2022-06-12 ASSESSMENT — ENCOUNTER SYMPTOMS
PAIN: 1
HIGHEST PAIN SEVERITY IN PAST 24 HOURS: 2/10
PAIN LOCATION - PAIN FREQUENCY: INTERMITTENT
PAIN LOCATION - PAIN SEVERITY: 2/10
SUBJECTIVE PAIN PROGRESSION: RAPIDLY IMPROVING
PERSON REPORTING PAIN: PATIENT
PAIN LOCATION - PAIN QUALITY: ACHY

## 2022-06-12 ASSESSMENT — ACTIVITIES OF DAILY LIVING (ADL)
PREPARING MEALS: INDEPENDENT
TOILETING: 1
TELEPHONE USE ASSESSED: 1
ORAL_CARE_ASSESSED: 1
FEEDING ASSESSED: 1
GROOMING_CURRENT_FUNCTION: INDEPENDENT
FEEDING: INDEPENDENT
USING THE TELPHONE: INDEPENDENT
AMBULATION ASSISTANCE: INDEPENDENT
DRESSING_UB_CURRENT_FUNCTION: INDEPENDENT
AMBULATION ASSISTANCE: 1
BATHING ASSESSED: 1
TOILETING: INDEPENDENT
BATHING_CURRENT_FUNCTION: INDEPENDENT
ORAL_CARE_CURRENT_FUNCTION: INDEPENDENT
GROOMING ASSESSED: 1
DRESSING_LB_CURRENT_FUNCTION: INDEPENDENT

## 2022-06-13 ENCOUNTER — HOME CARE VISIT (OUTPATIENT)
Dept: HOME HEALTH SERVICES | Facility: HOME HEALTHCARE | Age: 72
End: 2022-06-13
Payer: MEDICARE

## 2022-06-13 DIAGNOSIS — I48.0 PAROXYSMAL ATRIAL FIBRILLATION (HCC): ICD-10-CM

## 2022-06-13 PROCEDURE — G0299 HHS/HOSPICE OF RN EA 15 MIN: HCPCS

## 2022-06-13 ASSESSMENT — FIBROSIS 4 INDEX: FIB4 SCORE: 3.149019223495355105

## 2022-06-14 ENCOUNTER — TELEPHONE (OUTPATIENT)
Dept: CARDIOLOGY | Facility: MEDICAL CENTER | Age: 72
End: 2022-06-14
Payer: MEDICARE

## 2022-06-14 DIAGNOSIS — I48.0 PAROXYSMAL ATRIAL FIBRILLATION (HCC): ICD-10-CM

## 2022-06-14 RX ORDER — APIXABAN 5 MG/1
TABLET, FILM COATED ORAL
Qty: 180 TABLET | Refills: 3 | Status: SHIPPED | OUTPATIENT
Start: 2022-06-14 | End: 2022-07-01

## 2022-06-14 NOTE — TELEPHONE ENCOUNTER
ALYCE    Caller: Karen    Medication Name and Dosage: apixaban (ELIQUIS) 5mg Tab    Did patient contact pharmacy (If no, please call pharmacy first): Yes    Medication amount left: 2    Preferred Pharmacy: [facility] in [place]    Other questions (Topic): N/A    Callback Number (Will only call for issues): [phone]

## 2022-06-15 ENCOUNTER — ANTICOAGULATION MONITORING (OUTPATIENT)
Dept: VASCULAR LAB | Facility: MEDICAL CENTER | Age: 72
End: 2022-06-15
Payer: MEDICARE

## 2022-06-15 VITALS
HEART RATE: 81 BPM | SYSTOLIC BLOOD PRESSURE: 130 MMHG | RESPIRATION RATE: 17 BRPM | OXYGEN SATURATION: 98 % | DIASTOLIC BLOOD PRESSURE: 80 MMHG | TEMPERATURE: 97.8 F | BODY MASS INDEX: 33.77 KG/M2 | WEIGHT: 249 LBS

## 2022-06-15 DIAGNOSIS — D68.69 SECONDARY HYPERCOAGULABLE STATE (HCC): ICD-10-CM

## 2022-06-15 ASSESSMENT — ENCOUNTER SYMPTOMS
PAIN LOCATION - PAIN SEVERITY: 6/10
PAIN SEVERITY GOAL: 4/10
PAIN LOCATION - PAIN FREQUENCY: INTERMITTENT
PAIN LOCATION - PAIN SEVERITY: 4/10
PAIN LOCATION - PAIN QUALITY: ACHY
LOWEST PAIN SEVERITY IN PAST 24 HOURS: 4/10
PAIN: 1
HIGHEST PAIN SEVERITY IN PAST 24 HOURS: 6/10
PERSON REPORTING PAIN: PATIENT
PAIN LOCATION - PAIN FREQUENCY: INTERMITTENT
PAIN LOCATION - PAIN QUALITY: ACHY
PAIN LOCATION: HIPS
SUBJECTIVE PAIN PROGRESSION: GRADUALLY IMPROVING
PAIN LOCATION - RELIEVING FACTORS: SITTING
PAIN LOCATION - RELIEVING FACTORS: PAIN PILL
MUSCLE WEAKNESS: 1
PAIN LOCATION - EXACERBATING FACTORS: WALKING
PAIN LOCATION: BACK

## 2022-06-15 ASSESSMENT — PAIN SCALES - PAIN ASSESSMENT IN ADVANCED DEMENTIA (PAINAD)
NEGVOCALIZATION: 0 - NONE.
CONSOLABILITY: 0 - NO NEED TO CONSOLE.
BODYLANGUAGE: 0 - RELAXED.
FACIALEXPRESSION: 0 - SMILING OR INEXPRESSIVE.
TOTALSCORE: 0

## 2022-06-16 ENCOUNTER — HOME CARE VISIT (OUTPATIENT)
Dept: HOME HEALTH SERVICES | Facility: HOME HEALTHCARE | Age: 72
End: 2022-06-16
Payer: MEDICARE

## 2022-06-16 ENCOUNTER — HOSPITAL ENCOUNTER (OUTPATIENT)
Facility: MEDICAL CENTER | Age: 72
End: 2022-06-16
Attending: ORTHOPAEDIC SURGERY | Admitting: ORTHOPAEDIC SURGERY
Payer: MEDICARE

## 2022-06-16 PROBLEM — M16.9 OSTEOARTHRITIS OF HIP: Status: ACTIVE | Noted: 2022-06-16

## 2022-06-16 PROCEDURE — G0299 HHS/HOSPICE OF RN EA 15 MIN: HCPCS

## 2022-06-16 ASSESSMENT — FIBROSIS 4 INDEX: FIB4 SCORE: 3.149019223495355105

## 2022-06-16 NOTE — LETTER
June 20, 2022    Patient Name: Mike Ferrell  Surgeon Name: Gerard Hargrove M.D.  Surgery Facility: [facility] ([id number] [facility])  Surgery Date: 7/5/2022    The time of your surgery is not final and may change up to and until the day of your surgery. You will be contacted 24-48 hours prior to your surgery date with your check-in and surgery time.    If you will not be at one of the below numbers please call/text the surgery scheduler at [phone]  Preferred Phone: [phone]    BEFORE YOUR SURGERY   Pre Registration and/or Lab Work must be done within and no earlier than 28 days prior to your surgery date. Please call [facility] at [phone] for an appointment as soon as possible.    Please refrain from smoking any substance after midnight prior to surgery. Smoking may interfere with the anesthetic and frequently produces nausea during the recovery period.    Continue taking all lifesaving medications. Including the morning of your surgery with small sip of water.    Please read the MEDICATION INSTRUCTIONS below completely.    DAY OF YOUR SURGERY  Nothing to eat or drink after midnight     Please arrive at the hospital/surgery center at the check-in time provided.     An adult will need to bring you and take you home after your surgery.     AFTER YOUR SURGERY  Post op Appointment:   Date: 07/20/2022   Time: 10:15AM   With: SHERRY WOMACK   Location: [address] [place] [id number]    - No dental work for 3-6 months after your surgery.  - You must have someone provide transportation post surgery and someone to monitor you for at least 24 hours post-surgery. If you don't have either of these your appointment will be canceled.         TIME OFF WORK  FMLA or Disability forms can be faxed directly to: [phone] or you may drop them off at [address] [place] [address]. Our office charges a $35.00 fee per form. Forms will be  completed within 10 business days of drop off and payment received. For the status of your forms you may contact our disability office directly [phone].    MEDICATION INSTRUCTIONS  The following medications should be stopped a minimum of 10 days prior to surgery:  All over the counter, Supplements & Herbal medications    Anorectics: Phentermine (Adipex-P, Lomaira and Suprenza), Phentermine-topiramate (Qsymia), Bupropion-naltrexone (Contrave)    Opiod Partial Agonists/Opioid Antagonists: Buprenorphine (Subocone, Belbuca, Butrans, Probuphine Implant, Sublocade), Naltrexone (ReVia, Vivitrol), Naloxone    Amphetamines: Dextroamphetamine/Amphetamine (Adderall, Mydayis), Methylphenidate Hydrochloride (Concerta, Metadate, Methylin, Ritalin)    The following medications should be stopped 5 days prior to surgery:  Blood Thinners: Any Aspirin, Aspirin products, anti-inflammatories such as ibuprofen and any blood thinners such as Coumadin and Plavix. Please consult your prescribing physician if you are on life saving blood thinners, in regards to when to stop medications prior to surgery.     The following medications should be stopped a minimum of 3 days prior to surgery:  PDE-5 inhibitors: Sildenafil (Viagra), Tadalafil (Cialis), Vardenafil (Levitra), Avanafil (Stendra)    MAO Inhibitors: Rasagiline (Azilect), Selegiline (Eldepryl, Emsam, Selapar), Isocarboxazid (Marplan), Phenelzine (Nardil)

## 2022-06-19 VITALS
RESPIRATION RATE: 17 BRPM | BODY MASS INDEX: 33.63 KG/M2 | DIASTOLIC BLOOD PRESSURE: 65 MMHG | OXYGEN SATURATION: 95 % | HEART RATE: 76 BPM | WEIGHT: 248 LBS | TEMPERATURE: 97.6 F | SYSTOLIC BLOOD PRESSURE: 110 MMHG

## 2022-06-19 ASSESSMENT — ENCOUNTER SYMPTOMS
PAIN SEVERITY GOAL: 0/10
PERSON REPORTING PAIN: PATIENT
SUBJECTIVE PAIN PROGRESSION: UNCHANGED
HIGHEST PAIN SEVERITY IN PAST 24 HOURS: 5/10
LOWEST PAIN SEVERITY IN PAST 24 HOURS: 0/10
MUSCLE WEAKNESS: 1
DEBILITATING PAIN: 1
PAIN: 1

## 2022-06-19 ASSESSMENT — PAIN SCALES - PAIN ASSESSMENT IN ADVANCED DEMENTIA (PAINAD)
NEGVOCALIZATION: 0 - NONE.
CONSOLABILITY: 0 - NO NEED TO CONSOLE.
TOTALSCORE: 0
FACIALEXPRESSION: 0 - SMILING OR INEXPRESSIVE.
BODYLANGUAGE: 0 - RELAXED.

## 2022-06-20 ENCOUNTER — HOME CARE VISIT (OUTPATIENT)
Dept: HOME HEALTH SERVICES | Facility: HOME HEALTHCARE | Age: 72
End: 2022-06-20
Payer: MEDICARE

## 2022-06-20 VITALS
SYSTOLIC BLOOD PRESSURE: 126 MMHG | RESPIRATION RATE: 16 BRPM | HEART RATE: 76 BPM | DIASTOLIC BLOOD PRESSURE: 84 MMHG | BODY MASS INDEX: 33.63 KG/M2 | OXYGEN SATURATION: 93 % | WEIGHT: 248 LBS | TEMPERATURE: 97.6 F

## 2022-06-20 PROCEDURE — 665001 SOC-HOME HEALTH

## 2022-06-20 PROCEDURE — G0493 RN CARE EA 15 MIN HH/HOSPICE: HCPCS

## 2022-06-20 ASSESSMENT — FIBROSIS 4 INDEX
FIB4 SCORE: 3.149019223495355105
FIB4 SCORE: 3.149019223495355105

## 2022-06-20 ASSESSMENT — ACTIVITIES OF DAILY LIVING (ADL)
OASIS_M1830: 02
HOME_HEALTH_OASIS: 01

## 2022-06-20 ASSESSMENT — PATIENT HEALTH QUESTIONNAIRE - PHQ9: CLINICAL INTERPRETATION OF PHQ2 SCORE: 0

## 2022-06-20 ASSESSMENT — ENCOUNTER SYMPTOMS
PERSON REPORTING PAIN: PATIENT
PAIN SEVERITY GOAL: 0/10
HIGHEST PAIN SEVERITY IN PAST 24 HOURS: 10/10
SUBJECTIVE PAIN PROGRESSION: UNCHANGED
LOWEST PAIN SEVERITY IN PAST 24 HOURS: 0/10
PAIN: 1

## 2022-06-21 ENCOUNTER — TELEPHONE (OUTPATIENT)
Dept: CARDIOLOGY | Facility: MEDICAL CENTER | Age: 72
End: 2022-06-21
Payer: MEDICARE

## 2022-06-21 NOTE — TELEPHONE ENCOUNTER
Received stratification request from [facility] fax [phone]    Procedure: Right Total Hip Arthroplasty    To HK, ok to proceed and hold eliquis 48 hrs prior? Last OV 6/8/22, due back in 2 months,  no testing pending.

## 2022-06-21 NOTE — TELEPHONE ENCOUNTER
Caller: - Karen (spouse)    Office Name, phone number, fax number: [facility]    Fax clearance to [phone]    Procedure Name: Hip Replacement     Procedure Scheduled Date: 7/5/22    Callback Number: [phone]    Thank you,   Shyann KENDALL

## 2022-06-22 NOTE — TELEPHONE ENCOUNTER
Tiffanie Mariee M.D.  You 13 hours ago (6:42 PM)         Yes ok to hold.   Moderate risk patient for intermediate risk procedure, okay to proceed without further cardiac workup.     Message text       Faxed clearance to [facility] attn. Dr. Hargrove. [phone], transmission complete

## 2022-06-24 ENCOUNTER — PRE-ADMISSION TESTING (OUTPATIENT)
Dept: ADMISSIONS | Facility: MEDICAL CENTER | Age: 72
End: 2022-06-24
Attending: ORTHOPAEDIC SURGERY
Payer: MEDICARE

## 2022-06-24 VITALS — WEIGHT: 248 LBS | BODY MASS INDEX: 33.59 KG/M2 | HEIGHT: 72 IN

## 2022-06-24 DIAGNOSIS — Z01.812 PRE-OPERATIVE LABORATORY EXAMINATION: ICD-10-CM

## 2022-06-24 DIAGNOSIS — Z01.818 PREOPERATIVE EVALUATION TO RULE OUT SURGICAL CONTRAINDICATION: ICD-10-CM

## 2022-06-24 LAB
ABO GROUP BLD: NORMAL
ALBUMIN SERPL BCP-MCNC: 4.2 G/DL (ref 3.2–4.9)
ALBUMIN/GLOB SERPL: 1.6 G/DL
ALP SERPL-CCNC: 73 U/L (ref 30–99)
ALT SERPL-CCNC: 17 U/L (ref 2–50)
ANION GAP SERPL CALC-SCNC: 17 MMOL/L (ref 7–16)
AST SERPL-CCNC: 23 U/L (ref 12–45)
BASOPHILS # BLD AUTO: 0.1 % (ref 0–1.8)
BASOPHILS # BLD: 0.02 K/UL (ref 0–0.12)
BILIRUB SERPL-MCNC: 1 MG/DL (ref 0.1–1.5)
BUN SERPL-MCNC: 54 MG/DL (ref 8–22)
CALCIUM SERPL-MCNC: 9.3 MG/DL (ref 8.4–10.2)
CHLORIDE SERPL-SCNC: 100 MMOL/L (ref 96–112)
CO2 SERPL-SCNC: 23 MMOL/L (ref 20–33)
CREAT SERPL-MCNC: 1.95 MG/DL (ref 0.5–1.4)
EOSINOPHIL # BLD AUTO: 0 K/UL (ref 0–0.51)
EOSINOPHIL NFR BLD: 0 % (ref 0–6.9)
ERYTHROCYTE [DISTWIDTH] IN BLOOD BY AUTOMATED COUNT: 47.5 FL (ref 35.9–50)
GFR SERPLBLD CREATININE-BSD FMLA CKD-EPI: 35 ML/MIN/1.73 M 2
GLOBULIN SER CALC-MCNC: 2.7 G/DL (ref 1.9–3.5)
GLUCOSE SERPL-MCNC: 126 MG/DL (ref 65–99)
HCT VFR BLD AUTO: 35.2 % (ref 42–52)
HGB BLD-MCNC: 12.1 G/DL (ref 14–18)
IMM GRANULOCYTES # BLD AUTO: 0.23 K/UL (ref 0–0.11)
IMM GRANULOCYTES NFR BLD AUTO: 1.5 % (ref 0–0.9)
LYMPHOCYTES # BLD AUTO: 0.97 K/UL (ref 1–4.8)
LYMPHOCYTES NFR BLD: 6.1 % (ref 22–41)
MCH RBC QN AUTO: 34.6 PG (ref 27–33)
MCHC RBC AUTO-ENTMCNC: 34.4 G/DL (ref 33.7–35.3)
MCV RBC AUTO: 100.6 FL (ref 81.4–97.8)
MONOCYTES # BLD AUTO: 1.32 K/UL (ref 0–0.85)
MONOCYTES NFR BLD AUTO: 8.4 % (ref 0–13.4)
NEUTROPHILS # BLD AUTO: 13.26 K/UL (ref 1.82–7.42)
NEUTROPHILS NFR BLD: 83.9 % (ref 44–72)
NRBC # BLD AUTO: 0 K/UL
NRBC BLD-RTO: 0 /100 WBC
PLATELET # BLD AUTO: 137 K/UL (ref 164–446)
PMV BLD AUTO: 11.4 FL (ref 9–12.9)
POTASSIUM SERPL-SCNC: 4.3 MMOL/L (ref 3.6–5.5)
PROT SERPL-MCNC: 6.9 G/DL (ref 6–8.2)
RBC # BLD AUTO: 3.5 M/UL (ref 4.7–6.1)
RH BLD: NORMAL
SCCMEC + MECA PNL NOSE NAA+PROBE: NEGATIVE
SCCMEC + MECA PNL NOSE NAA+PROBE: NEGATIVE
SODIUM SERPL-SCNC: 140 MMOL/L (ref 135–145)
WBC # BLD AUTO: 15.8 K/UL (ref 4.8–10.8)

## 2022-06-24 PROCEDURE — 85025 COMPLETE CBC W/AUTO DIFF WBC: CPT

## 2022-06-24 PROCEDURE — 36415 COLL VENOUS BLD VENIPUNCTURE: CPT

## 2022-06-24 PROCEDURE — 80053 COMPREHEN METABOLIC PANEL: CPT

## 2022-06-24 PROCEDURE — 87641 MR-STAPH DNA AMP PROBE: CPT

## 2022-06-24 PROCEDURE — 86900 BLOOD TYPING SEROLOGIC ABO: CPT

## 2022-06-24 PROCEDURE — 86901 BLOOD TYPING SEROLOGIC RH(D): CPT

## 2022-06-24 PROCEDURE — 87640 STAPH A DNA AMP PROBE: CPT | Mod: XU

## 2022-06-24 ASSESSMENT — FIBROSIS 4 INDEX: FIB4 SCORE: 3.149019223495355105

## 2022-06-24 NOTE — PREPROCEDURE INSTRUCTIONS
Pre admit appointment completed. History and medications reviewed. Pre-op instructions given, hand outs reviewed and questions answered. Pre admit video watched. Total joint instructions given.     Anesthesia fasting guidelines reviewed. Patient instructed to continue regularly prescribed medications through the day before surgery. Per anesthesia protocol, patient instructed to take these medications with a sip of water the day of surgery: tylenol prn, norco prn, esomeprazole, sotalol.    Patient has cardiac clearance from Dr. Mariee. ÁNGEL score 6, pending sleep study results, STOPBANG initiated. Hx sent to Dr. Valera.    AICD, pacerform faxed to [facility].

## 2022-06-24 NOTE — PREPROCEDURE INSTRUCTIONS
Reviewed patient's medical history and medications with Dr Valera, based upon information available, Dr Valera indicates that the patient is a candidate to have the procedure at [facility] as scheduled on 7/5 with cardiology clearance. Pacerform has been requested from [facility].

## 2022-07-01 ENCOUNTER — APPOINTMENT (OUTPATIENT)
Dept: RADIOLOGY | Facility: MEDICAL CENTER | Age: 72
DRG: 683 | End: 2022-07-01
Attending: EMERGENCY MEDICINE
Payer: MEDICARE

## 2022-07-01 ENCOUNTER — HOSPITAL ENCOUNTER (INPATIENT)
Facility: MEDICAL CENTER | Age: 72
LOS: 2 days | DRG: 683 | End: 2022-07-03
Attending: EMERGENCY MEDICINE | Admitting: INTERNAL MEDICINE
Payer: MEDICARE

## 2022-07-01 DIAGNOSIS — N17.9 AKI (ACUTE KIDNEY INJURY) (HCC): ICD-10-CM

## 2022-07-01 DIAGNOSIS — F10.930 ALCOHOL WITHDRAWAL SYNDROME WITHOUT COMPLICATION (HCC): ICD-10-CM

## 2022-07-01 DIAGNOSIS — K21.9 GASTROESOPHAGEAL REFLUX DISEASE, UNSPECIFIED WHETHER ESOPHAGITIS PRESENT: ICD-10-CM

## 2022-07-01 DIAGNOSIS — M16.11 PRIMARY OSTEOARTHRITIS OF RIGHT HIP: ICD-10-CM

## 2022-07-01 DIAGNOSIS — E87.20 LACTIC ACIDOSIS: ICD-10-CM

## 2022-07-01 PROBLEM — D64.9 ANEMIA: Status: ACTIVE | Noted: 2022-04-01

## 2022-07-01 LAB
ALBUMIN SERPL BCP-MCNC: 3.5 G/DL (ref 3.2–4.9)
ALBUMIN/GLOB SERPL: 1.4 G/DL
ALP SERPL-CCNC: 85 U/L (ref 30–99)
ALT SERPL-CCNC: 16 U/L (ref 2–50)
ANION GAP SERPL CALC-SCNC: 18 MMOL/L (ref 7–16)
APPEARANCE UR: CLEAR
APTT PPP: 36 SEC (ref 24.7–36)
AST SERPL-CCNC: 24 U/L (ref 12–45)
BACTERIA #/AREA URNS HPF: ABNORMAL /HPF
BASOPHILS # BLD AUTO: 0 % (ref 0–1.8)
BASOPHILS # BLD: 0 K/UL (ref 0–0.12)
BILIRUB SERPL-MCNC: 1 MG/DL (ref 0.1–1.5)
BILIRUB UR QL STRIP.AUTO: NEGATIVE
BUN SERPL-MCNC: 60 MG/DL (ref 8–22)
CALCIUM SERPL-MCNC: 8.6 MG/DL (ref 8.4–10.2)
CHLORIDE SERPL-SCNC: 96 MMOL/L (ref 96–112)
CO2 SERPL-SCNC: 19 MMOL/L (ref 20–33)
COLOR UR: YELLOW
CREAT SERPL-MCNC: 2.39 MG/DL (ref 0.5–1.4)
EKG IMPRESSION: NORMAL
EOSINOPHIL # BLD AUTO: 0.01 K/UL (ref 0–0.51)
EOSINOPHIL NFR BLD: 0.1 % (ref 0–6.9)
EPI CELLS #/AREA URNS HPF: ABNORMAL /HPF
ERYTHROCYTE [DISTWIDTH] IN BLOOD BY AUTOMATED COUNT: 52.3 FL (ref 35.9–50)
FLUAV RNA SPEC QL NAA+PROBE: NEGATIVE
FLUBV RNA SPEC QL NAA+PROBE: NEGATIVE
GFR SERPLBLD CREATININE-BSD FMLA CKD-EPI: 28 ML/MIN/1.73 M 2
GLOBULIN SER CALC-MCNC: 2.5 G/DL (ref 1.9–3.5)
GLUCOSE SERPL-MCNC: 126 MG/DL (ref 65–99)
GLUCOSE UR STRIP.AUTO-MCNC: NEGATIVE MG/DL
HCT VFR BLD AUTO: 31.6 % (ref 42–52)
HGB BLD-MCNC: 10.6 G/DL (ref 14–18)
IMM GRANULOCYTES # BLD AUTO: 0.06 K/UL (ref 0–0.11)
IMM GRANULOCYTES NFR BLD AUTO: 0.9 % (ref 0–0.9)
INR PPP: 1.38 (ref 0.87–1.13)
KETONES UR STRIP.AUTO-MCNC: NEGATIVE MG/DL
LACTATE BLD-SCNC: 1.8 MMOL/L (ref 0.5–2)
LACTATE BLD-SCNC: 3 MMOL/L (ref 0.5–2)
LACTATE BLD-SCNC: 4.9 MMOL/L (ref 0.5–2)
LEUKOCYTE ESTERASE UR QL STRIP.AUTO: NEGATIVE
LYMPHOCYTES # BLD AUTO: 0.48 K/UL (ref 1–4.8)
LYMPHOCYTES NFR BLD: 6.9 % (ref 22–41)
MCH RBC QN AUTO: 35 PG (ref 27–33)
MCHC RBC AUTO-ENTMCNC: 33.5 G/DL (ref 33.7–35.3)
MCV RBC AUTO: 104.3 FL (ref 81.4–97.8)
MICRO URNS: ABNORMAL
MONOCYTES # BLD AUTO: 0.64 K/UL (ref 0–0.85)
MONOCYTES NFR BLD AUTO: 9.2 % (ref 0–13.4)
MUCOUS THREADS #/AREA URNS HPF: ABNORMAL /HPF
NEUTROPHILS # BLD AUTO: 5.78 K/UL (ref 1.82–7.42)
NEUTROPHILS NFR BLD: 82.9 % (ref 44–72)
NITRITE UR QL STRIP.AUTO: NEGATIVE
NRBC # BLD AUTO: 0 K/UL
NRBC BLD-RTO: 0 /100 WBC
NT-PROBNP SERPL IA-MCNC: 2387 PG/ML (ref 0–125)
PH UR STRIP.AUTO: 5 [PH] (ref 5–8)
PLATELET # BLD AUTO: 78 K/UL (ref 164–446)
PMV BLD AUTO: 11.2 FL (ref 9–12.9)
POTASSIUM SERPL-SCNC: 4.2 MMOL/L (ref 3.6–5.5)
PROT SERPL-MCNC: 6 G/DL (ref 6–8.2)
PROT UR QL STRIP: NEGATIVE MG/DL
PROTHROMBIN TIME: 16.3 SEC (ref 12–14.6)
RBC # BLD AUTO: 3.03 M/UL (ref 4.7–6.1)
RBC # URNS HPF: ABNORMAL /HPF
RBC UR QL AUTO: ABNORMAL
RSV RNA SPEC QL NAA+PROBE: NEGATIVE
SARS-COV-2 RNA RESP QL NAA+PROBE: NOTDETECTED
SODIUM SERPL-SCNC: 133 MMOL/L (ref 135–145)
SP GR UR STRIP.AUTO: 1.01
SPECIMEN SOURCE: NORMAL
TROPONIN T SERPL-MCNC: 67 NG/L (ref 6–19)
WBC # BLD AUTO: 7 K/UL (ref 4.8–10.8)
WBC #/AREA URNS HPF: ABNORMAL /HPF

## 2022-07-01 PROCEDURE — 84484 ASSAY OF TROPONIN QUANT: CPT

## 2022-07-01 PROCEDURE — 80053 COMPREHEN METABOLIC PANEL: CPT

## 2022-07-01 PROCEDURE — 71045 X-RAY EXAM CHEST 1 VIEW: CPT

## 2022-07-01 PROCEDURE — 87077 CULTURE AEROBIC IDENTIFY: CPT | Mod: 91

## 2022-07-01 PROCEDURE — C9803 HOPD COVID-19 SPEC COLLECT: HCPCS | Performed by: EMERGENCY MEDICINE

## 2022-07-01 PROCEDURE — 99285 EMERGENCY DEPT VISIT HI MDM: CPT

## 2022-07-01 PROCEDURE — 81001 URINALYSIS AUTO W/SCOPE: CPT

## 2022-07-01 PROCEDURE — 85025 COMPLETE CBC W/AUTO DIFF WBC: CPT

## 2022-07-01 PROCEDURE — 0241U HCHG SARS-COV-2 COVID-19 NFCT DS RESP RNA 4 TRGT MIC: CPT

## 2022-07-01 PROCEDURE — 87040 BLOOD CULTURE FOR BACTERIA: CPT

## 2022-07-01 PROCEDURE — 36415 COLL VENOUS BLD VENIPUNCTURE: CPT

## 2022-07-01 PROCEDURE — 83605 ASSAY OF LACTIC ACID: CPT | Mod: 91

## 2022-07-01 PROCEDURE — 700111 HCHG RX REV CODE 636 W/ 250 OVERRIDE (IP): Performed by: EMERGENCY MEDICINE

## 2022-07-01 PROCEDURE — 99223 1ST HOSP IP/OBS HIGH 75: CPT | Mod: AI | Performed by: INTERNAL MEDICINE

## 2022-07-01 PROCEDURE — 700105 HCHG RX REV CODE 258: Performed by: EMERGENCY MEDICINE

## 2022-07-01 PROCEDURE — 93005 ELECTROCARDIOGRAM TRACING: CPT | Performed by: EMERGENCY MEDICINE

## 2022-07-01 PROCEDURE — 770006 HCHG ROOM/CARE - MED/SURG/GYN SEMI*

## 2022-07-01 PROCEDURE — A9270 NON-COVERED ITEM OR SERVICE: HCPCS | Performed by: INTERNAL MEDICINE

## 2022-07-01 PROCEDURE — 83880 ASSAY OF NATRIURETIC PEPTIDE: CPT

## 2022-07-01 PROCEDURE — 85610 PROTHROMBIN TIME: CPT

## 2022-07-01 PROCEDURE — 87186 SC STD MICRODIL/AGAR DIL: CPT

## 2022-07-01 PROCEDURE — 700102 HCHG RX REV CODE 250 W/ 637 OVERRIDE(OP): Performed by: INTERNAL MEDICINE

## 2022-07-01 PROCEDURE — 85730 THROMBOPLASTIN TIME PARTIAL: CPT

## 2022-07-01 PROCEDURE — 96374 THER/PROPH/DIAG INJ IV PUSH: CPT

## 2022-07-01 RX ORDER — SODIUM CHLORIDE, SODIUM LACTATE, POTASSIUM CHLORIDE, AND CALCIUM CHLORIDE .6; .31; .03; .02 G/100ML; G/100ML; G/100ML; G/100ML
1000 INJECTION, SOLUTION INTRAVENOUS ONCE
Status: COMPLETED | OUTPATIENT
Start: 2022-07-01 | End: 2022-07-01

## 2022-07-01 RX ORDER — POTASSIUM CHLORIDE 20 MEQ/1
20 TABLET, EXTENDED RELEASE ORAL DAILY
Status: ON HOLD | COMMUNITY
End: 2022-07-07

## 2022-07-01 RX ORDER — CYANOCOBALAMIN 1000 UG/ML
1000 INJECTION, SOLUTION INTRAMUSCULAR; SUBCUTANEOUS
Status: DISCONTINUED | OUTPATIENT
Start: 2022-07-01 | End: 2022-07-03 | Stop reason: HOSPADM

## 2022-07-01 RX ORDER — ACETAMINOPHEN 325 MG/1
650 TABLET ORAL EVERY 6 HOURS PRN
Status: DISCONTINUED | OUTPATIENT
Start: 2022-07-01 | End: 2022-07-03 | Stop reason: HOSPADM

## 2022-07-01 RX ORDER — OMEPRAZOLE 20 MG/1
20 CAPSULE, DELAYED RELEASE ORAL
Status: DISCONTINUED | OUTPATIENT
Start: 2022-07-02 | End: 2022-07-03 | Stop reason: HOSPADM

## 2022-07-01 RX ORDER — POLYETHYLENE GLYCOL 3350 17 G/17G
1 POWDER, FOR SOLUTION ORAL
Status: DISCONTINUED | OUTPATIENT
Start: 2022-07-01 | End: 2022-07-03 | Stop reason: HOSPADM

## 2022-07-01 RX ORDER — TORSEMIDE 20 MG/1
20 TABLET ORAL DAILY
Status: ON HOLD | COMMUNITY
End: 2022-07-07

## 2022-07-01 RX ORDER — CEFTRIAXONE 2 G/1
2 INJECTION, POWDER, FOR SOLUTION INTRAMUSCULAR; INTRAVENOUS ONCE
Status: COMPLETED | OUTPATIENT
Start: 2022-07-01 | End: 2022-07-01

## 2022-07-01 RX ORDER — BISACODYL 10 MG
10 SUPPOSITORY, RECTAL RECTAL
Status: DISCONTINUED | OUTPATIENT
Start: 2022-07-01 | End: 2022-07-03 | Stop reason: HOSPADM

## 2022-07-01 RX ORDER — DULOXETIN HYDROCHLORIDE 30 MG/1
30 CAPSULE, DELAYED RELEASE ORAL DAILY
Status: SHIPPED | COMMUNITY
Start: 2022-06-23 | End: 2022-07-01

## 2022-07-01 RX ORDER — HYDROCODONE BITARTRATE AND ACETAMINOPHEN 10; 325 MG/1; MG/1
1 TABLET ORAL EVERY 4 HOURS PRN
Status: DISCONTINUED | OUTPATIENT
Start: 2022-07-01 | End: 2022-07-03 | Stop reason: HOSPADM

## 2022-07-01 RX ORDER — AMOXICILLIN 250 MG
2 CAPSULE ORAL 2 TIMES DAILY
Status: DISCONTINUED | OUTPATIENT
Start: 2022-07-01 | End: 2022-07-03 | Stop reason: HOSPADM

## 2022-07-01 RX ORDER — SODIUM CHLORIDE 9 MG/ML
1000 INJECTION, SOLUTION INTRAVENOUS ONCE
Status: COMPLETED | OUTPATIENT
Start: 2022-07-01 | End: 2022-07-01

## 2022-07-01 RX ORDER — HYDRALAZINE HYDROCHLORIDE 20 MG/ML
10 INJECTION INTRAMUSCULAR; INTRAVENOUS EVERY 4 HOURS PRN
Status: DISCONTINUED | OUTPATIENT
Start: 2022-07-01 | End: 2022-07-03 | Stop reason: HOSPADM

## 2022-07-01 RX ORDER — SOTALOL HYDROCHLORIDE 80 MG/1
120 TABLET ORAL EVERY MORNING
Status: DISCONTINUED | OUTPATIENT
Start: 2022-07-02 | End: 2022-07-03 | Stop reason: HOSPADM

## 2022-07-01 RX ADMIN — APIXABAN 5 MG: 5 TABLET, FILM COATED ORAL at 18:30

## 2022-07-01 RX ADMIN — SODIUM CHLORIDE 1000 ML: 9 INJECTION, SOLUTION INTRAVENOUS at 12:34

## 2022-07-01 RX ADMIN — HYDROCODONE BITARTRATE AND ACETAMINOPHEN 1 TABLET: 10; 325 TABLET ORAL at 18:32

## 2022-07-01 RX ADMIN — SODIUM CHLORIDE, POTASSIUM CHLORIDE, SODIUM LACTATE AND CALCIUM CHLORIDE 1000 ML: 600; 310; 30; 20 INJECTION, SOLUTION INTRAVENOUS at 14:00

## 2022-07-01 RX ADMIN — CEFTRIAXONE SODIUM 2 G: 2 INJECTION, POWDER, FOR SOLUTION INTRAMUSCULAR; INTRAVENOUS at 12:30

## 2022-07-01 ASSESSMENT — ENCOUNTER SYMPTOMS
EYE REDNESS: 0
COUGH: 0
FEVER: 0
CONSTIPATION: 1
SHORTNESS OF BREATH: 1
CHILLS: 0
WEAKNESS: 1
SORE THROAT: 0
FALLS: 0

## 2022-07-01 ASSESSMENT — CHA2DS2 SCORE
AGE 65 TO 74: NO
PRIOR STROKE OR TIA OR THROMBOEMBOLISM: NO
CHF OR LEFT VENTRICULAR DYSFUNCTION: YES
DIABETES: NO
CHA2DS2 VASC SCORE: 3
SEX: MALE
AGE 75 OR GREATER: NO
VASCULAR DISEASE: YES
HYPERTENSION: YES

## 2022-07-01 ASSESSMENT — PAIN DESCRIPTION - PAIN TYPE
TYPE: CHRONIC PAIN
TYPE: CHRONIC PAIN

## 2022-07-01 ASSESSMENT — LIFESTYLE VARIABLES: SUBSTANCE_ABUSE: 1

## 2022-07-01 ASSESSMENT — FIBROSIS 4 INDEX: FIB4 SCORE: 5.62

## 2022-07-01 NOTE — ED NOTES
Med Rec Complete per patient   Allergies Reviewed with patient  No antibiotics within the last 30 days  Patient's Preferred Pharmacy: [facility]

## 2022-07-01 NOTE — ASSESSMENT & PLAN NOTE
Planning for total hip replacement 7/5 with Dr. Hargrove  Pain control  PT/OT evaluated patient.  Consulted with orthopedics for evaluation for possible septic hip.  Patient unable to have MRI without contrast as requested by orthopedics due to AICD.  CT chest abdomen pelvis on 7/2/2022 negative for infections or fluid collection in chest, abdomen, pelvis.  Ultrasound right hip obtain, negative for effusion.  Discussed ultrasound findings with orthopedics who states no acute surgical needs at this time.

## 2022-07-01 NOTE — ASSESSMENT & PLAN NOTE
Mild drop in H/H 12-->10--> 9.9, no obvious bleeding  On eliquis  B12 low 5/2022, doesn't look like he's gotten b12, will start b12 1000mg IM weekly x 4-8 weeks  Occult blood stool ordered.  Continue to monitor anemia.

## 2022-07-01 NOTE — ASSESSMENT & PLAN NOTE
Baseline Cr ~1.3-1.4  On admission Cr 2.39  Etiology: likely pre-renal from diuretics (improved last month when they held them), could be cardiorenal but doesn't look like he's in florid heart failure, looks overall more dry, will get renal US  UA unremarkable  Improving with holding diuretics and ARB  Continue to hold diuretic, resuming ARB  Avoid nephrotoxic meds  Renally dose medications  CTM

## 2022-07-01 NOTE — ED NOTES
Regino from Lab called with critical result of Lactic acid 4.9 at 1230. Critical lab result read back to Regino.   Dr. Pineda notified of critical lab result at 1231.  Critical lab result read back by Dr. Pineda.

## 2022-07-01 NOTE — ASSESSMENT & PLAN NOTE
Home regimen: valsartan 80mg BID, torsemide 20mg daily, sotalol  Inpatient regimen: sotalol 120mg daily,  hydralazine prn  7/3: Renal functions improving, resuming valsartan.

## 2022-07-01 NOTE — H&P
Hospital Medicine History & Physical Note    Date of Service  7/1/2022    Primary Care Physician  Hipolito Hall M.D.    Consultants  none    Specialist Names: n/a      Code Status  DNAR/DNI    Chief Complaint  Chief Complaint   Patient presents with   • Abnormal Labs     High WBC    • Pain   • Bleeding/Bruising       History of Presenting Illness  Mike Ferrell is a 71 y.o. male with a h/o HFpEF, HTN, afib, CHB s/p PPM, VT s/p ICD and CKD who presented 7/1/2022 with hip pain and elevated WBC at orthopedic clinic.    Patient as a h/o R hip pain, having surgery next week with Dr. Hargrove (total arthroplasty).  He was seen at appointment and labs drawn, showed elevated WBC so was told to come in.  Patient reports for past weak he's been having worsening weakness.  He had trouble getting off the toilet and had severe hip pain.  Has arthritis in shoulder and knees as well.  Takes Norco.  Doesn't take NSAIDs.  He has chronic SOB from [place]ever years ago.  Drinks 3-4 drinks per day, bourbon coke, no h/o WD.     Patient had elevated Cr last month, PCP held diuretics and Cr improved, was seen at f/u cardiology appointment at 6/8 and diuretics were restarted.      In ED he was afebrile, normal HR, -148, normal oxygen sat on RA.  Labs remarkable for WBC 7, H/H 10/31 (previously 12 on 6/24), plt 78, Na 133, co2 19 AG 18, Cr 2.39, BUN 60, LA 4.9, troponin 67, BNP 2387, CXR cardiomegaly.  He was given 1L NS, repeat LA 1.8.     I discussed the plan of care with patient, bedside RN and ERP.    Review of Systems  Review of Systems   Constitutional: Negative for chills and fever.   HENT: Negative for congestion and sore throat.    Eyes: Negative for redness.   Respiratory: Positive for shortness of breath (chronic but more recently). Negative for cough.    Cardiovascular: Positive for leg swelling.   Gastrointestinal: Positive for constipation.   Genitourinary: Negative for dysuria and hematuria.   Musculoskeletal:  Positive for joint pain. Negative for falls.   Neurological: Positive for weakness.   Psychiatric/Behavioral: Positive for substance abuse (3-4 boubon's per day).       Past Medical History   has a past medical history of AICD (automatic cardioverter/defibrillator) present, Arthritis, Atrial fibrillation (HCC) (04/04/2012), Bowel habit changes, Breath shortness, Cataract (12/15/2021), Chronic back pain, Congestive heart failure (HCC), COVID-19 (09/2021), Dental disorder, Heart burn, High cholesterol, Hypertension, Methamphetamine use (HCC) (none for many years), Pacemaker, Pain (12/15/2021), Paroxysmal ventricular tachycardia (HCC) (04/04/2012), Pneumonia (2000), Sinoatrial node dysfunction (HCC) (04/04/2012), Snoring, and Syncope and collapse (04/04/2012).    Surgical History   has a past surgical history that includes cholecystectomy (01/01/1999); pacemaker insertion (01/01/2009); aicd implant (01/01/2010); tonsillectomy (01/01/1953); cataract extraction with iol; and aicd implant (2017).     Family History  family history is not on file.   Family history reviewed with patient. There is no family history that is pertinent to the chief complaint.     Social History   reports that he has never smoked. He has never used smokeless tobacco. He reports current alcohol use. He reports previous drug use.    Allergies  Allergies   Allergen Reactions   • Atorvastatin Rash, Itching and Myalgia         • Statins [Hmg-Coa-R Inhibitors] Rash and Itching     Skin gets red & itchy   • Sulfa Drugs Rash and Itching     Rash, itch       Medications  Prior to Admission Medications   Prescriptions Last Dose Informant Patient Reported? Taking?   Cholecalciferol (VITAMIN D3) 5000 units Cap 6/24/2022 at 06:30 Family Member Yes No   Sig: Take 5,000 Units by mouth every morning before breakfast.   Esomeprazole Magnesium 20 MG Tablet Delayed Response 7/1/2022 at 06:30 Family Member Yes No   Sig: Take 20 mg by mouth every morning before  breakfast.   HYDROcodone/acetaminophen (NORCO)  MG Tab 7/1/2022 at 06:30 Family Member Yes No   Sig: Take 1 Tablet by mouth every four hours as needed for Moderate Pain. Indications: Pain   apixaban (ELIQUIS) 5mg Tab 7/1/2022 at 06:30 Family Member Yes Yes   Sig: Take 5 mg by mouth 2 times a day.   colesevelam (WELCHOL) 625 MG Tab 6/30/2022 at PM Family Member Yes No   Sig: Take 625 mg by mouth every evening.   potassium chloride SA (KDUR) 20 MEQ Tab CR 7/1/2022 at 0630 Family Member Yes Yes   Sig: Take 20 mEq by mouth every day.   sotalol (BETAPACE) 120 MG tablet 7/1/2022 at 06:30 Family Member Yes No   Sig: Take 120 mg by mouth every morning.   torsemide (DEMADEX) 20 MG Tab 7/1/2022 at 06:30 Family Member Yes Yes   Sig: Take 20 mg by mouth every day.   valsartan (DIOVAN) 80 MG Tab 7/1/2022 at 06:30 Family Member No No   Sig: Take 1 Tablet by mouth 2 times a day.      Facility-Administered Medications: None       Physical Exam  Temp:  [36.4 °C (97.6 °F)] 36.4 °C (97.6 °F)  Pulse:  [68-76] 68  Resp:  [11-22] 20  BP: (119-148)/(59-77) 148/69  SpO2:  [89 %-93 %] 90 %  Blood Pressure : 123/71   Temperature: 36.4 °C (97.6 °F)   Pulse: 68   Respiration: 18   Pulse Oximetry: 93 %       Physical Exam  Constitutional:       General: He is not in acute distress.     Appearance: He is obese. He is not toxic-appearing or diaphoretic.   HENT:      Head: Normocephalic and atraumatic.      Nose: Nose normal.      Mouth/Throat:      Mouth: Mucous membranes are dry.      Pharynx: Oropharynx is clear.   Eyes:      General: No scleral icterus.     Conjunctiva/sclera: Conjunctivae normal.   Cardiovascular:      Rate and Rhythm: Normal rate and regular rhythm.      Heart sounds: No murmur heard.    No friction rub. No gallop.   Pulmonary:      Effort: Pulmonary effort is normal.      Breath sounds: Normal breath sounds.   Abdominal:      General: Bowel sounds are normal. There is no distension.      Palpations: Abdomen is soft.       Tenderness: There is no abdominal tenderness.   Musculoskeletal:      Cervical back: Normal range of motion and neck supple.      Right lower leg: Edema present.      Left lower leg: Edema present.   Skin:     Coloration: Skin is not jaundiced.      Findings: No rash.   Neurological:      Mental Status: He is alert and oriented to person, place, and time. Mental status is at baseline.   Psychiatric:         Mood and Affect: Mood normal.         Behavior: Behavior normal.         Laboratory:  Recent Labs     07/01/22  1217   WBC 7.0   RBC 3.03*   HEMOGLOBIN 10.6*   HEMATOCRIT 31.6*   .3*   MCH 35.0*   MCHC 33.5*   RDW 52.3*   PLATELETCT 78*   MPV 11.2     Recent Labs     07/01/22  1217   SODIUM 133*   POTASSIUM 4.2   CHLORIDE 96   CO2 19*   GLUCOSE 126*   BUN 60*   CREATININE 2.39*   CALCIUM 8.6     Recent Labs     07/01/22  1217   ALTSGPT 16   ASTSGOT 24   ALKPHOSPHAT 85   TBILIRUBIN 1.0   GLUCOSE 126*     Recent Labs     07/01/22  1217   APTT 36.0   INR 1.38*     Recent Labs     07/01/22  1217   NTPROBNP 2387*         Recent Labs     07/01/22  1217   TROPONINT 67*       Imaging:  DX-CHEST-PORTABLE (1 VIEW)   Final Result      Cardiomegaly.          X-Ray:  I have personally reviewed the images and compared with prior images.  EKG:  I have personally reviewed the images and compared with prior images.    Assessment/Plan:  Justification for Admission Status  I anticipate this patient will require at least two midnights for appropriate medical management, necessitating inpatient admission because given KEVYN requiring IVF, monitoring of renal function and PT/OT evalutation    * KEVYN (acute kidney injury) (HCC)- (present on admission)  Assessment & Plan  Baseline Cr ~1.3-1.4  On admission Cr 2.39  Etiology: likely pre-renal from diuretics (improved last month when they held them), could be cardiorenal but doesn't look like he's in florid heart failure, looks overall more dry, will get renal US  UA  unremarkable  Holding diuretics and ARB  CTM  Avoid nephrotoxic meds    Osteoarthritis of hip- (present on admission)  Assessment & Plan  Planning for total hip replacement 7/5 with Dr. Hargrove  Pain control  PT/OT    Macrocytic anemia  Assessment & Plan  Mild drop in H/H 12-->10, no obvious bleeding  On eliquis  CTM  If drops again, consider stool occult    (HFpEF) heart failure with preserved ejection fraction (HCC)- (present on admission)  Assessment & Plan  Dry mucus membranes, clear lungs, does have LE edema, BNP elevated however suspect overall dry since LA improved with IVF  Holding valsartan and torsemide     Essential hypertension- (present on admission)  Assessment & Plan  Home regimen: valsartan 80mg BID, torsemide 20mg daily, sotalol  Inpatient regimen: sotalol 120mg daily,  hydralazine prn    Atrial fibrillation (HCC)- (present on admission)  Assessment & Plan  H/o afib  Continue home sotalol and eliquis, monitor renal function      VTE prophylaxis: SCDs/TEDs and therapeutic anticoagulation with eliquis

## 2022-07-01 NOTE — ED NOTES
Bilateral arms with bruising and skin tears noted.  Pt states this all just happened within the last 2 days.

## 2022-07-01 NOTE — ED TRIAGE NOTES
Chief Complaint   Patient presents with   • Abnormal Labs     High WBC    • Pain   • Bleeding/Bruising     /71   Pulse 68   Temp 36.4 °C (97.6 °F) (Temporal)   Resp 18   SpO2 93%     Sent by Dr Hargrove pt scheduled for hip surgery 7/5/2022

## 2022-07-01 NOTE — ED PROVIDER NOTES
"CHIEF COMPLAINT  Chief Complaint   Patient presents with   • Abnormal Labs     High WBC    • Pain   • Bleeding/Bruising       HPI  Mike Ferrell is a 71 y.o. male with a history of hypertension, A. fib, V. tach status post AICD/pacer placement, CHF with normal EF.  He presents as a referral from Dr. Hargrove's office (patient is scheduled to get a hip replacement on Monday) for a high white count.  He also reports generalized weakness and inability to ambulate over the past several days.  He notes chronic shortness of breath that is not worse than usual.  He states he had \"valley fever\" that reduced his lung capacity.  He notes no fever, no nausea vomiting or diarrhea.  He has had diminished p.o. intake.  No abdominal pain.  No chest pain.  No cough or sore throat.      REVIEW OF SYSTEMS  All other systems are negative.     PAST MEDICAL HISTORY  Past Medical History:   Diagnosis Date   • AICD (automatic cardioverter/defibrillator) present    • Arthritis     Osteo- shoulders, hips, knees, ankles, generalized everywhere   • Atrial fibrillation (HCC) 04/04/2012   • Bowel habit changes     constipation   • Breath shortness     \"since 1996 from [place] fever\"   • Cataract 12/15/2021    Dec 2021/Jan 2022 bilat surgery   • Chronic back pain    • Congestive heart failure (HCC)    • COVID-19 09/2021   • Dental disorder     missing teeth x3   • Heart burn     occasional food related   • High cholesterol    • Hypertension    • Methamphetamine use (HCC) none for many years    25 years ago   • Pacemaker     defibrilator   • Pain 12/15/2021    back, right hip and knee, 8/10   • Paroxysmal ventricular tachycardia (HCC) 04/04/2012   • Pneumonia 2000    in the past   • Sinoatrial node dysfunction (HCC) 04/04/2012    Dr. Daniel Ferrera   • Snoring     Sleep Study June 2022, awaiting results   • Syncope and collapse 04/04/2012       FAMILY HISTORY  No family history on file.    SOCIAL HISTORY  Social History     Socioeconomic History   • " Marital status:    Tobacco Use   • Smoking status: Never Smoker   • Smokeless tobacco: Never Used   Vaping Use   • Vaping Use: Never used   Substance and Sexual Activity   • Alcohol use: Yes     Comment: 3 drinks per day   • Drug use: Not Currently     Comment: 25 years ago     Social Determinants of Health     Financial Resource Strain: Low Risk    • Difficulty of Paying Living Expenses: Not very hard   Food Insecurity: No Food Insecurity   • Worried About Running Out of Food in the Last Year: Never true   • Ran Out of Food in the Last Year: Never true   Transportation Needs: No Transportation Needs   • Lack of Transportation (Medical): No   • Lack of Transportation (Non-Medical): No   Physical Activity: Inactive   • Days of Exercise per Week: 0 days   • Minutes of Exercise per Session: 0 min   Stress: No Stress Concern Present   • Feeling of Stress : Not at all   Social Connections: Moderately Isolated   • Frequency of Communication with Friends and Family: More than three times a week   • Frequency of Social Gatherings with Friends and Family: More than three times a week   • Attends Buddhist Services: Never   • Active Member of Clubs or Organizations: No   • Attends Club or Organization Meetings: Never   • Marital Status:    Intimate Partner Violence: Not At Risk   • Fear of Current or Ex-Partner: No   • Emotionally Abused: No   • Physically Abused: No   • Sexually Abused: No   Housing Stability: Low Risk    • Unable to Pay for Housing in the Last Year: No   • Number of Places Lived in the Last Year: 1   • Unstable Housing in the Last Year: No       SURGICAL HISTORY  Past Surgical History:   Procedure Laterality Date   • AICD IMPLANT  2017   • AICD IMPLANT  01/01/2010   • PACEMAKER INSERTION  01/01/2009   • CHOLECYSTECTOMY  01/01/1999   • TONSILLECTOMY  01/01/1953   • CATARACT EXTRACTION WITH IOL         CURRENT MEDICATIONS  Home Medications    **Home medications have not yet been reviewed for  this encounter**         ALLERGIES  Allergies   Allergen Reactions   • Atorvastatin Rash, Itching and Myalgia         • Statins [Hmg-Coa-R Inhibitors] Itching   • Sulfa Drugs Rash and Itching     Rash, itch       PHYSICAL EXAM  VITAL SIGNS: There were no vitals taken for this visit.     Constitutional: Well developed, Well nourished, No acute distress, Non-toxic appearance.   HENT: Normocephalic, Atraumatic, TMs normal, mucous membranes moist, no erythema, exudates, swelling, or masses, nares patent  Eyes: nonicteric  Neck: Supple, no meningismus  Lymphatic: No lymphadenopathy noted.   Cardiovascular: Regular rate and rhythm, no gallops rubs or murmurs  Lungs: Clear bilaterally   Abdomen: Soft and nontender throughout  Skin: Warm, Dry, no rash  Back: No tenderness, No CVA tenderness.   Genitalia: Deferred  Rectal: Deferred  Extremities: No edema  Neurologic: Alert, appropriate, follows commands, moving all extremities, normal speech   Psychiatric: Affect normal    EKG  Time is 1303, rate around 80 (note that rate 142 is incorrect), atrial paced complexes, no further interpretation-good capture    RADIOLOGY/PROCEDURES  DX-CHEST-PORTABLE (1 VIEW)   Final Result      Cardiomegaly.        Results for orders placed or performed during the hospital encounter of 07/01/22   CBC With Differential   Result Value Ref Range    WBC 7.0 4.8 - 10.8 K/uL    RBC 3.03 (L) 4.70 - 6.10 M/uL    Hemoglobin 10.6 (L) 14.0 - 18.0 g/dL    Hematocrit 31.6 (L) 42.0 - 52.0 %    .3 (H) 81.4 - 97.8 fL    MCH 35.0 (H) 27.0 - 33.0 pg    MCHC 33.5 (L) 33.7 - 35.3 g/dL    RDW 52.3 (H) 35.9 - 50.0 fL    Platelet Count 78 (L) 164 - 446 K/uL    MPV 11.2 9.0 - 12.9 fL    Neutrophils-Polys 82.90 (H) 44.00 - 72.00 %    Lymphocytes 6.90 (L) 22.00 - 41.00 %    Monocytes 9.20 0.00 - 13.40 %    Eosinophils 0.10 0.00 - 6.90 %    Basophils 0.00 0.00 - 1.80 %    Immature Granulocytes 0.90 0.00 - 0.90 %    Nucleated RBC 0.00 /100 WBC    Neutrophils  (Absolute) 5.78 1.82 - 7.42 K/uL    Lymphs (Absolute) 0.48 (L) 1.00 - 4.80 K/uL    Monos (Absolute) 0.64 0.00 - 0.85 K/uL    Eos (Absolute) 0.01 0.00 - 0.51 K/uL    Baso (Absolute) 0.00 0.00 - 0.12 K/uL    Immature Granulocytes (abs) 0.06 0.00 - 0.11 K/uL    NRBC (Absolute) 0.00 K/uL   Comp Metabolic Panel   Result Value Ref Range    Sodium 133 (L) 135 - 145 mmol/L    Potassium 4.2 3.6 - 5.5 mmol/L    Chloride 96 96 - 112 mmol/L    Co2 19 (L) 20 - 33 mmol/L    Anion Gap 18.0 (H) 7.0 - 16.0    Glucose 126 (H) 65 - 99 mg/dL    Bun 60 (H) 8 - 22 mg/dL    Creatinine 2.39 (H) 0.50 - 1.40 mg/dL    Calcium 8.6 8.4 - 10.2 mg/dL    AST(SGOT) 24 12 - 45 U/L    ALT(SGPT) 16 2 - 50 U/L    Alkaline Phosphatase 85 30 - 99 U/L    Total Bilirubin 1.0 0.1 - 1.5 mg/dL    Albumin 3.5 3.2 - 4.9 g/dL    Total Protein 6.0 6.0 - 8.2 g/dL    Globulin 2.5 1.9 - 3.5 g/dL    A-G Ratio 1.4 g/dL   Urinalysis    Specimen: Urine   Result Value Ref Range    Color Yellow     Character Clear     Specific Gravity 1.015 <1.035    Ph 5.0 5.0 - 8.0    Glucose Negative Negative mg/dL    Ketones Negative Negative mg/dL    Protein Negative Negative mg/dL    Bilirubin Negative Negative    Nitrite Negative Negative    Leukocyte Esterase Negative Negative    Occult Blood Trace (A) Negative    Micro Urine Req Microscopic    Prothrombin Time   Result Value Ref Range    PT 16.3 (H) 12.0 - 14.6 sec    INR 1.38 (H) 0.87 - 1.13   APTT   Result Value Ref Range    APTT 36.0 24.7 - 36.0 sec   COV-2, FLU A/B, AND RSV BY PCR (2-4 HOURS CEPHEID): Collect NP swab in VTM    Specimen: Respirate   Result Value Ref Range    Influenza virus A RNA Negative Negative    Influenza virus B, PCR Negative Negative    RSV, PCR Negative Negative    SARS-CoV-2 by PCR NotDetected     SARS-CoV-2 Source Nasal Swab    TROPONIN   Result Value Ref Range    Troponin T 67 (H) 6 - 19 ng/L   proBrain Natriuretic Peptide, NT   Result Value Ref Range    NT-proBNP 2387 (H) 0 - 125 pg/mL   LACTIC  ACID   Result Value Ref Range    Lactic Acid 4.9 (HH) 0.5 - 2.0 mmol/L   ESTIMATED GFR   Result Value Ref Range    GFR (CKD-EPI) 28 (A) >60 mL/min/1.73 m 2   URINE MICROSCOPIC (W/UA)   Result Value Ref Range    WBC 0-2 (A) /hpf    RBC 0-2 (A) /hpf    Bacteria Rare (A) None /hpf    Epithelial Cells Rare Few /hpf    Mucous Threads Few /hpf   EKG (NOW)   Result Value Ref Range    Report       [facility] Emergency Dept.    Test Date:  2022  Pt Name:    BAILEY RICHARD                  Department: [facility]  MRN:        [id number]                      Room:       -ROOM 7  Gender:     Male                         Technician: ANALIA  :        [id number]                   Requested By:BILL MCCULLOUGH  Order #:    [id number]                    Reading MD:    Measurements  Intervals                                Axis  Rate:       142                          P:          0  MT:                                      QRS:        -77  QRSD:       183                          T:          -79  QT:         342  QTc:        526    Interpretive Statements  Atrial-paced complexes  No further analysis attempted due to paced rhythm  Compared to ECG 2022 10:11:33  AV dual-paced complex(es) or rhythm no longer present           COURSE & MEDICAL DECISION MAKING  Pertinent Labs & Imaging studies reviewed. (See chart for details)  This is a 71-year-old male who presents with what appears to be likely dehydration, generalized weakness.  He does have worsening KEVYN, has a lactate that is significantly elevated without definite source of infection.  His urine and chest are pending.  He has no abdominal tenderness on exam does not report any pain there.  The patient's white count is normal.  His lactic acidosis may be related to his dehydration.  He will be hydrated and admitted and we will cover with antibiotics.    FINAL IMPRESSION  1.  KEVYN  2.  Lactic acidosis  3.  Generalized weakness         Electronically signed by: Bill  DAR Pineda, 7/1/2022 11:54 AM

## 2022-07-02 ENCOUNTER — APPOINTMENT (OUTPATIENT)
Dept: RADIOLOGY | Facility: MEDICAL CENTER | Age: 72
DRG: 683 | End: 2022-07-02
Attending: NURSE PRACTITIONER
Payer: MEDICARE

## 2022-07-02 ENCOUNTER — APPOINTMENT (OUTPATIENT)
Dept: CARDIOLOGY | Facility: MEDICAL CENTER | Age: 72
DRG: 683 | End: 2022-07-02
Attending: INTERNAL MEDICINE
Payer: MEDICARE

## 2022-07-02 ENCOUNTER — APPOINTMENT (OUTPATIENT)
Dept: RADIOLOGY | Facility: MEDICAL CENTER | Age: 72
DRG: 683 | End: 2022-07-02
Attending: INTERNAL MEDICINE
Payer: MEDICARE

## 2022-07-02 LAB
ANION GAP SERPL CALC-SCNC: 12 MMOL/L (ref 7–16)
ANION GAP SERPL CALC-SCNC: 13 MMOL/L (ref 7–16)
BUN SERPL-MCNC: 41 MG/DL (ref 8–22)
BUN SERPL-MCNC: 47 MG/DL (ref 8–22)
CALCIUM SERPL-MCNC: 8.7 MG/DL (ref 8.4–10.2)
CALCIUM SERPL-MCNC: 8.9 MG/DL (ref 8.4–10.2)
CHLORIDE SERPL-SCNC: 100 MMOL/L (ref 96–112)
CHLORIDE SERPL-SCNC: 97 MMOL/L (ref 96–112)
CO2 SERPL-SCNC: 24 MMOL/L (ref 20–33)
CO2 SERPL-SCNC: 25 MMOL/L (ref 20–33)
CREAT SERPL-MCNC: 1.48 MG/DL (ref 0.5–1.4)
CREAT SERPL-MCNC: 1.52 MG/DL (ref 0.5–1.4)
ERYTHROCYTE [DISTWIDTH] IN BLOOD BY AUTOMATED COUNT: 50.2 FL (ref 35.9–50)
GFR SERPLBLD CREATININE-BSD FMLA CKD-EPI: 48 ML/MIN/1.73 M 2
GFR SERPLBLD CREATININE-BSD FMLA CKD-EPI: 49 ML/MIN/1.73 M 2
GLUCOSE SERPL-MCNC: 128 MG/DL (ref 65–99)
GLUCOSE SERPL-MCNC: 148 MG/DL (ref 65–99)
HCT VFR BLD AUTO: 29.6 % (ref 42–52)
HGB BLD-MCNC: 9.9 G/DL (ref 14–18)
LACTATE BLD-SCNC: 1.8 MMOL/L (ref 0.5–2)
LV EJECT FRACT  99904: 55
MAGNESIUM SERPL-MCNC: 2.2 MG/DL (ref 1.5–2.5)
MCH RBC QN AUTO: 34.3 PG (ref 27–33)
MCHC RBC AUTO-ENTMCNC: 33.4 G/DL (ref 33.7–35.3)
MCV RBC AUTO: 102.4 FL (ref 81.4–97.8)
PLATELET # BLD AUTO: 76 K/UL (ref 164–446)
PMV BLD AUTO: 10.6 FL (ref 9–12.9)
POTASSIUM SERPL-SCNC: 4.3 MMOL/L (ref 3.6–5.5)
POTASSIUM SERPL-SCNC: 4.6 MMOL/L (ref 3.6–5.5)
RBC # BLD AUTO: 2.89 M/UL (ref 4.7–6.1)
SODIUM SERPL-SCNC: 134 MMOL/L (ref 135–145)
SODIUM SERPL-SCNC: 137 MMOL/L (ref 135–145)
WBC # BLD AUTO: 5.7 K/UL (ref 4.8–10.8)

## 2022-07-02 PROCEDURE — 700111 HCHG RX REV CODE 636 W/ 250 OVERRIDE (IP): Performed by: INTERNAL MEDICINE

## 2022-07-02 PROCEDURE — 83605 ASSAY OF LACTIC ACID: CPT

## 2022-07-02 PROCEDURE — 770006 HCHG ROOM/CARE - MED/SURG/GYN SEMI*

## 2022-07-02 PROCEDURE — 700105 HCHG RX REV CODE 258: Performed by: INTERNAL MEDICINE

## 2022-07-02 PROCEDURE — 87077 CULTURE AEROBIC IDENTIFY: CPT | Mod: 91

## 2022-07-02 PROCEDURE — 700117 HCHG RX CONTRAST REV CODE 255: Performed by: INTERNAL MEDICINE

## 2022-07-02 PROCEDURE — 700102 HCHG RX REV CODE 250 W/ 637 OVERRIDE(OP): Performed by: INTERNAL MEDICINE

## 2022-07-02 PROCEDURE — 74176 CT ABD & PELVIS W/O CONTRAST: CPT

## 2022-07-02 PROCEDURE — 99233 SBSQ HOSP IP/OBS HIGH 50: CPT | Performed by: INTERNAL MEDICINE

## 2022-07-02 PROCEDURE — 76775 US EXAM ABDO BACK WALL LIM: CPT

## 2022-07-02 PROCEDURE — 85027 COMPLETE CBC AUTOMATED: CPT

## 2022-07-02 PROCEDURE — 93306 TTE W/DOPPLER COMPLETE: CPT | Mod: 26 | Performed by: INTERNAL MEDICINE

## 2022-07-02 PROCEDURE — 87040 BLOOD CULTURE FOR BACTERIA: CPT

## 2022-07-02 PROCEDURE — 83735 ASSAY OF MAGNESIUM: CPT

## 2022-07-02 PROCEDURE — A9270 NON-COVERED ITEM OR SERVICE: HCPCS | Performed by: INTERNAL MEDICINE

## 2022-07-02 PROCEDURE — 36415 COLL VENOUS BLD VENIPUNCTURE: CPT

## 2022-07-02 PROCEDURE — 80048 BASIC METABOLIC PNL TOTAL CA: CPT

## 2022-07-02 PROCEDURE — 94760 N-INVAS EAR/PLS OXIMETRY 1: CPT

## 2022-07-02 PROCEDURE — 93306 TTE W/DOPPLER COMPLETE: CPT

## 2022-07-02 RX ADMIN — HYDROCODONE BITARTRATE AND ACETAMINOPHEN 1 TABLET: 10; 325 TABLET ORAL at 06:21

## 2022-07-02 RX ADMIN — CEFTRIAXONE SODIUM 2 G: 2 INJECTION, POWDER, FOR SOLUTION INTRAMUSCULAR; INTRAVENOUS at 06:01

## 2022-07-02 RX ADMIN — HYDROCODONE BITARTRATE AND ACETAMINOPHEN 1 TABLET: 10; 325 TABLET ORAL at 12:12

## 2022-07-02 RX ADMIN — SODIUM CHLORIDE 3 G: 900 INJECTION INTRAVENOUS at 23:31

## 2022-07-02 RX ADMIN — SODIUM CHLORIDE 3 G: 900 INJECTION INTRAVENOUS at 17:09

## 2022-07-02 RX ADMIN — HUMAN ALBUMIN MICROSPHERES AND PERFLUTREN 3 ML: 10; .22 INJECTION, SOLUTION INTRAVENOUS at 16:00

## 2022-07-02 RX ADMIN — SENNOSIDES AND DOCUSATE SODIUM 2 TABLET: 50; 8.6 TABLET ORAL at 06:01

## 2022-07-02 RX ADMIN — SOTALOL HYDROCHLORIDE 120 MG: 80 TABLET ORAL at 06:01

## 2022-07-02 RX ADMIN — APIXABAN 5 MG: 5 TABLET, FILM COATED ORAL at 06:01

## 2022-07-02 RX ADMIN — APIXABAN 5 MG: 5 TABLET, FILM COATED ORAL at 17:07

## 2022-07-02 RX ADMIN — SODIUM CHLORIDE 3 G: 900 INJECTION INTRAVENOUS at 13:00

## 2022-07-02 RX ADMIN — OMEPRAZOLE 20 MG: 20 CAPSULE, DELAYED RELEASE ORAL at 06:03

## 2022-07-02 ASSESSMENT — ENCOUNTER SYMPTOMS
CHILLS: 0
WEAKNESS: 1
TINGLING: 0
DEPRESSION: 0
FOCAL WEAKNESS: 0
CONSTIPATION: 0
FALLS: 0
DIZZINESS: 0
HEMOPTYSIS: 0
TREMORS: 0
SHORTNESS OF BREATH: 0
DIARRHEA: 0
ORTHOPNEA: 0
NERVOUS/ANXIOUS: 0
FEVER: 0
HEADACHES: 0
MYALGIAS: 1
WEIGHT LOSS: 0
ABDOMINAL PAIN: 0
FLANK PAIN: 0
COUGH: 0
SORE THROAT: 0
PALPITATIONS: 0
VOMITING: 0
NAUSEA: 0

## 2022-07-02 ASSESSMENT — LIFESTYLE VARIABLES
ON A TYPICAL DAY WHEN YOU DRINK ALCOHOL HOW MANY DRINKS DO YOU HAVE: 4
ALCOHOL_USE: YES
TOTAL SCORE: 3
SUBSTANCE_ABUSE: 1
EVER HAD A DRINK FIRST THING IN THE MORNING TO STEADY YOUR NERVES TO GET RID OF A HANGOVER: YES
CONSUMPTION TOTAL: POSITIVE
HOW MANY TIMES IN THE PAST YEAR HAVE YOU HAD 5 OR MORE DRINKS IN A DAY: 5
AVERAGE NUMBER OF DAYS PER WEEK YOU HAVE A DRINK CONTAINING ALCOHOL: 7
TOTAL SCORE: 3
TOTAL SCORE: 3
EVER FELT BAD OR GUILTY ABOUT YOUR DRINKING: NO
HAVE YOU EVER FELT YOU SHOULD CUT DOWN ON YOUR DRINKING: YES
HAVE PEOPLE ANNOYED YOU BY CRITICIZING YOUR DRINKING: YES

## 2022-07-02 ASSESSMENT — COGNITIVE AND FUNCTIONAL STATUS - GENERAL
CLIMB 3 TO 5 STEPS WITH RAILING: A LITTLE
HELP NEEDED FOR BATHING: A LITTLE
DRESSING REGULAR LOWER BODY CLOTHING: A LITTLE
EATING MEALS: A LITTLE
WALKING IN HOSPITAL ROOM: A LITTLE
SUGGESTED CMS G CODE MODIFIER MOBILITY: CK
TOILETING: A LITTLE
MOBILITY SCORE: 18
PERSONAL GROOMING: A LITTLE
STANDING UP FROM CHAIR USING ARMS: A LITTLE
MOVING FROM LYING ON BACK TO SITTING ON SIDE OF FLAT BED: A LITTLE
DRESSING REGULAR UPPER BODY CLOTHING: A LITTLE
TURNING FROM BACK TO SIDE WHILE IN FLAT BAD: A LITTLE
MOVING TO AND FROM BED TO CHAIR: A LITTLE
SUGGESTED CMS G CODE MODIFIER DAILY ACTIVITY: CK
DAILY ACTIVITIY SCORE: 18

## 2022-07-02 ASSESSMENT — PAIN DESCRIPTION - PAIN TYPE
TYPE: CHRONIC PAIN
TYPE: CHRONIC PAIN

## 2022-07-02 ASSESSMENT — FIBROSIS 4 INDEX: FIB4 SCORE: 5.76

## 2022-07-02 NOTE — PROGRESS NOTES
Patient arrived from ED on [facility] and able to ambulate from gurney to bed with standby assist. Patient is A&Ox4, on room air. Patient oriented to room and call light. Bed is locked and in lowest position. Patient able to void in urinal with assistance. Call light within reach and fall precautions in place. Patient instructed to call for assistance before getting out of bed. Patient verbalized understanding.

## 2022-07-02 NOTE — PROGRESS NOTES
Hospital Medicine Daily Progress Note    Date of Service  7/2/2022    Chief Complaint  Mike Ferrell is a 71 y.o. male admitted 7/1/2022 with abnormal labs, pain, bleeding/bruising    Hospital Course  This is a 71-year-old male with a past medical history of HFpEF, hypertension, atrial fibrillation on Eliquis, HLD, CHB s/p PPM, VT s/p AICD, CKD who presented 7/1/2022 with right hip pain and leukocytosis.  Patient was at appointment at [facility] for preoperative appointment for tentative total right hip arthroplasty scheduled for 7/5/2022 with Dr. Hargrove.  Labs showed leukocytosis and he was referred to the emergency department.  Patient reported he has been having worsening weakness over the course of the past week as exemplified by difficulty getting off the toilet and worsening severe right hip pain.  He has osteoarthritis in his shoulders and his knees as well, takes Norco but denies taking NSAIDs.  Of note, he has had chronic shortness of breath from remote history of valley fever.  Last month, his creatinine was noted to be elevated resulting in his primary care provider holding diuretics until creatinine proved.  On follow-up appoint with cardiology on 6/8/2022, diuretics were resumed.  In the emergency department he was afebrile, systolic blood pressures 119-148, on room air.  No leukocytosis noted.  H&H 10/31 (previously 12 on 6/24), platelets 78, hyponatremic at 133,  mild etabolic acidosis with anion gap of 18, creatinine 2.39, BUN 60, GFR 28.  Lactic acid initially 4.9, decreased to 1.8 after 1 L normal saline.  Troponin elevated 67, BNP 2387.  Chest x-ray was done showing  cardiomegaly.  Due to patient's declining renal status, he is admitted for further evaluation.      Interval Problem Update  7/2/2022: patient AAOx4.  Reports ongoing pain to right hip area.  Afebrile.  Blood cultures positive for positive Streptococcus species.  Overnight on-call provider started patient on Rocephin IV.   Unknown source of infection.  Infectious disease was consulted and recommended changing Rocephin to Unasyn IV for additional Enterococcus coverage.  Consulted orthopedics who recommended MRI without contrast to evaluate hip.  Patient unable to have MRI due to AICD implantation.  CT chest abdomen pelvis without contrast ordered.     I have discussed this patient's plan of care and discharge plan at IDT rounds today with Case Management, Nursing, Nursing leadership, and other members of the IDT team.    Consultants/Specialty  infectious disease and orthopedics    Code Status  DNAR/DNI    Disposition  Patient is not medically cleared for discharge.   Anticipate discharge to to home with close outpatient follow-up.  I have placed the appropriate orders for post-discharge needs.    Review of Systems  Review of Systems   Constitutional: Positive for malaise/fatigue. Negative for chills, fever and weight loss.   HENT: Negative for congestion and sore throat.    Respiratory: Negative for cough, hemoptysis and shortness of breath.    Cardiovascular: Positive for leg swelling. Negative for chest pain, palpitations and orthopnea.   Gastrointestinal: Negative for abdominal pain, constipation, diarrhea, nausea and vomiting.   Genitourinary: Negative for dysuria and flank pain.   Musculoskeletal: Positive for joint pain ( Chronic right hip pain) and myalgias. Negative for falls.   Neurological: Positive for weakness. Negative for dizziness, tingling, tremors, focal weakness and headaches.   Psychiatric/Behavioral: Positive for substance abuse ( Patient reportedly drinks 4 bourbon Cokes per day.). Negative for depression. The patient is not nervous/anxious.    All other systems reviewed and are negative.       Physical Exam  Temp:  [36.5 °C (97.7 °F)-36.8 °C (98.3 °F)] 36.8 °C (98.3 °F)  Pulse:  [70-78] 70  Resp:  [16-17] 16  BP: (100-126)/(47-66) 126/47  SpO2:  [90 %-94 %] 92 %    Physical Exam  Vitals and nursing note reviewed.    Constitutional:       General: He is not in acute distress.     Appearance: He is obese. He is not toxic-appearing.   HENT:      Head: Normocephalic.      Mouth/Throat:      Mouth: Mucous membranes are moist.      Pharynx: Oropharynx is clear.   Eyes:      General: No scleral icterus.     Extraocular Movements: Extraocular movements intact.      Conjunctiva/sclera: Conjunctivae normal.      Pupils: Pupils are equal, round, and reactive to light.   Cardiovascular:      Rate and Rhythm: Regular rhythm. Tachycardia present.      Pulses: Normal pulses.      Heart sounds: Normal heart sounds. No murmur heard.    No gallop.   Pulmonary:      Effort: Pulmonary effort is normal. No respiratory distress.      Breath sounds: Normal breath sounds. No wheezing.   Chest:      Chest wall: No tenderness.   Abdominal:      General: Abdomen is flat. Bowel sounds are normal. There is no distension.      Palpations: Abdomen is soft.      Tenderness: There is no abdominal tenderness.   Musculoskeletal:         General: Tenderness ( Right hip tenderness at rest at baseline.) present. No deformity.      Right lower leg: Edema present.      Left lower leg: Edema present.   Skin:     General: Skin is warm and dry.      Capillary Refill: Capillary refill takes less than 2 seconds.      Coloration: Skin is not jaundiced.   Neurological:      General: No focal deficit present.      Mental Status: He is alert and oriented to person, place, and time. Mental status is at baseline.      Motor: Weakness present.   Psychiatric:         Mood and Affect: Mood normal.         Behavior: Behavior normal.         Thought Content: Thought content normal.         Judgment: Judgment normal.         Fluids    Intake/Output Summary (Last 24 hours) at 7/2/2022 1635  Last data filed at 7/2/2022 1212  Gross per 24 hour   Intake 120 ml   Output 1000 ml   Net -880 ml       Laboratory  Recent Labs     07/01/22  1217 07/02/22  0759   WBC 7.0 5.7   RBC 3.03* 2.89*    HEMOGLOBIN 10.6* 9.9*   HEMATOCRIT 31.6* 29.6*   .3* 102.4*   MCH 35.0* 34.3*   MCHC 33.5* 33.4*   RDW 52.3* 50.2*   PLATELETCT 78* 76*   MPV 11.2 10.6     Recent Labs     07/01/22  1217 07/02/22  0447 07/02/22  1353   SODIUM 133* 134* 137   POTASSIUM 4.2 4.3 4.6   CHLORIDE 96 97 100   CO2 19* 24 25   GLUCOSE 126* 128* 148*   BUN 60* 47* 41*   CREATININE 2.39* 1.52* 1.48*   CALCIUM 8.6 8.7 8.9     Recent Labs     07/01/22  1217   APTT 36.0   INR 1.38*               Imaging  EC-ECHOCARDIOGRAM COMPLETE W/ CONT   Final Result      CT-CHEST,ABDOMEN,PELVIS W/O   Final Result      1.  No evidence for infection within the chest, abdomen, pelvis      2.  The right upper lobe is abnormal with architecture distortion and central 16 x 7 mm mass. These findings could indicate postinflammatory scarring or a neoplasm. PET CT scan is recommended      3.  Mild bilateral dependent atelectasis      4.  Prior cholecystectomy      5.  Presence of cardiac device      6.  Severe osteoarthritis of both glenohumeral joint and the right hip joint      7.  aortic and branch vessel atherosclerotic plaque      US-RENAL   Final Result         1.  Grossly unremarkable renal ultrasound.      Examination is technically limited due to patient body habitus.      DX-CHEST-PORTABLE (1 VIEW)   Final Result      Cardiomegaly.      MR-PELVIS W/O    (Results Pending)        Assessment/Plan  * KEVYN (acute kidney injury) (HCC)- (present on admission)  Assessment & Plan  Baseline Cr ~1.3-1.4  On admission Cr 2.39  Etiology: likely pre-renal from diuretics (improved last month when they held them), could be cardiorenal but doesn't look like he's in florid heart failure, looks overall more dry, will get renal US  UA unremarkable  Improving with holding diuretics and ARB-continue to hold  CTM  Avoid nephrotoxic meds  Renally dose medications    Osteoarthritis of hip- (present on admission)  Assessment & Plan  Planning for total hip replacement 7/5 with  Dr. Hargrove  Pain control  PT/OT evaluated patient.  Consulted with orthopedics for evaluation for possible septic hip.  Patient unable to have MRI without contrast as requested by orthopedics due to AICD.  CT chest abdomen pelvis ordered.    Macrocytic anemia  Assessment & Plan  Mild drop in H/H 12-->10--> 9.9, no obvious bleeding  On eliquis  B12 low 5/2022, doesn't look like he's gotten b12, will start b12 1000mg IM weekly x 4-8 weeks  Occult blood stool ordered.  Continue to monitor anemia.      (HFpEF) heart failure with preserved ejection fraction (HCC)- (present on admission)  Assessment & Plan  On admission dry mucus membranes, clear lungs, does have LE edema, BNP elevated however suspect overall dry since LA improved with IVF.  Continue to hold valsartan and torsemide     Essential hypertension- (present on admission)  Assessment & Plan  Home regimen: valsartan 80mg BID, torsemide 20mg daily, sotalol  Inpatient regimen: sotalol 120mg daily,  hydralazine prn    Atrial fibrillation (HCC)- (present on admission)  Assessment & Plan  H/o afib  Continue home sotalol and eliquis, monitor renal function       VTE prophylaxis: therapeutic anticoagulation with Eliquis    I have performed a physical exam and reviewed and updated ROS and Plan today (7/2/2022). In review of yesterday's note (7/1/2022), there are no changes except as documented above.

## 2022-07-02 NOTE — PROGRESS NOTES
Abby from Lab called with critical result of Gram positive cocci at 2252. Critical lab result read back to Abby.   Dr. Diaz notified of critical lab result at 2255.  Critical lab result read back by Dr. Diaz.

## 2022-07-02 NOTE — CONSULTS
ID Brief note     Consulted as patient now with growth of possible Streptococcus species from blood cultures.  Is admitted for total hip replacement thought to be due to osteoarthritis.  Did have a leukocytosis on arrival.  Has been afebrile.    --- Stop ceftriaxone and transition to Unasyn  --- Follow-up blood cultures  --- Repeat blood cultures  --- Raises concerns as to whether the hip is infected, please discuss with orthopedics as to whether they would like to aspirate the hip, imaging, etc.      ID will see the patient in the a.m. with formal H&P.      Brittany Fisher MD

## 2022-07-02 NOTE — PROGRESS NOTES
4 Eyes Skin Assessment Completed by BETTY Blake and BETTY Rubio.    Head WDL  Ears WDL  Nose WDL  Mouth WDL  Neck WDL  Breast/Chest WDL  Shoulder Blades WDL  Spine WDL  (R) Arm/Elbow/Hand Bruising, skin tear  (L) Arm/Elbow/Hand Bruising, skin tear  Abdomen WDL  Groin WDL, peeling skin from moisture  Scrotum/Coccyx/Buttocks WDL  (R) Leg Bruising and Edema  (L) Leg Bruising and Edema  (R) Heel/Foot/Toe Swelling dry, cracked  (L) Heel/Foot/Toe Swelling dry, cracked          Devices In Places Blood Pressure Cuff      Interventions In Place Pressure Redistribution Mattress    Possible Skin Injury No    Pictures Uploaded Into Epic N/A  Wound Consult Placed N/A  RN Wound Prevention Protocol Ordered Yes

## 2022-07-02 NOTE — HOSPITAL COURSE
This is a 71-year-old male with a past medical history of HFpEF, hypertension, atrial fibrillation on Eliquis, HLD, CHB s/p PPM, VT s/p AICD, CKD, alcohol abuse who presented 7/1/2022 with right hip pain and leukocytosis.  Patient was at appointment at [facility] for preoperative appointment for tentative total right hip arthroplasty scheduled for 7/5/2022 with Dr. Hargrove.  Labs showed leukocytosis and he was referred to the emergency department.  Patient reported he has been having worsening weakness over the course of the past week as exemplified by difficulty getting off the toilet and worsening severe right hip pain.  He has osteoarthritis in his shoulders and his knees as well, takes Norco but denies taking NSAIDs.  Patient reports drinking 4 bourbon and Cokes per day.  His last drink was day of admission.  Of note, he has had chronic shortness of breath from remote history of valley fever.  Last month, his creatinine was noted to be elevated resulting in his primary care provider holding diuretics until creatinine proved.  On follow-up appoint with cardiology on 6/8/2022, diuretics were resumed.  In the emergency department he was afebrile, systolic blood pressures 119-148, on room air.  No leukocytosis noted.  H&H 10/31 (previously 12 on 6/24), platelets 78, hyponatremic at 133,  mild etabolic acidosis with anion gap of 18, creatinine 2.39, BUN 60, GFR 28.  Lactic acid initially 4.9, decreased to 1.8 after 1 L normal saline.  Troponin elevated 67, BNP 2387.  Chest x-ray was done showing cardiomegaly.    On 7/2/2022, blood cultures were + for gram-positive cocci.  ID was consulted and patient was started on Unasyn per infectious disease.  There was initial concern for septic arthritis to the patient's right hip due to worsening pain and warmth.  Patient was unable to undergo MRI due to implanted cardiac device.  CT chest abdomen pelvis showed severe osteoarthritis in both glenohumeral joint and  right hip joint, 16mm x 7mm mass in the central right upper lobe which could indicate postinflammatory scarring or neoplasm, no evidence of infection within the chest, abdomen, pelvis.  Right hip ultrasound was negative for effusion.  This was discussed with orthopedics who states that in the absence of effusion, patient is unlikely to have septic arthritis, and to explore other source of infection.  In the setting of ICD, infectious disease recommended KIM.  This was discussed with cardiology who recommended patient be transferred to [facility] for electrophysiology to see and weigh in on the permanent pacemaker in addition to KIM.  Furthermore, patient reported visual hallucinations with closing eyes but no other symptoms of withdrawal.  CIWA protocol orders in place.  All updates and findings were discussed at length with patient and his wife at bedside.  All questions and concerns were addressed.  Triage office at [facility] was contacted and accepted patient.  Emergent transfer to [facility] was arranged.

## 2022-07-02 NOTE — CARE PLAN
The patient is Stable - Low risk of patient condition declining or worsening    Shift Goals  Clinical Goals: Pain to remain a 7/10 or less this shift  Patient Goals: Pt to rest this shift    Progress made toward(s) clinical / shift goals:        Patient is not progressing towards the following goals:

## 2022-07-03 ENCOUNTER — APPOINTMENT (OUTPATIENT)
Dept: RADIOLOGY | Facility: MEDICAL CENTER | Age: 72
DRG: 683 | End: 2022-07-03
Attending: NURSE PRACTITIONER
Payer: MEDICARE

## 2022-07-03 ENCOUNTER — APPOINTMENT (OUTPATIENT)
Dept: RADIOLOGY | Facility: MEDICAL CENTER | Age: 72
DRG: 683 | End: 2022-07-03
Attending: INTERNAL MEDICINE
Payer: MEDICARE

## 2022-07-03 ENCOUNTER — HOSPITAL ENCOUNTER (INPATIENT)
Facility: MEDICAL CENTER | Age: 72
LOS: 8 days | DRG: 872 | End: 2022-07-11
Attending: HOSPITALIST | Admitting: INTERNAL MEDICINE
Payer: MEDICARE

## 2022-07-03 VITALS
SYSTOLIC BLOOD PRESSURE: 153 MMHG | HEART RATE: 81 BPM | WEIGHT: 254.41 LBS | RESPIRATION RATE: 18 BRPM | DIASTOLIC BLOOD PRESSURE: 96 MMHG | TEMPERATURE: 97.2 F | BODY MASS INDEX: 34.5 KG/M2 | OXYGEN SATURATION: 96 %

## 2022-07-03 DIAGNOSIS — G47.33 OSA (OBSTRUCTIVE SLEEP APNEA): ICD-10-CM

## 2022-07-03 DIAGNOSIS — R91.1 LUNG NODULE SEEN ON IMAGING STUDY: ICD-10-CM

## 2022-07-03 DIAGNOSIS — M25.551 RIGHT HIP PAIN: ICD-10-CM

## 2022-07-03 DIAGNOSIS — I48.91 ATRIAL FIBRILLATION, UNSPECIFIED TYPE (HCC): ICD-10-CM

## 2022-07-03 PROBLEM — R78.81 BACTEREMIA: Status: ACTIVE | Noted: 2022-07-03

## 2022-07-03 PROBLEM — R78.81 BACTEREMIA DUE TO STAPHYLOCOCCUS AUREUS: Status: ACTIVE | Noted: 2022-07-03

## 2022-07-03 PROBLEM — B95.61 BACTEREMIA DUE TO STAPHYLOCOCCUS AUREUS: Status: ACTIVE | Noted: 2022-07-03

## 2022-07-03 LAB
ANION GAP SERPL CALC-SCNC: 14 MMOL/L (ref 7–16)
BUN SERPL-MCNC: 33 MG/DL (ref 8–22)
CALCIUM SERPL-MCNC: 9.2 MG/DL (ref 8.4–10.2)
CHLORIDE SERPL-SCNC: 100 MMOL/L (ref 96–112)
CO2 SERPL-SCNC: 23 MMOL/L (ref 20–33)
CREAT SERPL-MCNC: 1.15 MG/DL (ref 0.5–1.4)
ERYTHROCYTE [DISTWIDTH] IN BLOOD BY AUTOMATED COUNT: 51.7 FL (ref 35.9–50)
GFR SERPLBLD CREATININE-BSD FMLA CKD-EPI: 67 ML/MIN/1.73 M 2
GLUCOSE SERPL-MCNC: 122 MG/DL (ref 65–99)
HCT VFR BLD AUTO: 31.5 % (ref 42–52)
HGB BLD-MCNC: 10.4 G/DL (ref 14–18)
MAGNESIUM SERPL-MCNC: 2.1 MG/DL (ref 1.5–2.5)
MCH RBC QN AUTO: 34.7 PG (ref 27–33)
MCHC RBC AUTO-ENTMCNC: 33 G/DL (ref 33.7–35.3)
MCV RBC AUTO: 105 FL (ref 81.4–97.8)
PHOSPHATE SERPL-MCNC: 2.5 MG/DL (ref 2.5–4.5)
PLATELET # BLD AUTO: 80 K/UL (ref 164–446)
PMV BLD AUTO: 11.6 FL (ref 9–12.9)
POTASSIUM SERPL-SCNC: 4.6 MMOL/L (ref 3.6–5.5)
RBC # BLD AUTO: 3 M/UL (ref 4.7–6.1)
SODIUM SERPL-SCNC: 137 MMOL/L (ref 135–145)
WBC # BLD AUTO: 6.2 K/UL (ref 4.8–10.8)

## 2022-07-03 PROCEDURE — 700102 HCHG RX REV CODE 250 W/ 637 OVERRIDE(OP): Performed by: INTERNAL MEDICINE

## 2022-07-03 PROCEDURE — 99223 1ST HOSP IP/OBS HIGH 75: CPT | Mod: AI | Performed by: INTERNAL MEDICINE

## 2022-07-03 PROCEDURE — 770020 HCHG ROOM/CARE - TELE (206)

## 2022-07-03 PROCEDURE — 94760 N-INVAS EAR/PLS OXIMETRY 1: CPT

## 2022-07-03 PROCEDURE — 80048 BASIC METABOLIC PNL TOTAL CA: CPT

## 2022-07-03 PROCEDURE — 700111 HCHG RX REV CODE 636 W/ 250 OVERRIDE (IP): Performed by: INTERNAL MEDICINE

## 2022-07-03 PROCEDURE — 99223 1ST HOSP IP/OBS HIGH 75: CPT | Performed by: INTERNAL MEDICINE

## 2022-07-03 PROCEDURE — A9270 NON-COVERED ITEM OR SERVICE: HCPCS | Performed by: INTERNAL MEDICINE

## 2022-07-03 PROCEDURE — HZ2ZZZZ DETOXIFICATION SERVICES FOR SUBSTANCE ABUSE TREATMENT: ICD-10-PCS | Performed by: NURSE PRACTITIONER

## 2022-07-03 PROCEDURE — 36415 COLL VENOUS BLD VENIPUNCTURE: CPT

## 2022-07-03 PROCEDURE — 700105 HCHG RX REV CODE 258: Performed by: INTERNAL MEDICINE

## 2022-07-03 PROCEDURE — 85027 COMPLETE CBC AUTOMATED: CPT

## 2022-07-03 PROCEDURE — 84100 ASSAY OF PHOSPHORUS: CPT

## 2022-07-03 PROCEDURE — 83735 ASSAY OF MAGNESIUM: CPT

## 2022-07-03 PROCEDURE — 99233 SBSQ HOSP IP/OBS HIGH 50: CPT | Performed by: INTERNAL MEDICINE

## 2022-07-03 PROCEDURE — 700101 HCHG RX REV CODE 250: Performed by: INTERNAL MEDICINE

## 2022-07-03 PROCEDURE — 76882 US LMTD JT/FCL EVL NVASC XTR: CPT | Mod: RT

## 2022-07-03 PROCEDURE — 87040 BLOOD CULTURE FOR BACTERIA: CPT

## 2022-07-03 RX ORDER — GAUZE BANDAGE 2" X 2"
100 BANDAGE TOPICAL DAILY
Status: DISCONTINUED | OUTPATIENT
Start: 2022-07-04 | End: 2022-07-03 | Stop reason: HOSPADM

## 2022-07-03 RX ORDER — CYANOCOBALAMIN 1000 UG/ML
1000 INJECTION, SOLUTION INTRAMUSCULAR; SUBCUTANEOUS
Status: DISCONTINUED | OUTPATIENT
Start: 2022-07-08 | End: 2022-07-06

## 2022-07-03 RX ORDER — LORAZEPAM 1 MG/1
3 TABLET ORAL
Status: CANCELLED | OUTPATIENT
Start: 2022-07-03

## 2022-07-03 RX ORDER — VALSARTAN 80 MG/1
80 TABLET ORAL 2 TIMES DAILY
Status: CANCELLED | OUTPATIENT
Start: 2022-07-03

## 2022-07-03 RX ORDER — LORAZEPAM 2 MG/ML
1.5 INJECTION INTRAMUSCULAR
Status: DISCONTINUED | OUTPATIENT
Start: 2022-07-03 | End: 2022-07-03 | Stop reason: HOSPADM

## 2022-07-03 RX ORDER — POLYETHYLENE GLYCOL 3350 17 G/17G
1 POWDER, FOR SOLUTION ORAL
Status: CANCELLED | OUTPATIENT
Start: 2022-07-03

## 2022-07-03 RX ORDER — LORAZEPAM 2 MG/ML
1 INJECTION INTRAMUSCULAR
Status: CANCELLED | OUTPATIENT
Start: 2022-07-03

## 2022-07-03 RX ORDER — LORAZEPAM 1 MG/1
1 TABLET ORAL EVERY 4 HOURS PRN
Status: DISCONTINUED | OUTPATIENT
Start: 2022-07-03 | End: 2022-07-03 | Stop reason: HOSPADM

## 2022-07-03 RX ORDER — LORAZEPAM 2 MG/ML
2 INJECTION INTRAMUSCULAR
Status: DISCONTINUED | OUTPATIENT
Start: 2022-07-03 | End: 2022-07-03 | Stop reason: HOSPADM

## 2022-07-03 RX ORDER — ACETAMINOPHEN 325 MG/1
650 TABLET ORAL EVERY 6 HOURS PRN
Status: CANCELLED | OUTPATIENT
Start: 2022-07-03

## 2022-07-03 RX ORDER — LORAZEPAM 0.5 MG/1
0.5 TABLET ORAL EVERY 4 HOURS PRN
Status: CANCELLED | OUTPATIENT
Start: 2022-07-03

## 2022-07-03 RX ORDER — LORAZEPAM 2 MG/ML
0.5 INJECTION INTRAMUSCULAR EVERY 4 HOURS PRN
Status: CANCELLED | OUTPATIENT
Start: 2022-07-03

## 2022-07-03 RX ORDER — BISACODYL 10 MG
10 SUPPOSITORY, RECTAL RECTAL
Status: DISCONTINUED | OUTPATIENT
Start: 2022-07-03 | End: 2022-07-06

## 2022-07-03 RX ORDER — LORAZEPAM 2 MG/ML
1 INJECTION INTRAMUSCULAR
Status: DISCONTINUED | OUTPATIENT
Start: 2022-07-03 | End: 2022-07-03 | Stop reason: HOSPADM

## 2022-07-03 RX ORDER — SOTALOL HYDROCHLORIDE 80 MG/1
120 TABLET ORAL EVERY MORNING
Status: CANCELLED | OUTPATIENT
Start: 2022-07-04

## 2022-07-03 RX ORDER — LORAZEPAM 1 MG/1
4 TABLET ORAL
Status: CANCELLED | OUTPATIENT
Start: 2022-07-03

## 2022-07-03 RX ORDER — FOLIC ACID 1 MG/1
1 TABLET ORAL DAILY
Status: DISCONTINUED | OUTPATIENT
Start: 2022-07-04 | End: 2022-07-03 | Stop reason: HOSPADM

## 2022-07-03 RX ORDER — VALSARTAN 80 MG/1
80 TABLET ORAL 2 TIMES DAILY
Status: DISCONTINUED | OUTPATIENT
Start: 2022-07-04 | End: 2022-07-11 | Stop reason: HOSPADM

## 2022-07-03 RX ORDER — FOLIC ACID 1 MG/1
1 TABLET ORAL DAILY
Status: DISPENSED | OUTPATIENT
Start: 2022-07-04 | End: 2022-07-08

## 2022-07-03 RX ORDER — HYDROCODONE BITARTRATE AND ACETAMINOPHEN 10; 325 MG/1; MG/1
1 TABLET ORAL EVERY 4 HOURS PRN
Status: DISCONTINUED | OUTPATIENT
Start: 2022-07-03 | End: 2022-07-04

## 2022-07-03 RX ORDER — HYDRALAZINE HYDROCHLORIDE 20 MG/ML
10 INJECTION INTRAMUSCULAR; INTRAVENOUS EVERY 4 HOURS PRN
Status: CANCELLED | OUTPATIENT
Start: 2022-07-03

## 2022-07-03 RX ORDER — OMEPRAZOLE 20 MG/1
20 CAPSULE, DELAYED RELEASE ORAL
Status: CANCELLED | OUTPATIENT
Start: 2022-07-04

## 2022-07-03 RX ORDER — BISACODYL 10 MG
10 SUPPOSITORY, RECTAL RECTAL
Status: CANCELLED | OUTPATIENT
Start: 2022-07-03

## 2022-07-03 RX ORDER — AMOXICILLIN 250 MG
2 CAPSULE ORAL 2 TIMES DAILY
Status: CANCELLED | OUTPATIENT
Start: 2022-07-03

## 2022-07-03 RX ORDER — LORAZEPAM 2 MG/ML
1.5 INJECTION INTRAMUSCULAR
Status: DISCONTINUED | OUTPATIENT
Start: 2022-07-03 | End: 2022-07-04

## 2022-07-03 RX ORDER — HYDRALAZINE HYDROCHLORIDE 20 MG/ML
10 INJECTION INTRAMUSCULAR; INTRAVENOUS EVERY 4 HOURS PRN
Status: DISCONTINUED | OUTPATIENT
Start: 2022-07-03 | End: 2022-07-11 | Stop reason: HOSPADM

## 2022-07-03 RX ORDER — ACETAMINOPHEN 325 MG/1
650 TABLET ORAL EVERY 6 HOURS PRN
Status: DISCONTINUED | OUTPATIENT
Start: 2022-07-03 | End: 2022-07-04

## 2022-07-03 RX ORDER — LORAZEPAM 1 MG/1
1 TABLET ORAL EVERY 4 HOURS PRN
Status: DISCONTINUED | OUTPATIENT
Start: 2022-07-03 | End: 2022-07-04

## 2022-07-03 RX ORDER — LORAZEPAM 2 MG/ML
0.5 INJECTION INTRAMUSCULAR EVERY 4 HOURS PRN
Status: DISCONTINUED | OUTPATIENT
Start: 2022-07-03 | End: 2022-07-04

## 2022-07-03 RX ORDER — LORAZEPAM 1 MG/1
3 TABLET ORAL
Status: DISCONTINUED | OUTPATIENT
Start: 2022-07-03 | End: 2022-07-03 | Stop reason: HOSPADM

## 2022-07-03 RX ORDER — LORAZEPAM 2 MG/ML
1 INJECTION INTRAMUSCULAR
Status: DISCONTINUED | OUTPATIENT
Start: 2022-07-03 | End: 2022-07-04

## 2022-07-03 RX ORDER — POLYETHYLENE GLYCOL 3350 17 G/17G
1 POWDER, FOR SOLUTION ORAL
Status: DISCONTINUED | OUTPATIENT
Start: 2022-07-03 | End: 2022-07-06

## 2022-07-03 RX ORDER — SOTALOL HYDROCHLORIDE 80 MG/1
120 TABLET ORAL EVERY MORNING
Status: DISCONTINUED | OUTPATIENT
Start: 2022-07-04 | End: 2022-07-11 | Stop reason: HOSPADM

## 2022-07-03 RX ORDER — LORAZEPAM 1 MG/1
2 TABLET ORAL
Status: DISCONTINUED | OUTPATIENT
Start: 2022-07-03 | End: 2022-07-03 | Stop reason: HOSPADM

## 2022-07-03 RX ORDER — LORAZEPAM 0.5 MG/1
0.5 TABLET ORAL EVERY 4 HOURS PRN
Status: DISCONTINUED | OUTPATIENT
Start: 2022-07-03 | End: 2022-07-04

## 2022-07-03 RX ORDER — LORAZEPAM 1 MG/1
1 TABLET ORAL EVERY 4 HOURS PRN
Status: CANCELLED | OUTPATIENT
Start: 2022-07-03

## 2022-07-03 RX ORDER — LORAZEPAM 2 MG/1
4 TABLET ORAL
Status: DISCONTINUED | OUTPATIENT
Start: 2022-07-03 | End: 2022-07-04

## 2022-07-03 RX ORDER — VALSARTAN 80 MG/1
80 TABLET ORAL 2 TIMES DAILY
Status: DISCONTINUED | OUTPATIENT
Start: 2022-07-03 | End: 2022-07-03 | Stop reason: HOSPADM

## 2022-07-03 RX ORDER — LORAZEPAM 2 MG/ML
1.5 INJECTION INTRAMUSCULAR
Status: CANCELLED | OUTPATIENT
Start: 2022-07-03

## 2022-07-03 RX ORDER — CYANOCOBALAMIN 1000 UG/ML
1000 INJECTION, SOLUTION INTRAMUSCULAR; SUBCUTANEOUS
Status: CANCELLED | OUTPATIENT
Start: 2022-07-08 | End: 2022-07-29

## 2022-07-03 RX ORDER — LORAZEPAM 2 MG/ML
2 INJECTION INTRAMUSCULAR
Status: CANCELLED | OUTPATIENT
Start: 2022-07-03

## 2022-07-03 RX ORDER — FOLIC ACID 1 MG/1
1 TABLET ORAL DAILY
Status: CANCELLED | OUTPATIENT
Start: 2022-07-04 | End: 2022-07-08

## 2022-07-03 RX ORDER — LORAZEPAM 2 MG/1
2 TABLET ORAL
Status: DISCONTINUED | OUTPATIENT
Start: 2022-07-03 | End: 2022-07-04

## 2022-07-03 RX ORDER — LORAZEPAM 0.5 MG/1
0.5 TABLET ORAL EVERY 4 HOURS PRN
Status: DISCONTINUED | OUTPATIENT
Start: 2022-07-03 | End: 2022-07-03 | Stop reason: HOSPADM

## 2022-07-03 RX ORDER — GAUZE BANDAGE 2" X 2"
100 BANDAGE TOPICAL DAILY
Status: CANCELLED | OUTPATIENT
Start: 2022-07-04 | End: 2022-07-08

## 2022-07-03 RX ORDER — LORAZEPAM 1 MG/1
2 TABLET ORAL
Status: CANCELLED | OUTPATIENT
Start: 2022-07-03

## 2022-07-03 RX ORDER — HYDROCODONE BITARTRATE AND ACETAMINOPHEN 10; 325 MG/1; MG/1
1 TABLET ORAL EVERY 4 HOURS PRN
Status: CANCELLED | OUTPATIENT
Start: 2022-07-03

## 2022-07-03 RX ORDER — LORAZEPAM 2 MG/ML
2 INJECTION INTRAMUSCULAR
Status: DISCONTINUED | OUTPATIENT
Start: 2022-07-03 | End: 2022-07-04

## 2022-07-03 RX ORDER — LORAZEPAM 2 MG/ML
0.5 INJECTION INTRAMUSCULAR EVERY 4 HOURS PRN
Status: DISCONTINUED | OUTPATIENT
Start: 2022-07-03 | End: 2022-07-03 | Stop reason: HOSPADM

## 2022-07-03 RX ORDER — GAUZE BANDAGE 2" X 2"
100 BANDAGE TOPICAL DAILY
Status: DISPENSED | OUTPATIENT
Start: 2022-07-04 | End: 2022-07-08

## 2022-07-03 RX ORDER — OMEPRAZOLE 20 MG/1
20 CAPSULE, DELAYED RELEASE ORAL
Qty: 30 CAPSULE | Status: ON HOLD
Start: 2022-07-04 | End: 2022-07-07

## 2022-07-03 RX ORDER — LORAZEPAM 1 MG/1
4 TABLET ORAL
Status: DISCONTINUED | OUTPATIENT
Start: 2022-07-03 | End: 2022-07-03 | Stop reason: HOSPADM

## 2022-07-03 RX ORDER — OMEPRAZOLE 20 MG/1
20 CAPSULE, DELAYED RELEASE ORAL
Status: DISCONTINUED | OUTPATIENT
Start: 2022-07-04 | End: 2022-07-10

## 2022-07-03 RX ORDER — AMOXICILLIN 250 MG
2 CAPSULE ORAL 2 TIMES DAILY
Status: DISCONTINUED | OUTPATIENT
Start: 2022-07-04 | End: 2022-07-06

## 2022-07-03 RX ADMIN — HYDROCODONE BITARTRATE AND ACETAMINOPHEN 1 TABLET: 10; 325 TABLET ORAL at 23:46

## 2022-07-03 RX ADMIN — VALSARTAN 80 MG: 80 TABLET ORAL at 18:32

## 2022-07-03 RX ADMIN — SODIUM CHLORIDE 3 G: 900 INJECTION INTRAVENOUS at 05:45

## 2022-07-03 RX ADMIN — SENNOSIDES AND DOCUSATE SODIUM 2 TABLET: 50; 8.6 TABLET ORAL at 17:30

## 2022-07-03 RX ADMIN — SODIUM CHLORIDE 3 G: 900 INJECTION INTRAVENOUS at 17:33

## 2022-07-03 RX ADMIN — HYDROCODONE BITARTRATE AND ACETAMINOPHEN 1 TABLET: 10; 325 TABLET ORAL at 09:10

## 2022-07-03 RX ADMIN — APIXABAN 5 MG: 5 TABLET, FILM COATED ORAL at 17:30

## 2022-07-03 RX ADMIN — SENNOSIDES AND DOCUSATE SODIUM 2 TABLET: 50; 8.6 TABLET ORAL at 05:45

## 2022-07-03 RX ADMIN — SOTALOL HYDROCHLORIDE 120 MG: 80 TABLET ORAL at 05:45

## 2022-07-03 RX ADMIN — HYDROCODONE BITARTRATE AND ACETAMINOPHEN 1 TABLET: 10; 325 TABLET ORAL at 14:35

## 2022-07-03 RX ADMIN — SODIUM CHLORIDE 3 G: 900 INJECTION INTRAVENOUS at 23:46

## 2022-07-03 RX ADMIN — APIXABAN 5 MG: 5 TABLET, FILM COATED ORAL at 05:45

## 2022-07-03 RX ADMIN — THIAMINE HYDROCHLORIDE: 100 INJECTION, SOLUTION INTRAMUSCULAR; INTRAVENOUS at 14:46

## 2022-07-03 RX ADMIN — SODIUM CHLORIDE 3 G: 900 INJECTION INTRAVENOUS at 11:13

## 2022-07-03 RX ADMIN — VALSARTAN 80 MG: 80 TABLET ORAL at 08:24

## 2022-07-03 RX ADMIN — OMEPRAZOLE 20 MG: 20 CAPSULE, DELAYED RELEASE ORAL at 05:46

## 2022-07-03 ASSESSMENT — CHA2DS2 SCORE
SEX: MALE
AGE 75 OR GREATER: NO
CHF OR LEFT VENTRICULAR DYSFUNCTION: YES
CHA2DS2 VASC SCORE: 3
AGE 65 TO 74: YES
DIABETES: NO
PRIOR STROKE OR TIA OR THROMBOEMBOLISM: NO
VASCULAR DISEASE: NO
HYPERTENSION: YES

## 2022-07-03 ASSESSMENT — LIFESTYLE VARIABLES
VISUAL DISTURBANCES: NOT PRESENT
VISUAL DISTURBANCES: NOT PRESENT
AUDITORY DISTURBANCES: NOT PRESENT
TREMOR: NO TREMOR
ORIENTATION AND CLOUDING OF SENSORIUM: ORIENTED AND CAN DO SERIAL ADDITIONS
PAROXYSMAL SWEATS: NO SWEAT VISIBLE
ANXIETY: NO ANXIETY (AT EASE)
TREMOR: NO TREMOR
AUDITORY DISTURBANCES: NOT PRESENT
AGITATION: NORMAL ACTIVITY
HEADACHE, FULLNESS IN HEAD: NOT PRESENT
AGITATION: NORMAL ACTIVITY
AUDITORY DISTURBANCES: NOT PRESENT
SUBSTANCE_ABUSE: 1
ANXIETY: NO ANXIETY (AT EASE)
AGITATION: NORMAL ACTIVITY
ANXIETY: NO ANXIETY (AT EASE)
ORIENTATION AND CLOUDING OF SENSORIUM: ORIENTED AND CAN DO SERIAL ADDITIONS
TOTAL SCORE: 0
NAUSEA AND VOMITING: NO NAUSEA AND NO VOMITING
TREMOR: NO TREMOR
VISUAL DISTURBANCES: NOT PRESENT
ANXIETY: MILDLY ANXIOUS
NAUSEA AND VOMITING: NO NAUSEA AND NO VOMITING
HEADACHE, FULLNESS IN HEAD: NOT PRESENT
HEADACHE, FULLNESS IN HEAD: NOT PRESENT
VISUAL DISTURBANCES: NOT PRESENT
ORIENTATION AND CLOUDING OF SENSORIUM: ORIENTED AND CAN DO SERIAL ADDITIONS
HEADACHE, FULLNESS IN HEAD: NOT PRESENT
TREMOR: NO TREMOR
TOTAL SCORE: 1
ORIENTATION AND CLOUDING OF SENSORIUM: ORIENTED AND CAN DO SERIAL ADDITIONS
PAROXYSMAL SWEATS: NO SWEAT VISIBLE
AGITATION: NORMAL ACTIVITY
NAUSEA AND VOMITING: NO NAUSEA AND NO VOMITING
TOTAL SCORE: 0
PAROXYSMAL SWEATS: NO SWEAT VISIBLE
TOTAL SCORE: 0
NAUSEA AND VOMITING: NO NAUSEA AND NO VOMITING
PAROXYSMAL SWEATS: NO SWEAT VISIBLE
AUDITORY DISTURBANCES: NOT PRESENT

## 2022-07-03 ASSESSMENT — PAIN DESCRIPTION - PAIN TYPE
TYPE: ACUTE PAIN

## 2022-07-03 ASSESSMENT — COGNITIVE AND FUNCTIONAL STATUS - GENERAL
CLIMB 3 TO 5 STEPS WITH RAILING: TOTAL
TURNING FROM BACK TO SIDE WHILE IN FLAT BAD: A LOT
PERSONAL GROOMING: A LITTLE
DRESSING REGULAR UPPER BODY CLOTHING: TOTAL
STANDING UP FROM CHAIR USING ARMS: A LOT
DAILY ACTIVITIY SCORE: 10
WALKING IN HOSPITAL ROOM: A LOT
DRESSING REGULAR LOWER BODY CLOTHING: TOTAL
MOVING TO AND FROM BED TO CHAIR: A LOT
HELP NEEDED FOR BATHING: TOTAL
EATING MEALS: A LITTLE
SUGGESTED CMS G CODE MODIFIER MOBILITY: CL
MOVING FROM LYING ON BACK TO SITTING ON SIDE OF FLAT BED: UNABLE
SUGGESTED CMS G CODE MODIFIER DAILY ACTIVITY: CL
TOILETING: TOTAL
MOBILITY SCORE: 10

## 2022-07-03 ASSESSMENT — ENCOUNTER SYMPTOMS
MYALGIAS: 1
SPUTUM PRODUCTION: 0
CONSTIPATION: 0
INSOMNIA: 0
PALPITATIONS: 0
WEIGHT LOSS: 0
VOMITING: 0
PALPITATIONS: 0
ORTHOPNEA: 0
HEMOPTYSIS: 0
COUGH: 0
WEAKNESS: 1
NERVOUS/ANXIOUS: 0
NECK PAIN: 0
DIARRHEA: 0
HEADACHES: 0
BLURRED VISION: 0
DEPRESSION: 0
HEMOPTYSIS: 0
SORE THROAT: 0
DIZZINESS: 0
DIZZINESS: 0
NAUSEA: 0
CHILLS: 0
FEVER: 0
MYALGIAS: 0
FLANK PAIN: 0
COUGH: 0
FOCAL WEAKNESS: 0
DOUBLE VISION: 0
BRUISES/BLEEDS EASILY: 0
VOMITING: 0
BLURRED VISION: 0
HEADACHES: 0
FEVER: 0
STRIDOR: 0
ABDOMINAL PAIN: 0
SORE THROAT: 0
WEIGHT LOSS: 0
TINGLING: 0
NAUSEA: 0
DEPRESSION: 0
TREMORS: 0
SHORTNESS OF BREATH: 0
DOUBLE VISION: 0
FALLS: 0
HALLUCINATIONS: 0

## 2022-07-03 NOTE — DISCHARGE SUMMARY
Discharge Summary    CHIEF COMPLAINT ON ADMISSION  Chief Complaint   Patient presents with   • Abnormal Labs     High WBC    • Pain   • Bleeding/Bruising       Reason for Admission  Abnormal Labs; Swelling; Bleeding     Admission Date  7/1/2022     CODE STATUS  DNAR/DNI    HPI & HOSPITAL COURSE    This is a 71-year-old male with a past medical history of HFpEF, hypertension, atrial fibrillation on Eliquis, HLD, CHB s/p PPM, VT s/p AICD, CKD, alcohol abuse who presented 7/1/2022 with right hip pain and leukocytosis.  Patient was at appointment at [facility] for preoperative appointment for tentative total right hip arthroplasty scheduled for 7/5/2022 with Dr. Hargrove.  Labs showed leukocytosis and he was referred to the emergency department.  Patient reported he has been having worsening weakness over the course of the past week as exemplified by difficulty getting off the toilet and worsening severe right hip pain.  He has osteoarthritis in his shoulders and his knees as well, takes Norco but denies taking NSAIDs.  Patient reports drinking 4 bourbon and Cokes per day.  His last drink was day of admission.  Of note, he has had chronic shortness of breath from remote history of valley fever.  Last month, his creatinine was noted to be elevated resulting in his primary care provider holding diuretics until creatinine proved.  On follow-up appoint with cardiology on 6/8/2022, diuretics were resumed.  In the emergency department he was afebrile, systolic blood pressures 119-148, on room air.  No leukocytosis noted.  H&H 10/31 (previously 12 on 6/24), platelets 78, hyponatremic at 133,  mild etabolic acidosis with anion gap of 18, creatinine 2.39, BUN 60, GFR 28.  Lactic acid initially 4.9, decreased to 1.8 after 1 L normal saline.  Troponin elevated 67, BNP 2387.  Chest x-ray was done showing cardiomegaly.    On 7/2/2022, blood cultures were + for gram-positive cocci.  ID was consulted and patient was started  on Unasyn per infectious disease.  There was initial concern for septic arthritis to the patient's right hip due to worsening pain and warmth.  Patient was unable to undergo MRI due to implanted cardiac device.  CT chest abdomen pelvis showed severe osteoarthritis in both glenohumeral joint and right hip joint, 16mm x 7mm mass in the central right upper lobe which could indicate postinflammatory scarring or neoplasm, no evidence of infection within the chest, abdomen, pelvis.  Right hip ultrasound was negative for effusion.  This was discussed with orthopedics who states that in the absence of effusion, patient is unlikely to have septic arthritis, and to explore other source of infection.  In the setting of ICD, infectious disease recommended KIM.  This was discussed with cardiology who recommended patient be transferred to [facility] for electrophysiology to see and weigh in on the permanent pacemaker in addition to KIM.  Furthermore, patient reported visual hallucinations with closing eyes but no other symptoms of withdrawal.  CIWA protocol orders in place.  All updates and findings were discussed at length with patient and his wife at bedside.  All questions and concerns were addressed.  Triage office at [facility] was contacted and accepted patient.  Emergent transfer to [facility] was arranged.      Therefore, he is discharged in guarded and stable condition to a critical access hospital.    The patient met 2-midnight criteria for an inpatient stay at the time of discharge.      FOLLOW UP ITEMS POST DISCHARGE  Patient to be emergently transferred to [facility] for higher level of care including electrophysiology and KIM.    DISCHARGE DIAGNOSES  Principal Problem:    KEVYN (acute kidney injury) (HCC) POA: Yes  Active Problems:    Atrial fibrillation (HCC) POA: Yes    Essential hypertension POA: Yes      Overview: ICD-10 transition     Alcohol abuse POA: Yes    (HFpEF) heart failure with preserved ejection fraction (HCC) POA: Yes    Macrocytic anemia POA: Unknown    Osteoarthritis of hip POA: Yes      Overview: Added automatically from request for surgery [id number]    Bacteremia POA: Unknown  Resolved Problems:    * No resolved hospital problems. *      FOLLOW UP  Future Appointments   Date Time Provider Department Center   7/12/2022  9:20 AM VASCULAR NURSE PRACTITIONER [facility] None   7/20/2022 10:15 AM David Rosario P.A.-C. [facility]   7/20/2022  3:40 PM Daniel Ferrera M.D. [facility] None   8/12/2022  2:15 PM BRODY Castellanos [facility] None   9/9/2022 10:00 AM Chantelle Perry M.D. [facility]     No follow-up provider specified.    MEDICATIONS ON DISCHARGE     Medication List      ASK your doctor about these medications      Instructions   apixaban 5mg Tabs  Commonly known as: ELIQUIS  Ask about: Which instructions should I use?   Take 5 mg by mouth 2 times a day.  Dose: 5 mg     colesevelam 625 MG Tabs  Commonly known as: WELCHOL   Take 625 mg by mouth every evening.  Dose: 625 mg     Esomeprazole Magnesium 20 MG Tbec   Take 20 mg by mouth every morning before breakfast.  Dose: 20 mg     HYDROcodone/acetaminophen  MG Tabs  Commonly known as: NORCO   Take 1 Tablet by mouth every four hours as needed for Moderate Pain. Indications: Pain  Dose: 1 Tablet     potassium chloride SA 20 MEQ Tbcr  Commonly known as: Kdur  Ask about: Which instructions should I use?   Take 20 mEq by mouth every day.  Dose: 20 mEq     sotalol 120 MG tablet  Commonly known as: BETAPACE   Take 120 mg by mouth every morning.  Dose: 120 mg     torsemide 20 MG Tabs  Commonly known as: DEMADEX  Ask about: Which instructions should I use?   Take 20 mg by mouth every day.  Dose: 20 mg     valsartan 80 MG Tabs  Commonly known as: DIOVAN   Take 1 Tablet by mouth 2 times a day.  Dose: 80 mg     vitamin D3 125 MCG (5000 UT) Caps   Take 5,000 Units by mouth every  morning before breakfast.  Dose: 5,000 Units            Allergies  Allergies   Allergen Reactions   • Atorvastatin Rash, Itching and Myalgia         • Statins [Hmg-Coa-R Inhibitors] Rash and Itching     Skin gets red & itchy   • Sulfa Drugs Rash and Itching     Rash, itch       DIET  Orders Placed This Encounter   Procedures   • Diet Order Diet: Cardiac     Standing Status:   Standing     Number of Occurrences:   1     Order Specific Question:   Diet:     Answer:   Cardiac [6]       ACTIVITY  As tolerated.  Weight bearing as tolerated    LINES, DRAINS, AND WOUNDS  This is an automated list. Peripheral IVs will be removed prior to discharge.  Peripheral IV 07/02/22 22 G Anterior;Left Wrist (Active)   Site Assessment Clean;Dry;Intact 07/03/22 1100   Dressing Type Transparent 07/03/22 1100   Line Status Infusing;Scrubbed the hub prior to access;Flushed 07/03/22 1100   Dressing Status Clean;Dry;Intact 07/03/22 1100   Dressing Intervention N/A 07/03/22 1100   Infiltration Grading ([facility] [place] [facility]) 0 07/03/22 1100   Phlebitis Scale ([facility] Only) 0 07/03/22 1100          Peripheral IV 07/02/22 22 G Anterior;Left Wrist (Active)   Site Assessment Clean;Dry;Intact 07/03/22 1100   Dressing Type Transparent 07/03/22 1100   Line Status Infusing;Scrubbed the hub prior to access;Flushed 07/03/22 1100   Dressing Status Clean;Dry;Intact 07/03/22 1100   Dressing Intervention N/A 07/03/22 1100   Infiltration Grading ([facility] [place] [facility]) 0 07/03/22 1100   Phlebitis Scale ([facility] Only) 0 07/03/22 1100               MENTAL STATUS ON TRANSFER  Patient awake alert oriented x4      CONSULTATIONS  Infectious disease, orthopedics    PROCEDURES  None    LABORATORY  Lab Results   Component Value Date    SODIUM 137 07/03/2022    POTASSIUM 4.6 07/03/2022    CHLORIDE 100 07/03/2022    CO2 23 07/03/2022    GLUCOSE 122 (H) 07/03/2022    BUN 33 (H) 07/03/2022    CREATININE 1.15 07/03/2022        Lab Results   Component Value Date    WBC 6.2 07/03/2022     HEMOGLOBIN 10.4 (L) 07/03/2022    HEMATOCRIT 31.5 (L) 07/03/2022    PLATELETCT 80 (L) 07/03/2022        Total time of the discharge process exceeds 45 minutes.

## 2022-07-03 NOTE — PROGRESS NOTES
Hospital Medicine Daily Progress Note    Date of Service  7/3/2022    Chief Complaint  Mike Ferrell is a 71 y.o. male admitted 7/1/2022 with abnormal labs, pain, bleeding/bruising    Hospital Course  This is a 71-year-old male with a past medical history of HFpEF, hypertension, atrial fibrillation on Eliquis, HLD, CHB s/p PPM, VT s/p AICD, CKD who presented 7/1/2022 with right hip pain and leukocytosis.  Patient was at appointment at [facility] for preoperative appointment for tentative total right hip arthroplasty scheduled for 7/5/2022 with Dr. Hargrove.  Labs showed leukocytosis and he was referred to the emergency department.  Patient reported he has been having worsening weakness over the course of the past week as exemplified by difficulty getting off the toilet and worsening severe right hip pain.  He has osteoarthritis in his shoulders and his knees as well, takes Norco but denies taking NSAIDs.  Of note, he has had chronic shortness of breath from remote history of valley fever.  Last month, his creatinine was noted to be elevated resulting in his primary care provider holding diuretics until creatinine proved.  On follow-up appoint with cardiology on 6/8/2022, diuretics were resumed.  In the emergency department he was afebrile, systolic blood pressures 119-148, on room air.  No leukocytosis noted.  H&H 10/31 (previously 12 on 6/24), platelets 78, hyponatremic at 133,  mild etabolic acidosis with anion gap of 18, creatinine 2.39, BUN 60, GFR 28.  Lactic acid initially 4.9, decreased to 1.8 after 1 L normal saline.  Troponin elevated 67, BNP 2387.  Chest x-ray was done showing  cardiomegaly.        Interval Problem Update  7/2/2022: patient AAOx4.  Reports ongoing pain to right hip area.  Afebrile.  Blood cultures positive for positive Streptococcus species.  Overnight on-call provider started patient on Rocephin IV.  Unknown source of infection.  Infectious disease was consulted and  recommended changing Rocephin to Unasyn IV for additional Enterococcus coverage.  Consulted orthopedics who recommended MRI without contrast to evaluate hip.  Patient unable to have MRI due to AICD implantation.  CT chest abdomen pelvis without contrast ordered.     7/3/2022: Patient AAOx4.  Significant other and family at bedside.  CT chest abdomen pelvis negative for signs of infection.  Discussed with IR regarding concern for septic arthritis.  Ultrasound right hip was negative for effusion.  Prior, unlikely to be able to aspirate any fluid.  This was communicated to orthopedics who states unlikely septic arthritis due to lack of effusion.  Updated ID.  Patient has s/p AICD.  ID recommends KIM for further evaluation.  Consulted cardiology.    I have discussed this patient's plan of care and discharge plan at IDT rounds today with Case Management, Nursing, Nursing leadership, and other members of the IDT team.    Consultants/Specialty  cardiology, infectious disease and orthopedics    Code Status  DNAR/DNI    Disposition  Patient is not medically cleared for discharge.   Anticipate discharge to to home with close outpatient follow-up.  I have placed the appropriate orders for post-discharge needs.    Review of Systems  Review of Systems   Constitutional: Positive for malaise/fatigue. Negative for chills, fever and weight loss.   HENT: Negative for congestion and sore throat.    Respiratory: Negative for cough, hemoptysis and shortness of breath.    Cardiovascular: Positive for leg swelling. Negative for chest pain, palpitations and orthopnea.   Gastrointestinal: Negative for abdominal pain, constipation, diarrhea, nausea and vomiting.   Genitourinary: Negative for dysuria and flank pain.   Musculoskeletal: Positive for joint pain (Right hip and knee pain) and myalgias. Negative for falls.   Neurological: Positive for weakness. Negative for dizziness, tingling, tremors, focal weakness and headaches.    Psychiatric/Behavioral: Positive for substance abuse ( Patient reportedly drinks 4 bourbon Cokes per day.). Negative for depression and hallucinations. The patient is not nervous/anxious.    All other systems reviewed and are negative.       Physical Exam  Temp:  [36.4 °C (97.6 °F)-37 °C (98.6 °F)] 37 °C (98.6 °F)  Pulse:  [70-78] 72  Resp:  [16-17] 17  BP: (121-152)/(60-92) 128/85  SpO2:  [93 %-95 %] 95 %    Physical Exam  Vitals and nursing note reviewed.   Constitutional:       General: He is not in acute distress.     Appearance: He is obese. He is not toxic-appearing.   HENT:      Head: Normocephalic.      Mouth/Throat:      Mouth: Mucous membranes are moist.      Pharynx: Oropharynx is clear.   Eyes:      General: No scleral icterus.     Extraocular Movements: Extraocular movements intact.      Conjunctiva/sclera: Conjunctivae normal.      Pupils: Pupils are equal, round, and reactive to light.   Cardiovascular:      Rate and Rhythm: Regular rhythm. Tachycardia present.      Pulses: Normal pulses.      Heart sounds: Normal heart sounds. No murmur heard.    No gallop.   Pulmonary:      Effort: Pulmonary effort is normal. No respiratory distress.      Breath sounds: Normal breath sounds. No wheezing.   Chest:      Chest wall: No tenderness.   Abdominal:      General: Abdomen is flat. Bowel sounds are normal. There is no distension.      Palpations: Abdomen is soft.      Tenderness: There is no abdominal tenderness.   Musculoskeletal:         General: Tenderness ( Right hip tenderness at rest at baseline.) present. No deformity.      Right lower leg: Edema present.      Left lower leg: Edema present.   Skin:     General: Skin is warm and dry.      Capillary Refill: Capillary refill takes less than 2 seconds.      Coloration: Skin is not jaundiced.   Neurological:      General: No focal deficit present.      Mental Status: He is alert and oriented to person, place, and time. Mental status is at baseline.       Motor: Weakness present.   Psychiatric:         Mood and Affect: Mood normal.         Behavior: Behavior normal.         Thought Content: Thought content normal.         Judgment: Judgment normal.         Fluids    Intake/Output Summary (Last 24 hours) at 7/3/2022 1211  Last data filed at 7/3/2022 0600  Gross per 24 hour   Intake 120 ml   Output 200 ml   Net -80 ml       Laboratory  Recent Labs     07/01/22  1217 07/02/22  0759 07/03/22  0205   WBC 7.0 5.7 6.2   RBC 3.03* 2.89* 3.00*   HEMOGLOBIN 10.6* 9.9* 10.4*   HEMATOCRIT 31.6* 29.6* 31.5*   .3* 102.4* 105.0*   MCH 35.0* 34.3* 34.7*   MCHC 33.5* 33.4* 33.0*   RDW 52.3* 50.2* 51.7*   PLATELETCT 78* 76* 80*   MPV 11.2 10.6 11.6     Recent Labs     07/02/22  0447 07/02/22  1353 07/03/22  0205   SODIUM 134* 137 137   POTASSIUM 4.3 4.6 4.6   CHLORIDE 97 100 100   CO2 24 25 23   GLUCOSE 128* 148* 122*   BUN 47* 41* 33*   CREATININE 1.52* 1.48* 1.15   CALCIUM 8.7 8.9 9.2     Recent Labs     07/01/22  1217   APTT 36.0   INR 1.38*               Imaging  US-EXTREMITY NON VASCULAR UNILATERAL RIGHT   Final Result      No sonographic evidence of hip effusion or other abnormal fluid collection      Technically challenging evaluation. MRI over CT could better characterize if clinically indicated      EC-ECHOCARDIOGRAM COMPLETE W/ CONT   Final Result      CT-CHEST,ABDOMEN,PELVIS W/O   Final Result      1.  No evidence for infection within the chest, abdomen, pelvis      2.  The right upper lobe is abnormal with architecture distortion and central 16 x 7 mm mass. These findings could indicate postinflammatory scarring or a neoplasm. PET CT scan is recommended      3.  Mild bilateral dependent atelectasis      4.  Prior cholecystectomy      5.  Presence of cardiac device      6.  Severe osteoarthritis of both glenohumeral joint and the right hip joint      7.  aortic and branch vessel atherosclerotic plaque      US-RENAL   Final Result         1.  Grossly unremarkable  renal ultrasound.      Examination is technically limited due to patient body habitus.      DX-CHEST-PORTABLE (1 VIEW)   Final Result      Cardiomegaly.      MR-PELVIS W/O    (Results Pending)   IR-CONSULT AND TREAT    (Results Pending)        Assessment/Plan  * KEVYN (acute kidney injury) (HCC)- (present on admission)  Assessment & Plan  Baseline Cr ~1.3-1.4  On admission Cr 2.39  Etiology: likely pre-renal from diuretics (improved last month when they held them), could be cardiorenal but doesn't look like he's in florid heart failure, looks overall more dry, will get renal US  UA unremarkable  Improving with holding diuretics and ARB  Continue to hold diuretic, resuming ARB  Avoid nephrotoxic meds  Renally dose medications  CTM    Bacteremia  Assessment & Plan  Blood culture 7/1/2022 +gram positive cocci  ID involved.  Patient changed from rocephin to unasyn per ID.   There was initial concern for septic arthritis in right hip.  Patient unable to have MRI due to implanted cardiac device.  CT chest abdomen pelvis was obtained.  Negative for infection in all cavities, osteoarthritis noted in bilateral hips.  Ultrasound of right hip negative for effusion.  Unlikely to be septic arthritis per orthopedics due to absence of effusion.  ID recommends KIM due to s/p implanted devices.  Cardiology consulted.    Osteoarthritis of hip- (present on admission)  Assessment & Plan  Planning for total hip replacement 7/5 with Dr. Hargrove  Pain control  PT/OT evaluated patient.  Consulted with orthopedics for evaluation for possible septic hip.  Patient unable to have MRI without contrast as requested by orthopedics due to AICD.  CT chest abdomen pelvis on 7/2/2022 negative for infections or fluid collection in chest, abdomen, pelvis.  Ultrasound right hip obtain, negative for effusion.  Discussed ultrasound findings with orthopedics who states no acute surgical needs at this time.    Macrocytic anemia  Assessment & Plan  Mild drop in  H/H 12-->10--> 9.9, no obvious bleeding  On eliquis  B12 low 5/2022, doesn't look like he's gotten b12, will start b12 1000mg IM weekly x 4-8 weeks  Occult blood stool ordered.  Continue to monitor anemia.      (HFpEF) heart failure with preserved ejection fraction (HCC)- (present on admission)  Assessment & Plan  On admission dry mucus membranes, clear lungs, does have LE edema, BNP elevated however suspect overall dry since LA improved with IVF.  Continue to hold torsemide   Resuming valsartan    Essential hypertension- (present on admission)  Assessment & Plan  Home regimen: valsartan 80mg BID, torsemide 20mg daily, sotalol  Inpatient regimen: sotalol 120mg daily,  hydralazine prn  7/3: Renal functions improving, resuming valsartan.    Atrial fibrillation (HCC)- (present on admission)  Assessment & Plan  H/o afib  Continue home sotalol and eliquis, monitor renal function       VTE prophylaxis: therapeutic anticoagulation with Eliquis    I have performed a physical exam and reviewed and updated ROS and Plan today (7/3/2022). In review of yesterday's note (7/2/2022), there are no changes except as documented above.

## 2022-07-03 NOTE — DISCHARGE PLANNING
Case Management Discharge Planning    Admission Date: 7/1/2022  GMLOS: 1.8  ALOS: 2    6-Clicks ADL Score: 18  6-Clicks Mobility Score: 18      Anticipated Discharge Dispo:      DME Needed: No    Action(s) Taken: Updated Provider/Nurse on Discharge Plan     RN CM called [facility] and spoke with Marilu who states their is currently no bed available at [facility]. [facility] states she will lt RN CM know when there is.     Escalations Completed: None    Medically Clear: No    Next Steps: Transfer to Main    Barriers to Discharge: Medical clearance

## 2022-07-03 NOTE — PROGRESS NOTES
Radha from Lab called with critical result of gram positive cocci at 0405. Critical lab result read back to Radha.   Dr. Diaz notified of critical lab result at 0405.  Critical lab result read back by Dr. Diaz.

## 2022-07-03 NOTE — CONSULTS
"Orthopaedic Consult / H&P Note    CHIEF COMPLAINT: Right hip pain in the setting of bacteremia    HISTORY OF PRESENT ILLNESS: Mike Ferrell is a 71 y.o. who originally was scheduled for a right total hip arthroplasty with Dr. Hargrove on July 5th, but was prematurely admitted to the hospital with a leukocytosis and severe right hip pain with subsequent blood cultures positive for a possible Streptococcus species. Orthopedics was subsequently consulted to evaluate for possible right hip septic arthritis. On my interview with the patient, he reports chronic severe right hip pain, however today he reports that the pain is so severe he is unable to ambulate. He reports little to no pain at rest. With any movement of the right lower extremity, he reports a severe \"12 out of 10\" pain in both the right hip and right knee. He otherwise denies feeling any fatigue or having fever or chills.    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 Daniel Ferrera   • Snoring     Sleep Study June 2022, awaiting results   • Syncope and collapse 04/04/2012       Past Surgical History:   Procedure Laterality Date   • AICD IMPLANT  " 2017   • AICD IMPLANT  01/01/2010   • PACEMAKER INSERTION  01/01/2009   • CHOLECYSTECTOMY  01/01/1999   • TONSILLECTOMY  01/01/1953   • CATARACT EXTRACTION WITH IOL         No current facility-administered medications on file prior to encounter.     Current Outpatient Medications on File Prior to Encounter   Medication Sig Dispense Refill   • apixaban (ELIQUIS) 5mg Tab Take 5 mg by mouth 2 times a day.     • torsemide (DEMADEX) 20 MG Tab Take 20 mg by mouth every day.     • potassium chloride SA (KDUR) 20 MEQ Tab CR Take 20 mEq by mouth every day.     • valsartan (DIOVAN) 80 MG Tab Take 1 Tablet by mouth 2 times a day. 60 Tablet 11   • [DISCONTINUED] Home Care Oxygen Inhale 2 L/min as needed for Shortness of Breath (PRN SOB and at night). Oxygen dose range: 2 L/min  Respiratory route via: Nasal Cannula   Oxygen supplier: none- received concentrator from [facility]     • Esomeprazole Magnesium 20 MG Tablet Delayed Response Take 20 mg by mouth every morning before breakfast.     • sotalol (BETAPACE) 120 MG tablet Take 120 mg by mouth every morning.     • HYDROcodone/acetaminophen (NORCO)  MG Tab Take 1 Tablet by mouth every four hours as needed for Moderate Pain. Indications: Pain     • colesevelam (WELCHOL) 625 MG Tab Take 625 mg by mouth every evening.     • Cholecalciferol (VITAMIN D3) 5000 units Cap Take 5,000 Units by mouth every morning before breakfast.         Allergies: Atorvastatin, Statins [hmg-coa-r inhibitors], and Sulfa drugs    Social History     Socioeconomic History   • Marital status:      Spouse name: Not on file   • Number of children: Not on file   • Years of education: Not on file   • Highest education level: Not on file   Occupational History   • Not on file   Tobacco Use   • Smoking status: Never Smoker   • Smokeless tobacco: Never Used   Vaping Use   • Vaping Use: Never used   Substance and Sexual Activity   • Alcohol use: Yes     Comment: 3 drinks per day   • Drug use: Not Currently      Comment: 25 years ago   • Sexual activity: Not on file   Other Topics Concern   • Not on file   Social History Narrative   • Not on file     Social Determinants of Health     Financial Resource Strain: Low Risk    • Difficulty of Paying Living Expenses: Not very hard   Food Insecurity: No Food Insecurity   • Worried About Running Out of Food in the Last Year: Never true   • Ran Out of Food in the Last Year: Never true   Transportation Needs: No Transportation Needs   • Lack of Transportation (Medical): No   • Lack of Transportation (Non-Medical): No   Physical Activity: Inactive   • Days of Exercise per Week: 0 days   • Minutes of Exercise per Session: 0 min   Stress: No Stress Concern Present   • Feeling of Stress : Not at all   Social Connections: Moderately Isolated   • Frequency of Communication with Friends and Family: More than three times a week   • Frequency of Social Gatherings with Friends and Family: More than three times a week   • Attends Scientologist Services: Never   • Active Member of Clubs or Organizations: No   • Attends Club or Organization Meetings: Never   • Marital Status:    Intimate Partner Violence: Not At Risk   • Fear of Current or Ex-Partner: No   • Emotionally Abused: No   • Physically Abused: No   • Sexually Abused: No   Housing Stability: Low Risk    • Unable to Pay for Housing in the Last Year: No   • Number of Places Lived in the Last Year: 1   • Unstable Housing in the Last Year: No       No family history on file.    REVIEW OF SYSTEMS: Full 13-point review of systems obtained with positives   noted in the HPI above, all others negative.    PHYSICAL EXAMINATION:  Vitals:    07/02/22 0932 07/02/22 1007 07/02/22 1616 07/02/22 1617   BP:  126/47 121/60    Pulse: 70 70 70 70   Resp: 17 16 17 16   Temp:  36.8 °C (98.3 °F) 36.4 °C (97.6 °F)    TempSrc:  Temporal Temporal    SpO2: 91% 92% 94% 94%   Weight:  115 kg (254 lb 6.6 oz)         GENERAL: No acute distress, alert and oriented  x3.  HEENT: Normocephalic, atraumatic  CARDIOVASCULAR: Regular rate  RESPIRATORY: Unlabored  MUSCULOSKELETAL:   Examination of the right lower extremity demonstrates normal appearing skin over the anterior right knee without erythema or effusion. There is no tenderness to palpation along the joint line of the right knee or about the patella. Mild tenderness to palpation at the popliteal fossa. There is no increased warmth at the right knee.    There is exquisite pain with log roll and/or abduction that experienced in both the right hip and right knee. Essentially, any passive movement of the right lower extremity causes severe pain in the right hip and knee.    The patient was not able to actively move the right hip or knee due to pain.    Otherwise, distally neurovascularly intact at the foot and ankle.  LABORATORY DATA:     Lab Results   Component Value Date/Time    WBC 5.7 07/02/2022 07:59 AM    RBC 2.89 (L) 07/02/2022 07:59 AM    HEMOGLOBIN 9.9 (L) 07/02/2022 07:59 AM    HEMATOCRIT 29.6 (L) 07/02/2022 07:59 AM        Lab Results   Component Value Date/Time    SODIUM 137 07/02/2022 01:53 PM    POTASSIUM 4.6 07/02/2022 01:53 PM    CHLORIDE 100 07/02/2022 01:53 PM    CO2 25 07/02/2022 01:53 PM    GLUCOSE 148 (H) 07/02/2022 01:53 PM    BUN 41 (H) 07/02/2022 01:53 PM    CREATININE 1.48 (H) 07/02/2022 01:53 PM    BUNCREATRAT 25 (H) 05/30/2014 09:04 AM        Lab Results   Component Value Date/Time    PROTHROMBTM 16.3 (H) 07/01/2022 12:17 PM    INR 1.38 (H) 07/01/2022 12:17 PM          IMAGING: CT of the pelvis, per my read, shows severe right hip arthritis without evidence of fluid collection. There is some thickening of the right hip capsule.    ASSESSMENT AND PLAN: Severe right hip pain in the setting of an unexplained bacteremia. The patients exam is certainly concerning and not normal in the setting of right hip osteoarthritis. His knee exam is more reassuring and I believe that his right knee pain is likely  related to the right hip pain. Although this could be a severe flare of his osteoarthritis, given his positive blood cultures it is reasonable to further evaluate the right hip as a source of infection.  I would recommend proceeding with a an IR guided right hip aspiration to evaluate for possible right septic hip osteoarthritis.    -NPO at midnight should his aspirate return positive and he require surgical debridement.    Chris Larios MD

## 2022-07-03 NOTE — CARE PLAN
The patient is Stable - Low risk of patient condition declining or worsening    Shift Goals  Clinical Goals: Pain to remain a 7/10 or less this shift  Patient Goals: Pt to rest this shift    Progress made toward(s) clinical / shift goals:  Pt with no pain.  Pt resting quietly in bed.    Patient is not progressing towards the following goals:

## 2022-07-03 NOTE — PROGRESS NOTES
Received bedside report.  Assumed pt care.   Assessment and chart check complete.  Pt is AA0X4, no signs of distress, denies nausea/vomiting.  Pt pain currently 7/10, medicated per MAR.   NPO with sips of medications instructed.  ABX given.  Fall precautions in place, treaded socks on pt, bed in low position.   Call light within reach.   Educated pt to call if needing anything.   Hourly rounding.

## 2022-07-03 NOTE — CARE PLAN
Problem: Knowledge Deficit - Standard  Goal: Patient and family/care givers will demonstrate understanding of plan of care, disease process/condition, diagnostic tests and medications  Outcome: Progressing     Problem: Pain - Standard  Goal: Alleviation of pain or a reduction in pain to the patient’s comfort goal  Outcome: Progressing     Problem: Fall Risk  Goal: Patient will remain free from falls  Outcome: Progressing   The patient is Stable - Low risk of patient condition declining or worsening    Shift Goals  Clinical Goals: pt pain will be less than a 5/10 by the end of shift  Patient Goals: rest comfortably    Progress made toward(s) clinical / shift goals:  all goals     Patient is not progressing towards the following goals:

## 2022-07-03 NOTE — CONSULTS
"Consults   INFECTIOUS DISEASES INPATIENT CONSULT NOTE     Date of Service: 7/3/2022    Consult Requested By: Daniel Gibbons M.D.    Reason for Consultation: Bacteremia    History of Present Illness:   Mike Ferrell is a 71 y.o.  admitted 7/1/2022. Pt has a past medical history of HFpEF, HTN, A fib on Eliquis, heart disease status post pacemaker, V. tach status post AICD and CKD . Also with osteoarthritis and was admitted for total hip replacement on the right.  He has significant leukocytosis so was admitted early and blood cultures were drawn which are now positive for possible Streptococcus species.  Given positive blood cultures and significant worsening of his right hip pain over the last few weeks. Orthopedics saw the patient on 7/2 and are recommending aspiration of the hip and then potential washout depending on results.    Review Of Systems:  Review of Systems   Constitutional: Positive for malaise/fatigue. Negative for chills and fever.   HENT: Negative for hearing loss.    Eyes: Negative for blurred vision and double vision.   Respiratory: Negative for cough, sputum production and shortness of breath.    Cardiovascular: Negative for chest pain and leg swelling.   Gastrointestinal: Negative for abdominal pain, constipation, diarrhea, nausea and vomiting.   Genitourinary: Negative for dysuria, frequency and urgency.   Musculoskeletal: Positive for joint pain.   Skin: Negative for rash.   Neurological: Negative for focal weakness.   Psychiatric/Behavioral: The patient is not nervous/anxious.        PMH:   Past Medical History:   Diagnosis Date   • AICD (automatic cardioverter/defibrillator) present    • Arthritis     Osteo- shoulders, hips, knees, ankles, generalized everywhere   • Atrial fibrillation (HCC) 04/04/2012   • Bowel habit changes     constipation   • Breath shortness     \"since 1996 from [place]\"   • Cataract 12/15/2021    Dec 2021/Jan 2022 bilat surgery   • Chronic back pain  "   • Congestive heart failure (HCC)    • COVID-19 09/2021   • Dental disorder     missing teeth x3   • Heart burn     occasional food related   • High cholesterol    • Hypertension    • Methamphetamine use (HCC) none for many years    25 years ago   • Pacemaker     defibrilator   • Pain 12/15/2021    back, right hip and knee, 8/10   • Paroxysmal ventricular tachycardia (HCC) 04/04/2012   • Pneumonia 2000    in the past   • Sinoatrial node dysfunction (HCC) 04/04/2012    Dr. Daniel Ferrera   • Snoring     Sleep Study June 2022, awaiting results   • Syncope and collapse 04/04/2012       PSH:  Past Surgical History:   Procedure Laterality Date   • AICD IMPLANT  2017   • AICD IMPLANT  01/01/2010   • PACEMAKER INSERTION  01/01/2009   • CHOLECYSTECTOMY  01/01/1999   • TONSILLECTOMY  01/01/1953   • CATARACT EXTRACTION WITH IOL         FAMILY HX:  No family history on file.  Reviewed family history. No pertinent family history.     SOCIAL HX:  Social History     Socioeconomic History   • Marital status:      Spouse name: Not on file   • Number of children: Not on file   • Years of education: Not on file   • Highest education level: Not on file   Occupational History   • Not on file   Tobacco Use   • Smoking status: Never Smoker   • Smokeless tobacco: Never Used   Vaping Use   • Vaping Use: Never used   Substance and Sexual Activity   • Alcohol use: Yes     Comment: 3 drinks per day   • Drug use: Not Currently     Comment: 25 years ago   • Sexual activity: Not on file   Other Topics Concern   • Not on file   Social History Narrative   • Not on file     Social Determinants of Health     Financial Resource Strain: Low Risk    • Difficulty of Paying Living Expenses: Not very hard   Food Insecurity: No Food Insecurity   • Worried About Running Out of Food in the Last Year: Never true   • Ran Out of Food in the Last Year: Never true   Transportation Needs: No Transportation Needs   • Lack of Transportation (Medical): No   •  Lack of Transportation (Non-Medical): No   Physical Activity: Inactive   • Days of Exercise per Week: 0 days   • Minutes of Exercise per Session: 0 min   Stress: No Stress Concern Present   • Feeling of Stress : Not at all   Social Connections: Moderately Isolated   • Frequency of Communication with Friends and Family: More than three times a week   • Frequency of Social Gatherings with Friends and Family: More than three times a week   • Attends Jehovah's witness Services: Never   • Active Member of Clubs or Organizations: No   • Attends Club or Organization Meetings: Never   • Marital Status:    Intimate Partner Violence: Not At Risk   • Fear of Current or Ex-Partner: No   • Emotionally Abused: No   • Physically Abused: No   • Sexually Abused: No   Housing Stability: Low Risk    • Unable to Pay for Housing in the Last Year: No   • Number of Places Lived in the Last Year: 1   • Unstable Housing in the Last Year: No     Social History     Tobacco Use   Smoking Status Never Smoker   Smokeless Tobacco Never Used     Social History     Substance and Sexual Activity   Alcohol Use Yes    Comment: 3 drinks per day       Allergies/Intolerances:  Allergies   Allergen Reactions   • Atorvastatin Rash, Itching and Myalgia         • Statins [Hmg-Coa-R Inhibitors] Rash and Itching     Skin gets red & itchy   • Sulfa Drugs Rash and Itching     Rash, itch       History reviewed with the patient     Other Current Medications:    Current Facility-Administered Medications:   •  valsartan (DIOVAN) tablet 80 mg, 80 mg, Oral, BID, Daniel Gibbons M.D., 80 mg at 07/03/22 0824  •  ampicillin/sulbactam (UNASYN) 3 g in  mL IVPB, 3 g, Intravenous, Q6HRS, Brittany Fisher M.D., Stopped at 07/03/22 0615  •  apixaban (ELIQUIS) tablet 5 mg, 5 mg, Oral, BID, Daniel Gibbons M.D., 5 mg at 07/03/22 0545  •  omeprazole (PRILOSEC) capsule 20 mg, 20 mg, Oral, [facility], Valeria Kwok M.D., 20 mg at 07/03/22 0546  •   HYDROcodone/acetaminophen (NORCO)  MG per tablet 1 Tablet, 1 Tablet, Oral, Q4HRS PRN, Valeria Kwok M.D., 1 Tablet at 22 0910  •  sotalol (BETAPACE) tablet 120 mg, 120 mg, Oral, QAM, Valeria Kwok M.D., 120 mg at 22 0545  •  senna-docusate (PERICOLACE or SENOKOT S) 8.6-50 MG per tablet 2 Tablet, 2 Tablet, Oral, BID, 2 Tablet at 22 0545 **AND** polyethylene glycol/lytes (MIRALAX) PACKET 1 Packet, 1 Packet, Oral, QDAY PRN **AND** magnesium hydroxide (MILK OF MAGNESIA) suspension 30 mL, 30 mL, Oral, QDAY PRN **AND** bisacodyl (DULCOLAX) suppository 10 mg, 10 mg, Rectal, QDAY PRN, Valeria Kwok M.D.  •  acetaminophen (Tylenol) tablet 650 mg, 650 mg, Oral, Q6HRS PRN, Valeria Kwok M.D.  •  hydrALAZINE (APRESOLINE) injection 10 mg, 10 mg, Intravenous, Q4HRS PRN, Valeria Kwok M.D.  •  cyanocobalamin (VITAMIN B-12) injection 1,000 mcg, 1,000 mcg, Intramuscular, Q7 DAYS, Valeria Kwok M.D.  [unfilled]    Most Recent Vital Signs:  BP (!) 146/92   Pulse 73   Temp 36.8 °C (98.2 °F) (Oral)   Resp 17   Wt 115 kg (254 lb 6.6 oz)   SpO2 93%   BMI 34.50 kg/m²   Temp  Av.6 °C (97.8 °F)  Min: 36.4 °C (97.5 °F)  Max: 36.8 °C (98.3 °F)    Physical Exam:  Physical Exam  Constitutional:       Appearance: Normal appearance.   HENT:      Head: Normocephalic and atraumatic.      Right Ear: External ear normal.      Left Ear: External ear normal.      Nose: Nose normal.      Mouth/Throat:      Mouth: Mucous membranes are dry.      Pharynx: Oropharynx is clear.   Eyes:      Extraocular Movements: Extraocular movements intact.      Conjunctiva/sclera: Conjunctivae normal.      Pupils: Pupils are equal, round, and reactive to light.   Cardiovascular:      Rate and Rhythm: Normal rate and regular rhythm.      Pulses: Normal pulses.   Pulmonary:      Effort: Pulmonary effort is normal.      Breath sounds: Normal breath sounds.   Abdominal:      General: Abdomen is flat.  "Bowel sounds are normal.      Palpations: Abdomen is soft.   Musculoskeletal:         General: Normal range of motion.      Cervical back: Normal range of motion and neck supple.      Comments: Right hip with mild warmth, no significant edema or erythema pain with movement   Skin:     General: Skin is warm and dry.   Neurological:      General: No focal deficit present.      Mental Status: He is alert and oriented to person, place, and time.   Psychiatric:         Mood and Affect: Mood normal.         Behavior: Behavior normal.             Pertinent Lab Results:  Recent Labs     07/01/22  1217 07/02/22  0759 07/03/22  0205   WBC 7.0 5.7 6.2      Recent Labs     07/01/22  1217 07/02/22  0759 07/03/22  0205   HEMOGLOBIN 10.6* 9.9* 10.4*   HEMATOCRIT 31.6* 29.6* 31.5*   .3* 102.4* 105.0*   MCH 35.0* 34.3* 34.7*   PLATELETCT 78* 76* 80*         Recent Labs     07/02/22  0447 07/02/22  1353 07/03/22  0205   SODIUM 134* 137 137   POTASSIUM 4.3 4.6 4.6   CHLORIDE 97 100 100   CO2 24 25 23   CREATININE 1.52* 1.48* 1.15        Recent Labs     07/01/22  1217   ALBUMIN 3.5        Pertinent Micro:  Results     Procedure Component Value Units Date/Time    BLOOD CULTURE [[id number]]  (Abnormal) Collected: 07/02/22 1353    Order Status: Completed Specimen: Blood from Peripheral Updated: 07/03/22 0403     Significant Indicator POS     Source BLD     Site PERIPHERAL     Culture Result Growth detected by Bactec instrument. 07/03/2022  04:01  Gram Stain: Gram positive cocci: Possible Streptococcus sp.      Narrative:      CALL  Qu[place]  [facility] tel. [phone],  CALLED  [facility] tel. [phone] 07/03/2022, 04:03, RB PERF. RESULTS CALLED TO: RN  [id number]  Per Hospital Policy: Only change Specimen Src: to \"Line\" if  specified by physician order.  Right Hand    BLOOD CULTURE [[id number]]  (Abnormal) Collected: 07/02/22 1353    Order Status: Completed Specimen: Blood from Peripheral Updated: 07/03/22 0313     Significant Indicator POS     Source " "BLD     Site PERIPHERAL     Culture Result Growth detected by Bactec instrument. 07/03/2022  03:11  Gram Stain: Gram positive cocci: Possible Streptococcus sp.      Narrative:      CALL  Qu[place]illa  [facility] tel. [phone],  CALLED  [facility] tel. [phone] 07/03/2022, 03:13, RB PERF. RESULTS CALLED TO: RN  [id number]  Per Hospital Policy: Only change Specimen Src: to \"Line\" if  specified by physician order.  Left AC    Blood Culture [[id number]]  (Abnormal) Collected: 07/01/22 1214    Order Status: Completed Specimen: Blood from Peripheral Updated: 07/01/22 2251     Significant Indicator POS     Source BLD     Site PERIPHERAL     Culture Result Growth detected by Bactec instrument. 07/01/2022  22:50  Gram Stain: Gram positive cocci: Possible Streptococcus sp.  Blood culture testing and Gram stain, if indicated, are  performed at [facility], [facility] [place]note 2[place]  [employer], [place].  Positive blood cultures are  sent to [facility], [address] [place] [address], for organism identification and  susceptibility testing.      Narrative:      [place]  [place]  [facility] [place] [phone],  CALLED  U tel. [phone] 07/01/2022, 22:51, RB PERF. RESULTS CALLED TO:  2 of 2 blood culture x2  Sites order. Per Hospital Policy:  Only change Specimen Src: to \"Line\" if specified by physician  order.  No site indicated    Blood Culture [[id number]]  (Abnormal) Collected: 07/01/22 1214    Order Status: Completed Specimen: Blood from Peripheral Updated: 07/01/22 2249     Significant Indicator POS     Source BLD     Site PERIPHERAL     Culture Result Growth detected by Bactec instrument. 07/01/2022  22:40  Gram Stain: Gram positive cocci: Possible Streptococcus sp.  Blood culture testing and Gram stain, if indicated, are  performed at [facility], [facility] [place]note 2]  [employer].[place], [place].  Positive blood cultures are  sent to [facility], [address] [place] [address], for organism " "identification and  susceptibility testing.      Narrative:      CALL  Quintanilla  [facility] tel. [phone],  CALLED  [facility] tel. [phone] 07/01/2022, 22:49, RB PERF. RESULTS CALLED TO:  [id number], RN  1 of 2 for Blood Culture x 2 sites order. Per Hospital  Policy: Only change Specimen Src: to \"Line\" if specified by  physician order.  No site indicated    Urinalysis [[id number]]  (Abnormal) Collected: 07/01/22 1300    Order Status: Completed Specimen: Urine Updated: 07/01/22 1347     Color Yellow     Character Clear     Specific Gravity 1.015     Ph 5.0     Glucose Negative mg/dL      Ketones Negative mg/dL      Protein Negative mg/dL      Bilirubin Negative     Nitrite Negative     Leukocyte Esterase Negative     Occult Blood Trace     Micro Urine Req Microscopic    COV-2, FLU A/B, AND RSV BY PCR (2-4 HOURS CEPHEID): Collect NP swab in VTM [[id number]] Collected: 07/01/22 1214    Order Status: Completed Specimen: Respirate Updated: 07/01/22 1312     Influenza virus A RNA Negative     Influenza virus B, PCR Negative     RSV, PCR Negative     SARS-CoV-2 by PCR NotDetected     Comment: PATIENTS: Important information regarding your results and instructions can  be found at [email or web address]   \"After your  Covid-19 Test\"    [facility] providers: PLEASE REFER TO DE-ESCALATION AND RETESTING PROTOCOL  on [email or web address]    **The [employer] GeneXpert Xpress SARS-CoV-2 RT-PCR Test has been made  available for use under the Emergency Use Authorization (EUA) only.          SARS-CoV-2 Source Nasal Swab        Blood Culture Hold   Date Value Ref Range Status   12/20/2021 Collected  Final        Studies:  CT-CHEST,ABDOMEN,PELVIS W/O    Result Date: 7/2/2022 7/2/2022 2:12 PM HISTORY/REASON FOR EXAM:  +blood cultures, unknown source. +OA in right hip w tentative surgery 7/5.  has ICD- unable to do MRI pelvis to assess for fluid collection Evaluate for source of infection TECHNIQUE/EXAM DESCRIPTION: CT scan of the chest, " abdomen and pelvis without contrast was performed. Noncontrast helical scanning of the chest, abdomen and pelvis was obtained from the lung apices through the pubic symphysis. Low dose optimization technique was utilized for this CT exam including automated exposure control and adjustment of the mA and/or kV according to patient size. COMPARISON:  1 view chest 7/1/2022 FINDINGS: Osseous structures: There is osteoarthritis of the right shoulder joint which is advanced and there are multiple associated intra-articular ossific body. There is also advanced osteoarthritis of the left glenohumeral joint. There is osteoarthritis of the right hip joint which appears severe in degree. There are spinal degenerative changes. There is scoliosis. There is a large degenerative subluxation at L5-S1. There are old compression fracture at T12 and L1. Chest: Cardiac device is present. LUNGS: The right lung apex there is curvilinear density with architectural distortion. There is a central mass measuring 16 x 7 mm. Findings could represent postinflammatory scarring or a neoplasm. There is mild dependent density both lung bases consistent with atelectasis. MEDIASTINUM: There is no adenopathy, mass, or other abnormality within the axilla, the mediastinum, or the elizabeth. AORTA: There is aortic and dense coronary atherosclerotic plaque. PLEURA: There no pleural effusion or pneumothoraces. Abdomen: LIVER: The liver is normal in appearance. SPLEEN: The spleen is normal in appearance. PANCREAS: The pancreas is normal in appearance. GALLBLADDER and biliary system: There has been cholecystectomy. Venous vasculature: The splenic vein and portal vein enhance normally. ADRENAL GLANDS: The adrenal glands are normal in appearance. KIDNEYS: The kidneys are normal in appearance. AORTA: There is aortic atherosclerosis without aneurysm. BOWEL: No bowel or mesenteric abnormality identified. Pelvis: Within normal limits.     1.  No evidence for infection  within the chest, abdomen, pelvis 2.  The right upper lobe is abnormal with architecture distortion and central 16 x 7 mm mass. These findings could indicate postinflammatory scarring or a neoplasm. PET CT scan is recommended 3.  Mild bilateral dependent atelectasis 4.  Prior cholecystectomy 5.  Presence of cardiac device 6.  Severe osteoarthritis of both glenohumeral joint and the right hip joint 7.  aortic and branch vessel atherosclerotic plaque    DX-CHEST-PORTABLE (1 VIEW)    Result Date: 7/1/2022 7/1/2022 12:41 PM HISTORY/REASON FOR EXAM: Sepsis TECHNIQUE/EXAM DESCRIPTION AND NUMBER OF VIEWS: Single portable view of the chest. COMPARISON: None FINDINGS: There is no evidence of focal consolidation or evidence of pulmonary edema. There is no pleural effusion. The heart is enlarged. There is a left-sided AICD.     Cardiomegaly.    US-RENAL    Result Date: 7/2/2022 7/2/2022 2:25 AM HISTORY/REASON FOR EXAM:  Abnormal Labs TECHNIQUE/EXAM DESCRIPTION:  Renal ultrasound. COMPARISON:  None FINDINGS: The right kidney measures 10.02 cm.  The left kidney measures 11.12 cm. There is no hydronephrosis. There are no abnormal calcifications. The bladder demonstrates no focal wall abnormality. Bilateral ureteral jets are not visualized.     1.  Grossly unremarkable renal ultrasound. Examination is technically limited due to patient body habitus.    US-EXTREMITY NON VASCULAR UNILATERAL RIGHT    Result Date: 7/3/2022  7/3/2022 8:17 AM HISTORY/REASON FOR EXAM:  Assessing for effusion right hip, possible septic arthritis TECHNIQUE/EXAM DESCRIPTION AND NUMBER OF VIEWS: Right groin region ultrasound to evaluate for joint effusion COMPARISON: None FINDINGS: Technically challenging evaluation due to body habitus and positioning limitations No abnormal fluid collection is seen. No hyperemia is noted.     No sonographic evidence of hip effusion or other abnormal fluid collection Technically challenging evaluation. MRI over CT could  better characterize if clinically indicated    EC-ECHOCARDIOGRAM COMPLETE W/ CONT    Result Date: 2022  Transthoracic Echo Report Echocardiography Laboratory CONCLUSIONS Technically difficult due to positioning and body habitus but adequate study for interpretation. Pt unable to assume left lateral decubitus due to injured hip. Grossly normal left ventricular systolic function. The left ventricular ejection fraction is visually estimated to be 55%. Mildly dilated right ventricle with preserved systolic function. Mild mitral valve regurgitation. Mild tricuspid regurgitation. Right ventricular systolic pressure is estimated to be 41 mmHg; mild pulmonary hypertension. Normal pericardium without effusion. Compared to prior study 22, current study technically difficult. PELON RICHARDN Exam Date:         2022                    14:58 Exam Location:     Inpatient Priority:          Routine Ordering Physician:        ED GARLAND Referring Physician:       [id number] Posadas Sonographer:               Kasi Bobo RDCS Age:    71     Gender:    M MRN:    [id number] :    1950 BSA:    2.36   Ht (in):    72     Wt (lb):    254 Exam Type:     Complete, Contrast Indications:     Acute/Subacute Bacterial Endocarditis ICD Codes:       421 CPT Codes:       24221,  BP:   126    /   47     HR: Technical Quality:       Technically difficult study -                          adequate information is obtained MEASUREMENTS  (Male / Female) Normal Values 2D ECHO LV Diastolic Diameter PLAX        4.6 cm                4.2 - 5.9 / 3.9 - 5.3 cm LV Systolic Diameter PLAX         3.1 cm                2.1 - 4.0 cm LV Fractional Shortening PLAX     32.4 %                25 - 46  % IVS Diastolic Thickness           1 cm                  LVPW Diastolic Thickness          1 cm                  Estimated LV Ejection Fraction    55 %                   M-MODE Aortic Root Diameter MM           3.4 cm                DOPPLER AV Peak Velocity                  1.2 m/s               AV Peak Gradient                  5.6 mmHg              AV Mean Gradient                  2.9 mmHg              LVOT Peak Velocity                0.66 m/s              Mitral E Point Velocity           0.77 m/s              Mitral E to A Ratio               1.1                   Mitral A Duration                 109 ms                MV Pressure Half Time             38 ms                 MV Area PHT                       5.8 cm2               MV Deceleration Time              131 ms                TR Peak Velocity                  352 cm/s              TR Peak Gradient                  49.5 mmHg             * Indicates values subject to auto-interpretation LV EF:  55    % FINDINGS Left Ventricle 3 ml contrast was used to enhance visualization of the endocardial border. Existing IV was used. Normal left ventricular wall thickness. Grossly normal left ventricular systolic function. The left ventricular ejection fraction is visually estimated to be 55%. Normal diastolic function. Right Ventricle Mildly dilated right ventricle with preserved systolic function. Right Atrium Enlarged right atrium. Left Atrium Normal left atrial size. Mitral Valve Structurally normal mitral valve without significant stenosis. Mild mitral valve regurgitation. Aortic Valve Structurally normal aortic valve without significant stenosis or regurgitation. Tricuspid Valve Structurally normal tricuspid valve. No tricuspid stenosis. Mild tricuspid regurgitation. Right ventricular systolic pressure is estimated to be 41 mmHg. Right atrial pressure is estimated to  be 3 mmHg. Pulmonic Valve Structurally normal pulmonic valve without significant stenosis or regurgitation. Pericardium Normal pericardium without effusion. Aorta Normal aortic root for body surface area. Patric Sood MD (Electronically Signed) Final Date:      02 July 2022                 16:47    DX-HIP-COMPLETE - UNILATERAL 2+ RIGHT    Result Date: 6/13/2022  X-ray of the right hip with 2 views consisting of AP and lateral views on 6/13/2022 demonstrates severe joint space narrowing with bone-on-bone osteoarthritic changes and particular osteophyte formation      ASSESSMENT/PLAN:     71 y.o.  admitted 7/1/2022. Pt has a past medical history of HFpEF, HTN, A fib on Eliquis, heart disease status post pacemaker, V. tach status post AICD and CKD . Osteoarthritis and was admitted for R BONI.  Blood cultures drawn due to leukocytosis which are now positive. Given positive blood cultures and worsening of his right hip pain over the last few weeks. Orthopedics saw the patient on 7/2 and are recommending aspiration of the hip and then potential washout depending on results.  However, no pocket to drain per IR.  Patient states his hip pain is ongoing.  CT chest abdomen pelvis with no evidence of infection.    Problem List    Bacteremia, unclear source, he does have some wounds on his upper arms but no obvious infection.  CTs with no intra-abdominal infection.  Denies any dysuria.  -Blood cultures on 7/1 and 7/2 + possible Streptococcus species   -TTE was poor study, no vegetations noted, no significant valvular disease  Acute on chronic right hip pain and concern for septic arthritis of the right hip  -Orthopedics are aware and have recommended aspiration of the right hip  Severe osteoarthritis of both glenohumeral and right hip joint noted on CT  History of heart disease and status post pacemaker  History of V. tach and status post AICD  A. fib on Eliquis  Mass noted in right upper lobe and PET scan recommended, defer to primary team  Antibiotic allergies: Sulfa drugs reported as rash    Plan     --- Concerned that the possible Streptococcus species may actually be Enterococcus so will continue Unasyn for now while awaiting speciation and susceptibilities  --- Follow-up blood  cultures  --- Blood cultures now positive x2 days in the setting of ICD so will recommend KIM  --- Ortho following and will defer to them regarding whether to open the hip for washout, per team no fluid available for aspiration, could consider dedicated hip CT, unable to do MRI due to implant  --- Monitor labs    Dispo: To be determined -still following up blood cultures, will need KIM and decisions on surgery  PICC: To be determined      Plan of care discussed with IM Daniel Gibbons M.D.. Will continue to follow    Brittany Fisher M.D.

## 2022-07-03 NOTE — ASSESSMENT & PLAN NOTE
Blood culture 7/1/2022 +gram positive cocci  ID involved.  Patient changed from rocephin to unasyn per ID.   There was initial concern for septic arthritis in right hip.  Patient unable to have MRI due to implanted cardiac device.  CT chest abdomen pelvis was obtained.  Negative for infection in all cavities, osteoarthritis noted in bilateral hips.  Ultrasound of right hip negative for effusion.  Unlikely to be septic arthritis per orthopedics due to absence of effusion.  ID recommends KIM due to s/p implanted devices.  Cardiology consulted.

## 2022-07-03 NOTE — PROGRESS NOTES
Abby from Lab called with critical result of gram positive cocci in chains at 0316. Critical lab result read back to Abby.   Dr. Diaz notified of critical lab result at 0317.  Critical lab result read back by Dr. Diaz .

## 2022-07-04 PROBLEM — K59.00 CONSTIPATION: Status: ACTIVE | Noted: 2022-07-04

## 2022-07-04 PROBLEM — A41.9 SEPSIS (HCC): Status: RESOLVED | Noted: 2022-07-04 | Resolved: 2022-07-04

## 2022-07-04 PROBLEM — A41.9 SEPSIS (HCC): Status: ACTIVE | Noted: 2022-07-04

## 2022-07-04 PROBLEM — R91.1 LUNG NODULE SEEN ON IMAGING STUDY: Status: ACTIVE | Noted: 2022-07-04

## 2022-07-04 PROBLEM — M25.551 RIGHT HIP PAIN: Status: ACTIVE | Noted: 2022-07-04

## 2022-07-04 PROBLEM — R73.9 HYPERGLYCEMIA: Status: ACTIVE | Noted: 2022-07-04

## 2022-07-04 LAB
ANION GAP SERPL CALC-SCNC: 12 MMOL/L (ref 7–16)
BACTERIA BLD CULT: ABNORMAL
BUN SERPL-MCNC: 28 MG/DL (ref 8–22)
CALCIUM SERPL-MCNC: 9.2 MG/DL (ref 8.5–10.5)
CHLORIDE SERPL-SCNC: 103 MMOL/L (ref 96–112)
CO2 SERPL-SCNC: 24 MMOL/L (ref 20–33)
CREAT SERPL-MCNC: 1.07 MG/DL (ref 0.5–1.4)
ERYTHROCYTE [DISTWIDTH] IN BLOOD BY AUTOMATED COUNT: 52.5 FL (ref 35.9–50)
EST. AVERAGE GLUCOSE BLD GHB EST-MCNC: 117 MG/DL
FERRITIN SERPL-MCNC: 524 NG/ML (ref 22–322)
FOLATE SERPL-MCNC: 16.4 NG/ML
GFR SERPLBLD CREATININE-BSD FMLA CKD-EPI: 73 ML/MIN/1.73 M 2
GLUCOSE BLD STRIP.AUTO-MCNC: 123 MG/DL (ref 65–99)
GLUCOSE SERPL-MCNC: 132 MG/DL (ref 65–99)
HBA1C MFR BLD: 5.7 % (ref 4–5.6)
HCT VFR BLD AUTO: 29.9 % (ref 42–52)
HGB BLD-MCNC: 9.9 G/DL (ref 14–18)
HGB RETIC QN AUTO: 35.6 PG/CELL (ref 29–35)
IMM RETICS NFR: 22.4 % (ref 9.3–17.4)
IRON SATN MFR SERPL: 16 % (ref 15–55)
IRON SERPL-MCNC: 32 UG/DL (ref 50–180)
MAGNESIUM SERPL-MCNC: 2.2 MG/DL (ref 1.5–2.5)
MCH RBC QN AUTO: 34.7 PG (ref 27–33)
MCHC RBC AUTO-ENTMCNC: 33.1 G/DL (ref 33.7–35.3)
MCV RBC AUTO: 104.9 FL (ref 81.4–97.8)
PHOSPHATE SERPL-MCNC: 3.1 MG/DL (ref 2.5–4.5)
PLATELET # BLD AUTO: 96 K/UL (ref 164–446)
PMV BLD AUTO: 11.1 FL (ref 9–12.9)
POTASSIUM SERPL-SCNC: 4.4 MMOL/L (ref 3.6–5.5)
RBC # BLD AUTO: 2.85 M/UL (ref 4.7–6.1)
RETICS # AUTO: 0.12 M/UL (ref 0.04–0.06)
RETICS/RBC NFR: 4.2 % (ref 0.8–2.1)
SIGNIFICANT IND 70042: ABNORMAL
SIGNIFICANT IND 70042: ABNORMAL
SITE SITE: ABNORMAL
SITE SITE: ABNORMAL
SODIUM SERPL-SCNC: 139 MMOL/L (ref 135–145)
SOURCE SOURCE: ABNORMAL
SOURCE SOURCE: ABNORMAL
TIBC SERPL-MCNC: 199 UG/DL (ref 250–450)
UIBC SERPL-MCNC: 167 UG/DL (ref 110–370)
VIT B12 SERPL-MCNC: 775 PG/ML (ref 211–911)
WBC # BLD AUTO: 7.1 K/UL (ref 4.8–10.8)

## 2022-07-04 PROCEDURE — 99232 SBSQ HOSP IP/OBS MODERATE 35: CPT | Performed by: INTERNAL MEDICINE

## 2022-07-04 PROCEDURE — A9270 NON-COVERED ITEM OR SERVICE: HCPCS | Performed by: INTERNAL MEDICINE

## 2022-07-04 PROCEDURE — 82962 GLUCOSE BLOOD TEST: CPT

## 2022-07-04 PROCEDURE — 97166 OT EVAL MOD COMPLEX 45 MIN: CPT

## 2022-07-04 PROCEDURE — 700102 HCHG RX REV CODE 250 W/ 637 OVERRIDE(OP): Performed by: HOSPITALIST

## 2022-07-04 PROCEDURE — 700102 HCHG RX REV CODE 250 W/ 637 OVERRIDE(OP): Performed by: INTERNAL MEDICINE

## 2022-07-04 PROCEDURE — 99233 SBSQ HOSP IP/OBS HIGH 50: CPT | Performed by: HOSPITALIST

## 2022-07-04 PROCEDURE — 82746 ASSAY OF FOLIC ACID SERUM: CPT

## 2022-07-04 PROCEDURE — 700111 HCHG RX REV CODE 636 W/ 250 OVERRIDE (IP): Performed by: INTERNAL MEDICINE

## 2022-07-04 PROCEDURE — 83550 IRON BINDING TEST: CPT

## 2022-07-04 PROCEDURE — 97535 SELF CARE MNGMENT TRAINING: CPT

## 2022-07-04 PROCEDURE — 700101 HCHG RX REV CODE 250: Performed by: HOSPITALIST

## 2022-07-04 PROCEDURE — 700101 HCHG RX REV CODE 250: Performed by: INTERNAL MEDICINE

## 2022-07-04 PROCEDURE — 82728 ASSAY OF FERRITIN: CPT

## 2022-07-04 PROCEDURE — 36415 COLL VENOUS BLD VENIPUNCTURE: CPT

## 2022-07-04 PROCEDURE — 82607 VITAMIN B-12: CPT

## 2022-07-04 PROCEDURE — A9270 NON-COVERED ITEM OR SERVICE: HCPCS | Performed by: STUDENT IN AN ORGANIZED HEALTH CARE EDUCATION/TRAINING PROGRAM

## 2022-07-04 PROCEDURE — 85046 RETICYTE/HGB CONCENTRATE: CPT

## 2022-07-04 PROCEDURE — 85027 COMPLETE CBC AUTOMATED: CPT

## 2022-07-04 PROCEDURE — 83735 ASSAY OF MAGNESIUM: CPT

## 2022-07-04 PROCEDURE — 700102 HCHG RX REV CODE 250 W/ 637 OVERRIDE(OP): Performed by: STUDENT IN AN ORGANIZED HEALTH CARE EDUCATION/TRAINING PROGRAM

## 2022-07-04 PROCEDURE — 770020 HCHG ROOM/CARE - TELE (206)

## 2022-07-04 PROCEDURE — 83036 HEMOGLOBIN GLYCOSYLATED A1C: CPT

## 2022-07-04 PROCEDURE — 97163 PT EVAL HIGH COMPLEX 45 MIN: CPT

## 2022-07-04 PROCEDURE — A9270 NON-COVERED ITEM OR SERVICE: HCPCS | Performed by: HOSPITALIST

## 2022-07-04 PROCEDURE — 700105 HCHG RX REV CODE 258: Performed by: INTERNAL MEDICINE

## 2022-07-04 PROCEDURE — 84100 ASSAY OF PHOSPHORUS: CPT

## 2022-07-04 PROCEDURE — 83540 ASSAY OF IRON: CPT

## 2022-07-04 PROCEDURE — 80048 BASIC METABOLIC PNL TOTAL CA: CPT

## 2022-07-04 RX ORDER — LIDOCAINE 50 MG/G
1 PATCH TOPICAL EVERY 24 HOURS
Status: DISCONTINUED | OUTPATIENT
Start: 2022-07-04 | End: 2022-07-11 | Stop reason: HOSPADM

## 2022-07-04 RX ORDER — DOCUSATE SODIUM 100 MG/1
100 CAPSULE, LIQUID FILLED ORAL 2 TIMES DAILY
Status: DISCONTINUED | OUTPATIENT
Start: 2022-07-04 | End: 2022-07-06

## 2022-07-04 RX ORDER — HYDROMORPHONE HYDROCHLORIDE 1 MG/ML
0.5 INJECTION, SOLUTION INTRAMUSCULAR; INTRAVENOUS; SUBCUTANEOUS
Status: DISCONTINUED | OUTPATIENT
Start: 2022-07-04 | End: 2022-07-06

## 2022-07-04 RX ORDER — ACETAMINOPHEN 500 MG
1000 TABLET ORAL 3 TIMES DAILY
Status: DISCONTINUED | OUTPATIENT
Start: 2022-07-04 | End: 2022-07-06

## 2022-07-04 RX ORDER — ACETAMINOPHEN 500 MG
500 TABLET ORAL EVERY 6 HOURS PRN
Status: DISCONTINUED | OUTPATIENT
Start: 2022-07-04 | End: 2022-07-11 | Stop reason: HOSPADM

## 2022-07-04 RX ORDER — OXYCODONE HYDROCHLORIDE 5 MG/1
2.5 TABLET ORAL
Status: DISCONTINUED | OUTPATIENT
Start: 2022-07-04 | End: 2022-07-06

## 2022-07-04 RX ORDER — LACTULOSE 20 G/30ML
30 SOLUTION ORAL
Status: DISCONTINUED | OUTPATIENT
Start: 2022-07-04 | End: 2022-07-06

## 2022-07-04 RX ORDER — OXYCODONE HYDROCHLORIDE 5 MG/1
5-10 TABLET ORAL
Status: DISCONTINUED | OUTPATIENT
Start: 2022-07-04 | End: 2022-07-04

## 2022-07-04 RX ORDER — METHOCARBAMOL 500 MG/1
500 TABLET, FILM COATED ORAL 4 TIMES DAILY
Status: DISCONTINUED | OUTPATIENT
Start: 2022-07-04 | End: 2022-07-04

## 2022-07-04 RX ADMIN — AMPICILLIN SODIUM 2000 MG: 2 INJECTION, POWDER, FOR SOLUTION INTRAVENOUS at 16:39

## 2022-07-04 RX ADMIN — SOTALOL HYDROCHLORIDE 120 MG: 80 TABLET ORAL at 08:03

## 2022-07-04 RX ADMIN — POLYETHYLENE GLYCOL 3350 1 PACKET: 17 POWDER, FOR SOLUTION ORAL at 16:37

## 2022-07-04 RX ADMIN — HYDROCODONE BITARTRATE AND ACETAMINOPHEN 1 TABLET: 10; 325 TABLET ORAL at 08:05

## 2022-07-04 RX ADMIN — WATER 2 G: 1000 INJECTION, SOLUTION INTRAVENOUS at 16:34

## 2022-07-04 RX ADMIN — AMPICILLIN SODIUM 2000 MG: 2 INJECTION, POWDER, FOR SOLUTION INTRAVENOUS at 23:16

## 2022-07-04 RX ADMIN — OMEPRAZOLE 20 MG: 20 CAPSULE, DELAYED RELEASE ORAL at 08:05

## 2022-07-04 RX ADMIN — ACETAMINOPHEN 1000 MG: 500 TABLET ORAL at 16:32

## 2022-07-04 RX ADMIN — AMPICILLIN SODIUM 2000 MG: 2 INJECTION, POWDER, FOR SOLUTION INTRAVENOUS at 19:29

## 2022-07-04 RX ADMIN — SENNOSIDES AND DOCUSATE SODIUM 2 TABLET: 50; 8.6 TABLET ORAL at 16:51

## 2022-07-04 RX ADMIN — LIDOCAINE 1 PATCH: 50 PATCH TOPICAL at 10:40

## 2022-07-04 RX ADMIN — DOCUSATE SODIUM 100 MG: 100 CAPSULE, LIQUID FILLED ORAL at 16:31

## 2022-07-04 RX ADMIN — ACETAMINOPHEN 1000 MG: 500 TABLET ORAL at 11:40

## 2022-07-04 RX ADMIN — APIXABAN 5 MG: 5 TABLET, FILM COATED ORAL at 08:04

## 2022-07-04 RX ADMIN — SODIUM CHLORIDE 3 G: 900 INJECTION INTRAVENOUS at 04:46

## 2022-07-04 RX ADMIN — OXYCODONE 5 MG: 5 TABLET ORAL at 11:40

## 2022-07-04 RX ADMIN — VALSARTAN 80 MG: 80 TABLET, FILM COATED ORAL at 16:28

## 2022-07-04 RX ADMIN — SODIUM CHLORIDE 3 G: 900 INJECTION INTRAVENOUS at 11:40

## 2022-07-04 RX ADMIN — VALSARTAN 80 MG: 80 TABLET, FILM COATED ORAL at 08:06

## 2022-07-04 ASSESSMENT — LIFESTYLE VARIABLES
TOTAL SCORE: 0
ALCOHOL_USE: YES
TOTAL SCORE: 0
EVER HAD A DRINK FIRST THING IN THE MORNING TO STEADY YOUR NERVES TO GET RID OF A HANGOVER: NO
CONSUMPTION TOTAL: POSITIVE
EVER FELT BAD OR GUILTY ABOUT YOUR DRINKING: NO
HAVE PEOPLE ANNOYED YOU BY CRITICIZING YOUR DRINKING: NO
ON A TYPICAL DAY WHEN YOU DRINK ALCOHOL HOW MANY DRINKS DO YOU HAVE: 4
AVERAGE NUMBER OF DAYS PER WEEK YOU HAVE A DRINK CONTAINING ALCOHOL: 6
HAVE YOU EVER FELT YOU SHOULD CUT DOWN ON YOUR DRINKING: NO
HOW MANY TIMES IN THE PAST YEAR HAVE YOU HAD 5 OR MORE DRINKS IN A DAY: 2
DOES PATIENT WANT TO STOP DRINKING: NO
TOTAL SCORE: 0

## 2022-07-04 ASSESSMENT — ENCOUNTER SYMPTOMS
DOUBLE VISION: 0
DIARRHEA: 0
SORE THROAT: 0
COUGH: 0
CONSTIPATION: 0
BLURRED VISION: 0
PALPITATIONS: 0
VOMITING: 0
SHORTNESS OF BREATH: 0
MYALGIAS: 1
NERVOUS/ANXIOUS: 0
ABDOMINAL PAIN: 0
CHILLS: 0
NAUSEA: 0
FEVER: 0

## 2022-07-04 ASSESSMENT — COGNITIVE AND FUNCTIONAL STATUS - GENERAL
SUGGESTED CMS G CODE MODIFIER DAILY ACTIVITY: CK
CLIMB 3 TO 5 STEPS WITH RAILING: A LOT
DAILY ACTIVITIY SCORE: 18
TOILETING: A LOT
DRESSING REGULAR LOWER BODY CLOTHING: A LOT
TURNING FROM BACK TO SIDE WHILE IN FLAT BAD: A LOT
WALKING IN HOSPITAL ROOM: A LITTLE
MOVING TO AND FROM BED TO CHAIR: A LOT
SUGGESTED CMS G CODE MODIFIER MOBILITY: CL
HELP NEEDED FOR BATHING: A LOT
MOVING FROM LYING ON BACK TO SITTING ON SIDE OF FLAT BED: A LOT
MOBILITY SCORE: 13
STANDING UP FROM CHAIR USING ARMS: A LOT

## 2022-07-04 ASSESSMENT — ACTIVITIES OF DAILY LIVING (ADL): TOILETING: INDEPENDENT

## 2022-07-04 ASSESSMENT — GAIT ASSESSMENTS
ASSISTIVE DEVICE: FRONT WHEEL WALKER
DISTANCE (FEET): 3
GAIT LEVEL OF ASSIST: MINIMAL ASSIST
DEVIATION: DECREASED BASE OF SUPPORT

## 2022-07-04 ASSESSMENT — PAIN DESCRIPTION - PAIN TYPE
TYPE: ACUTE PAIN
TYPE: ACUTE PAIN;CHRONIC PAIN
TYPE: ACUTE PAIN

## 2022-07-04 ASSESSMENT — FIBROSIS 4 INDEX: FIB4 SCORE: 4.56

## 2022-07-04 NOTE — ASSESSMENT & PLAN NOTE
Likely occult gastrointestinal source. Negative KIM. Unlikely septic arthritis based on negative subsequent BCx.  BCx from 7/1 and 7/2 growing gram positive enterococcus faecalis, sensitive to ampicillin. Subsequent BCx negative - ID following  Switched Ampicillin 2,000 mg IV to Linezolid 600 mg PO BID, planned stop date 7/17  Will need outpatient colonoscopy

## 2022-07-04 NOTE — ASSESSMENT & PLAN NOTE
Multifactorial, likely secondary to BM from alcohol and B12 deficiency  Continue B12  F/u occult blood stool

## 2022-07-04 NOTE — PROGRESS NOTES
4 Eyes Skin Assessment Completed by BETTY Abreu and BETTY Jackson.    Head WDL  Ears WDL  Nose WDL  Mouth WDL  Neck WDL  Breast/Chest WDL  Shoulder Blades WDL  Spine WDL  (R) Arm/Elbow/Hand Bruising and Discoloration  (L) Arm/Elbow/Hand Bruising, Abrasion and Scab, skin tear  Abdomen WDL  Groin WDL  Scrotum/Coccyx/Buttocks Excoriation, L buttock skin tear  (R) Leg WDL   (L) Leg Edema  (R) Heel/Foot/Toe Edema  (L) Heel/Foot/Toe Edema                Devices In Places Tele Box, Blood Pressure Cuff and Pulse Ox      Interventions In Place Pillows and Q2 Turns    Possible Skin Injury Yes    Pictures Uploaded Into Epic Yes  Wound Consult Placed N/A  RN Wound Prevention Protocol Ordered No

## 2022-07-04 NOTE — H&P
Hospital Medicine History & Physical Note    Date of Service  7/3/22    Primary Care Physician  Hipolito Hall M.D.    Consultants  orthopedics    Specialist Names: Dr. Hargrove    Code Status  DNAR/DNI    Chief Complaint  No chief complaint on file.      History of Presenting Illness  Mike Ferrell is a 71 y.o. male who presented 7/3/2022 as a transfer from [facility] with bacteremia requiring KIM.    Past medical history of HFpEF, hypertension, atrial fibrillation on Eliquis, HLD, CHB s/p PPM, VT s/p AICD, CKD, alcohol abuse who presented 7/1/2022 with right hip pain and leukocytosis.    On 7/2/2022, blood cultures were + for gram-positive cocci.  ID was consulted and patient was started on Unasyn per infectious disease.  There was initial concern for septic arthritis to the patient's right hip due to worsening pain and warmth.  Patient was unable to undergo MRI due to implanted cardiac device.    CT chest abdomen pelvis showed severe osteoarthritis in both glenohumeral joint and right hip joint, 16mm x 7mm mass in the central right upper lobe which could indicate postinflammatory scarring or neoplasm, no evidence of infection within the chest, abdomen, pelvis.    Right hip ultrasound was negative for effusion.  This was discussed with orthopedics who states that in the absence of effusion, patient is unlikely to have septic arthritis, and to explore other source of infection.      In the setting of ICD, infectious disease recommended KIM.  This was discussed with cardiology who recommended patient be transferred to [facility] for electrophysiology to see and weigh in on the permanent pacemaker in addition to KIM.    A tentative total right hip arthroplasty scheduled for 7/5/2022 with Dr. Hargrove.     At bedside he is awake and alert oriented x3, comfortable lying supine and conversational.  Complains of several days of constipation.      I discussed the plan of care with patient and  bedside RN.    Review of Systems  Review of Systems   Constitutional: Negative for fever, malaise/fatigue and weight loss.   HENT: Negative for sore throat and tinnitus.    Eyes: Negative for blurred vision and double vision.   Respiratory: Negative for cough, hemoptysis and stridor.    Cardiovascular: Negative for chest pain and palpitations.   Gastrointestinal: Negative for nausea and vomiting.   Genitourinary: Negative for dysuria and urgency.   Musculoskeletal: Negative for myalgias and neck pain.   Skin: Negative for itching and rash.   Neurological: Negative for dizziness and headaches.   Endo/Heme/Allergies: Does not bruise/bleed easily.   Psychiatric/Behavioral: Negative for depression. The patient does not have insomnia.        Past Medical History   has a past medical history of AICD (automatic cardioverter/defibrillator) present, Arthritis, Atrial fibrillation (HCC) (04/04/2012), Bowel habit changes, Breath shortness, Cataract (12/15/2021), Chronic back pain, Congestive heart failure (HCC), COVID-19 (09/2021), Dental disorder, Heart burn, High cholesterol, Hypertension, Methamphetamine use (HCC) (none for many years), Pacemaker, Pain (12/15/2021), Paroxysmal ventricular tachycardia (HCC) (04/04/2012), Pneumonia (2000), Sinoatrial node dysfunction (HCC) (04/04/2012), Snoring, and Syncope and collapse (04/04/2012).    Surgical History   has a past surgical history that includes cholecystectomy (01/01/1999); pacemaker insertion (01/01/2009); aicd implant (01/01/2010); tonsillectomy (01/01/1953); cataract extraction with iol; and aicd implant (2017).     Family History  Family history reviewed with patient. There is no family history that is pertinent to the chief complaint.     Social History   reports that he has never smoked. He has never used smokeless tobacco. He reports current alcohol use. He reports previous drug use.  Habitual four mixed drinks daily    Allergies  Allergies   Allergen Reactions   •  Atorvastatin Rash, Itching and Myalgia         • Statins [Hmg-Coa-R Inhibitors] Rash and Itching     Skin gets red & itchy   • Sulfa Drugs Rash and Itching     Rash, itch       Medications  Prior to Admission Medications   Prescriptions Last Dose Informant Patient Reported? Taking?   Cholecalciferol (VITAMIN D3) 5000 units Cap  Family Member Yes No   Sig: Take 5,000 Units by mouth every morning before breakfast.   HYDROcodone/acetaminophen (NORCO)  MG Tab  Family Member Yes No   Sig: Take 1 Tablet by mouth every four hours as needed for Moderate Pain. Indications: Pain   apixaban (ELIQUIS) 5mg Tab  Family Member Yes No   Sig: Take 5 mg by mouth 2 times a day.   colesevelam (WELCHOL) 625 MG Tab  Family Member Yes No   Sig: Take 625 mg by mouth every evening.   omeprazole (PRILOSEC) 20 MG delayed-release capsule   No No   Sig: Take 1 Capsule by mouth every morning before breakfast.   potassium chloride SA (KDUR) 20 MEQ Tab CR  Family Member Yes No   Sig: Take 20 mEq by mouth every day.   sotalol (BETAPACE) 120 MG tablet  Family Member Yes No   Sig: Take 120 mg by mouth every morning.   torsemide (DEMADEX) 20 MG Tab  Family Member Yes No   Sig: Take 20 mg by mouth every day.   valsartan (DIOVAN) 80 MG Tab  Family Member No No   Sig: Take 1 Tablet by mouth 2 times a day.      Facility-Administered Medications: None       Physical Exam  Temp:  [36.2 °C (97.2 °F)-37 °C (98.6 °F)] 36.2 °C (97.2 °F)  Pulse:  [70-81] 81  Resp:  [16-18] 18  BP: (128-153)/(82-96) 153/96  SpO2:  [93 %-96 %] 96 %                          Physical Exam  Vitals and nursing note reviewed.   Constitutional:       General: He is not in acute distress.     Appearance: Normal appearance. He is normal weight. He is not toxic-appearing.   HENT:      Head: Normocephalic and atraumatic.      Nose: Nose normal. No congestion or rhinorrhea.      Mouth/Throat:      Mouth: Mucous membranes are moist.      Pharynx: Oropharynx is clear.   Eyes:       Extraocular Movements: Extraocular movements intact.      Conjunctiva/sclera: Conjunctivae normal.      Pupils: Pupils are equal, round, and reactive to light.   Neck:      Vascular: No carotid bruit.   Cardiovascular:      Rate and Rhythm: Normal rate and regular rhythm.      Pulses: Normal pulses.      Heart sounds: Normal heart sounds. No murmur heard.    No gallop.      Comments: Pacemaker pocket without inflammation or pain  Pulmonary:      Effort: No respiratory distress.      Breath sounds: Normal breath sounds. No wheezing or rales.   Abdominal:      General: Abdomen is flat. Bowel sounds are normal. There is distension.      Palpations: Abdomen is soft. There is no mass.      Tenderness: There is no abdominal tenderness. There is no guarding or rebound.      Hernia: No hernia is present.   Musculoskeletal:         General: No tenderness or signs of injury.      Cervical back: Normal range of motion and neck supple. No muscular tenderness.   Lymphadenopathy:      Cervical: No cervical adenopathy.   Skin:     Capillary Refill: Capillary refill takes less than 2 seconds.      Coloration: Skin is not jaundiced or pale.      Findings: No bruising.   Neurological:      General: No focal deficit present.      Mental Status: He is alert and oriented to person, place, and time. Mental status is at baseline.      Cranial Nerves: No cranial nerve deficit.      Motor: No weakness.      Coordination: Coordination normal.   Psychiatric:         Mood and Affect: Mood normal.         Thought Content: Thought content normal.         Judgment: Judgment normal.         Laboratory:  Recent Labs     07/01/22  1217 07/02/22  0759 07/03/22  0205   WBC 7.0 5.7 6.2   RBC 3.03* 2.89* 3.00*   HEMOGLOBIN 10.6* 9.9* 10.4*   HEMATOCRIT 31.6* 29.6* 31.5*   .3* 102.4* 105.0*   MCH 35.0* 34.3* 34.7*   MCHC 33.5* 33.4* 33.0*   RDW 52.3* 50.2* 51.7*   PLATELETCT 78* 76* 80*   MPV 11.2 10.6 11.6     Recent Labs     07/02/22  1467  07/02/22  1353 07/03/22  0205   SODIUM 134* 137 137   POTASSIUM 4.3 4.6 4.6   CHLORIDE 97 100 100   CO2 24 25 23   GLUCOSE 128* 148* 122*   BUN 47* 41* 33*   CREATININE 1.52* 1.48* 1.15   CALCIUM 8.7 8.9 9.2     Recent Labs     07/01/22  1217 07/02/22  0447 07/02/22  1353 07/03/22  0205   ALTSGPT 16  --   --   --    ASTSGOT 24  --   --   --    ALKPHOSPHAT 85  --   --   --    TBILIRUBIN 1.0  --   --   --    GLUCOSE 126* 128* 148* 122*     Recent Labs     07/01/22  1217   APTT 36.0   INR 1.38*     Recent Labs     07/01/22  1217   NTPROBNP 2387*         Recent Labs     07/01/22  1217   TROPONINT 67*       Imaging:  EC-KIM W/O CONT    (Results Pending)       X-Ray:  I have personally reviewed the images and compared with prior images.    Assessment/Plan:  Justification for Admission Status  I anticipate this patient will require at least two midnights for appropriate medical management, necessitating inpatient admission because Persistent bacteremia complicated by acute alcohol withdrawal delirium    * Bacteremia due to Staphylococcus aureus- (present on admission)  Assessment & Plan  Continue Unasyn, follow-up KIM, CBC and repeat blood cultures    Hyperglycemia  Assessment & Plan  Likely undiagnosed diabetes.  Regular insulin sliding scale before every meal as needed, follow-up A1c    Constipation  Assessment & Plan  Colace and Fleet enema as needed    Anemia  Assessment & Plan  Likely multifactorial, follow-up anemia labs    Alcohol abuse- (present on admission)  Assessment & Plan  Without current evidence of withdrawal continue thiamine    Paroxysmal ventricular tachycardia (HCC)- (present on admission)  Assessment & Plan  Compensated, continue sotalol and Eliquis      VTE prophylaxis: therapeutic anticoagulation with Eliquis

## 2022-07-04 NOTE — PROGRESS NOTES
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 h/o of afib (on apixiban), CHB/VT s/p PPM/AICD, severe polyarthralgia's 2/2 OA p/w increasing right hip pain found to have strep bacteremia    #  strep bacteremia    - patient p/f increasing right hip pain with diffiuclty getting out of bed  - bld cx 7/1 and 7/2 + strep, pending speciation  - bld cx 7/3 NGTD  - TTE 7/2  no veg, plan for KIM given his pacemaker   - clinically main complaint increasing right hip pain, has limited active range of motion, would benefit from arthro r/o septic joint  - ID, ortho consulted  - on unasyn    # Afib  - chadvas score 1, low risk  - does not need periop bridging, will hold in event needs proceudres    # Lung nodule  - incidentally noted 1.6 cm right lung nodule --> outpt PET

## 2022-07-04 NOTE — ASSESSMENT & PLAN NOTE
Incidental finding on CT   Possibly due to previous inflammation from Valley Fever infection  [facility]. Referral placed by Dr. Chaparro for outpatient PET CT

## 2022-07-04 NOTE — PROGRESS NOTES
Brief cardiology note:    Patient transferred from [facility] for KIM for strep bacteremia.  Recommend n.p.o. after midnight with tentative plan for KIM tomorrow morning, if schedule allows.  Primary team updated.    Carlos Tillman M.D.

## 2022-07-04 NOTE — PROGRESS NOTES
Daily Progress Note:     Date of Service: 7/4/2022  Primary Team: [facility] IM Blue Team   Attending: Caro Goddard M.D.   Senior Resident: Dr. Micheline Camacho MD  Intern: Dr. Eun Foss MD  Contact:  [phone]    Patient Identifier:   Patient is a 70 yo mal with chief complaint of right hip pain, PMH of HFpEF, HTN, A. Fib s/p pacemaker on Eliquis, VT s/p AICD, HLD, CKD, and alcohol abuse, transferred from [facility] for management of bacteremia requiring KIM with tentative total right hip arthroplasty tomorrow 07/05 with Dr. Hargrove.    Subjective:  Patient states he has been experience bilateral hip pain, with progressively worsening right hip pain in the last two weeks. Prior to hospital admission, patient states he was able to walk around with assistance of a walker. Patient has intermittently quit alcohol, his last drink was before this hospital admission 4 days ago. He reports hallucinating a large gorilla walking into his room last night. Patient states his pain is unbearable and worsens with any activity, mildly alleviated with Norco but says his mobility is still extremely limited. He mentions he was previously hospitalized for Valley Fever in [place].     Interval Update:  07/04: Patient scheduled for KIM today.     Consultants/Specialty:  Ortho  Cardio  ID    Review of Systems:  Review of Systems   Constitutional: Negative for chills and fever.   HENT: Negative for congestion and sore throat.    Eyes: Negative for blurred vision and double vision.   Respiratory: Negative for cough and shortness of breath.    Cardiovascular: Positive for leg swelling. Negative for chest pain and palpitations.   Gastrointestinal: Negative for abdominal pain, nausea and vomiting.   Genitourinary: Negative for dysuria and urgency.   Musculoskeletal: Positive for joint pain.       Objective:   Vitals:   Temp:  [36.2 °C (97.2 °F)-36.9 °C (98.4 °F)] 36.6 °C (97.9 °F)  Pulse:  [70-86] 70  Resp:  [16-18] 16  BP:  (128-155)/(80-98) 128/80  SpO2:  [94 %-96 %] 94 %    Physical Exam  Constitutional:       Appearance: He is obese.   HENT:      Head: Normocephalic and atraumatic.   Eyes:      Conjunctiva/sclera: Conjunctivae normal.      Pupils: Pupils are equal, round, and reactive to light.   Cardiovascular:      Rate and Rhythm: Normal rate.      Pulses: Normal pulses.   Pulmonary:      Effort: Pulmonary effort is normal.      Breath sounds: Normal breath sounds.   Abdominal:      General: Abdomen is flat. There is no distension.      Palpations: Abdomen is soft.   Musculoskeletal:         General: Tenderness present.      Right lower leg: Edema present.      Left lower leg: Edema present.   Skin:     General: Skin is warm and dry.      Findings: Bruising present.   Neurological:      Mental Status: He is alert.   Psychiatric:         Mood and Affect: Mood normal.         Behavior: Behavior normal.           Labs:   Lab Results   Component Value Date/Time    WBC 7.1 07/04/2022 02:28 AM    RBC 2.85 (L) 07/04/2022 02:28 AM    HEMOGLOBIN 9.9 (L) 07/04/2022 02:28 AM    HEMATOCRIT 29.9 (L) 07/04/2022 02:28 AM    .9 (H) 07/04/2022 02:28 AM    MCH 34.7 (H) 07/04/2022 02:28 AM    MCHC 33.1 (L) 07/04/2022 02:28 AM    MPV 11.1 07/04/2022 02:28 AM    NEUTSPOLYS 82.90 (H) 07/01/2022 12:17 PM    LYMPHOCYTES 6.90 (L) 07/01/2022 12:17 PM    MONOCYTES 9.20 07/01/2022 12:17 PM    EOSINOPHILS 0.10 07/01/2022 12:17 PM    BASOPHILS 0.00 07/01/2022 12:17 PM        Lab Results   Component Value Date/Time    SODIUM 139 07/04/2022 02:28 AM    POTASSIUM 4.4 07/04/2022 02:28 AM    CHLORIDE 103 07/04/2022 02:28 AM    CO2 24 07/04/2022 02:28 AM    GLUCOSE 132 (H) 07/04/2022 02:28 AM    BUN 28 (H) 07/04/2022 02:28 AM    CREATININE 1.07 07/04/2022 02:28 AM    BUNCREATRAT 25 (H) 05/30/2014 09:04 AM        No results found for: AQEIG88H, LGCYWT330I, HYTXP069G, ARTHCO3, ARTBE, GAPBG, VCQ7GIQ6    Lab Results   Component Value Date/Time    PROTHROMBTM  16.3 (H) 07/01/2022 12:17 PM    INR 1.38 (H) 07/01/2022 12:17 PM        Imaging:   EC-KIM W/O CONT    (Results Pending)       Assessment and Plan:  Patient is a 70 yo mal with chief complaint of right hip pain, PMH of HFpEF, HTN, A. Fib s/p pacemaker on Eliquis, VT s/p AICD, HLD, CKD, and alcohol abuse, transferred from [facility] for management of bacteremia requiring KIM with tentative total right hip arthroplasty tomorrow 07/05 with Dr. Hargrove.    * Bacteremia- (present on admission)  Assessment & Plan   unclear source at this time, ?endocartitis vs septic hip   BC from 7/1 and 7/2 growing gram possitive enterococcus faecalis, sensitive to ampicillin- ID following, Amp + C3 to cover for Enterrococus endocarditis  Planned stop date 7/11    Cardiology consulted for KIM   Ortho following,?procedure         Lung nodule seen on imaging study  Assessment & Plan  Incidental finding on CT   Possibly due to previous inflammation from Valley Fever infection  Follow up outpatient     Right hip pain  Assessment & Plan  Need to rule out septic arthritis, ortho consulting, pending procedure. Dr. Hargrove will see tomorrow   NPO at midnight   Continue scheduled tylenol oxycodone 2.5 mg Q3 PRN + Dilaudid 0.5mg PRN for pain control, titrate as needed    Hyperglycemia  Assessment & Plan  A1C 5.7, no need for insulin   Follow up outpatient for diabetes prevention     Constipation  Assessment & Plan  Suspect from chronic opioid use  Colace and Fleet enema as needed    Chronic diastolic congestive heart failure, NYHA class 3 (HCC)- (present on admission)  Assessment & Plan  Continue valsartan      Anemia  Assessment & Plan  Multifactorial, likely secondary to BM from alcohol and B12 deficiency  Continue with B12, folate, thiamine     Alcohol abuse- (present on admission)  Assessment & Plan  Continue thiamine  Monitor for withdrawal  Last drink prior to admission on 06/30    Paroxysmal ventricular tachycardia (HCC)- (present on  admission)  Assessment & Plan  Continue sotalol   Hold Eliquis in anticipation of ortho procedure      Code Status: Full code  DVT prophylaxis: None  Diet: Regular  GI: Colace, Omeprazole, Senna-docusate, Miralax, Dulculax   Disposition: pending evaluation by cardiology and ortho    Eun Foss MD  PGY-1 Internal Medicine

## 2022-07-04 NOTE — ASSESSMENT & PLAN NOTE
Xray w/ severe OA of right hip, initially w/ suspicion for septic hip, however, no effusion noted on U/S, bacteremia from Enterococcus which is usually GI/ source. Ortho re-consulted, does not suspect septic hip, no consideration for hip replacement for at least 2 weeks after resolution of bacteremia   Can consider repeating BC 1-2 weeks after completion of Abx prior to surgery    Anesthesia does not recommend nerve blocks for acute management. Pain controlled with Lidocaine patch, Norco PRN  Continue to titrate as needed, monitor for respiratory depression, sedation, constipation   Medically stable for Inpatient Rehab

## 2022-07-04 NOTE — WOUND TEAM
Wound consulted for skin tears for arms and left buttock.  Pictures assessed, no advanced wound care needs at this time.  Opened up RN wound and skin protocol and Skin tear protocol placed for nursing to perform.  Wound consult completed, please re-consult if there is a need for advanced wound care.

## 2022-07-04 NOTE — CARE PLAN
The patient is Watcher - Medium risk of patient condition declining or worsening    Shift Goals  Clinical Goals: KIM  Patient Goals: pain control/rest    Progress made toward(s) clinical / shift goals:    Problem: Knowledge Deficit - Standard  Goal: Patient and family/care givers will demonstrate understanding of plan of care, disease process/condition, diagnostic tests and medications  Outcome: Progressing     Problem: Fall Risk  Goal: Patient will remain free from falls  Outcome: Progressing     Problem: Pain - Standard  Goal: Alleviation of pain or a reduction in pain to the patient’s comfort goal  Outcome: Progressing       Patient is not progressing towards the following goals:

## 2022-07-04 NOTE — THERAPY
Physical Therapy   Initial Evaluation     Patient Name: Mike Ferrell  Age:  71 y.o., Sex:  male  Medical Record #: [id number]  Today's Date: 7/4/2022     Precautions  Precautions: Fall Risk  Comments: no wt bearing restrictions, but painful R hip    Assessment  Patient is 71 y.o. male with bacteremia requiring KIM.  Past medical history of HFpEF, hypertension, atrial fibrillation on Eliquis, HLD, CHB s/p PPM, VT s/p AICD, CKD, alcohol abuse who presented 7/1/2022 with right hip pain and leukocytosis. CT chest abdomen pelvis showed severe osteoarthritis in both glenohumeral joint and right hip joint. Planned BONI delayed due to bacteremia.  Additional factors influencing patient status / progress : today, pt was expecting that he would be unable to walk, but once up sitting on commode for 10+ minutes and plan described for use of FWW to take weight off R hip, pt was able to stand and pivot, take a few steps back to bed with min A. Pt lives with spouse who can assist. PT to follow to progress activity to address endurance issues. .      Plan    Recommend Physical Therapy 3 times per week until therapy goals are met for the following treatments:  Bed Mobility, Gait Training, Neuro Re-Education / Balance, Stair Training and Therapeutic Activities    DC Equipment Recommendations: Unable to determine at this time  Discharge Recommendations: Recommend post-acute placement for additional physical therapy services prior to discharge home        Objective       07/04/22 1117   Charge Group   PT Evaluation PT Evaluation High   PT Self Care / Home Evaluation  1   Total Time Spent   PT Total Time Yes   PT Evaluation Time Spent (Mins) 25   PT Therapeutic Activities Time Spent (Mins) 10   PT Total Time Spent (Calculated) 35   Initial Contact Note    Initial Contact Note Order Received and Verified, Physical Therapy Evaluation in Progress with Full Report to Follow.   Precautions   Precautions Fall Risk   Comments no wt bearing  restrictions, but painful R hip   Vitals   O2 Delivery Device None - Room Air   Pain 0 - 10 Group   Therapist Pain Assessment During Activity;Nurse Notified  (not rated, R hip painful)   Prior Living Situation   Prior Services None   Housing / Facility 1 Story House   Steps Into Home 3   Steps In Home 0   Rail Both Rail (Steps into Home)   Elevator No   Equipment Owned Front-Wheel Walker;Single Point Cane   Lives with - Patient's Self Care Capacity Spouse  (wife is a full time student, can help as needed.)   Prior Level of Functional Mobility   Bed Mobility Independent   Transfer Status Independent   Ambulation Independent   Distance Ambulation (Feet)   (community)   Assistive Devices Used Front-Wheel Walker  (or cane, depending on the day.)   Stairs Independent   Cognition    Level of Consciousness Alert   Active ROM Lower Body    Active ROM Lower Body  X   Comments avoids full  motion R hip due to pain   Strength Lower Body   Lower Body Strength  X   Comments avoids full wt bearing R LE due to painful hip, but able to tolerate partial wt bearing on way back to bed with no buckling.   Balance Assessment   Sitting Balance (Static) Fair   Sitting Balance (Dynamic) Fair   Standing Balance (Static) Fair -   Standing Balance (Dynamic) Poor +   Weight Shift Sitting Fair   Weight Shift Standing Poor   Comments with FWW   Gait Analysis   Gait Level Of Assist Minimal Assist   Assistive Device Front Wheel Walker   Distance (Feet) 3   # of Times Distance was Traveled 1   Deviation Decreased Base Of Support  (partial wt bear through R LE, uses FWW well to take weight off right.)   # of Stairs Climbed 0   Weight Bearing Status no restrictions   Bed Mobility    Supine to Sit Moderate Assist   Sit to Supine Moderate Assist   Scooting Minimal Assist   Rolling Minimum Assist to Lt.   Functional Mobility   Sit to Stand Maximal Assist  (first time out of bed, fearful.)   Bed, Chair, Wheelchair Transfer Minimal Assist   Transfer  Method Stand Pivot   Comments pt asking to use bs commode. Able to have only small bm after sitting up for 15+ minutes, nsg aware.   How much difficulty does the patient currently have...   Turning over in bed (including adjusting bedclothes, sheets and blankets)? 2   Sitting down on and standing up from a chair with arms (e.g., wheelchair, bedside commode, etc.) 2   Moving from lying on back to sitting on the side of the bed? 2   How much help from another person does the patient currently need...   Moving to and from a bed to a chair (including a wheelchair)? 2   Need to walk in a hospital room? 3   Climbing 3-5 steps with a railing? 2   6 clicks Mobility Score 13   Activity Tolerance   Sitting in Chair 15+  (on commode)   Standing 2 min   Edema / Skin Assessment   Edema / Skin    (nsg in to dress areas with wounds.)   Patient / Family Goals    Patient / Family Goal #1 Have a BM   Short Term Goals    Short Term Goal # 1 Pt will perform bed mobility with supervision in 6 visits   Short Term Goal # 2 Pt will transfer with mod indep in 6 visits to improve functional indep.   Short Term Goal # 3 Pt will negotiate 2 stairs with supervision in 6 visits to access home.   Education Group   Education Provided Role of Physical Therapist   Role of Physical Therapist Patient Response Patient;Acceptance;Explanation;Verbal Demonstration   Problem List    Problems Impaired Bed Mobility;Impaired Transfers;Impaired Ambulation;Impaired Balance;Pain;Decreased Activity Tolerance   Anticipated Discharge Equipment and Recommendations   DC Equipment Recommendations Unable to determine at this time   Discharge Recommendations Recommend post-acute placement for additional physical therapy services prior to discharge home   Interdisciplinary Plan of Care Collaboration   IDT Collaboration with  Nursing   Patient Position at End of Therapy In Bed;Call Light within Reach;Tray Table within Reach;Phone within Reach   Collaboration Comments nsg  updated.   Session Information   Date / Session Number  7/4-1 (1/3, 7/9)

## 2022-07-04 NOTE — CARE PLAN
The patient is Watcher - Medium risk of patient condition declining or worsening    Shift Goals  Clinical Goals: KIM  Patient Goals: pain control/rest    Progress made toward(s) clinical / shift goals:  monitor vitals. Discussed POC, answered all current questions/     Patient is not progressing towards the following goals:  Na      Problem: Knowledge Deficit - Standard  Goal: Patient and family/care givers will demonstrate understanding of plan of care, disease process/condition, diagnostic tests and medications  Outcome: Progressing     Problem: Fall Risk  Goal: Patient will remain free from falls  Outcome: Progressing

## 2022-07-04 NOTE — CARE PLAN
The patient is Stable - Low risk of patient condition declining or worsening    Shift Goals  Clinical Goals: Pain will decrease to 3/10  Patient Goals: rest comfortably    Progress made toward(s) clinical / shift goals:  PRN medication given for pain control. Pt states pain has been controlled with PRN medication. Able to rest [id number].    Patient is not progressing towards the following goals:      Problem: Pain - Standard  Goal: Alleviation of pain or a reduction in pain to the patient’s comfort goal  Outcome: Progressing     Problem: Fall Risk  Goal: Patient will remain free from falls  Outcome: Progressing     Problem: Optimal Care for Alcohol Withdrawal  Goal: Optimal Care for the alcohol withdrawal patient  Outcome: Progressing     Problem: Psychosocial  Goal: Patient's level of anxiety will decrease  Outcome: Progressing

## 2022-07-04 NOTE — DISCHARGE PLANNING
HTH/SCP TCN chart review completed. Collaborated with FAIZA Fox prior to meeting with the pt. The most current review of medical record, knowledge of pt's PLOF and social support, LACE+ score of 60, 6 clicks scores of 10 mobility were considered.      This is a familiar pt from prior admission. Pt seen at bedside, spouse not present. Re-introduced TCN program. Reviewed education regarding post acute levels of care and SCP benefits. Pt verbalizes understanding and endorses discharging from [facility] approx 1 month ago.     Pt/spouse live in a mobile home with 5 steps to enter. At recent discharge from , pt was ambulatory household distances with a 4WW and able to manage his stairs with supervision. Pt notes current decreased functional mobility related to R hip pain and R LE weakness. He'd like to resume /[facility] services on discharge, but would consider SNF placement if recommended by the medical team. Choice forms proactively obtained for , faxed to [facility] and given to CM. [facility] referral placed. SNF choice reviewed and left bedside for further consideration.     TCN will continue to follow and collaborate with discharge planning team as additional post acute needs arise. Thank you.

## 2022-07-04 NOTE — PROGRESS NOTES
Infectious Disease Progress Note    Author: Brittany Fisher M.D. Date & Time of service: 2022  12:34 PM    Chief Complaint:  Bacteremia, possible septic hip     Interval History:    Review of Systems:  Review of Systems   Respiratory: Negative for cough and shortness of breath.    Gastrointestinal: Negative for abdominal pain, constipation, diarrhea, nausea and vomiting.   Musculoskeletal: Positive for joint pain and myalgias.   Psychiatric/Behavioral: The patient is not nervous/anxious.        Hemodynamics:  Temp (24hrs), Av.8 °C (98.2 °F), Min:36.7 °C (98 °F), Max:36.9 °C (98.4 °F)  Temperature: 36.7 °C (98 °F), Monitored Temp: 36.7 °C (98.1 °F)  Pulse  Av.7  Min: 70  Max: 86   Blood Pressure : (!) 141/98       Physical Exam:  Physical Exam  Cardiovascular:      Rate and Rhythm: Normal rate and regular rhythm.      Heart sounds: Normal heart sounds.   Pulmonary:      Effort: Pulmonary effort is normal.      Breath sounds: Normal breath sounds.   Abdominal:      General: Abdomen is flat. Bowel sounds are normal.      Palpations: Abdomen is soft.   Musculoskeletal:      Right lower leg: Edema present.   Skin:     General: Skin is warm and dry.   Neurological:      General: No focal deficit present.      Mental Status: He is oriented to person, place, and time.   Psychiatric:         Mood and Affect: Mood normal.         Behavior: Behavior normal.         Meds:    Current Facility-Administered Medications:   •  docusate sodium  •  lactulose  •  acetaminophen  •  acetaminophen  •  oxyCODONE immediate-release  •  lidocaine  •  HYDROmorphone  •  ampicillin-sulbactam (UNASYN) IV  •  [Held by provider] apixaban  •  [START ON 2022] cyanocobalamin  •  [DISCONTINUED] detox 1000 mL infusion **AND** thiamine **AND** multivitamin **AND** folic acid  •  hydrALAZINE  •  omeprazole  •  senna-docusate **AND** polyethylene glycol/lytes **AND** magnesium hydroxide **AND** bisacodyl  •  sotalol  •   valsartan    Labs:  Recent Labs     07/02/22  0759 07/03/22  0205 07/04/22  0228   WBC 5.7 6.2 7.1   RBC 2.89* 3.00* 2.85*   HEMOGLOBIN 9.9* 10.4* 9.9*   HEMATOCRIT 29.6* 31.5* 29.9*   .4* 105.0* 104.9*   MCH 34.3* 34.7* 34.7*   RDW 50.2* 51.7* 52.5*   PLATELETCT 76* 80* 96*   MPV 10.6 11.6 11.1     Recent Labs     07/02/22  1353 07/03/22  0205 07/04/22  0228   SODIUM 137 137 139   POTASSIUM 4.6 4.6 4.4   CHLORIDE 100 100 103   CO2 25 23 24   GLUCOSE 148* 122* 132*   BUN 41* 33* 28*     Recent Labs     07/02/22  1353 07/03/22  0205 07/04/22  0228   CREATININE 1.48* 1.15 1.07       Imaging:  CT-CHEST,ABDOMEN,PELVIS W/O    Result Date: 7/2/2022 7/2/2022 2:12 PM HISTORY/REASON FOR EXAM:  +blood cultures, unknown source. +OA in right hip w tentative surgery 7/5.  has ICD- unable to do MRI pelvis to assess for fluid collection Evaluate for source of infection TECHNIQUE/EXAM DESCRIPTION: CT scan of the chest, abdomen and pelvis without contrast was performed. Noncontrast helical scanning of the chest, abdomen and pelvis was obtained from the lung apices through the pubic symphysis. Low dose optimization technique was utilized for this CT exam including automated exposure control and adjustment of the mA and/or kV according to patient size. COMPARISON:  1 view chest 7/1/2022 FINDINGS: Osseous structures: There is osteoarthritis of the right shoulder joint which is advanced and there are multiple associated intra-articular ossific body. There is also advanced osteoarthritis of the left glenohumeral joint. There is osteoarthritis of the right hip joint which appears severe in degree. There are spinal degenerative changes. There is scoliosis. There is a large degenerative subluxation at L5-S1. There are old compression fracture at T12 and L1. Chest: Cardiac device is present. LUNGS: The right lung apex there is curvilinear density with architectural distortion. There is a central mass measuring 16 x 7 mm. Findings  could represent postinflammatory scarring or a neoplasm. There is mild dependent density both lung bases consistent with atelectasis. MEDIASTINUM: There is no adenopathy, mass, or other abnormality within the axilla, the mediastinum, or the elizabeth. AORTA: There is aortic and dense coronary atherosclerotic plaque. PLEURA: There no pleural effusion or pneumothoraces. Abdomen: LIVER: The liver is normal in appearance. SPLEEN: The spleen is normal in appearance. PANCREAS: The pancreas is normal in appearance. GALLBLADDER and biliary system: There has been cholecystectomy. Venous vasculature: The splenic vein and portal vein enhance normally. ADRENAL GLANDS: The adrenal glands are normal in appearance. KIDNEYS: The kidneys are normal in appearance. AORTA: There is aortic atherosclerosis without aneurysm. BOWEL: No bowel or mesenteric abnormality identified. Pelvis: Within normal limits.     1.  No evidence for infection within the chest, abdomen, pelvis 2.  The right upper lobe is abnormal with architecture distortion and central 16 x 7 mm mass. These findings could indicate postinflammatory scarring or a neoplasm. PET CT scan is recommended 3.  Mild bilateral dependent atelectasis 4.  Prior cholecystectomy 5.  Presence of cardiac device 6.  Severe osteoarthritis of both glenohumeral joint and the right hip joint 7.  aortic and branch vessel atherosclerotic plaque    DX-CHEST-PORTABLE (1 VIEW)    Result Date: 7/1/2022 7/1/2022 12:41 PM HISTORY/REASON FOR EXAM: Sepsis TECHNIQUE/EXAM DESCRIPTION AND NUMBER OF VIEWS: Single portable view of the chest. COMPARISON: None FINDINGS: There is no evidence of focal consolidation or evidence of pulmonary edema. There is no pleural effusion. The heart is enlarged. There is a left-sided AICD.     Cardiomegaly.    US-RENAL    Result Date: 7/2/2022 7/2/2022 2:25 AM HISTORY/REASON FOR EXAM:  Abnormal Labs TECHNIQUE/EXAM DESCRIPTION:  Renal ultrasound. COMPARISON:  None FINDINGS: The  right kidney measures 10.02 cm.  The left kidney measures 11.12 cm. There is no hydronephrosis. There are no abnormal calcifications. The bladder demonstrates no focal wall abnormality. Bilateral ureteral jets are not visualized.     1.  Grossly unremarkable renal ultrasound. Examination is technically limited due to patient body habitus.    US-EXTREMITY NON VASCULAR UNILATERAL RIGHT    Result Date: 7/3/2022  7/3/2022 8:17 AM HISTORY/REASON FOR EXAM:  Assessing for effusion right hip, possible septic arthritis TECHNIQUE/EXAM DESCRIPTION AND NUMBER OF VIEWS: Right groin region ultrasound to evaluate for joint effusion COMPARISON: None FINDINGS: Technically challenging evaluation due to body habitus and positioning limitations No abnormal fluid collection is seen. No hyperemia is noted.     No sonographic evidence of hip effusion or other abnormal fluid collection Technically challenging evaluation. MRI over CT could better characterize if clinically indicated    EC-ECHOCARDIOGRAM COMPLETE W/ CONT    Result Date: 7/2/2022  Transthoracic Echo Report Echocardiography Laboratory CONCLUSIONS Technically difficult due to positioning and body habitus but adequate study for interpretation. Pt unable to assume left lateral decubitus due to injured hip. Grossly normal left ventricular systolic function. The left ventricular ejection fraction is visually estimated to be 55%. Mildly dilated right ventricle with preserved systolic function. Mild mitral valve regurgitation. Mild tricuspid regurgitation. Right ventricular systolic pressure is estimated to be 41 mmHg; mild pulmonary hypertension. Normal pericardium without effusion. Compared to prior study 5/16/22, current study technically difficult. BAILEY RICHARD Exam Date:         07/02/2022                    14:58 Exam Location:     Inpatient Priority:          Routine Ordering Physician:        ED GARLAND Referring Physician:        [id number], VERONICA CONCEPCION Sonographer:               Kasi Bobo [facility] Age:    71     Gender:    M MRN:    [id number] :    1950 BSA:    2.36   Ht (in):    72     Wt (lb):    254 Exam Type:     Complete, Contrast Indications:     Acute/Subacute Bacterial Endocarditis ICD Codes:       421 CPT Codes:       97664,  BP:   126    /   47     HR: Technical Quality:       Technically difficult study -                          adequate information is obtained MEASUREMENTS  (Male / Female) Normal Values 2D ECHO LV Diastolic Diameter PLAX        4.6 cm                4.2 - 5.9 / 3.9 - 5.3 cm LV Systolic Diameter PLAX         3.1 cm                2.1 - 4.0 cm LV Fractional Shortening PLAX     32.4 %                25 - 46  % IVS Diastolic Thickness           1 cm                  LVPW Diastolic Thickness          1 cm                  Estimated LV Ejection Fraction    55 %                  M-MODE Aortic Root Diameter MM           3.4 cm                DOPPLER AV Peak Velocity                  1.2 m/s               AV Peak Gradient                  5.6 mmHg              AV Mean Gradient                  2.9 mmHg              LVOT Peak Velocity                0.66 m/s              Mitral E Point Velocity           0.77 m/s              Mitral E to A Ratio               1.1                   Mitral A Duration                 109 ms                MV Pressure Half Time             38 ms                 MV Area PHT                       5.8 cm2               MV Deceleration Time              131 ms                TR Peak Velocity                  352 cm/s              TR Peak Gradient                  49.5 mmHg             * Indicates values subject to auto-interpretation LV EF:  55    % FINDINGS Left Ventricle 3 ml contrast was used to enhance visualization of the endocardial border. Existing IV was used. Normal left ventricular wall thickness. Grossly normal left ventricular systolic  function. The left ventricular ejection fraction is visually estimated to be 55%. Normal diastolic function. Right Ventricle Mildly dilated right ventricle with preserved systolic function. Right Atrium Enlarged right atrium. Left Atrium Normal left atrial size. Mitral Valve Structurally normal mitral valve without significant stenosis. Mild mitral valve regurgitation. Aortic Valve Structurally normal aortic valve without significant stenosis or regurgitation. Tricuspid Valve Structurally normal tricuspid valve. No tricuspid stenosis. Mild tricuspid regurgitation. Right ventricular systolic pressure is estimated to be 41 mmHg. Right atrial pressure is estimated to  be 3 mmHg. Pulmonic Valve Structurally normal pulmonic valve without significant stenosis or regurgitation. Pericardium Normal pericardium without effusion. Aorta Normal aortic root for body surface area. Patric Sood MD (Electronically Signed) Final Date:     02 July 2022                 16:47    DX-HIP-COMPLETE - UNILATERAL 2+ RIGHT    Result Date: 6/13/2022  X-ray of the right hip with 2 views consisting of AP and lateral views on 6/13/2022 demonstrates severe joint space narrowing with bone-on-bone osteoarthritic changes and particular osteophyte formation      Micro:  Results     ** No results found for the last 168 hours. **          Assessment:  Active Hospital Problems    Diagnosis    • *Bacteremia due to Staphylococcus aureus [R78.81, B95.61]    • Constipation [K59.00]    • Hyperglycemia [R73.9]    • Anemia [D64.9]    • Alcohol abuse [F10.10]    • Paroxysmal ventricular tachycardia (HCC) [I47.2]      Interval 24 hours:      AF, O2 RA  Labs reviewed  Imaging personally reviewed both images and report.   Micro reviewed    Patients with significant pain in right hip as well as right knee.  Continued on antibiotics as below.    ASSESSMENT/PLAN:      71 y.o.  admitted 7/1/2022. Pt has a past medical history of HFpEF, HTN, A fib on Eliquis, heart  disease status post pacemaker, V. tach status post AICD and CKD . Osteoarthritis and was admitted for R BONI.  Blood cultures drawn due to leukocytosis which are now positive. Given positive blood cultures and worsening of his right hip pain over the last few weeks. Orthopedics saw the patient on 7/2 and are recommending aspiration of the hip and then potential washout depending on results.  However, no pocket to drain per IR.  Patient states his hip pain is ongoing.  CT chest abdomen pelvis with no evidence of infection.     Problem List     Bacteremia, unclear source, he does have some wounds on his upper arms but no obvious infection.  CTs with no intra-abdominal infection.  Denies any dysuria.  -Blood cultures on 7/1 and 7/2 +  E faecalis, ampicillin sensitive    -Blood cultures on 7/3 are no growth to date  -TTE was poor study, no vegetations noted, no significant valvular disease  Acute on chronic right hip pain and concern for septic arthritis of the right hip  -Orthopedics are aware and have recommended aspiration of the right hip  Severe osteoarthritis of both glenohumeral and right hip joint noted on CT  History of heart disease and status post pacemaker  History of V. tach and status post AICD  A. fib on Eliquis  Mass noted in right upper lobe and PET scan recommended, defer to primary team  Antibiotic allergies: Sulfa drugs reported as rash     Plan      ---  Stopped Unasyn will transition to ceftriaxone 2 g every 12 and ampicillin 2 g every 4 hours for Enterococcus endocarditis treatment while awaiting KIM.  If KIM is negative will de-escalate.    --- Follow-up repeat blood cultures - NGTD   ---  Awaiting KIM- ordered   --- Ortho following and will defer to them regarding whether to open the hip for washout, per team no fluid available for aspiration, could consider dedicated hip CT, unable to do MRI due to implant  --- Monitor labs     Dispo: To be determined -still following up blood cultures, will  need KIM and decisions on surgery  PICC: To be determined        Plan of care discussed with [facility] team and nurse.  Will continue to follow.

## 2022-07-04 NOTE — ASSESSMENT & PLAN NOTE
Suspect from chronic opioid use. Improved with diarrhea on scheduled bowel regimen so will continue to monitor

## 2022-07-04 NOTE — THERAPY
Occupational Therapy   Initial Evaluation     Patient Name: Mike Ferrell  Age:  71 y.o., Sex:  male  Medical Record #: [id number]  Today's Date: 7/4/2022     Precautions  Precautions: Fall Risk  Comments: no wt bearing restrictions, but painful R hip    Assessment  Patient is 71 y.o. male with a diagnosis of blood cultures were + for gram-positive cocci..   Pt currently limited by decreased functional mobility, activity tolerance,  strength, AROM, coordination, balance, and pain which are affecting pt's ability to complete ADLs/IADLs at baseline. Pt would benefit from OT services in the acute care setting to maximize functional recovery.       Plan    Recommend Occupational Therapy 3 times per week until therapy goals are met for the following treatments:  Self Care/Activities of Daily Living and Therapeutic Activities.       Discharge Recommendations: (P) Recommend post-acute placement for additional occupational therapy services prior to discharge home          07/04/22 1037   Prior Living Situation   Prior Services None   Housing / Facility 1 Story House   Steps Into Home 3   Steps In Home 0   Equipment Owned Front-Wheel Walker;Single Point Cane   Lives with - Patient's Self Care Capacity Spouse   Prior Level of ADL Function   Self Feeding Independent   Grooming / Hygiene Independent   Bathing Independent   Dressing Independent   Toileting Independent   ADL Assessment   Grooming Supervision   Upper Body Dressing Supervision   Lower Body Dressing Maximal Assist   Toileting Maximal Assist   Functional Mobility   Sit to Stand Maximal Assist   Bed, Chair, Wheelchair Transfer Minimal Assist   Short Term Goals   Short Term Goal # 1 Min A with LB dressing   Short Term Goal # 2 Supervised with ADL txfs   Anticipated Discharge Equipment and Recommendations   Discharge Recommendations Recommend post-acute placement for additional occupational therapy services prior to discharge home

## 2022-07-05 ENCOUNTER — ANESTHESIA (OUTPATIENT)
Dept: CARDIOLOGY | Facility: MEDICAL CENTER | Age: 72
DRG: 872 | End: 2022-07-05
Payer: MEDICARE

## 2022-07-05 ENCOUNTER — ANESTHESIA EVENT (OUTPATIENT)
Dept: CARDIOLOGY | Facility: MEDICAL CENTER | Age: 72
DRG: 872 | End: 2022-07-05
Payer: MEDICARE

## 2022-07-05 ENCOUNTER — APPOINTMENT (OUTPATIENT)
Dept: CARDIOLOGY | Facility: MEDICAL CENTER | Age: 72
DRG: 872 | End: 2022-07-05
Attending: INTERNAL MEDICINE
Payer: MEDICARE

## 2022-07-05 PROBLEM — G47.33 OSA (OBSTRUCTIVE SLEEP APNEA): Status: ACTIVE | Noted: 2022-07-05

## 2022-07-05 LAB
ANION GAP SERPL CALC-SCNC: 8 MMOL/L (ref 7–16)
BACTERIA BLD CULT: ABNORMAL
BASOPHILS # BLD AUTO: 0.1 % (ref 0–1.8)
BASOPHILS # BLD: 0.01 K/UL (ref 0–0.12)
BUN SERPL-MCNC: 27 MG/DL (ref 8–22)
CALCIUM SERPL-MCNC: 9.4 MG/DL (ref 8.5–10.5)
CHLORIDE SERPL-SCNC: 100 MMOL/L (ref 96–112)
CO2 SERPL-SCNC: 26 MMOL/L (ref 20–33)
CREAT SERPL-MCNC: 1.05 MG/DL (ref 0.5–1.4)
EOSINOPHIL # BLD AUTO: 0.03 K/UL (ref 0–0.51)
EOSINOPHIL NFR BLD: 0.4 % (ref 0–6.9)
ERYTHROCYTE [DISTWIDTH] IN BLOOD BY AUTOMATED COUNT: 52.5 FL (ref 35.9–50)
GFR SERPLBLD CREATININE-BSD FMLA CKD-EPI: 74 ML/MIN/1.73 M 2
GLUCOSE SERPL-MCNC: 102 MG/DL (ref 65–99)
HCT VFR BLD AUTO: 29.3 % (ref 42–52)
HGB BLD-MCNC: 9.6 G/DL (ref 14–18)
IMM GRANULOCYTES # BLD AUTO: 0.08 K/UL (ref 0–0.11)
IMM GRANULOCYTES NFR BLD AUTO: 1.1 % (ref 0–0.9)
LYMPHOCYTES # BLD AUTO: 0.84 K/UL (ref 1–4.8)
LYMPHOCYTES NFR BLD: 12 % (ref 22–41)
MCH RBC QN AUTO: 34.5 PG (ref 27–33)
MCHC RBC AUTO-ENTMCNC: 32.8 G/DL (ref 33.7–35.3)
MCV RBC AUTO: 105.4 FL (ref 81.4–97.8)
MONOCYTES # BLD AUTO: 0.88 K/UL (ref 0–0.85)
MONOCYTES NFR BLD AUTO: 12.6 % (ref 0–13.4)
NEUTROPHILS # BLD AUTO: 5.15 K/UL (ref 1.82–7.42)
NEUTROPHILS NFR BLD: 73.8 % (ref 44–72)
NRBC # BLD AUTO: 0 K/UL
NRBC BLD-RTO: 0 /100 WBC
PLATELET # BLD AUTO: 105 K/UL (ref 164–446)
PMV BLD AUTO: 10.9 FL (ref 9–12.9)
POTASSIUM SERPL-SCNC: 4.8 MMOL/L (ref 3.6–5.5)
RBC # BLD AUTO: 2.78 M/UL (ref 4.7–6.1)
SIGNIFICANT IND 70042: ABNORMAL
SIGNIFICANT IND 70042: ABNORMAL
SITE SITE: ABNORMAL
SITE SITE: ABNORMAL
SODIUM SERPL-SCNC: 134 MMOL/L (ref 135–145)
SOURCE SOURCE: ABNORMAL
SOURCE SOURCE: ABNORMAL
WBC # BLD AUTO: 7 K/UL (ref 4.8–10.8)

## 2022-07-05 PROCEDURE — 93312 ECHO TRANSESOPHAGEAL: CPT

## 2022-07-05 PROCEDURE — 700111 HCHG RX REV CODE 636 W/ 250 OVERRIDE (IP): Performed by: INTERNAL MEDICINE

## 2022-07-05 PROCEDURE — 700102 HCHG RX REV CODE 250 W/ 637 OVERRIDE(OP): Performed by: INTERNAL MEDICINE

## 2022-07-05 PROCEDURE — 700111 HCHG RX REV CODE 636 W/ 250 OVERRIDE (IP): Performed by: ANESTHESIOLOGY

## 2022-07-05 PROCEDURE — 700102 HCHG RX REV CODE 250 W/ 637 OVERRIDE(OP): Performed by: STUDENT IN AN ORGANIZED HEALTH CARE EDUCATION/TRAINING PROGRAM

## 2022-07-05 PROCEDURE — 99254 IP/OBS CNSLTJ NEW/EST MOD 60: CPT | Performed by: INTERNAL MEDICINE

## 2022-07-05 PROCEDURE — 99100 ANES PT EXTEME AGE<1 YR&>70: CPT | Performed by: ANESTHESIOLOGY

## 2022-07-05 PROCEDURE — 700101 HCHG RX REV CODE 250: Performed by: ANESTHESIOLOGY

## 2022-07-05 PROCEDURE — A9270 NON-COVERED ITEM OR SERVICE: HCPCS | Performed by: INTERNAL MEDICINE

## 2022-07-05 PROCEDURE — 93312 ECHO TRANSESOPHAGEAL: CPT | Mod: 26 | Performed by: INTERNAL MEDICINE

## 2022-07-05 PROCEDURE — 99233 SBSQ HOSP IP/OBS HIGH 50: CPT | Mod: GC | Performed by: INTERNAL MEDICINE

## 2022-07-05 PROCEDURE — 36415 COLL VENOUS BLD VENIPUNCTURE: CPT

## 2022-07-05 PROCEDURE — 160002 HCHG RECOVERY MINUTES (STAT)

## 2022-07-05 PROCEDURE — A9270 NON-COVERED ITEM OR SERVICE: HCPCS | Performed by: STUDENT IN AN ORGANIZED HEALTH CARE EDUCATION/TRAINING PROGRAM

## 2022-07-05 PROCEDURE — 01922 ANES N-INVAS IMG/RADJ THER: CPT | Performed by: ANESTHESIOLOGY

## 2022-07-05 PROCEDURE — 85025 COMPLETE CBC W/AUTO DIFF WBC: CPT

## 2022-07-05 PROCEDURE — 700105 HCHG RX REV CODE 258: Performed by: ANESTHESIOLOGY

## 2022-07-05 PROCEDURE — 160035 HCHG PACU - 1ST 60 MINS PHASE I

## 2022-07-05 PROCEDURE — 700105 HCHG RX REV CODE 258: Performed by: INTERNAL MEDICINE

## 2022-07-05 PROCEDURE — B246ZZ4 ULTRASONOGRAPHY OF RIGHT AND LEFT HEART, TRANSESOPHAGEAL: ICD-10-PCS | Performed by: INTERNAL MEDICINE

## 2022-07-05 PROCEDURE — 700101 HCHG RX REV CODE 250: Performed by: HOSPITALIST

## 2022-07-05 PROCEDURE — 80048 BASIC METABOLIC PNL TOTAL CA: CPT

## 2022-07-05 PROCEDURE — 770020 HCHG ROOM/CARE - TELE (206)

## 2022-07-05 RX ORDER — HYDROMORPHONE HYDROCHLORIDE 1 MG/ML
0.4 INJECTION, SOLUTION INTRAMUSCULAR; INTRAVENOUS; SUBCUTANEOUS
Status: DISCONTINUED | OUTPATIENT
Start: 2022-07-05 | End: 2022-07-05 | Stop reason: HOSPADM

## 2022-07-05 RX ORDER — OXYCODONE HCL 5 MG/5 ML
5 SOLUTION, ORAL ORAL
Status: DISCONTINUED | OUTPATIENT
Start: 2022-07-05 | End: 2022-07-05 | Stop reason: HOSPADM

## 2022-07-05 RX ORDER — ONDANSETRON 2 MG/ML
4 INJECTION INTRAMUSCULAR; INTRAVENOUS
Status: DISCONTINUED | OUTPATIENT
Start: 2022-07-05 | End: 2022-07-05 | Stop reason: HOSPADM

## 2022-07-05 RX ORDER — ALBUTEROL SULFATE 2.5 MG/3ML
2.5 SOLUTION RESPIRATORY (INHALATION)
Status: DISCONTINUED | OUTPATIENT
Start: 2022-07-05 | End: 2022-07-05 | Stop reason: HOSPADM

## 2022-07-05 RX ORDER — DIPHENHYDRAMINE HYDROCHLORIDE 50 MG/ML
12.5 INJECTION INTRAMUSCULAR; INTRAVENOUS
Status: DISCONTINUED | OUTPATIENT
Start: 2022-07-05 | End: 2022-07-05 | Stop reason: HOSPADM

## 2022-07-05 RX ORDER — MEPERIDINE HYDROCHLORIDE 25 MG/ML
6.25 INJECTION INTRAMUSCULAR; INTRAVENOUS; SUBCUTANEOUS
Status: DISCONTINUED | OUTPATIENT
Start: 2022-07-05 | End: 2022-07-05 | Stop reason: HOSPADM

## 2022-07-05 RX ORDER — SODIUM CHLORIDE, SODIUM LACTATE, POTASSIUM CHLORIDE, CALCIUM CHLORIDE 600; 310; 30; 20 MG/100ML; MG/100ML; MG/100ML; MG/100ML
INJECTION, SOLUTION INTRAVENOUS CONTINUOUS
Status: DISCONTINUED | OUTPATIENT
Start: 2022-07-05 | End: 2022-07-05 | Stop reason: HOSPADM

## 2022-07-05 RX ORDER — LIDOCAINE HYDROCHLORIDE 20 MG/ML
INJECTION, SOLUTION EPIDURAL; INFILTRATION; INTRACAUDAL; PERINEURAL PRN
Status: DISCONTINUED | OUTPATIENT
Start: 2022-07-05 | End: 2022-07-05 | Stop reason: HOSPADM

## 2022-07-05 RX ORDER — PHENYLEPHRINE HYDROCHLORIDE 10 MG/ML
INJECTION, SOLUTION INTRAMUSCULAR; INTRAVENOUS; SUBCUTANEOUS PRN
Status: DISCONTINUED | OUTPATIENT
Start: 2022-07-05 | End: 2022-07-05 | Stop reason: SURG

## 2022-07-05 RX ORDER — OXYCODONE HCL 5 MG/5 ML
10 SOLUTION, ORAL ORAL
Status: DISCONTINUED | OUTPATIENT
Start: 2022-07-05 | End: 2022-07-05 | Stop reason: HOSPADM

## 2022-07-05 RX ORDER — HALOPERIDOL 5 MG/ML
1 INJECTION INTRAMUSCULAR
Status: DISCONTINUED | OUTPATIENT
Start: 2022-07-05 | End: 2022-07-05 | Stop reason: HOSPADM

## 2022-07-05 RX ORDER — SODIUM CHLORIDE, SODIUM LACTATE, POTASSIUM CHLORIDE, CALCIUM CHLORIDE 600; 310; 30; 20 MG/100ML; MG/100ML; MG/100ML; MG/100ML
INJECTION, SOLUTION INTRAVENOUS
Status: DISCONTINUED | OUTPATIENT
Start: 2022-07-05 | End: 2022-07-05 | Stop reason: SURG

## 2022-07-05 RX ORDER — HYDROMORPHONE HYDROCHLORIDE 1 MG/ML
0.2 INJECTION, SOLUTION INTRAMUSCULAR; INTRAVENOUS; SUBCUTANEOUS
Status: DISCONTINUED | OUTPATIENT
Start: 2022-07-05 | End: 2022-07-05 | Stop reason: HOSPADM

## 2022-07-05 RX ORDER — HYDROMORPHONE HYDROCHLORIDE 1 MG/ML
0.1 INJECTION, SOLUTION INTRAMUSCULAR; INTRAVENOUS; SUBCUTANEOUS
Status: DISCONTINUED | OUTPATIENT
Start: 2022-07-05 | End: 2022-07-05 | Stop reason: HOSPADM

## 2022-07-05 RX ADMIN — ACETAMINOPHEN 1000 MG: 500 TABLET ORAL at 17:21

## 2022-07-05 RX ADMIN — LIDOCAINE 1 PATCH: 50 PATCH TOPICAL at 10:50

## 2022-07-05 RX ADMIN — PROPOFOL 30 MG: 10 INJECTION, EMULSION INTRAVENOUS at 09:00

## 2022-07-05 RX ADMIN — ACETAMINOPHEN 1000 MG: 500 TABLET ORAL at 06:15

## 2022-07-05 RX ADMIN — SOTALOL HYDROCHLORIDE 120 MG: 80 TABLET ORAL at 06:15

## 2022-07-05 RX ADMIN — VALSARTAN 80 MG: 80 TABLET, FILM COATED ORAL at 17:21

## 2022-07-05 RX ADMIN — WATER 2 G: 1000 INJECTION, SOLUTION INTRAVENOUS at 15:14

## 2022-07-05 RX ADMIN — AMPICILLIN SODIUM 2000 MG: 2 INJECTION, POWDER, FOR SOLUTION INTRAVENOUS at 19:46

## 2022-07-05 RX ADMIN — THERA TABS 1 TABLET: TAB at 06:14

## 2022-07-05 RX ADMIN — FOLIC ACID 1 MG: 1 TABLET ORAL at 06:14

## 2022-07-05 RX ADMIN — AMPICILLIN SODIUM 2000 MG: 2 INJECTION, POWDER, FOR SOLUTION INTRAVENOUS at 23:46

## 2022-07-05 RX ADMIN — AMPICILLIN SODIUM 2000 MG: 2 INJECTION, POWDER, FOR SOLUTION INTRAVENOUS at 03:15

## 2022-07-05 RX ADMIN — SODIUM CHLORIDE, POTASSIUM CHLORIDE, SODIUM LACTATE AND CALCIUM CHLORIDE: 600; 310; 30; 20 INJECTION, SOLUTION INTRAVENOUS at 08:46

## 2022-07-05 RX ADMIN — AMPICILLIN SODIUM 2000 MG: 2 INJECTION, POWDER, FOR SOLUTION INTRAVENOUS at 15:12

## 2022-07-05 RX ADMIN — PROPOFOL 30 MG: 10 INJECTION, EMULSION INTRAVENOUS at 08:57

## 2022-07-05 RX ADMIN — PROPOFOL 30 MG: 10 INJECTION, EMULSION INTRAVENOUS at 09:05

## 2022-07-05 RX ADMIN — PHENYLEPHRINE HYDROCHLORIDE 100 MCG: 10 INJECTION INTRAVENOUS at 09:01

## 2022-07-05 RX ADMIN — VALSARTAN 80 MG: 80 TABLET, FILM COATED ORAL at 06:15

## 2022-07-05 RX ADMIN — Medication 100 MG: at 06:15

## 2022-07-05 RX ADMIN — WATER 2 G: 1000 INJECTION, SOLUTION INTRAVENOUS at 03:14

## 2022-07-05 RX ADMIN — PHENYLEPHRINE HYDROCHLORIDE 100 MCG: 10 INJECTION INTRAVENOUS at 09:09

## 2022-07-05 RX ADMIN — PROPOFOL 30 MG: 10 INJECTION, EMULSION INTRAVENOUS at 09:02

## 2022-07-05 RX ADMIN — PROPOFOL 70 MG: 10 INJECTION, EMULSION INTRAVENOUS at 08:53

## 2022-07-05 RX ADMIN — OMEPRAZOLE 20 MG: 20 CAPSULE, DELAYED RELEASE ORAL at 06:15

## 2022-07-05 RX ADMIN — ACETAMINOPHEN 1000 MG: 500 TABLET ORAL at 11:32

## 2022-07-05 RX ADMIN — AMPICILLIN SODIUM 2000 MG: 2 INJECTION, POWDER, FOR SOLUTION INTRAVENOUS at 06:20

## 2022-07-05 RX ADMIN — AMPICILLIN SODIUM 2000 MG: 2 INJECTION, POWDER, FOR SOLUTION INTRAVENOUS at 11:22

## 2022-07-05 RX ADMIN — LIDOCAINE HYDROCHLORIDE 60 MG: 20 INJECTION, SOLUTION EPIDURAL; INFILTRATION; INTRACAUDAL at 08:53

## 2022-07-05 ASSESSMENT — ENCOUNTER SYMPTOMS
CHILLS: 0
FEVER: 0
SHORTNESS OF BREATH: 0
NAUSEA: 0
PALPITATIONS: 0
COUGH: 0
HALLUCINATIONS: 0
DOUBLE VISION: 0
VOMITING: 0
SORE THROAT: 0
BLURRED VISION: 0
ABDOMINAL PAIN: 0

## 2022-07-05 ASSESSMENT — PAIN SCALES - GENERAL: PAIN_LEVEL: 6

## 2022-07-05 ASSESSMENT — PAIN DESCRIPTION - PAIN TYPE
TYPE: ACUTE PAIN;CHRONIC PAIN
TYPE: ACUTE PAIN;CHRONIC PAIN

## 2022-07-05 NOTE — DISCHARGE INSTRUCTIONS
Discharge Instructions:    ACTIVITY: {LE Weight Bearing Status:36471}     ASSISTED DEVICES: You are being discharged with the following special equipment:  {SPECIAL EQUIPMENT:231538}    Physical Therapy:  If admitted to a skilled care facility, the patient should continue working with physical therapy on gait training and ambulation and should ambulate a minimum of 3 times daily with nursing or therapy.  If at home, home health or family can assist with ambulation.    Wound Care:  The patient should keep the wound clean and dry.  Daily dry gauze dressing changes should be performed until the wound is entirely dry and may continue to be performed as desired for comfort.  Your wound has been closed with staples, which will be removed 10-14 days after surgery.  Any new drainage from the wound, drainage lasting longer than 3 days post-operatively, wound opening/dehiscence, increased erythema, or other wound concerns should prompt an immediate call to the operative surgeon.  Please DO NOT initiate antibiotics or wound packing for wound concerns without discussing this with the surgeon.    DVT Prophylaxis:  After your injury/surgery, you are at increased risk for the development of deep vein thrombosis (blood clot of deep leg veins) or a pulmonary embolism (blood clot in the lung).      To help prevent these complications, you will be discharged on ***    Medication Refills:  Pay attention to the number of medications - particularly pain medications - you have remaining.

## 2022-07-05 NOTE — CARE PLAN
The patient is Watcher - Medium risk of patient condition declining or worsening    Shift Goals  Clinical Goals: KIM  Patient Goals: pain control/rest    Progress made toward(s) clinical / shift goals:    Problem: Knowledge Deficit - Standard  Goal: Patient and family/care givers will demonstrate understanding of plan of care, disease process/condition, diagnostic tests and medications  Outcome: Progressing     Problem: Pain - Standard  Goal: Alleviation of pain or a reduction in pain to the patient’s comfort goal  Outcome: Progressing       Patient is not progressing towards the following goals:

## 2022-07-05 NOTE — DISCHARGE PLANNING
Received Choice form at 1215  Agency/Facility Name: [facility]  Referral sent per Choice form @ 1225 with notation that bed is being held per christine Silva in administration.

## 2022-07-05 NOTE — CONSULTS
"Cardiology Initial Consult Note    Date of note:    7/5/2022      Consulting Physician: Willard Saldana M.D.    Name:   Mike Ferrell   YOB: 1950  Age:   71 y.o.  male   MRN:   [id number]      Reason for Consultation: Enterococcus bacteremia    HPI:  Mike Ferrell is a 71 y.o. male with a history of HFpEF, hypertension, A. fib on Eliquis, dyslipidemia, complete heart block s/p pacemaker, VT s/p AICD who presented to the hospital on 7/1/2022 with right hip pain and leukocytosis.  Blood cultures were positive for Enterococcus and patient was transferred to [facility] for KIM.    Upon my evaluation, patient is slightly confused.  States that he is scared and does not wish to undergo the procedure.  Spoke with wife who spoke with the patient and after their discussion patient is willing to undergo the procedure.  Understands the rationale behind the procedure.  Denies any active pain, chest pressure, palpitations or lightheadedness.  Denies chills, fevers or sore throat.      ROS  A complete ROS was performed and is negative except for pertinent positives mentioned in HPI.      Past Medical History:   Diagnosis Date   • AICD (automatic cardioverter/defibrillator) present    • Arthritis     Osteo- shoulders, hips, knees, ankles, generalized everywhere   • Atrial fibrillation (HCC) 04/04/2012   • Bowel habit changes     constipation   • Breath shortness     \"since 1996 from [place]\"   • Cataract 12/15/2021    Dec 2021/Jan 2022 bilat surgery   • Chronic back pain    • Congestive heart failure (HCC)    • COVID-19 09/2021   • Dental disorder     missing teeth x3   • Heart burn     occasional food related   • High cholesterol    • Hypertension    • Methamphetamine use (HCC) none for many years    25 years ago   • Pacemaker     defibrilator   • Pain 12/15/2021    back, right hip and knee, 8/10   • Paroxysmal ventricular tachycardia (HCC) 04/04/2012   • Pneumonia 2000    in the past   • " Sinoatrial node dysfunction (HCC) 04/04/2012    Dr. Daniel Ferrera   • Snoring     Sleep Study June 2022, awaiting results   • Syncope and collapse 04/04/2012       Past Surgical History:   Procedure Laterality Date   • AICD IMPLANT  2017   • AICD IMPLANT  01/01/2010   • PACEMAKER INSERTION  01/01/2009   • CHOLECYSTECTOMY  01/01/1999   • TONSILLECTOMY  01/01/1953   • CATARACT EXTRACTION WITH IOL           Medications Prior to Admission   Medication Sig Dispense Refill Last Dose   • omeprazole (PRILOSEC) 20 MG delayed-release capsule Take 1 Capsule by mouth every morning before breakfast. 30 Capsule     • apixaban (ELIQUIS) 5mg Tab Take 5 mg by mouth 2 times a day.   7/3/2022 at Unknown time   • torsemide (DEMADEX) 20 MG Tab Take 20 mg by mouth every day.   7/3/2022 at Unknown time   • potassium chloride SA (KDUR) 20 MEQ Tab CR Take 20 mEq by mouth every day.      • valsartan (DIOVAN) 80 MG Tab Take 1 Tablet by mouth 2 times a day. 60 Tablet 11 7/3/2022 at Unknown time   • sotalol (BETAPACE) 120 MG tablet Take 120 mg by mouth every morning.   7/3/2022 at Unknown time   • HYDROcodone/acetaminophen (NORCO)  MG Tab Take 1 Tablet by mouth every four hours as needed for Moderate Pain. Indications: Pain   7/3/2022 at Unknown time   • colesevelam (WELCHOL) 625 MG Tab Take 625 mg by mouth every evening.      • Cholecalciferol (VITAMIN D3) 5000 units Cap Take 5,000 Units by mouth every morning before breakfast.        Current Facility-Administered Medications   Medication Dose Route Frequency Provider Last Rate Last Admin   • docusate sodium (COLACE) capsule 100 mg  100 mg Oral BID Jose Westfall M.D.   100 mg at 07/04/22 1631   • lactulose 20 GM/30ML solution 30 mL  30 mL Oral Q24HRS PRN Jose Westfall M.D.       • acetaminophen (TYLENOL) tablet 500 mg  500 mg Oral Q6HRS PRN Micheline Camacho M.D.       • acetaminophen (TYLENOL) tablet 1,000 mg  1,000 mg Oral TID Micheline Camacho M.D.   1,000 mg at 07/05/22 0615   • lidocaine  (LIDODERM) 5 % 1 Patch  1 Patch Transdermal Q24HR Caro Goddard M.D.   1 Patch at 07/04/22 1040   • HYDROmorphone (Dilaudid) injection 0.5 mg  0.5 mg Intravenous Q3HRS PRN Caro Goddard M.D.       • oxyCODONE immediate-release (ROXICODONE) tablet 2.5 mg  2.5 mg Oral Q3HRS PRN Micheline Camacho M.D.       • cefTRIAXone (Rocephin) syringe 2 g  2 g Intravenous Q12HR Brittany Fisher M.D.   2 g at 07/05/22 0314   • ampicillin (Omnipen) 2,000 mg in  mL IVPB  2,000 mg Intravenous Q4HR Brittany Fisher M.D.   Stopped at 07/05/22 0650   • [Held by provider] apixaban (ELIQUIS) tablet 5 mg  5 mg Oral BID Daniel Gibbons M.D.   5 mg at 07/04/22 0804   • [START ON 7/8/2022] cyanocobalamin (VITAMIN B-12) injection 1,000 mcg  1,000 mcg Intramuscular Q7 DAYS Daniel Gibbons M.D.       • thiamine (Vitamin B-1) tablet 100 mg  100 mg Oral DAILY Daniel Gibbons M.D.   100 mg at 07/05/22 0615    And   • multivitamin (THERAGRAN) tablet 1 Tablet  1 Tablet Oral DAILY Daniel Gibbons M.D.   1 Tablet at 07/05/22 0614    And   • folic acid (FOLVITE) tablet 1 mg  1 mg Oral DAILY Daniel Gibbons M.D.   1 mg at 07/05/22 0614   • hydrALAZINE (APRESOLINE) injection 10 mg  10 mg Intravenous Q4HRS PRN Daniel Gibbons M.D.       • omeprazole (PRILOSEC) capsule 20 mg  20 mg Oral QAM AC Daniel Gibbons M.D.   20 mg at 07/05/22 0615   • senna-docusate (PERICOLACE or SENOKOT S) 8.6-50 MG per tablet 2 Tablet  2 Tablet Oral BID Daniel Gibbons M.D.   2 Tablet at 07/04/22 1651    And   • polyethylene glycol/lytes (MIRALAX) PACKET 1 Packet  1 Packet Oral QDAY PRN Daniel Gibbons M.D.   1 Packet at 07/04/22 1637    And   • magnesium hydroxide (MILK OF MAGNESIA) suspension 30 mL  30 mL Oral QDAY PRN Daniel Gibbons M.D.        And   • bisacodyl (DULCOLAX) suppository 10 mg  10 mg Rectal QDAY PRN Daniel Gibbons M.D.       • sotalol  (BETAPACE) tablet 120 mg  120 mg Oral QAM Daniel Gibbons M.D.   120 mg at 07/05/22 0615   • valsartan (DIOVAN) tablet 80 mg  80 mg Oral BID Daniel Gibbons M.D.   80 mg at 07/05/22 0615         Allergies   Allergen Reactions   • Atorvastatin Rash, Itching and Myalgia         • Statins [Hmg-Coa-R Inhibitors] Rash and Itching     Skin gets red & itchy   • Sulfa Drugs Rash and Itching     Rash, itch         Family history: No family history of premature CAD      Social History     Socioeconomic History   • Marital status:      Spouse name: Not on file   • Number of children: Not on file   • Years of education: Not on file   • Highest education level: Not on file   Occupational History   • Not on file   Tobacco Use   • Smoking status: Never Smoker   • Smokeless tobacco: Never Used   Vaping Use   • Vaping Use: Never used   Substance and Sexual Activity   • Alcohol use: Yes     Comment: 3 drinks per day   • Drug use: Not Currently     Comment: 25 years ago   • Sexual activity: Not on file   Other Topics Concern   • Not on file   Social History Narrative   • Not on file     Social Determinants of Health     Financial Resource Strain: Low Risk    • Difficulty of Paying Living Expenses: Not very hard   Food Insecurity: No Food Insecurity   • Worried About Running Out of Food in the Last Year: Never true   • Ran Out of Food in the Last Year: Never true   Transportation Needs: No Transportation Needs   • Lack of Transportation (Medical): No   • Lack of Transportation (Non-Medical): No   Physical Activity: Inactive   • Days of Exercise per Week: 0 days   • Minutes of Exercise per Session: 0 min   Stress: No Stress Concern Present   • Feeling of Stress : Not at all   Social Connections: Moderately Isolated   • Frequency of Communication with Friends and Family: More than three times a week   • Frequency of Social Gatherings with Friends and Family: More than three times a week   • Attends Mu-ism  Services: Never   • Active Member of Clubs or Organizations: No   • Attends Club or Organization Meetings: Never   • Marital Status:    Intimate Partner Violence: Not At Risk   • Fear of Current or Ex-Partner: No   • Emotionally Abused: No   • Physically Abused: No   • Sexually Abused: No   Housing Stability: Low Risk    • Unable to Pay for Housing in the Last Year: No   • Number of Places Lived in the Last Year: 1   • Unstable Housing in the Last Year: No         Intake/Output Summary (Last 24 hours) at 7/5/2022 0833  Last data filed at 7/5/2022 0200  Gross per 24 hour   Intake 600 ml   Output 725 ml   Net -125 ml        Physical Exam  Body mass index is 34.54 kg/m².  BP (!) 149/85   Pulse 71   Temp 36.7 °C (98 °F) (Temporal)   Resp 16   Wt 116 kg (254 lb 11.2 oz)   SpO2 94%   Vitals:    07/04/22 1858 07/05/22 0028 07/05/22 0410 07/05/22 0753   BP: 126/87 134/84 (!) 145/86 (!) 149/85   Pulse: 72 65 72 71   Resp: 18 18 18 16   Temp: 36 °C (96.8 °F) 36.4 °C (97.5 °F) 36.6 °C (97.9 °F) 36.7 °C (98 °F)   TempSrc: Temporal Temporal Temporal Temporal   SpO2: 93% 94% 93% 94%   Weight:         Oxygen Therapy:  Pulse Oximetry: 94 %, O2 (LPM): 0, O2 Delivery Device: None - Room Air    General: Somnolent, appears older than stated age  Eyes: nl conjunctiva  ENT: OP clear  Neck: JVP not elevated,  no carotid bruits  Lungs: normal respiratory effort, CTAB  Heart: RRR, no murmurs, no rubs or gallops, no edema bilateral lower extremities. No LV/RV heave on cardiac palpatation. 2+ bilateral radial pulses.  2+ bilateral dp pulses.   Abdomen: soft, non tender, non distended, no masses, normal bowel sounds.  No HSM.  Extremities/MSK: no clubbing, no cyanosis  Neurological: No focal sensory deficits  Psychiatric: Appropriate affect, A/O x 3  Skin: Warm extremities      Labs (personally reviewed and notable for):   Lab Results   Component Value Date/Time    SODIUM 134 (L) 07/05/2022 12:20 AM    POTASSIUM 4.8 07/05/2022  12:20 AM    CHLORIDE 100 07/05/2022 12:20 AM    CO2 26 07/05/2022 12:20 AM    GLUCOSE 102 (H) 07/05/2022 12:20 AM    BUN 27 (H) 07/05/2022 12:20 AM    CREATININE 1.05 07/05/2022 12:20 AM    BUNCREATRAT 25 (H) 05/30/2014 09:04 AM      Lab Results   Component Value Date/Time    WBC 7.0 07/05/2022 12:20 AM    RBC 2.78 (L) 07/05/2022 12:20 AM    HEMOGLOBIN 9.6 (L) 07/05/2022 12:20 AM    HEMATOCRIT 29.3 (L) 07/05/2022 12:20 AM    .4 (H) 07/05/2022 12:20 AM    MCH 34.5 (H) 07/05/2022 12:20 AM    MCHC 32.8 (L) 07/05/2022 12:20 AM    MPV 10.9 07/05/2022 12:20 AM    NEUTSPOLYS 73.80 (H) 07/05/2022 12:20 AM    LYMPHOCYTES 12.00 (L) 07/05/2022 12:20 AM    MONOCYTES 12.60 07/05/2022 12:20 AM    EOSINOPHILS 0.40 07/05/2022 12:20 AM    BASOPHILS 0.10 07/05/2022 12:20 AM      Lab Results   Component Value Date/Time    CHOLSTRLTOT 320 (H) 05/10/2022 07:36 AM     (H) 05/10/2022 07:36 AM    HDL 76 05/10/2022 07:36 AM    TRIGLYCERIDE 167 (H) 05/10/2022 07:36 AM       Lab Results   Component Value Date/Time    TROPONINT 67 (H) 07/01/2022 1217     Lab Results   Component Value Date/Time    NTPROBNP 2387 (H) 07/01/2022 1217         Cardiac Imaging and Procedures Review:    EKG dated 7/1/2022: My personal interpretation is AV paced rhythm    Echo dated 7/2/2022: My personal interpretation is normal left ventricular size and function, no obvious sign of endocarditis      Radiology test Review:  EC-KIM W/O CONT    (Results Pending)       Problem list:  Principal Problem:    Bacteremia POA: Yes  Active Problems:    Paroxysmal ventricular tachycardia (HCC) POA: Yes    Alcohol abuse POA: Yes    Anemia POA: Unknown    Chronic diastolic congestive heart failure, NYHA class 3 (HCC) POA: Yes    Constipation POA: Unknown    Hyperglycemia POA: Unknown    Right hip pain POA: Unknown    Lung nodule seen on imaging study POA: Unknown  Resolved Problems:    Sepsis (HCC) POA: Unknown      Impression and Medical Decision  Making:  #Enterococcus bacteremia  #Paroxysmal VT s/p AICD  #Chronic HFpEF  #Paroxysmal A. fib  #Hypercoagulable secondary to A. fib  #On chronic anticoagulation    Recommendations:  -- Had extensive discussion with the patient and his wife regarding proceeding with KIM to better evaluate cardiac valves and pacemaker leads to rule out endocarditis given Enterococcus bacteremia.  Initially, patient was hesitant for the procedure but after discussion with his wife he is amenable to proceed.    --The risks, benefits, and alternatives to transesophageal echocardiogram were discussed with the patient in specific detail, including oropharyngeal and esophageal traumas including hoarseness and dysphagia after the procedure. Rare cases demonstrating serious or fatal complications associated with transesophageal echocardiogram have been reported in the adult population, including cardiac, pulmonary and bleeding complications in less than 1% of people. Patients with an identified intracardiac thrombus are at increased risk for embolic events and this appears to be reduced with anticoagulant therapy. The patient and his wife understand that this is a an invasive procedure, verbalized understandings about these  possible complications and wishes to proceed with this procedure.  Wife is going to consent for the procedure based on Mike's wishes.    --Rest of the care per primary team.      Thank you for allowing me to participate in the care of this patient.  Please contact me with any questions.      Carlos Tillman M.D.  Cardiologist, [facility] [place]   [phone]    This note was generated using voice recognition software which has a small chance of producing errors of grammar and possibly content. I have made every reasonable attempt to find and correct any obvious errors, but expect that some may not be found prior to finalization of this note.

## 2022-07-05 NOTE — DISCHARGE PLANNING
[facility] TCN chart review completed. Collaborated with FAIZA Kumar. Patient seen at bedside with spouse Karen present.  Patient gave verbal consent for spouse Kim to be present during discussion. PT and OT Recommend post-acute placement for additional therapy services prior to discharge home.  Discussed benefits of SNF therapy before returning home. Discussed OT/PT recommendations, patient and spouse in agreement  and SNF choice obtained.  Patient and spouse only agreeable to [facility].  They have a friend that works at [facility] in administration, Vandana Silva. Spouse states Vandana Silva will get him a room at [facility] when he is medically cleared.  Spouse requested information on medical insurance coverage at [facility].  This TCN informed patient on cost and days of SNF.      This TCN received inquiry from [facility] , Ledy ASHFORD Regarding wifes request fro more information regarding co-insurance payments for a SNF benefits. This TCN previously notified and informed member and spouse Karen regarding $20 a day SNF co-payment for the first 20 days at SNF level of care. Furthermore, SNF co-payment increased to $150 a day beginning day 21 to day 34; no additional co-payments beginning day 35 to day 100.   Would note member has met $1091.84 out of $3400 annual deductible.     TCN will continue to follow and collaborate with discharge planning team as additional post acute care needs arise.  Thank you.     Previously completed:  - Appreciate PT/OT recommendations.   - Choice forms:  (HH previously obtained, SNF choice obtained today 7/5/22, only for [facility]).   - [facility] introduced (Y/), referral (sent).

## 2022-07-05 NOTE — OR NURSING
0915: Pt arrived from KIM, handoff received from anesthesiologist and RN. Breathing even and unlabored.     0941: Call made to patient's wife to update patient status.     0947: Handoff to BETTY Mays on T8.

## 2022-07-05 NOTE — PROGRESS NOTES
NO HARD CHART: Patient to procedure Room for KIM with no hard chart, only loose papers. Per Floor RN, no hard chart available. Procedure RN completed Pre-Procedure Consent paperwork packet, including: WHO Yellow Sheet signed by RN and MD, Informed consent for KIM procedure signed by RN, patient--Spouse Karen Ferrell-via phone consent, and Cardiology, and Informed consent for Anesthesia signed by Anesthesia MD and patient--Spouse Karen Ferrell-via phone consent--as patient is confused at bedside. Procedure Packet secured to loose papers and hand delivered to receiving PACU RN who acknowledged receipt and no hard chart.

## 2022-07-05 NOTE — ANESTHESIA PREPROCEDURE EVALUATION
Date/Time: 07/05/22 0830    Scheduled providers: Carlos Tillman M.D.; Favio Patrick M.D.    Procedure: EC-KIM W/O CONT    Diagnosis:             Bacteremia [R78.81]      Bacteremia due to Staphylococcus aureus [R78.81, B95.61]            Bacteremia [R78.81]      Bacteremia due to Staphylococcus aureus [R78.81, B95.61]    Location: [facility] IMAGING - ECHOCARDIOLOGY [facility]          Relevant Problems   PULMONARY   (positive) Dyspnea      CARDIAC   (positive) Atrial fibrillation (HCC)   (positive) Complete heart block (HCC)   (positive) Essential hypertension   (positive) Paroxysmal ventricular tachycardia (HCC)   (positive) RBBB   (positive) Sinoatrial node dysfunction (HCC)         (positive) KEVYN (acute kidney injury) (HCC)   (positive) CKD stage G3b/A2, GFR 30-44 and albumin creatinine ratio  mg/g (HCC)      Other   (positive) Gouty arthritis of both ankles     BP (!) 149/85   Pulse 71   Temp 36.7 °C (98 °F) (Temporal)   Resp 16   Wt 116 kg (254 lb 11.2 oz)   SpO2 94%   BMI 34.54 kg/m²     Physical Exam    Airway   Mallampati: II  TM distance: >3 FB  Neck ROM: full       Cardiovascular - normal exam  Rhythm: regular  Rate: normal  (-) murmur     Dental - normal exam           Pulmonary - normal exam  Breath sounds clear to auscultation     Abdominal    Neurological - normal exam                 Anesthesia Plan    ASA 3   ASA physical status 3 criteria: implanted pacemaker    Plan - general       Airway plan will be natural airway          Induction: intravenous    Postoperative Plan: Postoperative administration of opioids is intended.    Pertinent diagnostic labs and testing reviewed    Informed Consent:    Anesthetic plan and risks discussed with patient.    Use of blood products discussed with: patient whom consented to blood products.

## 2022-07-05 NOTE — ANESTHESIA TIME REPORT
Anesthesia Start and Stop Event Times     Date Time Event    7/5/2022 0817 Ready for Procedure     0845 Anesthesia Start     0916 Anesthesia Stop        Responsible Staff  07/05/22    Name Role Begin End    Favio Patrick M.D. Anesth 0845 0916        Overtime Reason:  no overtime (within assigned shift)    Comments:

## 2022-07-05 NOTE — PROGRESS NOTES
Daily Progress Note:     Date of Service: 7/5/2022  Primary Team: [employer] [facility] Blue Team   Attending: Dr. Willard Saldana M.D.   Senior Resident: Dr. Micheline Camacho MD  Intern: Dr. Eun Foss MD  Contact:  [phone]    Patient Identifier:   Patient is a 70 yo male with chief complaint of right hip pain, PMH of HFpEF, HTN, A. Fib s/p pacemaker on Eliquis, VT s/p AICD, HLD, CKD, and alcohol abuse, transferred from [facility] for management of bacteremia s/p KIM.    Subjective:  Patient states his pain is well controlled with current pain regimen. States he did not have any hallucinations last night. Patient has never had any colonoscopy in the past.     Interval Update:  07/05: Patient underwent KIM today. Preliminary results negative. Pending interpretation by cardiologist.    Consultants/Specialty:  Ortho  Cardio  ID    Review of Systems:  Review of Systems   Constitutional: Negative for chills and fever.   HENT: Negative for congestion and sore throat.    Eyes: Negative for blurred vision and double vision.   Respiratory: Negative for cough and shortness of breath.    Cardiovascular: Positive for leg swelling. Negative for chest pain and palpitations.   Gastrointestinal: Negative for abdominal pain, nausea and vomiting.   Genitourinary: Negative for dysuria and urgency.   Musculoskeletal: Positive for joint pain.   Psychiatric/Behavioral: Negative for hallucinations.       Objective:   Vitals:   Temp:  [36 °C (96.8 °F)-36.9 °C (98.4 °F)] 36.9 °C (98.4 °F)  Pulse:  [65-79] 72  Resp:  [16-33] 33  BP: (126-155)/(80-99) 146/99  SpO2:  [93 %-98 %] 94 %    Physical Exam  Constitutional:       Appearance: He is obese.   HENT:      Head: Normocephalic and atraumatic.   Eyes:      Conjunctiva/sclera: Conjunctivae normal.      Pupils: Pupils are equal, round, and reactive to light.   Cardiovascular:      Rate and Rhythm: Normal rate.      Pulses: Normal pulses.   Pulmonary:      Effort: Pulmonary effort is normal.       Breath sounds: Normal breath sounds.   Abdominal:      General: Abdomen is flat.      Palpations: Abdomen is soft.   Musculoskeletal:         General: Tenderness present.      Right lower leg: Edema present.      Left lower leg: Edema present.   Skin:     General: Skin is warm and dry.      Findings: Bruising present.   Neurological:      Mental Status: He is alert.   Psychiatric:         Mood and Affect: Mood normal.         Behavior: Behavior normal.           Labs:   Lab Results   Component Value Date/Time    WBC 7.0 07/05/2022 12:20 AM    RBC 2.78 (L) 07/05/2022 12:20 AM    HEMOGLOBIN 9.6 (L) 07/05/2022 12:20 AM    HEMATOCRIT 29.3 (L) 07/05/2022 12:20 AM    .4 (H) 07/05/2022 12:20 AM    MCH 34.5 (H) 07/05/2022 12:20 AM    MCHC 32.8 (L) 07/05/2022 12:20 AM    MPV 10.9 07/05/2022 12:20 AM    NEUTSPOLYS 73.80 (H) 07/05/2022 12:20 AM    LYMPHOCYTES 12.00 (L) 07/05/2022 12:20 AM    MONOCYTES 12.60 07/05/2022 12:20 AM    EOSINOPHILS 0.40 07/05/2022 12:20 AM    BASOPHILS 0.10 07/05/2022 12:20 AM        Lab Results   Component Value Date/Time    SODIUM 134 (L) 07/05/2022 12:20 AM    POTASSIUM 4.8 07/05/2022 12:20 AM    CHLORIDE 100 07/05/2022 12:20 AM    CO2 26 07/05/2022 12:20 AM    GLUCOSE 102 (H) 07/05/2022 12:20 AM    BUN 27 (H) 07/05/2022 12:20 AM    CREATININE 1.05 07/05/2022 12:20 AM    BUNCREATRAT 25 (H) 05/30/2014 09:04 AM        No results found for: GGVQS90E, UYBCNK114A, OBWIV548T, ARTHCO3, ARTBE, GAPBG, EIX7NYZ3    Lab Results   Component Value Date/Time    PROTHROMBTM 16.3 (H) 07/01/2022 12:17 PM    INR 1.38 (H) 07/01/2022 12:17 PM        Imaging:   EC-KIM W/O CONT             Assessment and Plan:  Patient is a 72 yo male with chief complaint of right hip pain, PMH of HFpEF, HTN, A. Fib s/p pacemaker on Eliquis, VT s/p AICD, HLD, CKD, and alcohol abuse, transferred from [facility] for management of bacteremia s/p KIM.      * Bacteremia- (present on admission)  Assessment & Plan  Unclear source at  this time, unlikely endocarditis based on negative preliminary KIM, likely gastrointestinal/genitourinary source  BC from 7/1 and 7/2 growing gram possitive enterococcus faecalis, sensitive to ampicillin- ID following  Amp + C3 to cover for enterococus  Planned stop date 7/11    Ortho following procedure         ÁNGEL (obstructive sleep apnea)  Assessment & Plan  CPAP    Lung nodule seen on imaging study  Assessment & Plan  Incidental finding on CT   Possibly due to previous inflammation from [place] Fever infection  Follow up outpatient     Right hip pain  Assessment & Plan  Need to rule out septic arthritis, ortho consulting, pending procedure with Dr. Hargrove   Continue scheduled tylenol oxycodone 2.5 mg Q3 PRN + Dilaudid 0.5mg PRN for pain control, titrate as needed    Hyperglycemia  Assessment & Plan  A1C 5.7, no need for insulin   Follow up outpatient for diabetes prevention     Constipation  Assessment & Plan  Suspect from chronic opioid use  Colace and Fleet enema as needed    Chronic diastolic congestive heart failure, NYHA class 3 (HCC)- (present on admission)  Assessment & Plan  Continue valsartan      Anemia  Assessment & Plan  Multifactorial, likely secondary to BM from alcohol and B12 deficiency  Continue with B12, folate, thiamine     Alcohol abuse- (present on admission)  Assessment & Plan  Continue thiamine  Monitor for withdrawal  Last drink prior to admission on 06/30    Paroxysmal ventricular tachycardia (HCC)- (present on admission)  Assessment & Plan  Continue sotalol   Hold Eliquis in anticipation of ortho procedure      Code Status: Full code  DVT prophylaxis: None  Diet: Regular  GI: Colace, Omeprazole, Senna-docusate, Miralax, Dulculax   Disposition: pending evaluation by cardiology and ortho    Eun Foss MD  PGY-1 Internal Medicine

## 2022-07-05 NOTE — ASSESSMENT & PLAN NOTE
With desaturations to 70% at night, per RN report, with apneic events when sleeping  Patient does not tolerate CPAP  F/u with prior completed sleep study outpatient

## 2022-07-05 NOTE — ANESTHESIA POSTPROCEDURE EVALUATION
Patient: Mike Ferrell    Procedure Summary     Date: 07/05/22 Room / Location: [facility] - ECHOCARDIOLOGY [facility]    Anesthesia Start: 0845 Anesthesia Stop: 0916    Procedure: EC-KIM W/O CONT Diagnosis:             Bacteremia      Bacteremia due to Staphylococcus aureus            Bacteremia      Bacteremia due to Staphylococcus aureus    Scheduled Providers: Carlos Tillman M.D.; Favio Patrick M.D. Responsible Provider: Favio Patrick M.D.    Anesthesia Type: general ASA Status: 3          Final Anesthesia Type: general  Last vitals  BP   Blood Pressure : (!) 149/85    Temp   36.7 °C (98 °F)    Pulse   71   Resp   16    SpO2   94 %      Anesthesia Post Evaluation    Patient location during evaluation: PACU  Patient participation: complete - patient participated  Level of consciousness: awake and alert  Pain score: 6    Airway patency: patent  Anesthetic complications: no  Cardiovascular status: hemodynamically stable  Respiratory status: acceptable  Hydration status: euvolemic    PONV: none          No complications documented.     Nurse Pain Score: 6 (NPRS)

## 2022-07-06 ENCOUNTER — APPOINTMENT (OUTPATIENT)
Dept: RADIOLOGY | Facility: MEDICAL CENTER | Age: 72
DRG: 872 | End: 2022-07-06
Attending: STUDENT IN AN ORGANIZED HEALTH CARE EDUCATION/TRAINING PROGRAM
Payer: MEDICARE

## 2022-07-06 PROBLEM — R19.7 DIARRHEA: Status: ACTIVE | Noted: 2022-07-06

## 2022-07-06 PROBLEM — E87.70 VOLUME OVERLOAD: Status: ACTIVE | Noted: 2022-07-06

## 2022-07-06 LAB
ANION GAP SERPL CALC-SCNC: 11 MMOL/L (ref 7–16)
BASOPHILS # BLD AUTO: 0.2 % (ref 0–1.8)
BASOPHILS # BLD: 0.01 K/UL (ref 0–0.12)
BUN SERPL-MCNC: 26 MG/DL (ref 8–22)
CALCIUM SERPL-MCNC: 8.9 MG/DL (ref 8.5–10.5)
CHLORIDE SERPL-SCNC: 105 MMOL/L (ref 96–112)
CO2 SERPL-SCNC: 24 MMOL/L (ref 20–33)
CREAT SERPL-MCNC: 1.08 MG/DL (ref 0.5–1.4)
EOSINOPHIL # BLD AUTO: 0.06 K/UL (ref 0–0.51)
EOSINOPHIL NFR BLD: 1 % (ref 0–6.9)
ERYTHROCYTE [DISTWIDTH] IN BLOOD BY AUTOMATED COUNT: 52.8 FL (ref 35.9–50)
GFR SERPLBLD CREATININE-BSD FMLA CKD-EPI: 72 ML/MIN/1.73 M 2
GLUCOSE SERPL-MCNC: 89 MG/DL (ref 65–99)
HCT VFR BLD AUTO: 29.5 % (ref 42–52)
HGB BLD-MCNC: 9.7 G/DL (ref 14–18)
IMM GRANULOCYTES # BLD AUTO: 0.11 K/UL (ref 0–0.11)
IMM GRANULOCYTES NFR BLD AUTO: 1.8 % (ref 0–0.9)
LYMPHOCYTES # BLD AUTO: 0.87 K/UL (ref 1–4.8)
LYMPHOCYTES NFR BLD: 13.9 % (ref 22–41)
MCH RBC QN AUTO: 34.8 PG (ref 27–33)
MCHC RBC AUTO-ENTMCNC: 32.9 G/DL (ref 33.7–35.3)
MCV RBC AUTO: 105.7 FL (ref 81.4–97.8)
MONOCYTES # BLD AUTO: 0.76 K/UL (ref 0–0.85)
MONOCYTES NFR BLD AUTO: 12.2 % (ref 0–13.4)
NEUTROPHILS # BLD AUTO: 4.43 K/UL (ref 1.82–7.42)
NEUTROPHILS NFR BLD: 70.9 % (ref 44–72)
NRBC # BLD AUTO: 0 K/UL
NRBC BLD-RTO: 0 /100 WBC
PLATELET # BLD AUTO: 121 K/UL (ref 164–446)
PMV BLD AUTO: 10.5 FL (ref 9–12.9)
POTASSIUM SERPL-SCNC: 3.9 MMOL/L (ref 3.6–5.5)
RBC # BLD AUTO: 2.79 M/UL (ref 4.7–6.1)
SODIUM SERPL-SCNC: 140 MMOL/L (ref 135–145)
WBC # BLD AUTO: 6.2 K/UL (ref 4.8–10.8)

## 2022-07-06 PROCEDURE — 770020 HCHG ROOM/CARE - TELE (206)

## 2022-07-06 PROCEDURE — 700101 HCHG RX REV CODE 250: Performed by: HOSPITALIST

## 2022-07-06 PROCEDURE — 73501 X-RAY EXAM HIP UNI 1 VIEW: CPT | Mod: RT

## 2022-07-06 PROCEDURE — 700102 HCHG RX REV CODE 250 W/ 637 OVERRIDE(OP): Performed by: INTERNAL MEDICINE

## 2022-07-06 PROCEDURE — 97530 THERAPEUTIC ACTIVITIES: CPT

## 2022-07-06 PROCEDURE — 36415 COLL VENOUS BLD VENIPUNCTURE: CPT

## 2022-07-06 PROCEDURE — A9270 NON-COVERED ITEM OR SERVICE: HCPCS | Performed by: INTERNAL MEDICINE

## 2022-07-06 PROCEDURE — 80048 BASIC METABOLIC PNL TOTAL CA: CPT

## 2022-07-06 PROCEDURE — 85025 COMPLETE CBC W/AUTO DIFF WBC: CPT

## 2022-07-06 PROCEDURE — 99233 SBSQ HOSP IP/OBS HIGH 50: CPT | Performed by: INTERNAL MEDICINE

## 2022-07-06 PROCEDURE — 97535 SELF CARE MNGMENT TRAINING: CPT

## 2022-07-06 PROCEDURE — 700102 HCHG RX REV CODE 250 W/ 637 OVERRIDE(OP): Performed by: STUDENT IN AN ORGANIZED HEALTH CARE EDUCATION/TRAINING PROGRAM

## 2022-07-06 PROCEDURE — 700105 HCHG RX REV CODE 258: Performed by: INTERNAL MEDICINE

## 2022-07-06 PROCEDURE — 700111 HCHG RX REV CODE 636 W/ 250 OVERRIDE (IP): Performed by: INTERNAL MEDICINE

## 2022-07-06 PROCEDURE — 99233 SBSQ HOSP IP/OBS HIGH 50: CPT | Mod: GC | Performed by: INTERNAL MEDICINE

## 2022-07-06 PROCEDURE — A9270 NON-COVERED ITEM OR SERVICE: HCPCS | Performed by: STUDENT IN AN ORGANIZED HEALTH CARE EDUCATION/TRAINING PROGRAM

## 2022-07-06 RX ORDER — CALCIUM POLYCARBOPHIL 625 MG 625 MG/1
625 TABLET ORAL
Status: CANCELLED | OUTPATIENT
Start: 2022-07-06

## 2022-07-06 RX ORDER — ACETAMINOPHEN 325 MG/1
650 TABLET ORAL 3 TIMES DAILY
Status: DISCONTINUED | OUTPATIENT
Start: 2022-07-06 | End: 2022-07-06

## 2022-07-06 RX ORDER — HYDROCODONE BITARTRATE AND ACETAMINOPHEN 5; 325 MG/1; MG/1
1 TABLET ORAL EVERY 6 HOURS PRN
Status: DISCONTINUED | OUTPATIENT
Start: 2022-07-06 | End: 2022-07-11 | Stop reason: HOSPADM

## 2022-07-06 RX ORDER — HYDROCODONE BITARTRATE AND ACETAMINOPHEN 5; 325 MG/1; MG/1
1 TABLET ORAL ONCE
Status: DISPENSED | OUTPATIENT
Start: 2022-07-06 | End: 2022-07-09

## 2022-07-06 RX ORDER — FUROSEMIDE 10 MG/ML
40 INJECTION INTRAMUSCULAR; INTRAVENOUS ONCE
Status: COMPLETED | OUTPATIENT
Start: 2022-07-07 | End: 2022-07-07

## 2022-07-06 RX ADMIN — OMEPRAZOLE 20 MG: 20 CAPSULE, DELAYED RELEASE ORAL at 06:25

## 2022-07-06 RX ADMIN — ACETAMINOPHEN 1000 MG: 500 TABLET ORAL at 06:25

## 2022-07-06 RX ADMIN — AMPICILLIN SODIUM 2000 MG: 2 INJECTION, POWDER, FOR SOLUTION INTRAVENOUS at 15:48

## 2022-07-06 RX ADMIN — AMPICILLIN SODIUM 2000 MG: 2 INJECTION, POWDER, FOR SOLUTION INTRAVENOUS at 12:41

## 2022-07-06 RX ADMIN — HYDROCODONE BITARTRATE AND ACETAMINOPHEN 1 TABLET: 5; 325 TABLET ORAL at 10:48

## 2022-07-06 RX ADMIN — VALSARTAN 80 MG: 80 TABLET, FILM COATED ORAL at 17:55

## 2022-07-06 RX ADMIN — THERA TABS 1 TABLET: TAB at 06:25

## 2022-07-06 RX ADMIN — FOLIC ACID 1 MG: 1 TABLET ORAL at 06:25

## 2022-07-06 RX ADMIN — Medication 100 MG: at 06:24

## 2022-07-06 RX ADMIN — AMPICILLIN SODIUM 2000 MG: 2 INJECTION, POWDER, FOR SOLUTION INTRAVENOUS at 03:05

## 2022-07-06 RX ADMIN — APIXABAN 5 MG: 5 TABLET, FILM COATED ORAL at 10:47

## 2022-07-06 RX ADMIN — AMPICILLIN SODIUM 2000 MG: 2 INJECTION, POWDER, FOR SOLUTION INTRAVENOUS at 06:26

## 2022-07-06 RX ADMIN — VALSARTAN 80 MG: 80 TABLET, FILM COATED ORAL at 06:24

## 2022-07-06 RX ADMIN — SOTALOL HYDROCHLORIDE 120 MG: 80 TABLET ORAL at 06:25

## 2022-07-06 RX ADMIN — LIDOCAINE 1 PATCH: 50 PATCH TOPICAL at 08:58

## 2022-07-06 RX ADMIN — APIXABAN 5 MG: 5 TABLET, FILM COATED ORAL at 17:55

## 2022-07-06 RX ADMIN — AMPICILLIN SODIUM 2000 MG: 2 INJECTION, POWDER, FOR SOLUTION INTRAVENOUS at 19:13

## 2022-07-06 ASSESSMENT — GAIT ASSESSMENTS
DEVIATION: BRADYKINETIC;DECREASED HEEL STRIKE;ANTALGIC
ASSISTIVE DEVICE: FRONT WHEEL WALKER
DISTANCE (FEET): 20
GAIT LEVEL OF ASSIST: CONTACT GUARD ASSIST

## 2022-07-06 ASSESSMENT — ENCOUNTER SYMPTOMS
COUGH: 0
SHORTNESS OF BREATH: 0
PALPITATIONS: 0
ABDOMINAL PAIN: 0
DOUBLE VISION: 0
WEAKNESS: 1
HEADACHES: 0
FEVER: 0
NERVOUS/ANXIOUS: 0
MYALGIAS: 0
NAUSEA: 0
CONSTIPATION: 0
DIARRHEA: 1
SPUTUM PRODUCTION: 0
SORE THROAT: 0
BLURRED VISION: 0
ABDOMINAL PAIN: 1
CHILLS: 0
VOMITING: 0

## 2022-07-06 ASSESSMENT — COGNITIVE AND FUNCTIONAL STATUS - GENERAL
MOVING FROM LYING ON BACK TO SITTING ON SIDE OF FLAT BED: UNABLE
MOVING TO AND FROM BED TO CHAIR: UNABLE
WALKING IN HOSPITAL ROOM: A LITTLE
TURNING FROM BACK TO SIDE WHILE IN FLAT BAD: A LOT
CLIMB 3 TO 5 STEPS WITH RAILING: A LOT
STANDING UP FROM CHAIR USING ARMS: A LITTLE
SUGGESTED CMS G CODE MODIFIER MOBILITY: CL
MOBILITY SCORE: 12

## 2022-07-06 ASSESSMENT — PAIN DESCRIPTION - PAIN TYPE
TYPE: ACUTE PAIN
TYPE: ACUTE PAIN
TYPE: ACUTE PAIN;CHRONIC PAIN

## 2022-07-06 NOTE — PROGRESS NOTES
Assumed care of pt. Bedside report received from Dominic HERNÁNDEZ. Pt was updated on plan of care. Call light, phone and personal belongings in reach. Bed alarm on and working properly, bed in lowest position, and locked.

## 2022-07-06 NOTE — DISCHARGE PLANNING
Agency/Facility Name: [facility]  Outcome: DPA attempted to contact SNF regarding referral status however v-mailbox is full at this time. DPA to attempt follow up at later time.

## 2022-07-06 NOTE — ASSESSMENT & PLAN NOTE
Resolved  Volume overload 2/2 to aggressive IVF resus on admission  Improved after diuresis, at goal euvolemia.  Monitor I/Os

## 2022-07-06 NOTE — ASSESSMENT & PLAN NOTE
Resolved  Iatrogenic vs. Secondary to ampicillin  Now D/C stool softeners, monitor  C. Diff workup if diarrhea persists

## 2022-07-06 NOTE — DISCHARGE PLANNING
Case Management Discharge Planning    This CM spoke with the patient at the bedside.  Pt lives in a single story home with 5 steps to the door with his wife.  Pt uses [facility] in [place] and has a WC, FWW and Cane at home.  Pt was independent but wasn't driving at home prior to this admission.    Pt has an Advanced Directive on File    Admission Date: 7/3/2022  GMLOS: 4.8  ALOS: 3    6-Clicks ADL Score: 18  6-Clicks Mobility Score: 12  PT and/or OT Eval ordered: Yes  Post-acute Referrals Ordered: Yes  Post-acute Choice Obtained: Yes  Has referral(s) been sent to post-acute provider:  Yes, [facility] pending medical clearance and a bed.      Anticipated Discharge Dispo: Discharge Disposition: D/T to SNF with Medicare cert in anticipation of skilled care (03)    DME Needed: No    Action(s) Taken: DC Assessment Complete (See below)    Escalations Completed: None    Medically Clear: No    Next Steps: monitor for emerging needs and medical clearance    Barriers to Discharge: Medical clearance    Is the patient up for discharge tomorrow: No     Care Transition Team Assessment    Information Source  Orientation Level: Oriented X4  Information Given By: Patient  Informant's Name: Natividad Gilbert, spouse [phone]  Who is responsible for making decisions for patient? : Patient    Readmission Evaluation  Is this a readmission?: No    Elopement Risk  Legal Hold: No  Ambulatory or Self Mobile in Wheelchair: No-Not an Elopement Risk  Elopement Risk: Not at Risk for Elopement    Interdisciplinary Discharge Planning  Does Admitting Nurse Feel This Could be a Complex Discharge?: No  Primary Care Physician: Hipolito Hall  Lives with - Patient's Self Care Capacity: Spouse  Patient or legal guardian wants to designate a caregiver: No  Support Systems: Spouse / Significant Other, Children  Housing / Facility: 1 Story House  Prior Services: None  Durable Medical Equipment: Walker, Other - Specify (WC, Cane)    Discharge  Preparedness  What is your plan after discharge?: Skilled nursing facility  What are your discharge supports?: Spouse, Child  Prior Functional Level: Independent with Activities of Daily Living, Independent with Medication Management, Ambulatory, Uses Walker  Difficulity with ADLs: None  Difficulity with IADLs: None    Functional Assesment  Prior Functional Level: Independent with Activities of Daily Living, Independent with Medication Management, Ambulatory, Uses Walker    Finances  Financial Barriers to Discharge: No  Prescription Coverage: Yes    Vision / Hearing Impairment  Hearing Impairment: Both Ears, Patient Declines to Wear Hearing Device(s)              Domestic Abuse  Have you ever been the victim of abuse or violence?: No  Physical Abuse or Sexual Abuse: No  Verbal Abuse or Emotional Abuse: No  Possible Abuse/Neglect Reported to:: Not Applicable              Anticipated Discharge Information  Discharge Disposition: D/T to SNF with Medicare cert in anticipation of skilled care (03)

## 2022-07-06 NOTE — DISCHARGE PLANNING
[facility] T[facility] chart review completed. Collaborated with FAIZA García.  Per  Raqule, kenny is looking at block for pain management.  Per chart review patients blood cultures were positive for enterococcus and underwent KIM on 7/5/22.  Patient seen at bedside. Patient is agreeable to SNF and has been accepted to [facility].   Patient is not medically cleared at this time. TCN will continue to follow and collaborate with discharge planning team as additional post acute needs arise. Thank you.        Previously completed:  - Appreciate PT/OT recommendations.   - Choice forms:  (HH previously obtained, SNF choice obtained today 7/5/22, only for [facility]).   - GSC introduced (Y/), referral (sent).

## 2022-07-06 NOTE — PROGRESS NOTES
Infectious Disease Progress Note    Author: Brittany Fisher M.D. Date & Time of service: 2022  4:06 PM    Chief Complaint:  Bacteremia, possible septic hip      Interval History:   AF, O2 RA, significant pain in right hip as well as right knee.     Review of Systems:  Review of Systems   Respiratory: Negative for cough, sputum production and shortness of breath.    Cardiovascular: Negative for chest pain.   Gastrointestinal: Positive for diarrhea. Negative for abdominal pain, constipation, nausea and vomiting.   Musculoskeletal: Positive for joint pain. Negative for myalgias.   Psychiatric/Behavioral: The patient is not nervous/anxious.        Hemodynamics:  Temp (24hrs), Av.6 °C (97.9 °F), Min:36.4 °C (97.5 °F), Max:37 °C (98.6 °F)  Temperature: 36.5 °C (97.7 °F), Monitored Temp: 37.1 °C (98.8 °F)  Pulse  Av  Min: 65  Max: 86   Blood Pressure : 134/79       Physical Exam:  Physical Exam  Cardiovascular:      Rate and Rhythm: Normal rate and regular rhythm.      Heart sounds: Normal heart sounds.   Pulmonary:      Effort: Pulmonary effort is normal.      Breath sounds: Normal breath sounds.   Abdominal:      General: Abdomen is flat. Bowel sounds are normal. There is distension.      Palpations: Abdomen is soft.      Tenderness: There is no abdominal tenderness.   Musculoskeletal:         General: Normal range of motion.   Skin:     General: Skin is warm and dry.   Neurological:      General: No focal deficit present.      Mental Status: He is oriented to person, place, and time.   Psychiatric:         Mood and Affect: Mood normal.         Behavior: Behavior normal.         Meds:    Current Facility-Administered Medications:   •  HYDROcodone-acetaminophen  •  HYDROcodone-acetaminophen  •  lactulose  •  acetaminophen  •  lidocaine  •  ampicillin  •  apixaban  •  [DISCONTINUED] detox 1000 mL infusion **AND** thiamine **AND** multivitamin **AND** folic acid  •  hydrALAZINE  •  omeprazole  •   sotalol  •  valsartan    Labs:  Recent Labs     07/04/22 0228 07/05/22  0020 07/06/22  0032   WBC 7.1 7.0 6.2   RBC 2.85* 2.78* 2.79*   HEMOGLOBIN 9.9* 9.6* 9.7*   HEMATOCRIT 29.9* 29.3* 29.5*   .9* 105.4* 105.7*   MCH 34.7* 34.5* 34.8*   RDW 52.5* 52.5* 52.8*   PLATELETCT 96* 105* 121*   MPV 11.1 10.9 10.5   NEUTSPOLYS  --  73.80* 70.90   LYMPHOCYTES  --  12.00* 13.90*   MONOCYTES  --  12.60 12.20   EOSINOPHILS  --  0.40 1.00   BASOPHILS  --  0.10 0.20     Recent Labs     07/04/22 0228 07/05/22  0020 07/06/22  0032   SODIUM 139 134* 140   POTASSIUM 4.4 4.8 3.9   CHLORIDE 103 100 105   CO2 24 26 24   GLUCOSE 132* 102* 89   BUN 28* 27* 26*     Recent Labs     07/04/22 0228 07/05/22  0020 07/06/22  0032   CREATININE 1.07 1.05 1.08       Imaging:  CT-CHEST,ABDOMEN,PELVIS W/O    Result Date: 7/2/2022 7/2/2022 2:12 PM HISTORY/REASON FOR EXAM:  +blood cultures, unknown source. +OA in right hip w tentative surgery 7/5.  has ICD- unable to do MRI pelvis to assess for fluid collection Evaluate for source of infection TECHNIQUE/EXAM DESCRIPTION: CT scan of the chest, abdomen and pelvis without contrast was performed. Noncontrast helical scanning of the chest, abdomen and pelvis was obtained from the lung apices through the pubic symphysis. Low dose optimization technique was utilized for this CT exam including automated exposure control and adjustment of the mA and/or kV according to patient size. COMPARISON:  1 view chest 7/1/2022 FINDINGS: Osseous structures: There is osteoarthritis of the right shoulder joint which is advanced and there are multiple associated intra-articular ossific body. There is also advanced osteoarthritis of the left glenohumeral joint. There is osteoarthritis of the right hip joint which appears severe in degree. There are spinal degenerative changes. There is scoliosis. There is a large degenerative subluxation at L5-S1. There are old compression fracture at T12 and L1. Chest: Cardiac  device is present. LUNGS: The right lung apex there is curvilinear density with architectural distortion. There is a central mass measuring 16 x 7 mm. Findings could represent postinflammatory scarring or a neoplasm. There is mild dependent density both lung bases consistent with atelectasis. MEDIASTINUM: There is no adenopathy, mass, or other abnormality within the axilla, the mediastinum, or the elizabeth. AORTA: There is aortic and dense coronary atherosclerotic plaque. PLEURA: There no pleural effusion or pneumothoraces. Abdomen: LIVER: The liver is normal in appearance. SPLEEN: The spleen is normal in appearance. PANCREAS: The pancreas is normal in appearance. GALLBLADDER and biliary system: There has been cholecystectomy. Venous vasculature: The splenic vein and portal vein enhance normally. ADRENAL GLANDS: The adrenal glands are normal in appearance. KIDNEYS: The kidneys are normal in appearance. AORTA: There is aortic atherosclerosis without aneurysm. BOWEL: No bowel or mesenteric abnormality identified. Pelvis: Within normal limits.     1.  No evidence for infection within the chest, abdomen, pelvis 2.  The right upper lobe is abnormal with architecture distortion and central 16 x 7 mm mass. These findings could indicate postinflammatory scarring or a neoplasm. PET CT scan is recommended 3.  Mild bilateral dependent atelectasis 4.  Prior cholecystectomy 5.  Presence of cardiac device 6.  Severe osteoarthritis of both glenohumeral joint and the right hip joint 7.  aortic and branch vessel atherosclerotic plaque    DX-CHEST-PORTABLE (1 VIEW)    Result Date: 7/1/2022 7/1/2022 12:41 PM HISTORY/REASON FOR EXAM: Sepsis TECHNIQUE/EXAM DESCRIPTION AND NUMBER OF VIEWS: Single portable view of the chest. COMPARISON: None FINDINGS: There is no evidence of focal consolidation or evidence of pulmonary edema. There is no pleural effusion. The heart is enlarged. There is a left-sided AICD.      Cardiomegaly.    US-RENAL    Result Date: 7/2/2022 7/2/2022 2:25 AM HISTORY/REASON FOR EXAM:  Abnormal Labs TECHNIQUE/EXAM DESCRIPTION:  Renal ultrasound. COMPARISON:  None FINDINGS: The right kidney measures 10.02 cm.  The left kidney measures 11.12 cm. There is no hydronephrosis. There are no abnormal calcifications. The bladder demonstrates no focal wall abnormality. Bilateral ureteral jets are not visualized.     1.  Grossly unremarkable renal ultrasound. Examination is technically limited due to patient body habitus.    US-EXTREMITY NON VASCULAR UNILATERAL RIGHT    Result Date: 7/3/2022  7/3/2022 8:17 AM HISTORY/REASON FOR EXAM:  Assessing for effusion right hip, possible septic arthritis TECHNIQUE/EXAM DESCRIPTION AND NUMBER OF VIEWS: Right groin region ultrasound to evaluate for joint effusion COMPARISON: None FINDINGS: Technically challenging evaluation due to body habitus and positioning limitations No abnormal fluid collection is seen. No hyperemia is noted.     No sonographic evidence of hip effusion or other abnormal fluid collection Technically challenging evaluation. MRI over CT could better characterize if clinically indicated    EC-ECHOCARDIOGRAM COMPLETE W/ CONT    Result Date: 7/2/2022  Transthoracic Echo Report Echocardiography Laboratory CONCLUSIONS Technically difficult due to positioning and body habitus but adequate study for interpretation. Pt unable to assume left lateral decubitus due to injured hip. Grossly normal left ventricular systolic function. The left ventricular ejection fraction is visually estimated to be 55%. Mildly dilated right ventricle with preserved systolic function. Mild mitral valve regurgitation. Mild tricuspid regurgitation. Right ventricular systolic pressure is estimated to be 41 mmHg; mild pulmonary hypertension. Normal pericardium without effusion. Compared to prior study 5/16/22, current study technically difficult. BAILEY RICHARD Exam Date:          2022                    14:58 Exam Location:     Inpatient Priority:          Routine Ordering Physician:        ED GARLAND Referring Physician:       [id number] Gibbs Sonographer:               Kasi Bobo RDCS Age:    71     Gender:    M MRN:    [id number] :    1950 BSA:    2.36   Ht (in):    72     Wt (lb):    254 Exam Type:     Complete, Contrast Indications:     Acute/Subacute Bacterial Endocarditis ICD Codes:       421 CPT Codes:       29353,  BP:   126    /   47     HR: Technical Quality:       Technically difficult study -                          adequate information is obtained MEASUREMENTS  (Male / Female) Normal Values 2D ECHO LV Diastolic Diameter PLAX        4.6 cm                4.2 - 5.9 / 3.9 - 5.3 cm LV Systolic Diameter PLAX         3.1 cm                2.1 - 4.0 cm LV Fractional Shortening PLAX     32.4 %                25 - 46  % IVS Diastolic Thickness           1 cm                  LVPW Diastolic Thickness          1 cm                  Estimated LV Ejection Fraction    55 %                  M-MODE Aortic Root Diameter MM           3.4 cm                DOPPLER AV Peak Velocity                  1.2 m/s               AV Peak Gradient                  5.6 mmHg              AV Mean Gradient                  2.9 mmHg              LVOT Peak Velocity                0.66 m/s              Mitral E Point Velocity           0.77 m/s              Mitral E to A Ratio               1.1                   Mitral A Duration                 109 ms                MV Pressure Half Time             38 ms                 MV Area PHT                       5.8 cm2               MV Deceleration Time              131 ms                TR Peak Velocity                  352 cm/s              TR Peak Gradient                  49.5 mmHg             * Indicates values subject to auto-interpretation LV EF:  55    % FINDINGS  Left Ventricle 3 ml contrast was used to enhance visualization of the endocardial border. Existing IV was used. Normal left ventricular wall thickness. Grossly normal left ventricular systolic function. The left ventricular ejection fraction is visually estimated to be 55%. Normal diastolic function. Right Ventricle Mildly dilated right ventricle with preserved systolic function. Right Atrium Enlarged right atrium. Left Atrium Normal left atrial size. Mitral Valve Structurally normal mitral valve without significant stenosis. Mild mitral valve regurgitation. Aortic Valve Structurally normal aortic valve without significant stenosis or regurgitation. Tricuspid Valve Structurally normal tricuspid valve. No tricuspid stenosis. Mild tricuspid regurgitation. Right ventricular systolic pressure is estimated to be 41 mmHg. Right atrial pressure is estimated to  be 3 mmHg. Pulmonic Valve Structurally normal pulmonic valve without significant stenosis or regurgitation. Pericardium Normal pericardium without effusion. Aorta Normal aortic root for body surface area. Patric Sood MD (Electronically Signed) Final Date:     02 July 2022                 16:47    EC-KIM W/O CONT    Result Date: 7/5/2022  Results Will be Available after Interpretation by Cardiologist.    DX-HIP-UNILATERAL-WITH PELVIS-1 VIEW RIGHT    Result Date: 7/6/2022 7/6/2022 11:19 AM HISTORY/REASON FOR EXAM:  Atraumatic Pelvic/Hip Pain. TECHNIQUE/EXAM DESCRIPTION AND NUMBER OF VIEWS:  2 views of the RIGHT hip. COMPARISON: Chest, abdomen and pelvis CT 7/2/2022 FINDINGS: There is no fracture or dislocation. The visualized osseous structures are in anatomic alignment. There are advanced degenerative changes of the right hip with severe joint space narrowing, bone-on-bone contact and small marginal osteophytes. Mild to moderate osteoarthritis of the left hip. Lower lumbar degenerative changes. Bone mineralization is age-appropriate..     1.  Severe  osteoarthritis of the right hip with bone-on-bone contact. 2.  Mild to moderate left hip osteoarthritis.    DX-HIP-COMPLETE - UNILATERAL 2+ RIGHT    Result Date: 6/13/2022  X-ray of the right hip with 2 views consisting of AP and lateral views on 6/13/2022 demonstrates severe joint space narrowing with bone-on-bone osteoarthritic changes and particular osteophyte formation      Micro:  Results     ** No results found for the last 168 hours. **          Assessment:  Active Hospital Problems    Diagnosis    • *Bacteremia [R78.81]    • Diarrhea [R19.7]    • Volume overload [E87.70]    • ÁNGEL (obstructive sleep apnea) [G47.33]    • Constipation [K59.00]    • Hyperglycemia [R73.9]    • Right hip pain [M25.551]    • Lung nodule seen on imaging study [R91.1]    • Chronic diastolic congestive heart failure, NYHA class 3 (HCC) [I50.32]    • Anemia [D64.9]    • Gouty arthritis of both ankles [M10.9]    • Alcohol abuse [F10.10]    • Paroxysmal ventricular tachycardia (HCC) [I47.2]      Interval 24 hours:      AF, O2 RA  Labs reviewed  Imaging personally reviewed both images and report.   Micro reviewed    Patient with multiple bouts of diarrhea earlier today.  States some improvement in his hip pain.  Continued on antibiotics as below.     ASSESSMENT/PLAN:      71 y.o.  admitted 7/1/2022. Pt has a past medical history of HFpEF, HTN, A fib on Eliquis, heart disease status post pacemaker, V. tach status post AICD and CKD . Osteoarthritis and was admitted for R BONI.  Blood cultures drawn due to leukocytosis which are now positive. Given positive blood cultures and worsening of his right hip pain over the last few weeks. Orthopedics saw the patient on 7/2 and are recommending aspiration of the hip and then potential washout depending on results.  However, no pocket to drain per IR.  Patient states his hip pain is ongoing.  CT chest abdomen pelvis with no evidence of infection.     Problem List     Bacteremia, unclear source, he  does have some wounds on his upper arms but no obvious infection.  CTs with no intra-abdominal infection.  Denies any dysuria.  -Blood cultures on 7/1 and 7/2 +  E faecalis, ampicillin sensitive    -Blood cultures on 7/3 are no growth to date  -TTE was poor study, no vegetations noted, no significant valvular disease  -KIM on 7/5 -per verbal no vegetations but awaiting official read  Acute on chronic right hip pain and concern for septic arthritis of the right hip  -Orthopedics are aware and have recommended aspiration of the right hip  Severe osteoarthritis of both glenohumeral and right hip joint noted on CT  History of heart disease and status post pacemaker  History of V. tach and status post AICD  A. fib on Eliquis  Mass noted in right upper lobe and PET scan recommended, defer to primary team  Antibiotic allergies: Sulfa drugs reported as rash  Diarrhea, new, states he was on stool softeners     Plan      --- Continue ampicillin 2 g every 4 hours, stop ceftriaxone based on verbal that there were no vegetations -awaiting read from the KIM-once confirmed there are no vegetations on the valves or the ICD/leads will recommend a 2-week antibiotic course -transition to linezolid 600 mg twice daily on discharge (end 7/17/22)   --- Follow-up repeat blood cultures - NGTD   --- Ortho with no plans for I&D and will wait on hip replacement until he is cleared his infection   --- Monitor labs  --- Stop stool softeners and if continues to have watery diarrhea tomorrow then obtain C. difficile testing to rule out     Dispo: Likely home on oral antibiotics   PICC: No       Plan of care discussed with [facility] team.  Will continue to follow.

## 2022-07-06 NOTE — PROGRESS NOTES
Daily Progress Note:     Date of Service: 7/6/2022  Primary Team: [facility] IM Blue Team   Attending: Dr. Willard Saldana M.D.   Senior Resident: Dr. Micheline Camacho MD  Intern: Dr. Eun Foss MD  Contact:  [phone]    Patient Identifier:   Patient is a 70 yo male with a PMH of HFpEF, HTN, A. Fib s/p pacemaker on Eliquis, VT s/p AICD, HLD, CKD, and alcohol abuse, transferred from [facility] for management of bacteremia s/p KIM.    Subjective:  Patient's wife present during exam, understandably confused regarding treatment and plan and asking for clarification. Explained plan to patient and patient's wife, who endorsed understanding.  Patient wife states patient is severely allergic to oxycontin. Patient confirms allergy and states he does well on [place]. Patient's PCP is Dr. Hall. He has had a prior sleep study done but does not yet know the results. Patient's wife states patient's pulmonologist is Dr. Ferrera. Currently tolerating CPAP well. Patient has had 5 episodes of loose stool this morning and complaining of intermittent RLQ abdominal spasms. Complains of cuts from using bed pan, requesting bedside commode.     Interval Update:  07/05: Patient underwent KIM today. Preliminary results negative. Pending interpretation by cardiologist.  07/06: Patient has never had prior colonoscopy in the past.    Consultants/Specialty:  Ortho  Cardio  ID    Review of Systems:  Review of Systems   Constitutional: Negative for chills and fever.   HENT: Negative for congestion and sore throat.    Eyes: Negative for blurred vision and double vision.   Respiratory: Negative for cough and shortness of breath.    Cardiovascular: Positive for leg swelling. Negative for chest pain and palpitations.   Gastrointestinal: Positive for abdominal pain and diarrhea. Negative for constipation, nausea and vomiting.   Genitourinary: Negative for dysuria and urgency.   Musculoskeletal: Positive for joint pain.   Neurological: Positive for  weakness. Negative for headaches.       Objective:   Vitals:   Temp:  [36.4 °C (97.5 °F)-37 °C (98.6 °F)] 36.8 °C (98.2 °F)  Pulse:  [67-80] 80  Resp:  [18-20] 18  BP: (123-152)/(71-95) 138/86  SpO2:  [93 %-98 %] 93 %    Physical Exam  Constitutional:       Appearance: He is obese.   HENT:      Head: Normocephalic and atraumatic.   Eyes:      Conjunctiva/sclera: Conjunctivae normal.      Pupils: Pupils are equal, round, and reactive to light.   Cardiovascular:      Rate and Rhythm: Normal rate.      Pulses: Normal pulses.   Pulmonary:      Effort: Pulmonary effort is normal.      Breath sounds: Normal breath sounds.   Abdominal:      General: Abdomen is flat.      Palpations: Abdomen is soft.      Tenderness: There is abdominal tenderness. There is no guarding.   Musculoskeletal:         General: Swelling and tenderness present.      Right lower leg: Edema present.      Left lower leg: Edema present.   Skin:     General: Skin is warm and dry.      Findings: Bruising present.   Neurological:      Mental Status: He is alert.   Psychiatric:         Mood and Affect: Mood normal.         Behavior: Behavior normal.           Labs:   Lab Results   Component Value Date/Time    WBC 6.2 07/06/2022 12:32 AM    RBC 2.79 (L) 07/06/2022 12:32 AM    HEMOGLOBIN 9.7 (L) 07/06/2022 12:32 AM    HEMATOCRIT 29.5 (L) 07/06/2022 12:32 AM    .7 (H) 07/06/2022 12:32 AM    MCH 34.8 (H) 07/06/2022 12:32 AM    MCHC 32.9 (L) 07/06/2022 12:32 AM    MPV 10.5 07/06/2022 12:32 AM    NEUTSPOLYS 70.90 07/06/2022 12:32 AM    LYMPHOCYTES 13.90 (L) 07/06/2022 12:32 AM    MONOCYTES 12.20 07/06/2022 12:32 AM    EOSINOPHILS 1.00 07/06/2022 12:32 AM    BASOPHILS 0.20 07/06/2022 12:32 AM        Lab Results   Component Value Date/Time    SODIUM 140 07/06/2022 12:32 AM    POTASSIUM 3.9 07/06/2022 12:32 AM    CHLORIDE 105 07/06/2022 12:32 AM    CO2 24 07/06/2022 12:32 AM    GLUCOSE 89 07/06/2022 12:32 AM    BUN 26 (H) 07/06/2022 12:32 AM    CREATININE  1.08 07/06/2022 12:32 AM    BUNCREATRAT 25 (H) 05/30/2014 09:04 AM            Lab Results   Component Value Date/Time    PROTHROMBTM 16.3 (H) 07/01/2022 12:17 PM    INR 1.38 (H) 07/01/2022 12:17 PM        Imaging:   DX-HIP-UNILATERAL-WITH PELVIS-1 VIEW RIGHT   Final Result      1.  Severe osteoarthritis of the right hip with bone-on-bone contact.   2.  Mild to moderate left hip osteoarthritis.      EC-KIM W/O CONT             Assessment and Plan:  Patient is a 70 yo male with a PMH of HFpEF, HTN, A. Fib s/p pacemaker on Eliquis, VT s/p AICD, HLD, CKD, and alcohol abuse, transferred from [facility] for management of bacteremia s/p KIM.      * Bacteremia- (present on admission)  Assessment & Plan  Likely occult gastrointestinal source. Unlikely endocarditis based on negative preliminary KIM. Unlikely septic arthritis based on negative subsequent BCx.  BCx from 7/1 and 7/2 growing gram positive enterococcus faecalis, sensitive to ampicillin. Subsequent BCx negative - ID following  Continue Ampicillin 2,000 mg IV, planned stop date 7/11  Ortho consulted  Plan for outpatient colonoscopy and elective right hip surgery        Diarrhea  Assessment & Plan  Fibercon  Off stool softeners  Consider diagnosis of C. Diff if diarrhea persists    ÁNGEL (obstructive sleep apnea)  Assessment & Plan  CPAP  F/u with prior completed sleep study outpatient    Lung nodule seen on imaging study  Assessment & Plan  Incidental finding on CT   Possibly due to previous inflammation from [place] Fever infection  F/u with outpatient pulmonology    Right hip pain  Assessment & Plan  Unlikely secondary ot septic arthritis, ortho consulted  Discontinue oxycodone  Start Norco for pain control  Consult PT  Plan for discharge to short term rehab facility    Hyperglycemia  Assessment & Plan  A1C 5.7, no need for insulin   Follow up outpatient for diabetes prevention     Constipation  Assessment & Plan  Resolved  Suspect from chronic opioid use  Colace  and Fleet enema as needed    Chronic diastolic congestive heart failure, NYHA class 3 (HCC)- (present on admission)  Assessment & Plan  Continue valsartan      Anemia  Assessment & Plan  Multifactorial, likely secondary to BM from alcohol and B12 deficiency  Continue with B12, folate, thiamine     Alcohol abuse- (present on admission)  Assessment & Plan  Continue thiamine  Monitor for withdrawal  Last drink prior to admission on 06/30    Paroxysmal ventricular tachycardia (HCC)- (present on admission)  Assessment & Plan  Continue sotalol   Hold Eliquis in anticipation of ortho procedure      Code Status: Full code  DVT prophylaxis: Eliquis  Diet: Regular  GI: Fibercon  Disposition: pending evaluation by PT/OT    Eun Foss MD  PGY-1 Internal Medicine

## 2022-07-06 NOTE — THERAPY
"Physical Therapy   Daily Treatment     Patient Name: Mike Ferrell  Age:  71 y.o., Sex:  male  Medical Record #: [id number]  Today's Date: 7/6/2022     Precautions: Fall Risk  Comments: No WB restrictions    Assessment    Pt s/p x-ray this morning which revealed \"severe osteoarthritis of the right hip\" and \"mild to moderate left hip osteoarthritis\". Pt with improved tolerance to fxnl mob today. He was able to negotiate bed mob toward R side of bed with min A and cues to sequence. Improved tolerance to WB on RLE with gait and pt was able to negotiate 20ft with FWW and CGA. Discussed importance of OOB activity and prehab prior to BONI. Instructed pt on seated and supine therapeutic exercises to strengthen hip muscles. Provided pt with handout for HEP. PT to cont to follow.    Plan    Continue current treatment plan.    DC Equipment Recommendations: Unable to determine at this time  Discharge Recommendations: Recommend post-acute placement for additional physical therapy services prior to discharge home     Objective    Cognition    Cognition / Consciousness X   Speech/ Communication Hard of Hearing   Level of Consciousness Alert   Comments pleasant and cooperative   Active ROM Lower Body    Active ROM Lower Body  X   Comments LLE WFL; RLE WFL with exception of R hip flexion d/t pain/weakness - unable to perform any anti-gravity hip flexion   Strength Lower Body   Lower Body Strength  X   Comments R hip flexion 2-/5 otherwise WFL; able to perform WB on R with gait today   Sitting Lower Body Exercises   Sitting Lower Body Exercises Yes   Ankle Pumps Bilateral;1 set of 10   Long Arc Quad Bilateral;1 set of 10   Marching Reciprocal;1 set of 10   Comments poor R hip flexion for marching activity   Other Treatments   Other Treatments Provided Education regarding importance of OOB and prehab for impending BONI   Balance   Sitting Balance (Static) Fair   Sitting Balance (Dynamic) Fair   Standing Balance (Static) Fair   Standing " Balance (Dynamic) Fair -   Weight Shift Sitting Fair   Weight Shift Standing Fair   Skilled Intervention Verbal Cuing;Sequencing   Comments w/ FWW, fair WB transfer onto RLE during gait   Gait Analysis   Gait Level Of Assist Contact Guard Assist   Assistive Device Front Wheel Walker   Distance (Feet) 20   # of Times Distance was Traveled 1   Deviation Bradykinetic;Decreased Heel Strike;Antalgic   # of Stairs Climbed 0   Weight Bearing Status no restrictions   Skilled Intervention Verbal Cuing;Sequencing;Inhibition   Bed Mobility    Supine to Sit Minimal Assist (toward R side of bed)   Sit to Supine (Up in chair post)   Scooting Standby Assist   Skilled Intervention Verbal Cuing;Sequencing;Postural Facilitation   Comments cues to sequence pushing through LLE and reaching across body with LUE, required min A to flex RLE off bed toward EOB and push up to EOB   Functional Mobility   Sit to Stand Minimal Assist   Bed, Chair, Wheelchair Transfer Contact Guard Assist   Transfer Method Stand Step   Mobility in room w/ FWW   Skilled Intervention Verbal Cuing;Sequencing;Postural Facilitation   Short Term Goals    Short Term Goal # 1 Pt will perform bed mobility with supervision in 6 visits   Goal Outcome # 1 goal not met   Short Term Goal # 2 Pt will transfer with mod indep in 6 visits to improve functional indep.   Goal Outcome # 2 Goal not met   Short Term Goal # 3 Pt will negotiate 2 stairs with supervision in 6 visits to access home.   Goal Outcome # 3 Goal not met   Short Term Goal # 4 Pt will amb >50ft with FWW and SPV within 6 visits to negotiate household distances.

## 2022-07-06 NOTE — PROGRESS NOTES
Bedside report received from BETTY Pizarro. Pt A&Ox4. paced on the monitor. On room air. No complaints of pain. Patient working with physical therapy. POC discussed with pt. Pt verbalizes understanding. Call light and belongings within reach. Bed locked and in lowest position. Alarm and fall precautions in place.

## 2022-07-06 NOTE — CARE PLAN
The patient is Stable - Low risk of patient condition declining or worsening    Shift Goals  Clinical Goals: pain control, pt and ot  Patient Goals: pain control  Family Goals: Support Patient    Progress made toward(s) clinical / shift goals:    Problem: Knowledge Deficit - Standard  Goal: Patient and family/care givers will demonstrate understanding of plan of care, disease process/condition, diagnostic tests and medications  Outcome: Progressing     Problem: Lifestyle Changes  Goal: Patient's ability to identify lifestyle changes and available resources to help reduce recurrence of condition will improve  Outcome: Progressing     Problem: Fall Risk  Goal: Patient will remain free from falls  Outcome: Progressing       Patient is not progressing towards the following goals:

## 2022-07-07 LAB
ANION GAP SERPL CALC-SCNC: 10 MMOL/L (ref 7–16)
BASOPHILS # BLD AUTO: 0.2 % (ref 0–1.8)
BASOPHILS # BLD: 0.01 K/UL (ref 0–0.12)
BUN SERPL-MCNC: 26 MG/DL (ref 8–22)
CALCIUM SERPL-MCNC: 9 MG/DL (ref 8.5–10.5)
CHLORIDE SERPL-SCNC: 105 MMOL/L (ref 96–112)
CO2 SERPL-SCNC: 23 MMOL/L (ref 20–33)
CREAT SERPL-MCNC: 0.95 MG/DL (ref 0.5–1.4)
EOSINOPHIL # BLD AUTO: 0.08 K/UL (ref 0–0.51)
EOSINOPHIL NFR BLD: 1.2 % (ref 0–6.9)
ERYTHROCYTE [DISTWIDTH] IN BLOOD BY AUTOMATED COUNT: 53.5 FL (ref 35.9–50)
GFR SERPLBLD CREATININE-BSD FMLA CKD-EPI: 84 ML/MIN/1.73 M 2
GLUCOSE SERPL-MCNC: 101 MG/DL (ref 65–99)
HCT VFR BLD AUTO: 29.4 % (ref 42–52)
HGB BLD-MCNC: 9.5 G/DL (ref 14–18)
IMM GRANULOCYTES # BLD AUTO: 0.11 K/UL (ref 0–0.11)
IMM GRANULOCYTES NFR BLD AUTO: 1.7 % (ref 0–0.9)
LYMPHOCYTES # BLD AUTO: 1.12 K/UL (ref 1–4.8)
LYMPHOCYTES NFR BLD: 17.3 % (ref 22–41)
MAGNESIUM SERPL-MCNC: 1.9 MG/DL (ref 1.5–2.5)
MCH RBC QN AUTO: 33.9 PG (ref 27–33)
MCHC RBC AUTO-ENTMCNC: 32.3 G/DL (ref 33.7–35.3)
MCV RBC AUTO: 105 FL (ref 81.4–97.8)
MONOCYTES # BLD AUTO: 0.74 K/UL (ref 0–0.85)
MONOCYTES NFR BLD AUTO: 11.4 % (ref 0–13.4)
NEUTROPHILS # BLD AUTO: 4.42 K/UL (ref 1.82–7.42)
NEUTROPHILS NFR BLD: 68.2 % (ref 44–72)
NRBC # BLD AUTO: 0 K/UL
NRBC BLD-RTO: 0 /100 WBC
PHOSPHATE SERPL-MCNC: 2.6 MG/DL (ref 2.5–4.5)
PLATELET # BLD AUTO: 137 K/UL (ref 164–446)
PMV BLD AUTO: 10.4 FL (ref 9–12.9)
POTASSIUM SERPL-SCNC: 4 MMOL/L (ref 3.6–5.5)
RBC # BLD AUTO: 2.8 M/UL (ref 4.7–6.1)
SODIUM SERPL-SCNC: 138 MMOL/L (ref 135–145)
WBC # BLD AUTO: 6.5 K/UL (ref 4.8–10.8)

## 2022-07-07 PROCEDURE — 700102 HCHG RX REV CODE 250 W/ 637 OVERRIDE(OP): Performed by: INTERNAL MEDICINE

## 2022-07-07 PROCEDURE — 99232 SBSQ HOSP IP/OBS MODERATE 35: CPT | Performed by: INTERNAL MEDICINE

## 2022-07-07 PROCEDURE — A9270 NON-COVERED ITEM OR SERVICE: HCPCS | Performed by: INTERNAL MEDICINE

## 2022-07-07 PROCEDURE — 700111 HCHG RX REV CODE 636 W/ 250 OVERRIDE (IP)

## 2022-07-07 PROCEDURE — 85025 COMPLETE CBC W/AUTO DIFF WBC: CPT

## 2022-07-07 PROCEDURE — 99233 SBSQ HOSP IP/OBS HIGH 50: CPT | Mod: GC | Performed by: INTERNAL MEDICINE

## 2022-07-07 PROCEDURE — 700105 HCHG RX REV CODE 258: Performed by: INTERNAL MEDICINE

## 2022-07-07 PROCEDURE — 700101 HCHG RX REV CODE 250: Performed by: HOSPITALIST

## 2022-07-07 PROCEDURE — A9270 NON-COVERED ITEM OR SERVICE: HCPCS | Performed by: STUDENT IN AN ORGANIZED HEALTH CARE EDUCATION/TRAINING PROGRAM

## 2022-07-07 PROCEDURE — 770001 HCHG ROOM/CARE - MED/SURG/GYN PRIV*

## 2022-07-07 PROCEDURE — 36415 COLL VENOUS BLD VENIPUNCTURE: CPT

## 2022-07-07 PROCEDURE — 84100 ASSAY OF PHOSPHORUS: CPT

## 2022-07-07 PROCEDURE — 97530 THERAPEUTIC ACTIVITIES: CPT

## 2022-07-07 PROCEDURE — 80048 BASIC METABOLIC PNL TOTAL CA: CPT

## 2022-07-07 PROCEDURE — 83735 ASSAY OF MAGNESIUM: CPT

## 2022-07-07 PROCEDURE — 700102 HCHG RX REV CODE 250 W/ 637 OVERRIDE(OP): Performed by: STUDENT IN AN ORGANIZED HEALTH CARE EDUCATION/TRAINING PROGRAM

## 2022-07-07 PROCEDURE — 700111 HCHG RX REV CODE 636 W/ 250 OVERRIDE (IP): Performed by: INTERNAL MEDICINE

## 2022-07-07 RX ORDER — SOTALOL HYDROCHLORIDE 120 MG/1
120 TABLET ORAL EVERY MORNING
Qty: 60 TABLET | Refills: 3 | Status: SHIPPED | OUTPATIENT
Start: 2022-07-08 | End: 2022-07-11 | Stop reason: SDUPTHER

## 2022-07-07 RX ORDER — OMEPRAZOLE 20 MG/1
20 CAPSULE, DELAYED RELEASE ORAL
Qty: 30 CAPSULE | Refills: 1 | Status: SHIPPED
Start: 2022-07-08 | End: 2022-07-11 | Stop reason: SDUPTHER

## 2022-07-07 RX ORDER — ACETAMINOPHEN 500 MG
500 TABLET ORAL EVERY 6 HOURS PRN
Qty: 30 TABLET | Refills: 0 | Status: ON HOLD
Start: 2022-07-07 | End: 2022-12-17

## 2022-07-07 RX ORDER — VALSARTAN 80 MG/1
80 TABLET ORAL 2 TIMES DAILY
Qty: 30 TABLET | Refills: 3 | Status: SHIPPED | OUTPATIENT
Start: 2022-07-07 | End: 2022-08-12

## 2022-07-07 RX ORDER — CHOLECALCIFEROL (VITAMIN D3) 125 MCG
2000 CAPSULE ORAL DAILY
Status: DISCONTINUED | OUTPATIENT
Start: 2022-07-07 | End: 2022-07-11 | Stop reason: HOSPADM

## 2022-07-07 RX ORDER — LIDOCAINE 50 MG/G
1 PATCH TOPICAL EVERY 24 HOURS
Qty: 10 PATCH | Refills: 1 | Status: SHIPPED | OUTPATIENT
Start: 2022-07-08 | End: 2022-08-12

## 2022-07-07 RX ORDER — HYDROCODONE BITARTRATE AND ACETAMINOPHEN 5; 325 MG/1; MG/1
1 TABLET ORAL EVERY 6 HOURS PRN
Qty: 12 TABLET | Refills: 0 | Status: SHIPPED
Start: 2022-07-07 | End: 2022-07-11 | Stop reason: SDUPTHER

## 2022-07-07 RX ORDER — MAGNESIUM SULFATE HEPTAHYDRATE 40 MG/ML
2 INJECTION, SOLUTION INTRAVENOUS ONCE
Status: COMPLETED | OUTPATIENT
Start: 2022-07-07 | End: 2022-07-07

## 2022-07-07 RX ORDER — FUROSEMIDE 10 MG/ML
40 INJECTION INTRAMUSCULAR; INTRAVENOUS
Status: DISCONTINUED | OUTPATIENT
Start: 2022-07-07 | End: 2022-07-08

## 2022-07-07 RX ORDER — LINEZOLID 600 MG/1
600 TABLET, FILM COATED ORAL EVERY 12 HOURS
Status: DISCONTINUED | OUTPATIENT
Start: 2022-07-07 | End: 2022-07-07

## 2022-07-07 RX ADMIN — AMPICILLIN SODIUM 2000 MG: 2 INJECTION, POWDER, FOR SOLUTION INTRAVENOUS at 08:49

## 2022-07-07 RX ADMIN — FOLIC ACID 1 MG: 1 TABLET ORAL at 06:01

## 2022-07-07 RX ADMIN — FUROSEMIDE 40 MG: 10 INJECTION, SOLUTION INTRAMUSCULAR; INTRAVENOUS at 17:52

## 2022-07-07 RX ADMIN — VALSARTAN 80 MG: 80 TABLET, FILM COATED ORAL at 17:52

## 2022-07-07 RX ADMIN — SOTALOL HYDROCHLORIDE 120 MG: 80 TABLET ORAL at 05:58

## 2022-07-07 RX ADMIN — VALSARTAN 80 MG: 80 TABLET, FILM COATED ORAL at 06:00

## 2022-07-07 RX ADMIN — AMPICILLIN INJECTION 2000 MG: 2 POWDER, FOR SOLUTION INTRAMUSCULAR; INTRAVENOUS at 22:18

## 2022-07-07 RX ADMIN — AMPICILLIN SODIUM 2000 MG: 2 INJECTION, POWDER, FOR SOLUTION INTRAVENOUS at 03:53

## 2022-07-07 RX ADMIN — HYDROCODONE BITARTRATE AND ACETAMINOPHEN 1 TABLET: 5; 325 TABLET ORAL at 15:31

## 2022-07-07 RX ADMIN — CYANOCOBALAMIN TAB 500 MCG 2000 MCG: 500 TAB at 13:03

## 2022-07-07 RX ADMIN — FUROSEMIDE 40 MG: 10 INJECTION, SOLUTION INTRAMUSCULAR; INTRAVENOUS at 00:38

## 2022-07-07 RX ADMIN — AMPICILLIN SODIUM 2000 MG: 2 INJECTION, POWDER, FOR SOLUTION INTRAVENOUS at 00:38

## 2022-07-07 RX ADMIN — OMEPRAZOLE 20 MG: 20 CAPSULE, DELAYED RELEASE ORAL at 08:49

## 2022-07-07 RX ADMIN — AMPICILLIN INJECTION 2000 MG: 2 POWDER, FOR SOLUTION INTRAMUSCULAR; INTRAVENOUS at 17:53

## 2022-07-07 RX ADMIN — Medication 100 MG: at 06:01

## 2022-07-07 RX ADMIN — MAGNESIUM SULFATE HEPTAHYDRATE 2 G: 40 INJECTION, SOLUTION INTRAVENOUS at 07:00

## 2022-07-07 RX ADMIN — AMPICILLIN INJECTION 2000 MG: 2 POWDER, FOR SOLUTION INTRAMUSCULAR; INTRAVENOUS at 13:04

## 2022-07-07 RX ADMIN — LIDOCAINE 1 PATCH: 50 PATCH TOPICAL at 08:49

## 2022-07-07 RX ADMIN — APIXABAN 5 MG: 5 TABLET, FILM COATED ORAL at 17:52

## 2022-07-07 RX ADMIN — THERA TABS 1 TABLET: TAB at 06:00

## 2022-07-07 ASSESSMENT — ENCOUNTER SYMPTOMS
CHILLS: 0
FEVER: 0
PALPITATIONS: 0
VOMITING: 0
ABDOMINAL PAIN: 0
MYALGIAS: 0
DIARRHEA: 0
DIARRHEA: 1
BLURRED VISION: 0
COUGH: 0
SORE THROAT: 0
SHORTNESS OF BREATH: 0
NERVOUS/ANXIOUS: 0
DOUBLE VISION: 0
HEADACHES: 0
WEAKNESS: 1
NAUSEA: 0
CONSTIPATION: 0

## 2022-07-07 ASSESSMENT — LIFESTYLE VARIABLES
VISUAL DISTURBANCES: NOT PRESENT
TOTAL SCORE: 1
NAUSEA AND VOMITING: NO NAUSEA AND NO VOMITING
HEADACHE, FULLNESS IN HEAD: NOT PRESENT
ANXIETY: MILDLY ANXIOUS
TREMOR: NO TREMOR
AUDITORY DISTURBANCES: NOT PRESENT
ORIENTATION AND CLOUDING OF SENSORIUM: ORIENTED AND CAN DO SERIAL ADDITIONS
PAROXYSMAL SWEATS: NO SWEAT VISIBLE
AGITATION: NORMAL ACTIVITY

## 2022-07-07 ASSESSMENT — FIBROSIS 4 INDEX: FIB4 SCORE: 3.2

## 2022-07-07 ASSESSMENT — COGNITIVE AND FUNCTIONAL STATUS - GENERAL
TOILETING: A LOT
DAILY ACTIVITIY SCORE: 18
HELP NEEDED FOR BATHING: A LOT
SUGGESTED CMS G CODE MODIFIER DAILY ACTIVITY: CK
DRESSING REGULAR LOWER BODY CLOTHING: A LOT

## 2022-07-07 NOTE — THERAPY
Occupational Therapy  Daily Treatment     Patient Name: Mike Ferrell  Age:  71 y.o., Sex:  male  Medical Record #: [id number]  Today's Date: 7/7/2022     Precautions  Precautions: Fall Risk  Comments: No WB restrictions    Assessment    Pt currently limited by decreased functional mobility, activity tolerance, strength, balance, and pain which are affecting pt's ability to complete ADLs/IADLs at baseline. Pt would benefit from OT services in the acute care setting to maximize functional recovery.      Plan    Continue current treatment plan.       Discharge Recommendations: (P) Recommend post-acute placement for additional occupational therapy services prior to discharge home         07/07/22 0717   Activities of Daily Living   Grooming Supervision   Upper Body Dressing Supervision   Lower Body Dressing Maximal Assist   Toileting Maximal Assist   Functional Mobility   Sit to Stand Minimal Assist   Bed, Chair, Wheelchair Transfer Minimal Assist   Short Term Goals   Short Term Goal # 1 Min A with LB dressing   Goal Outcome # 1 Progressing slower than expected   Short Term Goal # 2 Supervised with ADL txfs   Goal Outcome # 2 Progressing as expected   Anticipated Discharge Equipment and Recommendations   Discharge Recommendations Recommend post-acute placement for additional occupational therapy services prior to discharge home   Session Information   Priority 2

## 2022-07-07 NOTE — PROGRESS NOTES
Infectious Disease Progress Note    Author: Brittany Fisher M.D. Date & Time of service: 2022  9:06 AM    Chief Complaint:  Bacteremia, possible septic hip      Interval History:   AF, O2 RA, significant pain in right hip as well as right knee.    AF, O2 RA, multiple bouts of diarrhea earlier today.  States some improvement in his hip pain.     Review of Systems:  Review of Systems   Respiratory: Negative for cough and shortness of breath.    Gastrointestinal: Positive for diarrhea. Negative for abdominal pain, constipation, nausea and vomiting.   Genitourinary: Negative for dysuria.   Musculoskeletal: Positive for joint pain. Negative for myalgias.   Neurological: Positive for weakness.   Psychiatric/Behavioral: The patient is not nervous/anxious.        Hemodynamics:  Temp (24hrs), Av.7 °C (98.1 °F), Min:36.4 °C (97.5 °F), Max:37 °C (98.6 °F)  Temperature: 37 °C (98.6 °F), Monitored Temp: 36.8 °C (98.2 °F)  Pulse  Av  Min: 65  Max: 86   Blood Pressure : (!) 145/77       Physical Exam:  Physical Exam  Cardiovascular:      Rate and Rhythm: Normal rate and regular rhythm.      Heart sounds: Normal heart sounds.   Pulmonary:      Effort: Pulmonary effort is normal.      Breath sounds: Normal breath sounds.   Abdominal:      General: Abdomen is flat. Bowel sounds are normal.      Palpations: Abdomen is soft.   Musculoskeletal:         General: Normal range of motion.   Skin:     General: Skin is warm and dry.      Findings: Bruising present.   Neurological:      General: No focal deficit present.      Mental Status: He is oriented to person, place, and time.   Psychiatric:         Mood and Affect: Mood normal.         Behavior: Behavior normal.         Meds:    Current Facility-Administered Medications:   •  magnesium sulfate  •  HYDROcodone-acetaminophen  •  HYDROcodone-acetaminophen  •  acetaminophen  •  lidocaine  •  ampicillin  •  apixaban  •  [DISCONTINUED] detox 1000 mL infusion **AND**  thiamine **AND** multivitamin **AND** folic acid  •  hydrALAZINE  •  omeprazole  •  sotalol  •  valsartan    Labs:  Recent Labs     07/05/22  0020 07/06/22  0032 07/07/22  0029   WBC 7.0 6.2 6.5   RBC 2.78* 2.79* 2.80*   HEMOGLOBIN 9.6* 9.7* 9.5*   HEMATOCRIT 29.3* 29.5* 29.4*   .4* 105.7* 105.0*   MCH 34.5* 34.8* 33.9*   RDW 52.5* 52.8* 53.5*   PLATELETCT 105* 121* 137*   MPV 10.9 10.5 10.4   NEUTSPOLYS 73.80* 70.90 68.20   LYMPHOCYTES 12.00* 13.90* 17.30*   MONOCYTES 12.60 12.20 11.40   EOSINOPHILS 0.40 1.00 1.20   BASOPHILS 0.10 0.20 0.20     Recent Labs     07/05/22  0020 07/06/22  0032 07/07/22  0029   SODIUM 134* 140 138   POTASSIUM 4.8 3.9 4.0   CHLORIDE 100 105 105   CO2 26 24 23   GLUCOSE 102* 89 101*   BUN 27* 26* 26*     Recent Labs     07/05/22  0020 07/06/22  0032 07/07/22  0029   CREATININE 1.05 1.08 0.95       Imaging:  CT-CHEST,ABDOMEN,PELVIS W/O    Result Date: 7/2/2022 7/2/2022 2:12 PM HISTORY/REASON FOR EXAM:  +blood cultures, unknown source. +OA in right hip w tentative surgery 7/5.  has ICD- unable to do MRI pelvis to assess for fluid collection Evaluate for source of infection TECHNIQUE/EXAM DESCRIPTION: CT scan of the chest, abdomen and pelvis without contrast was performed. Noncontrast helical scanning of the chest, abdomen and pelvis was obtained from the lung apices through the pubic symphysis. Low dose optimization technique was utilized for this CT exam including automated exposure control and adjustment of the mA and/or kV according to patient size. COMPARISON:  1 view chest 7/1/2022 FINDINGS: Osseous structures: There is osteoarthritis of the right shoulder joint which is advanced and there are multiple associated intra-articular ossific body. There is also advanced osteoarthritis of the left glenohumeral joint. There is osteoarthritis of the right hip joint which appears severe in degree. There are spinal degenerative changes. There is scoliosis. There is a large degenerative  subluxation at L5-S1. There are old compression fracture at T12 and L1. Chest: Cardiac device is present. LUNGS: The right lung apex there is curvilinear density with architectural distortion. There is a central mass measuring 16 x 7 mm. Findings could represent postinflammatory scarring or a neoplasm. There is mild dependent density both lung bases consistent with atelectasis. MEDIASTINUM: There is no adenopathy, mass, or other abnormality within the axilla, the mediastinum, or the elizabeth. AORTA: There is aortic and dense coronary atherosclerotic plaque. PLEURA: There no pleural effusion or pneumothoraces. Abdomen: LIVER: The liver is normal in appearance. SPLEEN: The spleen is normal in appearance. PANCREAS: The pancreas is normal in appearance. GALLBLADDER and biliary system: There has been cholecystectomy. Venous vasculature: The splenic vein and portal vein enhance normally. ADRENAL GLANDS: The adrenal glands are normal in appearance. KIDNEYS: The kidneys are normal in appearance. AORTA: There is aortic atherosclerosis without aneurysm. BOWEL: No bowel or mesenteric abnormality identified. Pelvis: Within normal limits.     1.  No evidence for infection within the chest, abdomen, pelvis 2.  The right upper lobe is abnormal with architecture distortion and central 16 x 7 mm mass. These findings could indicate postinflammatory scarring or a neoplasm. PET CT scan is recommended 3.  Mild bilateral dependent atelectasis 4.  Prior cholecystectomy 5.  Presence of cardiac device 6.  Severe osteoarthritis of both glenohumeral joint and the right hip joint 7.  aortic and branch vessel atherosclerotic plaque    DX-CHEST-PORTABLE (1 VIEW)    Result Date: 7/1/2022 7/1/2022 12:41 PM HISTORY/REASON FOR EXAM: Sepsis TECHNIQUE/EXAM DESCRIPTION AND NUMBER OF VIEWS: Single portable view of the chest. COMPARISON: None FINDINGS: There is no evidence of focal consolidation or evidence of pulmonary edema. There is no pleural effusion.  The heart is enlarged. There is a left-sided AICD.     Cardiomegaly.    US-RENAL    Result Date: 7/2/2022 7/2/2022 2:25 AM HISTORY/REASON FOR EXAM:  Abnormal Labs TECHNIQUE/EXAM DESCRIPTION:  Renal ultrasound. COMPARISON:  None FINDINGS: The right kidney measures 10.02 cm.  The left kidney measures 11.12 cm. There is no hydronephrosis. There are no abnormal calcifications. The bladder demonstrates no focal wall abnormality. Bilateral ureteral jets are not visualized.     1.  Grossly unremarkable renal ultrasound. Examination is technically limited due to patient body habitus.    US-EXTREMITY NON VASCULAR UNILATERAL RIGHT    Result Date: 7/3/2022  7/3/2022 8:17 AM HISTORY/REASON FOR EXAM:  Assessing for effusion right hip, possible septic arthritis TECHNIQUE/EXAM DESCRIPTION AND NUMBER OF VIEWS: Right groin region ultrasound to evaluate for joint effusion COMPARISON: None FINDINGS: Technically challenging evaluation due to body habitus and positioning limitations No abnormal fluid collection is seen. No hyperemia is noted.     No sonographic evidence of hip effusion or other abnormal fluid collection Technically challenging evaluation. MRI over CT could better characterize if clinically indicated    EC-ECHOCARDIOGRAM COMPLETE W/ CONT    Result Date: 7/2/2022  Transthoracic Echo Report Echocardiography Laboratory CONCLUSIONS Technically difficult due to positioning and body habitus but adequate study for interpretation. Pt unable to assume left lateral decubitus due to injured hip. Grossly normal left ventricular systolic function. The left ventricular ejection fraction is visually estimated to be 55%. Mildly dilated right ventricle with preserved systolic function. Mild mitral valve regurgitation. Mild tricuspid regurgitation. Right ventricular systolic pressure is estimated to be 41 mmHg; mild pulmonary hypertension. Normal pericardium without effusion. Compared to prior study 5/16/22, current study technically  difficult. BAILEY RICHARD Exam Date:         2022                    14:58 Exam Location:     Inpatient Priority:          Routine Ordering Physician:        ED GARLAND Referring Physician:       VERONICA Howard Sonographer:               Kasi Bobo RDCS Age:    71     Gender:    M MRN:    [id number] :    1950 BSA:    2.36   Ht (in):    72     Wt (lb):    254 Exam Type:     Complete, Contrast Indications:     Acute/Subacute Bacterial Endocarditis ICD Codes:       421 CPT Codes:       81951,  BP:   126    /   47     HR: Technical Quality:       Technically difficult study -                          adequate information is obtained MEASUREMENTS  (Male / Female) Normal Values 2D ECHO LV Diastolic Diameter PLAX        4.6 cm                4.2 - 5.9 / 3.9 - 5.3 cm LV Systolic Diameter PLAX         3.1 cm                2.1 - 4.0 cm LV Fractional Shortening PLAX     32.4 %                25 - 46  % IVS Diastolic Thickness           1 cm                  LVPW Diastolic Thickness          1 cm                  Estimated LV Ejection Fraction    55 %                  M-MODE Aortic Root Diameter MM           3.4 cm                DOPPLER AV Peak Velocity                  1.2 m/s               AV Peak Gradient                  5.6 mmHg              AV Mean Gradient                  2.9 mmHg              LVOT Peak Velocity                0.66 m/s              Mitral E Point Velocity           0.77 m/s              Mitral E to A Ratio               1.1                   Mitral A Duration                 109 ms                MV Pressure Half Time             38 ms                 MV Area PHT                       5.8 cm2               MV Deceleration Time              131 ms                TR Peak Velocity                  352 cm/s              TR Peak Gradient                  49.5 mmHg             * Indicates values subject to  auto-interpretation LV EF:  55    % FINDINGS Left Ventricle 3 ml contrast was used to enhance visualization of the endocardial border. Existing IV was used. Normal left ventricular wall thickness. Grossly normal left ventricular systolic function. The left ventricular ejection fraction is visually estimated to be 55%. Normal diastolic function. Right Ventricle Mildly dilated right ventricle with preserved systolic function. Right Atrium Enlarged right atrium. Left Atrium Normal left atrial size. Mitral Valve Structurally normal mitral valve without significant stenosis. Mild mitral valve regurgitation. Aortic Valve Structurally normal aortic valve without significant stenosis or regurgitation. Tricuspid Valve Structurally normal tricuspid valve. No tricuspid stenosis. Mild tricuspid regurgitation. Right ventricular systolic pressure is estimated to be 41 mmHg. Right atrial pressure is estimated to  be 3 mmHg. Pulmonic Valve Structurally normal pulmonic valve without significant stenosis or regurgitation. Pericardium Normal pericardium without effusion. Aorta Normal aortic root for body surface area. Patric Sood MD (Electronically Signed) Final Date:     02 July 2022                 16:47    EC-KIM W/O CONT    Result Date: 7/5/2022  Results Will be Available after Interpretation by Cardiologist.    DX-HIP-UNILATERAL-WITH PELVIS-1 VIEW RIGHT    Result Date: 7/6/2022 7/6/2022 11:19 AM HISTORY/REASON FOR EXAM:  Atraumatic Pelvic/Hip Pain. TECHNIQUE/EXAM DESCRIPTION AND NUMBER OF VIEWS:  2 views of the RIGHT hip. COMPARISON: Chest, abdomen and pelvis CT 7/2/2022 FINDINGS: There is no fracture or dislocation. The visualized osseous structures are in anatomic alignment. There are advanced degenerative changes of the right hip with severe joint space narrowing, bone-on-bone contact and small marginal osteophytes. Mild to moderate osteoarthritis of the left hip. Lower lumbar degenerative changes. Bone mineralization is  age-appropriate..     1.  Severe osteoarthritis of the right hip with bone-on-bone contact. 2.  Mild to moderate left hip osteoarthritis.    DX-HIP-COMPLETE - UNILATERAL 2+ RIGHT    Result Date: 6/13/2022  X-ray of the right hip with 2 views consisting of AP and lateral views on 6/13/2022 demonstrates severe joint space narrowing with bone-on-bone osteoarthritic changes and particular osteophyte formation      Micro:  Results     ** No results found for the last 168 hours. **          Assessment:  Active Hospital Problems    Diagnosis    • *Bacteremia [R78.81]    • Diarrhea [R19.7]    • Volume overload [E87.70]    • ÁNGEL (obstructive sleep apnea) [G47.33]    • Constipation [K59.00]    • Hyperglycemia [R73.9]    • Right hip pain [M25.551]    • Lung nodule seen on imaging study [R91.1]    • Chronic diastolic congestive heart failure, NYHA class 3 (HCC) [I50.32]    • Anemia [D64.9]    • Gouty arthritis of both ankles [M10.9]    • Alcohol abuse [F10.10]    • Paroxysmal ventricular tachycardia (HCC) [I47.2]      Interval 24 hours:      AF, O2 RA  Labs reviewed  Micro reviewed    Patient doing well overall, some pain in right knee.  Diarrhea has resolved.  Continued on antibiotics as below.      ASSESSMENT/PLAN:      71 y.o.  admitted 7/1/2022. Pt has a past medical history of HFpEF, HTN, A fib on Eliquis, heart disease status post pacemaker, V. tach status post AICD and CKD . Osteoarthritis and was admitted for R BONI.  Blood cultures drawn due to leukocytosis which are now positive. Given positive blood cultures and worsening of his right hip pain over the last few weeks. Orthopedics saw the patient on 7/2 and are recommending aspiration of the hip and then potential washout depending on results.  However, no pocket to drain per IR.  Patient states his hip pain is ongoing.  CT chest abdomen pelvis with no evidence of infection.     Problem List     Bacteremia, unclear source, he does have some wounds on his upper arms but  no obvious infection.  CTs with no intra-abdominal infection.  Denies any dysuria.  -Blood cultures on 7/1 and 7/2 +  E faecalis, ampicillin sensitive    -Blood cultures on 7/3 are no growth to date  -TTE was poor study, no vegetations noted, no significant valvular disease  -KIM on 7/5 -no vegetations, no vegetations noted on leads  Acute on chronic right hip pain and concern for septic arthritis of the right hip  -Orthopedics are aware and have recommended aspiration of the right hip  Severe osteoarthritis of both glenohumeral and right hip joint noted on CT  History of heart disease and status post pacemaker  History of V. tach and status post AICD  A. fib on Eliquis  Mass noted in right upper lobe and PET scan recommended, defer to primary team  Antibiotic allergies: Sulfa drugs reported as rash  Diarrhea, resolved      Plan      --- Plan had been to transition to linezolid for p.o. course as I thought the patient was discharging to home.  However, he is actually discharging to a skilled facility so prefer to continue IV antibiotics - ampicillin 2 g every 4 hours-OR continuous infusion 12 grams daily -  2-week antibiotic course -end 7/17/22.  --- Please ensure the skilled facility can give antibiotics as recommended above  --- If he discharges to home prior to 7/17 okay to transition to linezolid 600 mg twice daily to complete the course   --- Follow-up repeat blood cultures o final - NGTD   --- Ortho with no plans for I&D and will wait on hip replacement until he is cleared his infection   --- Monitor labs  -     Dispo: Skilled facility for IV antibiotics  PICC: midline      Plan of care discussed with [facility] team.  ID will sign off.

## 2022-07-07 NOTE — DISCHARGE PLANNING
Agency/Facility Name: [facility]  Spoke To: Luis  Outcome: SNF will have bed for Pt tomorrow pending medical clearance.

## 2022-07-08 PROBLEM — R06.02 SHORTNESS OF BREATH: Status: ACTIVE | Noted: 2022-07-06

## 2022-07-08 LAB
ANION GAP SERPL CALC-SCNC: 11 MMOL/L (ref 7–16)
BACTERIA BLD CULT: NORMAL
BACTERIA BLD CULT: NORMAL
BASOPHILS # BLD AUTO: 0.1 % (ref 0–1.8)
BASOPHILS # BLD: 0.01 K/UL (ref 0–0.12)
BUN SERPL-MCNC: 24 MG/DL (ref 8–22)
CALCIUM SERPL-MCNC: 9 MG/DL (ref 8.5–10.5)
CHLORIDE SERPL-SCNC: 104 MMOL/L (ref 96–112)
CO2 SERPL-SCNC: 25 MMOL/L (ref 20–33)
CREAT SERPL-MCNC: 0.95 MG/DL (ref 0.5–1.4)
EOSINOPHIL # BLD AUTO: 0.08 K/UL (ref 0–0.51)
EOSINOPHIL NFR BLD: 1.2 % (ref 0–6.9)
ERYTHROCYTE [DISTWIDTH] IN BLOOD BY AUTOMATED COUNT: 52 FL (ref 35.9–50)
GFR SERPLBLD CREATININE-BSD FMLA CKD-EPI: 84 ML/MIN/1.73 M 2
GLUCOSE SERPL-MCNC: 97 MG/DL (ref 65–99)
HCT VFR BLD AUTO: 30.1 % (ref 42–52)
HGB BLD-MCNC: 9.8 G/DL (ref 14–18)
IMM GRANULOCYTES # BLD AUTO: 0.11 K/UL (ref 0–0.11)
IMM GRANULOCYTES NFR BLD AUTO: 1.6 % (ref 0–0.9)
LYMPHOCYTES # BLD AUTO: 1.11 K/UL (ref 1–4.8)
LYMPHOCYTES NFR BLD: 16.4 % (ref 22–41)
MAGNESIUM SERPL-MCNC: 2.2 MG/DL (ref 1.5–2.5)
MCH RBC QN AUTO: 34.1 PG (ref 27–33)
MCHC RBC AUTO-ENTMCNC: 32.6 G/DL (ref 33.7–35.3)
MCV RBC AUTO: 104.9 FL (ref 81.4–97.8)
MONOCYTES # BLD AUTO: 0.64 K/UL (ref 0–0.85)
MONOCYTES NFR BLD AUTO: 9.5 % (ref 0–13.4)
NEUTROPHILS # BLD AUTO: 4.82 K/UL (ref 1.82–7.42)
NEUTROPHILS NFR BLD: 71.2 % (ref 44–72)
NRBC # BLD AUTO: 0 K/UL
NRBC BLD-RTO: 0 /100 WBC
PHOSPHATE SERPL-MCNC: 3.5 MG/DL (ref 2.5–4.5)
PLATELET # BLD AUTO: 165 K/UL (ref 164–446)
PMV BLD AUTO: 10.2 FL (ref 9–12.9)
POTASSIUM SERPL-SCNC: 3.6 MMOL/L (ref 3.6–5.5)
RBC # BLD AUTO: 2.87 M/UL (ref 4.7–6.1)
SIGNIFICANT IND 70042: NORMAL
SIGNIFICANT IND 70042: NORMAL
SITE SITE: NORMAL
SITE SITE: NORMAL
SODIUM SERPL-SCNC: 140 MMOL/L (ref 135–145)
SOURCE SOURCE: NORMAL
SOURCE SOURCE: NORMAL
WBC # BLD AUTO: 6.8 K/UL (ref 4.8–10.8)

## 2022-07-08 PROCEDURE — 700102 HCHG RX REV CODE 250 W/ 637 OVERRIDE(OP): Performed by: INTERNAL MEDICINE

## 2022-07-08 PROCEDURE — 85025 COMPLETE CBC W/AUTO DIFF WBC: CPT

## 2022-07-08 PROCEDURE — 700111 HCHG RX REV CODE 636 W/ 250 OVERRIDE (IP)

## 2022-07-08 PROCEDURE — A9270 NON-COVERED ITEM OR SERVICE: HCPCS | Performed by: STUDENT IN AN ORGANIZED HEALTH CARE EDUCATION/TRAINING PROGRAM

## 2022-07-08 PROCEDURE — 99232 SBSQ HOSP IP/OBS MODERATE 35: CPT | Mod: GC | Performed by: INTERNAL MEDICINE

## 2022-07-08 PROCEDURE — 80048 BASIC METABOLIC PNL TOTAL CA: CPT

## 2022-07-08 PROCEDURE — 700102 HCHG RX REV CODE 250 W/ 637 OVERRIDE(OP): Performed by: STUDENT IN AN ORGANIZED HEALTH CARE EDUCATION/TRAINING PROGRAM

## 2022-07-08 PROCEDURE — 700111 HCHG RX REV CODE 636 W/ 250 OVERRIDE (IP): Performed by: INTERNAL MEDICINE

## 2022-07-08 PROCEDURE — 770001 HCHG ROOM/CARE - MED/SURG/GYN PRIV*

## 2022-07-08 PROCEDURE — 700101 HCHG RX REV CODE 250: Performed by: HOSPITALIST

## 2022-07-08 PROCEDURE — A9270 NON-COVERED ITEM OR SERVICE: HCPCS | Performed by: INTERNAL MEDICINE

## 2022-07-08 PROCEDURE — 700105 HCHG RX REV CODE 258: Performed by: INTERNAL MEDICINE

## 2022-07-08 PROCEDURE — 83735 ASSAY OF MAGNESIUM: CPT

## 2022-07-08 PROCEDURE — 36415 COLL VENOUS BLD VENIPUNCTURE: CPT

## 2022-07-08 PROCEDURE — 700111 HCHG RX REV CODE 636 W/ 250 OVERRIDE (IP): Performed by: STUDENT IN AN ORGANIZED HEALTH CARE EDUCATION/TRAINING PROGRAM

## 2022-07-08 PROCEDURE — 84100 ASSAY OF PHOSPHORUS: CPT

## 2022-07-08 RX ORDER — POTASSIUM CHLORIDE 20 MEQ/1
40 TABLET, EXTENDED RELEASE ORAL ONCE
Status: COMPLETED | OUTPATIENT
Start: 2022-07-08 | End: 2022-07-08

## 2022-07-08 RX ORDER — FUROSEMIDE 10 MG/ML
40 INJECTION INTRAMUSCULAR; INTRAVENOUS ONCE
Status: COMPLETED | OUTPATIENT
Start: 2022-07-08 | End: 2022-07-08

## 2022-07-08 RX ADMIN — SOTALOL HYDROCHLORIDE 120 MG: 80 TABLET ORAL at 06:30

## 2022-07-08 RX ADMIN — OMEPRAZOLE 20 MG: 20 CAPSULE, DELAYED RELEASE ORAL at 09:13

## 2022-07-08 RX ADMIN — AMPICILLIN INJECTION 2000 MG: 2 POWDER, FOR SOLUTION INTRAMUSCULAR; INTRAVENOUS at 14:14

## 2022-07-08 RX ADMIN — POTASSIUM CHLORIDE 40 MEQ: 1500 TABLET, EXTENDED RELEASE ORAL at 16:36

## 2022-07-08 RX ADMIN — FUROSEMIDE 40 MG: 10 INJECTION, SOLUTION INTRAMUSCULAR; INTRAVENOUS at 06:34

## 2022-07-08 RX ADMIN — VALSARTAN 80 MG: 80 TABLET, FILM COATED ORAL at 06:30

## 2022-07-08 RX ADMIN — AMPICILLIN INJECTION 2000 MG: 2 POWDER, FOR SOLUTION INTRAMUSCULAR; INTRAVENOUS at 16:47

## 2022-07-08 RX ADMIN — CYANOCOBALAMIN TAB 500 MCG 2000 MCG: 500 TAB at 06:32

## 2022-07-08 RX ADMIN — LIDOCAINE 1 PATCH: 50 PATCH TOPICAL at 09:13

## 2022-07-08 RX ADMIN — AMPICILLIN INJECTION 2000 MG: 2 POWDER, FOR SOLUTION INTRAMUSCULAR; INTRAVENOUS at 02:17

## 2022-07-08 RX ADMIN — AMPICILLIN INJECTION 2000 MG: 2 POWDER, FOR SOLUTION INTRAMUSCULAR; INTRAVENOUS at 09:13

## 2022-07-08 RX ADMIN — AMPICILLIN INJECTION 2000 MG: 2 POWDER, FOR SOLUTION INTRAMUSCULAR; INTRAVENOUS at 21:11

## 2022-07-08 RX ADMIN — APIXABAN 5 MG: 5 TABLET, FILM COATED ORAL at 16:47

## 2022-07-08 RX ADMIN — FUROSEMIDE 40 MG: 10 INJECTION, SOLUTION INTRAMUSCULAR; INTRAVENOUS at 16:35

## 2022-07-08 RX ADMIN — HYDROCODONE BITARTRATE AND ACETAMINOPHEN 1 TABLET: 5; 325 TABLET ORAL at 16:41

## 2022-07-08 RX ADMIN — AMPICILLIN INJECTION 2000 MG: 2 POWDER, FOR SOLUTION INTRAMUSCULAR; INTRAVENOUS at 06:28

## 2022-07-08 RX ADMIN — VALSARTAN 80 MG: 80 TABLET, FILM COATED ORAL at 16:47

## 2022-07-08 RX ADMIN — POTASSIUM CHLORIDE 40 MEQ: 1500 TABLET, EXTENDED RELEASE ORAL at 09:13

## 2022-07-08 RX ADMIN — APIXABAN 5 MG: 5 TABLET, FILM COATED ORAL at 06:31

## 2022-07-08 ASSESSMENT — ENCOUNTER SYMPTOMS
DOUBLE VISION: 0
PALPITATIONS: 0
VOMITING: 0
WEAKNESS: 1
FEVER: 0
BLURRED VISION: 0
CONSTIPATION: 0
SHORTNESS OF BREATH: 0
NAUSEA: 0
SORE THROAT: 0
ABDOMINAL PAIN: 0
DIARRHEA: 0
CHILLS: 0
HEADACHES: 0
COUGH: 0

## 2022-07-08 NOTE — DISCHARGE PLANNING
[facility] TCN chart review completed. Collaborated with FAIZA Kumar.   Patient seen at bedside. Plan was to Discharge to [facility] today as patient is medically cleared but now they no longer have a bed available.  FAIZA states they may have a bed available on Monday.  TCN will continue to follow and collaborate with discharge planning team as additional post acute needs arise. Thank you.    Previously completed:  - Appreciate PT/OT recommendations.   - Choice forms:  (HH previously obtained, SNF choice obtained 7/5/22, only for [facility]).   - [facility] introduced (Y/), referral (sent).

## 2022-07-08 NOTE — DISCHARGE SUMMARY
[facility] Discharge Summary    Attending: Willard Saldana M.d.  Senior Resident: Dr. Camacho   Intern:  Dr. Foss  Contact Number: [phone]    CHIEF COMPLAINT ON ADMISSION  Weakness  Right Hip pain     Reason for Admission  Bacteremia     Admission Date  7/3/2022    CODE STATUS  DNAR/DNI    HPI & HOSPITAL COURSE  Patient is a 72 y.o M w/  HFpEF, hypertension, atrial fibrillation on Eliquis, HLD, CHB s/p PPM, VT s/p AICD, CKD, alcohol abuse who presented 7/1/2022 with right hip pain and leukocytosis found to have Strep sp bacteremia, ultimately identify as Enterococcus that is pan sensitive, transferred from [facility] to r/o endocarditis and further evaluation of right hip pain       #Enterrococcus bacteremia   - presented to [facility] w/ increasing weakness, right hip pain   - Blood Cultures 7/1 and 7/2 grows pan sensitive Enterococcus. Repeat BCx 7/3 NGTD. Initially started on Unasyn, transitioned to IV ampicillin 2G every 4H on identification of organism, Ceftriaxone added due to concern for Enterococcus endocarditis, which was later d/c with negative KIM. Ampicillin transitioned to Linezolid 600mg BID on dicharge to complete 2 weeks Abx course- end date 7/17/22  - CT Chest/Abd/Pelvis without obvious source  - Initially suspicious of septic hip, evaluated by Ortho, U/S without effusion so deem not likely source   - considering the organism, suspect occult GI/ source, patient will need colonoscopy outpatient. Dr. Pabon from [facility] contacted and will facilitate appointment     #Right Hip Pain   - w/ severe hip OA, pending total hip replacement with Dr. Gerard Hargrove, now deferred until Bacteremia is resolved   - recommends repeating BCx 1-2 weeks after completion of Abx course   -Anesthesia consulted, does not recommend lumbosacral block due to risk of infections, superficial blocks will only last 24-48 hours and not indicated   - Follow up with ortho after completion of Abx for  surgery evaluation    #Lung Nodule  - incidental finding of 1.7cm nodule, w/ hx of Valley Fever  - will need outpatient PET CT to r/o malignancy. Ordered on discharge  - referral to pulmonary made. Inpatient pulmonologist also contact to facilitate outpatient follow up.     #Macrocytic Anemia  - w/ hx of B12 deficiency and alcohol use disorder, both likely contributing factors  - B12 oral replacement and thiamine given during admission    - H/H with mild decrease during admission, likely from acute illness  - follow up with PCP to ensure stable, colonoscopy outpatient     #VT s/p AICD  #CHB s/p PPM  - on apixaban 5mg BID and sotolol 120mg daily outpatient, both were continue during admission   - telemetry with paced rhythm      Therefore, he is discharged in good and stable condition to skilled nursing facility.    The patient met 2-midnight criteria for an inpatient stay at the time of discharge.    Discharge Date  7/11/2022    Physical Exam on Day of Discharge    Physical Exam  Constitutional:       Appearance: He is obese.   HENT:      Head: Normocephalic and atraumatic.   Eyes:      Conjunctiva/sclera: Conjunctivae normal.      Pupils: Pupils are equal, round, and reactive to light.   Cardiovascular:      Rate and Rhythm: Normal rate.      Pulses: Normal pulses.   Pulmonary:      Effort: Pulmonary effort is normal.      Breath sounds: Normal breath sounds.   Abdominal:      General: Abdomen is flat.      Palpations: Abdomen is soft.      Tenderness: There is no abdominal tenderness.   Musculoskeletal:         General: Tenderness present. No swelling.      Right lower leg: No edema.      Left lower leg: No edema.   Skin:     General: Skin is warm and dry.      Findings: Bruising present.   Neurological:      Mental Status: He is alert.   Psychiatric:         Mood and Affect: Mood normal.         Behavior: Behavior normal.        FOLLOW UP ITEMS POST DISCHARGE  - Will need PET-CT scan outpatient   - Suspect GI/  source for bacteremia, need outpatient colonoscopy  - Follow up with Cardiology regarding pacemaker and medications   - Repeat B/C 1-2 weeks after completion of antibiotics therapy     DISCHARGE DIAGNOSES  Principal Problem:    Bacteremia POA: Yes  Active Problems:    Paroxysmal ventricular tachycardia (HCC) POA: Yes    Alcohol abuse POA: Yes    Gouty arthritis of both ankles POA: Yes    Anemia POA: Unknown    Chronic diastolic congestive heart failure, NYHA class 3 (HCC) POA: Yes    Constipation POA: Unknown    Hyperglycemia POA: Unknown    Right hip pain POA: Unknown    Lung nodule seen on imaging study POA: Unknown    ÁNGEL (obstructive sleep apnea) POA: Unknown    Diarrhea POA: Unknown    Shortness of breath POA: Unknown  Resolved Problems:    Sepsis (HCC) POA: Unknown      FOLLOW UP  Future Appointments   Date Time Provider Department Center   7/12/2022  9:20 AM VASCULAR NURSE PRACTITIONER [facility] None   7/20/2022 10:15 AM David Rosario P.A.-C. [facility]   7/20/2022  3:40 PM Daniel Ferrera M.D. [facility] None   8/12/2022  2:15 PM BRODY Castellanos [facility] None   9/9/2022 10:00 AM Chantelle Perry M.D. [facility]     MEDICATIONS ON DISCHARGE     Medication List      START taking these medications      Instructions   acetaminophen 500 MG Tabs  Commonly known as: TYLENOL   Take 1 Tablet by mouth every 6 hours as needed for Moderate Pain.  Dose: 500 mg     cyanocobalamin 1000 MCG Tabs  Commonly known as: VITAMIN B12   Take 2 Tablets by mouth every day.  Dose: 2,000 mcg     HYDROcodone-acetaminophen 5-325 MG Tabs per tablet  Commonly known as: NORCO  Replaces: HYDROcodone/acetaminophen  MG Tabs   Take 1 Tablet by mouth every 6 hours as needed (moderate pain) for up to 3 days.  Dose: 1 Tablet     lidocaine 5 % Ptch  Commonly known as: LIDODERM   Place 1 Patch on the skin every 24 hours. (12 hours on, 12 hours off)  Dose: 1 Patch     linezolid 600 MG Tabs  Commonly known as: ZYVOX   Take 1  Tablet by mouth every 12 hours.  Dose: 600 mg        CHANGE how you take these medications      Instructions   omeprazole 40 MG delayed-release capsule  What changed:   · medication strength  · how much to take  Commonly known as: PRILOSEC   Take 1 Capsule by mouth every morning before breakfast.  Dose: 40 mg        CONTINUE taking these medications      Instructions   apixaban 5mg Tabs  Commonly known as: ELIQUIS   Take 5 mg by mouth 2 times a day.  Dose: 5 mg     sotalol 120 MG tablet  Commonly known as: BETAPACE   Take 1 Tablet by mouth every morning.  Dose: 120 mg     valsartan 80 MG Tabs  Commonly known as: DIOVAN   Take 1 Tablet by mouth 2 times a day.  Dose: 80 mg        STOP taking these medications    colesevelam 625 MG Tabs  Commonly known as: WELCHOL     HYDROcodone/acetaminophen  MG Tabs  Commonly known as: NORCO  Replaced by: HYDROcodone-acetaminophen 5-325 MG Tabs per tablet     potassium chloride SA 20 MEQ Tbcr  Commonly known as: Kdur     torsemide 20 MG Tabs  Commonly known as: DEMADEX     vitamin D3 125 MCG (5000 UT) Caps            Allergies  Allergies   Allergen Reactions   • Atorvastatin Rash, Itching and Myalgia         • Statins [Hmg-Coa-R Inhibitors] Rash and Itching     Skin gets red & itchy   • Sulfa Drugs Rash and Itching     Rash, itch       DIET  Orders Placed This Encounter   Procedures   • Diet Order Diet: Cardiac     Standing Status:   Standing     Number of Occurrences:   1     Order Specific Question:   Diet:     Answer:   Cardiac [6]       ACTIVITY  As tolerated.  Weight bearing as tolerated    LINES, DRAINS, AND WOUNDS  This is an automated list. Peripheral IVs will be removed prior to discharge.  Peripheral IV 07/02/22 22 G Anterior;Left Wrist (Active)   Site Assessment Clean;Intact 07/07/22 2000   Dressing Type Transparent 07/07/22 2000   Line Status Flushed 07/07/22 2000   Dressing Status Clean;Intact;Dry 07/07/22 2000   Dressing Intervention N/A 07/04/22 0800    Infiltration Grading ([facility] 0 07/07/22 2000   Phlebitis Scale ([facility] Only) 0 07/07/22 2000       Wound 07/03/22 Buttocks Left (Active)   Site Assessment Clean;Dry 07/07/22 0800   Periwound Assessment Clean;Dry;Intact 07/07/22 0800   Margins Unattached edges 07/06/22 1215   Closure Open to air 07/06/22 1215   Drainage Amount Small 07/06/22 1215   Drainage Description Sanguineous 07/06/22 1215   Treatments Cleansed;Offloading 07/06/22 1215   Wound Cleansing Soap and Water 07/06/22 1215   Dressing Options Open to Air 07/06/22 2000       Wound 07/04/22 Arm Left (Active)   Site Assessment Dry;Red;Purple;Bleeding;Fragile 07/07/22 2000   Periwound Assessment Clean;Dry;Intact 07/07/22 2000   Margins Attached edges 07/06/22 1215   Closure Secondary intention 07/07/22 2000   Drainage Amount Small 07/07/22 2000   Drainage Description Sanguineous 07/07/22 2000   Treatments Cleansed;Site care 07/06/22 1215   Dressing Options Dry Gauze 07/04/22 2000   Dressing Status Clean;Dry;Intact 07/06/22 2000       Peripheral IV 07/02/22 22 G Anterior;Left Wrist (Active)   Site Assessment Clean;Intact 07/07/22 2000   Dressing Type Transparent 07/07/22 2000   Line Status Flushed 07/07/22 2000   Dressing Status Clean;Intact;Dry 07/07/22 2000   Dressing Intervention N/A 07/04/22 0800   Infiltration Grading ([facility]) 0 07/07/22 2000   Phlebitis Scale ([facility]) 0 07/07/22 2000               MENTAL STATUS ON TRANSFER A0x 4       CONSULTATIONS  Cardiology   Infectious Disease  Orthopedics     PROCEDURES  DX-HIP-UNILATERAL-WITH PELVIS-1 VIEW RIGHT   Final Result      1.  Severe osteoarthritis of the right hip with bone-on-bone contact.   2.  Mild to moderate left hip osteoarthritis.      EC-KIM W/O CONT   Final Result   CONCLUSIONS  No evidence of endocarditis.   Unable to visualize entire pacemaker wires but no sign of vegetation on   what could be seen.               LABORATORY  Lab Results   Component Value Date    SODIUM 140  07/08/2022    POTASSIUM 3.6 07/08/2022    CHLORIDE 104 07/08/2022    CO2 25 07/08/2022    GLUCOSE 97 07/08/2022    BUN 24 (H) 07/08/2022    CREATININE 0.95 07/08/2022        Lab Results   Component Value Date    WBC 6.8 07/08/2022    HEMOGLOBIN 9.8 (L) 07/08/2022    HEMATOCRIT 30.1 (L) 07/08/2022    PLATELETCT 165 07/08/2022        Total time of the discharge process 60  minutes.

## 2022-07-08 NOTE — CARE PLAN
The patient is Watcher - Medium risk of patient condition declining or worsening    Shift Goals  Clinical Goals: Mobility  Patient Goals: Discharge  Family Goals: Support Patient    Progress made toward(s) clinical / shift goals:    Problem: Knowledge Deficit - Standard  Goal: Patient and family/care givers will demonstrate understanding of plan of care, disease process/condition, diagnostic tests and medications  Outcome: Progressing  Note: White board updated with POC and care team information during bedside report.      Problem: Fall Risk  Goal: Patient will remain free from falls  Outcome: Progressing  Note: Fall precautions in place. Bed in lowest position. Non-skid socks in place. Personal possessions within reach. Mobility sign on door. Bed-alarm on. Call light within reach. Pt educated regarding fall prevention and states understanding.       Problem: Pain - Standard  Goal: Alleviation of pain or a reduction in pain to the patient’s comfort goal  Outcome: Progressing  Note: Pt declines need for pain intervention at this time.

## 2022-07-08 NOTE — PROGRESS NOTES
Daily Progress Note:     Date of Service: 7/7/2022  Primary Team: [facility] Blue Team   Attending: Dr. Willard Saldana M.D.   Senior Resident: Dr. Micheline Camacho MD  Intern: Dr. Eun Foss MD  Contact:  [phone]    Patient Identifier:   Patient is a 72 yo male with a PMH of HFpEF, HTN, A. Fib s/p pacemaker on Eliquis, VT s/p AICD, HLD, CKD, and alcohol abuse, transferred from [facility] for management of bacteremia s/p negative KIM.    Subjective:  Patient developed shortness of breath this afternoon, patient's wife is concerned he has CHF. Echo showed EF 55%. States diarrhea has resolved.    Interval Update:  07/05: Patient underwent KIM today. Preliminary results negative. Pending interpretation by cardiologist.  07/06: Patient has never had prior colonoscopy in the past.    Consultants/Specialty:  Ortho  Cardio  ID    Review of Systems:  Review of Systems   Constitutional: Negative for chills and fever.   HENT: Negative for congestion and sore throat.    Eyes: Negative for blurred vision and double vision.   Respiratory: Negative for cough and shortness of breath.    Cardiovascular: Positive for leg swelling. Negative for chest pain and palpitations.   Gastrointestinal: Negative for abdominal pain, constipation, diarrhea, nausea and vomiting.   Genitourinary: Negative for dysuria and urgency.   Musculoskeletal: Positive for joint pain.   Neurological: Positive for weakness. Negative for headaches.       Objective:   Vitals:   Temp:  [36.7 °C (98.1 °F)-37 °C (98.6 °F)] 36.7 °C (98.1 °F)  Pulse:  [68-77] 71  Resp:  [16-18] 18  BP: (136-159)/(77-86) 143/80  SpO2:  [90 %-95 %] 90 %    Physical Exam  Constitutional:       Appearance: He is obese.   HENT:      Head: Normocephalic and atraumatic.   Eyes:      Conjunctiva/sclera: Conjunctivae normal.      Pupils: Pupils are equal, round, and reactive to light.   Cardiovascular:      Rate and Rhythm: Normal rate.      Pulses: Normal pulses.   Pulmonary:       Effort: Pulmonary effort is normal.      Breath sounds: Normal breath sounds.   Abdominal:      General: Abdomen is flat.      Palpations: Abdomen is soft.      Tenderness: There is abdominal tenderness.   Musculoskeletal:         General: Swelling and tenderness present.      Right lower leg: Edema present.      Left lower leg: Edema present.   Skin:     General: Skin is warm and dry.      Findings: Bruising present.   Neurological:      Mental Status: He is alert.   Psychiatric:         Mood and Affect: Mood normal.         Behavior: Behavior normal.           Labs:   Lab Results   Component Value Date/Time    WBC 6.5 07/07/2022 12:29 AM    RBC 2.80 (L) 07/07/2022 12:29 AM    HEMOGLOBIN 9.5 (L) 07/07/2022 12:29 AM    HEMATOCRIT 29.4 (L) 07/07/2022 12:29 AM    .0 (H) 07/07/2022 12:29 AM    MCH 33.9 (H) 07/07/2022 12:29 AM    MCHC 32.3 (L) 07/07/2022 12:29 AM    MPV 10.4 07/07/2022 12:29 AM    NEUTSPOLYS 68.20 07/07/2022 12:29 AM    LYMPHOCYTES 17.30 (L) 07/07/2022 12:29 AM    MONOCYTES 11.40 07/07/2022 12:29 AM    EOSINOPHILS 1.20 07/07/2022 12:29 AM    BASOPHILS 0.20 07/07/2022 12:29 AM        Lab Results   Component Value Date/Time    SODIUM 138 07/07/2022 12:29 AM    POTASSIUM 4.0 07/07/2022 12:29 AM    CHLORIDE 105 07/07/2022 12:29 AM    CO2 23 07/07/2022 12:29 AM    GLUCOSE 101 (H) 07/07/2022 12:29 AM    BUN 26 (H) 07/07/2022 12:29 AM    CREATININE 0.95 07/07/2022 12:29 AM    BUNCREATRAT 25 (H) 05/30/2014 09:04 AM            Lab Results   Component Value Date/Time    PROTHROMBTM 16.3 (H) 07/01/2022 12:17 PM    INR 1.38 (H) 07/01/2022 12:17 PM        Imaging:   DX-HIP-UNILATERAL-WITH PELVIS-1 VIEW RIGHT   Final Result      1.  Severe osteoarthritis of the right hip with bone-on-bone contact.   2.  Mild to moderate left hip osteoarthritis.      EC-KIM W/O CONT   Final Result          Assessment and Plan:  Patient is a 70 yo male with a PMH of HFpEF, HTN, A. Fib s/p pacemaker on Eliquis, VT s/p AICD, HLD,  CKD, and alcohol abuse, transferred from [facility] for management of bacteremia s/p negative KIM.      * Bacteremia- (present on admission)  Assessment & Plan  Likely occult gastrointestinal source. Unlikely endocarditis based on negative preliminary KIM. Unlikely septic arthritis based on negative subsequent BCx.  BCx from 7/1 and 7/2 growing gram positive enterococcus faecalis, sensitive to ampicillin. Subsequent BCx negative - ID following  Continue Ampicillin 2,000 mg IV, planned stop date 7/11  Ortho consulted  Plan for outpatient colonoscopy and elective right hip surgery        Volume overload  Assessment & Plan  Likely secondary to fluid administration and bedbound status from right hip pain  Start furosemide   Consult PT/OT      Diarrhea  Assessment & Plan  Resolved  Iatrogenic vs. Secondary to ampicillin  Now D/C stool softeners, monitor  C. Diff workup if diarrhea persists    ÁNGEL (obstructive sleep apnea)  Assessment & Plan  CPAP  F/u with prior completed sleep study outpatient    Lung nodule seen on imaging study  Assessment & Plan  Incidental finding on CT   Possibly due to previous inflammation from Valley Fever infection  F/u with outpatient pulmonology    Right hip pain  Assessment & Plan  Unlikely secondary ot septic arthritis, ortho consulted  Discontinue oxycodone  Start Norco for pain control  Consult PT  Plan for discharge to short term rehab facility    Hyperglycemia  Assessment & Plan  A1C 5.7, no need for insulin   Follow up outpatient for diabetes prevention     Constipation  Assessment & Plan  Resolved  Suspect from chronic opioid use    Chronic diastolic congestive heart failure, NYHA class 3 (HCC)- (present on admission)  Assessment & Plan  Continue valsartan      Anemia  Assessment & Plan  Multifactorial, likely secondary to BM from alcohol and B12 deficiency  Continue with folate, thiamine  Restart B12    Gouty arthritis of both ankles- (present on admission)  Assessment &  Plan  Likely secondary to alcohol abuse  Manage outpatient with PCP    Alcohol abuse- (present on admission)  Assessment & Plan  Continue thiamine  No longer in acute danger of withdrawal  Last drink prior to admission on 06/30    Paroxysmal ventricular tachycardia (HCC)- (present on admission)  Assessment & Plan  Continue sotalol   Resume Eliquis       Code Status: Full code  DVT prophylaxis: Eliquis  Diet: Regular  GI: Fibercon  Disposition: d/c tomorrow to [facility]    Eun Foss MD  PGY-1 Internal Medicine

## 2022-07-08 NOTE — PROGRESS NOTES
Daily Progress Note:     Date of Service: 7/8/2022  Primary Team: [facility] IM Blue Team   Attending: Dr. Willard Saldana M.D.   Senior Resident: Dr. Micheline Camacho MD  Intern: Dr. Eun Foss MD  Contact:  [phone]    Patient Identifier:   Patient is a 72 yo male with a PMH of HFpEF, HTN, A. Fib s/p pacemaker on Eliquis, VT s/p AICD, HLD, CKD, and alcohol abuse, transferred from [facility] for management of bacteremia s/p negative KIM.    Subjective:  No acute event overnight, SOB improved after Lasix  Reports improvement in hip pain currently. Discussed with anesthesia regarding nerve block until able to get surgery, does not recommend lumbosacral nerve block at this time as risk of infection, more superficial nerve blocks only indicated prior to surgery due to short duration.   Pending bed at [facility]    Interval Update:  07/05: Patient underwent KIM today. Preliminary results negative. Pending interpretation by cardiologist.  07/06: with multiple loose BMs, iatrogenic, d/c scheduled bowel protocol   7/7: diarrhea resolved, continue with ampicillin IV, Lasix 40mg IVP given, net output -860    Consultants/Specialty:  Ortho  Cardio  ID    Review of Systems:  Review of Systems   Constitutional: Negative for chills and fever.   HENT: Negative for congestion and sore throat.    Eyes: Negative for blurred vision and double vision.   Respiratory: Negative for cough and shortness of breath.    Cardiovascular: Positive for leg swelling. Negative for chest pain and palpitations.   Gastrointestinal: Negative for abdominal pain, constipation, diarrhea, nausea and vomiting.   Genitourinary: Negative for dysuria and urgency.   Musculoskeletal: Positive for joint pain.   Neurological: Positive for weakness. Negative for headaches.       Objective:   Vitals:   Temp:  [36.7 °C (98.1 °F)-37.1 °C (98.8 °F)] 36.7 °C (98.1 °F)  Pulse:  [66-81] 71  Resp:  [16-18] 16  BP: (124-148)/(77-86) 124/84  SpO2:  [90 %-98 %] 90 %    Physical  Exam  Constitutional:       Appearance: He is obese.   HENT:      Head: Normocephalic and atraumatic.   Eyes:      Conjunctiva/sclera: Conjunctivae normal.      Pupils: Pupils are equal, round, and reactive to light.   Cardiovascular:      Rate and Rhythm: Normal rate.      Pulses: Normal pulses.   Pulmonary:      Effort: Pulmonary effort is normal.      Breath sounds: Normal breath sounds.   Abdominal:      General: Abdomen is flat.      Palpations: Abdomen is soft.      Tenderness: There is abdominal tenderness.   Musculoskeletal:         General: Swelling and tenderness present.      Right lower leg: Edema present.      Left lower leg: Edema present.   Skin:     General: Skin is warm and dry.      Findings: Bruising present.   Neurological:      Mental Status: He is alert.   Psychiatric:         Mood and Affect: Mood normal.         Behavior: Behavior normal.           Labs:   Lab Results   Component Value Date/Time    WBC 6.8 07/08/2022 12:12 AM    RBC 2.87 (L) 07/08/2022 12:12 AM    HEMOGLOBIN 9.8 (L) 07/08/2022 12:12 AM    HEMATOCRIT 30.1 (L) 07/08/2022 12:12 AM    .9 (H) 07/08/2022 12:12 AM    MCH 34.1 (H) 07/08/2022 12:12 AM    MCHC 32.6 (L) 07/08/2022 12:12 AM    MPV 10.2 07/08/2022 12:12 AM    NEUTSPOLYS 71.20 07/08/2022 12:12 AM    LYMPHOCYTES 16.40 (L) 07/08/2022 12:12 AM    MONOCYTES 9.50 07/08/2022 12:12 AM    EOSINOPHILS 1.20 07/08/2022 12:12 AM    BASOPHILS 0.10 07/08/2022 12:12 AM        Lab Results   Component Value Date/Time    SODIUM 140 07/08/2022 12:12 AM    POTASSIUM 3.6 07/08/2022 12:12 AM    CHLORIDE 104 07/08/2022 12:12 AM    CO2 25 07/08/2022 12:12 AM    GLUCOSE 97 07/08/2022 12:12 AM    BUN 24 (H) 07/08/2022 12:12 AM    CREATININE 0.95 07/08/2022 12:12 AM    BUNCREATRAT 25 (H) 05/30/2014 09:04 AM            Lab Results   Component Value Date/Time    PROTHROMBTM 16.3 (H) 07/01/2022 12:17 PM    INR 1.38 (H) 07/01/2022 12:17 PM        Imaging:   DX-HIP-UNILATERAL-WITH PELVIS-1 VIEW  RIGHT   Final Result      1.  Severe osteoarthritis of the right hip with bone-on-bone contact.   2.  Mild to moderate left hip osteoarthritis.      EC-KIM W/O CONT   Final Result          Assessment and Plan:  Patient is a 72 yo male with a PMH of HFpEF, HTN, A. Fib s/p pacemaker on Eliquis, VT s/p AICD, HLD, CKD, and alcohol abuse, transferred from [facility] for management of bacteremia s/p negative KIM.      * Bacteremia- (present on admission)  Assessment & Plan  Likely occult gastrointestinal source. Negative KIM. Unlikely septic arthritis based on negative subsequent BCx.  BCx from 7/1 and 7/2 growing gram positive enterococcus faecalis, sensitive to ampicillin. Subsequent BCx negative - ID following  Continue Ampicillin 2,000 mg IV, planned stop date 7/11  Will need outpatient colonoscopy       Shortness of breath  Assessment & Plan  Volume overload 2/2 to aggressive IVF resus on admission  Improved after diuresis, will continue diuresis, goal euvolemia. IVP Lasix 40mg BID today   Monitor I/Os       Diarrhea  Assessment & Plan  Resolved  Iatrogenic vs. Secondary to ampicillin  Now D/C stool softeners, monitor  C. 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 Pulm referral placed    Right hip pain  Assessment & Plan  Xray w/ severe OA of right hip, initially w/ suspicion for septic hip, however, no effusion noted on U/S, bacteremia from Enterococcus which is usually GI/ source. Ortho re-consulted, does not suspect septic hip, no consideration for hip replacement for at least 2 weeks after resolution of bacteremia   Can consider  repeating BC 1-2 weeks after completion of Abx prior to surgery    Anesthesia does not recommend nerve blocks for acute management. Pain controlled with Lidocaine patch, Norco PRN  Continue to titrate as needed, monitor for respiratory depression, sedation, constipation   Medically stable for Inpatient Rehab     Hyperglycemia  Assessment & Plan  A1C 5.7, no need for insulin   Follow up outpatient for diabetes prevention     Constipation  Assessment & Plan  Suspect from chronic opioid use. Improved   with diarrhea on scheduled bowel regimen so will continue to monitor     Chronic diastolic congestive heart failure, NYHA class 3 (HCC)- (present on admission)  Assessment & Plan  Continue valsartan      Anemia  Assessment & Plan  Multifactorial, likely secondary to BM from alcohol and B12 deficiency  Continue with folate, thiamine  Restart B12    Gouty arthritis of both ankles- (present on admission)  Assessment & Plan  Likely secondary to alcohol abuse  Manage outpatient with PCP    Alcohol abuse- (present on admission)  Assessment & Plan  Continue thiamine  No longer in acute danger of withdrawal  Last drink prior to admission on 06/30    Paroxysmal ventricular tachycardia (HCC)- (present on admission)  Assessment & Plan  Continue sotalol   Resume Eliquis       Code Status: Full code  DVT prophylaxis: full AC with Eliquis  Diet: Regular  GI: Fibercon  Disposition: Medically stable for D/C to inpatient rehab,

## 2022-07-08 NOTE — PROGRESS NOTES
I assessed Mr Ferrell at 19:30 at the request of day team in the setting of c/o SOB earlier in the evening, for which the patient received one time dose of 40 mg of furosemide.   On approach, pt was sleeping in bed but easily rousable. He denies SOB and reports urinating several time. He urinated more than 400 ml in 2 hours post furosemide administration.   Lungs were clear, saturating to 92 on RA.

## 2022-07-08 NOTE — DISCHARGE PLANNING
[facility] TCN chart review completed. Collaborated with FAIZA Kumar.  Patient seen at bedside.  Per chart review, patient is agreeable to SNF and has been accepted to [facility].  [facility] will have a bed for patient tomorrow pending medical clearance.  Disposition for skilled facility for IV antibiotics when medically cleared.  TCN will continue to follow and collaborate with discharge planning team as additional post acute needs arise. Thank you.    Previously completed:  - Appreciate PT/OT recommendations.   - Choice forms:  (HH previously obtained, SNF choice obtained 7/5/22, only for [facility]).   - GSC introduced (Y/), referral (sent).

## 2022-07-08 NOTE — DISCHARGE PLANNING
[facility] #[id number]    Case Management Discharge Planning    Admission Date: 7/3/2022  GMLOS: 3.5  ALOS: 5    6-Clicks ADL Score: 18  6-Clicks Mobility Score: 12  PT and/or OT Eval ordered: Yes  Post-acute Referrals Ordered: Yes  Post-acute Choice Obtained: Yes  Has referral(s) been sent to post-acute provider:  Yes      Anticipated Discharge Dispo: Discharge Disposition: D/T to SNF with Medicare cert in anticipation of skilled care (03)    DME Needed: No    Action(s) Taken: Completed PASSR/LOC    Escalations Completed: None    Medically Clear: Yes    Next Steps: PASRR completed. To get a bed at [facility] today. Transport not yet arranged.     [facility] will not have a bed until at least Monday. Patient and spouse aware.     Barriers to Discharge: Pending Placement    Is the patient up for discharge tomorrow: No

## 2022-07-09 LAB
ANION GAP SERPL CALC-SCNC: 11 MMOL/L (ref 7–16)
BUN SERPL-MCNC: 22 MG/DL (ref 8–22)
CALCIUM SERPL-MCNC: 9.2 MG/DL (ref 8.5–10.5)
CHLORIDE SERPL-SCNC: 101 MMOL/L (ref 96–112)
CO2 SERPL-SCNC: 23 MMOL/L (ref 20–33)
CREAT SERPL-MCNC: 0.92 MG/DL (ref 0.5–1.4)
GFR SERPLBLD CREATININE-BSD FMLA CKD-EPI: 87 ML/MIN/1.73 M 2
GLUCOSE SERPL-MCNC: 109 MG/DL (ref 65–99)
MAGNESIUM SERPL-MCNC: 1.9 MG/DL (ref 1.5–2.5)
POTASSIUM SERPL-SCNC: 4.8 MMOL/L (ref 3.6–5.5)
SODIUM SERPL-SCNC: 135 MMOL/L (ref 135–145)

## 2022-07-09 PROCEDURE — A9270 NON-COVERED ITEM OR SERVICE: HCPCS | Performed by: INTERNAL MEDICINE

## 2022-07-09 PROCEDURE — 36415 COLL VENOUS BLD VENIPUNCTURE: CPT

## 2022-07-09 PROCEDURE — 700111 HCHG RX REV CODE 636 W/ 250 OVERRIDE (IP): Performed by: INTERNAL MEDICINE

## 2022-07-09 PROCEDURE — 770001 HCHG ROOM/CARE - MED/SURG/GYN PRIV*

## 2022-07-09 PROCEDURE — 700102 HCHG RX REV CODE 250 W/ 637 OVERRIDE(OP): Performed by: INTERNAL MEDICINE

## 2022-07-09 PROCEDURE — 700102 HCHG RX REV CODE 250 W/ 637 OVERRIDE(OP)

## 2022-07-09 PROCEDURE — 700102 HCHG RX REV CODE 250 W/ 637 OVERRIDE(OP): Performed by: STUDENT IN AN ORGANIZED HEALTH CARE EDUCATION/TRAINING PROGRAM

## 2022-07-09 PROCEDURE — 99232 SBSQ HOSP IP/OBS MODERATE 35: CPT | Mod: GC | Performed by: INTERNAL MEDICINE

## 2022-07-09 PROCEDURE — A9270 NON-COVERED ITEM OR SERVICE: HCPCS

## 2022-07-09 PROCEDURE — 80048 BASIC METABOLIC PNL TOTAL CA: CPT

## 2022-07-09 PROCEDURE — 700101 HCHG RX REV CODE 250: Performed by: HOSPITALIST

## 2022-07-09 PROCEDURE — 83735 ASSAY OF MAGNESIUM: CPT

## 2022-07-09 PROCEDURE — 700105 HCHG RX REV CODE 258: Performed by: INTERNAL MEDICINE

## 2022-07-09 PROCEDURE — A9270 NON-COVERED ITEM OR SERVICE: HCPCS | Performed by: STUDENT IN AN ORGANIZED HEALTH CARE EDUCATION/TRAINING PROGRAM

## 2022-07-09 RX ORDER — POTASSIUM CHLORIDE 20 MEQ/1
40 TABLET, EXTENDED RELEASE ORAL DAILY
Status: DISCONTINUED | OUTPATIENT
Start: 2022-07-09 | End: 2022-07-09

## 2022-07-09 RX ORDER — FUROSEMIDE 40 MG/1
40 TABLET ORAL ONCE
Status: COMPLETED | OUTPATIENT
Start: 2022-07-09 | End: 2022-07-09

## 2022-07-09 RX ORDER — POTASSIUM CHLORIDE 20 MEQ/1
40 TABLET, EXTENDED RELEASE ORAL ONCE
Status: COMPLETED | OUTPATIENT
Start: 2022-07-09 | End: 2022-07-09

## 2022-07-09 RX ADMIN — HYDROCODONE BITARTRATE AND ACETAMINOPHEN 1 TABLET: 5; 325 TABLET ORAL at 08:51

## 2022-07-09 RX ADMIN — AMPICILLIN INJECTION 2000 MG: 2 POWDER, FOR SOLUTION INTRAMUSCULAR; INTRAVENOUS at 17:29

## 2022-07-09 RX ADMIN — FUROSEMIDE 40 MG: 40 TABLET ORAL at 13:36

## 2022-07-09 RX ADMIN — APIXABAN 5 MG: 5 TABLET, FILM COATED ORAL at 17:30

## 2022-07-09 RX ADMIN — VALSARTAN 80 MG: 80 TABLET, FILM COATED ORAL at 17:30

## 2022-07-09 RX ADMIN — AMPICILLIN INJECTION 2000 MG: 2 POWDER, FOR SOLUTION INTRAMUSCULAR; INTRAVENOUS at 05:13

## 2022-07-09 RX ADMIN — CYANOCOBALAMIN TAB 500 MCG 2000 MCG: 500 TAB at 05:11

## 2022-07-09 RX ADMIN — APIXABAN 5 MG: 5 TABLET, FILM COATED ORAL at 05:13

## 2022-07-09 RX ADMIN — AMPICILLIN INJECTION 2000 MG: 2 POWDER, FOR SOLUTION INTRAMUSCULAR; INTRAVENOUS at 02:04

## 2022-07-09 RX ADMIN — POTASSIUM CHLORIDE 40 MEQ: 1500 TABLET, EXTENDED RELEASE ORAL at 05:13

## 2022-07-09 RX ADMIN — AMPICILLIN INJECTION 2000 MG: 2 POWDER, FOR SOLUTION INTRAMUSCULAR; INTRAVENOUS at 13:36

## 2022-07-09 RX ADMIN — SOTALOL HYDROCHLORIDE 120 MG: 80 TABLET ORAL at 05:12

## 2022-07-09 RX ADMIN — OMEPRAZOLE 20 MG: 20 CAPSULE, DELAYED RELEASE ORAL at 08:51

## 2022-07-09 RX ADMIN — AMPICILLIN INJECTION 2000 MG: 2 POWDER, FOR SOLUTION INTRAMUSCULAR; INTRAVENOUS at 10:29

## 2022-07-09 RX ADMIN — LIDOCAINE 1 PATCH: 50 PATCH TOPICAL at 08:51

## 2022-07-09 RX ADMIN — VALSARTAN 80 MG: 80 TABLET, FILM COATED ORAL at 05:12

## 2022-07-09 RX ADMIN — AMPICILLIN INJECTION 2000 MG: 2 POWDER, FOR SOLUTION INTRAMUSCULAR; INTRAVENOUS at 21:43

## 2022-07-09 ASSESSMENT — ENCOUNTER SYMPTOMS
WEAKNESS: 1
CHILLS: 0
FEVER: 0
SORE THROAT: 0
ABDOMINAL PAIN: 0
NAUSEA: 0
HEADACHES: 0
COUGH: 0
BLURRED VISION: 0
PALPITATIONS: 0
CONSTIPATION: 0
DOUBLE VISION: 0
DIARRHEA: 0
VOMITING: 0
SHORTNESS OF BREATH: 0

## 2022-07-09 ASSESSMENT — PAIN DESCRIPTION - PAIN TYPE
TYPE: ACUTE PAIN
TYPE: ACUTE PAIN
TYPE: CHRONIC PAIN

## 2022-07-09 ASSESSMENT — FIBROSIS 4 INDEX: FIB4 SCORE: 2.69

## 2022-07-09 NOTE — CARE PLAN
The patient is Watcher - Medium risk of patient condition declining or worsening    Shift Goals  Clinical Goals: Mobility  Patient Goals: Discharge  Family Goals: Support Patient    Progress made toward(s) clinical / shift goals:    Problem: Knowledge Deficit - Standard  Goal: Patient and family/care givers will demonstrate understanding of plan of care, disease process/condition, diagnostic tests and medications  Outcome: Progressing  Note: White board updated with POC and care team information during bedside report.      Problem: Psychosocial  Goal: Patient's level of anxiety will decrease  Outcome: Progressing  Note: Family at bedside to ease anxiety     Problem: Fall Risk  Goal: Patient will remain free from falls  Outcome: Progressing  Note: Fall precautions in place. Bed in lowest position. Non-skid socks in place. Personal possessions within reach. Mobility sign on door. Bed-alarm on. Call light within reach. Pt educated regarding fall prevention and states understanding.

## 2022-07-09 NOTE — CARE PLAN
The patient is Watcher - Medium risk of patient condition declining or worsening    Shift Goals  Clinical Goals: Mobility  Patient Goals: Discharge  Family Goals: Support Patient      Problem: Knowledge Deficit - Standard  Goal: Patient and family/care givers will demonstrate understanding of plan of care, disease process/condition, diagnostic tests and medications  Outcome: Progressing     Problem: Optimal Care for Alcohol Withdrawal  Goal: Optimal Care for the alcohol withdrawal patient  Outcome: Progressing     Problem: Seizure Precautions  Goal: Implementation of seizure precautions  Outcome: Progressing

## 2022-07-10 LAB
ANION GAP SERPL CALC-SCNC: 11 MMOL/L (ref 7–16)
BUN SERPL-MCNC: 23 MG/DL (ref 8–22)
CALCIUM SERPL-MCNC: 8.9 MG/DL (ref 8.5–10.5)
CHLORIDE SERPL-SCNC: 104 MMOL/L (ref 96–112)
CO2 SERPL-SCNC: 23 MMOL/L (ref 20–33)
CREAT SERPL-MCNC: 0.88 MG/DL (ref 0.5–1.4)
ERYTHROCYTE [DISTWIDTH] IN BLOOD BY AUTOMATED COUNT: 52.4 FL (ref 35.9–50)
GFR SERPLBLD CREATININE-BSD FMLA CKD-EPI: 90 ML/MIN/1.73 M 2
GLUCOSE SERPL-MCNC: 92 MG/DL (ref 65–99)
HCT VFR BLD AUTO: 28 % (ref 42–52)
HEMOCCULT STL QL: POSITIVE
HGB BLD-MCNC: 8.8 G/DL (ref 14–18)
MAGNESIUM SERPL-MCNC: 1.9 MG/DL (ref 1.5–2.5)
MCH RBC QN AUTO: 33.7 PG (ref 27–33)
MCHC RBC AUTO-ENTMCNC: 31.4 G/DL (ref 33.7–35.3)
MCV RBC AUTO: 107.3 FL (ref 81.4–97.8)
PHOSPHATE SERPL-MCNC: 3 MG/DL (ref 2.5–4.5)
PLATELET # BLD AUTO: 223 K/UL (ref 164–446)
PMV BLD AUTO: 10 FL (ref 9–12.9)
POTASSIUM SERPL-SCNC: 4.3 MMOL/L (ref 3.6–5.5)
RBC # BLD AUTO: 2.61 M/UL (ref 4.7–6.1)
SODIUM SERPL-SCNC: 138 MMOL/L (ref 135–145)
WBC # BLD AUTO: 8.5 K/UL (ref 4.8–10.8)

## 2022-07-10 PROCEDURE — 80048 BASIC METABOLIC PNL TOTAL CA: CPT

## 2022-07-10 PROCEDURE — 700101 HCHG RX REV CODE 250: Performed by: HOSPITALIST

## 2022-07-10 PROCEDURE — 83735 ASSAY OF MAGNESIUM: CPT

## 2022-07-10 PROCEDURE — 700111 HCHG RX REV CODE 636 W/ 250 OVERRIDE (IP)

## 2022-07-10 PROCEDURE — 85027 COMPLETE CBC AUTOMATED: CPT

## 2022-07-10 PROCEDURE — 700105 HCHG RX REV CODE 258: Performed by: INTERNAL MEDICINE

## 2022-07-10 PROCEDURE — A9270 NON-COVERED ITEM OR SERVICE: HCPCS

## 2022-07-10 PROCEDURE — A9270 NON-COVERED ITEM OR SERVICE: HCPCS | Performed by: INTERNAL MEDICINE

## 2022-07-10 PROCEDURE — 36415 COLL VENOUS BLD VENIPUNCTURE: CPT

## 2022-07-10 PROCEDURE — 700111 HCHG RX REV CODE 636 W/ 250 OVERRIDE (IP): Performed by: INTERNAL MEDICINE

## 2022-07-10 PROCEDURE — 700102 HCHG RX REV CODE 250 W/ 637 OVERRIDE(OP): Performed by: INTERNAL MEDICINE

## 2022-07-10 PROCEDURE — 700102 HCHG RX REV CODE 250 W/ 637 OVERRIDE(OP)

## 2022-07-10 PROCEDURE — 700102 HCHG RX REV CODE 250 W/ 637 OVERRIDE(OP): Performed by: STUDENT IN AN ORGANIZED HEALTH CARE EDUCATION/TRAINING PROGRAM

## 2022-07-10 PROCEDURE — A9270 NON-COVERED ITEM OR SERVICE: HCPCS | Performed by: STUDENT IN AN ORGANIZED HEALTH CARE EDUCATION/TRAINING PROGRAM

## 2022-07-10 PROCEDURE — 99232 SBSQ HOSP IP/OBS MODERATE 35: CPT | Mod: GC | Performed by: INTERNAL MEDICINE

## 2022-07-10 PROCEDURE — 770001 HCHG ROOM/CARE - MED/SURG/GYN PRIV*

## 2022-07-10 PROCEDURE — 82272 OCCULT BLD FECES 1-3 TESTS: CPT

## 2022-07-10 PROCEDURE — 84100 ASSAY OF PHOSPHORUS: CPT

## 2022-07-10 RX ORDER — OMEPRAZOLE 20 MG/1
40 CAPSULE, DELAYED RELEASE ORAL
Status: DISCONTINUED | OUTPATIENT
Start: 2022-07-10 | End: 2022-07-10

## 2022-07-10 RX ORDER — MAGNESIUM SULFATE 1 G/100ML
1 INJECTION INTRAVENOUS ONCE
Status: COMPLETED | OUTPATIENT
Start: 2022-07-10 | End: 2022-07-10

## 2022-07-10 RX ORDER — LINEZOLID 600 MG/1
600 TABLET, FILM COATED ORAL EVERY 12 HOURS
Status: DISCONTINUED | OUTPATIENT
Start: 2022-07-10 | End: 2022-07-11 | Stop reason: HOSPADM

## 2022-07-10 RX ORDER — OMEPRAZOLE 20 MG/1
40 CAPSULE, DELAYED RELEASE ORAL 2 TIMES DAILY
Status: DISCONTINUED | OUTPATIENT
Start: 2022-07-10 | End: 2022-07-10

## 2022-07-10 RX ORDER — OMEPRAZOLE 20 MG/1
40 CAPSULE, DELAYED RELEASE ORAL DAILY
Status: DISCONTINUED | OUTPATIENT
Start: 2022-07-10 | End: 2022-07-11 | Stop reason: HOSPADM

## 2022-07-10 RX ADMIN — AMPICILLIN INJECTION 2000 MG: 2 POWDER, FOR SOLUTION INTRAMUSCULAR; INTRAVENOUS at 02:08

## 2022-07-10 RX ADMIN — AMPICILLIN INJECTION 2000 MG: 2 POWDER, FOR SOLUTION INTRAMUSCULAR; INTRAVENOUS at 05:10

## 2022-07-10 RX ADMIN — OMEPRAZOLE 40 MG: 20 CAPSULE, DELAYED RELEASE ORAL at 16:14

## 2022-07-10 RX ADMIN — CYANOCOBALAMIN TAB 500 MCG 2000 MCG: 500 TAB at 05:11

## 2022-07-10 RX ADMIN — LINEZOLID 600 MG: 600 TABLET, FILM COATED ORAL at 14:09

## 2022-07-10 RX ADMIN — VALSARTAN 80 MG: 80 TABLET, FILM COATED ORAL at 16:14

## 2022-07-10 RX ADMIN — SOTALOL HYDROCHLORIDE 120 MG: 80 TABLET ORAL at 05:11

## 2022-07-10 RX ADMIN — APIXABAN 5 MG: 5 TABLET, FILM COATED ORAL at 05:11

## 2022-07-10 RX ADMIN — VALSARTAN 80 MG: 80 TABLET, FILM COATED ORAL at 05:11

## 2022-07-10 RX ADMIN — APIXABAN 5 MG: 5 TABLET, FILM COATED ORAL at 16:14

## 2022-07-10 RX ADMIN — MAGNESIUM SULFATE HEPTAHYDRATE 1 G: 1 INJECTION, SOLUTION INTRAVENOUS at 05:58

## 2022-07-10 RX ADMIN — AMPICILLIN INJECTION 2000 MG: 2 POWDER, FOR SOLUTION INTRAMUSCULAR; INTRAVENOUS at 10:48

## 2022-07-10 RX ADMIN — LIDOCAINE 1 PATCH: 50 PATCH TOPICAL at 10:48

## 2022-07-10 ASSESSMENT — PAIN DESCRIPTION - PAIN TYPE: TYPE: CHRONIC PAIN

## 2022-07-10 ASSESSMENT — ENCOUNTER SYMPTOMS
DIARRHEA: 1
CHILLS: 0
HEADACHES: 0
SORE THROAT: 0
WEAKNESS: 1
NAUSEA: 0
SHORTNESS OF BREATH: 0
BLURRED VISION: 0
FEVER: 0
PALPITATIONS: 0
COUGH: 0
CONSTIPATION: 0
VOMITING: 0
ABDOMINAL PAIN: 0

## 2022-07-10 ASSESSMENT — FIBROSIS 4 INDEX: FIB4 SCORE: 1.99

## 2022-07-10 NOTE — PROGRESS NOTES
Assumed care of patient, bedside report received from Nicolasa RN. Updated on POC, call light within reach and fall precautions in place. Bed locked and in lowest position. Patient instructed to call for assistance before getting out of bed. All questions answered, no other needs at this time.

## 2022-07-10 NOTE — PROGRESS NOTES
I was asked by the primary team (ANANDA Agarwal, DR. Willard Saldana) to review the patient's CT scan. There is a 1.6 cm heterogenous nodule in the RUL with no calcifications. The borders are not smooth and it is not well circiumscribed. It could be a scar but may also represent a spiculated nodule. No emphysema or lymphadenopathy noted. The patient is a former smoker. Using the Francesco risk stratification score, he has high as a 38% chance that this nodule represents a malignancy. I concur that it is reasonable to perform PET/CT to further risk stratify this as a nodule vs. Avid lesion. I have placed the PET/CT order and placed correspondence for outpatietn pulmonary f/u. I did not interview or evaluate the patient today, as the primary care team stated that they had a good rapport with family and would like to inform them of the decision.     Total consult time: 20 minutes which included time spent on chart review, personally reviewing pertinent images, and coordinating care with the healthcare team to include consultants.     Marcelino Chaparro DO  Staff Pulmonologist and Intensivist  [facility]

## 2022-07-10 NOTE — PROGRESS NOTES
Daily Progress Note:     Date of Service: 7/10/2022  Primary Team: [facility] IM Blue Team   Attending: Dr. Willard Saldana M.D.   Senior Resident: Dr. Micheline Camacho MD  Intern: Dr. Eun Foss MD  Contact:  [phone]    Patient Identifier:   Patient is a 70 yo male with a PMH of HFpEF, HTN, A. Fib s/p pacemaker on Eliquis, VT s/p AICD, HLD, CKD, and alcohol abuse, transferred from [facility] for management of bacteremia s/p negative KIM.    Subjective:  Patient complains of not being able to sleep from being woken up multiple times at night for blood draws etc. Will reduce lab draws to QOD at 0800 to avoid sleep interruption. Does not tolerate CPAP at night. He denies SOB and says swelling has improved. Patient's wife states patient has trouble ambulating as he is attached to his IV, patient and wife inquiring about transition to oral Abx. Spoke with ID, Dr. Fox, transitioning patient from IV ampicillin to PO linezolid. Unable to schedule nuclear PET scan as inpatient without approval from hospital medical director. Spoke with pulmonology, Dr. Chaparro, who placed an order for PET CT outpatient. Have patient follow up with [facility].     Interval Update:  07/05: Patient underwent KIM today. Preliminary results negative. Pending interpretation by cardiologist.  07/06: with multiple loose BMs, iatrogenic, d/c scheduled bowel protocol   07/07: diarrhea resolved, continue with ampicillin IV, Lasix 40mg IVP given, net output -860  07/08: Anesthesia does not recommend lumbosacral nerve block due to risk of infection. Superficial infection indicated only prior to surgery because of short duration.    Consultants/Specialty:  Ortho  Cardio  ID    Review of Systems:  Review of Systems   Constitutional: Negative for chills and fever.   HENT: Negative for congestion and sore throat.    Eyes: Negative for blurred vision.   Respiratory: Negative for cough and shortness of breath.    Cardiovascular: Negative  for chest pain, palpitations and leg swelling.   Gastrointestinal: Positive for diarrhea. Negative for abdominal pain, constipation, nausea and vomiting.   Genitourinary: Negative for dysuria and urgency.   Musculoskeletal: Positive for joint pain.   Neurological: Positive for weakness. Negative for headaches.       Objective:   Vitals:   Temp:  [36.6 °C (97.9 °F)-37.1 °C (98.8 °F)] 36.6 °C (97.9 °F)  Pulse:  [68-79] 68  Resp:  [18] 18  BP: (128-140)/(75-81) 140/80  SpO2:  [93 %-95 %] 95 %    Physical Exam  Constitutional:       Appearance: He is obese.   HENT:      Head: Normocephalic and atraumatic.   Eyes:      Conjunctiva/sclera: Conjunctivae normal.      Pupils: Pupils are equal, round, and reactive to light.   Cardiovascular:      Rate and Rhythm: Normal rate.      Pulses: Normal pulses.   Pulmonary:      Effort: Pulmonary effort is normal.      Breath sounds: Normal breath sounds.   Abdominal:      General: Abdomen is flat.      Palpations: Abdomen is soft.      Tenderness: There is no abdominal tenderness.   Musculoskeletal:         General: Tenderness present. No swelling.      Right lower leg: No edema.      Left lower leg: No edema.   Skin:     General: Skin is warm and dry.      Findings: Bruising present.   Neurological:      Mental Status: He is alert.   Psychiatric:         Mood and Affect: Mood normal.         Behavior: Behavior normal.           Labs:   Lab Results   Component Value Date/Time    WBC 8.5 07/10/2022 01:39 AM    RBC 2.61 (L) 07/10/2022 01:39 AM    HEMOGLOBIN 8.8 (L) 07/10/2022 01:39 AM    HEMATOCRIT 28.0 (L) 07/10/2022 01:39 AM    .3 (H) 07/10/2022 01:39 AM    MCH 33.7 (H) 07/10/2022 01:39 AM    MCHC 31.4 (L) 07/10/2022 01:39 AM    MPV 10.0 07/10/2022 01:39 AM    NEUTSPOLYS 71.20 07/08/2022 12:12 AM    LYMPHOCYTES 16.40 (L) 07/08/2022 12:12 AM    MONOCYTES 9.50 07/08/2022 12:12 AM    EOSINOPHILS 1.20 07/08/2022 12:12 AM    BASOPHILS 0.10 07/08/2022 12:12 AM        Lab Results    Component Value Date/Time    SODIUM 138 07/10/2022 01:39 AM    POTASSIUM 4.3 07/10/2022 01:39 AM    CHLORIDE 104 07/10/2022 01:39 AM    CO2 23 07/10/2022 01:39 AM    GLUCOSE 92 07/10/2022 01:39 AM    BUN 23 (H) 07/10/2022 01:39 AM    CREATININE 0.88 07/10/2022 01:39 AM    BUNCREATRAT 25 (H) 05/30/2014 09:04 AM            Lab Results   Component Value Date/Time    PROTHROMBTM 16.3 (H) 07/01/2022 12:17 PM    INR 1.38 (H) 07/01/2022 12:17 PM        Imaging:   DX-HIP-UNILATERAL-WITH PELVIS-1 VIEW RIGHT   Final Result      1.  Severe osteoarthritis of the right hip with bone-on-bone contact.   2.  Mild to moderate left hip osteoarthritis.      EC-KIM W/O CONT   Final Result      CT-PETCT-WHOLE BODY    (Results Pending)   [id number] BASE TO MID-THIGH    (Results Pending)       Assessment and Plan:  Patient is a 72 yo male with a PMH of HFpEF, HTN, A. Fib s/p pacemaker on Eliquis, VT s/p AICD, HLD, CKD, and alcohol abuse, transferred from [facility] for management of bacteremia s/p negative KIM.      * Bacteremia- (present on admission)  Assessment & Plan  Likely occult gastrointestinal source. Negative KIM. Unlikely septic arthritis based on negative subsequent BCx.  BCx from 7/1 and 7/2 growing gram positive enterococcus faecalis, sensitive to ampicillin. Subsequent BCx negative - ID following  Switched Ampicillin 2,000 mg IV to Linezolid 600 mg PO BID, planned stop date 7/17  Will need outpatient colonoscopy       Shortness of breath  Assessment & Plan  Resolved  Volume overload 2/2 to aggressive IVF resus on admission  Improved after diuresis, at goal euvolemia.  Monitor I/Os       Diarrhea  Assessment & Plan  Resolved  Iatrogenic vs. Secondary to ampicillin  Now D/C stool softeners, monitor  C. Diff workup if diarrhea persists    ÁNGEL (obstructive sleep apnea)  Assessment & Plan  With desaturations to 70% at night, per RN report, with apneic events when sleeping  Patient does not tolerate CPAP  F/u with prior  completed sleep study outpatient    Lung nodule seen on imaging study  Assessment & Plan  Incidental finding on CT   Possibly due to previous inflammation from Valley Fever infection  F/u with outpatient [facility]. Referral placed by Dr. Chaparro for outpatient PET CT    Right hip pain  Assessment & Plan  Xray w/ severe OA of right hip, initially w/ suspicion for septic hip, however, no effusion noted on U/S, bacteremia from Enterococcus which is usually GI/ source. Ortho re-consulted, does not suspect septic hip, no consideration for hip replacement for at least 2 weeks after resolution of bacteremia   Can consider repeating BC 1-2 weeks after completion of Abx prior to surgery    Anesthesia does not recommend nerve blocks for acute management. Pain controlled with Lidocaine patch, Norco PRN  Continue to titrate as needed, monitor for respiratory depression, sedation, constipation   Medically stable for Inpatient Rehab     Hyperglycemia  Assessment & Plan  A1C 5.7, no need for insulin   Follow up outpatient for diabetes prevention     Constipation  Assessment & Plan  Suspect from chronic opioid use. Improved with diarrhea on scheduled bowel regimen so will continue to monitor     Chronic diastolic congestive heart failure, NYHA class 3 (HCC)- (present on admission)  Assessment & Plan  Continue valsartan      Anemia  Assessment & Plan  Multifactorial, likely secondary to BM from alcohol and B12 deficiency  Continue B12  F/u occult blood stool     Gouty arthritis of both ankles- (present on admission)  Assessment & Plan  Likely secondary to alcohol abuse  Manage outpatient with PCP    Alcohol abuse- (present on admission)  Assessment & Plan  No longer in acute danger of withdrawal  Last drink prior to admission on 06/30    Paroxysmal ventricular tachycardia (HCC)- (present on admission)  Assessment & Plan  Continue sotalol   Continue Eliquis   Follow up with cardiology outpatient      Code Status: Full  code  DVT prophylaxis: full AC with Eliquis  Diet: Regular  GI: Fibercon  Disposition: Medically stable for D/C to inpatient rehab at [facility], pending bed availability Jennifer Foss MD  Internal Medicine, PGY-1

## 2022-07-10 NOTE — CARE PLAN
The patient is Watcher - Medium risk of patient condition declining or worsening    Shift Goals  Clinical Goals: Mobility  Patient Goals: discharge  Family Goals: Support Patient    Progress made toward(s) clinical / shift goals:  Pt has limited motion in RLE that increases difficulty with mobility and ambulation.     Problem: Knowledge Deficit - Standard  Goal: Patient and family/care givers will demonstrate understanding of plan of care, disease process/condition, diagnostic tests and medications  Outcome: Progressing  Note: RN discussed ABX therapy with pt and POC for the night.      Problem: Skin Integrity  Goal: Skin integrity is maintained or improved  Outcome: Progressing  Note: RN to ensure pt does not sit on bedpan for extended periods of time and to monitor skin on butt cheeks.        Patient is not progressing towards the following goals:

## 2022-07-10 NOTE — PROGRESS NOTES
Daily Progress Note:     Date of Service: 7/9/2022  Primary Team: [employer] [facility] Blue Team   Attending: Dr. Willard Saldana M.D.   Senior Resident: Dr. Micheline Camacho MD  Intern: Dr. Eun Foss MD  Contact:  [phone]    Patient Identifier:   Patient is a 70 yo male with a PMH of HFpEF, HTN, A. Fib s/p pacemaker on Eliquis, VT s/p AICD, HLD, CKD, and alcohol abuse, transferred from [facility] for management of bacteremia s/p negative KIM.    Subjective:  Patient has no complaints, states SOB has improved and feels less swollen. Patient may be able to have nuclear PET scan as inpatient depending on scheduling.    Interval Update:  07/05: Patient underwent KIM today. Preliminary results negative. Pending interpretation by cardiologist.  07/06: with multiple loose BMs, iatrogenic, d/c scheduled bowel protocol   7/7: diarrhea resolved, continue with ampicillin IV, Lasix 40mg IVP given, net output -[phone]  07/08: Anesthesia does not recommend lumbosacral nerve block due to risk of infection. Superficial infection indicated only prior to surgery because of short duration.    Consultants/Specialty:  Ortho  Cardio  ID    Review of Systems:  Review of Systems   Constitutional: Negative for chills and fever.   HENT: Negative for congestion and sore throat.    Eyes: Negative for blurred vision and double vision.   Respiratory: Negative for cough and shortness of breath.    Cardiovascular: Positive for leg swelling. Negative for chest pain and palpitations.   Gastrointestinal: Negative for abdominal pain, constipation, diarrhea, nausea and vomiting.   Genitourinary: Negative for dysuria and urgency.   Musculoskeletal: Positive for joint pain.   Neurological: Positive for weakness. Negative for headaches.       Objective:   Vitals:   Temp:  [36.6 °C (97.9 °F)-36.7 °C (98.1 °F)] 36.6 °C (97.9 °F)  Pulse:  [71-84] 73  Resp:  [18] 18  BP: (119-136)/(73-79) 136/77  SpO2:  [93 %-96 %] 93 %    Physical Exam  Constitutional:        Appearance: He is obese.   HENT:      Head: Normocephalic and atraumatic.   Eyes:      Conjunctiva/sclera: Conjunctivae normal.      Pupils: Pupils are equal, round, and reactive to light.   Cardiovascular:      Rate and Rhythm: Normal rate.      Pulses: Normal pulses.   Pulmonary:      Effort: Pulmonary effort is normal.      Breath sounds: Normal breath sounds.   Abdominal:      General: Abdomen is flat.      Palpations: Abdomen is soft.      Tenderness: There is no abdominal tenderness.   Musculoskeletal:         General: Swelling and tenderness present.      Right lower leg: Edema present.      Left lower leg: Edema present.   Skin:     General: Skin is warm and dry.      Findings: Bruising present.   Neurological:      Mental Status: He is alert.   Psychiatric:         Mood and Affect: Mood normal.         Behavior: Behavior normal.           Labs:   Lab Results   Component Value Date/Time    WBC 6.8 07/08/2022 12:12 AM    RBC 2.87 (L) 07/08/2022 12:12 AM    HEMOGLOBIN 9.8 (L) 07/08/2022 12:12 AM    HEMATOCRIT 30.1 (L) 07/08/2022 12:12 AM    .9 (H) 07/08/2022 12:12 AM    MCH 34.1 (H) 07/08/2022 12:12 AM    MCHC 32.6 (L) 07/08/2022 12:12 AM    MPV 10.2 07/08/2022 12:12 AM    NEUTSPOLYS 71.20 07/08/2022 12:12 AM    LYMPHOCYTES 16.40 (L) 07/08/2022 12:12 AM    MONOCYTES 9.50 07/08/2022 12:12 AM    EOSINOPHILS 1.20 07/08/2022 12:12 AM    BASOPHILS 0.10 07/08/2022 12:12 AM        Lab Results   Component Value Date/Time    SODIUM 135 07/09/2022 08:28 AM    POTASSIUM 4.8 07/09/2022 08:28 AM    CHLORIDE 101 07/09/2022 08:28 AM    CO2 23 07/09/2022 08:28 AM    GLUCOSE 109 (H) 07/09/2022 08:28 AM    BUN 22 07/09/2022 08:28 AM    CREATININE 0.92 07/09/2022 08:28 AM    BUNCREATRAT 25 (H) 05/30/2014 09:04 AM            Lab Results   Component Value Date/Time    PROTHROMBTM 16.3 (H) 07/01/2022 12:17 PM    INR 1.38 (H) 07/01/2022 12:17 PM        Imaging:   DX-HIP-UNILATERAL-WITH PELVIS-1 VIEW RIGHT   Final Result       1.  Severe osteoarthritis of the right hip with bone-on-bone contact.   2.  Mild to moderate left hip osteoarthritis.      EC-KIM W/O CONT   Final Result      CT-PETCT-WHOLE BODY    (Results Pending)   [id number] BASE TO MID-THIGH    (Results Pending)       Assessment and Plan:  Patient is a 72 yo male with a PMH of HFpEF, HTN, A. Fib s/p pacemaker on Eliquis, VT s/p AICD, HLD, CKD, and alcohol abuse, transferred from [facility] for management of bacteremia s/p negative KIM.      * Bacteremia- (present on admission)  Assessment & Plan  Likely occult gastrointestinal source. Negative KIM. Unlikely septic arthritis based on negative subsequent BCx.  BCx from 7/1 and 7/2 growing gram positive enterococcus faecalis, sensitive to ampicillin. Subsequent BCx negative - ID following  Continue Ampicillin 2,000 mg IV, planned stop date 7/11  Will need outpatient colonoscopy       Shortness of breath  Assessment & Plan  Volume overload 2/2 to aggressive IVF resus on admission  Improved after diuresis, will continue diuresis, goal euvolemia. Received Lasix 40mg one time dose today   Monitor I/Os       Diarrhea  Assessment & Plan  Resolved  Iatrogenic vs. Secondary to ampicillin  Now D/C stool softeners, monitor  C. 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 If not f/u with outpatient pulmonology. Pulm referral placed for outpatient PET CT    Right hip pain  Assessment & Plan  Xray w/ severe OA of right hip, initially w/ suspicion for septic hip, however, no effusion noted on U/S, bacteremia from Enterococcus which is usually  GI/ source. Ortho re-consulted, does not suspect septic hip, no consideration for hip replacement for at least 2 weeks after resolution of bacteremia   Can consider repeating BC 1-2 weeks after completion of Abx prior to surgery    Anesthesia does not recommend nerve blocks for acute management. Pain controlled with Lidocaine patch, Norco PRN  Continue to titrate as needed, monitor for respiratory depression, sedation, constipation   Medically stable for Inpatient Rehab     Hyperglycemia  Assessment & Plan  A1C 5.7, no need for insulin   Follow up outpatient for diabetes prevention     Constipation  Assessment & Plan  Suspect from chronic opioid use. Improved   with diarrhea on scheduled bowel regimen so will continue to monitor     Chronic diastolic congestive heart failure, NYHA class 3 (HCC)- (present on admission)  Assessment & Plan  Continue valsartan      Anemia  Assessment & Plan  Multifactorial, likely secondary to BM from alcohol and B12 deficiency  Continue B12    Gouty arthritis of both ankles- (present on admission)  Assessment & Plan  Likely secondary to alcohol abuse  Manage outpatient with PCP    Alcohol abuse- (present on admission)  Assessment & Plan  Continue thiamine  No longer in acute danger of withdrawal  Last drink prior to admission on 06/30    Paroxysmal ventricular tachycardia (HCC)- (present on admission)  Assessment & Plan  Continue sotalol   Resume Eliquis       Code Status: Full code  DVT prophylaxis: full AC with Eliquis  Diet: Regular  GI: Fibercon  Disposition: Medically stable for D/C to inpatient rehab at [facility], pending bed availability Jennifer Foss MD  Internal Medicine, PGY-1

## 2022-07-11 VITALS
DIASTOLIC BLOOD PRESSURE: 85 MMHG | SYSTOLIC BLOOD PRESSURE: 125 MMHG | TEMPERATURE: 97.9 F | RESPIRATION RATE: 18 BRPM | WEIGHT: 240.96 LBS | OXYGEN SATURATION: 94 % | BODY MASS INDEX: 32.68 KG/M2 | HEART RATE: 71 BPM

## 2022-07-11 PROBLEM — K59.00 CONSTIPATION: Status: RESOLVED | Noted: 2022-07-04 | Resolved: 2022-07-11

## 2022-07-11 PROBLEM — R19.7 DIARRHEA: Status: RESOLVED | Noted: 2022-07-06 | Resolved: 2022-07-11

## 2022-07-11 PROBLEM — R06.02 SHORTNESS OF BREATH: Status: RESOLVED | Noted: 2022-07-06 | Resolved: 2022-07-11

## 2022-07-11 PROBLEM — R73.9 HYPERGLYCEMIA: Status: RESOLVED | Noted: 2022-07-04 | Resolved: 2022-07-11

## 2022-07-11 LAB
ALBUMIN SERPL BCP-MCNC: 2.5 G/DL (ref 3.2–4.9)
ALBUMIN/GLOB SERPL: 0.8 G/DL
ALP SERPL-CCNC: 117 U/L (ref 30–99)
ALT SERPL-CCNC: 11 U/L (ref 2–50)
ANION GAP SERPL CALC-SCNC: 13 MMOL/L (ref 7–16)
AST SERPL-CCNC: 16 U/L (ref 12–45)
BILIRUB SERPL-MCNC: 0.3 MG/DL (ref 0.1–1.5)
BUN SERPL-MCNC: 20 MG/DL (ref 8–22)
CALCIUM SERPL-MCNC: 9 MG/DL (ref 8.5–10.5)
CHLORIDE SERPL-SCNC: 103 MMOL/L (ref 96–112)
CO2 SERPL-SCNC: 20 MMOL/L (ref 20–33)
CREAT SERPL-MCNC: 0.95 MG/DL (ref 0.5–1.4)
GFR SERPLBLD CREATININE-BSD FMLA CKD-EPI: 84 ML/MIN/1.73 M 2
GLOBULIN SER CALC-MCNC: 3.3 G/DL (ref 1.9–3.5)
GLUCOSE SERPL-MCNC: 112 MG/DL (ref 65–99)
HCT VFR BLD AUTO: 29.6 % (ref 42–52)
HGB BLD-MCNC: 9.5 G/DL (ref 14–18)
POTASSIUM SERPL-SCNC: 4 MMOL/L (ref 3.6–5.5)
PROT SERPL-MCNC: 5.8 G/DL (ref 6–8.2)
SODIUM SERPL-SCNC: 136 MMOL/L (ref 135–145)

## 2022-07-11 PROCEDURE — 85014 HEMATOCRIT: CPT

## 2022-07-11 PROCEDURE — 700102 HCHG RX REV CODE 250 W/ 637 OVERRIDE(OP): Performed by: INTERNAL MEDICINE

## 2022-07-11 PROCEDURE — A9270 NON-COVERED ITEM OR SERVICE: HCPCS | Performed by: INTERNAL MEDICINE

## 2022-07-11 PROCEDURE — A9270 NON-COVERED ITEM OR SERVICE: HCPCS

## 2022-07-11 PROCEDURE — 700102 HCHG RX REV CODE 250 W/ 637 OVERRIDE(OP): Performed by: STUDENT IN AN ORGANIZED HEALTH CARE EDUCATION/TRAINING PROGRAM

## 2022-07-11 PROCEDURE — A9270 NON-COVERED ITEM OR SERVICE: HCPCS | Performed by: STUDENT IN AN ORGANIZED HEALTH CARE EDUCATION/TRAINING PROGRAM

## 2022-07-11 PROCEDURE — 85018 HEMOGLOBIN: CPT

## 2022-07-11 PROCEDURE — 80053 COMPREHEN METABOLIC PANEL: CPT

## 2022-07-11 PROCEDURE — 36415 COLL VENOUS BLD VENIPUNCTURE: CPT

## 2022-07-11 PROCEDURE — 700102 HCHG RX REV CODE 250 W/ 637 OVERRIDE(OP)

## 2022-07-11 RX ORDER — HYDROCODONE BITARTRATE AND ACETAMINOPHEN 5; 325 MG/1; MG/1
1 TABLET ORAL EVERY 6 HOURS PRN
Qty: 30 TABLET | Refills: 0 | Status: SHIPPED | OUTPATIENT
Start: 2022-07-11 | End: 2022-07-19

## 2022-07-11 RX ORDER — OMEPRAZOLE 40 MG/1
40 CAPSULE, DELAYED RELEASE ORAL
Qty: 90 CAPSULE | Refills: 0 | Status: SHIPPED | OUTPATIENT
Start: 2022-07-11 | End: 2022-08-12

## 2022-07-11 RX ORDER — HYDROCODONE BITARTRATE AND ACETAMINOPHEN 5; 325 MG/1; MG/1
1 TABLET ORAL EVERY 6 HOURS PRN
Qty: 30 TABLET | Refills: 0 | Status: SHIPPED | OUTPATIENT
Start: 2022-07-11 | End: 2022-07-11 | Stop reason: SDUPTHER

## 2022-07-11 RX ORDER — SOTALOL HYDROCHLORIDE 120 MG/1
120 TABLET ORAL EVERY MORNING
Qty: 60 TABLET | Refills: 3 | Status: ON HOLD | OUTPATIENT
Start: 2022-07-11 | End: 2023-07-24

## 2022-07-11 RX ORDER — LINEZOLID 600 MG/1
600 TABLET, FILM COATED ORAL EVERY 12 HOURS
Qty: 20 TABLET | Refills: 0 | Status: SHIPPED | OUTPATIENT
Start: 2022-07-11 | End: 2022-08-12

## 2022-07-11 RX ADMIN — VALSARTAN 80 MG: 80 TABLET, FILM COATED ORAL at 04:40

## 2022-07-11 RX ADMIN — LINEZOLID 600 MG: 600 TABLET, FILM COATED ORAL at 04:40

## 2022-07-11 RX ADMIN — CYANOCOBALAMIN TAB 500 MCG 2000 MCG: 500 TAB at 04:40

## 2022-07-11 RX ADMIN — APIXABAN 5 MG: 5 TABLET, FILM COATED ORAL at 04:40

## 2022-07-11 RX ADMIN — SOTALOL HYDROCHLORIDE 120 MG: 80 TABLET ORAL at 04:40

## 2022-07-11 RX ADMIN — OMEPRAZOLE 40 MG: 20 CAPSULE, DELAYED RELEASE ORAL at 04:40

## 2022-07-11 NOTE — DISCHARGE PLANNING
Agency/Facility Name: [facility]  Outcome: DPA left v-mail regarding referral status with request for callback.     0945-  Agency/Facility Name: [facility]  Spoke To: Luis  Outcome: Pt accepted to SNF with bed available today, SNF offering 1500 transport time via Kayla van.     BETTY KENDALL notified     0950-  Agency/Facility Name: [facility]  Spoke To: Luis  Outcome: Transport time has been modified to 1400 today.    RN CM notified

## 2022-07-11 NOTE — CARE PLAN
The patient is Stable - Low risk of patient condition declining or worsening    Shift Goals  Clinical Goals: D/C to SNF  Patient Goals: D/C to facility tomorrow  Family Goals: Support Patient    Progress made toward(s) clinical / shift goals:      Problem: Knowledge Deficit - Standard  Goal: Patient and family/care givers will demonstrate understanding of plan of care, disease process/condition, diagnostic tests and medications  Outcome: Progressing     Problem: Fall Risk  Goal: Patient will remain free from falls  Outcome: Progressing     Problem: Skin Integrity  Goal: Skin integrity is maintained or improved  Outcome: Progressing     Patient is not progressing towards the following goals:

## 2022-07-12 ENCOUNTER — APPOINTMENT (OUTPATIENT)
Dept: VASCULAR LAB | Facility: MEDICAL CENTER | Age: 72
End: 2022-07-12
Payer: MEDICARE

## 2022-07-12 NOTE — DISCHARGE PLANNING
"HTH/SCP TCN chart review completed. Collaborated with FAIZA Kumar.   Patient seen at bedside. \"I feel much better today\".  Patient reported no other concerns at this time.  Per chart review and collaboration with FAIZA patient is medically stable and plan is to discharge to inpatient rehabilitation at [facility] today.  Planned discharge to [facility] around 1400.  No other TCN needs at this time. TCN will continue to follow and collaborate with discharge planning team as additional post acute needs arise. Thank you.    Previously completed:  - Appreciate PT/OT recommendations.   - Choice forms:  (HH previously obtained, SNF choice obtained 7/5/22, only for [facility]).   - GSC introduced (Y/), referral (sent).   "

## 2022-07-13 NOTE — PROGRESS NOTES
Pulmonary Clinic Note    Date of Visit: 7/15/2022     Chief Complaint:  Chief Complaint   Patient presents with   • Hospital Follow-up     07/01/2022-07/11/2022 Dr. Chaparro       HPI:   Mike Ferrell is a very pleasant 72 y.o. year old male never smoker, with a PMHx of HFpEF, HTN, A fib on Eliquis, heart disease status post pacemaker, V. tach status post AICD and CKD  who presented to the Pulmonary Clinic for a hospital follow up.  Seen in the hospital by Dr. Johnston.  Last seen in the office on 2/12/2021 with Dr. Ferrera.     Hospitalized from 7/1/2022-7/10/2022 for glucose after presenting to Dr. Cortes's office, orthopedics.  He was originally scheduled for a right total hip arthroplasty on 7/5 2022 but was found to have lactic acidosis from an unknown source, possibly septic hip.  CT Chest on 7/2/2022 showed a 16 x 7 mm mass in the right upper lobe without any calcifications. Dr. Johnston saw the patient in the hospital and placed an order for a PET scan, which was unable to be completed.     Today he presents with his wife.  He states that he has some significant shortness of breath with movement, and wheezing.  He denies any coughing or sputum production.  He denies any unintentional weight loss, night sweats, fevers, chills.  He does state that when he gets up and walks with physical therapy he gets extremely short of breath and was inquiring on his oxygen saturations on ambulation.  His primary care did order a home sleep study, but the patient nor his wife know the results of that.  The patient did trial a CPAP versus BiPAP at the hospital and states that he would not undergo Pap therapy.     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  No Personal history of non-resolving or recurrent pneumonia  No Personal history of DVT or pulmonary embolism  No Personal history of recurrent sinusitis or epistaxis/purulent discharge  No reported NSAID precipitated cough, wheeze or sinus congestion    No cold air intolerance  No family hx of atopy and or asthma  No history of nasal polyps  No hx of recent COVID-19 infection    MMRC Grade: 4- Breathless with ambulating around house or ADLs    Past Medical History:   Diagnosis Date   • AICD (automatic cardioverter/defibrillator) present    • Arthritis     Osteo- shoulders, hips, knees, ankles, generalized everywhere   • Atrial fibrillation (HCC) 04/04/2012   • Bowel habit changes     constipation   • Breath shortness     \"since 1996 from Valley fever\"   • Cataract 12/15/2021    Dec 2021/Jan 2022 bilat surgery   • Chronic back pain    • Congestive heart failure (HCC)    • COVID-19 09/2021   • Dental disorder     missing teeth x3   • Heart burn     occasional food related   • High cholesterol    • Hypertension    • Methamphetamine use (HCC) none for many years    25 years ago   • Pacemaker     defibrilator   • Pain 12/15/2021    back, right hip and knee, 8/10   • Paroxysmal ventricular tachycardia (HCC) 04/04/2012   • Pneumonia 2000    in the past   • Sinoatrial node dysfunction (HCC) 04/04/2012    Dr. Daniel Ferrera   • Snoring     Sleep Study June 2022, awaiting results   • Syncope and collapse 04/04/2012     Past Surgical History:   Procedure Laterality Date   • AICD IMPLANT  2017   • AICD IMPLANT  01/01/2010   • PACEMAKER INSERTION  01/01/2009   • CHOLECYSTECTOMY  01/01/1999   • TONSILLECTOMY  01/01/1953   • CATARACT EXTRACTION WITH IOL       Social History     Socioeconomic History   • Marital status:      Spouse name: Not on " file   • Number of children: Not on file   • Years of education: Not on file   • Highest education level: Not on file   Occupational History   • Not on file   Tobacco Use   • Smoking status: Never Smoker   • Smokeless tobacco: Never Used   Vaping Use   • Vaping Use: Never used   Substance and Sexual Activity   • Alcohol use: Yes     Comment: 3 drinks per day   • Drug use: Not Currently     Comment: 25 years ago   • Sexual activity: Not on file   Other Topics Concern   • Not on file   Social History Narrative   • Not on file     Social Determinants of Health     Financial Resource Strain: Low Risk    • Difficulty of Paying Living Expenses: Not very hard   Food Insecurity: No Food Insecurity   • Worried About Running Out of Food in the Last Year: Never true   • Ran Out of Food in the Last Year: Never true   Transportation Needs: No Transportation Needs   • Lack of Transportation (Medical): No   • Lack of Transportation (Non-Medical): No   Physical Activity: Inactive   • Days of Exercise per Week: 0 days   • Minutes of Exercise per Session: 0 min   Stress: No Stress Concern Present   • Feeling of Stress : Not at all   Social Connections: Moderately Isolated   • Frequency of Communication with Friends and Family: More than three times a week   • Frequency of Social Gatherings with Friends and Family: More than three times a week   • Attends Evangelical Services: Never   • Active Member of Clubs or Organizations: No   • Attends Club or Organization Meetings: Never   • Marital Status:    Intimate Partner Violence: Not At Risk   • Fear of Current or Ex-Partner: No   • Emotionally Abused: No   • Physically Abused: No   • Sexually Abused: No   Housing Stability: Low Risk    • Unable to Pay for Housing in the Last Year: No   • Number of Places Lived in the Last Year: 1   • Unstable Housing in the Last Year: No        History reviewed. No pertinent family history.  Current Outpatient Medications on File Prior to Visit    Medication Sig Dispense Refill   • omeprazole (PRILOSEC) 40 MG delayed-release capsule Take 1 Capsule by mouth every morning before breakfast. 90 Capsule 0   • sotalol (BETAPACE) 120 MG tablet Take 1 Tablet by mouth every morning. 60 Tablet 3   • linezolid (ZYVOX) 600 MG Tab Take 1 Tablet by mouth every 12 hours. 20 Tablet 0   • HYDROcodone-acetaminophen (NORCO) 5-325 MG Tab per tablet Take 1 Tablet by mouth every 6 hours as needed (moderate pain) for up to 8 days. 30 Tablet 0   • valsartan (DIOVAN) 80 MG Tab Take 1 Tablet by mouth 2 times a day. 30 Tablet 3   • acetaminophen (TYLENOL) 500 MG Tab Take 1 Tablet by mouth every 6 hours as needed for Moderate Pain. 30 Tablet 0   • cyanocobalamin (VITAMIN B12) 1000 MCG Tab Take 2 Tablets by mouth every day. 60 Tablet 3   • lidocaine (LIDODERM) 5 % Patch Place 1 Patch on the skin every 24 hours. (12 hours on, 12 hours off) 10 Patch 1   • apixaban (ELIQUIS) 5mg Tab Take 5 mg by mouth 2 times a day.     • [DISCONTINUED] Home Care Oxygen Inhale 2 L/min as needed for Shortness of Breath (PRN SOB and at night). Oxygen dose range: 2 L/min  Respiratory route via: Nasal Cannula   Oxygen supplier: none- received concentrator from son       No current facility-administered medications on file prior to visit.     Allergies: Atorvastatin, Statins [hmg-coa-r inhibitors], and Sulfa drugs    ROS:   Review of Systems   Constitutional: Positive for malaise/fatigue. Negative for chills, diaphoresis, fever and weight loss.   HENT: Negative for congestion and sinus pain.    Respiratory: Positive for shortness of breath and wheezing. Negative for cough, hemoptysis and sputum production.    Cardiovascular: Negative for chest pain, palpitations and leg swelling.   Gastrointestinal: Negative for diarrhea, heartburn, nausea and vomiting.   Musculoskeletal: Negative for falls and myalgias.   Neurological: Negative for dizziness, weakness and headaches.     Vitals:  /70 (BP Location: Left  arm, Patient Position: Sitting, BP Cuff Size: Large adult)   Pulse 72   Ht 1.829 m (6')   Wt 99.8 kg (220 lb)   SpO2 98%     Physical Exam:  Physical Exam  Constitutional:       General: He is not in acute distress.     Appearance: Normal appearance. He is not ill-appearing, toxic-appearing or diaphoretic.   Cardiovascular:      Rate and Rhythm: Normal rate and regular rhythm.      Heart sounds: No murmur heard.    No friction rub. No gallop.   Pulmonary:      Effort: No respiratory distress.      Breath sounds: Normal breath sounds. No stridor. No wheezing, rhonchi or rales.   Musculoskeletal:         General: No swelling.      Right lower leg: No edema.      Left lower leg: No edema.   Skin:     General: Skin is warm.   Neurological:      General: No focal deficit present.      Mental Status: He is alert and oriented to person, place, and time.   Psychiatric:         Mood and Affect: Mood normal.         Behavior: Behavior normal.         Thought Content: Thought content normal.         Judgment: Judgment normal.         Laboratory Data:  PFTs (Date: 2/10/2021)-      Impression:  1.  Baseline spirometry shows proportionate and mild reduction in both FVC at 3.15 L or 70% predicted and FEV1 at 2.3 L or 68% predicted.  FEV1/FVC ratio is normal at 73.  2.  No significant bronchodilator response.  3.  Total lung capacity is low normal at 6.06 L or 81% predicted.  There is borderline air trapping.  4.  Diffusion capacity is mildly reduced at 69% predicted.  Overall pulmonary function testing shows mild restrictive defect although borderline air trapping and mild concavity to the expiratory flow volume loop suggest mixed restrictive obstructive lung disease.  DLCO is mildly reduced suggesting pulmonary parenchymal process such as ILD.  Currently the PFT pattern could be consistent with the stated diagnoses of bronchiectasis with associated ILD.  Suggest clinical correlation.    CT Chest: (Date:  7/10/2022)-    Impression:  1.  No evidence for infection within the chest, abdomen, pelvis  2.  The right upper lobe is abnormal with architecture distortion and central 16 x 7 mm mass. These findings could indicate postinflammatory scarring or a neoplasm. PET CT scan is recommended  3.  Mild bilateral dependent atelectasis  4.  Prior cholecystectomy  5.  Presence of cardiac device  6.  Severe osteoarthritis of both glenohumeral joint and the right hip joint  7.  aortic and branch vessel atherosclerotic plaque    ECHO: (Date: 7/2/2022)-  Impression:  Technically difficult due to positioning and body habitus but adequate   study for interpretation.   Pt unable to assume left lateral decubitus due to injured hip.   Grossly normal left ventricular systolic function. The left ventricular   ejection fraction is visually estimated to be 55%.  Mildly dilated right ventricle with preserved systolic function.  Mild mitral valve regurgitation.  Mild tricuspid regurgitation.  Right ventricular systolic pressure is estimated to be 41 mmHg; mild   pulmonary hypertension.   Normal pericardium without effusion.      Assessment and Plan:    Problem List Items Addressed This Visit     Lung nodule seen on imaging study     Right upper lobe nodule with architectural distortion measuring 16 x 7 mm.  A PET scan is ordered and to be completed on August 4.  We will follow-up with results at his next appointment.           Relevant Orders    [id number] BASE TO MID-THIGH    Dyspnea     Patient states he is short of breath on exertion, with accompanying wheezing.  He denies any significant coughing or sputum production.  He states his primary care did prescribe him Breo, but she did not start the medication.  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   PFTs are as follows an FVC of 3.15 L or 70%, FEV1 2.30 L or 68%, FEV1/FVC 73%, %, TLC 81%, DLCO 69% predicted.  -- Continue Breo as prescribed by his primary care  -- Incentive spirometer 6-8 times an hour  -- Patient foregoes multi oximeter in the office today as he is very weak and wheelchair-bound at this time.  -- Patient is working with physical therapy at a skilled nursing facility  -- Will consider updating PFTs at next visit           ÁNGEL (obstructive sleep apnea)     Patient had a home sleep study done by his primary care provider.  Patient nor his wife know the results of that sleep study.  Encourage patient to touch base with his primary care provider to receive his results and if warranted, a referral to our sleep center.  I did go over with him in depth fully of the different treatment options for sleep apnea.  Patient did try CPAP therapy in the hospital, but did not tolerate it.                 Diagnostic studies have been reviewed with the patient.    Return in about 3 weeks (around 8/5/2022), or if symptoms worsen or fail to improve.     This note was generated using voice recognition software which has a chance of producing errors of grammar and possibly content.  I have made every reasonable attempt to find and correct any obvious errors, but it should be expected that some may not be found prior to finalization of this note.    Time spent in record review prior to patient arrival, reviewing results, and in face-to-face encounter totaled 38 min.  __________  TYLOR Aldana  Pulmonary Medicine  [facility]   99.8

## 2022-07-15 ENCOUNTER — OFFICE VISIT (OUTPATIENT)
Dept: SLEEP MEDICINE | Facility: MEDICAL CENTER | Age: 72
End: 2022-07-15
Payer: MEDICARE

## 2022-07-15 VITALS
WEIGHT: 220 LBS | SYSTOLIC BLOOD PRESSURE: 128 MMHG | OXYGEN SATURATION: 98 % | HEIGHT: 72 IN | BODY MASS INDEX: 29.8 KG/M2 | DIASTOLIC BLOOD PRESSURE: 70 MMHG | HEART RATE: 72 BPM

## 2022-07-15 DIAGNOSIS — R06.09 DYSPNEA ON EXERTION: ICD-10-CM

## 2022-07-15 DIAGNOSIS — G47.33 OSA (OBSTRUCTIVE SLEEP APNEA): ICD-10-CM

## 2022-07-15 DIAGNOSIS — R91.1 LUNG NODULE SEEN ON IMAGING STUDY: ICD-10-CM

## 2022-07-15 PROCEDURE — 99214 OFFICE O/P EST MOD 30 MIN: CPT

## 2022-07-15 ASSESSMENT — ENCOUNTER SYMPTOMS
VOMITING: 0
WEIGHT LOSS: 0
WHEEZING: 1
FEVER: 0
PALPITATIONS: 0
HEMOPTYSIS: 0
HEADACHES: 0
DIAPHORESIS: 0
COUGH: 0
HEARTBURN: 0
SINUS PAIN: 0
SHORTNESS OF BREATH: 1
DIARRHEA: 0
CHILLS: 0
FALLS: 0
MYALGIAS: 0
NAUSEA: 0
WEAKNESS: 0
DIZZINESS: 0
SPUTUM PRODUCTION: 0

## 2022-07-15 ASSESSMENT — FIBROSIS 4 INDEX: FIB4 SCORE: 1.6

## 2022-07-15 NOTE — ASSESSMENT & PLAN NOTE
Right upper lobe nodule with architectural distortion measuring 16 x 7 mm.  A PET scan is ordered and to be completed on August 4.  We will follow-up with results at his next appointment.

## 2022-07-15 NOTE — ASSESSMENT & PLAN NOTE
Patient had a home sleep study done by his primary care provider.  Patient nor his wife know the results of that sleep study.  Encourage patient to touch base with his primary care provider to receive his results and if warranted, a referral to our sleep center.  I did go over with him in depth fully of the different treatment options for sleep apnea.  Patient did try CPAP therapy in the hospital, but did not tolerate it.

## 2022-07-31 NOTE — DOCUMENTATION QUERY
"                                                                         [facility]                                                                       Query Response Note      PATIENT:               BAILEY RICHARD  ACCT #:                  [id number]  MRN:                     [id number]  :                      1950  ADMIT DATE:       7/3/2022 11:14 PM  DISCH DATE:        2022 2:31 PM  RESPONDING  PROVIDER #:        [id number]           QUERY TEXT:    The diagnosis of sepsis has been documented in the Medical Record.  (Customize how many SIRS criteria are present)  Please provide additional clinical indicators/SIRS criteria supportive of the documented diagnosis of sepsis.   Note: If an appropriate response is not listed below, please respond with a new note.    Sepsis - real or suspected infection plus 2 or more SIRS criteria  Temp <96.8 or >101  HR >90  RR >20  WBC <4,000 or > 12,000  >10% bandemia     Contact me with questions.    Thank you,   Kaylene Patel, VIVIANP, CDI  [email or web address]      The patient's Clinical Indicators include:  70yo with dx of bacteremia, alcohol abuse, gouty arthritis of both ankles, Lung nodule, sepsis.      H&P \"Bacteremia due to Staphylococcus aureus\"   Infectious Disease PN \"Bacteremia, unclear source, he does have some wounds on his upper arms but no obvious infection.  CTs with no intra-abdominal infection.  Denies any dysuria. -Blood cultures on  and  +  E faecalis, ampicillin sensitive -Blood cultures on 7/3 are no growth to date\"   Les PN \"Unlikely septic arthritis based on negative subsequent BCx.\"   D/C summary \"Resolved problems Sepsis POA: Unknown\"   Graves PN \"trop > 50, no clinical failure or ACS, likely demand injury from sepsis\"  07/10 PN Les PN \"Enterococcus, occult GI source?  - colonoscopy when stable for cancer screening, ampicillin until , per ID ok to transition to LINEZOLID\"    Epic Nurse VS- afebrile during " hospitalization  Epic Nurse VS- 07/05 @1000 RR 33 (otherwise wnl)    07/04 WBC wnl during stay    Risk factors: gouty arthritis, Severe right hip OA, bacteremia from Enterococcus which is usually GI/ source.    Treatment: IVF, medications, specialty consultation, imaging, labwork, monitoring  Options provided:   -- Sepsis exists, (please document additional + SIRS criteria and clinical indicators)   -- Sepsis does not exist - amended documentation in the medical record and updated problem list   -- Unable to provide additional clarity regarding the diagnosis   -- Other explanation, please explain findings   -- Unable to determine      Query created by: Jorge, April on 7/28/2022 10:03 AM    RESPONSE TEXT:    sepsis exists - SIRS , Enteroccoccal bacteremia, KEVYN          Electronically signed by:  DEBBIE BRISENO MD 7/31/2022 12:16 PM

## 2022-08-04 ENCOUNTER — HOSPITAL ENCOUNTER (OUTPATIENT)
Dept: RADIOLOGY | Facility: MEDICAL CENTER | Age: 72
End: 2022-08-04
Payer: MEDICARE

## 2022-08-04 DIAGNOSIS — R91.1 LUNG NODULE SEEN ON IMAGING STUDY: ICD-10-CM

## 2022-08-04 PROCEDURE — A9552 F18 FDG: HCPCS

## 2022-08-04 NOTE — RESULT ENCOUNTER NOTE
Tesfaye Mckeon, there was no uptake in your lung nodule. So we will continue to monitor this with serial imaging. But, it did show an abnormality in your colon. I recommend you to let your PCP- Dr. Garcia know that there was an abnormality so you two can review the scan together.

## 2022-08-09 ENCOUNTER — HOME HEALTH ADMISSION (OUTPATIENT)
Dept: HOME HEALTH SERVICES | Facility: HOME HEALTHCARE | Age: 72
End: 2022-08-09
Payer: MEDICARE

## 2022-08-10 ENCOUNTER — NON-PROVIDER VISIT (OUTPATIENT)
Dept: CARDIOLOGY | Facility: MEDICAL CENTER | Age: 72
End: 2022-08-10
Payer: MEDICARE

## 2022-08-10 PROCEDURE — 93295 DEV INTERROG REMOTE 1/2/MLT: CPT | Performed by: INTERNAL MEDICINE

## 2022-08-10 NOTE — CARDIAC REMOTE MONITOR - SCAN
Device transmission reviewed. Device demonstrated appropriate function.       Electronically Signed by: Jozef Lucero M.D.    8/16/2022  8:14 PM

## 2022-08-11 ENCOUNTER — HOME CARE VISIT (OUTPATIENT)
Dept: HOME HEALTH SERVICES | Facility: HOME HEALTHCARE | Age: 72
End: 2022-08-11
Payer: MEDICARE

## 2022-08-11 PROCEDURE — G0493 RN CARE EA 15 MIN HH/HOSPICE: HCPCS

## 2022-08-11 PROCEDURE — 665001 SOC-HOME HEALTH

## 2022-08-11 ASSESSMENT — FIBROSIS 4 INDEX: FIB4 SCORE: 1.6

## 2022-08-12 ENCOUNTER — TELEMEDICINE (OUTPATIENT)
Dept: CARDIOLOGY | Facility: MEDICAL CENTER | Age: 72
End: 2022-08-12
Payer: MEDICARE

## 2022-08-12 VITALS
TEMPERATURE: 96.3 F | RESPIRATION RATE: 18 BRPM | WEIGHT: 218 LBS | DIASTOLIC BLOOD PRESSURE: 60 MMHG | SYSTOLIC BLOOD PRESSURE: 100 MMHG | OXYGEN SATURATION: 97 % | BODY MASS INDEX: 29.53 KG/M2 | HEART RATE: 72 BPM | HEIGHT: 72 IN

## 2022-08-12 VITALS
HEART RATE: 71 BPM | BODY MASS INDEX: 32.23 KG/M2 | HEIGHT: 72 IN | WEIGHT: 238 LBS | DIASTOLIC BLOOD PRESSURE: 87 MMHG | OXYGEN SATURATION: 97 % | SYSTOLIC BLOOD PRESSURE: 124 MMHG

## 2022-08-12 DIAGNOSIS — I50.32 CHRONIC HEART FAILURE WITH PRESERVED EJECTION FRACTION (HCC): ICD-10-CM

## 2022-08-12 DIAGNOSIS — I50.30 ACC/AHA STAGE C HEART FAILURE WITH PRESERVED EJECTION FRACTION (HCC): ICD-10-CM

## 2022-08-12 DIAGNOSIS — Z79.899 HIGH RISK MEDICATION USE: ICD-10-CM

## 2022-08-12 PROCEDURE — 99215 OFFICE O/P EST HI 40 MIN: CPT | Mod: 95 | Performed by: NURSE PRACTITIONER

## 2022-08-12 RX ORDER — DULOXETIN HYDROCHLORIDE 30 MG/1
CAPSULE, DELAYED RELEASE ORAL
COMMUNITY
Start: 2022-07-28 | End: 2022-08-12

## 2022-08-12 ASSESSMENT — PATIENT HEALTH QUESTIONNAIRE - PHQ9
1. LITTLE INTEREST OR PLEASURE IN DOING THINGS: 00
2. FEELING DOWN, DEPRESSED, IRRITABLE, OR HOPELESS: 00
CLINICAL INTERPRETATION OF PHQ2 SCORE: 0

## 2022-08-12 ASSESSMENT — ENCOUNTER SYMPTOMS
DEPRESSION: 1
VOMITING: 0
SHORTNESS OF BREATH: 1
PAIN LOCATION - PAIN QUALITY: ACHY
WEIGHT LOSS: 0
SUBJECTIVE PAIN PROGRESSION: UNCHANGED
VOMITING: DENIES
DYSPNEA ACTIVITY LEVEL: AFTER AMBULATING 10 - 20 FT
BRUISES/BLEEDS EASILY: 0
LOWEST PAIN SEVERITY IN PAST 24 HOURS: 2/10
SHORTNESS OF BREATH: 0
PALPITATIONS: 0
PAIN: 1
LOSS OF CONSCIOUSNESS: 0
BLURRED VISION: 0
FEVER: 0
ORTHOPNEA: 0
PAIN LOCATION - PAIN FREQUENCY: CONSTANT
PAIN SEVERITY GOAL: 0/10
PERSON REPORTING PAIN: PATIENT
ABDOMINAL PAIN: 0
NAUSEA: 0
DIZZINESS: 0
PAIN LOCATION - RELIEVING FACTORS: PAIN MEDICATION, REST
HIGHEST PAIN SEVERITY IN PAST 24 HOURS: 10/10
COUGH: 0
TINGLING: 0
DEBILITATING PAIN: 1
PAIN LOCATION - PAIN SEVERITY: 4/10
PAIN LOCATION - PAIN DURATION: 2-3 HRS
NAUSEA: DENIES
PAIN LOCATION - EXACERBATING FACTORS: INCREASE ACTIVITY

## 2022-08-12 ASSESSMENT — FIBROSIS 4 INDEX: FIB4 SCORE: 1.6

## 2022-08-12 ASSESSMENT — ACTIVITIES OF DAILY LIVING (ADL): OASIS_M1830: 05

## 2022-08-12 NOTE — PROGRESS NOTES
Chief Complaint   Patient presents with    Atrial Fibrillation     F/V Dx: Paroxysmal atrial fibrillation (HCC)    Congestive Heart Failure     F/V Dx: Chronic heart failure with preserved ejection fraction (HCC)    Hypertension     F/V Dx: Essential hypertension       This evaluation was conducted via Zoom using secure and encrypted videoconferencing technology. The patient was in their home in the [facility].    The patient's identity was confirmed and verbal consent was obtained for this virtual visit.    Subjective     Mike Ferrell is a 72 y.o. male who presents today heart failure with preserved EF, hypertension, A. fib on Eliquis, dyslipidemia, complete heart block s/p PPM, VT T s/p AICD follow-up after recent hospitalization on 7/5/2022.  Patient was evaluated by Dr. Tillman for KIM with Enterococcus bacteremia with possible septic hip.  No vegetation identified, no valvular disease.  Patient was last seen by myself on 5/25/2022.  Patient was also last seen by Dr. Mariee on 6/8/2022.    Today, patient reports he has been doing well since discharge from the hospital he is working with home health with PT and OT.. Patient denies chest pain, shortness of breath, palpitations, dizziness/lightheadedness, orthopnea, PND or Edema.  Patient's device was last interrogated on 4/1/2022.  Device is functioning appropriately.  Remote transmission also sent today.  Patient reports he was told verbally that transmission was good.  Patient endorses medication compliance and denies weight gain.  Medication changes during hospitalization reviewed.    Discussion regarding further optimization of GDMT and reviewing home blood pressures.  We will transition patient from valsartan to Entresto.  Discussed the importance of adequate hydration.  Patient reports has not needed torsemide since he has been home.  We will also refer patient to lipid and heart failure pharmacotherapy clinic for further medication  "optimization.  Patient obtain BMP 2 weeks after starting Entresto.  Patient to follow-up with heart failure clinic in 6 weeks      Past Medical History:   Diagnosis Date    AICD (automatic cardioverter/defibrillator) present     Arthritis     Osteo- shoulders, hips, knees, ankles, generalized everywhere    Atrial fibrillation (HCC) 04/04/2012    Bowel habit changes     constipation    Breath shortness     \"since 1996 from [place]\"    Cataract 12/15/2021    Dec 2021/Jan 2022 bilat surgery    Chronic back pain     Congestive heart failure (HCC)     COVID-19 09/2021    Dental disorder     missing teeth x3    Heart burn     occasional food related    High cholesterol     Hypertension     Methamphetamine use (HCC) none for many years    25 years ago    Pacemaker     defibrilator    Pain 12/15/2021    back, right hip and knee, 8/10    Paroxysmal ventricular tachycardia (HCC) 04/04/2012    Pneumonia 2000    in the past    Sinoatrial node dysfunction (HCC) 04/04/2012    Dr. Daniel Ferrera    Snoring     Sleep Study June 2022, awaiting results    Syncope and collapse 04/04/2012     Past Surgical History:   Procedure Laterality Date    AICD IMPLANT  2017    AICD IMPLANT  01/01/2010    PACEMAKER INSERTION  01/01/2009    CHOLECYSTECTOMY  01/01/1999    TONSILLECTOMY  01/01/1953    CATARACT EXTRACTION WITH IOL       History reviewed. No pertinent family history.  Social History     Socioeconomic History    Marital status:      Spouse name: Not on file    Number of children: Not on file    Years of education: Not on file    Highest education level: Not on file   Occupational History    Not on file   Tobacco Use    Smoking status: Never    Smokeless tobacco: Never   Vaping Use    Vaping Use: Never used   Substance and Sexual Activity    Alcohol use: Not Currently    Drug use: Not Currently     Comment: 25 years ago    Sexual activity: Not on file   Other Topics Concern    Not on file   Social History Narrative    Not on " file     Social Determinants of Health     Financial Resource Strain: Low Risk     Difficulty of Paying Living Expenses: Not very hard   Food Insecurity: No Food Insecurity    Worried About Running Out of Food in the Last Year: Never true    Ran Out of Food in the Last Year: Never true   Transportation Needs: No Transportation Needs    Lack of Transportation (Medical): No    Lack of Transportation (Non-Medical): No   Physical Activity: Inactive    Days of Exercise per Week: 0 days    Minutes of Exercise per Session: 0 min   Stress: No Stress Concern Present    Feeling of Stress : Not at all   Social Connections: Moderately Isolated    Frequency of Communication with Friends and Family: More than three times a week    Frequency of Social Gatherings with Friends and Family: More than three times a week    Attends Rastafari Services: Never    Active Member of Clubs or Organizations: No    Attends Club or Organization Meetings: Never    Marital Status:    Intimate Partner Violence: Not At Risk    Fear of Current or Ex-Partner: No    Emotionally Abused: No    Physically Abused: No    Sexually Abused: No   Housing Stability: Low Risk     Unable to Pay for Housing in the Last Year: No    Number of Places Lived in the Last Year: 1    Unstable Housing in the Last Year: No     Allergies   Allergen Reactions    Oxycodone Hcl      HALLUCINATIONS    Atorvastatin Rash, Itching and Myalgia          Statins [Hmg-Coa-R Inhibitors] Rash and Itching     Skin gets red & itchy    Sulfa Drugs Rash and Itching     Rash, itch     Outpatient Encounter Medications as of 8/12/2022   Medication Sig Dispense Refill    sacubitril-valsartan (ENTRESTO) 49-51 MG Tab tablet Take 1 Tablet by mouth 2 times a day. 60 Tablet 11    Cholecalciferol (VITAMIN D) 125 MCG (5000 UT) Cap Take 1 Capsule by mouth every day.      HYDROcodone/acetaminophen (NORCO)  MG Tab Take 1 Tablet by mouth 2 times a day.      colchicine (COLCRYS) 0.6 MG Tab Take  0.6 mg by mouth 2 times a day.      colesevelam (WELCHOL) 625 MG Tab Take 625 mg by mouth every day.      torsemide (DEMADEX) 20 MG Tab Take 20 mg by mouth 1 time a day as needed (for weight gain and swelling).      potassium Chloride ER (K-TAB) 20 MEQ Tab CR tablet Take 20 mEq by mouth 1 time a day as needed (if taking Torsemide).      sotalol (BETAPACE) 120 MG tablet Take 1 Tablet by mouth every morning. 60 Tablet 3    acetaminophen (TYLENOL) 500 MG Tab Take 1 Tablet by mouth every 6 hours as needed for Moderate Pain. 30 Tablet 0    cyanocobalamin (VITAMIN B12) 1000 MCG Tab Take 2 Tablets by mouth every day. (Patient taking differently: Take 5,000 mcg by mouth every day. SUBLINGUAL LIQUID) 60 Tablet 3    apixaban (ELIQUIS) 5mg Tab Take 5 mg by mouth 2 times a day.      [DISCONTINUED] DULoxetine (CYMBALTA) 30 MG Cap DR Particles  (Patient not taking: Reported on 8/12/2022)      [DISCONTINUED] omeprazole (PRILOSEC) 40 MG delayed-release capsule Take 1 Capsule by mouth every morning before breakfast. (Patient not taking: No sig reported) 90 Capsule 0    [DISCONTINUED] linezolid (ZYVOX) 600 MG Tab Take 1 Tablet by mouth every 12 hours. (Patient not taking: Reported on 8/11/2022) 20 Tablet 0    [DISCONTINUED] valsartan (DIOVAN) 80 MG Tab Take 1 Tablet by mouth 2 times a day. 30 Tablet 3    [DISCONTINUED] lidocaine (LIDODERM) 5 % Patch Place 1 Patch on the skin every 24 hours. (12 hours on, 12 hours off) (Patient not taking: Reported on 8/11/2022) 10 Patch 1    [DISCONTINUED] Home Care Oxygen Inhale 2 L/min as needed for Shortness of Breath (PRN SOB and at night). Oxygen dose range: 2 L/min  Respiratory route via: Nasal Cannula   Oxygen supplier: none- received concentrator from son       No facility-administered encounter medications on file as of 8/12/2022.     Review of Systems   Constitutional:  Negative for fever, malaise/fatigue and weight loss.   Eyes:  Negative for blurred vision.   Respiratory:  Negative for  cough and shortness of breath.    Cardiovascular:  Negative for chest pain, palpitations, orthopnea and leg swelling.   Gastrointestinal:  Negative for abdominal pain, nausea and vomiting.   Neurological:  Negative for dizziness, tingling and loss of consciousness.   Endo/Heme/Allergies:  Does not bruise/bleed easily.            Objective     /87 (BP Location: Left arm, Patient Position: Sitting, BP Cuff Size: Adult)   Pulse 71   Ht 1.829 m (6')   Wt 108 kg (238 lb)   SpO2 97%   BMI 32.28 kg/m²     Physical Exam       Lab Results   Component Value Date/Time    CHOLSTRLTOT 320 (H) 05/10/2022 07:36 AM     (H) 05/10/2022 07:36 AM    HDL 76 05/10/2022 07:36 AM    TRIGLYCERIDE 167 (H) 05/10/2022 07:36 AM       Lab Results   Component Value Date/Time    SODIUM 136 07/11/2022 06:10 AM    POTASSIUM 4.0 07/11/2022 06:10 AM    CHLORIDE 103 07/11/2022 06:10 AM    CO2 20 07/11/2022 06:10 AM    GLUCOSE 112 (H) 07/11/2022 06:10 AM    BUN 20 07/11/2022 06:10 AM    CREATININE 0.95 07/11/2022 06:10 AM    BUNCREATRAT 25 (H) 05/30/2014 09:04 AM     Lab Results   Component Value Date/Time    ALKPHOSPHAT 117 (H) 07/11/2022 06:10 AM    ASTSGOT 16 07/11/2022 06:10 AM    ALTSGPT 11 07/11/2022 06:10 AM    TBILIRUBIN 0.3 07/11/2022 06:10 AM        Echo dated 2/21/2022:   CONCLUSIONS  Compared to the prior echo on 11/08/19, no significant changes  The left ventricular ejection fraction is visually estimated to be 60%.  Mild mitral regurgitation.  Estimated right ventricular systolic pressure is 30 mmHg.  Grade 1 diastolic dysfunction (my read)  Assessment & Plan     1. Chronic heart failure with preserved ejection fraction (HCC)  Basic Metabolic Panel    Referral to Pharmacotherapy Service      2. ACC/AHA stage C heart failure with preserved ejection fraction (HCC)  Basic Metabolic Panel    Referral to Pharmacotherapy Service      3. High risk medication use  Basic Metabolic Panel    Referral to Pharmacotherapy Service           Medical Decision Making: Today's Assessment/Status/Plan:        HFpEF, Stage C, Class II, LVEF 60%:  -Discussed Heart failure trajectory and prognosis with patient. Will continue to optimize medical therapy as tolerated. Advanced HF treatment, need for remote monitoring consideration at every visit.  -ACE-I/ARB/ARNI: Transition from valsartan to Entresto 46/51 twice daily.  Check BMP in 2 weeks  -Aldosterone Antagonist: Consider once tolerating Entresto  -SGLT2-I: Consider once tolerating Entresto  -Diuretic: Torsemide 20 mg as needed  -Labs: Will continue to closely monitor for side effects of patient's high risk medication(s) including renal function, NTproBNP, and electrolytes as needed  -Close monitoring discussed with patient. Lab work ordered.  -Reinforced s/sx of worsening heart failure with patient and weight monitoring. Pt verbalizes understanding. Pt to call office if present.  -Heart Failure Education: pt to be contacted by HF nurse for further education.  In the meantime, during visit today we discussed indications and use for each medication, importance of treatment adherence, definition of heart failure and potential etiologies for current diagnosis.  -Pharmacotherapy referral placed  -Compliance Barriers none identified  -Genetic testing consideration consider in the future    Atrial fibrillation  Complete heart block status post pacemaker  [id number] 3 (hypertension, age,?  CHF)  -Continue Eliquis  -Continue sotalol  -Continue follow-up with device clinic     Paroxysmal ventricular tachycardia  Status post ICD  -Continue sotalol  -Continue follow-up with device clinic     Dyslipidemia  Statin intolerance  -Follow-up in lipid clinic.  Referral placed for pharmacotherapy clinic     Hypertension  BP well controlled this visit  -Transition to Entresto  -Continue Lasix as above    FU in clinic in 6 weeks with heart failure clinic. Sooner if needed.    Patient verbalizes understanding and agrees  with the plan of care.     I personally spent a total of 42 minutes which includes face-to-face time and non-face-to-face time spent on preparing to see the patient, reviewing prior notes and tests, obtaining history from the patient, performing a medically appropriate exam, counseling and educating the patient, ordering medications/tests/procedures/referrals as clinically indicated, and documenting information in the electronic medical record.    MINI Castellanos.   [facility]  [phone]    PLEASE NOTE: This Note was created using voice recognition Software. I have made every reasonable attempt to correct obvious errors, but I expect that there are errors of grammar and possibly content that I did not discover before finalizing the note

## 2022-08-15 ENCOUNTER — DOCUMENTATION (OUTPATIENT)
Dept: VASCULAR LAB | Facility: MEDICAL CENTER | Age: 72
End: 2022-08-15
Payer: MEDICARE

## 2022-08-15 ENCOUNTER — HOME CARE VISIT (OUTPATIENT)
Dept: HOME HEALTH SERVICES | Facility: HOME HEALTHCARE | Age: 72
End: 2022-08-15
Payer: MEDICARE

## 2022-08-15 PROCEDURE — G0299 HHS/HOSPICE OF RN EA 15 MIN: HCPCS

## 2022-08-15 ASSESSMENT — FIBROSIS 4 INDEX: FIB4 SCORE: 1.6

## 2022-08-15 NOTE — Clinical Note
Thank you  ----- Message -----  From: BRODY Castellanos  Sent: 8/16/2022   2:55 PM PDT  To: Eddie Ocampo R.N., *      Any concerns for additional etiologies for nausea vomiting such as food, sick contacts, GERD.  If ruled out, patient may resume valsartan and stop Entresto.  ----- Message -----  From: Eddie Ocampo R.N.  Sent: 8/16/2022   2:37 PM PDT  To: BRODY Castellanos    For Review Please !  nausea and some vomitting since 8/14/2022, wife said Entresto is the only new medication.

## 2022-08-16 ENCOUNTER — HOME CARE VISIT (OUTPATIENT)
Dept: HOME HEALTH SERVICES | Facility: HOME HEALTHCARE | Age: 72
End: 2022-08-16
Payer: MEDICARE

## 2022-08-16 VITALS
HEART RATE: 74 BPM | SYSTOLIC BLOOD PRESSURE: 106 MMHG | BODY MASS INDEX: 31.19 KG/M2 | RESPIRATION RATE: 18 BRPM | TEMPERATURE: 97.2 F | DIASTOLIC BLOOD PRESSURE: 80 MMHG | OXYGEN SATURATION: 91 % | WEIGHT: 230 LBS

## 2022-08-16 PROCEDURE — G0151 HHCP-SERV OF PT,EA 15 MIN: HCPCS

## 2022-08-16 PROCEDURE — G0152 HHCP-SERV OF OT,EA 15 MIN: HCPCS

## 2022-08-16 ASSESSMENT — BALANCE ASSESSMENTS
IMMEDIATE STANDING BALANCE FIRST 5 SECONDS: 0 - UNSTEADY (STAGGERS, MOVES FEET, TRUNK SWAY)
ATTEMPTS TO ARISE: 1 - ABLE, REQUIRES MORE THAN ONE ATTEMPT
ARISING SCORE: 1
BALANCE SCORE: 4
TURNING 360 DEGREES STEPS: 0 - DISCONTINUOUS STEPS
NUDGED: 0 - BEGINS TO FALL
SITTING DOWN: 1 - USES ARMS OR NOT SMOOTH MOTION
NUDGED SCORE: 0
EYES CLOSED AT MAXIMUM POSITION NUDGED: 0 - UNSTEADY
ARISES: 1 - ABLE, USES ARMS TO HELP
SITTING BALANCE: 0 - LEANS OR SLIDES IN CHAIR
STANDING BALANCE: 1 - STEADY BUT WIDE STANCE AND USES CANE OR OTHER SUPPORT

## 2022-08-16 ASSESSMENT — ACTIVITIES OF DAILY LIVING (ADL)
PHYSICAL TRANSFERS ASSESSED: 1
AMBULATION ASSISTANCE: 1
CURRENT_FUNCTION: STAND BY ASSIST
CURRENT_FUNCTION: CONTACT GUARD ASSIST
CURRENT_FUNCTION: MINIMUM ASSIST
AMBULATION ASSISTANCE ON FLAT SURFACES: 1
AMBULATION ASSISTANCE: STAND BY ASSIST

## 2022-08-16 ASSESSMENT — GAIT ASSESSMENTS
STEP SYMMETRY: 1 - RIGHT AND LEFT STEP LENGTH APPEAR EQUAL
STEP CONTINUITY: 0 - STOPPING OR DISCONTINUITY BETWEEN STEPS
TRUNK: 1 - NO SWAY BUT FLEXION OF KNEES OR BACK OR SPREADS ARMS WHILE WALKING
INITIATION OF GAIT IMMEDIATELY AFTER GO: 0 - ANY HESITANCY OR MULTIPLE ATTEMPTS TO START
WALKING STANCE: 0 - HEELS APART
BALANCE AND GAIT SCORE: 9
PATH SCORE: 1
PATH: 1 - MILD/MODERATE DEVIATION OR USES WALKING AID
TRUNK SCORE: 1
GAIT SCORE: 5

## 2022-08-16 ASSESSMENT — ENCOUNTER SYMPTOMS
PAIN SEVERITY GOAL: 0/10
PAIN LOCATION: RIGHT LEG
HIGHEST PAIN SEVERITY IN PAST 24 HOURS: 6/10
MUSCLE WEAKNESS: 1
LOWEST PAIN SEVERITY IN PAST 24 HOURS: 0/10
ARTHRALGIAS: 1
PAIN: 1
PERSON REPORTING PAIN: PATIENT
SEVERE DYSPNEA: 1

## 2022-08-16 ASSESSMENT — FIBROSIS 4 INDEX: FIB4 SCORE: 1.6

## 2022-08-16 NOTE — CASE COMMUNICATION
For Review Please !  nausea and some vomitting since 8/14/2022, wife said Entresto is the only new medication.

## 2022-08-16 NOTE — Clinical Note
Occupational Therapy Evaluation completed 8/17/2022. Requesting authorization for 1w4 effective 8/21/2022 for continued skilled OT.

## 2022-08-17 ENCOUNTER — HOME CARE VISIT (OUTPATIENT)
Dept: HOME HEALTH SERVICES | Facility: HOME HEALTHCARE | Age: 72
End: 2022-08-17
Payer: MEDICARE

## 2022-08-17 VITALS
SYSTOLIC BLOOD PRESSURE: 123 MMHG | RESPIRATION RATE: 17 BRPM | BODY MASS INDEX: 31.19 KG/M2 | TEMPERATURE: 97.6 F | DIASTOLIC BLOOD PRESSURE: 90 MMHG | HEART RATE: 73 BPM | OXYGEN SATURATION: 98 % | WEIGHT: 230 LBS

## 2022-08-17 VITALS
DIASTOLIC BLOOD PRESSURE: 76 MMHG | OXYGEN SATURATION: 98 % | HEART RATE: 75 BPM | TEMPERATURE: 97.1 F | SYSTOLIC BLOOD PRESSURE: 100 MMHG | RESPIRATION RATE: 18 BRPM

## 2022-08-17 PROCEDURE — G0155 HHCP-SVS OF CSW,EA 15 MIN: HCPCS

## 2022-08-17 SDOH — ECONOMIC STABILITY: HOUSING INSECURITY: HOME SAFETY: OT CONTACT INFO AND FUTURE APPOINTMENT WRITTEN IN ON PATIENT BINDER

## 2022-08-17 ASSESSMENT — ACTIVITIES OF DAILY LIVING (ADL)
ORAL_CARE_CURRENT_FUNCTION: NEEDS ASSISTANCE
BATHING ASSESSED: 1
LIGHT HOUSEKEEPING: DEPENDENT
SHOPPING: DEPENDENT
PREPARING MEALS: DEPENDENT
DRESSING_UB_CURRENT_FUNCTION: SUPERVISION
DRESSING_LB_CURRENT_FUNCTION: MODERATE ASSIST
TOILETING: MODERATE ASSIST
LAUNDRY ASSESSED: 1
TELEPHONE USE ASSESSED: 1
LAUNDRY: DEPENDENT
USING THE TELPHONE: INDEPENDENT
TRANSPORTATION ASSESSED: 1
TOILETING: 1
FEEDING: SUPERVISION
FEEDING ASSESSED: 1
BATHING_CURRENT_FUNCTION: MODERATE ASSIST
CURRENT_FUNCTION: STAND BY ASSIST
GROOMING ASSESSED: 1
ORAL_CARE_ASSESSED: 1
SHOPPING ASSESSED: 1
GROOMING_CURRENT_FUNCTION: STAND BY ASSIST
HOUSEKEEPING ASSESSED: 1
ORAL_CARE_EQUIPMENT_USED: STANDBY ASSIST
AMBULATION ASSISTANCE: STAND BY ASSIST
TRANSPORTATION: DEPENDENT

## 2022-08-17 ASSESSMENT — ENCOUNTER SYMPTOMS
PAIN LOCATION - PAIN FREQUENCY: CONSTANT
PAIN LOCATION - EXACERBATING FACTORS: WALKING
SUBJECTIVE PAIN PROGRESSION: UNCHANGED
PAIN: 1
HIGHEST PAIN SEVERITY IN PAST 24 HOURS: 8/10
MUSCLE WEAKNESS: 1
PAIN LOCATION - PAIN SEVERITY: 8/10
LOWEST PAIN SEVERITY IN PAST 24 HOURS: 0/10
PAIN LOCATION: BOTH KNEES
PERSON REPORTING PAIN: PATIENT
PAIN LOCATION - RELIEVING FACTORS: PAIN PILL, SITTING
PAIN LOCATION - PAIN QUALITY: ACHY, SHARP
PAIN LOCATION - RELIEVING FACTORS: REST, MEDS
PAIN LOCATION: RIGHT LEG
PAIN LOCATION - EXACERBATING FACTORS: AMBULATION
PAIN LOCATION - PAIN QUALITY: ACHY
HIGHEST PAIN SEVERITY IN PAST 24 HOURS: 4/10
PAIN LOCATION - PAIN SEVERITY: 4/10
PAIN SEVERITY GOAL: 0/10
LOWEST PAIN SEVERITY IN PAST 24 HOURS: 0/10
PERSON REPORTING PAIN: PATIENT
PAIN LOCATION - PAIN DURATION: CHRONIC
PAIN LOCATION - PAIN FREQUENCY: FREQUENT
PAIN: 1
SUBJECTIVE PAIN PROGRESSION: UNCHANGED
PAIN SEVERITY GOAL: 2/10
NAUSEA: X 2 DAYS

## 2022-08-17 ASSESSMENT — PAIN SCALES - PAIN ASSESSMENT IN ADVANCED DEMENTIA (PAINAD)
CONSOLABILITY: 0 - NO NEED TO CONSOLE.
FACIALEXPRESSION: 0 - SMILING OR INEXPRESSIVE.
NEGVOCALIZATION: 0 - NONE.
TOTALSCORE: 0
BODYLANGUAGE: 0 - RELAXED.

## 2022-08-17 NOTE — CASE COMMUNICATION
Quality Review for 8.11.22 SOC [employer]IS performed on by ASHLEE Terrazas RN on 8.17.2022:    Edits completed by ASHLEE Terrazas RN:  1. Changed  to 3 per ambulation score   2. Changed  to 13  3. Changed [id number] to 3 per OT eval on 8.16.22. Changed  to 8.16.22 to per the [employer] collaboration convention. Changed  to 1 per OT eval  4.  Updated F2F data

## 2022-08-17 NOTE — CASE COMMUNICATION
Patient name: Mike Ferrell  : 1950    Occupational Therapy Evaluation. Pt with weakness and fatigue today. BP initially 78/50. Pt reports vomiting the previous two days, but none today. Pt drank 16 ounces of water then lay down in bed. Repeat BP taken ~ 25 min  after initial reading with  result of 100/76. Pt did not take his torsemide or potassium this AM. All other meds taken as prescribed. Encouraged hydration and up with  assistance only. OT communicated with pt's nurse casemanager and the nursing supervisor re: BP while in the home via voalte. Pt left supine in bed with spouse in attendance post session. Other vitals: HR 75, RR 18, temp 97.1, O2 98% on RA. No pain at rest. RLE pain 8/10 with ambulation from knee up into hip.     OLLIE Garza/L    Nohelia: Please sent to pt's PCP, Dr. Hipolito Hall

## 2022-08-18 ENCOUNTER — HOME CARE VISIT (OUTPATIENT)
Dept: HOME HEALTH SERVICES | Facility: HOME HEALTHCARE | Age: 72
End: 2022-08-18
Payer: MEDICARE

## 2022-08-18 VITALS
RESPIRATION RATE: 18 BRPM | TEMPERATURE: 97.5 F | SYSTOLIC BLOOD PRESSURE: 114 MMHG | HEART RATE: 72 BPM | DIASTOLIC BLOOD PRESSURE: 82 MMHG | OXYGEN SATURATION: 91 %

## 2022-08-18 PROCEDURE — G0299 HHS/HOSPICE OF RN EA 15 MIN: HCPCS

## 2022-08-18 PROCEDURE — G0151 HHCP-SERV OF PT,EA 15 MIN: HCPCS

## 2022-08-18 ASSESSMENT — FIBROSIS 4 INDEX: FIB4 SCORE: 1.6

## 2022-08-18 ASSESSMENT — ENCOUNTER SYMPTOMS
LOWEST PAIN SEVERITY IN PAST 24 HOURS: 0/10
PAIN LOCATION: RIGHT HIP
PAIN LOCATION - PAIN SEVERITY: 7/10
PAIN SEVERITY GOAL: 0/10
PAIN LOCATION: RIGHT SHOULDER
PAIN LOCATION - PAIN SEVERITY: 7/10
PAIN LOCATION - PAIN SEVERITY: 7/10
PERSON REPORTING PAIN: PATIENT
PAIN: 1
SEVERE DYSPNEA: 1
HIGHEST PAIN SEVERITY IN PAST 24 HOURS: 7/10
PAIN LOCATION: LEFT SHOULDER

## 2022-08-18 NOTE — CASE COMMUNICATION
Noted and agree with all changes. Thank you.     Carly Montgomery RN    ----- Message -----  From: Ariana Terrazas R.N.  Sent: 8/17/2022   3:32 PM PDT  To: Tenzin Montgomery R.N.      Quality Review for 8.11.22 SOC OASIS performed on by ASHLEE Terrazas RN on 8.17.2022:    Edits completed by ASHLEE Terrazas RN:  1. Changed  to 3 per ambulation score   2. Changed  to 13  3. Changed [id number] to 3 per OT eval on 8.16.22. Changed  to 8.16. 22 to per the [employer] collaboration convention. Changed  to 1 per OT eval  4.  Updated F2F data

## 2022-08-21 VITALS
WEIGHT: 231 LBS | OXYGEN SATURATION: 97 % | RESPIRATION RATE: 18 BRPM | HEART RATE: 72 BPM | SYSTOLIC BLOOD PRESSURE: 114 MMHG | BODY MASS INDEX: 31.33 KG/M2 | DIASTOLIC BLOOD PRESSURE: 82 MMHG | TEMPERATURE: 97.5 F

## 2022-08-21 ASSESSMENT — ENCOUNTER SYMPTOMS
DEBILITATING PAIN: 1
MUSCLE WEAKNESS: 1
PAIN LOCATION - PAIN QUALITY: ACHY
PAIN LOCATION - PAIN SEVERITY: 4/10
PAIN: 1
PAIN SEVERITY GOAL: 0/10
HIGHEST PAIN SEVERITY IN PAST 24 HOURS: 4/10
LIMITED RANGE OF MOTION: 1
PERSON REPORTING PAIN: PATIENT
SUBJECTIVE PAIN PROGRESSION: UNCHANGED
PAIN LOCATION - RELIEVING FACTORS: PAIN PILL, RESTING
LOWEST PAIN SEVERITY IN PAST 24 HOURS: 0/10

## 2022-08-21 ASSESSMENT — PAIN SCALES - PAIN ASSESSMENT IN ADVANCED DEMENTIA (PAINAD)
FACIALEXPRESSION: 0 - SMILING OR INEXPRESSIVE.
BODYLANGUAGE: 0 - RELAXED.
TOTALSCORE: 0
NEGVOCALIZATION: 0 - NONE.
CONSOLABILITY: 0 - NO NEED TO CONSOLE.

## 2022-08-21 ASSESSMENT — ACTIVITIES OF DAILY LIVING (ADL): AMBULATION ASSISTANCE: STAND BY ASSIST

## 2022-08-22 ENCOUNTER — HOME CARE VISIT (OUTPATIENT)
Dept: HOME HEALTH SERVICES | Facility: HOME HEALTHCARE | Age: 72
End: 2022-08-22
Payer: MEDICARE

## 2022-08-22 VITALS
DIASTOLIC BLOOD PRESSURE: 68 MMHG | OXYGEN SATURATION: 95 % | RESPIRATION RATE: 18 BRPM | HEART RATE: 75 BPM | BODY MASS INDEX: 30.92 KG/M2 | WEIGHT: 228 LBS | SYSTOLIC BLOOD PRESSURE: 100 MMHG | TEMPERATURE: 98.1 F

## 2022-08-22 PROCEDURE — G0151 HHCP-SERV OF PT,EA 15 MIN: HCPCS

## 2022-08-22 PROCEDURE — G0299 HHS/HOSPICE OF RN EA 15 MIN: HCPCS

## 2022-08-22 ASSESSMENT — FIBROSIS 4 INDEX
FIB4 SCORE: 1.6
FIB4 SCORE: 1.6

## 2022-08-22 ASSESSMENT — ENCOUNTER SYMPTOMS
DEBILITATING PAIN: 1
PAIN LOCATION: LEFT SHOULDER
PAIN SEVERITY GOAL: 2/10
SEVERE DYSPNEA: 1
PAIN: 1
PAIN LOCATION - PAIN SEVERITY: 2/10
PAIN LOCATION: RIGHT SHOULDER
PAIN LOCATION - PAIN SEVERITY: 2/10
PAIN LOCATION: RIGHT LEG
PAIN LOCATION - PAIN SEVERITY: 2/10
LOWEST PAIN SEVERITY IN PAST 24 HOURS: 2/10
HIGHEST PAIN SEVERITY IN PAST 24 HOURS: 7/10
PERSON REPORTING PAIN: PATIENT

## 2022-08-24 ENCOUNTER — HOME CARE VISIT (OUTPATIENT)
Dept: HOME HEALTH SERVICES | Facility: HOME HEALTHCARE | Age: 72
End: 2022-08-24
Payer: MEDICARE

## 2022-08-24 VITALS
HEART RATE: 69 BPM | OXYGEN SATURATION: 94 % | SYSTOLIC BLOOD PRESSURE: 128 MMHG | RESPIRATION RATE: 18 BRPM | DIASTOLIC BLOOD PRESSURE: 74 MMHG | TEMPERATURE: 97.5 F

## 2022-08-24 VITALS
WEIGHT: 229 LBS | DIASTOLIC BLOOD PRESSURE: 72 MMHG | SYSTOLIC BLOOD PRESSURE: 106 MMHG | OXYGEN SATURATION: 95 % | HEART RATE: 69 BPM | TEMPERATURE: 98.1 F | RESPIRATION RATE: 18 BRPM | BODY MASS INDEX: 31.06 KG/M2

## 2022-08-24 PROCEDURE — G0151 HHCP-SERV OF PT,EA 15 MIN: HCPCS

## 2022-08-24 PROCEDURE — G0299 HHS/HOSPICE OF RN EA 15 MIN: HCPCS

## 2022-08-24 PROCEDURE — G0155 HHCP-SVS OF CSW,EA 15 MIN: HCPCS

## 2022-08-24 ASSESSMENT — ENCOUNTER SYMPTOMS
DEBILITATING PAIN: 1
PAIN: 1
DENIES PAIN: 1
PERSON REPORTING PAIN: PATIENT

## 2022-08-24 ASSESSMENT — FIBROSIS 4 INDEX: FIB4 SCORE: 1.6

## 2022-08-25 ENCOUNTER — HOME CARE VISIT (OUTPATIENT)
Dept: HOME HEALTH SERVICES | Facility: HOME HEALTHCARE | Age: 72
End: 2022-08-25
Payer: MEDICARE

## 2022-08-25 VITALS
DIASTOLIC BLOOD PRESSURE: 72 MMHG | SYSTOLIC BLOOD PRESSURE: 106 MMHG | RESPIRATION RATE: 17 BRPM | TEMPERATURE: 97.1 F | HEART RATE: 69 BPM | OXYGEN SATURATION: 99 %

## 2022-08-25 PROCEDURE — G0152 HHCP-SERV OF OT,EA 15 MIN: HCPCS

## 2022-08-25 ASSESSMENT — ENCOUNTER SYMPTOMS
PAIN LOCATION - EXACERBATING FACTORS: MOVEMENT
PAIN LOCATION - PAIN SEVERITY: 4/10
LIMITED RANGE OF MOTION: 1
SUBJECTIVE PAIN PROGRESSION: UNCHANGED
PAIN LOCATION - RELIEVING FACTORS: RESTING, PAIN PILL
PAIN: 1
PAIN SEVERITY GOAL: 0/10
HIGHEST PAIN SEVERITY IN PAST 24 HOURS: 4/10
PERSON REPORTING PAIN: PATIENT
PAIN LOCATION - PAIN FREQUENCY: INTERMITTENT
LOWEST PAIN SEVERITY IN PAST 24 HOURS: 0/10
PAIN LOCATION - PAIN QUALITY: ACHY

## 2022-08-25 ASSESSMENT — PAIN SCALES - PAIN ASSESSMENT IN ADVANCED DEMENTIA (PAINAD)
TOTALSCORE: 0
BODYLANGUAGE: 0 - RELAXED.
NEGVOCALIZATION: 0 - NONE.
CONSOLABILITY: 0 - NO NEED TO CONSOLE.
FACIALEXPRESSION: 0 - SMILING OR INEXPRESSIVE.

## 2022-08-25 ASSESSMENT — ACTIVITIES OF DAILY LIVING (ADL)
AMBULATION ASSISTANCE: STAND BY ASSIST
CURRENT_FUNCTION: STAND BY ASSIST

## 2022-08-25 NOTE — CASE COMMUNICATION
Patient; Mike Ferrell  : 1950    FYI:    Pt with low BP during OT session again today. BP initially 84/52 with associated fatigue. After drinking 10 oz of water and after 5 minutes BP was still low. Pt drank an additional 10 oz of water. 25 min after initial low BP reading BP had recovered to 106/72. OT encouraged consistent hydration. Pt lay down in bed post session due to fatigue. Will continue to monitor.     Hansa Ruth, OTR/INDIGO Pozo: Can you please send this note to Dr. Hipolito Hall, PT's PCP.

## 2022-08-26 ENCOUNTER — HOME CARE VISIT (OUTPATIENT)
Dept: HOME HEALTH SERVICES | Facility: HOME HEALTHCARE | Age: 72
End: 2022-08-26
Payer: MEDICARE

## 2022-08-26 PROCEDURE — G0299 HHS/HOSPICE OF RN EA 15 MIN: HCPCS

## 2022-08-26 ASSESSMENT — FIBROSIS 4 INDEX: FIB4 SCORE: 1.6

## 2022-08-28 VITALS
WEIGHT: 230 LBS | DIASTOLIC BLOOD PRESSURE: 74 MMHG | OXYGEN SATURATION: 94 % | TEMPERATURE: 97.5 F | BODY MASS INDEX: 31.19 KG/M2 | HEART RATE: 69 BPM | RESPIRATION RATE: 18 BRPM | SYSTOLIC BLOOD PRESSURE: 128 MMHG

## 2022-08-28 VITALS
WEIGHT: 234 LBS | BODY MASS INDEX: 31.74 KG/M2 | DIASTOLIC BLOOD PRESSURE: 80 MMHG | HEART RATE: 73 BPM | SYSTOLIC BLOOD PRESSURE: 128 MMHG | TEMPERATURE: 98.1 F | RESPIRATION RATE: 17 BRPM | OXYGEN SATURATION: 96 %

## 2022-08-28 ASSESSMENT — ENCOUNTER SYMPTOMS
LOWEST PAIN SEVERITY IN PAST 24 HOURS: 0/10
PAIN LOCATION - PAIN QUALITY: ACHY
PAIN LOCATION - PAIN SEVERITY: 4/10
MUSCLE WEAKNESS: 1
SUBJECTIVE PAIN PROGRESSION: UNCHANGED
PAIN SEVERITY GOAL: 0/10
PAIN: 1
HIGHEST PAIN SEVERITY IN PAST 24 HOURS: 4/10
PAIN LOCATION - RELIEVING FACTORS: PAIN PILL, RESTING

## 2022-08-28 ASSESSMENT — PAIN SCALES - PAIN ASSESSMENT IN ADVANCED DEMENTIA (PAINAD)
NEGVOCALIZATION: 0 - NONE.
FACIALEXPRESSION: 0 - SMILING OR INEXPRESSIVE.
TOTALSCORE: 0
BODYLANGUAGE: 0 - RELAXED.
CONSOLABILITY: 0 - NO NEED TO CONSOLE.

## 2022-08-29 ENCOUNTER — HOME CARE VISIT (OUTPATIENT)
Dept: HOME HEALTH SERVICES | Facility: HOME HEALTHCARE | Age: 72
End: 2022-08-29
Payer: MEDICARE

## 2022-08-29 ENCOUNTER — HOSPITAL ENCOUNTER (OUTPATIENT)
Facility: MEDICAL CENTER | Age: 72
End: 2022-08-29
Attending: NURSE PRACTITIONER
Payer: MEDICARE

## 2022-08-29 DIAGNOSIS — Z79.899 HIGH RISK MEDICATION USE: ICD-10-CM

## 2022-08-29 DIAGNOSIS — I50.32 CHRONIC HEART FAILURE WITH PRESERVED EJECTION FRACTION (HCC): ICD-10-CM

## 2022-08-29 DIAGNOSIS — I50.30 ACC/AHA STAGE C HEART FAILURE WITH PRESERVED EJECTION FRACTION (HCC): ICD-10-CM

## 2022-08-29 LAB
ANION GAP SERPL CALC-SCNC: 17 MMOL/L (ref 7–16)
BUN SERPL-MCNC: 12 MG/DL (ref 8–22)
CALCIUM SERPL-MCNC: 9.6 MG/DL (ref 8.5–10.5)
CHLORIDE SERPL-SCNC: 103 MMOL/L (ref 96–112)
CO2 SERPL-SCNC: 23 MMOL/L (ref 20–33)
CREAT SERPL-MCNC: 0.77 MG/DL (ref 0.5–1.4)
GFR SERPLBLD CREATININE-BSD FMLA CKD-EPI: 94 ML/MIN/1.73 M 2
GLUCOSE SERPL-MCNC: 94 MG/DL (ref 65–99)
POTASSIUM SERPL-SCNC: 3.6 MMOL/L (ref 3.6–5.5)
SODIUM SERPL-SCNC: 143 MMOL/L (ref 135–145)

## 2022-08-29 PROCEDURE — 80048 BASIC METABOLIC PNL TOTAL CA: CPT

## 2022-08-29 PROCEDURE — G0299 HHS/HOSPICE OF RN EA 15 MIN: HCPCS

## 2022-08-29 ASSESSMENT — ENCOUNTER SYMPTOMS
PAIN: 1
PAIN LOCATION - PAIN FREQUENCY: INTERMITTENT
PAIN SEVERITY GOAL: 0/10
HIGHEST PAIN SEVERITY IN PAST 24 HOURS: 3/10
PAIN LOCATION - RELIEVING FACTORS: RESTING, PAIN MEDS
SUBJECTIVE PAIN PROGRESSION: UNCHANGED
PAIN LOCATION - PAIN SEVERITY: 3/10
LOWEST PAIN SEVERITY IN PAST 24 HOURS: 0/10
PAIN LOCATION - EXACERBATING FACTORS: WALKING
LIMITED RANGE OF MOTION: 1
PAIN LOCATION - PAIN QUALITY: ACHY
PERSON REPORTING PAIN: PATIENT

## 2022-08-29 ASSESSMENT — ACTIVITIES OF DAILY LIVING (ADL): AMBULATION ASSISTANCE: STAND BY ASSIST

## 2022-08-29 ASSESSMENT — PAIN SCALES - PAIN ASSESSMENT IN ADVANCED DEMENTIA (PAINAD)
FACIALEXPRESSION: 0 - SMILING OR INEXPRESSIVE.
BODYLANGUAGE: 0 - RELAXED.
NEGVOCALIZATION: 0 - NONE.
CONSOLABILITY: 0 - NO NEED TO CONSOLE.
TOTALSCORE: 0

## 2022-08-29 ASSESSMENT — FIBROSIS 4 INDEX: FIB4 SCORE: 1.6

## 2022-08-30 ENCOUNTER — HOME CARE VISIT (OUTPATIENT)
Dept: HOME HEALTH SERVICES | Facility: HOME HEALTHCARE | Age: 72
End: 2022-08-30
Payer: MEDICARE

## 2022-08-30 PROCEDURE — G0155 HHCP-SVS OF CSW,EA 15 MIN: HCPCS

## 2022-08-31 ENCOUNTER — HOME CARE VISIT (OUTPATIENT)
Dept: HOME HEALTH SERVICES | Facility: HOME HEALTHCARE | Age: 72
End: 2022-08-31
Payer: MEDICARE

## 2022-08-31 ENCOUNTER — PATIENT MESSAGE (OUTPATIENT)
Dept: CARDIOLOGY | Facility: MEDICAL CENTER | Age: 72
End: 2022-08-31
Payer: MEDICARE

## 2022-08-31 VITALS
SYSTOLIC BLOOD PRESSURE: 105 MMHG | BODY MASS INDEX: 31.6 KG/M2 | HEART RATE: 71 BPM | TEMPERATURE: 97.7 F | OXYGEN SATURATION: 92 % | WEIGHT: 233 LBS | RESPIRATION RATE: 17 BRPM | DIASTOLIC BLOOD PRESSURE: 73 MMHG

## 2022-08-31 ASSESSMENT — ENCOUNTER SYMPTOMS
PAIN SEVERITY GOAL: 0/10
SUBJECTIVE PAIN PROGRESSION: WAXING AND WANING
HIGHEST PAIN SEVERITY IN PAST 24 HOURS: 10/10
PERSON REPORTING PAIN: PATIENT
PAIN LOCATION: R HIP
PAIN LOCATION - PAIN QUALITY: SHARP
PAIN LOCATION - PAIN FREQUENCY: INTERMITTENT
PAIN LOCATION - PAIN SEVERITY: 7/10
PAIN LOCATION - EXACERBATING FACTORS: WALKING
PAIN LOCATION - RELIEVING FACTORS: PAIN PILL, RESTING
PAIN: 1
LOWEST PAIN SEVERITY IN PAST 24 HOURS: 7/10
MUSCLE WEAKNESS: 1

## 2022-08-31 ASSESSMENT — PAIN SCALES - PAIN ASSESSMENT IN ADVANCED DEMENTIA (PAINAD)
NEGVOCALIZATION: 0 - NONE.
TOTALSCORE: 0
BODYLANGUAGE: 0 - RELAXED.
CONSOLABILITY: 0 - NO NEED TO CONSOLE.
FACIALEXPRESSION: 0 - SMILING OR INEXPRESSIVE.

## 2022-08-31 NOTE — PATIENT COMMUNICATION
JG    Caller: Mike Ferrell    Topic/issue: ENTRESTO    Patient would like a call back to discuss this medication. Please advise.    Thank you,  Jamey DAN    Callback Number: [phone] (home)

## 2022-09-01 ENCOUNTER — HOME CARE VISIT (OUTPATIENT)
Dept: HOME HEALTH SERVICES | Facility: HOME HEALTHCARE | Age: 72
End: 2022-09-01
Payer: MEDICARE

## 2022-09-01 PROCEDURE — G0299 HHS/HOSPICE OF RN EA 15 MIN: HCPCS

## 2022-09-01 ASSESSMENT — FIBROSIS 4 INDEX: FIB4 SCORE: 2.73

## 2022-09-03 ENCOUNTER — APPOINTMENT (OUTPATIENT)
Dept: RADIOLOGY | Facility: MEDICAL CENTER | Age: 72
End: 2022-09-03
Attending: EMERGENCY MEDICINE
Payer: MEDICARE

## 2022-09-03 ENCOUNTER — HOME CARE VISIT (OUTPATIENT)
Dept: HOME HEALTH SERVICES | Facility: HOME HEALTHCARE | Age: 72
End: 2022-09-03
Payer: MEDICARE

## 2022-09-03 ENCOUNTER — HOSPITAL ENCOUNTER (EMERGENCY)
Facility: MEDICAL CENTER | Age: 72
End: 2022-09-03
Attending: EMERGENCY MEDICINE
Payer: MEDICARE

## 2022-09-03 VITALS
OXYGEN SATURATION: 94 % | DIASTOLIC BLOOD PRESSURE: 98 MMHG | HEART RATE: 71 BPM | TEMPERATURE: 97.4 F | SYSTOLIC BLOOD PRESSURE: 192 MMHG | RESPIRATION RATE: 19 BRPM

## 2022-09-03 DIAGNOSIS — R44.3 HALLUCINATIONS: ICD-10-CM

## 2022-09-03 DIAGNOSIS — R44.1 VISUAL HALLUCINATIONS: ICD-10-CM

## 2022-09-03 LAB
ALBUMIN SERPL BCP-MCNC: 3.4 G/DL (ref 3.2–4.9)
ALBUMIN/GLOB SERPL: 1.5 G/DL
ALP SERPL-CCNC: 126 U/L (ref 30–99)
ALT SERPL-CCNC: 7 U/L (ref 2–50)
ANION GAP SERPL CALC-SCNC: 9 MMOL/L (ref 7–16)
APPEARANCE UR: CLEAR
AST SERPL-CCNC: 20 U/L (ref 12–45)
BASOPHILS # BLD AUTO: 1.1 % (ref 0–1.8)
BASOPHILS # BLD: 0.06 K/UL (ref 0–0.12)
BILIRUB SERPL-MCNC: 0.4 MG/DL (ref 0.1–1.5)
BILIRUB UR QL STRIP.AUTO: NEGATIVE
BUN SERPL-MCNC: 13 MG/DL (ref 8–22)
CALCIUM SERPL-MCNC: 9 MG/DL (ref 8.5–10.5)
CHLORIDE SERPL-SCNC: 107 MMOL/L (ref 96–112)
CO2 SERPL-SCNC: 25 MMOL/L (ref 20–33)
COLOR UR: YELLOW
CREAT SERPL-MCNC: 0.77 MG/DL (ref 0.5–1.4)
EOSINOPHIL # BLD AUTO: 0.08 K/UL (ref 0–0.51)
EOSINOPHIL NFR BLD: 1.4 % (ref 0–6.9)
ERYTHROCYTE [DISTWIDTH] IN BLOOD BY AUTOMATED COUNT: 49.9 FL (ref 35.9–50)
ETHANOL BLD-MCNC: <10.1 MG/DL
GFR SERPLBLD CREATININE-BSD FMLA CKD-EPI: 94 ML/MIN/1.73 M 2
GLOBULIN SER CALC-MCNC: 2.3 G/DL (ref 1.9–3.5)
GLUCOSE SERPL-MCNC: 89 MG/DL (ref 65–99)
GLUCOSE UR STRIP.AUTO-MCNC: NEGATIVE MG/DL
HCT VFR BLD AUTO: 35.7 % (ref 42–52)
HGB BLD-MCNC: 11.6 G/DL (ref 14–18)
IMM GRANULOCYTES # BLD AUTO: 0.03 K/UL (ref 0–0.11)
IMM GRANULOCYTES NFR BLD AUTO: 0.5 % (ref 0–0.9)
KETONES UR STRIP.AUTO-MCNC: NEGATIVE MG/DL
LACTATE SERPL-SCNC: 1.8 MMOL/L (ref 0.5–2)
LACTATE SERPL-SCNC: 2 MMOL/L (ref 0.5–2)
LEUKOCYTE ESTERASE UR QL STRIP.AUTO: NEGATIVE
LYMPHOCYTES # BLD AUTO: 1.45 K/UL (ref 1–4.8)
LYMPHOCYTES NFR BLD: 26.1 % (ref 22–41)
MCH RBC QN AUTO: 32.6 PG (ref 27–33)
MCHC RBC AUTO-ENTMCNC: 32.5 G/DL (ref 33.7–35.3)
MCV RBC AUTO: 100.3 FL (ref 81.4–97.8)
MICRO URNS: NORMAL
MONOCYTES # BLD AUTO: 0.62 K/UL (ref 0–0.85)
MONOCYTES NFR BLD AUTO: 11.2 % (ref 0–13.4)
NEUTROPHILS # BLD AUTO: 3.31 K/UL (ref 1.82–7.42)
NEUTROPHILS NFR BLD: 59.7 % (ref 44–72)
NITRITE UR QL STRIP.AUTO: NEGATIVE
NRBC # BLD AUTO: 0 K/UL
NRBC BLD-RTO: 0 /100 WBC
PH UR STRIP.AUTO: 7 [PH] (ref 5–8)
PLATELET # BLD AUTO: 205 K/UL (ref 164–446)
PMV BLD AUTO: 10.6 FL (ref 9–12.9)
POTASSIUM SERPL-SCNC: 4 MMOL/L (ref 3.6–5.5)
PROT SERPL-MCNC: 5.7 G/DL (ref 6–8.2)
PROT UR QL STRIP: NEGATIVE MG/DL
RBC # BLD AUTO: 3.56 M/UL (ref 4.7–6.1)
RBC UR QL AUTO: NEGATIVE
SODIUM SERPL-SCNC: 141 MMOL/L (ref 135–145)
SP GR UR STRIP.AUTO: 1
UROBILINOGEN UR STRIP.AUTO-MCNC: 0.2 MG/DL
WBC # BLD AUTO: 5.6 K/UL (ref 4.8–10.8)

## 2022-09-03 PROCEDURE — 70496 CT ANGIOGRAPHY HEAD: CPT

## 2022-09-03 PROCEDURE — 85025 COMPLETE CBC W/AUTO DIFF WBC: CPT

## 2022-09-03 PROCEDURE — 81003 URINALYSIS AUTO W/O SCOPE: CPT

## 2022-09-03 PROCEDURE — 87040 BLOOD CULTURE FOR BACTERIA: CPT

## 2022-09-03 PROCEDURE — 36415 COLL VENOUS BLD VENIPUNCTURE: CPT

## 2022-09-03 PROCEDURE — 71045 X-RAY EXAM CHEST 1 VIEW: CPT

## 2022-09-03 PROCEDURE — 80053 COMPREHEN METABOLIC PANEL: CPT

## 2022-09-03 PROCEDURE — 83605 ASSAY OF LACTIC ACID: CPT

## 2022-09-03 PROCEDURE — 700117 HCHG RX CONTRAST REV CODE 255: Performed by: EMERGENCY MEDICINE

## 2022-09-03 PROCEDURE — 82077 ASSAY SPEC XCP UR&BREATH IA: CPT

## 2022-09-03 PROCEDURE — 87086 URINE CULTURE/COLONY COUNT: CPT

## 2022-09-03 PROCEDURE — 99285 EMERGENCY DEPT VISIT HI MDM: CPT

## 2022-09-03 RX ADMIN — IOHEXOL 80 ML: 350 INJECTION, SOLUTION INTRAVENOUS at 11:29

## 2022-09-03 NOTE — DISCHARGE INSTRUCTIONS
There is no signs of infection, no electrolyte imbalance, and CT of the brain is normal.  He has stopped having the hallucinations, at this time you are stable for discharge home.  Return if hallucinations continue or worsen or if he does not realize that these hallucinations are hallucinations.

## 2022-09-03 NOTE — ED PROVIDER NOTES
"ED Provider  Scribed for Alexi Haynes D.O. by Mary Anne Jefferson. 9/3/2022  7:06 AM    Means of arrival:EMS  History obtained from:Patient  History limited by: Patient    CHIEF COMPLAINT  Chief Complaint   Patient presents with    Hallucinations       HPI  Mike Ferrell is a 72 y.o. male who presents to the ED via EMS with complaints of auditory and visual hallucinations onset last night. He states that his bedroom looked like a \"spaceship,\" and that a friend was in his room that he hasn't seen in years, speaking to him. He states that he remembers falling asleep once, but was awake and aware of his hallucinations while they were happening. Patient has history of the same secondary to bladder infection and sepsis. Patient has associated symptoms of fever and chills. He denies nausea, diarrhea, vomiting, cough. He also denies having a history of depression and anxiety.     REVIEW OF SYSTEMS  See HPI for further details. All other systems are negative.     PAST MEDICAL HISTORY   has a past medical history of AICD (automatic cardioverter/defibrillator) present, Arthritis, Atrial fibrillation (HCC) (04/04/2012), Bowel habit changes, Breath shortness, Cataract (12/15/2021), Chronic back pain, Congestive heart failure (HCC), COVID-19 (09/2021), Dental disorder, Heart burn, High cholesterol, Hypertension, Methamphetamine use (HCC) (none for many years), Pacemaker, Pain (12/15/2021), Paroxysmal ventricular tachycardia (HCC) (04/04/2012), Pneumonia (2000), Sinoatrial node dysfunction (HCC) (04/04/2012), Snoring, and Syncope and collapse (04/04/2012).    SOCIAL HISTORY  Social History     Tobacco Use    Smoking status: Never    Smokeless tobacco: Never   Vaping Use    Vaping Use: Never used   Substance and Sexual Activity    Alcohol use: Not Currently    Drug use: Not Currently     Comment: 25 years ago            SURGICAL HISTORY   has a past surgical history that includes cholecystectomy (01/01/1999); pacemaker " insertion (01/01/2009); aicd implant (01/01/2010); tonsillectomy (01/01/1953); cataract extraction with iol; and aicd implant (2017).    CURRENT MEDICATIONS  Current Outpatient Medications   Medication Instructions    acetaminophen (TYLENOL) 500 mg, Oral, EVERY 6 HOURS PRN    apixaban (ELIQUIS) 5 mg, Oral, 2 TIMES DAILY    Cholecalciferol (VITAMIN D) 125 MCG (5000 UT) Cap 1 Capsule, Oral, DAILY    colchicine (COLCRYS) 0.6 mg, Oral, 2 TIMES DAILY    colesevelam (WELCHOL) 625 mg, Oral, DAILY    cyanocobalamin (VITAMIN B12) 2,000 mcg, Oral, DAILY    HYDROcodone/acetaminophen (NORCO)  MG Tab 1 Tablet, Oral, 2 TIMES DAILY    potassium Chloride ER (K-TAB) 20 MEQ Tab CR tablet 20 mEq, Oral, 1 TIME DAILY PRN    sacubitril-valsartan (ENTRESTO) 49-51 MG Tab 0.5 Tablets, Oral, 2 TIMES DAILY    sotalol (BETAPACE) 120 mg, Oral, EVERY MORNING    torsemide (DEMADEX) 20 mg, Oral, 1 TIME DAILY PRN       ALLERGIES  Allergies   Allergen Reactions    Oxycodone Hcl      HALLUCINATIONS    Atorvastatin Rash, Itching and Myalgia          Statins [Hmg-Coa-R Inhibitors] Rash and Itching     Skin gets red & itchy    Sulfa Drugs Rash and Itching     Rash, itch       PHYSICAL EXAM  VITAL SIGNS: BP (!) 190/99   Pulse 82   Temp 36.8 °C (98.3 °F) (Temporal)   Resp 17   SpO2 93%     Constitutional: Alert in no apparent distress.  HENT: No signs of trauma, mucous membranes are moist  Eyes: Conjunctiva normal, Non-icteric.   Neck: Normal range of motion, No tenderness, Supple.  Lymphatic: No lymphadenopathy noted.   Cardiovascular: Regular rate and rhythm, no murmurs.   Thorax & Lungs: Normal breath sounds, No respiratory distress, No wheezing, No chest tenderness.   Abdomen: Bowel sounds normal, Soft, No tenderness, No masses, No pulsatile masses. No peritoneal signs.  Skin: Warm, Dry, normal color.   Back: No bony tenderness, No CVA tenderness.   Extremities: No edema, No tenderness, No cyanosis  Musculoskeletal: Good range of motion in  all major joints. No tenderness to palpation or major deformities noted.   Neurologic: Alert and oriented x4, Normal motor function, Normal sensory function, No focal deficits noted.   Psychiatric: Affect normal, Judgment normal, Mood normal. Patient is complaining of seeing and hearing things but knows they're not real.    DIAGNOSTIC STUDIES / PROCEDURES    LABS  Results for orders placed or performed during the hospital encounter of 09/03/22   LACTIC ACID   Result Value Ref Range    Lactic Acid 2.0 0.5 - 2.0 mmol/L   CBC WITH DIFFERENTIAL   Result Value Ref Range    WBC 5.6 4.8 - 10.8 K/uL    RBC 3.56 (L) 4.70 - 6.10 M/uL    Hemoglobin 11.6 (L) 14.0 - 18.0 g/dL    Hematocrit 35.7 (L) 42.0 - 52.0 %    .3 (H) 81.4 - 97.8 fL    MCH 32.6 27.0 - 33.0 pg    MCHC 32.5 (L) 33.7 - 35.3 g/dL    RDW 49.9 35.9 - 50.0 fL    Platelet Count 205 164 - 446 K/uL    MPV 10.6 9.0 - 12.9 fL    Neutrophils-Polys 59.70 44.00 - 72.00 %    Lymphocytes 26.10 22.00 - 41.00 %    Monocytes 11.20 0.00 - 13.40 %    Eosinophils 1.40 0.00 - 6.90 %    Basophils 1.10 0.00 - 1.80 %    Immature Granulocytes 0.50 0.00 - 0.90 %    Nucleated RBC 0.00 /100 WBC    Neutrophils (Absolute) 3.31 1.82 - 7.42 K/uL    Lymphs (Absolute) 1.45 1.00 - 4.80 K/uL    Monos (Absolute) 0.62 0.00 - 0.85 K/uL    Eos (Absolute) 0.08 0.00 - 0.51 K/uL    Baso (Absolute) 0.06 0.00 - 0.12 K/uL    Immature Granulocytes (abs) 0.03 0.00 - 0.11 K/uL    NRBC (Absolute) 0.00 K/uL   COMP METABOLIC PANEL   Result Value Ref Range    Sodium 141 135 - 145 mmol/L    Potassium 4.0 3.6 - 5.5 mmol/L    Chloride 107 96 - 112 mmol/L    Co2 25 20 - 33 mmol/L    Anion Gap 9.0 7.0 - 16.0    Glucose 89 65 - 99 mg/dL    Bun 13 8 - 22 mg/dL    Creatinine 0.77 0.50 - 1.40 mg/dL    Calcium 9.0 8.5 - 10.5 mg/dL    AST(SGOT) 20 12 - 45 U/L    ALT(SGPT) 7 2 - 50 U/L    Alkaline Phosphatase 126 (H) 30 - 99 U/L    Total Bilirubin 0.4 0.1 - 1.5 mg/dL    Albumin 3.4 3.2 - 4.9 g/dL    Total Protein 5.7  (L) 6.0 - 8.2 g/dL    Globulin 2.3 1.9 - 3.5 g/dL    A-G Ratio 1.5 g/dL   URINALYSIS    Specimen: Urine   Result Value Ref Range    Color Yellow     Character Clear     Specific Gravity 1.004 <1.035    Ph 7.0 5.0 - 8.0    Glucose Negative Negative mg/dL    Ketones Negative Negative mg/dL    Protein Negative Negative mg/dL    Bilirubin Negative Negative    Urobilinogen, Urine 0.2 Negative    Nitrite Negative Negative    Leukocyte Esterase Negative Negative    Occult Blood Negative Negative    Micro Urine Req see below    ESTIMATED GFR   Result Value Ref Range    GFR (CKD-EPI) 94 >60 mL/min/1.73 m 2   DIAGNOSTIC ALCOHOL   Result Value Ref Range    Diagnostic Alcohol <10.1 <10.1 mg/dL   LACTIC ACID   Result Value Ref Range    Lactic Acid 1.8 0.5 - 2.0 mmol/L     All labs reviewed by me.    RADIOLOGY  CT-CTA HEAD WITH & W/O-POST PROCESS   Final Result      1.  No large vessel occlusion, high-grade stenosis or aneurysm of the [place].   2.  No CT evidence of acute infarct, hemorrhage or mass.   3.  Moderate atrophy and chronic small vessel ischemic changes.      DX-CHEST-PORTABLE (1 VIEW)   Final Result      No acute cardiopulmonary abnormality identified.        The radiologist's interpretations of all radiological studies have been reviewed by me.    Films have been independently by me      COURSE  Pertinent Labs & Imaging studies reviewed. (See chart for details)        7:06 AM - Patient seen and examined at bedside. Discussed plan of care. Ordered for DX-Chest, Lactic Acid, CBC with differential, CMP, UA with culture, blood culture, to evaluate his symptoms.     8:17 AM - Review of patient's results is unrevealing. He does not have a UTI and his WBC is normal. I have ordered a CT-CTA Head w/ and w/o to further evaluate his symptoms.     8:22 AM - Patient was reevaluated at bedside. Discussed lab and radiology results thus far with the patient and informed them that further studies need to be done. He  understands and agrees to the updated plan of care.      12:10 PM - Reviewed CT-CTA Head, which is unrevealing.     12:17 PM - I reevaluated the patient at bedside and updated him on his results as outlined above. I discussed the plan for discharge, as outlined below, and gave the patient the opportunity to ask questions. he understands and agrees to the plan for discharge.    MEDICAL DECISION MAKING  This is a 72 y.o. male who presents auditory and visual hallucinations, he thought he was in a space ship and saw people that he had not seen in years.  And on the way here he believed that the  turned into a    But he does know that these were hallucinations were not real.  He has had no further episodes of it while he was here.  He had episodes of going to sleep with jerking motions at the wife's concern was seizures, these were not prolonged he had no postictal.  These were jerking motions of sleep.    Is urinalysis shows no infection white blood cell count is negative.  Lactic acid is also negative there is no signs of fever infection or sepsis.    With that CT was done CT of the brain shows no structural abnormality and he has not had any further hallucinations at this point he is stable for discharge home to return if his symptoms return or worsen.      DISPOSITION:  Patient will be discharged home in stable condition.    FOLLOW UP:  Hipolito Hall M.D.  [address] [place] [id number] [phone]    In 3 days          FINAL IMPRESSION  1. Hallucinations    2. Visual hallucinations         Mary Anne PALOMARES (El), am scribing for, and in the presence of, Alexi Haynes D.O..    Electronically signed by: Mary Anne Jefferson (El), 9/3/2022    Alexi PALOMARES D.O. personally performed the services described in this documentation, as scribed by Mary Anne Jefferson in my presence, and it is both accurate and complete.    The note accurately reflects work and  decisions made by me.  Alexi Haynes D.O.  9/3/2022  1:44 PM

## 2022-09-03 NOTE — ED NOTES
Patient resting on stretcher. Remains drowsy and intermittently hallucinating. Wife at bedside, call bell within reach

## 2022-09-03 NOTE — ED TRIAGE NOTES
BIB EMS For new onset visual and auditory hallucinations. Patient has a history of the same with a prior sepsis/uri admission per EMS. Patient GCS 15 A&Ox4 but endorses AH+VH. Carries on conversation well but if left alone he loses train of thought and can hear and see things he knows are not there.    Vitals stable on arrival, HTN 190s, afebrile 98.3f

## 2022-09-03 NOTE — ED NOTES
Pt provided discharge teaching and education.  Verbalized understanding.  Pt educated on signs and symptoms to return to ER. Pt assisted to wheelchair and taken to the ER lobby.  Pt assisted into pt's vehicle.

## 2022-09-04 VITALS
DIASTOLIC BLOOD PRESSURE: 77 MMHG | RESPIRATION RATE: 16 BRPM | HEART RATE: 71 BPM | BODY MASS INDEX: 31.06 KG/M2 | WEIGHT: 229 LBS | SYSTOLIC BLOOD PRESSURE: 114 MMHG | TEMPERATURE: 98 F | OXYGEN SATURATION: 92 %

## 2022-09-04 ASSESSMENT — ENCOUNTER SYMPTOMS
SUBJECTIVE PAIN PROGRESSION: WAXING AND WANING
PAIN LOCATION - PAIN QUALITY: SHARP
PAIN LOCATION - PAIN SEVERITY: 6/10
PAIN LOCATION - PAIN FREQUENCY: INTERMITTENT
HIGHEST PAIN SEVERITY IN PAST 24 HOURS: 6/10
PAIN SEVERITY GOAL: 0/10
PAIN: 1
PAIN LOCATION - RELIEVING FACTORS: RESTING, PAIN PILL
PAIN LOCATION - EXACERBATING FACTORS: WALKING
LOWEST PAIN SEVERITY IN PAST 24 HOURS: 0/10
LIMITED RANGE OF MOTION: 1

## 2022-09-04 ASSESSMENT — PAIN SCALES - PAIN ASSESSMENT IN ADVANCED DEMENTIA (PAINAD)
TOTALSCORE: 0
BODYLANGUAGE: 0 - RELAXED.
CONSOLABILITY: 0 - NO NEED TO CONSOLE.
NEGVOCALIZATION: 0 - NONE.
FACIALEXPRESSION: 0 - SMILING OR INEXPRESSIVE.

## 2022-09-05 ENCOUNTER — HOME CARE VISIT (OUTPATIENT)
Dept: HOME HEALTH SERVICES | Facility: HOME HEALTHCARE | Age: 72
End: 2022-09-05
Payer: MEDICARE

## 2022-09-05 LAB
BACTERIA UR CULT: NORMAL
SIGNIFICANT IND 70042: NORMAL
SITE SITE: NORMAL
SOURCE SOURCE: NORMAL

## 2022-09-05 PROCEDURE — G0299 HHS/HOSPICE OF RN EA 15 MIN: HCPCS

## 2022-09-05 ASSESSMENT — FIBROSIS 4 INDEX: FIB4 SCORE: 2.73

## 2022-09-07 ENCOUNTER — HOME CARE VISIT (OUTPATIENT)
Dept: HOME HEALTH SERVICES | Facility: HOME HEALTHCARE | Age: 72
End: 2022-09-07
Payer: MEDICARE

## 2022-09-07 VITALS
HEART RATE: 71 BPM | DIASTOLIC BLOOD PRESSURE: 60 MMHG | TEMPERATURE: 98.2 F | RESPIRATION RATE: 17 BRPM | OXYGEN SATURATION: 95 % | SYSTOLIC BLOOD PRESSURE: 110 MMHG

## 2022-09-07 PROCEDURE — G0157 HHC PT ASSISTANT EA 15: HCPCS | Mod: CQ

## 2022-09-07 ASSESSMENT — ENCOUNTER SYMPTOMS
PAIN: 1
HIGHEST PAIN SEVERITY IN PAST 24 HOURS: 3/10
PAIN LOCATION: GENERALIZED
PERSON REPORTING PAIN: PATIENT
PAIN SEVERITY GOAL: 0/10
LOWEST PAIN SEVERITY IN PAST 24 HOURS: 3/10

## 2022-09-08 ENCOUNTER — HOME CARE VISIT (OUTPATIENT)
Dept: HOME HEALTH SERVICES | Facility: HOME HEALTHCARE | Age: 72
End: 2022-09-08
Payer: MEDICARE

## 2022-09-08 VITALS
DIASTOLIC BLOOD PRESSURE: 78 MMHG | HEART RATE: 69 BPM | RESPIRATION RATE: 16 BRPM | SYSTOLIC BLOOD PRESSURE: 112 MMHG | OXYGEN SATURATION: 97 % | TEMPERATURE: 97.5 F

## 2022-09-08 VITALS
RESPIRATION RATE: 18 BRPM | BODY MASS INDEX: 31.06 KG/M2 | TEMPERATURE: 97.7 F | OXYGEN SATURATION: 97 % | DIASTOLIC BLOOD PRESSURE: 67 MMHG | WEIGHT: 229 LBS | HEART RATE: 73 BPM | SYSTOLIC BLOOD PRESSURE: 105 MMHG

## 2022-09-08 LAB
BACTERIA BLD CULT: NORMAL
BACTERIA BLD CULT: NORMAL
SIGNIFICANT IND 70042: NORMAL
SIGNIFICANT IND 70042: NORMAL
SITE SITE: NORMAL
SITE SITE: NORMAL
SOURCE SOURCE: NORMAL
SOURCE SOURCE: NORMAL

## 2022-09-08 PROCEDURE — G0152 HHCP-SERV OF OT,EA 15 MIN: HCPCS

## 2022-09-08 PROCEDURE — G0299 HHS/HOSPICE OF RN EA 15 MIN: HCPCS

## 2022-09-08 ASSESSMENT — ENCOUNTER SYMPTOMS
PAIN LOCATION - RELIEVING FACTORS: RESTING
PAIN LOCATION - EXACERBATING FACTORS: WALKING, MOVEMENT
PAIN LOCATION - PAIN QUALITY: ACHY, SHARP
LIMITED RANGE OF MOTION: 1
PAIN LOCATION - PAIN FREQUENCY: INTERMITTENT
DEBILITATING PAIN: 1
HIGHEST PAIN SEVERITY IN PAST 24 HOURS: 7/10
PERSON REPORTING PAIN: PATIENT
PAIN: 1
SUBJECTIVE PAIN PROGRESSION: UNCHANGED
PAIN SEVERITY GOAL: 0/10
PAIN LOCATION - PAIN SEVERITY: 7/10
LOWEST PAIN SEVERITY IN PAST 24 HOURS: 0/10

## 2022-09-08 ASSESSMENT — PAIN SCALES - PAIN ASSESSMENT IN ADVANCED DEMENTIA (PAINAD)
NEGVOCALIZATION: 0 - NONE.
FACIALEXPRESSION: 0 - SMILING OR INEXPRESSIVE.
CONSOLABILITY: 0 - NO NEED TO CONSOLE.
BODYLANGUAGE: 0 - RELAXED.
TOTALSCORE: 0

## 2022-09-08 ASSESSMENT — ACTIVITIES OF DAILY LIVING (ADL): AMBULATION ASSISTANCE: STAND BY ASSIST

## 2022-09-08 ASSESSMENT — FIBROSIS 4 INDEX: FIB4 SCORE: 2.73

## 2022-09-09 NOTE — CASE COMMUNICATION
Mike is cont to progress. He overall perez well and cont to report a strong carry over with HEP. He still fatigue with 2 min of task but is able to pace himself rest and resume Will cont with POC to increase his functional mob and ind to prevent further decline in function. S/B Rica Yang PT

## 2022-09-10 ASSESSMENT — ENCOUNTER SYMPTOMS
HIGHEST PAIN SEVERITY IN PAST 24 HOURS: 7/10
PAIN: 1
LOWEST PAIN SEVERITY IN PAST 24 HOURS: 0/10
PAIN LOCATION: RIGHT HIP
SUBJECTIVE PAIN PROGRESSION: UNCHANGED
PAIN SEVERITY GOAL: 2/10
PERSON REPORTING PAIN: PATIENT
PAIN LOCATION - PAIN SEVERITY: 0/10

## 2022-09-11 VITALS
DIASTOLIC BLOOD PRESSURE: 78 MMHG | OXYGEN SATURATION: 97 % | BODY MASS INDEX: 31.33 KG/M2 | RESPIRATION RATE: 16 BRPM | TEMPERATURE: 97.5 F | WEIGHT: 231 LBS | HEART RATE: 69 BPM | SYSTOLIC BLOOD PRESSURE: 112 MMHG

## 2022-09-11 ASSESSMENT — ENCOUNTER SYMPTOMS
MUSCLE WEAKNESS: 1
PERSON REPORTING PAIN: PATIENT
SEVERE DYSPNEA: 1
DENIES PAIN: 1

## 2022-09-11 ASSESSMENT — PAIN SCALES - PAIN ASSESSMENT IN ADVANCED DEMENTIA (PAINAD)
BODYLANGUAGE: 0 - RELAXED.
TOTALSCORE: 0
FACIALEXPRESSION: 0 - SMILING OR INEXPRESSIVE.
CONSOLABILITY: 0 - NO NEED TO CONSOLE.
NEGVOCALIZATION: 0 - NONE.

## 2022-09-12 ENCOUNTER — HOME CARE VISIT (OUTPATIENT)
Dept: HOME HEALTH SERVICES | Facility: HOME HEALTHCARE | Age: 72
End: 2022-09-12
Payer: MEDICARE

## 2022-09-12 VITALS
DIASTOLIC BLOOD PRESSURE: 62 MMHG | SYSTOLIC BLOOD PRESSURE: 120 MMHG | HEART RATE: 72 BPM | TEMPERATURE: 97.5 F | OXYGEN SATURATION: 93 % | RESPIRATION RATE: 16 BRPM | BODY MASS INDEX: 31.6 KG/M2 | WEIGHT: 233 LBS

## 2022-09-12 PROCEDURE — 665001 SOC-HOME HEALTH

## 2022-09-12 PROCEDURE — G0155 HHCP-SVS OF CSW,EA 15 MIN: HCPCS

## 2022-09-12 PROCEDURE — G0299 HHS/HOSPICE OF RN EA 15 MIN: HCPCS

## 2022-09-12 PROCEDURE — G0151 HHCP-SERV OF PT,EA 15 MIN: HCPCS

## 2022-09-12 ASSESSMENT — ENCOUNTER SYMPTOMS
PAIN LOCATION - PAIN SEVERITY: 5/10
PERSON REPORTING PAIN: PATIENT
SEVERE DYSPNEA: 1
LOWEST PAIN SEVERITY IN PAST 24 HOURS: 0/10
PAIN SEVERITY GOAL: 0/10
DEBILITATING PAIN: 1
HIGHEST PAIN SEVERITY IN PAST 24 HOURS: 5/10
PAIN LOCATION: RIGHT LEG
PAIN: 1

## 2022-09-12 ASSESSMENT — FIBROSIS 4 INDEX
FIB4 SCORE: 2.73
FIB4 SCORE: 2.73

## 2022-09-12 ASSESSMENT — GAIT ASSESSMENTS
PATH SCORE: 1
PATH: 1 - MILD/MODERATE DEVIATION OR USES WALKING AID
TRUNK SCORE: 1
STEP CONTINUITY: 0 - STOPPING OR DISCONTINUITY BETWEEN STEPS
GAIT SCORE: 6
STEP SYMMETRY: 1 - RIGHT AND LEFT STEP LENGTH APPEAR EQUAL
TRUNK: 1 - NO SWAY BUT FLEXION OF KNEES OR BACK OR SPREADS ARMS WHILE WALKING
WALKING STANCE: 0 - HEELS APART
BALANCE AND GAIT SCORE: 14
INITIATION OF GAIT IMMEDIATELY AFTER GO: 0 - ANY HESITANCY OR MULTIPLE ATTEMPTS TO START

## 2022-09-12 ASSESSMENT — BALANCE ASSESSMENTS
NUDGED: 1 - STAGGERS, GRABS, CATCHES SELF
ARISING SCORE: 1
ATTEMPTS TO ARISE: 1 - ABLE, REQUIRES MORE THAN ONE ATTEMPT
SITTING BALANCE: 1 - STEADY, SAFE
STANDING BALANCE: 1 - STEADY BUT WIDE STANCE AND USES CANE OR OTHER SUPPORT
IMMEDIATE STANDING BALANCE FIRST 5 SECONDS: 1 - STEADY BUT USES WALKER OR OTHER SUPPORT
TURNING 360 DEGREES STEPS: 0 - DISCONTINUOUS STEPS
ARISES: 1 - ABLE, USES ARMS TO HELP
SITTING DOWN: 1 - USES ARMS OR NOT SMOOTH MOTION
NUDGED SCORE: 1
BALANCE SCORE: 8
EYES CLOSED AT MAXIMUM POSITION NUDGED: 0 - UNSTEADY

## 2022-09-12 ASSESSMENT — ACTIVITIES OF DAILY LIVING (ADL)
AMBULATION ASSISTANCE: CONTACT GUARD ASSIST
AMBULATION ASSISTANCE ON FLAT SURFACES: 1
PHYSICAL TRANSFERS ASSESSED: 1
AMBULATION_DISTANCE/DURATION_TOLERATED: 60FT
AMBULATION ASSISTANCE: 1
CURRENT_FUNCTION: CONTACT GUARD ASSIST

## 2022-09-13 ENCOUNTER — HOME CARE VISIT (OUTPATIENT)
Dept: HOME HEALTH SERVICES | Facility: HOME HEALTHCARE | Age: 72
End: 2022-09-13
Payer: MEDICARE

## 2022-09-13 ENCOUNTER — TELEPHONE (OUTPATIENT)
Dept: VASCULAR LAB | Facility: MEDICAL CENTER | Age: 72
End: 2022-09-13
Payer: MEDICARE

## 2022-09-13 VITALS
DIASTOLIC BLOOD PRESSURE: 72 MMHG | BODY MASS INDEX: 31.33 KG/M2 | SYSTOLIC BLOOD PRESSURE: 109 MMHG | HEART RATE: 81 BPM | TEMPERATURE: 98 F | WEIGHT: 231 LBS | OXYGEN SATURATION: 94 % | RESPIRATION RATE: 17 BRPM

## 2022-09-13 PROCEDURE — G0152 HHCP-SERV OF OT,EA 15 MIN: HCPCS

## 2022-09-13 ASSESSMENT — ACTIVITIES OF DAILY LIVING (ADL)
USING THE TELPHONE: INDEPENDENT
FEEDING ASSESSED: 1
TRANSPORTATION ASSESSED: 1
MODE OF TRANSPORTATION: NOT DRIVING
TOILETING: 1
TOILETING: MINIMUM ASSIST
FEEDING: INDEPENDENT
GROOMING_CURRENT_FUNCTION: INDEPENDENT
ORAL_CARE_ASSESSED: 1
DRESSING_LB_CURRENT_FUNCTION: INDEPENDENT
GROOMING ASSESSED: 1
TRANSPORTATION: DEPENDENT
BATHING_CURRENT_FUNCTION: MINIMUM ASSIST
ORAL_CARE_CURRENT_FUNCTION: INDEPENDENT
PREPARING MEALS: NEEDS ASSISTANCE
TELEPHONE USE ASSESSED: 1
DRESSING_UB_CURRENT_FUNCTION: INDEPENDENT
BATHING ASSESSED: 1

## 2022-09-13 ASSESSMENT — ENCOUNTER SYMPTOMS
PAIN LOCATION - PAIN QUALITY: SHARP
LOWEST PAIN SEVERITY IN PAST 24 HOURS: 0/10
PAIN LOCATION - PAIN FREQUENCY: FREQUENT
PAIN LOCATION - EXACERBATING FACTORS: AMBULATION
PERSON REPORTING PAIN: PATIENT
PAIN LOCATION - PAIN SEVERITY: 6/10
PAIN LOCATION - RELIEVING FACTORS: REST, MEDS, REPOSITIONING
PAIN: 1
HIGHEST PAIN SEVERITY IN PAST 24 HOURS: 6/10
PAIN LOCATION: RIGHT LEG

## 2022-09-13 ASSESSMENT — FIBROSIS 4 INDEX: FIB4 SCORE: 2.73

## 2022-09-13 NOTE — TELEPHONE ENCOUNTER
Received call from pt wanting to cancel his CHF pharmacotherapy appt. When asked to r/s he refused as he did not need help with medications.    Sent FYI to referring provider    Bridgette Wolf, PharmD

## 2022-09-14 ENCOUNTER — HOME CARE VISIT (OUTPATIENT)
Dept: HOME HEALTH SERVICES | Facility: HOME HEALTHCARE | Age: 72
End: 2022-09-14
Payer: MEDICARE

## 2022-09-15 ENCOUNTER — HOME CARE VISIT (OUTPATIENT)
Dept: HOME HEALTH SERVICES | Facility: HOME HEALTHCARE | Age: 72
End: 2022-09-15
Payer: MEDICARE

## 2022-09-15 VITALS
RESPIRATION RATE: 16 BRPM | DIASTOLIC BLOOD PRESSURE: 62 MMHG | HEART RATE: 72 BPM | OXYGEN SATURATION: 93 % | SYSTOLIC BLOOD PRESSURE: 120 MMHG | TEMPERATURE: 97.5 F | BODY MASS INDEX: 31.6 KG/M2 | WEIGHT: 233 LBS

## 2022-09-15 ASSESSMENT — ENCOUNTER SYMPTOMS
PAIN LOCATION: RIGHT THIGH
PAIN LOCATION - PAIN QUALITY: ACHY
PAIN LOCATION - PAIN SEVERITY: 3/10
LOWEST PAIN SEVERITY IN PAST 24 HOURS: 0/10
HIGHEST PAIN SEVERITY IN PAST 24 HOURS: 3/10
SUBJECTIVE PAIN PROGRESSION: WAXING AND WANING
MUSCLE WEAKNESS: 1
PAIN LOCATION - PAIN FREQUENCY: INTERMITTENT
PERSON REPORTING PAIN: PATIENT
PAIN LOCATION - EXACERBATING FACTORS: WALKING, STANDING
PAIN SEVERITY GOAL: 0/10
PAIN: 1

## 2022-09-15 ASSESSMENT — PAIN SCALES - PAIN ASSESSMENT IN ADVANCED DEMENTIA (PAINAD)
TOTALSCORE: 0
CONSOLABILITY: 0 - NO NEED TO CONSOLE.
FACIALEXPRESSION: 0 - SMILING OR INEXPRESSIVE.
BODYLANGUAGE: 0 - RELAXED.
NEGVOCALIZATION: 0 - NONE.

## 2022-09-19 ENCOUNTER — TELEPHONE (OUTPATIENT)
Dept: PALLIATIVE MEDICINE | Facility: HOSPICE | Age: 72
End: 2022-09-19
Payer: MEDICARE

## 2022-09-19 ENCOUNTER — HOME CARE VISIT (OUTPATIENT)
Dept: HOME HEALTH SERVICES | Facility: HOME HEALTHCARE | Age: 72
End: 2022-09-19
Payer: MEDICARE

## 2022-09-19 VITALS
TEMPERATURE: 98 F | RESPIRATION RATE: 18 BRPM | SYSTOLIC BLOOD PRESSURE: 120 MMHG | DIASTOLIC BLOOD PRESSURE: 70 MMHG | OXYGEN SATURATION: 96 % | HEART RATE: 89 BPM

## 2022-09-19 PROCEDURE — G0151 HHCP-SERV OF PT,EA 15 MIN: HCPCS

## 2022-09-19 ASSESSMENT — ENCOUNTER SYMPTOMS
PAIN LOCATION - PAIN SEVERITY: 4/10
PAIN SEVERITY GOAL: 0/10
HIGHEST PAIN SEVERITY IN PAST 24 HOURS: 4/10
DEBILITATING PAIN: 1
SEVERE DYSPNEA: 1
PERSON REPORTING PAIN: PATIENT
PAIN: 1
LOWEST PAIN SEVERITY IN PAST 24 HOURS: 0/10
PAIN LOCATION: RIGHT LEG

## 2022-09-20 ENCOUNTER — HOSPITAL ENCOUNTER (OUTPATIENT)
Dept: RADIOLOGY | Facility: MEDICAL CENTER | Age: 72
End: 2022-09-20
Attending: FAMILY MEDICINE
Payer: MEDICARE

## 2022-09-20 DIAGNOSIS — M54.50 LOW BACK PAIN, UNSPECIFIED BACK PAIN LATERALITY, UNSPECIFIED CHRONICITY, UNSPECIFIED WHETHER SCIATICA PRESENT: ICD-10-CM

## 2022-09-20 PROCEDURE — 72100 X-RAY EXAM L-S SPINE 2/3 VWS: CPT

## 2022-09-21 ENCOUNTER — HOME CARE VISIT (OUTPATIENT)
Dept: HOME HEALTH SERVICES | Facility: HOME HEALTHCARE | Age: 72
End: 2022-09-21
Payer: MEDICARE

## 2022-09-21 VITALS
TEMPERATURE: 97.5 F | HEART RATE: 65 BPM | DIASTOLIC BLOOD PRESSURE: 80 MMHG | SYSTOLIC BLOOD PRESSURE: 128 MMHG | RESPIRATION RATE: 18 BRPM | OXYGEN SATURATION: 95 %

## 2022-09-21 PROCEDURE — G0151 HHCP-SERV OF PT,EA 15 MIN: HCPCS

## 2022-09-21 ASSESSMENT — ENCOUNTER SYMPTOMS
SEVERE DYSPNEA: 1
PERSON REPORTING PAIN: PATIENT
DENIES PAIN: 1
DEBILITATING PAIN: 1

## 2022-09-22 ENCOUNTER — HOME CARE VISIT (OUTPATIENT)
Dept: HOME HEALTH SERVICES | Facility: HOME HEALTHCARE | Age: 72
End: 2022-09-22
Payer: MEDICARE

## 2022-09-22 PROCEDURE — G0299 HHS/HOSPICE OF RN EA 15 MIN: HCPCS

## 2022-09-22 ASSESSMENT — FIBROSIS 4 INDEX: FIB4 SCORE: 2.73

## 2022-09-25 VITALS
RESPIRATION RATE: 18 BRPM | DIASTOLIC BLOOD PRESSURE: 80 MMHG | BODY MASS INDEX: 31.46 KG/M2 | WEIGHT: 232 LBS | OXYGEN SATURATION: 97 % | SYSTOLIC BLOOD PRESSURE: 112 MMHG | HEART RATE: 76 BPM | TEMPERATURE: 97.6 F

## 2022-09-25 ASSESSMENT — ENCOUNTER SYMPTOMS
PERSON REPORTING PAIN: PATIENT
PAIN SEVERITY GOAL: 0/10
PAIN LOCATION: RIGHT HIP/THIGH
SUBJECTIVE PAIN PROGRESSION: UNCHANGED
PAIN LOCATION - EXACERBATING FACTORS: WALKING
PAIN LOCATION - PAIN SEVERITY: 2/10
PAIN LOCATION - PAIN QUALITY: ACHY
PAIN LOCATION - RELIEVING FACTORS: PAIN PILL, RESTING
PAIN: 1
LOWEST PAIN SEVERITY IN PAST 24 HOURS: 0/10
HIGHEST PAIN SEVERITY IN PAST 24 HOURS: 2/10
PAIN LOCATION - PAIN FREQUENCY: INTERMITTENT

## 2022-09-25 ASSESSMENT — PAIN SCALES - PAIN ASSESSMENT IN ADVANCED DEMENTIA (PAINAD)
TOTALSCORE: 0
FACIALEXPRESSION: 0 - SMILING OR INEXPRESSIVE.
NEGVOCALIZATION: 0 - NONE.
BODYLANGUAGE: 0 - RELAXED.
CONSOLABILITY: 0 - NO NEED TO CONSOLE.

## 2022-09-26 ENCOUNTER — HOME CARE VISIT (OUTPATIENT)
Dept: HOME HEALTH SERVICES | Facility: HOME HEALTHCARE | Age: 72
End: 2022-09-26
Payer: MEDICARE

## 2022-09-26 VITALS
RESPIRATION RATE: 18 BRPM | TEMPERATURE: 97.5 F | DIASTOLIC BLOOD PRESSURE: 70 MMHG | HEART RATE: 55 BPM | OXYGEN SATURATION: 95 % | SYSTOLIC BLOOD PRESSURE: 108 MMHG

## 2022-09-26 PROCEDURE — G0151 HHCP-SERV OF PT,EA 15 MIN: HCPCS

## 2022-09-26 ASSESSMENT — ENCOUNTER SYMPTOMS
HIGHEST PAIN SEVERITY IN PAST 24 HOURS: 6/10
LOWEST PAIN SEVERITY IN PAST 24 HOURS: 0/10
PERSON REPORTING PAIN: PATIENT
DEBILITATING PAIN: 1
DENIES PAIN: 1
PAIN SEVERITY GOAL: 0/10

## 2022-09-28 ENCOUNTER — APPOINTMENT (OUTPATIENT)
Dept: PALLIATIVE MEDICINE | Facility: HOSPICE | Age: 72
End: 2022-09-28
Payer: MEDICARE

## 2022-09-28 ENCOUNTER — HOME CARE VISIT (OUTPATIENT)
Dept: HOME HEALTH SERVICES | Facility: HOME HEALTHCARE | Age: 72
End: 2022-09-28
Payer: MEDICARE

## 2022-09-28 VITALS
WEIGHT: 231 LBS | OXYGEN SATURATION: 93 % | TEMPERATURE: 98.1 F | HEART RATE: 76 BPM | SYSTOLIC BLOOD PRESSURE: 118 MMHG | BODY MASS INDEX: 31.33 KG/M2 | RESPIRATION RATE: 16 BRPM | DIASTOLIC BLOOD PRESSURE: 72 MMHG

## 2022-09-28 PROCEDURE — 99497 ADVNCD CARE PLAN 30 MIN: CPT

## 2022-09-28 PROCEDURE — 99349 HOME/RES VST EST MOD MDM 40: CPT | Mod: 25

## 2022-09-28 PROCEDURE — G0151 HHCP-SERV OF PT,EA 15 MIN: HCPCS

## 2022-09-28 ASSESSMENT — FIBROSIS 4 INDEX: FIB4 SCORE: 2.73

## 2022-09-28 ASSESSMENT — ENCOUNTER SYMPTOMS
PERSON REPORTING PAIN: PATIENT
SEVERE DYSPNEA: 1
DENIES PAIN: 1
LOWEST PAIN SEVERITY IN PAST 24 HOURS: 0/10
HIGHEST PAIN SEVERITY IN PAST 24 HOURS: 6/10
DEBILITATING PAIN: 1
PAIN SEVERITY GOAL: 0/10

## 2022-09-28 ASSESSMENT — ACTIVITIES OF DAILY LIVING (ADL)
AMBULATION ASSISTANCE ON FLAT SURFACES: 1
OASIS_M1830: 02
HOME_HEALTH_OASIS: 01

## 2022-09-28 ASSESSMENT — PATIENT HEALTH QUESTIONNAIRE - PHQ9: CLINICAL INTERPRETATION OF PHQ2 SCORE: 0

## 2022-09-29 RX ORDER — GABAPENTIN 100 MG/1
200 CAPSULE ORAL
Qty: 60 CAPSULE | Refills: 3 | Status: SHIPPED | OUTPATIENT
Start: 2022-09-29 | End: 2022-10-20

## 2022-09-29 NOTE — PROGRESS NOTES
In-Home Palliative Medicine Evaluation    Mike Ferrell  72 y.o.  Male  MRN [id number]  PCP Hipolito Hall M.D.  Specialists: Kaylene SNIDER   Location: Mike's private residence    Reason for palliative medicine consultation and/or visit: goals of care discussion    Assessment and Plan:  Primary diagnosis: HFpEF    Prognosis: PPS 40%    Physical aspects of care:  Pain in lower back and BLE not controlled with hydrocodone, and current medications.  Currently receiving PT with [facility].  Spends most of time in bed, and while in bed, either sleeping or watching TV.  He sleeps up to 17 hours a day.  He complains of leg swelling, and SOB on minimal exertion, has O2 though not using it. Uses walker to ambulate.  Fair to poor appetite. Urinary urgency.    New orders/recommendations:  Gabapentin 200mg BID      Psycho/Social aspects of care:  Lives with wife Karne (partner of 31 years) and sister-in-law.  Karen is currently receiving Home Health and recovering from back surgery.  He has adult children, and spends time with his grandkids.  He admits to having panic attacks when left alone.    Spiritual aspects of care:  Mike states he believes in God, and has a personal relationship with God.    Goals of care:  Mike wants to pursue curative care for all of his present conditions.  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    Advance care planning:  Discussed POLST.  Will continue this discussion at future encounters.    The patient and/or legal decision maker has provided voluntary consent to discuss advance care planning. We discussed disease process, goals of care, hospitalization and curative focused care, and what is most important to him. The following documents were discussed POLST. Total time spent in ACP discussion 45 minutes, which is separate from the time spent completing the evaluation and management visit.     Pertinent Physical Exam Findings:    Constitutional: ill appearing, large stature  HEENT: hoarse, nasal congestion  Pulm/Chest: diminished, coarse upper lobes  CVS: BLEE 2-3+  Abdomen: distended  MSK: stiff,   Neuro: lethargic, oriented to person, place, and situation  Skin: fragile, bruising and redness on LEs  Psych: cooperative, anxious, drinking rum and coke during encounter      Current Medications:    Current Outpatient Medications:     valsartan (DIOVAN) 80 MG Tab, Take 80 mg by mouth every day., Disp: , Rfl:     sacubitril-valsartan (ENTRESTO) 49-51 MG Tab, Take 0.5 Tablets by mouth 2 times a day. (Patient not taking: Reported on 9/4/2022), Disp: 60 Tablet, Rfl: 11    Cholecalciferol (VITAMIN D) 125 MCG (5000 UT) Cap, Take 1 Capsule by mouth every day., Disp: , Rfl:     HYDROcodone/acetaminophen (NORCO)  MG Tab, Take 1 Tablet by mouth 2 times a day., Disp: , Rfl:     colchicine (COLCRYS) 0.6 MG Tab, Take 0.6 mg by mouth 2 times a day., Disp: , Rfl:     colesevelam (WELCHOL) 625 MG Tab, Take 625 mg by mouth every day., Disp: , Rfl:     torsemide (DEMADEX) 20 MG Tab, Take 20 mg by mouth 1 time a day as needed (for weight gain and swelling)., Disp: , Rfl:     potassium Chloride ER (K-TAB) 20 MEQ Tab CR tablet, Take 20 mEq by mouth 1 time a day as needed (if taking Torsemide)., Disp: , Rfl:     sotalol (BETAPACE) 120 MG tablet, Take 1 Tablet by mouth every morning., Disp: 60 Tablet, Rfl: 3    acetaminophen (TYLENOL) 500 MG Tab, Take 1 Tablet by mouth every 6 hours as needed for Moderate Pain., Disp: 30 Tablet, Rfl: 0    cyanocobalamin (VITAMIN B12) 1000 MCG Tab, Take 2 Tablets by mouth every day. (Patient taking differently: Take 5,000 mcg by mouth every day. SUBLINGUAL LIQUID), Disp: 60 Tablet, Rfl: 3    apixaban (ELIQUIS) 5mg Tab, Take 5 mg by mouth 2 times a day., Disp: , Rfl:     Medication Allergies:  Oxycodone hcl, Atorvastatin, Statins [hmg-coa-r inhibitors], and Sulfa drugs    Thank you for allowing me the opportunity to participate  in the care of Mike Ferrell    I spent a total of 50 minutes in direct face-to-face time with the patient and/or family. More than 50% of this time was spent in counseling and coordination of care. This is separate from the time spent on advance care planning, which is documented above.       Kaylene SNIDER   [facility]   P - [phone]    - [phone]

## 2022-09-30 ENCOUNTER — HOME CARE VISIT (OUTPATIENT)
Dept: HOME HEALTH SERVICES | Facility: HOME HEALTHCARE | Age: 72
End: 2022-09-30
Payer: MEDICARE

## 2022-09-30 NOTE — CASE COMMUNICATION
Quality Review for DC OASIS by SIMONE Ko RN on  September 30, 2022      Edits completed by SIMONE Ko RN:  1.  is 3 per ambulation status

## 2022-09-30 NOTE — Clinical Note
I agree with this change.  Liz Francisco PT, DPT  ----- Message -----  From: Milana Ko R.N.  Sent: 9/30/2022  10:20 AM PDT  To: Liz Francisco, PT         Quality Review for DC OASIS by SIMONE Ko, RN on  September 30, 2022      Edits completed by SIMONE Ko RN:  1.  is 3 per ambulation status

## 2022-10-04 NOTE — PROGRESS NOTES
Chief Complaint   Patient presents with   • Ventricular Tachycardia     F/V Dx: Paroxysmal ventricular tachycardia (HCC)   • Atrial Fibrillation       Subjective     Mike Ferrell is a 71 y.o. male who presents today with history of atrial arrhythmias and ventricular arrhythmias on sotalol.  Permanent pacemaker upgrade to a defibrillator secondary to VT. complaining of lower extremity edema.  No shortness of breath.  No chest pain.  Some excess fluid intake.    Past Medical History:   Diagnosis Date   • Arthritis    • Atrial fibrillation (HCC) 4/4/2012   • Methamphetamine use (HCC) none for many years   • Pain     shoulders   • Paroxysmal ventricular tachycardia (HCC) 4/4/2012   • Sinoatrial node dysfunction (HCC) 4/4/2012   • Syncope and collapse 4/4/2012     Past Surgical History:   Procedure Laterality Date   • AICD IMPLANT  2010   • PACEMAKER INSERTION  2009   • CHOLECYSTECTOMY  1999   • TONSILLECTOMY  1953     No family history on file.  Social History     Socioeconomic History   • Marital status:      Spouse name: Not on file   • Number of children: Not on file   • Years of education: Not on file   • Highest education level: Not on file   Occupational History   • Not on file   Tobacco Use   • Smoking status: Never Smoker   • Smokeless tobacco: Never Used   Vaping Use   • Vaping Use: Never used   Substance and Sexual Activity   • Alcohol use: Yes     Comment: a couple of whiskey every weekened    • Drug use: No   • Sexual activity: Not on file   Other Topics Concern   • Not on file   Social History Narrative   • Not on file     Social Determinants of Health     Financial Resource Strain: Low Risk    • Difficulty of Paying Living Expenses: Not hard at all   Food Insecurity: No Food Insecurity   • Worried About Running Out of Food in the Last Year: Never true   • Ran Out of Food in the Last Year: Never true   Transportation Needs: No Transportation Needs   • Lack of Transportation (Medical): No   • Lack  of Transportation (Non-Medical): No   Physical Activity:    • Days of Exercise per Week:    • Minutes of Exercise per Session:    Stress:    • Feeling of Stress :    Social Connections:    • Frequency of Communication with Friends and Family:    • Frequency of Social Gatherings with Friends and Family:    • Attends Yazidism Services:    • Active Member of Clubs or Organizations:    • Attends Club or Organization Meetings:    • Marital Status:    Intimate Partner Violence:    • Fear of Current or Ex-Partner:    • Emotionally Abused:    • Physically Abused:    • Sexually Abused:      Allergies   Allergen Reactions   • Lipitor [Atorvastatin]    • Sulfa Drugs Rash and Itching     Rash, itch   • Statins [Hmg-Coa-R Inhibitors] Itching     Outpatient Encounter Medications as of 11/1/2021   Medication Sig Dispense Refill   • MAGNESIUM PO Take  by mouth.     • furosemide (LASIX) 40 MG Tab Take 1 Tablet by mouth every day. 30 Tablet 3   • potassium chloride SA (KDUR) 20 MEQ Tab CR Take 1 Tablet by mouth every day. 30 Tablet 3   • colchicine (COLCRYS) 0.6 MG Tab Take 1 tablet by mouth 2 Times a Day. Continue until gout flare resolves. 30 tablet 0   • traMADol (ULTRAM) 50 MG Tab Take 50 mg by mouth every 6 hours as needed for Moderate Pain.     • potassium chloride SA (KDUR) 20 MEQ Tab CR Take 20 mEq by mouth every day.     • B Complex Vitamins (B COMPLEX PO) Take  by mouth.     • sotalol (BETAPACE) 120 MG tablet Take 1 Tab by mouth 2 times a day. 180 Tab 3   • Cholecalciferol (VITAMIN D3) 5000 units Cap Take 1 capsule by mouth every day.     • hydrochlorothiazide (MICROZIDE) 12.5 MG capsule Take 1 Cap by mouth every day. 100 Cap 3   • warfarin (COUMADIN) 5 MG Tab TAKE 1 (ONE) TO 1 & 1/2 (ONE & ONE-HALF) TABLETS BY MOUTH ONCE DAILY AS DIRECTED BY THE COUMADIN CLINIC. (Patient taking differently: Take 5 mg by mouth every day.) 135 Tab 1   • amlodipine (NORVASC) 5 MG TABS Take 5 mg by mouth every day.     • benazepril (LOTENSIN)  40 MG tablet Take 40 mg by mouth every day.     • ascorbic acid (VITAMIN C) 500 MG tablet Take 500 mg by mouth every day. (Patient not taking: Reported on 11/1/2021)     • colesevelam (WELCHOL) 625 MG Tab Take 625 mg by mouth every day. (Patient not taking: Reported on 11/1/2021)       No facility-administered encounter medications on file as of 11/1/2021.     ROS           Objective     /80 (BP Location: Left arm, Patient Position: Sitting, BP Cuff Size: Adult)   Pulse 74   Resp 16   Ht 1.829 m (6')   Wt 102 kg (225 lb)   SpO2 94%   BMI 30.52 kg/m²     Physical Exam  Constitutional:       Appearance: He is well-developed.   HENT:      Head: Normocephalic and atraumatic.   Cardiovascular:      Rate and Rhythm: Normal rate and regular rhythm.      Heart sounds: No murmur heard.   No friction rub. No gallop.    Pulmonary:      Effort: Pulmonary effort is normal.      Breath sounds: Normal breath sounds.   Abdominal:      Palpations: Abdomen is soft.   Musculoskeletal:         General: Normal range of motion.      Cervical back: Normal range of motion and neck supple.      Right lower leg: Edema present.      Left lower leg: Edema present.   Skin:     General: Skin is warm and dry.   Neurological:      Mental Status: He is alert and oriented to person, place, and time.   Psychiatric:         Behavior: Behavior normal.         Thought Content: Thought content normal.         Judgment: Judgment normal.                Assessment & Plan     1. RBBB     2. Paroxysmal ventricular tachycardia (HCC)     3. Automatic implantable cardioverter-defibrillator in situ     4. Alcohol abuse     5. Anticoagulant long-term use     6. Atrial fibrillation, unspecified type (HCC)     7. Sinoatrial node dysfunction (HCC)     8. Essential hypertension     9. Mixed hyperlipidemia  Comp Metabolic Panel    Lipid Profile       Medical Decision Making: Today's Assessment/Status/Plan:   1.  Lower extremity edema.  Venous insufficiency.   Written for Lasix and potassium.  Avoid too much salt and water. On Norvasc.  2.  Status post AICD.  Stable.  Minimal ventricular pacing.  3.  Hyperlipidemia intolerant to statins.  Restart WelChol.  Recheck labs in 3 months.  4.  Anticoagulation continue Coumadin.  5.  Follow-up in 6 months.                    none

## 2022-10-07 ENCOUNTER — TELEMEDICINE (OUTPATIENT)
Dept: CARDIOLOGY | Facility: MEDICAL CENTER | Age: 72
End: 2022-10-07
Payer: MEDICARE

## 2022-10-07 VITALS
RESPIRATION RATE: 17 BRPM | BODY MASS INDEX: 29.53 KG/M2 | HEIGHT: 72 IN | HEART RATE: 69 BPM | TEMPERATURE: 98.4 F | DIASTOLIC BLOOD PRESSURE: 80 MMHG | OXYGEN SATURATION: 92 % | SYSTOLIC BLOOD PRESSURE: 121 MMHG | WEIGHT: 218 LBS

## 2022-10-07 DIAGNOSIS — I48.0 PAROXYSMAL ATRIAL FIBRILLATION (HCC): ICD-10-CM

## 2022-10-07 DIAGNOSIS — Z78.9 STATIN INTOLERANCE: ICD-10-CM

## 2022-10-07 DIAGNOSIS — Z95.0 CARDIAC PACEMAKER IN SITU: ICD-10-CM

## 2022-10-07 DIAGNOSIS — I44.2 COMPLETE HEART BLOCK (HCC): ICD-10-CM

## 2022-10-07 DIAGNOSIS — I50.30 ACC/AHA STAGE C HEART FAILURE WITH PRESERVED EJECTION FRACTION (HCC): ICD-10-CM

## 2022-10-07 DIAGNOSIS — E78.5 DYSLIPIDEMIA: ICD-10-CM

## 2022-10-07 DIAGNOSIS — I50.9 CHF (NYHA CLASS III, ACC/AHA STAGE C) (HCC): ICD-10-CM

## 2022-10-07 DIAGNOSIS — Z79.899 HIGH RISK MEDICATION USE: ICD-10-CM

## 2022-10-07 PROCEDURE — 99213 OFFICE O/P EST LOW 20 MIN: CPT | Mod: 95 | Performed by: NURSE PRACTITIONER

## 2022-10-07 RX ORDER — VALSARTAN 80 MG/1
80 TABLET ORAL 2 TIMES DAILY
Qty: 180 TABLET | Refills: 3 | Status: ON HOLD | OUTPATIENT
Start: 2022-10-07 | End: 2023-07-24

## 2022-10-07 ASSESSMENT — ENCOUNTER SYMPTOMS
BRUISES/BLEEDS EASILY: 0
PALPITATIONS: 0
LOSS OF CONSCIOUSNESS: 0
BLURRED VISION: 0
ABDOMINAL PAIN: 0
NAUSEA: 0
SHORTNESS OF BREATH: 0
WEIGHT LOSS: 0
COUGH: 0
ORTHOPNEA: 0
VOMITING: 0
FEVER: 0
DIZZINESS: 0
TINGLING: 0

## 2022-10-07 ASSESSMENT — FIBROSIS 4 INDEX: FIB4 SCORE: 2.73

## 2022-10-07 NOTE — PROGRESS NOTES
Chief Complaint   Patient presents with    Atrial Fibrillation     F/V DX: Paroxysmal atrial fibrillation (HCC)     Congestive Heart Failure     F/V DX: Heart failure with preserved ejection fraction, unspecified HF chronicity (HCC)     This evaluation was conducted via Zoom using secure and encrypted videoconferencing technology. The patient was in their home in the [facility].    The patient's identity was confirmed and verbal consent was obtained for this virtual visit.    Subjective     Mike Ferrell is a 72 y.o. male who presents today heart failure with preserved EF, hypertension, A. fib on Eliquis, dyslipidemia, complete heart block s/p PPM, VT T s/p AICD follow-up after recent hospitalization on 7/5/2022.  Patient was evaluated by Dr. Tillman for KIM with Enterococcus bacteremia with possible septic hip.  No vegetation identified, no valvular disease.  Patient was last seen by myself on 5/25/2022.  Patient was also last seen by Dr. Mariee on 6/8/2022.    Today, patient completed this evaluation in the supine position.  Patient reports he has not tolerated Entresto due to dizziness/lightheadedness associated with low blood pressure.  Patient reports significant chronic back pain and is being evaluated by neurosurgery for palliative measures.  Otherwise, Patient denies chest pain, shortness of breath, palpitations, orthopnea, PND or Edema.  Unable to evaluate volume status due to virtual nature of this visit.  Patient reports his weight at home is down to 213 pounds.  Patient notes he is fluid overloaded at 231 pounds.  We will transition patient to valsartan and add Jardiance to his GDMT.  We will obtain blood work in 2 weeks to evaluate renal electrolyte function for high risk medication use.      Past Medical History:   Diagnosis Date    AICD (automatic cardioverter/defibrillator) present     Arthritis     Osteo- shoulders, hips, knees, ankles, generalized everywhere    Atrial fibrillation (HCC)  "04/04/2012    Bowel habit changes     constipation    Breath shortness     \"since 1996 from [place]\"    Cataract 12/15/2021    Dec 2021/Jan 2022 bilat surgery    Chronic back pain     Congestive heart failure (HCC)     COVID-19 09/2021    Dental disorder     missing teeth x3    Dependence on supplemental oxygen     2 liters @ HS    Heart burn     occasional food related    High cholesterol     Hypertension     Methamphetamine use (HCC) none for many years    25 years ago    Pain 12/15/2021    back, right hip and knee, 8/10    Paroxysmal ventricular tachycardia 04/04/2012    Pneumonia 2000    in the past    Sinoatrial node dysfunction (HCC) 04/04/2012    Dr. Daniel Ferrera    Snoring     Sleep Study June 2022, awaiting results    Syncope and collapse 04/04/2012     Past Surgical History:   Procedure Laterality Date    AICD IMPLANT  2017    AICD IMPLANT  01/01/2010    CHOLECYSTECTOMY  01/01/1999    TONSILLECTOMY  01/01/1953    CATARACT EXTRACTION WITH IOL       History reviewed. No pertinent family history.  Social History     Socioeconomic History    Marital status:      Spouse name: Not on file    Number of children: Not on file    Years of education: Not on file    Highest education level: Not on file   Occupational History    Not on file   Tobacco Use    Smoking status: Never    Smokeless tobacco: Never   Vaping Use    Vaping Use: Never used   Substance and Sexual Activity    Alcohol use: Yes     Alcohol/week: 9.0 oz     Types: 15 Standard drinks or equivalent per week     Comment: 2-3 per day    Drug use: Not Currently     Comment: 25 years ago    Sexual activity: Not on file   Other Topics Concern    Not on file   Social History Narrative    Not on file     Social Determinants of Health     Financial Resource Strain: Low Risk     Difficulty of Paying Living Expenses: Not very hard   Food Insecurity: No Food Insecurity    Worried About Running Out of Food in the Last Year: Never true    Ran Out of Food " in the Last Year: Never true   Transportation Needs: No Transportation Needs    Lack of Transportation (Medical): No    Lack of Transportation (Non-Medical): No   Physical Activity: Inactive    Days of Exercise per Week: 0 days    Minutes of Exercise per Session: 0 min   Stress: No Stress Concern Present    Feeling of Stress : Not at all   Social Connections: Moderately Isolated    Frequency of Communication with Friends and Family: More than three times a week    Frequency of Social Gatherings with Friends and Family: More than three times a week    Attends Jew Services: Never    Active Member of Clubs or Organizations: No    Attends Club or Organization Meetings: Never    Marital Status:    Intimate Partner Violence: Not At Risk    Fear of Current or Ex-Partner: No    Emotionally Abused: No    Physically Abused: No    Sexually Abused: No   Housing Stability: Low Risk     Unable to Pay for Housing in the Last Year: No    Number of Places Lived in the Last Year: 1    Unstable Housing in the Last Year: No     Allergies   Allergen Reactions    Oxycodone Hcl      HALLUCINATIONS    Atorvastatin Rash, Itching and Myalgia          Statins [Hmg-Coa-R Inhibitors] Rash and Itching     Skin gets red & itchy    Sulfa Drugs Rash and Itching     Rash, itch     Outpatient Encounter Medications as of 10/7/2022   Medication Sig Dispense Refill    valsartan (DIOVAN) 80 MG Tab Take 1 Tablet by mouth 2 times a day. 180 Tablet 3    Empagliflozin 10 MG Tab Take 1 Tablet by mouth every day. 90 Tablet 3    [DISCONTINUED] gabapentin (NEURONTIN) 100 MG Cap Take 2 Capsules by mouth 2 (two) times a day for 30 days. Take 2 capsules every morning for three days, then increase to taking 2 capsules every morning and every night.  Discontinue for unsafe side-effects 60 Capsule 3    Cholecalciferol (VITAMIN D) 125 MCG (5000 UT) Cap Take 1 Capsule by mouth every day.      HYDROcodone/acetaminophen (NORCO)  MG Tab Take 1 Tablet by  mouth 2 times a day. Indications: Pain      colchicine (COLCRYS) 0.6 MG Tab Take 1 Tablet by mouth as needed. Indications: Gout      colesevelam (WELCHOL) 625 MG Tab Take 1 Tablet by mouth every day. Indications: High Amount of Fats in the Blood      [DISCONTINUED] torsemide (DEMADEX) 20 MG Tab Take 20 mg by mouth 1 time a day as needed (for weight gain and swelling).      [DISCONTINUED] potassium Chloride ER (K-TAB) 20 MEQ Tab CR tablet Take 20 mEq by mouth 1 time a day as needed (if taking Torsemide).      sotalol (BETAPACE) 120 MG tablet Take 1 Tablet by mouth every morning. 60 Tablet 3    acetaminophen (TYLENOL) 500 MG Tab Take 1 Tablet by mouth every 6 hours as needed for Moderate Pain. (Patient taking differently: Take 2 Tablets by mouth every 6 hours as needed for Moderate Pain.) 30 Tablet 0    cyanocobalamin (VITAMIN B12) 1000 MCG Tab Take 2 Tablets by mouth every day. (Patient taking differently: Take 5 Tablets by mouth every day. SUBLINGUAL LIQUID) 60 Tablet 3    apixaban (ELIQUIS) 5mg Tab Take 1 Tablet by mouth 2 times a day.      [DISCONTINUED] valsartan (DIOVAN) 80 MG Tab Take 80 mg by mouth every day.      [DISCONTINUED] sacubitril-valsartan (ENTRESTO) 49-51 MG Tab Take 0.5 Tablets by mouth 2 times a day. 60 Tablet 11    [DISCONTINUED] Home Care Oxygen Inhale 2 L/min as needed for Shortness of Breath (PRN SOB and at night). Oxygen dose range: 2 L/min  Respiratory route via: Nasal Cannula   Oxygen supplier: none- received concentrator from son       No facility-administered encounter medications on file as of 10/7/2022.     Review of Systems   Constitutional:  Positive for malaise/fatigue. Negative for fever and weight loss.   Eyes:  Negative for blurred vision.   Respiratory:  Negative for cough and shortness of breath.    Cardiovascular:  Negative for chest pain, palpitations, orthopnea and leg swelling.   Gastrointestinal:  Negative for abdominal pain, nausea and vomiting.   Musculoskeletal:   Positive for back pain.   Neurological:  Negative for dizziness, tingling and loss of consciousness.   Endo/Heme/Allergies:  Does not bruise/bleed easily.            Objective     /80 (BP Location: Left arm, Patient Position: Sitting, BP Cuff Size: Adult)   Pulse 69   Temp 36.9 °C (98.4 °F)   Resp 17   Ht 1.829 m (6')   Wt 98.9 kg (218 lb)   SpO2 92%   BMI 29.57 kg/m²     Physical Exam  Physical Exam:  Constitutional: Alert, no distress, well-groomed.  Supine position at rest  Skin: No rashes in visible areas.  Eye: Round. Conjunctiva clear, lids normal. No icterus.   ENMT: Lips pink without lesions, good dentition, moist mucous membranes. Phonation normal.  Neck: No masses, no thyromegaly. Moves freely without pain.  CV: Pulse as reported by patient  Respiratory: Unlabored respiratory effort, no cough or audible wheeze  Psych: Alert and oriented x3, normal affect and mood.        Lab Results   Component Value Date/Time    CHOLSTRLTOT 320 (H) 05/10/2022 07:36 AM     (H) 05/10/2022 07:36 AM    HDL 76 05/10/2022 07:36 AM    TRIGLYCERIDE 167 (H) 05/10/2022 07:36 AM       Lab Results   Component Value Date/Time    SODIUM 141 09/03/2022 06:57 AM    POTASSIUM 4.0 09/03/2022 06:57 AM    CHLORIDE 107 09/03/2022 06:57 AM    CO2 25 09/03/2022 06:57 AM    GLUCOSE 89 09/03/2022 06:57 AM    BUN 13 09/03/2022 06:57 AM    CREATININE 0.77 09/03/2022 06:57 AM    BUNCREATRAT 25 (H) 05/30/2014 09:04 AM     Lab Results   Component Value Date/Time    ALKPHOSPHAT 126 (H) 09/03/2022 06:57 AM    ASTSGOT 20 09/03/2022 06:57 AM    ALTSGPT 7 09/03/2022 06:57 AM    TBILIRUBIN 0.4 09/03/2022 06:57 AM        Echo dated 2/21/2022:   CONCLUSIONS  Compared to the prior echo on 11/08/19, no significant changes  The left ventricular ejection fraction is visually estimated to be 60%.  Mild mitral regurgitation.  Estimated right ventricular systolic pressure is 30 mmHg.  Grade 1 diastolic dysfunction (my read)  Assessment & Plan      1. ACC/AHA stage C heart failure with preserved ejection fraction (HCC)  Basic Metabolic Panel      2. High risk medication use  Basic Metabolic Panel      3. Paroxysmal atrial fibrillation (HCC)  Basic Metabolic Panel      4. CHF (NYHA class III, ACC/AHA stage C) (HCC)  Basic Metabolic Panel      5. Statin intolerance  Basic Metabolic Panel      6. Dyslipidemia  Basic Metabolic Panel      7. Cardiac pacemaker in situ  Basic Metabolic Panel      8. Complete heart block (HCC)  Basic Metabolic Panel          Medical Decision Making: Today's Assessment/Status/Plan:        HFpEF, Stage C, Class III, LVEF 60%:  -Discussed Heart failure trajectory and prognosis with patient. Will continue to optimize medical therapy as tolerated. Advanced HF treatment, need for remote monitoring consideration at every visit.  -ACE-I/ARB/ARNI: Poor tolerance to Entresto.  Transition to valsartan 80 mg twice daily  -Aldosterone Antagonist: Consider once tolerating valsartan  -SGLT2-I: Add Jardiance 10 mg daily  -Diuretic: Torsemide 20 mg as needed  -Labs: Will continue to closely monitor for side effects of patient's high risk medication(s) including renal function, NTproBNP, and electrolytes as needed  -Close monitoring discussed with patient. Lab work ordered with BMP in 2 weeks  -Reinforced s/sx of worsening heart failure with patient and weight monitoring. Pt verbalizes understanding. Pt to call office if present.  -Heart Failure Education: pt to be contacted by HF nurse for further education.  In the meantime, during visit today we discussed indications and use for each medication, importance of treatment adherence, definition of heart failure and potential etiologies for current diagnosis.  -Pharmacotherapy referral placed  -Compliance Barriers complex comorbidities  -Genetic testing consideration consider in the future    Atrial fibrillation  Complete heart block status post pacemaker  [id number] 3 (hypertension, age,?   CHF)  -Continue Eliquis  -Continue sotalol  -Continue follow-up with device clinic     Paroxysmal ventricular tachycardia  Status post ICD  -Continue sotalol  -Continue follow-up with device clinic     Dyslipidemia  Statin intolerance  -Follow-up in lipid clinic.  Referral placed for pharmacotherapy clinic     Hypertension  -BP well controlled this visit  -Medications per above    FU in clinic in 3 months with Dr. Mariee. Sooner if needed.    Patient verbalizes understanding and agrees with the plan of care.       MINI Castellanos.   [facility]  [phone]    PLEASE NOTE: This Note was created using voice recognition Software. I have made every reasonable attempt to correct obvious errors, but I expect that there are errors of grammar and possibly content that I did not discover before finalizing the note

## 2022-10-10 RX ORDER — POTASSIUM CHLORIDE 20 MEQ/1
20 TABLET, EXTENDED RELEASE ORAL
Qty: 90 TABLET | Refills: 0 | Status: ON HOLD | OUTPATIENT
Start: 2022-10-10 | End: 2023-07-24

## 2022-10-14 ENCOUNTER — ANTICOAGULATION VISIT (OUTPATIENT)
Dept: VASCULAR LAB | Facility: MEDICAL CENTER | Age: 72
End: 2022-10-14
Attending: NURSE PRACTITIONER
Payer: MEDICARE

## 2022-10-14 DIAGNOSIS — D68.69 SECONDARY HYPERCOAGULABLE STATE (HCC): ICD-10-CM

## 2022-10-14 PROCEDURE — 99212 OFFICE O/P EST SF 10 MIN: CPT

## 2022-10-14 NOTE — PROGRESS NOTES
Target end date:Indefinite     Indication: Atrial Fibrillation     Drug: Eliquis 5 mg BID     CHADsVASC = 3    Health Status Since Last Assessment   Patient recently had a visit to the ED for hallucinations at the beginning of september, some confusion about his medications that are being started. Patient states he is feeling unwell and some confusion about the anticoagulation appt that is being scheduled.    Patient denies any embolic events (stroke/tia/systemic embolism)    Adherence with DOAC Therapy   Pt has not missed any doses in the average week    Bleeding Risk Assessment      Epistaxis   Pt denies any excessive or unusual bleeding/hematomas.  Pt denies any GI bleeds or hematemesis.  Pt denies any concerning daily headache or sub dural hematoma symptoms.     Pt denies any hematuria    Latest Hemoglobin 11.6   ETOH overuse previous history of ETOH abuse      Creatinine Clearance/Renal Function     Latest ClCr ~100 ml/min    Hepatic function   Latest LFTs WNL   Pt denies any history of liver dysfunction      Drug Interactions   Platelets: 205   ASA/other antiplatelets none   NSAID none   Other drug interactions    Verified no anticonvulsant or azole therapy, education provided for future use.     Examination   Blood Pressure 121/80 at last visit   Symptomatic hypotension no   Significant gait impairment/imbalance/high risk for falls? Patient with spinal injury, in wheel chair. Gait impaired, Moderate risk for falls. Educated patient to be cautious when ambulating.    Final Assessment and Recommendations:   Patient appears stable from the anticoagulation standpoint.     Benefits of continued DOAC therapy outweigh risks for this patient   Recommend pt continue with current DOAC therapy    DOAC is affordable, covered by Medicaid ~ $10     Other Actions: cmp/ cbc hemogram ordered prior to next visit    Follow up:   Will follow up with patient 6 months.        Ирина Ferrera, CeceliaD

## 2022-10-19 ENCOUNTER — TELEPHONE (OUTPATIENT)
Dept: CARDIOLOGY | Facility: MEDICAL CENTER | Age: 72
End: 2022-10-19
Payer: MEDICARE

## 2022-10-20 RX ORDER — GABAPENTIN 100 MG/1
200 CAPSULE ORAL 2 TIMES DAILY
Qty: 120 CAPSULE | Refills: 11 | Status: SHIPPED | OUTPATIENT
Start: 2022-10-20 | End: 2022-10-21

## 2022-10-20 NOTE — TELEPHONE ENCOUNTER
Letter drafted with MD recommendations, electronically sent to pt via [employer], and routed to , , completed status see below.

## 2022-10-20 NOTE — PROGRESS NOTES
Discussed gabapentin with spouse, Karen. She has been giving one tablet twice daily but will start giving the prescribed dosage. This dose may increase during our visit next week according to patient's response.

## 2022-10-20 NOTE — TELEPHONE ENCOUNTER
----- Message from Tiffanie Mariee M.D. sent at 10/19/2022  4:43 PM PDT -----  Regarding: RE: ELIQUIS CLEARANCE    The patient is a moderate to high risk patient for an intermediate risk procedure. No further cardiac workup is necessary prior to procedure.    OK to hold Eliquis for 48 hours prior.     Temi -- could you send clearance letter and let the patient know?     Thank you.   -    ----- Message -----  From: Kirsty Servin  Sent: 10/19/2022   4:20 PM PDT  To: Tiffanie Mariee M.D.  Subject: ELIQUIS CLEARANCE                                YOUR PT HAS AN APPT 10/28/22 FOR A MYELOGRAM AND I NEED A CLEARANCE FOR HIM TO HOLD HIS ELIQUIS, COULD YOU PLEASE PUT THIS CLEARANCE IN HIS CHART AND HAVE SOMEONE CLINICAL CALL HIM WITH INSTRUCTIONS.    THANK YOU, KIRSTY- INTERVENTIONAL RADIOLOGY SCHEDULER

## 2022-10-21 ENCOUNTER — TELEPHONE (OUTPATIENT)
Dept: CARDIOLOGY | Facility: MEDICAL CENTER | Age: 72
End: 2022-10-21
Payer: MEDICARE

## 2022-10-21 ENCOUNTER — PATIENT MESSAGE (OUTPATIENT)
Dept: CARDIOLOGY | Facility: MEDICAL CENTER | Age: 72
End: 2022-10-21
Payer: MEDICARE

## 2022-10-21 NOTE — PROGRESS NOTES
Called patient's wife regarding concern and side effects of Jardiance.  Wife also reports patient initiated gabapentin this week and was told to stop by palliative care APRN.    Wife reports patient is confused, lethargic, extremely weak.  Patient resistant to seek evaluation at urgent care or ER per his wife.    Wife reports patient is too weak to walk to the car to drive to ER or urgent care.  Wife reports that started 2 days ago.  We discussed due to significant change he should be evaluated by a healthcare provider.  Provided information regarding dispatch health.  We discussed low threshold to call 911 and have paramedics take patient to ER for further evaluation.    Wife verbalizes understanding and agreeable plan of care.

## 2022-10-21 NOTE — TELEPHONE ENCOUNTER
Caller: - Kim (spouse)    Topic/issue: Kim would like a call back, to discuss the side effects for the Jardiane.  She is requesting a call, not a MYChart message, as she is very concerned.  .  Callback Number: - [phone]    Thank you,   Shyann KENDALL

## 2022-10-21 NOTE — PROGRESS NOTES
Spoke with patient's spouse Karen. Patient became overly lethargic when taking gabapentin 200mg this AM. He was unable to get out of the bed and had slurred speech. She was able to get him up to eat some crackers and he felt better. Blood sugar was 116. Advised for Karen to call Home Health at this time. Gabapentin will be discontinued.

## 2022-10-21 NOTE — TELEPHONE ENCOUNTER
"KAITLYNN    Caller: Kim (Spouse)    Topic/issue: Pt's spouse called and wanted to speak to a nurse about his just prescribed script for Jardiance (didn't see as a current med). She states that on 10- KAITLYNN prescribed this med to him and on the instructions it states \"do not take if pt has heart failure.\" Please call back.    Callback Number: [phone]    "

## 2022-10-24 ENCOUNTER — TELEPHONE (OUTPATIENT)
Dept: CARDIOLOGY | Facility: MEDICAL CENTER | Age: 72
End: 2022-10-24

## 2022-10-24 ENCOUNTER — PRE-ADMISSION TESTING (OUTPATIENT)
Dept: ADMISSIONS | Facility: MEDICAL CENTER | Age: 72
End: 2022-10-24
Attending: NEUROLOGICAL SURGERY
Payer: MEDICARE

## 2022-10-24 NOTE — PREPROCEDURE INSTRUCTIONS
Pre admit phone call complete with wife DENIS. She verbalized understanding to hold Eliquis for 2 days prior to procedure.

## 2022-11-02 ENCOUNTER — APPOINTMENT (OUTPATIENT)
Dept: RADIOLOGY | Facility: MEDICAL CENTER | Age: 72
End: 2022-11-02
Attending: NEUROLOGICAL SURGERY
Payer: MEDICARE

## 2022-11-02 ENCOUNTER — HOSPITAL ENCOUNTER (OUTPATIENT)
Facility: MEDICAL CENTER | Age: 72
End: 2022-11-02
Attending: NEUROLOGICAL SURGERY | Admitting: NEUROLOGICAL SURGERY
Payer: MEDICARE

## 2022-11-02 VITALS
TEMPERATURE: 97 F | BODY MASS INDEX: 31.14 KG/M2 | WEIGHT: 229.94 LBS | HEART RATE: 69 BPM | OXYGEN SATURATION: 92 % | DIASTOLIC BLOOD PRESSURE: 75 MMHG | RESPIRATION RATE: 16 BRPM | HEIGHT: 72 IN | SYSTOLIC BLOOD PRESSURE: 134 MMHG

## 2022-11-02 DIAGNOSIS — M54.16 LUMBAR RADICULOPATHY: ICD-10-CM

## 2022-11-02 LAB
ERYTHROCYTE [DISTWIDTH] IN BLOOD BY AUTOMATED COUNT: 55.9 FL (ref 35.9–50)
HCT VFR BLD AUTO: 36.2 % (ref 42–52)
HGB BLD-MCNC: 11.6 G/DL (ref 14–18)
INR PPP: 1.13 (ref 0.87–1.13)
MCH RBC QN AUTO: 32.3 PG (ref 27–33)
MCHC RBC AUTO-ENTMCNC: 32 G/DL (ref 33.7–35.3)
MCV RBC AUTO: 100.8 FL (ref 81.4–97.8)
PLATELET # BLD AUTO: 269 K/UL (ref 164–446)
PMV BLD AUTO: 9.5 FL (ref 9–12.9)
PROTHROMBIN TIME: 14.4 SEC (ref 12–14.6)
RBC # BLD AUTO: 3.59 M/UL (ref 4.7–6.1)
WBC # BLD AUTO: 11.5 K/UL (ref 4.8–10.8)

## 2022-11-02 PROCEDURE — 160002 HCHG RECOVERY MINUTES (STAT)

## 2022-11-02 PROCEDURE — 62284 INJECTION FOR MYELOGRAM: CPT

## 2022-11-02 PROCEDURE — 700117 HCHG RX CONTRAST REV CODE 255: Performed by: RADIOLOGY

## 2022-11-02 PROCEDURE — 36415 COLL VENOUS BLD VENIPUNCTURE: CPT

## 2022-11-02 PROCEDURE — 160046 HCHG PACU - 1ST 60 MINS PHASE II

## 2022-11-02 PROCEDURE — 160047 HCHG PACU  - EA ADDL 30 MINS PHASE II

## 2022-11-02 PROCEDURE — 72132 CT LUMBAR SPINE W/DYE: CPT

## 2022-11-02 PROCEDURE — 85027 COMPLETE CBC AUTOMATED: CPT

## 2022-11-02 PROCEDURE — 85610 PROTHROMBIN TIME: CPT

## 2022-11-02 RX ORDER — EMPAGLIFLOZIN 10 MG/1
TABLET, FILM COATED ORAL
COMMUNITY
End: 2022-11-15

## 2022-11-02 RX ADMIN — IOHEXOL 10 ML: 180 INJECTION INTRAVENOUS at 11:00

## 2022-11-02 ASSESSMENT — FIBROSIS 4 INDEX: FIB4 SCORE: 2.73

## 2022-11-02 NOTE — OR NURSING
Assume care for pt in pre-op. Patient allergies and NPO status verified. Belongings secured. Patient verbalizes understanding of pain scale, expected course of stay and plan of care. Surgical site verified with patient. Labs drawn for cbc and pt/inr, waiting for results. Pt had no Covid symptoms or exposure in the past 21 days, no covid test needed at this time. Call light within reach. No further needs at this time. Hourly rounding in place.

## 2022-11-02 NOTE — OR SURGEON
Immediate Post- Operative Note        Findings: back pain      Procedure(s): LP myelogram      Estimated Blood Loss: Less than 5 ml        Complications: None            11/2/2022     11:27 AM     Aiden Boss M.D.

## 2022-11-02 NOTE — DISCHARGE INSTRUCTIONS
HOME CARE INSTRUCTIONS    ACTIVITY: Rest and take it easy for the first 24 hours.  A responsible adult is recommended to remain with you during that time.  It is normal to feel sleepy.  We encourage you to not do anything that requires balance, judgment or coordination.    FOR 24 HOURS DO NOT:  Drive, operate machinery or run household appliances.  Drink beer or alcoholic beverages.  Make important decisions or sign legal documents.    SPECIAL INSTRUCTIONS: Lumbar Puncture, Care After  This sheet gives you information about how to care for yourself after your procedure. Your health care provider may also give you more specific instructions. If you have problems or questions, contact your health care provider.  What can I expect after the procedure?  After the procedure, it is common to have:  Mild discomfort or pain at the puncture site.  A mild headache that is relieved with pain medicines.  Follow these instructions at home:  Activity  Lie down flat or rest for as long as directed by your health care provider.  Return to your normal activities as told by your health care provider. Ask your health care provider what activities are safe for you.  Avoid lifting anything heavier than 10 lb (4.5 kg) for at least 12 hours after the procedure.  Do not drive for 24 hours if you were given a medicine to help you relax (sedative) during your procedure.  Do not drive or use heavy machinery while taking prescription pain medicine.  Puncture site care  Remove or change your bandage (dressing) as told by your health care provider.  Check your puncture area every day for signs of infection. Check for:  More pain.  Redness or swelling.  Fluid or blood leaking from the puncture site.  Warmth.  Pus or a bad smell.  General instructions  Take over-the-counter and prescription medicines only as told by your health care provider.  Drink enough fluids to keep your urine clear or pale yellow. Your health care provider may recommend  drinking caffeine to prevent a headache.  Keep all follow-up visits as told by your health care provider. This is important.  Contact a health care provider if:  You have fever or chills.  You have nausea or vomiting.  You have a headache that lasts for more than 2 days or does not get better with medicine.  Get help right away if:  You develop any of the following in your legs:  Weakness.  Numbness.  Tingling.  You are unable to control when you urinate or have a bowel movement (incontinence).  You have signs of infection around your puncture site, such as:  More pain.  Redness or swelling.  Fluid or blood leakage.  Warmth.  Pus or a bad smell.  You are dizzy or you feel like you might faint.  You have a severe headache, especially when you sit or stand.  Summary  A lumbar puncture is a procedure in which a small needle is inserted into the lower back to remove fluid that surrounds the brain and spinal cord.  After this procedure, it is common to have a headache and pain around the needle insertion area.  Lying flat, staying hydrated, and drinking caffeine can help prevent headaches.  Monitor your needle insertion site for signs of infection, including warmth, fluid, or more pain.  Get help right away if you develop leg weakness, leg numbness, incontinence, or severe headaches.  This information is not intended to replace advice given to you by your health care provider. Make sure you discuss any questions you have with your health care provider.  Document Released: 12/23/2014 Document Revised: 01/31/2018 Document Reviewed: 01/31/2018  [employer] Patient Education © 2020 [employer]      DIET: To avoid nausea, slowly advance diet as tolerated, avoiding spicy or greasy foods for the first day.  Add more substantial food to your diet according to your physician's instructions.  Babies can be fed formula or breast milk as soon as they are hungry.  INCREASE FLUIDS AND FIBER TO AVOID CONSTIPATION.    SURGICAL  DRESSING/BATHING: may remove dressing in 24 hours and then shower    MEDICATIONS: Resume taking daily medication.  Take prescribed pain medication with food.  If no medication is prescribed, you may take non-aspirin pain medication if needed.  PAIN MEDICATION CAN BE VERY CONSTIPATING.  Take a stool softener or laxative such as senokot, pericolace, or milk of magnesia if needed.    A follow-up appointment should be arranged with your doctor; call to schedule.    You should CALL YOUR PHYSICIAN if you develop:  Fever greater than 101 degrees F.  Pain not relieved by medication, or persistent nausea or vomiting.  Excessive bleeding (blood soaking through dressing) or unexpected drainage from the wound.  Extreme redness or swelling around the incision site, drainage of pus or foul smelling drainage.  Inability to urinate or empty your bladder within 8 hours.  Problems with breathing or chest pain.    You should call 911 if you develop problems with breathing or chest pain.  If you are unable to contact your doctor or surgical center, you should go to the nearest emergency room or urgent care center.  Physician's telephone #: [phone] (Dr. Boss)    MILD FLU-LIKE SYMPTOMS ARE NORMAL.  YOU MAY EXPERIENCE GENERALIZED MUSCLE ACHES, THROAT IRRITATION, HEADACHE AND/OR SOME NAUSEA.    If any questions arise, call your doctor.  If your doctor is not available, please feel free to call the Surgical Center at [phone].  The Center is open Monday through Friday from 7AM to 7PM.      A registered nurse may call you a few days after your surgery to see how you are doing after your procedure.    You may also receive a survey in the mail within the next two weeks and we ask that you take a few moments to complete the survey and return it to us.  Our goal is to provide you with very good care and we value your comments.     Depression / Suicide Risk    As you are discharged from this [facility] facility, it is important to  learn how to keep safe from harming yourself.    Recognize the warning signs:  Abrupt changes in personality, positive or negative- including increase in energy   Giving away possessions  Change in eating patterns- significant weight changes-  positive or negative  Change in sleeping patterns- unable to sleep or sleeping all the time   Unwillingness or inability to communicate  Depression  Unusual sadness, discouragement and loneliness  Talk of wanting to die  Neglect of personal appearance   Rebelliousness- reckless behavior  Withdrawal from people/activities they love  Confusion- inability to concentrate     If you or a loved one observes any of these behaviors or has concerns about self-harm, here's what you can do:  Talk about it- your feelings and reasons for harming yourself  Remove any means that you might use to hurt yourself (examples: pills, rope, extension cords, firearm)  Get professional help from the community (Mental Health, Substance Abuse, psychological counseling)  Do not be alone:Call your Safe Contact- someone whom you trust who will be there for you.  Call your local CRISIS HOTLINE 167-8147 or 519-264-3444  Call your local Children's Mobile Crisis Response Team [place] (488) 857-0003 or www.[employer]  Call the toll free National Suicide Prevention Hotlines   National Suicide Prevention Lifeline 549-889-AERV (2692)  National Hope Line Network 800-SUICIDE (178-1579)    I acknowledge receipt and understanding of these Home Care instructions.

## 2022-11-03 ENCOUNTER — APPOINTMENT (OUTPATIENT)
Dept: PALLIATIVE MEDICINE | Facility: HOSPICE | Age: 72
End: 2022-11-03
Payer: MEDICARE

## 2022-11-03 RX ORDER — AMOXICILLIN 250 MG
1-2 CAPSULE ORAL DAILY
Qty: 60 TABLET | Refills: 11 | Status: SHIPPED | OUTPATIENT
Start: 2022-11-03 | End: 2022-12-03

## 2022-11-03 NOTE — PROGRESS NOTES
Patient having constipation, Bms every 3-5 days. Taking MOM PRN. Requesting daily dose for regularity. Senna-S ordered at this time.

## 2022-11-04 ENCOUNTER — HOSPITAL ENCOUNTER (OUTPATIENT)
Dept: RADIOLOGY | Facility: MEDICAL CENTER | Age: 72
End: 2022-11-04
Attending: NURSE PRACTITIONER
Payer: MEDICARE

## 2022-11-04 DIAGNOSIS — M54.16 LUMBAR RADICULOPATHY: ICD-10-CM

## 2022-11-04 PROCEDURE — 72110 X-RAY EXAM L-2 SPINE 4/>VWS: CPT

## 2022-11-04 NOTE — PROGRESS NOTES
Mike Ferrell  72 year old Male  MRN [id number]  Specialists: TYLOR Frazier  Location: Patient's Home    Reason for palliative medicine consultation and/or visit: Advanced Directives, POLST    Assessment and Plan:  Primary diagnosis: Heart Failure with Preserved EF    Prognosis: >1 year, PPS 50%    Physical aspects of care:  Pain location, severity, quality, radiation, exacerbating/relieving factors, what has worked in the past, what is currently being prescribed?  Other symptom assessment: Dyspnea, Nausea, Vomiting, Fatigue, Decreased Appetite,   New orders/recommendations    Pain is chronic: lower back, right hip, right thigh from knee to hip 10/10  Taking IBU 800mg 3 x weekly and Norco 2-3x daily - managed by PCP and Dr. Solorio  Appetite is poor, he is eating ~25-50%   Chronic Constipation - MOM 2 caps every 4-5 days. New order for Senna-S 1-2 PO at night PRN   Incontinent of B&B at times    Psychological aspects of care:  Denies depression, anxiety although he presents with flat affect with minimal eye contact. He states extreme dissatisfaction and frustration with health care and [facility] in general. He declines any interventions for mental health.    Social aspects of care:  Main support system is EFRAIN Ann (YOCASTA), spouse Karen RUST is being paid by [employer]  Denies financial issues, spouse is paying the bills.      Spiritual aspects of care:  “Not a very Adventism person” “I have my beliefs and understandings.” “Reads the Bible” “Try to do decent things for people”   Hx of Catholicism, was an accolade as a child but is no longer following Pentecostalism trang.    Goals of care:  Patient would like his spouse Karen (Ameena) to speak for him. POA complete. They are not ready to seek hospice care and are not willing to have the conversation. POLST is DNR.    Advance care planning:  Includes MPOA, Living Will, POLST, family meeting, medical goals, advance directives, QOL measures including code  status  Patient has living will/POA, and POLST (DNR) completed.     The patient and/or legal decision maker has provided voluntary consent to discuss advance care planning. We discussed POLST/POA. The following documents were completed: POLST (Living will previously completed). Total time spent in ACP discussion 65 minutes, which is separate from the time spent completing the evaluation and management visit.     Pertinent Physical Exam Findings: (delete what is not important)  General: Obese pleasant elderly man appearing stated age. Alert to person, place, time  HEENT:      Head: Normocephalic and atraumatic.   Ears: [place]  Eyes: No scleral icterus, no discharge     Nose: No congestion or rhinorrhea.      Mouth/Throat/Pharynx: No erythema, sores  Pulm/Chest: Pulmonary effort is normal at rest, increasing dyspnea with any exertion. No wheezing or rhonchi  CVS: Denies chest pain  Abdomen: Protuberant abdomen, no distention, no tenderness  MSK: Swelling and tenderness right leg from knee to thigh, unable to bear weight    dae, which is documented above.     Current Medications:    Current Outpatient Medications:     sennosides-docusate sodium (SENOKOT-S) 8.6-50 MG tablet, Take 1-2 Tablets by mouth every day for 30 days., Disp: 60 Tablet, Rfl: 11    Empagliflozin (JARDIANCE) 10 MG Tab, Take  by mouth., Disp: , Rfl:     torsemide (DEMADEX) 20 MG Tab, TAKE 1 TABLET BY MOUTH 1 TIME A DAY AS NEEDED (FOR WEIGHT GAIN AND SWELLING)., Disp: 25 Tablet, Rfl: 6    potassium chloride SA (KDUR) 20 MEQ Tab CR, TAKE 1 TABLET BY MOUTH 1 TIME A DAY AS NEEDED (WITH TORSEMIDE)., Disp: 90 Tablet, Rfl: 0    valsartan (DIOVAN) 80 MG Tab, Take 1 Tablet by mouth 2 times a day., Disp: 180 Tablet, Rfl: 3    Empagliflozin 10 MG Tab, Take 1 Tablet by mouth every day., Disp: 90 Tablet, Rfl: 3    Cholecalciferol (VITAMIN D) 125 MCG (5000 UT) Cap, Take 1 Capsule by mouth every day., Disp: , Rfl:     HYDROcodone/acetaminophen (NORCO)  MG Tab,  Take 1 Tablet by mouth 2 times a day. Indications: Pain, Disp: , Rfl:     colchicine (COLCRYS) 0.6 MG Tab, Take 0.6 mg by mouth as needed. Indications: Gout, Disp: , Rfl:     colesevelam (WELCHOL) 625 MG Tab, Take 625 mg by mouth every day. Indications: High Amount of Fats in the Blood, Disp: , Rfl:     sotalol (BETAPACE) 120 MG tablet, Take 1 Tablet by mouth every morning., Disp: 60 Tablet, Rfl: 3    acetaminophen (TYLENOL) 500 MG Tab, Take 1 Tablet by mouth every 6 hours as needed for Moderate Pain. (Patient taking differently: Take 1,000 mg by mouth every 6 hours as needed for Moderate Pain.), Disp: 30 Tablet, Rfl: 0    cyanocobalamin (VITAMIN B12) 1000 MCG Tab, Take 2 Tablets by mouth every day. (Patient taking differently: Take 5,000 mcg by mouth every day. SUBLINGUAL LIQUID), Disp: 60 Tablet, Rfl: 3    apixaban (ELIQUIS) 5mg Tab, Take 5 mg by mouth 2 times a day., Disp: , Rfl:     Medication Allergies:  Oxycodone hcl, Atorvastatin, Statins [hmg-coa-r inhibitors], and Sulfa drugs    Thank you for allowing me the opportunity to participate in the care of Mike Ferrell    I spent a total of 90 minutes in direct face-to-face time with the patient and/or family. More than 50% of this time was spent in counseling and coordination of care. This is separate from the time spent on advance care plan    TYLOR Frazier  Hospice and Palliative Medicine  Nurse Practitioner of [facility] and In-Home Palliative Care  [address] [place]  [id number]  P: [phone]  F: [phone]

## 2022-11-11 ENCOUNTER — NON-PROVIDER VISIT (OUTPATIENT)
Dept: CARDIOLOGY | Facility: MEDICAL CENTER | Age: 72
End: 2022-11-11
Payer: MEDICARE

## 2022-11-11 PROCEDURE — 93295 DEV INTERROG REMOTE 1/2/MLT: CPT | Performed by: INTERNAL MEDICINE

## 2022-11-11 NOTE — CARDIAC REMOTE MONITOR - SCAN
Device transmission reviewed. Device demonstrated appropriate function.       Electronically Signed by: Kim Belle M.D.    11/11/2022  4:08 PM

## 2022-11-15 ENCOUNTER — OFFICE VISIT (OUTPATIENT)
Dept: CARDIOLOGY | Facility: MEDICAL CENTER | Age: 72
End: 2022-11-15
Payer: MEDICARE

## 2022-11-15 ENCOUNTER — TELEPHONE (OUTPATIENT)
Dept: CARDIOLOGY | Facility: MEDICAL CENTER | Age: 72
End: 2022-11-15

## 2022-11-15 VITALS
SYSTOLIC BLOOD PRESSURE: 112 MMHG | WEIGHT: 219 LBS | HEART RATE: 72 BPM | RESPIRATION RATE: 22 BRPM | HEIGHT: 72 IN | BODY MASS INDEX: 29.66 KG/M2 | DIASTOLIC BLOOD PRESSURE: 72 MMHG | OXYGEN SATURATION: 96 %

## 2022-11-15 DIAGNOSIS — I48.91 ATRIAL FIBRILLATION, UNSPECIFIED TYPE (HCC): ICD-10-CM

## 2022-11-15 DIAGNOSIS — I50.30 HEART FAILURE WITH PRESERVED EJECTION FRACTION, UNSPECIFIED HF CHRONICITY (HCC): ICD-10-CM

## 2022-11-15 DIAGNOSIS — Z79.01 ANTICOAGULANT LONG-TERM USE: ICD-10-CM

## 2022-11-15 DIAGNOSIS — I49.5 SINOATRIAL NODE DYSFUNCTION (HCC): ICD-10-CM

## 2022-11-15 DIAGNOSIS — I47.29 PAROXYSMAL VENTRICULAR TACHYCARDIA (HCC): ICD-10-CM

## 2022-11-15 DIAGNOSIS — Z95.810 AUTOMATIC IMPLANTABLE CARDIOVERTER-DEFIBRILLATOR IN SITU: ICD-10-CM

## 2022-11-15 DIAGNOSIS — I44.2 COMPLETE HEART BLOCK (HCC): ICD-10-CM

## 2022-11-15 DIAGNOSIS — I50.32 CHRONIC DIASTOLIC CONGESTIVE HEART FAILURE, NYHA CLASS 3 (HCC): ICD-10-CM

## 2022-11-15 DIAGNOSIS — D68.69 SECONDARY HYPERCOAGULABLE STATE (HCC): ICD-10-CM

## 2022-11-15 DIAGNOSIS — F10.10 ALCOHOL ABUSE: ICD-10-CM

## 2022-11-15 PROCEDURE — 99214 OFFICE O/P EST MOD 30 MIN: CPT | Mod: 25 | Performed by: INTERNAL MEDICINE

## 2022-11-15 PROCEDURE — 93283 PRGRMG EVAL IMPLANTABLE DFB: CPT | Performed by: INTERNAL MEDICINE

## 2022-11-15 RX ORDER — BUDESONIDE, GLYCOPYRROLATE, AND FORMOTEROL FUMARATE 160; 9; 4.8 UG/1; UG/1; UG/1
1 AEROSOL, METERED RESPIRATORY (INHALATION) DAILY
Status: ON HOLD | COMMUNITY
Start: 2022-09-28 | End: 2023-07-24

## 2022-11-15 RX ORDER — METHYLPREDNISOLONE 4 MG/1
TABLET ORAL
COMMUNITY
Start: 2022-09-14 | End: 2022-11-15

## 2022-11-15 RX ORDER — MOXIFLOXACIN 5 MG/ML
1 SOLUTION OPHTHALMIC
Status: ON HOLD | COMMUNITY
Start: 2022-10-31 | End: 2023-07-24

## 2022-11-15 RX ORDER — DULOXETIN HYDROCHLORIDE 30 MG/1
CAPSULE, DELAYED RELEASE ORAL
COMMUNITY
Start: 2022-09-30 | End: 2022-12-05

## 2022-11-15 RX ORDER — IBUPROFEN 600 MG/1
600 TABLET ORAL EVERY 6 HOURS PRN
Status: ON HOLD | COMMUNITY
End: 2022-12-17

## 2022-11-15 ASSESSMENT — FIBROSIS 4 INDEX: FIB4 SCORE: 2.08

## 2022-11-15 NOTE — LETTER
PROCEDURE/SURGERY CLEARANCE FORM      Encounter Date: 11/15/2022    Patient: Mike Ferrell  YOB: 1950    CARDIOLOGIST:  Daniel Ferrera M.D.    REFERRING DOCTOR:  Herberth Waterman M.D.    PATIENT does not have  PPM.    PATIENT has AICD.    The above patient is ok to proceed to have the following procedure/surgery: back surgery                                           Additional comments: may hold eliquis 2 days prior if needed.           Thank you,          Daniel Ferrera M.D.  Electronically signed

## 2022-11-15 NOTE — PROGRESS NOTES
"Chief Complaint   Patient presents with    Congestive Heart Failure     F/V Dx: Heart failure with preserved ejection fraction, unspecified HF chronicity (HCC)    Atrial Fibrillation     F/V Dx: Atrial fibrillation, unspecified type (HCC)       Subjective     Mike Ferrell is a 72 y.o. male who presents today with history of atrial arrhythmia and ventricular arrhythmia status post implantable defibrillator.  Tough summer.  Enterococcal sepsis.  Negative transesophageal echocardiogram.  Treated with antibiotics.  Culture negative now.  Needs back surgery.  No shortness of breath or chest pain.  Insurance is changing needs referral.    Past Medical History:   Diagnosis Date    AICD (automatic cardioverter/defibrillator) present     Arthritis     Osteo- shoulders, hips, knees, ankles, generalized everywhere    Atrial fibrillation (HCC) 04/04/2012    Bowel habit changes     constipation    Breath shortness     \"since 1996 from [place]\"    Cataract 12/15/2021    Dec 2021/Jan 2022 bilat surgery    Chronic back pain     Congestive heart failure (HCC)     COVID-19 09/2021    Dental disorder     missing teeth x3    Dependence on supplemental oxygen     2 liters @ HS    Heart burn     occasional food related    High cholesterol     Hypertension     Methamphetamine use (HCC) none for many years    25 years ago    Pain 12/15/2021    back, right hip and knee, 8/10    Paroxysmal ventricular tachycardia 04/04/2012    Pneumonia 2000    in the past    Sinoatrial node dysfunction (HCC) 04/04/2012    Dr. Daniel Ferrera    Snoring     Sleep Study June 2022, awaiting results    Syncope and collapse 04/04/2012     Past Surgical History:   Procedure Laterality Date    AICD IMPLANT  2017    AICD IMPLANT  01/01/2010    CHOLECYSTECTOMY  01/01/1999    TONSILLECTOMY  01/01/1953    CATARACT EXTRACTION WITH IOL       History reviewed. No pertinent family history.  Social History     Socioeconomic History    Marital status:      " Spouse name: Not on file    Number of children: Not on file    Years of education: Not on file    Highest education level: Not on file   Occupational History    Not on file   Tobacco Use    Smoking status: Never    Smokeless tobacco: Never   Vaping Use    Vaping Use: Never used   Substance and Sexual Activity    Alcohol use: Yes     Alcohol/week: 9.0 oz     Types: 15 Standard drinks or equivalent per week     Comment: 2-3 per day    Drug use: Not Currently     Comment: 25 years ago    Sexual activity: Not on file   Other Topics Concern    Not on file   Social History Narrative    Not on file     Social Determinants of Health     Financial Resource Strain: Low Risk     Difficulty of Paying Living Expenses: Not very hard   Food Insecurity: No Food Insecurity    Worried About Running Out of Food in the Last Year: Never true    Ran Out of Food in the Last Year: Never true   Transportation Needs: No Transportation Needs    Lack of Transportation (Medical): No    Lack of Transportation (Non-Medical): No   Physical Activity: Inactive    Days of Exercise per Week: 0 days    Minutes of Exercise per Session: 0 min   Stress: No Stress Concern Present    Feeling of Stress : Not at all   Social Connections: Moderately Isolated    Frequency of Communication with Friends and Family: More than three times a week    Frequency of Social Gatherings with Friends and Family: More than three times a week    Attends Yazdanism Services: Never    Active Member of Clubs or Organizations: No    Attends Club or Organization Meetings: Never    Marital Status:    Intimate Partner Violence: Not At Risk    Fear of Current or Ex-Partner: No    Emotionally Abused: No    Physically Abused: No    Sexually Abused: No   Housing Stability: Low Risk     Unable to Pay for Housing in the Last Year: No    Number of Places Lived in the Last Year: 1    Unstable Housing in the Last Year: No     Allergies   Allergen Reactions    Oxycodone Hcl       HALLUCINATIONS    Atorvastatin Rash, Itching and Myalgia          Statins [Hmg-Coa-R Inhibitors] Rash and Itching     Skin gets red & itchy    Sulfa Drugs Rash and Itching     Rash, itch     Outpatient Encounter Medications as of 11/15/2022   Medication Sig Dispense Refill    Moxifloxacin HCl 0.5 % Solution       BREZTRI AEROSPHERE 160-9-4.8 MCG/ACT Aerosol       ibuprofen (MOTRIN) 600 MG Tab Take 1 Tablet by mouth every 6 hours as needed.      senna-docusate (PERICOLACE OR SENOKOT S) 8.6-50 MG Tab Take 1-2 Tablets by mouth every day for 30 days. 60 Tablet 11    torsemide (DEMADEX) 20 MG Tab TAKE 1 TABLET BY MOUTH 1 TIME A DAY AS NEEDED (FOR WEIGHT GAIN AND SWELLING). 25 Tablet 6    potassium chloride SA (KDUR) 20 MEQ Tab CR TAKE 1 TABLET BY MOUTH 1 TIME A DAY AS NEEDED (WITH TORSEMIDE). 90 Tablet 0    valsartan (DIOVAN) 80 MG Tab Take 1 Tablet by mouth 2 times a day. 180 Tablet 3    Empagliflozin 10 MG Tab Take 1 Tablet by mouth every day. 90 Tablet 3    Cholecalciferol (VITAMIN D) 125 MCG (5000 UT) Cap Take 1 Capsule by mouth every day.      HYDROcodone/acetaminophen (NORCO)  MG Tab Take 1 Tablet by mouth 2 times a day. Indications: Pain      colchicine (COLCRYS) 0.6 MG Tab Take 1 Tablet by mouth as needed. Indications: Gout      colesevelam (WELCHOL) 625 MG Tab Take 1 Tablet by mouth every day. Indications: High Amount of Fats in the Blood      sotalol (BETAPACE) 120 MG tablet Take 1 Tablet by mouth every morning. 60 Tablet 3    acetaminophen (TYLENOL) 500 MG Tab Take 1 Tablet by mouth every 6 hours as needed for Moderate Pain. (Patient taking differently: Take 2 Tablets by mouth every 6 hours as needed for Moderate Pain.) 30 Tablet 0    cyanocobalamin (VITAMIN B12) 1000 MCG Tab Take 2 Tablets by mouth every day. (Patient taking differently: Take 5 Tablets by mouth every day. SUBLINGUAL LIQUID) 60 Tablet 3    apixaban (ELIQUIS) 5mg Tab Take 1 Tablet by mouth 2 times a day.      DULoxetine (CYMBALTA) 30 MG  Cap DR Particles  (Patient not taking: Reported on 11/15/2022)      [DISCONTINUED] methylPREDNISolone (MEDROL DOSEPAK) 4 MG Tablet Therapy Pack  (Patient not taking: Reported on 11/15/2022)      [DISCONTINUED] Empagliflozin (JARDIANCE) 10 MG Tab Take  by mouth. (Patient not taking: Reported on 11/15/2022)      [DISCONTINUED] Home Care Oxygen Inhale 2 L/min as needed for Shortness of Breath (PRN SOB and at night). Oxygen dose range: 2 L/min  Respiratory route via: Nasal Cannula   Oxygen supplier: none- received concentrator from son       No facility-administered encounter medications on file as of 11/15/2022.     ROS           Objective     /72 (BP Location: Left arm, Patient Position: Sitting, BP Cuff Size: Adult)   Pulse 72   Resp (!) 22   Ht 1.829 m (6')   Wt 99.3 kg (219 lb)   SpO2 96%   BMI 29.70 kg/m²     Physical Exam  Constitutional:       Appearance: He is well-developed.   HENT:      Head: Normocephalic and atraumatic.   Cardiovascular:      Rate and Rhythm: Normal rate and regular rhythm.      Heart sounds: No murmur heard.    No friction rub. No gallop.   Pulmonary:      Effort: Pulmonary effort is normal.      Breath sounds: Normal breath sounds.   Abdominal:      Palpations: Abdomen is soft.   Musculoskeletal:      Cervical back: Normal range of motion and neck supple.   Skin:     General: Skin is warm and dry.   Neurological:      Mental Status: He is alert and oriented to person, place, and time.   Psychiatric:         Behavior: Behavior normal.         Thought Content: Thought content normal.         Judgment: Judgment normal.              Assessment & Plan     1. Atrial fibrillation, unspecified type ([facility])        2. Heart failure with preserved ejection fraction, unspecified HF chronicity ([facility])        3. Chronic diastolic congestive heart failure, NYHA class 3 ([facility])        4. Anticoagulant long-term use        5. Alcohol abuse        6. Secondary hypercoagulable state ([facility])        7.  Paroxysmal ventricular tachycardia        8. Sinoatrial node dysfunction (HCC)  REFERRAL TO CARDIOLOGY          Medical Decision Making: Today's Assessment/Status/Plan:   1.  Recent enterococcal sepsis.  Treated with antibiotics.  Culture negative post antibioics.  Has device.  Continue to monitor.  KIM was negative.  2.  Back surgery.  Moderate risk.  3.  Anticoagulation continue.  4.  Heart failure preserved ejection fraction.  Stable.  5.  Referral to outside EP.  Dr. Hall.  Insurance change.  6.  Device AICD.  Normal function.

## 2022-11-28 ASSESSMENT — ENCOUNTER SYMPTOMS: BACK PAIN: 1

## 2022-11-29 ENCOUNTER — TELEPHONE (OUTPATIENT)
Dept: CARDIOLOGY | Facility: MEDICAL CENTER | Age: 72
End: 2022-11-29

## 2022-11-29 NOTE — TELEPHONE ENCOUNTER
DS    Caller: Xena with [facility]     Topic/issue: Xena is calling on behalf of the patient because a referral from  was sent to another cardiologist, an outside provider from [facility] and would like clarification on this. Please advise.     Callback Number: Xena at [phone]     Thank you,   Marguerite BENJAMIN

## 2022-12-01 ENCOUNTER — HOSPITAL ENCOUNTER (OUTPATIENT)
Dept: RADIOLOGY | Facility: MEDICAL CENTER | Age: 72
End: 2022-12-01
Attending: NEUROLOGICAL SURGERY
Payer: MEDICARE

## 2022-12-01 DIAGNOSIS — Q76.2 CONGENITAL SPONDYLOLISTHESIS: ICD-10-CM

## 2022-12-01 PROCEDURE — 72131 CT LUMBAR SPINE W/O DYE: CPT

## 2022-12-05 ENCOUNTER — PRE-ADMISSION TESTING (OUTPATIENT)
Dept: ADMISSIONS | Facility: MEDICAL CENTER | Age: 72
End: 2022-12-05
Attending: NEUROLOGICAL SURGERY
Payer: MEDICARE

## 2022-12-05 ENCOUNTER — HOSPITAL ENCOUNTER (OUTPATIENT)
Dept: RADIOLOGY | Facility: MEDICAL CENTER | Age: 72
End: 2022-12-05
Attending: NEUROLOGICAL SURGERY | Admitting: NEUROLOGICAL SURGERY
Payer: MEDICARE

## 2022-12-05 DIAGNOSIS — Z01.810 PRE-OPERATIVE CARDIOVASCULAR EXAMINATION: ICD-10-CM

## 2022-12-05 DIAGNOSIS — Z01.812 PRE-OPERATIVE LABORATORY EXAMINATION: ICD-10-CM

## 2022-12-05 DIAGNOSIS — Z01.811 PRE-OPERATIVE RESPIRATORY EXAMINATION: ICD-10-CM

## 2022-12-05 LAB
ANION GAP SERPL CALC-SCNC: 12 MMOL/L (ref 7–16)
APTT PPP: 38.4 SEC (ref 24.7–36)
BASOPHILS # BLD AUTO: 0.9 % (ref 0–1.8)
BASOPHILS # BLD: 0.07 K/UL (ref 0–0.12)
BUN SERPL-MCNC: 15 MG/DL (ref 8–22)
CALCIUM SERPL-MCNC: 9.7 MG/DL (ref 8.5–10.5)
CHLORIDE SERPL-SCNC: 101 MMOL/L (ref 96–112)
CO2 SERPL-SCNC: 27 MMOL/L (ref 20–33)
CREAT SERPL-MCNC: 1.09 MG/DL (ref 0.5–1.4)
EKG IMPRESSION: NORMAL
EOSINOPHIL # BLD AUTO: 0.18 K/UL (ref 0–0.51)
EOSINOPHIL NFR BLD: 2.4 % (ref 0–6.9)
ERYTHROCYTE [DISTWIDTH] IN BLOOD BY AUTOMATED COUNT: 58.1 FL (ref 35.9–50)
GFR SERPLBLD CREATININE-BSD FMLA CKD-EPI: 71 ML/MIN/1.73 M 2
GLUCOSE SERPL-MCNC: 93 MG/DL (ref 65–99)
HCT VFR BLD AUTO: 37.8 % (ref 42–52)
HGB BLD-MCNC: 12.1 G/DL (ref 14–18)
IMM GRANULOCYTES # BLD AUTO: 0.02 K/UL (ref 0–0.11)
IMM GRANULOCYTES NFR BLD AUTO: 0.3 % (ref 0–0.9)
INR PPP: 1.48 (ref 0.87–1.13)
LYMPHOCYTES # BLD AUTO: 1.69 K/UL (ref 1–4.8)
LYMPHOCYTES NFR BLD: 22.6 % (ref 22–41)
MCH RBC QN AUTO: 33.3 PG (ref 27–33)
MCHC RBC AUTO-ENTMCNC: 32 G/DL (ref 33.7–35.3)
MCV RBC AUTO: 104.1 FL (ref 81.4–97.8)
MONOCYTES # BLD AUTO: 0.9 K/UL (ref 0–0.85)
MONOCYTES NFR BLD AUTO: 12 % (ref 0–13.4)
NEUTROPHILS # BLD AUTO: 4.63 K/UL (ref 1.82–7.42)
NEUTROPHILS NFR BLD: 61.8 % (ref 44–72)
NRBC # BLD AUTO: 0 K/UL
NRBC BLD-RTO: 0 /100 WBC
PLATELET # BLD AUTO: 265 K/UL (ref 164–446)
PMV BLD AUTO: 10.2 FL (ref 9–12.9)
POTASSIUM SERPL-SCNC: 3.9 MMOL/L (ref 3.6–5.5)
PROTHROMBIN TIME: 17.6 SEC (ref 12–14.6)
RBC # BLD AUTO: 3.63 M/UL (ref 4.7–6.1)
SODIUM SERPL-SCNC: 140 MMOL/L (ref 135–145)
WBC # BLD AUTO: 7.5 K/UL (ref 4.8–10.8)

## 2022-12-05 PROCEDURE — 93005 ELECTROCARDIOGRAM TRACING: CPT

## 2022-12-05 PROCEDURE — 80048 BASIC METABOLIC PNL TOTAL CA: CPT

## 2022-12-05 PROCEDURE — 36415 COLL VENOUS BLD VENIPUNCTURE: CPT

## 2022-12-05 PROCEDURE — 85025 COMPLETE CBC W/AUTO DIFF WBC: CPT

## 2022-12-05 PROCEDURE — 85610 PROTHROMBIN TIME: CPT

## 2022-12-05 PROCEDURE — 71046 X-RAY EXAM CHEST 2 VIEWS: CPT

## 2022-12-05 PROCEDURE — 85730 THROMBOPLASTIN TIME PARTIAL: CPT

## 2022-12-05 PROCEDURE — 93010 ELECTROCARDIOGRAM REPORT: CPT | Performed by: INTERNAL MEDICINE

## 2022-12-05 RX ORDER — EMPAGLIFLOZIN 10 MG/1
TABLET, FILM COATED ORAL
COMMUNITY
End: 2022-12-05

## 2022-12-05 NOTE — OR NURSING
I faxed the pacemaker info sheet for Dr ADELE Ferrera at [facility].Pending response.

## 2022-12-15 ENCOUNTER — HOSPITAL ENCOUNTER (OUTPATIENT)
Facility: MEDICAL CENTER | Age: 72
End: 2022-12-17
Attending: NEUROLOGICAL SURGERY | Admitting: NEUROLOGICAL SURGERY
Payer: MEDICARE

## 2022-12-15 ENCOUNTER — ANESTHESIA (OUTPATIENT)
Dept: SURGERY | Facility: MEDICAL CENTER | Age: 72
End: 2022-12-15
Payer: MEDICARE

## 2022-12-15 ENCOUNTER — APPOINTMENT (OUTPATIENT)
Dept: RADIOLOGY | Facility: MEDICAL CENTER | Age: 72
End: 2022-12-15
Attending: NEUROLOGICAL SURGERY
Payer: MEDICARE

## 2022-12-15 ENCOUNTER — ANESTHESIA EVENT (OUTPATIENT)
Dept: SURGERY | Facility: MEDICAL CENTER | Age: 72
End: 2022-12-15
Payer: MEDICARE

## 2022-12-15 DIAGNOSIS — M48.061 SPINAL STENOSIS OF LUMBAR REGION WITHOUT NEUROGENIC CLAUDICATION: ICD-10-CM

## 2022-12-15 DIAGNOSIS — M48.061 LUMBAR STENOSIS WITHOUT NEUROGENIC CLAUDICATION: ICD-10-CM

## 2022-12-15 PROCEDURE — 110454 HCHG SHELL REV 250: Performed by: NEUROLOGICAL SURGERY

## 2022-12-15 PROCEDURE — 72100 X-RAY EXAM L-S SPINE 2/3 VWS: CPT

## 2022-12-15 PROCEDURE — 700111 HCHG RX REV CODE 636 W/ 250 OVERRIDE (IP): Performed by: ANESTHESIOLOGY

## 2022-12-15 PROCEDURE — 700102 HCHG RX REV CODE 250 W/ 637 OVERRIDE(OP): Performed by: ANESTHESIOLOGY

## 2022-12-15 PROCEDURE — 96367 TX/PROPH/DG ADDL SEQ IV INF: CPT | Mod: XU

## 2022-12-15 PROCEDURE — 700105 HCHG RX REV CODE 258: Performed by: NURSE PRACTITIONER

## 2022-12-15 PROCEDURE — G0378 HOSPITAL OBSERVATION PER HR: HCPCS

## 2022-12-15 PROCEDURE — 160036 HCHG PACU - EA ADDL 30 MINS PHASE I: Performed by: NEUROLOGICAL SURGERY

## 2022-12-15 PROCEDURE — 700101 HCHG RX REV CODE 250: Performed by: ANESTHESIOLOGY

## 2022-12-15 PROCEDURE — 700101 HCHG RX REV CODE 250: Performed by: NURSE PRACTITIONER

## 2022-12-15 PROCEDURE — C1821 INTERSPINOUS IMPLANT: HCPCS | Performed by: NEUROLOGICAL SURGERY

## 2022-12-15 PROCEDURE — 160048 HCHG OR STATISTICAL LEVEL 1-5: Performed by: NEUROLOGICAL SURGERY

## 2022-12-15 PROCEDURE — 00670 ANES XTNSV SP&SPI CORD PX: CPT | Performed by: ANESTHESIOLOGY

## 2022-12-15 PROCEDURE — A9270 NON-COVERED ITEM OR SERVICE: HCPCS | Performed by: ANESTHESIOLOGY

## 2022-12-15 PROCEDURE — 160009 HCHG ANES TIME/MIN: Performed by: NEUROLOGICAL SURGERY

## 2022-12-15 PROCEDURE — C1713 ANCHOR/SCREW BN/BN,TIS/BN: HCPCS | Performed by: NEUROLOGICAL SURGERY

## 2022-12-15 PROCEDURE — A9270 NON-COVERED ITEM OR SERVICE: HCPCS | Performed by: NURSE PRACTITIONER

## 2022-12-15 PROCEDURE — 160042 HCHG SURGERY MINUTES - EA ADDL 1 MIN LEVEL 5: Performed by: NEUROLOGICAL SURGERY

## 2022-12-15 PROCEDURE — 110371 HCHG SHELL REV 272: Performed by: NEUROLOGICAL SURGERY

## 2022-12-15 PROCEDURE — 700111 HCHG RX REV CODE 636 W/ 250 OVERRIDE (IP): Performed by: NEUROLOGICAL SURGERY

## 2022-12-15 PROCEDURE — 502000 HCHG MISC OR IMPLANTS RC 0278: Performed by: NEUROLOGICAL SURGERY

## 2022-12-15 PROCEDURE — 700111 HCHG RX REV CODE 636 W/ 250 OVERRIDE (IP): Performed by: NURSE PRACTITIONER

## 2022-12-15 PROCEDURE — 160002 HCHG RECOVERY MINUTES (STAT): Performed by: NEUROLOGICAL SURGERY

## 2022-12-15 PROCEDURE — 160035 HCHG PACU - 1ST 60 MINS PHASE I: Performed by: NEUROLOGICAL SURGERY

## 2022-12-15 PROCEDURE — 700101 HCHG RX REV CODE 250: Performed by: NEUROLOGICAL SURGERY

## 2022-12-15 PROCEDURE — 96366 THER/PROPH/DIAG IV INF ADDON: CPT | Mod: XU

## 2022-12-15 PROCEDURE — 502714 HCHG ROBOTIC SURGERY SERVICES: Performed by: NEUROLOGICAL SURGERY

## 2022-12-15 PROCEDURE — 99100 ANES PT EXTEME AGE<1 YR&>70: CPT | Performed by: ANESTHESIOLOGY

## 2022-12-15 PROCEDURE — 160031 HCHG SURGERY MINUTES - 1ST 30 MINS LEVEL 5: Performed by: NEUROLOGICAL SURGERY

## 2022-12-15 PROCEDURE — 700105 HCHG RX REV CODE 258: Performed by: NEUROLOGICAL SURGERY

## 2022-12-15 PROCEDURE — 700102 HCHG RX REV CODE 250 W/ 637 OVERRIDE(OP): Performed by: NURSE PRACTITIONER

## 2022-12-15 PROCEDURE — 96365 THER/PROPH/DIAG IV INF INIT: CPT | Mod: XU

## 2022-12-15 DEVICE — IMPLANTABLE DEVICE: Type: IMPLANTABLE DEVICE | Site: SPINE LUMBAR | Status: FUNCTIONAL

## 2022-12-15 DEVICE — ROD PREBENT TITANIUM 5.5 X 40MM (2TCONX2=4): Type: IMPLANTABLE DEVICE | Site: SPINE LUMBAR | Status: FUNCTIONAL

## 2022-12-15 DEVICE — ORTHOBLEND 5CC: Type: IMPLANTABLE DEVICE | Site: SPINE LUMBAR | Status: FUNCTIONAL

## 2022-12-15 DEVICE — SCREW SOLERA SET SCREW (1TCX40+3TCX21+2TCX10=123): Type: IMPLANTABLE DEVICE | Site: SPINE LUMBAR | Status: FUNCTIONAL

## 2022-12-15 DEVICE — SCREW MAS  SOLERA 6.5 X 45MM (1TCX16+3TCX8=40): Type: IMPLANTABLE DEVICE | Site: SPINE LUMBAR | Status: FUNCTIONAL

## 2022-12-15 DEVICE — ROD PREBENT TITANIUM 5.5 X 35MM (2TCONX2=4): Type: IMPLANTABLE DEVICE | Site: SPINE LUMBAR | Status: FUNCTIONAL

## 2022-12-15 DEVICE — CROSSLINK TSRH 5.5 MULTI 1.75 - (3TX2=6) 1.75-2.15: Type: IMPLANTABLE DEVICE | Site: SPINE LUMBAR | Status: FUNCTIONAL

## 2022-12-15 DEVICE — SCREW MAS  SOLERA 6.5 X 40MM (1TCX16+3TCX8=40): Type: IMPLANTABLE DEVICE | Site: SPINE LUMBAR | Status: FUNCTIONAL

## 2022-12-15 RX ORDER — ACETAMINOPHEN 500 MG
1000 TABLET ORAL ONCE
Status: ACTIVE | OUTPATIENT
Start: 2022-12-15 | End: 2022-12-16

## 2022-12-15 RX ORDER — DIPHENHYDRAMINE HCL 25 MG
25 TABLET ORAL EVERY 6 HOURS PRN
Status: DISCONTINUED | OUTPATIENT
Start: 2022-12-15 | End: 2022-12-17 | Stop reason: HOSPADM

## 2022-12-15 RX ORDER — TORSEMIDE 20 MG/1
20 TABLET ORAL
Status: DISCONTINUED | OUTPATIENT
Start: 2022-12-16 | End: 2022-12-16

## 2022-12-15 RX ORDER — CEFAZOLIN SODIUM 1 G/3ML
INJECTION, POWDER, FOR SOLUTION INTRAMUSCULAR; INTRAVENOUS
Status: DISCONTINUED | OUTPATIENT
Start: 2022-12-15 | End: 2022-12-15 | Stop reason: HOSPADM

## 2022-12-15 RX ORDER — LABETALOL HYDROCHLORIDE 5 MG/ML
10 INJECTION, SOLUTION INTRAVENOUS
Status: DISCONTINUED | OUTPATIENT
Start: 2022-12-15 | End: 2022-12-17 | Stop reason: HOSPADM

## 2022-12-15 RX ORDER — CEFAZOLIN SODIUM 1 G/3ML
INJECTION, POWDER, FOR SOLUTION INTRAMUSCULAR; INTRAVENOUS PRN
Status: DISCONTINUED | OUTPATIENT
Start: 2022-12-15 | End: 2022-12-15 | Stop reason: SURG

## 2022-12-15 RX ORDER — VANCOMYCIN HYDROCHLORIDE 1 G/20ML
INJECTION, POWDER, LYOPHILIZED, FOR SOLUTION INTRAVENOUS
Status: COMPLETED | OUTPATIENT
Start: 2022-12-15 | End: 2022-12-15

## 2022-12-15 RX ORDER — SODIUM CHLORIDE, SODIUM LACTATE, POTASSIUM CHLORIDE, CALCIUM CHLORIDE 600; 310; 30; 20 MG/100ML; MG/100ML; MG/100ML; MG/100ML
INJECTION, SOLUTION INTRAVENOUS CONTINUOUS
Status: DISCONTINUED | OUTPATIENT
Start: 2022-12-15 | End: 2022-12-15 | Stop reason: HOSPADM

## 2022-12-15 RX ORDER — DIPHENHYDRAMINE HYDROCHLORIDE 50 MG/ML
12.5 INJECTION INTRAMUSCULAR; INTRAVENOUS
Status: DISCONTINUED | OUTPATIENT
Start: 2022-12-15 | End: 2022-12-15 | Stop reason: HOSPADM

## 2022-12-15 RX ORDER — CYCLOBENZAPRINE HCL 10 MG
10 TABLET ORAL EVERY 8 HOURS PRN
Status: DISCONTINUED | OUTPATIENT
Start: 2022-12-15 | End: 2022-12-17 | Stop reason: HOSPADM

## 2022-12-15 RX ORDER — GLYCOPYRROLATE 0.2 MG/ML
INJECTION INTRAMUSCULAR; INTRAVENOUS PRN
Status: DISCONTINUED | OUTPATIENT
Start: 2022-12-15 | End: 2022-12-15 | Stop reason: SURG

## 2022-12-15 RX ORDER — DIAZEPAM 5 MG/1
5 TABLET ORAL EVERY 4 HOURS PRN
Status: DISCONTINUED | OUTPATIENT
Start: 2022-12-15 | End: 2022-12-17 | Stop reason: HOSPADM

## 2022-12-15 RX ORDER — HYDROMORPHONE HYDROCHLORIDE 1 MG/ML
0.1 INJECTION, SOLUTION INTRAMUSCULAR; INTRAVENOUS; SUBCUTANEOUS
Status: DISCONTINUED | OUTPATIENT
Start: 2022-12-15 | End: 2022-12-15 | Stop reason: HOSPADM

## 2022-12-15 RX ORDER — BISACODYL 10 MG
10 SUPPOSITORY, RECTAL RECTAL
Status: DISCONTINUED | OUTPATIENT
Start: 2022-12-15 | End: 2022-12-17 | Stop reason: HOSPADM

## 2022-12-15 RX ORDER — DIPHENHYDRAMINE HYDROCHLORIDE 50 MG/ML
25 INJECTION INTRAMUSCULAR; INTRAVENOUS EVERY 6 HOURS PRN
Status: DISCONTINUED | OUTPATIENT
Start: 2022-12-15 | End: 2022-12-17 | Stop reason: HOSPADM

## 2022-12-15 RX ORDER — MEPERIDINE HYDROCHLORIDE 25 MG/ML
6.25 INJECTION INTRAMUSCULAR; INTRAVENOUS; SUBCUTANEOUS
Status: DISCONTINUED | OUTPATIENT
Start: 2022-12-15 | End: 2022-12-15 | Stop reason: HOSPADM

## 2022-12-15 RX ORDER — SOTALOL HYDROCHLORIDE 120 MG/1
120 TABLET ORAL EVERY MORNING
Status: DISCONTINUED | OUTPATIENT
Start: 2022-12-16 | End: 2022-12-17 | Stop reason: HOSPADM

## 2022-12-15 RX ORDER — SODIUM CHLORIDE AND POTASSIUM CHLORIDE 150; 900 MG/100ML; MG/100ML
INJECTION, SOLUTION INTRAVENOUS CONTINUOUS
Status: DISCONTINUED | OUTPATIENT
Start: 2022-12-15 | End: 2022-12-16

## 2022-12-15 RX ORDER — ONDANSETRON 2 MG/ML
4 INJECTION INTRAMUSCULAR; INTRAVENOUS
Status: DISCONTINUED | OUTPATIENT
Start: 2022-12-15 | End: 2022-12-15 | Stop reason: HOSPADM

## 2022-12-15 RX ORDER — HYDROMORPHONE HYDROCHLORIDE 2 MG/ML
INJECTION, SOLUTION INTRAMUSCULAR; INTRAVENOUS; SUBCUTANEOUS PRN
Status: DISCONTINUED | OUTPATIENT
Start: 2022-12-15 | End: 2022-12-15 | Stop reason: SURG

## 2022-12-15 RX ORDER — HALOPERIDOL 5 MG/ML
1 INJECTION INTRAMUSCULAR
Status: DISCONTINUED | OUTPATIENT
Start: 2022-12-15 | End: 2022-12-15 | Stop reason: HOSPADM

## 2022-12-15 RX ORDER — AMOXICILLIN 250 MG
1 CAPSULE ORAL
Status: DISCONTINUED | OUTPATIENT
Start: 2022-12-15 | End: 2022-12-17 | Stop reason: HOSPADM

## 2022-12-15 RX ORDER — BUDESONIDE AND FORMOTEROL FUMARATE DIHYDRATE 160; 4.5 UG/1; UG/1
1 AEROSOL RESPIRATORY (INHALATION) DAILY
Status: DISCONTINUED | OUTPATIENT
Start: 2022-12-16 | End: 2022-12-17 | Stop reason: HOSPADM

## 2022-12-15 RX ORDER — VALSARTAN 80 MG/1
80 TABLET ORAL 2 TIMES DAILY
Status: DISCONTINUED | OUTPATIENT
Start: 2022-12-15 | End: 2022-12-17 | Stop reason: HOSPADM

## 2022-12-15 RX ORDER — ONDANSETRON 2 MG/ML
4 INJECTION INTRAMUSCULAR; INTRAVENOUS EVERY 4 HOURS PRN
Status: DISCONTINUED | OUTPATIENT
Start: 2022-12-15 | End: 2022-12-17 | Stop reason: HOSPADM

## 2022-12-15 RX ORDER — LABETALOL HYDROCHLORIDE 5 MG/ML
5 INJECTION, SOLUTION INTRAVENOUS
Status: COMPLETED | OUTPATIENT
Start: 2022-12-15 | End: 2022-12-15

## 2022-12-15 RX ORDER — COLESEVELAM 180 1/1
625 TABLET ORAL DAILY
Status: DISCONTINUED | OUTPATIENT
Start: 2022-12-16 | End: 2022-12-17 | Stop reason: HOSPADM

## 2022-12-15 RX ORDER — POLYETHYLENE GLYCOL 3350 17 G/17G
1 POWDER, FOR SOLUTION ORAL 2 TIMES DAILY PRN
Status: DISCONTINUED | OUTPATIENT
Start: 2022-12-15 | End: 2022-12-17 | Stop reason: HOSPADM

## 2022-12-15 RX ORDER — BUPIVACAINE HYDROCHLORIDE AND EPINEPHRINE 5; 5 MG/ML; UG/ML
INJECTION, SOLUTION PERINEURAL
Status: DISCONTINUED | OUTPATIENT
Start: 2022-12-15 | End: 2022-12-15 | Stop reason: HOSPADM

## 2022-12-15 RX ORDER — MOXIFLOXACIN 5 MG/ML
1 SOLUTION/ DROPS OPHTHALMIC
Status: DISCONTINUED | OUTPATIENT
Start: 2022-12-15 | End: 2022-12-17 | Stop reason: HOSPADM

## 2022-12-15 RX ORDER — METHOCARBAMOL 750 MG/1
750 TABLET, FILM COATED ORAL EVERY 8 HOURS PRN
Status: DISCONTINUED | OUTPATIENT
Start: 2022-12-15 | End: 2022-12-17 | Stop reason: HOSPADM

## 2022-12-15 RX ORDER — DAPAGLIFLOZIN 10 MG/1
10 TABLET, FILM COATED ORAL DAILY
Status: DISCONTINUED | OUTPATIENT
Start: 2022-12-16 | End: 2022-12-17 | Stop reason: HOSPADM

## 2022-12-15 RX ORDER — ENEMA 19; 7 G/133ML; G/133ML
1 ENEMA RECTAL
Status: DISCONTINUED | OUTPATIENT
Start: 2022-12-15 | End: 2022-12-17 | Stop reason: HOSPADM

## 2022-12-15 RX ORDER — HYDROMORPHONE HYDROCHLORIDE 1 MG/ML
0.2 INJECTION, SOLUTION INTRAMUSCULAR; INTRAVENOUS; SUBCUTANEOUS
Status: DISCONTINUED | OUTPATIENT
Start: 2022-12-15 | End: 2022-12-15 | Stop reason: HOSPADM

## 2022-12-15 RX ORDER — POTASSIUM CHLORIDE 20 MEQ/1
20 TABLET, EXTENDED RELEASE ORAL
Status: DISCONTINUED | OUTPATIENT
Start: 2022-12-15 | End: 2022-12-17 | Stop reason: HOSPADM

## 2022-12-15 RX ORDER — NEOSTIGMINE METHYLSULFATE 1 MG/ML
INJECTION, SOLUTION INTRAVENOUS PRN
Status: DISCONTINUED | OUTPATIENT
Start: 2022-12-15 | End: 2022-12-15 | Stop reason: SURG

## 2022-12-15 RX ORDER — AMOXICILLIN 250 MG
1 CAPSULE ORAL NIGHTLY
Status: DISCONTINUED | OUTPATIENT
Start: 2022-12-15 | End: 2022-12-17 | Stop reason: HOSPADM

## 2022-12-15 RX ORDER — ROCURONIUM BROMIDE 10 MG/ML
INJECTION, SOLUTION INTRAVENOUS PRN
Status: DISCONTINUED | OUTPATIENT
Start: 2022-12-15 | End: 2022-12-15 | Stop reason: SURG

## 2022-12-15 RX ORDER — SODIUM CHLORIDE, SODIUM LACTATE, POTASSIUM CHLORIDE, CALCIUM CHLORIDE 600; 310; 30; 20 MG/100ML; MG/100ML; MG/100ML; MG/100ML
INJECTION, SOLUTION INTRAVENOUS CONTINUOUS
Status: ACTIVE | OUTPATIENT
Start: 2022-12-15 | End: 2022-12-15

## 2022-12-15 RX ORDER — HYDROMORPHONE HYDROCHLORIDE 1 MG/ML
0.4 INJECTION, SOLUTION INTRAMUSCULAR; INTRAVENOUS; SUBCUTANEOUS
Status: DISCONTINUED | OUTPATIENT
Start: 2022-12-15 | End: 2022-12-15 | Stop reason: HOSPADM

## 2022-12-15 RX ORDER — PHENYLEPHRINE HCL IN 0.9% NACL 0.5 MG/5ML
SYRINGE (ML) INTRAVENOUS PRN
Status: DISCONTINUED | OUTPATIENT
Start: 2022-12-15 | End: 2022-12-15 | Stop reason: SURG

## 2022-12-15 RX ORDER — DOCUSATE SODIUM 100 MG/1
100 CAPSULE, LIQUID FILLED ORAL 2 TIMES DAILY
Status: DISCONTINUED | OUTPATIENT
Start: 2022-12-15 | End: 2022-12-17 | Stop reason: HOSPADM

## 2022-12-15 RX ORDER — HYDRALAZINE HYDROCHLORIDE 20 MG/ML
10 INJECTION INTRAMUSCULAR; INTRAVENOUS
Status: DISCONTINUED | OUTPATIENT
Start: 2022-12-15 | End: 2022-12-17 | Stop reason: HOSPADM

## 2022-12-15 RX ORDER — ONDANSETRON 4 MG/1
4 TABLET, ORALLY DISINTEGRATING ORAL EVERY 4 HOURS PRN
Status: DISCONTINUED | OUTPATIENT
Start: 2022-12-15 | End: 2022-12-17 | Stop reason: HOSPADM

## 2022-12-15 RX ADMIN — FENTANYL CITRATE 25 MCG: 50 INJECTION, SOLUTION INTRAMUSCULAR; INTRAVENOUS at 16:58

## 2022-12-15 RX ADMIN — POTASSIUM CHLORIDE AND SODIUM CHLORIDE 1000 ML: 900; 150 INJECTION, SOLUTION INTRAVENOUS at 21:05

## 2022-12-15 RX ADMIN — VALSARTAN 80 MG: 80 TABLET, FILM COATED ORAL at 21:13

## 2022-12-15 RX ADMIN — PROPOFOL 160 MG: 10 INJECTION, EMULSION INTRAVENOUS at 12:51

## 2022-12-15 RX ADMIN — SENNOSIDES AND DOCUSATE SODIUM 1 TABLET: 50; 8.6 TABLET ORAL at 20:58

## 2022-12-15 RX ADMIN — FENTANYL CITRATE 50 MCG: 50 INJECTION INTRAMUSCULAR; INTRAVENOUS at 16:24

## 2022-12-15 RX ADMIN — LABETALOL HYDROCHLORIDE 5 MG: 5 INJECTION, SOLUTION INTRAVENOUS at 16:36

## 2022-12-15 RX ADMIN — Medication: at 20:59

## 2022-12-15 RX ADMIN — HYDROCODONE BITARTRATE AND ACETAMINOPHEN 15 MG: 7.5; 325 SOLUTION ORAL at 16:24

## 2022-12-15 RX ADMIN — CEFAZOLIN 2 G: 2 INJECTION, POWDER, FOR SOLUTION INTRAMUSCULAR; INTRAVENOUS at 21:09

## 2022-12-15 RX ADMIN — FENTANYL CITRATE 25 MCG: 50 INJECTION, SOLUTION INTRAMUSCULAR; INTRAVENOUS at 16:30

## 2022-12-15 RX ADMIN — ROCURONIUM BROMIDE 50 MG: 10 INJECTION, SOLUTION INTRAVENOUS at 12:51

## 2022-12-15 RX ADMIN — GLYCOPYRROLATE 1 MG: 0.2 INJECTION INTRAMUSCULAR; INTRAVENOUS at 15:52

## 2022-12-15 RX ADMIN — CEFAZOLIN 2 G: 330 INJECTION, POWDER, FOR SOLUTION INTRAMUSCULAR; INTRAVENOUS at 13:14

## 2022-12-15 RX ADMIN — HYDROMORPHONE HYDROCHLORIDE 2 MG: 2 INJECTION INTRAMUSCULAR; INTRAVENOUS; SUBCUTANEOUS at 12:51

## 2022-12-15 RX ADMIN — SODIUM CHLORIDE, POTASSIUM CHLORIDE, SODIUM LACTATE AND CALCIUM CHLORIDE: 600; 310; 30; 20 INJECTION, SOLUTION INTRAVENOUS at 12:47

## 2022-12-15 RX ADMIN — METHOCARBAMOL 750 MG: 750 TABLET ORAL at 20:57

## 2022-12-15 RX ADMIN — DOCUSATE SODIUM 100 MG: 100 CAPSULE, LIQUID FILLED ORAL at 20:58

## 2022-12-15 RX ADMIN — Medication 200 MCG: at 15:17

## 2022-12-15 RX ADMIN — NEOSTIGMINE METHYLSULFATE 5 MG: 1 INJECTION INTRAVENOUS at 15:52

## 2022-12-15 RX ADMIN — LABETALOL HYDROCHLORIDE 5 MG: 5 INJECTION, SOLUTION INTRAVENOUS at 16:45

## 2022-12-15 ASSESSMENT — PAIN DESCRIPTION - PAIN TYPE
TYPE: SURGICAL PAIN
TYPE: SURGICAL PAIN
TYPE: ACUTE PAIN;SURGICAL PAIN
TYPE: SURGICAL PAIN
TYPE: ACUTE PAIN
TYPE: SURGICAL PAIN

## 2022-12-15 ASSESSMENT — PAIN SCALES - GENERAL: PAIN_LEVEL: 0

## 2022-12-15 ASSESSMENT — FIBROSIS 4 INDEX: FIB4 SCORE: 2.11

## 2022-12-15 NOTE — ANESTHESIA PROCEDURE NOTES
Airway    Date/Time: 12/15/2022 12:53 PM  Performed by: Rashid Evans M.D.  Authorized by: Rashid Evans M.D.     Location:  OR  Urgency:  Elective  Indications for Airway Management:  Anesthesia      Spontaneous Ventilation: absent    Sedation Level:  Deep  Preoxygenated: Yes    Patient Position:  Sniffing  Final Airway Type:  Endotracheal airway  Final Endotracheal Airway:  ETT  Cuffed: Yes    Technique Used for Successful ETT Placement:  Direct laryngoscopy    Insertion Site:  Oral  Blade Type:  Maggy  Laryngoscope Blade/Videolaryngoscope Blade Size:  3  ETT Size (mm):  7.0  Measured from:  Teeth  ETT to Teeth (cm):  24  Placement Verified by: auscultation and capnometry    Cormack-Lehane Classification:  Grade IIa - partial view of glottis  Number of Attempts at Approach:  1

## 2022-12-15 NOTE — PROGRESS NOTES
Med Rec Complete per patient  Allergies Reviewed with patient  No antibiotics within the last 30 days  Patient's Preferred Pharmacy:  [facility] in [place], [place]      Patient takes Eliquis 5mg, twice daily.

## 2022-12-16 ENCOUNTER — HOME HEALTH ADMISSION (OUTPATIENT)
Dept: HOME HEALTH SERVICES | Facility: HOME HEALTHCARE | Age: 72
End: 2022-12-16
Payer: MEDICARE

## 2022-12-16 LAB
ANION GAP SERPL CALC-SCNC: 6 MMOL/L (ref 7–16)
BASOPHILS # BLD AUTO: 0.1 % (ref 0–1.8)
BASOPHILS # BLD: 0.01 K/UL (ref 0–0.12)
BUN SERPL-MCNC: 29 MG/DL (ref 8–22)
CALCIUM SERPL-MCNC: 8 MG/DL (ref 8.5–10.5)
CHLORIDE SERPL-SCNC: 103 MMOL/L (ref 96–112)
CO2 SERPL-SCNC: 28 MMOL/L (ref 20–33)
CREAT SERPL-MCNC: 0.82 MG/DL (ref 0.5–1.4)
EOSINOPHIL # BLD AUTO: 0 K/UL (ref 0–0.51)
EOSINOPHIL NFR BLD: 0 % (ref 0–6.9)
ERYTHROCYTE [DISTWIDTH] IN BLOOD BY AUTOMATED COUNT: 53.6 FL (ref 35.9–50)
ERYTHROCYTE [DISTWIDTH] IN BLOOD BY AUTOMATED COUNT: 54 FL (ref 35.9–50)
GFR SERPLBLD CREATININE-BSD FMLA CKD-EPI: 92 ML/MIN/1.73 M 2
GLUCOSE SERPL-MCNC: 115 MG/DL (ref 65–99)
HCT VFR BLD AUTO: 26.4 % (ref 42–52)
HCT VFR BLD AUTO: 27 % (ref 42–52)
HGB BLD-MCNC: 8.3 G/DL (ref 14–18)
HGB BLD-MCNC: 8.4 G/DL (ref 14–18)
IMM GRANULOCYTES # BLD AUTO: 0.17 K/UL (ref 0–0.11)
IMM GRANULOCYTES NFR BLD AUTO: 1.2 % (ref 0–0.9)
LYMPHOCYTES # BLD AUTO: 0.91 K/UL (ref 1–4.8)
LYMPHOCYTES NFR BLD: 6.6 % (ref 22–41)
MCH RBC QN AUTO: 32.9 PG (ref 27–33)
MCH RBC QN AUTO: 33.9 PG (ref 27–33)
MCHC RBC AUTO-ENTMCNC: 31.1 G/DL (ref 33.7–35.3)
MCHC RBC AUTO-ENTMCNC: 31.4 G/DL (ref 33.7–35.3)
MCV RBC AUTO: 105.9 FL (ref 81.4–97.8)
MCV RBC AUTO: 107.8 FL (ref 81.4–97.8)
MONOCYTES # BLD AUTO: 1.71 K/UL (ref 0–0.85)
MONOCYTES NFR BLD AUTO: 12.3 % (ref 0–13.4)
NEUTROPHILS # BLD AUTO: 11.09 K/UL (ref 1.82–7.42)
NEUTROPHILS NFR BLD: 79.8 % (ref 44–72)
NRBC # BLD AUTO: 0 K/UL
NRBC BLD-RTO: 0 /100 WBC
PLATELET # BLD AUTO: 167 K/UL (ref 164–446)
PLATELET # BLD AUTO: 180 K/UL (ref 164–446)
PMV BLD AUTO: 10.6 FL (ref 9–12.9)
PMV BLD AUTO: 10.7 FL (ref 9–12.9)
POTASSIUM SERPL-SCNC: 5.1 MMOL/L (ref 3.6–5.5)
RBC # BLD AUTO: 2.45 M/UL (ref 4.7–6.1)
RBC # BLD AUTO: 2.55 M/UL (ref 4.7–6.1)
SODIUM SERPL-SCNC: 137 MMOL/L (ref 135–145)
WBC # BLD AUTO: 10.9 K/UL (ref 4.8–10.8)
WBC # BLD AUTO: 13.9 K/UL (ref 4.8–10.8)

## 2022-12-16 PROCEDURE — 700102 HCHG RX REV CODE 250 W/ 637 OVERRIDE(OP): Performed by: NURSE PRACTITIONER

## 2022-12-16 PROCEDURE — 97162 PT EVAL MOD COMPLEX 30 MIN: CPT

## 2022-12-16 PROCEDURE — 97535 SELF CARE MNGMENT TRAINING: CPT

## 2022-12-16 PROCEDURE — 700111 HCHG RX REV CODE 636 W/ 250 OVERRIDE (IP): Performed by: NURSE PRACTITIONER

## 2022-12-16 PROCEDURE — 36415 COLL VENOUS BLD VENIPUNCTURE: CPT

## 2022-12-16 PROCEDURE — 700105 HCHG RX REV CODE 258: Performed by: NURSE PRACTITIONER

## 2022-12-16 PROCEDURE — 80048 BASIC METABOLIC PNL TOTAL CA: CPT

## 2022-12-16 PROCEDURE — G0378 HOSPITAL OBSERVATION PER HR: HCPCS

## 2022-12-16 PROCEDURE — A9270 NON-COVERED ITEM OR SERVICE: HCPCS | Performed by: NURSE PRACTITIONER

## 2022-12-16 PROCEDURE — 85025 COMPLETE CBC W/AUTO DIFF WBC: CPT

## 2022-12-16 PROCEDURE — 96366 THER/PROPH/DIAG IV INF ADDON: CPT

## 2022-12-16 PROCEDURE — 97165 OT EVAL LOW COMPLEX 30 MIN: CPT

## 2022-12-16 PROCEDURE — 85027 COMPLETE CBC AUTOMATED: CPT | Mod: XU

## 2022-12-16 RX ORDER — HYDROCODONE BITARTRATE AND ACETAMINOPHEN 10; 325 MG/1; MG/1
1 TABLET ORAL EVERY 4 HOURS PRN
Status: DISCONTINUED | OUTPATIENT
Start: 2022-12-16 | End: 2022-12-17 | Stop reason: HOSPADM

## 2022-12-16 RX ADMIN — COLESEVELAM HYDROCHLORIDE 625 MG: 625 TABLET, FILM COATED ORAL at 05:36

## 2022-12-16 RX ADMIN — CEFAZOLIN 2 G: 2 INJECTION, POWDER, FOR SOLUTION INTRAMUSCULAR; INTRAVENOUS at 05:11

## 2022-12-16 RX ADMIN — HYDROCODONE BITARTRATE AND ACETAMINOPHEN 1 TABLET: 10; 325 TABLET ORAL at 20:03

## 2022-12-16 RX ADMIN — SOTALOL HYDROCHLORIDE 120 MG: 120 TABLET ORAL at 05:36

## 2022-12-16 RX ADMIN — VALSARTAN 80 MG: 80 TABLET, FILM COATED ORAL at 05:37

## 2022-12-16 RX ADMIN — DOCUSATE SODIUM 100 MG: 100 CAPSULE, LIQUID FILLED ORAL at 05:37

## 2022-12-16 RX ADMIN — VALSARTAN 80 MG: 80 TABLET, FILM COATED ORAL at 17:46

## 2022-12-16 RX ADMIN — TORSEMIDE 20 MG: 20 TABLET ORAL at 05:37

## 2022-12-16 RX ADMIN — METHOCARBAMOL 750 MG: 750 TABLET ORAL at 05:36

## 2022-12-16 RX ADMIN — DOCUSATE SODIUM 100 MG: 100 CAPSULE, LIQUID FILLED ORAL at 17:46

## 2022-12-16 RX ADMIN — BUDESONIDE AND FORMOTEROL FUMARATE DIHYDRATE 1 PUFF: 160; 4.5 AEROSOL RESPIRATORY (INHALATION) at 05:35

## 2022-12-16 RX ADMIN — SENNOSIDES AND DOCUSATE SODIUM 1 TABLET: 50; 8.6 TABLET ORAL at 20:03

## 2022-12-16 RX ADMIN — DAPAGLIFLOZIN 10 MG: 10 TABLET, FILM COATED ORAL at 05:36

## 2022-12-16 ASSESSMENT — COGNITIVE AND FUNCTIONAL STATUS - GENERAL
TOILETING: A LITTLE
MOVING TO AND FROM BED TO CHAIR: UNABLE
HELP NEEDED FOR BATHING: A LITTLE
DRESSING REGULAR UPPER BODY CLOTHING: A LITTLE
SUGGESTED CMS G CODE MODIFIER DAILY ACTIVITY: CJ
CLIMB 3 TO 5 STEPS WITH RAILING: A LITTLE
DAILY ACTIVITIY SCORE: 20
MOBILITY SCORE: 14
WALKING IN HOSPITAL ROOM: A LITTLE
MOVING FROM LYING ON BACK TO SITTING ON SIDE OF FLAT BED: A LOT
DRESSING REGULAR LOWER BODY CLOTHING: A LITTLE
STANDING UP FROM CHAIR USING ARMS: A LITTLE
TURNING FROM BACK TO SIDE WHILE IN FLAT BAD: A LOT
SUGGESTED CMS G CODE MODIFIER MOBILITY: CL

## 2022-12-16 ASSESSMENT — GAIT ASSESSMENTS
DEVIATION: BRADYKINETIC;INCREASED BASE OF SUPPORT;DECREASED HEEL STRIKE;DECREASED TOE OFF
GAIT LEVEL OF ASSIST: STANDBY ASSIST
ASSISTIVE DEVICE: 4 WHEEL WALKER
DISTANCE (FEET): 75

## 2022-12-16 ASSESSMENT — PAIN DESCRIPTION - PAIN TYPE
TYPE: ACUTE PAIN
TYPE: ACUTE PAIN;SURGICAL PAIN
TYPE: ACUTE PAIN;SURGICAL PAIN
TYPE: SURGICAL PAIN;ACUTE PAIN
TYPE: SURGICAL PAIN;ACUTE PAIN

## 2022-12-16 ASSESSMENT — ACTIVITIES OF DAILY LIVING (ADL): TOILETING: REQUIRES ASSIST

## 2022-12-16 NOTE — DISCHARGE PLANNING
ATTN: Case Management  RE: Referral for Home Health    As of 12/16/2022, we have accepted the Home Health referral for the patient listed above.    A [facility] clinician will be out to see the patient within 48 hours. If you have any questions or concerns regarding the patient's transition to Home Health, please do not hesitate to contact us at [phone].      We look forward to collaborating with you,  [facility] Team

## 2022-12-16 NOTE — CARE PLAN
The patient is Stable - Low risk of patient condition declining or worsening    Shift Goals  Clinical Goals: Pain control, safety  Patient Goals: Pain control, rest    Progress made toward(s) clinical / shift goals:    Problem: Pain - Standard  Goal: Alleviation of pain or a reduction in pain to the patient’s comfort goal  Outcome: Progressing  Note: Patient states pain is relieved with current pain medication regimen. Pt medicated per MAR. Pillows and ice packs provided for comfort.      Problem: Fall Risk  Goal: Patient will remain free from falls  Outcome: Progressing  Note: Patient educated on importance of use of call light when needing assistance. Bed locked and in lowest position. No DME at bedside. Call light and side table within reach.

## 2022-12-16 NOTE — CARE PLAN
The patient is Stable - Low risk of patient condition declining or worsening    Problem: Pain - Standard  Goal: Alleviation of pain or a reduction in pain to the patient’s comfort goal  Outcome: Progressing       Shift Goals  Clinical Goals: Pain managent  Patient Goals: Manage pain  Family Goals: CYNTHIA    Progress made toward(s) clinical / shift goals: Patient has pain which is managed by current pain regiment. Upon assessment patient states he is comfortable at this time    Patient is not progressing towards the following goals:

## 2022-12-16 NOTE — PROGRESS NOTES
Patient to floor with transport in stable condition. VSS. Surgical dressings clean dry intact. Aox4 and on 2 l O2. Family updated. No further needs.

## 2022-12-16 NOTE — DISCHARGE PLANNING
Care Transition Team Assessment    Met with patient at bedside, discussed PLOF and confirmed demographics. Was at [facility] in July and then [facility]  in august and September. His sister in law recently moved into home to help out and is available after discharge to help out as well as his wife, prefers to go home. PT eval pending. CM will continue to follow.     Information Source  Orientation Level: Oriented X4  Elopement Risk  Legal Hold: No  Elopement Risk: Not at Risk for Elopement    Interdisciplinary Discharge Planning  Lives with - Patient's Self Care Capacity: Spouse  Patient or legal guardian wants to designate a caregiver: No  Support Systems: Family Member(s)  Housing / Facility: 1 Story House, 5 steps outside with railing. Ramp at back.   Able to Return to Previous ADL's: Future Time w/Therapy  Mobility Issues: No  Prior Services: Skilled Home Health Services  Durable Medical Equipment:  (SPC, FWW, shower chair, WC)    Discharge Preparedness  What is your plan after discharge?: Uncertain - pending medical team collaboration  What are your discharge supports?: Spouse (sister in law)  Prior Functional Level: Needs Assist with Activities of Daily Living  Difficulity with ADLs: Bathing  Difficulty with ADLs Comment: wife assists  Difficulity with IADLs: Cooking, Driving, Laundry, Shopping    Functional Assesment  Prior Functional Level: Needs Assist with Activities of Daily Living  Vision / Hearing Impairment  Hearing Impairment: Both Ears, Hearing Device Not Available  Domestic Abuse  Physical Abuse or Sexual Abuse: No  Verbal Abuse or Emotional Abuse: No  Possible Abuse/Neglect Reported to:: Not Applicable  Discharge Risks or Barriers  Discharge risks or barriers?: Complex medical needs    Anticipated Discharge Information  Discharge Disposition: D/T to home under A care in anticipation of covered skilled care (06)  Discharge Address: [address] [place] [id number]

## 2022-12-16 NOTE — PROGRESS NOTES
Patient in PACU in stable condition. VSS. Surgical dressing clean dry intact. Will continue to monitor and medicate appropriately.

## 2022-12-16 NOTE — DISCHARGE PLANNING
Received Choice form at [phone]  Agency/Facility Name: [facility]   Referral sent per Choice form @ NOT sent, pending orders      Referral sent @[phone]

## 2022-12-16 NOTE — FACE TO FACE
Face to Face Supporting Documentation - Home Health    The encounter with this patient was in whole or in part the primary reason for home health admission.    Date of encounter:   Patient:                    MRN:                       YOB: 2024  Mike Ferrell  [id number]  1950     Home health to see patient for:  Skilled Nursing care for assessment, interventions & education and Physical Therapy evaluation and treatment    Skilled need for:  Surgical Aftercare post op    Skilled nursing interventions to include:  Comment: post op    Homebound status evidenced by:  Needs the assistance of another person in order to leave the home. Leaving home requires a considerable and taxing effort. There is a normal inability to leave the home.    Community Physician to provide follow up care: Hipolito Hall M.D.     Optional Interventions? No      I certify the face to face encounter for this home health care referral meets the CMS requirements and the encounter/clinical assessment with the patient was, in whole, or in part, for the medical condition(s) listed above, which is the primary reason for home health care. Based on my clinical findings: the service(s) are medically necessary, support the need for home health care, and the homebound criteria are met.  I certify that this patient has had a face to face encounter by myself.  ROSI Kaplan. - NPI: [id number]

## 2022-12-16 NOTE — ANESTHESIA POSTPROCEDURE EVALUATION
Patient: Mike Ferrell    Procedure Summary     Date: 12/15/22 Room / Location: [facility] 05 / SURGERY [facility]    Anesthesia Start: 1247 Anesthesia Stop: 1608    Procedures:       POSTERIOR L3-S1 LAMINECTOMY, L5 CROFT AND TRANSFORAMINAL LUMBAR INTERBODY FUSION WITH ROBOTIC PROTOCOL (Spine Lumbar)      LAMINECTOMY, SPINE, LUMBAR, WITH DISCECTOMY (Spine Lumbar)      BONE GRAFT, ILIAC CREST (Spine Lumbar) Diagnosis: (CONGENITAL SPONDYLOLISTHESIS)    Surgeons: Herberth Solorio M.D. Responsible Provider: Rashid Evans M.D.    Anesthesia Type: general ASA Status: 3          Final Anesthesia Type: general  Last vitals  BP   Blood Pressure : (!) 196/96    Temp   36.6 °C (97.8 °F)    Pulse   71   Resp   16    SpO2   94 %      Anesthesia Post Evaluation    Patient location during evaluation: PACU  Patient participation: complete - patient participated  Level of consciousness: awake and alert  Pain score: 0    Airway patency: patent  Anesthetic complications: no  Cardiovascular status: hemodynamically stable  Respiratory status: acceptable  Hydration status: euvolemic    PONV: none          No notable events documented.     Nurse Pain Score: 0 (NPRS)

## 2022-12-16 NOTE — THERAPY
"Occupational Therapy   Initial Evaluation     Patient Name: Mike Ferrell  Age:  72 y.o., Sex:  male  Medical Record #: [id number]  Today's Date: 12/16/2022     Precautions: Fall Risk, Spinal / Back Precautions   Comments: no brace per chart    Assessment  Patient is 72 y.o. male admitted for elective spine sx pt is s/p Bilateral  laminectomy, facetectomy and decompression of L3-S1 w/fusion. Pt is POD#1 doing well. Pt requires assist for ADL's at baseline from SO and has all needed DME and AE. Anticipate no further acute OT needs at this time.     Plan  Recommend Occupational Therapy for Evaluation only Patient will not be actively followed for occupational therapy services at this time, however may be seen if requested by physician for 1 more visit within 30 days to address any discharge or equipment needs.       DC Equipment Recommendations: None  Discharge Recommendations: Recommend home health for continued occupational therapy services     Subjective  \"My wife does most of that for me already\"      Objective     12/16/22 0941   Charge Group   OT Evaluation OT Evaluation Low   Total Time Spent   OT Time Spent Yes   OT Evaluation (Minutes) 19   OT Total Time Spent (Calculated) 19   Initial Contact Note    Initial Contact Note Order Received and Verified, Evaluation Only - Patient Does Not Require Further Acute Occupational Therapy at this Time.  However, May Benefit from Post Acute Therapy for Higher Level Functional Deficits.   Prior Living Situation   Prior Services Skilled Home Health Services   Housing / Facility 1 Story House   Bathroom Set up Walk In Shower;Shower Chair   Equipment Owned Front-Wheel Walker;4-Wheel Walker;Reacher;Sock Aid;Bed Side Commode   Lives with - Patient's Self Care Capacity Spouse   Comments pt reports SO assists as baseline   Prior Level of ADL Function   Self Feeding Independent   Grooming / Hygiene Independent   Bathing Requires Assist   Dressing Requires Assist   Toileting " Routing to provider, did you happen to contact patient this morning regarding thyroid US results and plan?  Unable to locate any documentation in chart regarding a phone call to patient, he states he had message on phone about results and comments about needing biopsy.    Routing to provider to please review and advise on imaging and testing done today. Please clarify plan and we can call patient back, thank you!    Mary Agudelo RN       Requires Assist   Comments pt report SO assits w/LB dressing, bathing and serafin care   Prior Level of IADL Function   Medication Management Independent   Laundry Requires Assist   Kitchen Mobility Requires Assist   Finances Requires Assist   Home Management Requires Assist   Shopping Requires Assist   Prior Level Of Mobility Independent With Device in Community   Precautions   Precautions Fall Risk;Spinal / Back Precautions    Comments no brace per chart   Pain 0 - 10 Group   Location Back   Location Orientation Lower   Therapist Pain Assessment During Activity;Nurse Notified;4   Cognition    Cognition / Consciousness WDL   Level of Consciousness Alert   Comments cooperative   Passive ROM Upper Body   Passive ROM Upper Body WDL   Active ROM Upper Body   Active ROM Upper Body  X   Comments chronic shoulder limitations   Strength Upper Body   Upper Body Strength  WDL   Sensation Upper Body   Upper Extremity Sensation  WDL   Upper Body Muscle Tone   Upper Body Muscle Tone  WDL   Neurological Concerns   Neurological Concerns No   Coordination Upper Body   Coordination WDL   Balance Assessment   Sitting Balance (Static) Good   Sitting Balance (Dynamic) Fair   Standing Balance (Static) Fair   Standing Balance (Dynamic) Fair   Weight Shift Sitting Fair   Weight Shift Standing Fair   Comments w/ 4ww   Bed Mobility    Sit to Supine Moderate Assist   Comments pt reports SO assists at baseline   ADL Assessment   Grooming Supervision;Standing   Upper Body Dressing Supervision   Lower Body Dressing Maximal Assist   Toileting Supervision   Comments pt reports SO manages socks at [facility] or pt has AE   How much help from another person does the patient currently need...   6 Clicks Daily Activity Score 20   Functional Mobility   Sit to Stand Standby Assist   Bed, Chair, Wheelchair Transfer Standby Assist   Toilet Transfers Standby Assist   Mobility walking in room w/4ww   Activity Tolerance   Comments mild c/o fatigue   Patient /  Family Goals   Patient / Family Goal #1 to go home   Short Term Goals   Short Term Goal # 1 pt will have no further acute OT needs   Education Group   Education Provided Back Safety;Activities of Daily Living   Role of Occupational Therapist Patient Response Patient;Acceptance;Explanation;Demonstration   Back Safety Patient Response Patient;Acceptance;Demonstration;Explanation   ADL Patient Response Patient;Acceptance;Explanation;Demonstration   Problem List   Problem List None

## 2022-12-16 NOTE — DISCHARGE SUMMARY
DATE OF ADMISSION:  12/15/2022     OPERATION PERFORMED:  L3 through S1 laminectomy with L5-S1 fusion on   12/15/2022.     HOSPITAL COURSE:  On date of admission, operation was performed.    Postoperatively, the patient has done well.  He does state his strength is   some improved.  He is eating.  He is ambulating.  We have discontinued his PCA   and started oral pain medications.  His Yu was removed.  If he is able to   void, may discharge home tomorrow.  His Hemovac output is quite high.  His   hemoglobin was 8.5 this morning.  We will check again this afternoon and again   in the morning.     Bilateral lower extremity strength is grossly intact.  His incision is clean,   dry and intact with Hemovac with 240 out.     DISCHARGE INSTRUCTIONS:  Given to patient in paper format.     DISCHARGE MEDICATIONS:  1.  Hydrocodone 10/325, 1-2 p.o. q.6 hours p.r.n. pain, #56, no refills.  2.  Tizanidine 4 mg 1 p.o. t.i.d. p.r.n. spasm, #60, no refills.     Informed consent and medication agreement done over the phone in preop.     ASSESSMENT AND PLAN:  1.  Status post above delineated surgery with Dr. Solorio on 12/15/2022.  2.  The patient will follow up in 4 weeks with x-rays.  3.  The patient may resume Eliquis 7 days postop.     Should the patient have further questions or concerns, they will not hesitate   to give our office a call.        ______________________________  DERRELL Lehman        ______________________________  MD CRESCENCIO RAGSDALE/TIFFANY/KEITH    DD:  12/16/2022 12:27  DT:  12/16/2022 12:45    Job#:  [id number]

## 2022-12-16 NOTE — THERAPY
Physical Therapy   Initial Evaluation     Patient Name: Mike Ferrell  Age:  72 y.o., Sex:  male  Medical Record #: [id number]  Today's Date: 12/16/2022     Precautions: Fall Risk;Spinal / Back Precautions   Comments: no brace per chart    Assessment  Patient is 72 y.o. male POD #1 L3-S1 lami with L5-S1 fusion. Pt educated on spinal precautions post-op. Today he required assistance primarily to negotiate bed mob requiring mod-max. He reports his wife assists him with bed mob at baseline. He tolerated amb x75ft with 4WW prior to fatiguing. He demonstrates RLE weakness 2-(3-)/5 vs LLE 3/5 and reports diminished sensation in RLE that has been present from pre-op. Will work toward increasing ind with bed mob while in house, recommend HHPT upon DC.    Plan    Recommend Physical Therapy 4 times per week until therapy goals are met for the following treatments:  Bed Mobility, Neuro Re-Education / Balance, Therapeutic Activities, and Therapeutic Exercises    DC Equipment Recommendations: None (owns 4WW)  Discharge Recommendations: Recommend home health for continued physical therapy services     Objective    Prior Living Situation   Prior Services None  (HH up until September)   Housing / Facility [address] [facility]   Steps Into Home 5  (also has ramp access)   Equipment Owned 4-Wheel Walker;Single Point Cane;Tub / Shower Seat   Lives with - Patient's Self Care Capacity Spouse  (and YOCASTA)   Comments Reports Spouse has been providing assistance as needed pre-op   Prior Level of Functional Mobility   Bed Mobility Required Assist   Transfer Status Independent   Ambulation Independent   Distance Ambulation (Feet)   (Limited household distances)   Assistive Devices Used 4-Wheel Walker   Stairs Independent   Comments used stairs to descend out of house only, ramp to ascend   Cognition    Cognition / Consciousness WDL   Level of Consciousness Alert   Comments Cooperative   Passive ROM Lower Body   Passive ROM Lower Body WDL   Active ROM  "Lower Body    Active ROM Lower Body  X   Comments Full R hip flexion limited by weakness, otherwise WNL   Strength Lower Body   Lower Body Strength  X   Comments LLE grossly 3/5; R hip flexion 2/5 and residual RLE 3-/5, pt reports right leg has been \"dead for about a year\"   Sensation Lower Body   Lower Extremity Sensation   X   Comments reports diminished sensation in RLE compared to LLE   Balance Assessment   Sitting Balance (Static) Fair   Sitting Balance (Dynamic) Fair   Standing Balance (Static) Fair   Standing Balance (Dynamic) Fair -   Weight Shift Sitting Fair   Weight Shift Standing Fair   Comments w/ 4WW   Gait Analysis   Gait Level Of Assist Standby Assist   Assistive Device 4 Wheel Walker   Distance (Feet) 75   # of Times Distance was Traveled 1   Deviation Bradykinetic;Increased Base Of Support;Decreased Heel Strike;Decreased Toe Off   Level of Assist with Stairs   (Declined to trial - has ramp access if needed)   Weight Bearing Status NR   Bed Mobility    Supine to Sit Moderate Assist   Sit to Supine   (Up with OT post)   Comments attempted to educate pt on log roll sequencing, reports SO \"throws him around\" at baseline to get up out of bed and can assist him upon DC   Functional Mobility   Sit to Stand Minimal Assist   Bed, Chair, Wheelchair Transfer Contact Guard Assist   Transfer Method Stand Step   Short Term Goals    Short Term Goal # 1 Pt will perform supine<>sit via log roll with min A within 6 visits to improve bed mob and reduce caregiver strain.   Short Term Goal # 2 Pt will perform STS with 4WW and SBA within 6 visits.   Education Group   Education Provided Role of Physical Therapist;Spine Precautions   Spine Precautions Patient Response Patient;Acceptance;Explanation;Handout;Reinforcement Needed   Role of Physical Therapist Patient Response Patient;Acceptance;Explanation;Demonstration;Action Demonstration     "

## 2022-12-16 NOTE — OP REPORT
DATE OF SERVICE:  12/15/2022     PREOPERATIVE DIAGNOSES:  1.  L3 to S1 stenosis with radiculopathy.  2.  Congenital L5 spondylolysis with L5-S1 spondylolisthesis.     POSTOPERATIVE DIAGNOSES:  1.  L3 to S1 stenosis with radiculopathy.  2.  Congenital L5 spondylolysis with L5-S1 spondylolisthesis.     OPERATIONS:  1.  Bilateral 3-4 partial laminectomy, medial facetectomy, decompression of   thecal sac, bilateral L4 nerve roots.  2.  Bilateral 4-5 partial laminectomy, medial facetectomy, right sided   foraminotomy, extensive decompression of distal right 5 root plus proximal   bilateral L5 nerve roots.  3.  L5-S1 Martinez's procedure, resection of spondylolysis, extensive bilateral   foraminotomies, decompression of bilateral distal L5 and bilateral proximal S1   nerve roots.  4.  L5-S1 fusion with translumbar interbody fusion plus posterolateral fusion.  5.  Harvesting preparation, local bone graft.  6.  Placement of left sided L5-S1 translumbar interbody cage (Medtronic   Catalyft PL40 cage 9x29 mm).  7.  L5-S1 nonsegmental pedicle screw instrumentation (Medtronic Solera   system).  8.  Mazor stereotaxy.     SURGEON:  Herberth Solorio MD     ASSISTANT:  DERRELL Strange     ANESTHESIA:  General endotracheal.     ANESTHESIOLOGIST:  Rashid Evans MD     PREPARATION:  ChloraPrep.     MEDICATIONS:  The patient given Ancef prior to incision.     INDICATIONS:  The patient with back and radicular pain refractory to   nonoperative therapies, had stenosis 3-1.  He had a congenital spondylolysis   at L5 with spondylolisthesis with marked foraminal narrowing for this exiting   5 roots.  The patient had a neurologic deficit, was felt to be a candidate for   operative decompression to relieve pain to prevent further loss of neurologic   function and optimize the potential for the return of loss function.  The   patient understood major risks and complications, paralysis exceedingly rare,   biggest risk of surgery is nonresponse,  which is 10-20%, small risk of wound   infection, spinal fluid leak, chance to require another operation rest of his   life 15%.  The patient is understanding and agreed to proceed and signed   consent,.     NEED FOR SURGICAL ASSISTANCE:  Surgical assistant required throughout the case   for retraction, suction, irrigation, cleaning of instruments, keep the case   moving forward and minimize operative time.     DESCRIPTION OF PROCEDURE:  The patient was brought to the operating room.    Peripheral venous lines in place.  General anesthesia was induced.  The   patient intubated.  Yu catheter was placed.  The patient laid prone on the   OSI table using 6 posts.  Pressure points were carefully padded.  The   patient's left arm would not bend to lay on the standard arm rest.  We had to   create a sling for his left arm, which was done carefully to avoid any   pressure on the ulnar nerve where the axilla, which were carefully padded.    The back was then doubly prepped with ChloraPrep and draped by myself.    Planned incision was marked from 3-1.  Posterior spine was exposed in a   subperiosteal dissection in a standard fashion, dissected out the transverse   processes of 5, sacral ala of 5 bilaterally.  Deep Gelpi retractors were   placed.  Using large Leksell, I removed the spinous processes of 5, 4,   inferior half of 3.  Midline laminectomy was carried out with the large   Leksell, removing the free floating lamina and inferior facets of 5.  Large   pieces of bone were cleansed of bone tissue, placed through the bone mill,   midline lamina, inferior half of 3, all of 4, the medial facets were drilled   down to paper thin shelf of bone.  Thin shelf of bone along with thickened   ligamentum flavum removed in a piecemeal fashion with 2, 3 and 4 mm Kerrisons   were needed.  I worked laterally to the medial aspect of the pedicles of 4, 5   and S1, completely unroofing the respective nerve roots. Did extensive    foraminotomy around the right 4 root resecting the inferior aspect of the pars   interarticularis, tip of superior facet of 5 on the right side to the point   where the 4 root was completely decompressed.  I could pass the Wylie nerve   probe into distal foramen.  Similarly, the 5 roots were decompressed out their   respective foramina.  I removed lateral bulging disk underneath the 5 root on   the right to provide further decompression.  This was done after pedicle   screws were placed with distraction, which eased the decompression throughout   the case.  All bone was harvested to be used for bone grafting.  A post was   placed in the right posterior superior iliac spine, connected to the Miyowa   Robotic System.  The patient was registered.  Then, using the Miyowa robot,   pedicle screws were placed bilaterally at L5 and S1.  Initial hole begun with   the specialized drill, then with a 5.5 mm tap.  I then palpated the trajectory   through the pedicle or vertebral body.  I could palpate bone medially,   laterally, superiorly and inferiorly at the depth, ensuring no penetration   outside the pedicle or vertebral body.  At S1, we placed a 6.5x45 mm screws   bilaterally and at L5, 6.5x40 mm screws. Lordosis rods were placed, 35 on the   right, 40 on the left, partially tightened inferiorly and distraction was   applied.  Once distraction was applied, the foraminotomies over the 5 roots   were carried out until the 5 roots were completely free along with the S1   roots. Prior to placement of the pedicle screws, we decorticated the   transverse process of 5 and the sacral ala and placed bone graft   posterolaterally and then 2.5 mL of [place] putty most posteriorly to hold the   graft in place.  The thecal sac was then retracted from left to right.  The   5-1 disk was incised on the left side and the disk debrided with the paddle   kyle, serrated curettes, pituitary rongeurs.  Bone graft was then tamped   into the  depths of the disk space after disk space preparation.  Then, the   Catalyft 9x29 cage was placed stereotactically, tamped into place, then fully   expanded.  More bone graft was placed down through the cage mcmahan into the   cage itself.  Next, distraction was released on the pedicle screws.  Further   tightening was carried out x4 to factory torque setting and then further   tightened x4, three 22 cross connector was placed, tightened x3.  L5 and S1   moved as single unit.  Final exploration revealed complete decompression of   the thecal sac, bilateral L4, L5 and S1 nerve roots.  Fat grafts were placed   over the 5 roots bilaterally.  Wound throughout the case was irrigated with   antibiotic irrigation.  Muscle bleeders controlled with bipolar   electrocautery.  A gram of vancomycin powder was sprinkled in the depth of the   wound, brought out through separate stab incision.  Deep fascia was closed   with #1 Vicryl, subcutaneous fascia with #1 Vicryl, subcuticular closed with   3-0 Vicryl including the pin site.  Skin edges approximated with quarter-inch   Steri-Strips.  Drain was sutured in place at the exit site with 2-0 Vicryl   connected to closed drainage system.  Final sponge, needle, and cottonoid   counts correct.  Estimated blood loss 500 mL.  Sterile dressing was placed.    The patient laid supine on the bed, extubated, taken to recovery room in   satisfactory condition, tolerated the procedure well without apparent   complication.        ______________________________  SANTOS ANDERSON MD    [facility]    DD:  12/15/2022 16:17  DT:  12/15/2022 19:13    Job#:  [id number]    CC:Rashid Evans MD

## 2022-12-17 VITALS
OXYGEN SATURATION: 98 % | BODY MASS INDEX: 32.7 KG/M2 | WEIGHT: 241.4 LBS | SYSTOLIC BLOOD PRESSURE: 109 MMHG | HEIGHT: 72 IN | DIASTOLIC BLOOD PRESSURE: 64 MMHG | HEART RATE: 70 BPM | RESPIRATION RATE: 17 BRPM | TEMPERATURE: 97.7 F

## 2022-12-17 LAB
ANION GAP SERPL CALC-SCNC: 7 MMOL/L (ref 7–16)
BUN SERPL-MCNC: 38 MG/DL (ref 8–22)
CALCIUM SERPL-MCNC: 8.4 MG/DL (ref 8.5–10.5)
CHLORIDE SERPL-SCNC: 103 MMOL/L (ref 96–112)
CO2 SERPL-SCNC: 29 MMOL/L (ref 20–33)
CREAT SERPL-MCNC: 1.17 MG/DL (ref 0.5–1.4)
ERYTHROCYTE [DISTWIDTH] IN BLOOD BY AUTOMATED COUNT: 54.2 FL (ref 35.9–50)
GFR SERPLBLD CREATININE-BSD FMLA CKD-EPI: 65 ML/MIN/1.73 M 2
GLUCOSE SERPL-MCNC: 97 MG/DL (ref 65–99)
HCT VFR BLD AUTO: 24.8 % (ref 42–52)
HGB BLD-MCNC: 7.6 G/DL (ref 14–18)
MCH RBC QN AUTO: 32.9 PG (ref 27–33)
MCHC RBC AUTO-ENTMCNC: 30.6 G/DL (ref 33.7–35.3)
MCV RBC AUTO: 107.4 FL (ref 81.4–97.8)
PLATELET # BLD AUTO: 136 K/UL (ref 164–446)
PMV BLD AUTO: 10.3 FL (ref 9–12.9)
POTASSIUM SERPL-SCNC: 4.6 MMOL/L (ref 3.6–5.5)
RBC # BLD AUTO: 2.31 M/UL (ref 4.7–6.1)
SODIUM SERPL-SCNC: 139 MMOL/L (ref 135–145)
WBC # BLD AUTO: 11.2 K/UL (ref 4.8–10.8)

## 2022-12-17 PROCEDURE — G0378 HOSPITAL OBSERVATION PER HR: HCPCS

## 2022-12-17 PROCEDURE — A9270 NON-COVERED ITEM OR SERVICE: HCPCS | Performed by: NURSE PRACTITIONER

## 2022-12-17 PROCEDURE — 85027 COMPLETE CBC AUTOMATED: CPT

## 2022-12-17 PROCEDURE — 96366 THER/PROPH/DIAG IV INF ADDON: CPT | Mod: XU

## 2022-12-17 PROCEDURE — 700102 HCHG RX REV CODE 250 W/ 637 OVERRIDE(OP): Performed by: NURSE PRACTITIONER

## 2022-12-17 PROCEDURE — 36415 COLL VENOUS BLD VENIPUNCTURE: CPT

## 2022-12-17 PROCEDURE — 80048 BASIC METABOLIC PNL TOTAL CA: CPT

## 2022-12-17 RX ADMIN — POLYETHYLENE GLYCOL 3350 1 PACKET: 17 POWDER, FOR SOLUTION ORAL at 09:27

## 2022-12-17 RX ADMIN — DOCUSATE SODIUM 100 MG: 100 CAPSULE, LIQUID FILLED ORAL at 06:02

## 2022-12-17 RX ADMIN — HYDROCODONE BITARTRATE AND ACETAMINOPHEN 1 TABLET: 10; 325 TABLET ORAL at 09:27

## 2022-12-17 RX ADMIN — METHOCARBAMOL 750 MG: 750 TABLET ORAL at 06:01

## 2022-12-17 RX ADMIN — COLESEVELAM HYDROCHLORIDE 625 MG: 625 TABLET, FILM COATED ORAL at 06:02

## 2022-12-17 RX ADMIN — BUDESONIDE AND FORMOTEROL FUMARATE DIHYDRATE 1 PUFF: 160; 4.5 AEROSOL RESPIRATORY (INHALATION) at 06:01

## 2022-12-17 RX ADMIN — DAPAGLIFLOZIN 10 MG: 10 TABLET, FILM COATED ORAL at 06:01

## 2022-12-17 RX ADMIN — HYDROCODONE BITARTRATE AND ACETAMINOPHEN 1 TABLET: 10; 325 TABLET ORAL at 02:15

## 2022-12-17 RX ADMIN — SOTALOL HYDROCHLORIDE 120 MG: 120 TABLET ORAL at 06:02

## 2022-12-17 RX ADMIN — VALSARTAN 80 MG: 80 TABLET, FILM COATED ORAL at 06:06

## 2022-12-17 ASSESSMENT — PAIN DESCRIPTION - PAIN TYPE
TYPE: SURGICAL PAIN
TYPE: ACUTE PAIN;SURGICAL PAIN

## 2022-12-17 NOTE — DISCHARGE PLANNING
Care Transition Team Discharge Planning    Anticipated Discharge Information  Discharge Disposition: D/T to home under [facility] care in anticipation of covered skilled care (06)  Discharge Address: [address] [place] [id number]              Discharge Plan:  Home with [facility]    Dr Solorio already placed  Discharge Summary , Informed BETTY Palmer  that Pt has been accepted by [facility]. Pt might discharge today.    This RN FAIZA requested Marysol DPA to inform [facility] that Pt might discharge today.

## 2022-12-17 NOTE — PROGRESS NOTES
Neurosurgery Progress Note    Subjective:  States str improved   Eating, ambulating, voiding  +flatus wants to go home    Exam:  BLE str intact CDI with hvac 5    BP  Min: 91/46  Max: 125/72  Pulse  Av.4  Min: 68  Max: 76  Resp  Av.6  Min: 17  Max: 18  Temp  Av.6 °C (97.8 °F)  Min: 36.4 °C (97.5 °F)  Max: 36.7 °C (98.1 °F)  Monitored Temp 2  Av.6 °C (97.9 °F)  Min: 36.4 °C (97.5 °F)  Max: 36.8 °C (98.2 °F)  SpO2  Av.2 %  Min: 94 %  Max: 99 %    No data recorded    Recent Labs     22  0426 22  1459 22  0825   WBC 10.9* 13.9* 11.2*   RBC 2.55* 2.45* 2.31*   HEMOGLOBIN 8.4* 8.3* 7.6*   HEMATOCRIT 27.0* 26.4* 24.8*   .9* 107.8* 107.4*   MCH 32.9 33.9* 32.9   MCHC 31.1* 31.4* 30.6*   RDW 53.6* 54.0* 54.2*   PLATELETCT 167 180 136*   MPV 10.6 10.7 10.3       Recent Labs     22  0426 22  0825   SODIUM 137 139   POTASSIUM 5.1 4.6   CHLORIDE 103 103   CO2 28 29   GLUCOSE 115* 97   BUN 29* 38*   CREATININE 0.82 1.17   CALCIUM 8.0* 8.4*                 Intake/Output                         22 0700 - 22 0659 22 07 - 22 0659      Total  Total                 Intake    Total Intake -- -- -- -- -- --       Output    Urine  900  700 1600  550  -- 550    Number of Times Voided -- 1 x 1 x 2 x -- 2 x    Urine Void (mL)  550 -- 550    Drains  --  90 90  10  -- 10    Output (mL) (Closed/Suction Drain 1 Posterior Back Hemovac) -- 90 90 10 -- 10    Total Output  560 -- 560       Net I/O     -900 -790 -1690 -560 -- -560              Intake/Output Summary (Last 24 hours) at 2022 1341  Last data filed at 2022 1317  Gross per 24 hour   Intake --   Output 1950 ml   Net -1950 ml               HYDROcodone/acetaminophen  1 Tablet Q4HRS PRN    budesonide-formoterol  1 Puff DAILY    colesevelam  625 mg DAILY    moxifloxacin  1 Drop QDAY PRN    potassium chloride SA  20 mEq QDAY PRN     sotalol  120 mg QAM    valsartan  80 mg BID    Pharmacy Consult Request  1 Each PHARMACY TO DOSE    MD ALERT...DO NOT ADMINISTER NSAIDS or ASPIRIN unless ORDERED By Neurosurgery  1 Each PRN    docusate sodium  100 mg BID    senna-docusate  1 Tablet Nightly    senna-docusate  1 Tablet Q24HRS PRN    polyethylene glycol/lytes  1 Packet BID PRN    magnesium hydroxide  30 mL QDAY PRN    bisacodyl  10 mg Q24HRS PRN    sodium phosphate  1 Each Once PRN    ondansetron  4 mg Q4HRS PRN    ondansetron  4 mg Q4HRS PRN    diphenhydrAMINE  25 mg Q6HRS PRN    Or    diphenhydrAMINE  25 mg Q6HRS PRN    methocarbamol  750 mg Q8HRS PRN    Or    cyclobenzaprine  10 mg Q8HRS PRN    Or    diazePAM  5 mg Q4HRS PRN    labetalol  10 mg Q HOUR PRN    hydrALAZINE  10 mg Q HOUR PRN    dapagliflozin propanediol  10 mg DAILY       Assessment and Plan:    POD #2 L3-S1 lami with L5-S1 fusion  Prophylactic anticoagulation: no         Start date/time: tbd    Plan:  Dc hvac  Dc home

## 2022-12-17 NOTE — CARE PLAN
Problem: Pain - Standard  Goal: Alleviation of pain or a reduction in pain to the patient’s comfort goal  Description: Target End Date:  Prior to discharge or change in level of care    Document on Vitals flowsheet    1.  Document pain using the appropriate pain scale per order or unit policy  2.  Educate and implement non-pharmacologic comfort measures (i.e. relaxation, distraction, massage, cold/heat therapy, etc.)  3.  Pain management medications as ordered  4.  Reassess pain after pain med administration per policy  5.  If opiods administered assess patient's response to pain medication is appropriate per POSS sedation scale  6.  Follow pain management plan developed in collaboration with patient and interdisciplinary team (including palliative care or pain specialists if applicable)  Outcome: Progressing     Problem: Knowledge Deficit - Standard  Goal: Patient and family/care givers will demonstrate understanding of plan of care, disease process/condition, diagnostic tests and medications  Description: Target End Date:  1-3 days or as soon as patient condition allows    Document in Patient Education    1.  Patient and family/caregiver oriented to unit, equipment, visitation policy and means for communicating concern  2.  Complete/review Learning Assessment  3.  Assess knowledge level of disease process/condition, treatment plan, diagnostic tests and medications  4.  Explain disease process/condition, treatment plan, diagnostic tests and medications  Outcome: Progressing     Problem: Fall Risk  Goal: Patient will remain free from falls  Description: Target End Date:  Prior to discharge or change in level of care    Document interventions on the Kate Han Fall Risk Assessment    1.  Assess for fall risk factors  2.  Implement fall precautions  Outcome: Progressing   The patient is Stable - Low risk of patient condition declining or worsening    Shift Goals  Clinical Goals: pain control  Patient Goals: go  home  Family Goals: CYNTHIA    Progress made toward(s) clinical / shift goals: pain controlled with prns     Patient is not progressing towards the following goals:

## 2022-12-17 NOTE — DISCHARGE INSTRUCTIONS
Your Hemoglobin is low at 7.6. Since you are asymptomatic the NP will let you go home. Come back to ER if you experience any dizziness, lightheadedness, or a low BP   Wait 10 days post op to resume eliquis. May resume 12/26.

## 2022-12-17 NOTE — CARE PLAN
The patient is Stable - Low risk of patient condition declining or worsening    Shift Goals  Clinical Goals: Pain management  Patient Goals: Pain control, rest  Family Goals: CYNTHIA    Progress made toward(s) clinical / shift goals:    Problem: Pain - Standard  Goal: Alleviation of pain or a reduction in pain to the patient’s comfort goal  Outcome: Progressing  Note: Patient states pain is relieved with current pain medication regimen. Patient medicated per MAR. Pt educated on nonpharmacologic methods to relieve pain such as deep breathing, ice packs, and repositioning.      Problem: Knowledge Deficit - Standard  Goal: Patient and family/care givers will demonstrate understanding of plan of care, disease process/condition, diagnostic tests and medications  Outcome: Progressing  Note: Patient educated on plan of care, verbalizes understanding, all questions answered. Pt encouraged to voice feelings and concerns.

## 2022-12-17 NOTE — DISCHARGE PLANNING
[facility] TCN chart review completed. Attempted to collaborate with CM prior to meeting with the pt. The most current review of medical record, knowledge of pt's PLOF and social support, and LACE+ score of 63 were considered; no 6 clicks scores entered at this time, though appreciate kardex notation that patient has been to bathroom and back with FWW.     Pt seen at bedside. Introduced TCN program. Provided education regarding post acute levels of care. Discussed HTH/[facility] plan benefits (Meds to Beds, medical uber and [facility] transitional care). Pt verbalizes understanding.     Patient reports that he owns a 4WW, FWW, W/C, and SPC. He reports that the limited walking he has completed in the hospital has been less painful than it is at baseline.     Choice proactively obtained for HH - CM Sue advised.  TCN will continue to follow and collaborate with discharge planning team as additional post acute needs arise. Thank you.     Completed today:  PT/OT with recommendations for HH on 12/16/22  Choice obtained:   [facility] referral Not sent. Patient declines, will have f/u appointment with neurosurgeon in a couple weeks.

## 2022-12-20 ENCOUNTER — DOCUMENTATION (OUTPATIENT)
Dept: CARDIOLOGY | Facility: MEDICAL CENTER | Age: 72
End: 2022-12-20
Payer: MEDICARE

## 2022-12-20 ENCOUNTER — HOME CARE VISIT (OUTPATIENT)
Dept: HOME HEALTH SERVICES | Facility: HOME HEALTHCARE | Age: 72
End: 2022-12-20
Payer: MEDICARE

## 2022-12-20 VITALS
WEIGHT: 211.2 LBS | BODY MASS INDEX: 28.61 KG/M2 | SYSTOLIC BLOOD PRESSURE: 104 MMHG | TEMPERATURE: 97.4 F | OXYGEN SATURATION: 94 % | DIASTOLIC BLOOD PRESSURE: 60 MMHG | RESPIRATION RATE: 16 BRPM | HEART RATE: 71 BPM | HEIGHT: 72 IN

## 2022-12-20 PROCEDURE — G0493 RN CARE EA 15 MIN HH/HOSPICE: HCPCS

## 2022-12-20 PROCEDURE — 665001 SOC-HOME HEALTH

## 2022-12-20 SDOH — ECONOMIC STABILITY: HOUSING INSECURITY: HOME SAFETY: ERSTANDING.    SN EDUCATED PT IN REGARDS TO FALL PREVENTION USING HANDOUT IN WHITE BINDER.

## 2022-12-20 SDOH — ECONOMIC STABILITY: HOUSING INSECURITY
HOME SAFETY: ENT IN THE HOME., INSTRUCTED PATIENT/CAREGIVER TO GET ONE AS SOON AS POSSIBLE. SMOKE ALARMS ARE PRESENT AND FUNCTIONAL ON EACH LEVEL OF THE HOME. PATIENT DOES HAVE A FIRE ESCAPE PLAN DEVELOPED. PATIENT DOES NOT HAVE FLAMMABLE MATERIALS PRESENT IN THE

## 2022-12-20 SDOH — ECONOMIC STABILITY: HOUSING INSECURITY
HOME SAFETY: OXYGEN SAFETY RISK ASSESSMENT PERFORMED. PATIENT DOES NOT HAVE A NO SMOKING SIGN POSTED IN THE HOME., PT WAS GIVEN A NO SMOKING SIGN AND PROVIDED EDUCATION ABOUT WHY IT IS IMPORTANT TO PLACE ONE. PATIENT DOES NOT HAVE A WORKING FIRE EXTINGUISHER PRES

## 2022-12-20 SDOH — ECONOMIC STABILITY: HOUSING INSECURITY
HOME SAFETY: HOME PRESENTING A FIRE HAZARD. EVIDENCE FOUND OF SMOKING MATERIALS PRESENT (WIFE SMOKES OUTSIDE) IN THE HOME., EDUCATED PATIENT ABOUT THE RISKS OF FIRE, BURNS AND EVEN DEATH DUE TO SMOKING IN THE PRESENCE OF SUPPLEMENTAL OXYGEN. PT DID VERBALIZE UND

## 2022-12-20 SDOH — ECONOMIC STABILITY: HOUSING INSECURITY: EVIDENCE OF SMOKING MATERIAL: 0

## 2022-12-20 ASSESSMENT — PATIENT HEALTH QUESTIONNAIRE - PHQ9
2. FEELING DOWN, DEPRESSED, IRRITABLE, OR HOPELESS: 00
1. LITTLE INTEREST OR PLEASURE IN DOING THINGS: 00
CLINICAL INTERPRETATION OF PHQ2 SCORE: 0

## 2022-12-20 ASSESSMENT — ENCOUNTER SYMPTOMS
PAIN LOCATION - RELIEVING FACTORS: REST AND PAIN MEDS
DYSPNEA ACTIVITY LEVEL: AFTER AMBULATING LESS THAN 10 FT
NAUSEA: PT DENIES
PAIN SEVERITY GOAL: 0/10
VOMITING: PT DENIES
SHORTNESS OF BREATH: 1
PAIN LOCATION - PAIN DURATION: SHORT
HIGHEST PAIN SEVERITY IN PAST 24 HOURS: 5/10
PAIN LOCATION - PAIN FREQUENCY: FREQUENT
LOWEST PAIN SEVERITY IN PAST 24 HOURS: 0/10
PAIN LOCATION - PAIN QUALITY: SHARP
PAIN LOCATION: BACK
PAIN LOCATION - PAIN SEVERITY: 5/10
PAIN: 1
SUBJECTIVE PAIN PROGRESSION: GRADUALLY IMPROVING
PERSON REPORTING PAIN: PATIENT

## 2022-12-20 ASSESSMENT — ACTIVITIES OF DAILY LIVING (ADL)
OASIS_M1830: 05
TRANSPORTATION COMMENTS: PT NEEDS ASSISTANCE TO LEAVE HOME

## 2022-12-20 ASSESSMENT — FIBROSIS 4 INDEX: FIB4 SCORE: 4.11

## 2022-12-20 NOTE — PROGRESS NOTES
Medication chart review for [facility] services    Received referral from [facility].   Medications reviewed  compared with discharge summary if available.    Current medication list per [facility]     Current Outpatient Medications:     Home Care Oxygen, 2 L/min, Inhalation, QHS    [START ON 12/26/2022] apixaban, 5 mg, Oral, BID    cyclobenzaprine, 10 mg, Oral, BID    ALBUTEROL SULFATE INH, 1 Puff, Inhalation, DAILY    Moxifloxacin HCl, 1 Drop, Both Eyes, QDAY PRN    Breztri Aerosphere, 1 Puff, Inhalation, DAILY    torsemide, TAKE 1 TABLET BY MOUTH 1 TIME A DAY AS NEEDED (FOR WEIGHT GAIN AND SWELLING).    potassium chloride SA, 20 mEq, Oral, QDAY PRN    valsartan, 80 mg, Oral, BID    Empagliflozin, 10 mg, Oral, DAILY    HYDROcodone/acetaminophen, 1 Tablet, Oral, Q6HRS PRN    colchicine, 0.6 mg, Oral, PRN    colesevelam, 625 mg, Oral, DAILY    sotalol, 120 mg, Oral, QAM    cyanocobalamin, 2,000 mcg, Oral, DAILY (Patient not taking: Reported on 12/20/2022)    Location of hospital, and discharge summary date, if applicable:   [facility] discharge summary note 12/16/2022.       DISCHARGE MEDICATIONS:  1.  Hydrocodone 10/325, 1-2 p.o. q.6 hours p.r.n. pain, #56, no refills.  2.  Tizanidine 4 mg 1 p.o. t.i.d. p.r.n. spasm, #60, no refills.      Allergies   Allergen Reactions    Oxycodone Hcl Unspecified     HALLUCINATIONS    Atorvastatin Rash, Itching and Myalgia          Statins [Hmg-Coa-R Inhibitors] Rash and Itching     Skin gets red & itchy    Sulfa Drugs Rash and Itching     Rash, itch       Labs     Lab Results   Component Value Date/Time    SODIUM 139 12/17/2022 08:25 AM    POTASSIUM 4.6 12/17/2022 08:25 AM    CHLORIDE 103 12/17/2022 08:25 AM    CO2 29 12/17/2022 08:25 AM    GLUCOSE 97 12/17/2022 08:25 AM    BUN 38 (H) 12/17/2022 08:25 AM    CREATININE 1.17 12/17/2022 08:25 AM    BUNCREATRAT 25 (H) 05/30/2014 09:04 AM     Lab Results   Component Value Date/Time    ALKPHOSPHAT 126 (H)  09/03/2022 06:57 AM    ASTSGOT 20 09/03/2022 06:57 AM    ALTSGPT 7 09/03/2022 06:57 AM    TBILIRUBIN 0.4 09/03/2022 06:57 AM    INR 1.48 (H) 12/05/2022 01:51 PM    ALBUMIN 3.4 09/03/2022 06:57 AM        Assessment for clinically significant drug interactions, drug omissions/additions, duplicative therapies.            CC   Hipolito Hall M.D.  [address]  [address]  [place] [address]  Fax: [phone]    [facility]  Phone [phone] fax [phone]    This note was created using voice recognition software (Dragon). The accuracy of the dictation is limited by the abilities of the software. I have reviewed the note prior to signing, however some errors in grammar and context are still possible. If you have any questions related to this note please do not hesitate to contact our office.

## 2022-12-21 ENCOUNTER — HOME CARE VISIT (OUTPATIENT)
Dept: HOME HEALTH SERVICES | Facility: HOME HEALTHCARE | Age: 72
End: 2022-12-21
Payer: MEDICARE

## 2022-12-21 PROCEDURE — G0151 HHCP-SERV OF PT,EA 15 MIN: HCPCS

## 2022-12-22 ENCOUNTER — HOME CARE VISIT (OUTPATIENT)
Dept: HOME HEALTH SERVICES | Facility: HOME HEALTHCARE | Age: 72
End: 2022-12-22
Payer: MEDICARE

## 2022-12-22 VITALS
OXYGEN SATURATION: 87 % | SYSTOLIC BLOOD PRESSURE: 106 MMHG | TEMPERATURE: 97.6 F | RESPIRATION RATE: 16 BRPM | DIASTOLIC BLOOD PRESSURE: 76 MMHG | HEART RATE: 80 BPM

## 2022-12-22 ASSESSMENT — ENCOUNTER SYMPTOMS
HIGHEST PAIN SEVERITY IN PAST 24 HOURS: 5/10
PAIN LOCATION - PAIN SEVERITY: 5/10
DEBILITATING PAIN: 1
PAIN SEVERITY GOAL: 0/10
PERSON REPORTING PAIN: PATIENT
LOWEST PAIN SEVERITY IN PAST 24 HOURS: 5/10
PAIN LOCATION: BACK
PAIN: 1
ARTHRALGIAS: 1
MUSCLE WEAKNESS: 1

## 2022-12-22 ASSESSMENT — GAIT ASSESSMENTS
STEP CONTINUITY: 0 - STOPPING OR DISCONTINUITY BETWEEN STEPS
STEP SYMMETRY: 1 - RIGHT AND LEFT STEP LENGTH APPEAR EQUAL
TRUNK: 1 - NO SWAY BUT FLEXION OF KNEES OR BACK OR SPREADS ARMS WHILE WALKING
PATH SCORE: 1
WALKING STANCE: 0 - HEELS APART
INITIATION OF GAIT IMMEDIATELY AFTER GO: 0 - ANY HESITANCY OR MULTIPLE ATTEMPTS TO START
TRUNK SCORE: 1
GAIT SCORE: 7
PATH: 1 - MILD/MODERATE DEVIATION OR USES WALKING AID
BALANCE AND GAIT SCORE: 10

## 2022-12-22 ASSESSMENT — BALANCE ASSESSMENTS
BALANCE SCORE: 3
STANDING BALANCE: 1 - STEADY BUT WIDE STANCE AND USES CANE OR OTHER SUPPORT
TURNING 360 DEGREES STEPS: 0 - DISCONTINUOUS STEPS
IMMEDIATE STANDING BALANCE FIRST 5 SECONDS: 1 - STEADY BUT USES WALKER OR OTHER SUPPORT
SITTING DOWN: 1 - USES ARMS OR NOT SMOOTH MOTION
SITTING BALANCE: 0 - LEANS OR SLIDES IN CHAIR
NUDGED SCORE: 0
NUDGED: 0 - BEGINS TO FALL
ARISING SCORE: 0
EYES CLOSED AT MAXIMUM POSITION NUDGED: 0 - UNSTEADY
ATTEMPTS TO ARISE: 0 - UNABLE WITHOUT HELP
ARISES: 0 - UNABLE WITHOUT HELP

## 2022-12-22 ASSESSMENT — ACTIVITIES OF DAILY LIVING (ADL)
CURRENT_FUNCTION: MINIMUM ASSIST
AMBULATION ASSISTANCE: 1
AMBULATION ASSISTANCE ON FLAT SURFACES: 1
PHYSICAL TRANSFERS ASSESSED: 1
AMBULATION_DISTANCE/DURATION_TOLERATED: 70FT
AMBULATION ASSISTANCE: STAND BY ASSIST

## 2022-12-23 ENCOUNTER — HOME CARE VISIT (OUTPATIENT)
Dept: HOME HEALTH SERVICES | Facility: HOME HEALTHCARE | Age: 72
End: 2022-12-23
Payer: MEDICARE

## 2022-12-23 PROCEDURE — G0299 HHS/HOSPICE OF RN EA 15 MIN: HCPCS

## 2022-12-23 ASSESSMENT — FIBROSIS 4 INDEX: FIB4 SCORE: 4.11

## 2022-12-26 VITALS
RESPIRATION RATE: 17 BRPM | DIASTOLIC BLOOD PRESSURE: 62 MMHG | SYSTOLIC BLOOD PRESSURE: 110 MMHG | OXYGEN SATURATION: 95 % | BODY MASS INDEX: 28.62 KG/M2 | WEIGHT: 211 LBS | TEMPERATURE: 97.6 F | HEART RATE: 74 BPM

## 2022-12-26 SDOH — ECONOMIC STABILITY: HOUSING INSECURITY: EVIDENCE OF SMOKING MATERIAL: 0

## 2022-12-26 ASSESSMENT — ENCOUNTER SYMPTOMS
LAST BOWEL MOVEMENT: 66466
PAIN LOCATION - RELIEVING FACTORS: PAIN PILL, SITTING
PERSON REPORTING PAIN: PATIENT
PAIN: 1
STOOL FREQUENCY: DAILY
PAIN SEVERITY GOAL: 0/10
HIGHEST PAIN SEVERITY IN PAST 24 HOURS: 6/10
DEBILITATING PAIN: 1
SUBJECTIVE PAIN PROGRESSION: RAPIDLY IMPROVING
PAIN LOCATION - PAIN SEVERITY: 6/10
PAIN LOCATION - PAIN QUALITY: ACHY
BOWEL PATTERN NORMAL: 1
PAIN LOCATION: BACK
LOWEST PAIN SEVERITY IN PAST 24 HOURS: 0/10
MUSCLE WEAKNESS: 1
PAIN LOCATION - PAIN FREQUENCY: INTERMITTENT
LOWER EXTREMITY EDEMA: 1

## 2022-12-26 ASSESSMENT — PAIN SCALES - PAIN ASSESSMENT IN ADVANCED DEMENTIA (PAINAD)
NEGVOCALIZATION: 0 - NONE.
BODYLANGUAGE: 0 - RELAXED.
TOTALSCORE: 0
CONSOLABILITY: 0 - NO NEED TO CONSOLE.
FACIALEXPRESSION: 0 - SMILING OR INEXPRESSIVE.

## 2022-12-27 ENCOUNTER — TELEPHONE (OUTPATIENT)
Dept: PALLIATIVE MEDICINE | Facility: HOSPICE | Age: 72
End: 2022-12-27
Payer: MEDICARE

## 2022-12-27 ENCOUNTER — HOME CARE VISIT (OUTPATIENT)
Dept: HOME HEALTH SERVICES | Facility: HOME HEALTHCARE | Age: 72
End: 2022-12-27
Payer: MEDICARE

## 2022-12-27 PROCEDURE — G0299 HHS/HOSPICE OF RN EA 15 MIN: HCPCS

## 2022-12-27 ASSESSMENT — FIBROSIS 4 INDEX: FIB4 SCORE: 4.11

## 2022-12-27 NOTE — TELEPHONE ENCOUNTER
"Check-in call.  Spoke to Karen, she states that Mike is doing \"very well\" after his surgery, and has his needs met with the help of Home Health.  "

## 2022-12-29 VITALS
WEIGHT: 215 LBS | TEMPERATURE: 97.8 F | BODY MASS INDEX: 29.16 KG/M2 | HEART RATE: 73 BPM | OXYGEN SATURATION: 96 % | RESPIRATION RATE: 17 BRPM | SYSTOLIC BLOOD PRESSURE: 112 MMHG | DIASTOLIC BLOOD PRESSURE: 68 MMHG

## 2022-12-29 SDOH — ECONOMIC STABILITY: HOUSING INSECURITY: EVIDENCE OF SMOKING MATERIAL: 0

## 2022-12-29 ASSESSMENT — PAIN SCALES - PAIN ASSESSMENT IN ADVANCED DEMENTIA (PAINAD)
BODYLANGUAGE: 0 - RELAXED.
CONSOLABILITY: 0 - NO NEED TO CONSOLE.
FACIALEXPRESSION: 0 - SMILING OR INEXPRESSIVE.
NEGVOCALIZATION: 0 - NONE.
TOTALSCORE: 0

## 2022-12-29 ASSESSMENT — ENCOUNTER SYMPTOMS
PAIN SEVERITY GOAL: 0/10
PAIN LOCATION - PAIN FREQUENCY: INTERMITTENT
DEBILITATING PAIN: 1
SUBJECTIVE PAIN PROGRESSION: GRADUALLY IMPROVING
LAST BOWEL MOVEMENT: 66470
MUSCLE WEAKNESS: 1
BOWEL PATTERN NORMAL: 1
LOWEST PAIN SEVERITY IN PAST 24 HOURS: 0/10
PAIN: 1
HIGHEST PAIN SEVERITY IN PAST 24 HOURS: 4/10
PAIN LOCATION - PAIN QUALITY: ACHY
PAIN LOCATION - EXACERBATING FACTORS: WALKING
PAIN LOCATION - PAIN SEVERITY: 4/10
PAIN LOCATION: BACK
PERSON REPORTING PAIN: PATIENT
LOWER EXTREMITY EDEMA: 1
STOOL FREQUENCY: DAILY

## 2022-12-30 ENCOUNTER — HOME CARE VISIT (OUTPATIENT)
Dept: HOME HEALTH SERVICES | Facility: HOME HEALTHCARE | Age: 72
End: 2022-12-30
Payer: MEDICARE

## 2022-12-30 VITALS
TEMPERATURE: 97.9 F | OXYGEN SATURATION: 95 % | HEART RATE: 73 BPM | RESPIRATION RATE: 17 BRPM | SYSTOLIC BLOOD PRESSURE: 130 MMHG | DIASTOLIC BLOOD PRESSURE: 84 MMHG

## 2022-12-30 PROCEDURE — G0493 RN CARE EA 15 MIN HH/HOSPICE: HCPCS

## 2022-12-30 PROCEDURE — G0151 HHCP-SERV OF PT,EA 15 MIN: HCPCS

## 2022-12-30 ASSESSMENT — ENCOUNTER SYMPTOMS
PAIN: 1
PAIN LOCATION: BACK
PERSON REPORTING PAIN: PATIENT
PAIN SEVERITY GOAL: 0/10
SEVERE DYSPNEA: 1
DEBILITATING PAIN: 1
HIGHEST PAIN SEVERITY IN PAST 24 HOURS: 8/10
PAIN LOCATION - PAIN SEVERITY: 0/10
LOWEST PAIN SEVERITY IN PAST 24 HOURS: 0/10

## 2022-12-30 ASSESSMENT — FIBROSIS 4 INDEX: FIB4 SCORE: 4.11

## 2022-12-31 VITALS
SYSTOLIC BLOOD PRESSURE: 130 MMHG | HEART RATE: 73 BPM | OXYGEN SATURATION: 95 % | WEIGHT: 211 LBS | TEMPERATURE: 97.9 F | BODY MASS INDEX: 28.62 KG/M2 | DIASTOLIC BLOOD PRESSURE: 84 MMHG

## 2022-12-31 SDOH — ECONOMIC STABILITY: HOUSING INSECURITY: EVIDENCE OF SMOKING MATERIAL: 0

## 2022-12-31 ASSESSMENT — ENCOUNTER SYMPTOMS
HIGHEST PAIN SEVERITY IN PAST 24 HOURS: 6/10
PERSON REPORTING PAIN: PATIENT
PAIN LOCATION: BACK
LIMITED RANGE OF MOTION: 1
PAIN: 1
STOOL FREQUENCY: DAILY
PAIN LOCATION - PAIN QUALITY: ACHY
PAIN SEVERITY GOAL: 0/10
DEBILITATING PAIN: 1
PAIN LOCATION - EXACERBATING FACTORS: WALKING
PAIN LOCATION - PAIN SEVERITY: 6/10
PAIN LOCATION - PAIN FREQUENCY: INTERMITTENT
LOWEST PAIN SEVERITY IN PAST 24 HOURS: 0/10
LAST BOWEL MOVEMENT: 66473
BOWEL PATTERN NORMAL: 1

## 2022-12-31 ASSESSMENT — PAIN SCALES - PAIN ASSESSMENT IN ADVANCED DEMENTIA (PAINAD)
TOTALSCORE: 0
NEGVOCALIZATION: 0 - NONE.
CONSOLABILITY: 0 - NO NEED TO CONSOLE.
FACIALEXPRESSION: 0 - SMILING OR INEXPRESSIVE.
BODYLANGUAGE: 0 - RELAXED.

## 2022-12-31 ASSESSMENT — ACTIVITIES OF DAILY LIVING (ADL)
OASIS_M1830: 02
HOME_HEALTH_OASIS: 01

## 2023-01-02 ENCOUNTER — HOME CARE VISIT (OUTPATIENT)
Dept: HOME HEALTH SERVICES | Facility: HOME HEALTHCARE | Age: 73
End: 2023-01-02
Payer: MEDICARE

## 2023-01-02 NOTE — CASE COMMUNICATION
Quality Review for 12.30.22 NE OASIS performed on by ASHLEE Terrazas RN on 1.2.2023:    Edits completed by ASHLEE Terrazas RN:  1. Changed  to 4, pt wears 02 at HS for ÁNGEL.   2. Changed  to 0 per wound assessment  3. Changed  to 2 per [id number] L,M response of 4

## 2023-01-02 NOTE — CASE COMMUNICATION
Quality Review for 12.30.22 RI OASIS performed on by ASHLEE Terrazas RN on 1.2.2023:     Edits completed by ASHLEE Terrazas RN:  1. Changed  to 4, pt wears 02 at HS for ÁNGEL.   2. Changed  to 0 per wound assessment

## 2023-01-02 NOTE — CASE COMMUNICATION
Quality Review for 12.20.22 SOC OASIS performed on by ASHLEE Terrazas RN on 1.2.2023:    Edits completed by ASHLEE Terrazas RN:  1.  and  dx coding updated per chart review.   2. Changed  to 12.20.22 per LSOC order  3. Changed  to 3 per ambulation score  4. Changed  to 2  5.  Updated F2F data

## 2023-01-02 NOTE — Clinical Note
I agree with these changes  ----- Message -----  From: Ariana Terrazas R.N.  Sent: 1/2/2023  12:00 PM PST  To: Deya Hodgson R.N.      Quality Review for 12.20.22 SOC OASIS performed on by ASHLEE Terrazas RN on 1.2.2023:    Edits completed by ASHLEE Terrazas RN:  1.  and  dx coding updated per chart review.   2. Changed  to 12.20.22 per LSOC order  3. Changed  to 3 per ambulation score  4. Changed  to 2  5.  Updated F2F data

## 2023-01-04 NOTE — CASE COMMUNICATION
agree to all changes made  ----- Message -----  From: Ariana Terrazas R.N.  Sent: 1/2/2023  12:22 PM PST  To: Eddie Ocampo R.N.      Quality Review for 12.30.22 DC OASIS performed on by ASHLEE Terrazas RN on 1.2.2023:    Edits completed by ASHLEE Terrazas RN:  1. Changed  to 4, pt wears 02 at HS for ÁNGEL.   2. Changed  to 0 per wound assessment  3. Changed  to 2 per [id number] L,M response of 4

## 2023-02-14 ENCOUNTER — NON-PROVIDER VISIT (OUTPATIENT)
Dept: CARDIOLOGY | Facility: MEDICAL CENTER | Age: 73
End: 2023-02-14
Payer: COMMERCIAL

## 2023-02-14 PROCEDURE — 93295 DEV INTERROG REMOTE 1/2/MLT: CPT | Performed by: INTERNAL MEDICINE

## 2023-02-14 NOTE — CARDIAC REMOTE MONITOR - SCAN
Device transmission reviewed. Device demonstrated appropriate function.       Electronically Signed by: Jozef Lucero M.D.    2/14/2023  1:52 PM

## 2023-04-16 NOTE — H&P
"Hospital Medicine History & Physical Note    Date of Service  2/22/2021    Primary Care Physician  Hipolito Hall M.D.    Consultants  None    Code Status  Full Code    Chief Complaint  Chief Complaint   Patient presents with   • Gout     BIB [facility] for \"gout attack\" to entire body since yesterday. unable to move comfortably states everything is \"stiff\". major pain in neck and BLE region. denies other medical complaints. denies increased intake in red meats, nuts, seafood. \"refugio been sticking to my diet and dont why my gout flared up\" hx of this condition, not on any medications because \"it causes m,e diarrhea\"         History of Presenting Illness  70 y.o. male with current medical history of paroxysmal A. fib on sotalol & chronic coagulation with warfarin, sick sinus syndrome s/p pacemaker placement & AICD for Paroxysmal Paroxysmal VT,   chronic dyspnea on exertion-remote history of valley fever: follow-up with pulmonology, hypertension, hyperlipidemia, alcohol use ( drinks whiskey every day, patient and wife stated that he drinks too much ) who presented 2/22/2021 with pain and swelling in his ankles bilaterally.  Patient stated that he started to have gout attacks 40 years ago, cannot tolerate prophylactic medications for gout because of diarrhea , so he stopped taking gouty medications for years and he used to have gout attacks 1-2 times a year.  Patient complained of joint pains in multiple joints in his body including bilateral knees, bilateral ankles, toes, fingers.  He started noticed pain & swelling in both ankles a week ago & the pain is getting progressively worsened up to a point he cannot ambulate.  So he decided to came into ER.  Wife at bedside at the time of my evaluation.    At ER, vitals s showed a systolic blood pressure on the lower side.  Labs showed mild leukocytosis with WBC of 12, elevated L/A at 3.4, hyponatremia with a sodium of 131.  Elevated C-reactive protein.  Uric acid within " normal limit.  CPK normal  UA positive for nitrates and some bacteria, WBC 0-2.  Patient got a dose of ceftriaxone, IV fluid, 1 dose oral Norco.  Patient will be admitted for management of acute gout attack & possible UTI.      Review of Systems  Review of Systems   Constitutional: Positive for malaise/fatigue. Negative for chills and fever.   HENT: Negative for congestion, ear discharge, ear pain, sinus pain and sore throat.    Eyes: Negative for blurred vision and double vision.   Respiratory: Positive for shortness of breath. Negative for cough, sputum production and wheezing.    Cardiovascular: Negative for chest pain, palpitations and leg swelling.   Gastrointestinal: Negative for abdominal pain, constipation, diarrhea, nausea and vomiting.   Genitourinary: Negative for dysuria, frequency and urgency.   Musculoskeletal: Positive for joint pain. Negative for myalgias.   Neurological: Negative for dizziness, focal weakness and headaches.   Psychiatric/Behavioral: The patient is not nervous/anxious.        Past Medical History   has a past medical history of Arthritis, Atrial fibrillation (HCC) (4/4/2012), Methamphetamine use (HCC) (none for many years), Pain, Paroxysmal ventricular tachycardia (HCC) (4/4/2012), Sinoatrial node dysfunction (HCC) (4/4/2012), and Syncope and collapse (4/4/2012).    Surgical History   has a past surgical history that includes cholecystectomy (1999); pacemaker insertion (2009); aicd implant (2010); and tonsillectomy (1953).     Family History  family history is not on file.     Social History   reports that he has never smoked. He has never used smokeless tobacco. He reports current alcohol use. He reports that he does not use drugs.    Allergies  Allergies   Allergen Reactions   • Lipitor [Atorvastatin]    • Sulfa Drugs Rash and Itching     Rash, itch       Medications  Prior to Admission Medications   Prescriptions Last Dose Informant Patient Reported? Taking?   B Complex Vitamins  (B COMPLEX PO) 2/21/2021 at Unknown time Patient Yes No   Sig: Take  by mouth.   Cholecalciferol (VITAMIN D3) 5000 units Cap 2/21/2021 at Unknown time Patient Yes No   Sig: Take 1 capsule by mouth every day.   amlodipine (NORVASC) 5 MG TABS 2/21/2021 at PM Patient Yes No   Sig: Take 5 mg by mouth every day.   ascorbic acid (VITAMIN C) 500 MG tablet 2/21/2021 at Unknown time Patient Yes Yes   Sig: Take 500 mg by mouth every day.   benazepril (LOTENSIN) 40 MG tablet 2/21/2021 at Unknown time Patient Yes No   Sig: Take 40 mg by mouth every day.   colesevelam (WELCHOL) 625 MG Tab 2/19/2021  Yes Yes   Sig: Take 625 mg by mouth every day.   hydrochlorothiazide (MICROZIDE) 12.5 MG capsule 2/21/2021 at Unknown time Patient No No   Sig: Take 1 Cap by mouth every day.   potassium chloride SA (KDUR) 20 MEQ Tab CR 2/21/2021 at Unknown time Patient Yes No   Sig: Take 20 mEq by mouth every day.   pravastatin (PRAVACHOL) 20 MG Tab 2/21/2021 at Unknown time Patient No No   Sig: Take 1 tablet by mouth every evening.   sotalol (BETAPACE) 120 MG tablet 2/21/2021 at 1900 Patient No No   Sig: Take 1 Tab by mouth 2 times a day.   traMADol (ULTRAM) 50 MG Tab 2/19/2021  Yes Yes   Sig: Take 50 mg by mouth every 6 hours as needed for Moderate Pain.   warfarin (COUMADIN) 5 MG Tab 2/21/2021 at 1900 Patient No No   Sig: TAKE 1 (ONE) TO 1 & 1/2 (ONE & ONE-HALF) TABLETS BY MOUTH ONCE DAILY AS DIRECTED BY THE COUMADIN CLINIC.   Patient taking differently: Take 5 mg by mouth every day.      Facility-Administered Medications: None       Physical Exam  Temp:  [36.6 °C (97.8 °F)-36.7 °C (98 °F)] 36.7 °C (98 °F)  Pulse:  [68-72] 69  Resp:  [14-25] 20  BP: (106-134)/(59-71) 113/67  SpO2:  [90 %-99 %] 96 %    Physical Exam  Constitutional:       General: He is not in acute distress.     Appearance: He is obese.   HENT:      Head: Normocephalic and atraumatic.      Nose: Nose normal.      Mouth/Throat:      Mouth: Mucous membranes are moist.      Pharynx:  No posterior oropharyngeal erythema.   Eyes:      General: No scleral icterus.     Extraocular Movements: Extraocular movements intact.      Conjunctiva/sclera: Conjunctivae normal.      Pupils: Pupils are equal, round, and reactive to light.   Cardiovascular:      Rate and Rhythm: Normal rate and regular rhythm.      Pulses: Normal pulses.      Heart sounds: Normal heart sounds. No murmur. No gallop.    Pulmonary:      Effort: Pulmonary effort is normal.      Breath sounds: Normal breath sounds. No stridor. No wheezing, rhonchi or rales.   Abdominal:      General: Bowel sounds are normal.      Palpations: Abdomen is soft.   Musculoskeletal:         General: Swelling and tenderness present.      Cervical back: Normal range of motion and neck supple. No rigidity.      Comments: Swelling, Erythema, increased temperature, tenderness in Ankles bilaterally   Skin:     General: Skin is warm.   Neurological:      General: No focal deficit present.      Mental Status: He is alert and oriented to person, place, and time.   Psychiatric:         Mood and Affect: Mood normal.         Behavior: Behavior normal.         Laboratory:  Recent Labs     02/22/21  0830   WBC 12.4*   RBC 3.97*   HEMOGLOBIN 14.3   HEMATOCRIT 40.9*   .0*   MCH 36.0*   MCHC 35.0   RDW 46.8   PLATELETCT 184   MPV 11.4     Recent Labs     02/22/21  0830   SODIUM 131*   POTASSIUM 3.8   CHLORIDE 93*   CO2 22   GLUCOSE 109*   BUN 14   CREATININE 0.92   CALCIUM 9.4     Recent Labs     02/22/21  0830   ALTSGPT 14   ASTSGOT 17   ALKPHOSPHAT 89   TBILIRUBIN 2.2*   GLUCOSE 109*         No results for input(s): NTPROBNP in the last 72 hours.      No results for input(s): TROPONINT in the last 72 hours.    Imaging:  DX-CHEST-PORTABLE (1 VIEW)   Final Result      No acute cardiac or pulmonary abnormality is noted.            Assessment/Plan:  I anticipate this patient will require at least two midnights for appropriate medical management, necessitating inpatient  "admission.    Gouty arthritis of both ankles  Assessment & Plan  - c/o Red, hot, tender ,swollen ankles bilaterally  -Known history of gout for 40 years, not on any medications because of medication intolerance with diarrhea  -Colchicine 1.5 mg once followed by colchicine 0.6 mg in 1 hour followed by colchicine 0.6 mg twice daily  -Consider starting allopurinol on discharge        Leucocytosis  Assessment & Plan  - Labs showed mild leukocytosis with WBC of 12, elevated L/A at 3.4  - UA positive for nitrates and some bacteria, WBC 0-2.  - Patient got a dose of ceftriaxone, IV fluid  -Leukocytosis may be a component of acute gouty arthritis  -Patient denied dysuria, urinary frequency and urgency  -Hold antibiotics for now since the patient got a dose of ceftriaxone for today   -Ordered procalcitonin  -will start antibiotic if worsening leukocytosis and procalcitonin came back positive    Dyspnea- (present on admission)  Assessment & Plan  -Neck dyspnea on exertion   - past medical history of valley fever  -Pt is following up with outpatient pulmonology once a year  -Oxygen per protocol    Alcohol abuse- (present on admission)  Assessment & Plan  -Patient and wife stated that he drinks whiskey every day, wife stated that \"he drinks too much\"  -Patient denied past medical history of alcohol withdrawal seizures or alcohol withdrawal signs/ symptoms   -Monitor for alcohol withdrawal signs & symptoms    Anticoagulant long-term use- (present on admission)  Assessment & Plan  -On warfarin for atrial fibrillation anticoagulation  -Continue    Atrial fibrillation (HCC)- (present on admission)  Assessment & Plan  - sick sinus syndrome s/p pacemaker placement & AICD for Paroxysmal Paroxysmal VT  -Atrial fibrillation  -[id number] score 2  - stable   -Patient is following up with outpatient cardiology every 6 mos  -Continue home sotalol and warfarin      Sinoatrial node dysfunction (HCC)- (present on admission)  Assessment & " None Plan  - sick sinus syndrome s/p pacemaker placement & AICD for Paroxysmal Paroxysmal VT  - stable   -Patient is following up with outpatient cardiology every 6 mos  -Continue home sotalol and warfarin      Paroxysmal ventricular tachycardia (HCC)- (present on admission)  Assessment & Plan  - sick sinus syndrome s/p pacemaker placement & AICD for Paroxysmal Paroxysmal VT  - stable   -Patient is following up with outpatient cardiology every 6 mos  -Continue home sotalol and warfarin    DVT prophylaxis with SCD and patient is on chronic anticoagulation with warfarin

## 2023-07-09 NOTE — PROGRESS NOTES
Assumed care of patient, bedside report received from BETTY Baldwin. Patient A&O x4, RA, and no complaints of pain at this time. Updated on POC, call light within reach and fall precautions in place. Bed locked and in lowest position. Patient instructed to call for assistance before getting out of bed. All questions answered, no other needs at this time.      No

## 2023-07-10 NOTE — PROGRESS NOTES
Called and gave report to BETTY Jay over at [facility].    Impaired gait Size Of Margin In Cm: 1.1

## 2023-10-19 ENCOUNTER — ANTICOAGULATION MONITORING (OUTPATIENT)
Dept: VASCULAR LAB | Facility: MEDICAL CENTER | Age: 73
End: 2023-10-19
Payer: COMMERCIAL

## 2023-10-19 DIAGNOSIS — D68.69 SECONDARY HYPERCOAGULABLE STATE (HCC): ICD-10-CM

## 2023-10-19 DIAGNOSIS — I48.91 ATRIAL FIBRILLATION, UNSPECIFIED TYPE (HCC): ICD-10-CM

## 2023-10-19 NOTE — PROGRESS NOTES
[facility]    Pt reports he will no longer see [facility].  He is managed my SM.  Episodes of care updated.    Jose Arvizu, CeceliaD
